# Patient Record
Sex: FEMALE | Race: ASIAN | NOT HISPANIC OR LATINO | URBAN - METROPOLITAN AREA
[De-identification: names, ages, dates, MRNs, and addresses within clinical notes are randomized per-mention and may not be internally consistent; named-entity substitution may affect disease eponyms.]

---

## 2017-03-01 ENCOUNTER — OUTPATIENT (OUTPATIENT)
Dept: OUTPATIENT SERVICES | Facility: HOSPITAL | Age: 72
LOS: 1 days | End: 2017-03-01
Payer: MEDICAID

## 2017-03-01 PROCEDURE — G9001: CPT

## 2017-03-14 DIAGNOSIS — R69 ILLNESS, UNSPECIFIED: ICD-10-CM

## 2018-04-11 ENCOUNTER — APPOINTMENT (OUTPATIENT)
Dept: MAMMOGRAPHY | Facility: IMAGING CENTER | Age: 73
End: 2018-04-11

## 2018-04-11 ENCOUNTER — APPOINTMENT (OUTPATIENT)
Dept: ULTRASOUND IMAGING | Facility: IMAGING CENTER | Age: 73
End: 2018-04-11

## 2019-02-01 ENCOUNTER — OUTPATIENT (OUTPATIENT)
Dept: OUTPATIENT SERVICES | Facility: HOSPITAL | Age: 74
LOS: 1 days | End: 2019-02-01
Payer: MEDICAID

## 2019-02-01 PROCEDURE — G9001: CPT

## 2019-02-21 ENCOUNTER — INPATIENT (INPATIENT)
Facility: HOSPITAL | Age: 74
LOS: 2 days | Discharge: HOME HEALTH | End: 2019-02-24
Attending: STUDENT IN AN ORGANIZED HEALTH CARE EDUCATION/TRAINING PROGRAM | Admitting: STUDENT IN AN ORGANIZED HEALTH CARE EDUCATION/TRAINING PROGRAM
Payer: MEDICARE

## 2019-02-21 VITALS
OXYGEN SATURATION: 96 % | DIASTOLIC BLOOD PRESSURE: 58 MMHG | SYSTOLIC BLOOD PRESSURE: 128 MMHG | HEART RATE: 112 BPM | RESPIRATION RATE: 15 BRPM | TEMPERATURE: 101 F

## 2019-02-21 LAB
ALBUMIN SERPL ELPH-MCNC: 4 G/DL — SIGNIFICANT CHANGE UP (ref 3.3–5)
ALP SERPL-CCNC: 111 U/L — SIGNIFICANT CHANGE UP (ref 40–120)
ALT FLD-CCNC: 9 U/L — SIGNIFICANT CHANGE UP (ref 4–33)
ANION GAP SERPL CALC-SCNC: 13 MMO/L — SIGNIFICANT CHANGE UP (ref 7–14)
AST SERPL-CCNC: 19 U/L — SIGNIFICANT CHANGE UP (ref 4–32)
BASE EXCESS BLDV CALC-SCNC: -0.2 MMOL/L — SIGNIFICANT CHANGE UP
BASOPHILS # BLD AUTO: 0.06 K/UL — SIGNIFICANT CHANGE UP (ref 0–0.2)
BASOPHILS NFR BLD AUTO: 0.3 % — SIGNIFICANT CHANGE UP (ref 0–2)
BILIRUB SERPL-MCNC: 1.8 MG/DL — HIGH (ref 0.2–1.2)
BLOOD GAS VENOUS - CREATININE: 0.94 MG/DL — SIGNIFICANT CHANGE UP (ref 0.5–1.3)
BUN SERPL-MCNC: 18 MG/DL — SIGNIFICANT CHANGE UP (ref 7–23)
CALCIUM SERPL-MCNC: 9.3 MG/DL — SIGNIFICANT CHANGE UP (ref 8.4–10.5)
CHLORIDE BLDV-SCNC: 105 MMOL/L — SIGNIFICANT CHANGE UP (ref 96–108)
CHLORIDE SERPL-SCNC: 98 MMOL/L — SIGNIFICANT CHANGE UP (ref 98–107)
CO2 SERPL-SCNC: 23 MMOL/L — SIGNIFICANT CHANGE UP (ref 22–31)
CREAT SERPL-MCNC: 0.97 MG/DL — SIGNIFICANT CHANGE UP (ref 0.5–1.3)
EOSINOPHIL # BLD AUTO: 0.01 K/UL — SIGNIFICANT CHANGE UP (ref 0–0.5)
EOSINOPHIL NFR BLD AUTO: 0.1 % — SIGNIFICANT CHANGE UP (ref 0–6)
FLU A RESULT: NOT DETECTED — SIGNIFICANT CHANGE UP
FLU A RESULT: NOT DETECTED — SIGNIFICANT CHANGE UP
FLUAV AG NPH QL: NOT DETECTED — SIGNIFICANT CHANGE UP
FLUBV AG NPH QL: NOT DETECTED — SIGNIFICANT CHANGE UP
GAS PNL BLDV: 135 MMOL/L — LOW (ref 136–146)
GLUCOSE BLDV-MCNC: 189 — HIGH (ref 70–99)
GLUCOSE SERPL-MCNC: 186 MG/DL — HIGH (ref 70–99)
HCO3 BLDV-SCNC: 24 MMOL/L — SIGNIFICANT CHANGE UP (ref 20–27)
HCT VFR BLD CALC: 36.4 % — SIGNIFICANT CHANGE UP (ref 34.5–45)
HCT VFR BLDV CALC: 40.7 % — SIGNIFICANT CHANGE UP (ref 34.5–45)
HGB BLD-MCNC: 12.3 G/DL — SIGNIFICANT CHANGE UP (ref 11.5–15.5)
HGB BLDV-MCNC: 13.3 G/DL — SIGNIFICANT CHANGE UP (ref 11.5–15.5)
IMM GRANULOCYTES NFR BLD AUTO: 0.6 % — SIGNIFICANT CHANGE UP (ref 0–1.5)
LACTATE BLDV-MCNC: 1.5 MMOL/L — SIGNIFICANT CHANGE UP (ref 0.5–2)
LYMPHOCYTES # BLD AUTO: 16.5 % — SIGNIFICANT CHANGE UP (ref 13–44)
LYMPHOCYTES # BLD AUTO: 3.3 K/UL — SIGNIFICANT CHANGE UP (ref 1–3.3)
MCHC RBC-ENTMCNC: 29.6 PG — SIGNIFICANT CHANGE UP (ref 27–34)
MCHC RBC-ENTMCNC: 33.8 % — SIGNIFICANT CHANGE UP (ref 32–36)
MCV RBC AUTO: 87.7 FL — SIGNIFICANT CHANGE UP (ref 80–100)
MONOCYTES # BLD AUTO: 1.32 K/UL — HIGH (ref 0–0.9)
MONOCYTES NFR BLD AUTO: 6.6 % — SIGNIFICANT CHANGE UP (ref 2–14)
NEUTROPHILS # BLD AUTO: 15.18 K/UL — HIGH (ref 1.8–7.4)
NEUTROPHILS NFR BLD AUTO: 75.9 % — SIGNIFICANT CHANGE UP (ref 43–77)
NRBC # FLD: 0 K/UL — LOW (ref 25–125)
PCO2 BLDV: 37 MMHG — LOW (ref 41–51)
PH BLDV: 7.43 PH — SIGNIFICANT CHANGE UP (ref 7.32–7.43)
PLATELET # BLD AUTO: 246 K/UL — SIGNIFICANT CHANGE UP (ref 150–400)
PMV BLD: 10.4 FL — SIGNIFICANT CHANGE UP (ref 7–13)
PO2 BLDV: 28 MMHG — LOW (ref 35–40)
POTASSIUM BLDV-SCNC: 3.6 MMOL/L — SIGNIFICANT CHANGE UP (ref 3.4–4.5)
POTASSIUM SERPL-MCNC: 3.5 MMOL/L — SIGNIFICANT CHANGE UP (ref 3.5–5.3)
POTASSIUM SERPL-SCNC: 3.5 MMOL/L — SIGNIFICANT CHANGE UP (ref 3.5–5.3)
PROT SERPL-MCNC: 8.5 G/DL — HIGH (ref 6–8.3)
RBC # BLD: 4.15 M/UL — SIGNIFICANT CHANGE UP (ref 3.8–5.2)
RBC # FLD: 12.7 % — SIGNIFICANT CHANGE UP (ref 10.3–14.5)
RSV RESULT: SIGNIFICANT CHANGE UP
RSV RNA RESP QL NAA+PROBE: SIGNIFICANT CHANGE UP
SAO2 % BLDV: 50.8 % — LOW (ref 60–85)
SODIUM SERPL-SCNC: 134 MMOL/L — LOW (ref 135–145)
WBC # BLD: 19.98 K/UL — HIGH (ref 3.8–10.5)
WBC # FLD AUTO: 19.98 K/UL — HIGH (ref 3.8–10.5)

## 2019-02-21 PROCEDURE — 71045 X-RAY EXAM CHEST 1 VIEW: CPT | Mod: 26,76

## 2019-02-21 RX ORDER — SODIUM CHLORIDE 9 MG/ML
1000 INJECTION INTRAMUSCULAR; INTRAVENOUS; SUBCUTANEOUS ONCE
Qty: 0 | Refills: 0 | Status: COMPLETED | OUTPATIENT
Start: 2019-02-21 | End: 2019-02-21

## 2019-02-21 RX ORDER — ACETAMINOPHEN 500 MG
650 TABLET ORAL ONCE
Qty: 0 | Refills: 0 | Status: COMPLETED | OUTPATIENT
Start: 2019-02-21 | End: 2019-02-21

## 2019-02-21 RX ADMIN — Medication 650 MILLIGRAM(S): at 21:17

## 2019-02-21 RX ADMIN — Medication 650 MILLIGRAM(S): at 22:00

## 2019-02-21 RX ADMIN — SODIUM CHLORIDE 2000 MILLILITER(S): 9 INJECTION INTRAMUSCULAR; INTRAVENOUS; SUBCUTANEOUS at 21:17

## 2019-02-21 NOTE — ED PROVIDER NOTE - ATTENDING CONTRIBUTION TO CARE
Donna: 74 yo female with a h/o DM, HTN, c/o one week of flu like illness. Pt endorses productive cough, myalgias, nasal congestion, and subjective fevers and chills. No LE pain or edema. No abdominal pain, nausea or vomiting. No recent sick contacts or foreign travel. Exam: GENERAL: well appearing, NAD, HEENT: MMM, PERRLA, CARDIO: +S1/S2, no murmurs, rubs or gallops, LUNGS: CTA B/L, no wheezing, rales or rhonchi, no tachypnea, speaking in full sentences ABD: soft, nontender, BSx4 quadrants, no guarding or rigidity. EXT: No LE edema NEURO: AxOx3,  SKIN: no rashes or lesions, well perfused A/P- 74yo female with URI symptoms. will obtain cbc, cmp, VBG, CXR, give IVf and reassess.

## 2019-02-21 NOTE — ED PROVIDER NOTE - PROGRESS NOTE DETAILS
Maegan ([facility]):  73 y.o. Female PMH DMII p/w diarrhea, cough, and fever/chills x 1 week.  Febrile, but well appearing and unremarkable exam, lungs clear.  Infectious w/u, Tylenol, IVF, CXR, UA/UCx, reassess.

## 2019-02-21 NOTE — ED PROVIDER NOTE - CLINICAL SUMMARY MEDICAL DECISION MAKING FREE TEXT BOX
Kyle WREN MD PGY1: 73 F p/w cough, fevers, weaknesss c/f influenza vs pneumonia. WIll eval with flu swab, CXR. Likely treat with levaquin.

## 2019-02-21 NOTE — ED PROVIDER NOTE - NSFOLLOWUPINSTRUCTIONS_ED_ALL_ED_FT
Please return to the ED for any concerns, Please follow-up with a primary care doctor in the next week. Please take your antibiotics as directed.

## 2019-02-21 NOTE — ED PROVIDER NOTE - CARE PLAN
Principal Discharge DX:	Pneumonia Principal Discharge DX:	Pneumonia  Secondary Diagnosis:	Sepsis, due to unspecified organism

## 2019-02-21 NOTE — ED PROVIDER NOTE - PHYSICAL EXAMINATION
Kyle WREN MD PGY1:   PHYSICAL EXAM:    GENERAL: NAD, well-developed  HEENT:  Atraumatic, Normocephalic  CHEST/LUNG: Chest rise equal bilaterally. CTAB.   HEART: Regular rate and rhythm. No murmurs or rubs.   ABDOMEN: Soft, Nontender, Nondistended  EXTREMITIES:  2+ Peripheral Pulses.  PSYCH: A&Ox3  SKIN: No obvious rashes or lesions

## 2019-02-21 NOTE — ED ADULT TRIAGE NOTE - CHIEF COMPLAINT QUOTE
Pt brought in by EMS from home for flu-like symptoms.  C/o diarrhea,  chills, productive cough for approx 1 week.  Upon EMS arrival, pt was 88% RA, increased to 96% on 4L O2 via NC.  Denies taking any OTC meds for symptoms.  Denies any chest pain/palpitations.  PMHx: DM, HTN, high cholesterol

## 2019-02-21 NOTE — ED ADULT NURSE NOTE - OBJECTIVE STATEMENT
pt received alert and oriented x3. pmhx dm. pt c.o having flu like symptoms consisting of body aches,fevers,chills for one week. pt states she last took tyneol for fever this morning. respirations equal and unlabored. abd soft and non tender. pt declines any chest pain,sob,n/v/d,dizziness,lightheadness. Call bell in reach, warm blanket provided, bed in lowest position, side rails up x2,safety maintained. will continue to monitor. md at bedside for eval.

## 2019-02-21 NOTE — ED PROVIDER NOTE - NSFOLLOWUPCLINICS_GEN_ALL_ED_FT
[facility] - Primary Care  Primary Care  [address] [place] [address]  Phone: [phone]  Fax:   Follow Up Time:

## 2019-02-21 NOTE — ED PROVIDER NOTE - OBJECTIVE STATEMENT
Kyle WREN MD PGY1: 73 F PMH HTn and T2DM p/w productive cough, subjective fevers, and weakness x 2 weeks. Has not seen anyone for this before. Has been taking antipyretics intermittently with some relief. No dysuria, hematuria. received influenza vaccine last season.

## 2019-02-22 ENCOUNTER — TRANSCRIPTION ENCOUNTER (OUTPATIENT)
Age: 74
End: 2019-02-22

## 2019-02-22 DIAGNOSIS — Z29.9 ENCOUNTER FOR PROPHYLACTIC MEASURES, UNSPECIFIED: ICD-10-CM

## 2019-02-22 DIAGNOSIS — J18.9 PNEUMONIA, UNSPECIFIED ORGANISM: ICD-10-CM

## 2019-02-22 DIAGNOSIS — E11.9 TYPE 2 DIABETES MELLITUS WITHOUT COMPLICATIONS: ICD-10-CM

## 2019-02-22 DIAGNOSIS — R65.10 SYSTEMIC INFLAMMATORY RESPONSE SYNDROME (SIRS) OF NON-INFECTIOUS ORIGIN WITHOUT ACUTE ORGAN DYSFUNCTION: ICD-10-CM

## 2019-02-22 DIAGNOSIS — Z79.899 OTHER LONG TERM (CURRENT) DRUG THERAPY: ICD-10-CM

## 2019-02-22 DIAGNOSIS — I10 ESSENTIAL (PRIMARY) HYPERTENSION: ICD-10-CM

## 2019-02-22 DIAGNOSIS — E87.1 HYPO-OSMOLALITY AND HYPONATREMIA: ICD-10-CM

## 2019-02-22 DIAGNOSIS — R50.9 FEVER, UNSPECIFIED: ICD-10-CM

## 2019-02-22 DIAGNOSIS — R00.0 TACHYCARDIA, UNSPECIFIED: ICD-10-CM

## 2019-02-22 DIAGNOSIS — R19.7 DIARRHEA, UNSPECIFIED: ICD-10-CM

## 2019-02-22 LAB
ALBUMIN SERPL ELPH-MCNC: 3.3 G/DL — SIGNIFICANT CHANGE UP (ref 3.3–5)
ALP SERPL-CCNC: 101 U/L — SIGNIFICANT CHANGE UP (ref 40–120)
ALT FLD-CCNC: 14 U/L — SIGNIFICANT CHANGE UP (ref 4–33)
ANION GAP SERPL CALC-SCNC: 12 MMO/L — SIGNIFICANT CHANGE UP (ref 7–14)
APPEARANCE UR: CLEAR — SIGNIFICANT CHANGE UP
AST SERPL-CCNC: 22 U/L — SIGNIFICANT CHANGE UP (ref 4–32)
B PERT DNA SPEC QL NAA+PROBE: NOT DETECTED — SIGNIFICANT CHANGE UP
BACTERIA # UR AUTO: NEGATIVE — SIGNIFICANT CHANGE UP
BASE EXCESS BLDV CALC-SCNC: -3.5 MMOL/L — SIGNIFICANT CHANGE UP
BASOPHILS # BLD AUTO: 0.05 K/UL — SIGNIFICANT CHANGE UP (ref 0–0.2)
BASOPHILS NFR BLD AUTO: 0.3 % — SIGNIFICANT CHANGE UP (ref 0–2)
BILIRUB SERPL-MCNC: 1.7 MG/DL — HIGH (ref 0.2–1.2)
BILIRUB UR-MCNC: NEGATIVE — SIGNIFICANT CHANGE UP
BLOOD GAS VENOUS - CREATININE: 0.75 MG/DL — SIGNIFICANT CHANGE UP (ref 0.5–1.3)
BLOOD UR QL VISUAL: SIGNIFICANT CHANGE UP
BUN SERPL-MCNC: 15 MG/DL — SIGNIFICANT CHANGE UP (ref 7–23)
C PNEUM DNA SPEC QL NAA+PROBE: NOT DETECTED — SIGNIFICANT CHANGE UP
CALCIUM SERPL-MCNC: 7.9 MG/DL — LOW (ref 8.4–10.5)
CHLORIDE BLDV-SCNC: 110 MMOL/L — HIGH (ref 96–108)
CHLORIDE SERPL-SCNC: 106 MMOL/L — SIGNIFICANT CHANGE UP (ref 98–107)
CO2 SERPL-SCNC: 21 MMOL/L — LOW (ref 22–31)
COLOR SPEC: YELLOW — SIGNIFICANT CHANGE UP
CREAT SERPL-MCNC: 0.83 MG/DL — SIGNIFICANT CHANGE UP (ref 0.5–1.3)
EOSINOPHIL # BLD AUTO: 0.17 K/UL — SIGNIFICANT CHANGE UP (ref 0–0.5)
EOSINOPHIL NFR BLD AUTO: 1 % — SIGNIFICANT CHANGE UP (ref 0–6)
FLUAV H1 2009 PAND RNA SPEC QL NAA+PROBE: NOT DETECTED — SIGNIFICANT CHANGE UP
FLUAV H1 RNA SPEC QL NAA+PROBE: NOT DETECTED — SIGNIFICANT CHANGE UP
FLUAV H3 RNA SPEC QL NAA+PROBE: NOT DETECTED — SIGNIFICANT CHANGE UP
FLUAV SUBTYP SPEC NAA+PROBE: NOT DETECTED — SIGNIFICANT CHANGE UP
FLUBV RNA SPEC QL NAA+PROBE: NOT DETECTED — SIGNIFICANT CHANGE UP
GAS PNL BLDV: 139 MMOL/L — SIGNIFICANT CHANGE UP (ref 136–146)
GLUCOSE BLDV-MCNC: 114 — HIGH (ref 70–99)
GLUCOSE SERPL-MCNC: 115 MG/DL — HIGH (ref 70–99)
GLUCOSE UR-MCNC: 30 — SIGNIFICANT CHANGE UP
HADV DNA SPEC QL NAA+PROBE: NOT DETECTED — SIGNIFICANT CHANGE UP
HBA1C BLD-MCNC: 6.4 % — HIGH (ref 4–5.6)
HCO3 BLDV-SCNC: 21 MMOL/L — SIGNIFICANT CHANGE UP (ref 20–27)
HCOV PNL SPEC NAA+PROBE: SIGNIFICANT CHANGE UP
HCT VFR BLD CALC: 33.3 % — LOW (ref 34.5–45)
HCT VFR BLDV CALC: 35.4 % — SIGNIFICANT CHANGE UP (ref 34.5–45)
HGB BLD-MCNC: 11 G/DL — LOW (ref 11.5–15.5)
HGB BLDV-MCNC: 11.5 G/DL — SIGNIFICANT CHANGE UP (ref 11.5–15.5)
HMPV RNA SPEC QL NAA+PROBE: NOT DETECTED — SIGNIFICANT CHANGE UP
HPIV1 RNA SPEC QL NAA+PROBE: NOT DETECTED — SIGNIFICANT CHANGE UP
HPIV2 RNA SPEC QL NAA+PROBE: NOT DETECTED — SIGNIFICANT CHANGE UP
HPIV3 RNA SPEC QL NAA+PROBE: NOT DETECTED — SIGNIFICANT CHANGE UP
HPIV4 RNA SPEC QL NAA+PROBE: NOT DETECTED — SIGNIFICANT CHANGE UP
HYALINE CASTS # UR AUTO: SIGNIFICANT CHANGE UP
IMM GRANULOCYTES NFR BLD AUTO: 0.5 % — SIGNIFICANT CHANGE UP (ref 0–1.5)
KETONES UR-MCNC: SIGNIFICANT CHANGE UP
LACTATE BLDV-MCNC: 1.9 MMOL/L — SIGNIFICANT CHANGE UP (ref 0.5–2)
LEUKOCYTE ESTERASE UR-ACNC: NEGATIVE — SIGNIFICANT CHANGE UP
LYMPHOCYTES # BLD AUTO: 17.7 % — SIGNIFICANT CHANGE UP (ref 13–44)
LYMPHOCYTES # BLD AUTO: 2.89 K/UL — SIGNIFICANT CHANGE UP (ref 1–3.3)
MAGNESIUM SERPL-MCNC: 1.8 MG/DL — SIGNIFICANT CHANGE UP (ref 1.6–2.6)
MCHC RBC-ENTMCNC: 29.5 PG — SIGNIFICANT CHANGE UP (ref 27–34)
MCHC RBC-ENTMCNC: 33 % — SIGNIFICANT CHANGE UP (ref 32–36)
MCV RBC AUTO: 89.3 FL — SIGNIFICANT CHANGE UP (ref 80–100)
MONOCYTES # BLD AUTO: 1.51 K/UL — HIGH (ref 0–0.9)
MONOCYTES NFR BLD AUTO: 9.2 % — SIGNIFICANT CHANGE UP (ref 2–14)
MUCOUS THREADS # UR AUTO: SIGNIFICANT CHANGE UP
NEUTROPHILS # BLD AUTO: 11.65 K/UL — HIGH (ref 1.8–7.4)
NEUTROPHILS NFR BLD AUTO: 71.3 % — SIGNIFICANT CHANGE UP (ref 43–77)
NITRITE UR-MCNC: NEGATIVE — SIGNIFICANT CHANGE UP
NRBC # FLD: 0 K/UL — LOW (ref 25–125)
PCO2 BLDV: 41 MMHG — SIGNIFICANT CHANGE UP (ref 41–51)
PH BLDV: 7.34 PH — SIGNIFICANT CHANGE UP (ref 7.32–7.43)
PH UR: 6 — SIGNIFICANT CHANGE UP (ref 5–8)
PHOSPHATE SERPL-MCNC: 1.8 MG/DL — LOW (ref 2.5–4.5)
PLATELET # BLD AUTO: 214 K/UL — SIGNIFICANT CHANGE UP (ref 150–400)
PMV BLD: 10.3 FL — SIGNIFICANT CHANGE UP (ref 7–13)
PO2 BLDV: 27 MMHG — LOW (ref 35–40)
POTASSIUM BLDV-SCNC: 3.1 MMOL/L — LOW (ref 3.4–4.5)
POTASSIUM SERPL-MCNC: 3.4 MMOL/L — LOW (ref 3.5–5.3)
POTASSIUM SERPL-SCNC: 3.4 MMOL/L — LOW (ref 3.5–5.3)
PROCALCITONIN SERPL-MCNC: 0.24 NG/ML — HIGH (ref 0.02–0.1)
PROT SERPL-MCNC: 7 G/DL — SIGNIFICANT CHANGE UP (ref 6–8.3)
PROT UR-MCNC: 70 — SIGNIFICANT CHANGE UP
RBC # BLD: 3.73 M/UL — LOW (ref 3.8–5.2)
RBC # FLD: 12.7 % — SIGNIFICANT CHANGE UP (ref 10.3–14.5)
RBC CASTS # UR COMP ASSIST: SIGNIFICANT CHANGE UP (ref 0–?)
RSV RNA SPEC QL NAA+PROBE: NOT DETECTED — SIGNIFICANT CHANGE UP
RV+EV RNA SPEC QL NAA+PROBE: NOT DETECTED — SIGNIFICANT CHANGE UP
SAO2 % BLDV: 43.4 % — LOW (ref 60–85)
SODIUM SERPL-SCNC: 139 MMOL/L — SIGNIFICANT CHANGE UP (ref 135–145)
SP GR SPEC: 1.03 — SIGNIFICANT CHANGE UP (ref 1–1.04)
SQUAMOUS # UR AUTO: SIGNIFICANT CHANGE UP
UROBILINOGEN FLD QL: SIGNIFICANT CHANGE UP
WBC # BLD: 16.35 K/UL — HIGH (ref 3.8–10.5)
WBC # FLD AUTO: 16.35 K/UL — HIGH (ref 3.8–10.5)
WBC UR QL: SIGNIFICANT CHANGE UP (ref 0–?)

## 2019-02-22 PROCEDURE — 12345: CPT | Mod: NC,GC

## 2019-02-22 PROCEDURE — 99223 1ST HOSP IP/OBS HIGH 75: CPT | Mod: GC

## 2019-02-22 RX ORDER — LACTOBACILLUS ACIDOPHILUS 100MM CELL
1 CAPSULE ORAL DAILY
Qty: 0 | Refills: 0 | Status: DISCONTINUED | OUTPATIENT
Start: 2019-02-22 | End: 2019-02-24

## 2019-02-22 RX ORDER — DORZOLAMIDE HYDROCHLORIDE 20 MG/ML
1 SOLUTION/ DROPS OPHTHALMIC THREE TIMES A DAY
Qty: 0 | Refills: 0 | Status: DISCONTINUED | OUTPATIENT
Start: 2019-02-22 | End: 2019-02-24

## 2019-02-22 RX ORDER — BENZOCAINE AND MENTHOL 5; 1 G/100ML; G/100ML
1 LIQUID ORAL EVERY 6 HOURS
Qty: 0 | Refills: 0 | Status: DISCONTINUED | OUTPATIENT
Start: 2019-02-22 | End: 2019-02-22

## 2019-02-22 RX ORDER — DEXTROSE 50 % IN WATER 50 %
12.5 SYRINGE (ML) INTRAVENOUS ONCE
Qty: 0 | Refills: 0 | Status: DISCONTINUED | OUTPATIENT
Start: 2019-02-22 | End: 2019-02-24

## 2019-02-22 RX ORDER — SODIUM CHLORIDE 9 MG/ML
1000 INJECTION, SOLUTION INTRAVENOUS
Qty: 0 | Refills: 0 | Status: DISCONTINUED | OUTPATIENT
Start: 2019-02-22 | End: 2019-02-22

## 2019-02-22 RX ORDER — ACETAMINOPHEN 500 MG
650 TABLET ORAL EVERY 6 HOURS
Qty: 0 | Refills: 0 | Status: DISCONTINUED | OUTPATIENT
Start: 2019-02-22 | End: 2019-02-24

## 2019-02-22 RX ORDER — SODIUM CHLORIDE 9 MG/ML
1000 INJECTION INTRAMUSCULAR; INTRAVENOUS; SUBCUTANEOUS ONCE
Qty: 0 | Refills: 0 | Status: COMPLETED | OUTPATIENT
Start: 2019-02-22 | End: 2019-02-22

## 2019-02-22 RX ORDER — GLUCAGON INJECTION, SOLUTION 0.5 MG/.1ML
1 INJECTION, SOLUTION SUBCUTANEOUS ONCE
Qty: 0 | Refills: 0 | Status: DISCONTINUED | OUTPATIENT
Start: 2019-02-22 | End: 2019-02-24

## 2019-02-22 RX ORDER — DEXTROSE 50 % IN WATER 50 %
25 SYRINGE (ML) INTRAVENOUS ONCE
Qty: 0 | Refills: 0 | Status: DISCONTINUED | OUTPATIENT
Start: 2019-02-22 | End: 2019-02-24

## 2019-02-22 RX ORDER — SODIUM CHLORIDE 9 MG/ML
1000 INJECTION INTRAMUSCULAR; INTRAVENOUS; SUBCUTANEOUS
Qty: 0 | Refills: 0 | Status: DISCONTINUED | OUTPATIENT
Start: 2019-02-22 | End: 2019-02-24

## 2019-02-22 RX ORDER — INSULIN LISPRO 100/ML
VIAL (ML) SUBCUTANEOUS AT BEDTIME
Qty: 0 | Refills: 0 | Status: DISCONTINUED | OUTPATIENT
Start: 2019-02-22 | End: 2019-02-24

## 2019-02-22 RX ORDER — SODIUM CHLORIDE 9 MG/ML
1000 INJECTION, SOLUTION INTRAVENOUS
Qty: 0 | Refills: 0 | Status: DISCONTINUED | OUTPATIENT
Start: 2019-02-22 | End: 2019-02-24

## 2019-02-22 RX ORDER — BENZOCAINE AND MENTHOL 5; 1 G/100ML; G/100ML
1 LIQUID ORAL EVERY 6 HOURS
Qty: 0 | Refills: 0 | Status: DISCONTINUED | OUTPATIENT
Start: 2019-02-22 | End: 2019-02-24

## 2019-02-22 RX ORDER — INSULIN LISPRO 100/ML
VIAL (ML) SUBCUTANEOUS
Qty: 0 | Refills: 0 | Status: DISCONTINUED | OUTPATIENT
Start: 2019-02-22 | End: 2019-02-24

## 2019-02-22 RX ORDER — SODIUM,POTASSIUM PHOSPHATES 278-250MG
1 POWDER IN PACKET (EA) ORAL
Qty: 0 | Refills: 0 | Status: COMPLETED | OUTPATIENT
Start: 2019-02-22 | End: 2019-02-22

## 2019-02-22 RX ORDER — DEXTROSE 50 % IN WATER 50 %
15 SYRINGE (ML) INTRAVENOUS ONCE
Qty: 0 | Refills: 0 | Status: DISCONTINUED | OUTPATIENT
Start: 2019-02-22 | End: 2019-02-24

## 2019-02-22 RX ADMIN — SODIUM CHLORIDE 75 MILLILITER(S): 9 INJECTION INTRAMUSCULAR; INTRAVENOUS; SUBCUTANEOUS at 05:27

## 2019-02-22 RX ADMIN — SODIUM CHLORIDE 1000 MILLILITER(S): 9 INJECTION INTRAMUSCULAR; INTRAVENOUS; SUBCUTANEOUS at 01:30

## 2019-02-22 RX ADMIN — Medication 100 MILLIGRAM(S): at 22:02

## 2019-02-22 RX ADMIN — Medication 1 TABLET(S): at 11:45

## 2019-02-22 RX ADMIN — Medication 100 MILLIGRAM(S): at 14:26

## 2019-02-22 RX ADMIN — Medication 1 PACKET(S): at 11:45

## 2019-02-22 RX ADMIN — DORZOLAMIDE HYDROCHLORIDE 1 DROP(S): 20 SOLUTION/ DROPS OPHTHALMIC at 22:03

## 2019-02-22 RX ADMIN — Medication 100 MILLIGRAM(S): at 05:28

## 2019-02-22 RX ADMIN — BENZOCAINE AND MENTHOL 1 LOZENGE: 5; 1 LIQUID ORAL at 06:34

## 2019-02-22 RX ADMIN — DORZOLAMIDE HYDROCHLORIDE 1 DROP(S): 20 SOLUTION/ DROPS OPHTHALMIC at 14:26

## 2019-02-22 RX ADMIN — SODIUM CHLORIDE 2000 MILLILITER(S): 9 INJECTION INTRAMUSCULAR; INTRAVENOUS; SUBCUTANEOUS at 00:30

## 2019-02-22 RX ADMIN — Medication 100 MILLIGRAM(S): at 06:34

## 2019-02-22 NOTE — PROGRESS NOTE ADULT - PROBLEM SELECTOR PLAN 1
- Patient with tachycardia, fever, and leukocytosis w/ symptoms concerning for respiratory infection  - S/p levaquin  - RVP negative   - CXR- No pleural effusions or pneumothorax.  - pro-calcitonin- 0.24  - c/w IV Levaquin 750 mg daily, x 5 days  - f/u CT chest  - f/u BCx and UCx, urine legionella

## 2019-02-22 NOTE — H&P ADULT - PROBLEM SELECTOR PLAN 8
- Patient does not recall medications. Need to get med rec from [facility] [address] [place] [address] Phone: [phone] - DVT prophylaxis   - Fall risk   - May need PT consult.

## 2019-02-22 NOTE — H&P ADULT - NSHPPHYSICALEXAM_GEN_ALL_CORE
VITAL SIGNS (Last 24 hrs):  T(C): 36.6 (02-22-19 @ 02:51), Max: 38.4 (02-21-19 @ 19:11)  HR: 90 (02-22-19 @ 02:51) (81 - 112)  BP: 110/59 (02-22-19 @ 02:51) (95/47 - 144/86)  RR: 16 (02-22-19 @ 02:51) (15 - 16)  SpO2: 99% (02-22-19 @ 02:51) (95% - 100%)    I&O's Summary VITAL SIGNS (Last 24 hrs):  T(C): 36.6 (02-22-19 @ 02:51), Max: 38.4 (02-21-19 @ 19:11)  HR: 90 (02-22-19 @ 02:51) (81 - 112)  BP: 110/59 (02-22-19 @ 02:51) (95/47 - 144/86)  RR: 16 (02-22-19 @ 02:51) (15 - 16)  SpO2: 99% (02-22-19 @ 02:51) (95% - 100%)    I&O's Summary    GENERAL: NAD, well-developed  HEAD:  Atraumatic, Normocephalic  EYES: EOMI, conjunctiva and sclera clear  NECK: Supple, No JVD  CHEST/LUNG: Course breath sounds.   HEART: Regular rate and rhythm; normal S1 S2,  No murmurs, rubs, or gallops  ABDOMEN: Soft, Nontender, Nondistended; Bowel sounds normal  EXTREMITIES:  2+ Peripheral Pulses, No clubbing, cyanosis, or edema  PSYCH: AAOx3, No acute distress   NEUROLOGY: non-focal  SKIN: No rashes or lesions VITAL SIGNS (Last 24 hrs):  T(C): 36.6 (02-22-19 @ 02:51), Max: 38.4 (02-21-19 @ 19:11)  HR: 90 (02-22-19 @ 02:51) (81 - 112)  BP: 110/59 (02-22-19 @ 02:51) (95/47 - 144/86)  RR: 16 (02-22-19 @ 02:51) (15 - 16)  SpO2: 99% (02-22-19 @ 02:51) (95% - 100%)    Constitutional: NAD, well-developed, well-nourished  Ears, Nose, Mouth, and Throat: normal external ears and nose, normal hearing, moist oral mucosa  Eyes: normal conjunctiva, EOMI, PERRL  Neck: supple, no JVD  Respiratory: +coarse breath sounds, normal respiratory effort  Cardiovascular: RRR, no M/R/G, no edema, 2+ Peripheral Pulses  Gastrointestinal: soft, nontender, nondistended, +BS, no hernia  Skin: warm, dry, no rash  Neurologic: sensation grossly intact, CN grossly intact, non-focal exam  Musculoskeletal: no clubbing, no cyanosis, no joint swelling  Psychiatric: AOX3, normal mood, affect

## 2019-02-22 NOTE — H&P ADULT - PROBLEM SELECTOR PLAN 6
- Consistent carb diet   - C/w SSI   - Hgba1c in the am. - 1 episode in the ED.   - Lactobacillus ordered.   - CTM

## 2019-02-22 NOTE — DISCHARGE NOTE ADULT - PATIENT PORTAL LINK FT
You can access the [email or web address] Patient Portal, offered by [facility], by registering with the following website: [email or web address]

## 2019-02-22 NOTE — H&P ADULT - NSHPREVIEWOFSYSTEMS_GEN_ALL_CORE
Constitutional: Fever,   Eyes: No recent vision problems or eye pain.  ENT: No congestion, ear pain, or sore throat.  Endocrine: No excess sweating, temperature intolerance  Cardiovascular: No chest pain, palpitations, shortness of breath, pre-syncope, syncope  Respiratory: productive cough   Gastrointestinal: Diarrhea. No abdominal pain, nausea, vomiting,  Genitourinary: No dysuria, hematuria  Musculoskeletal: No joint swelling, joint pain  Neurologic: No headache, dizziness, focal weakness  Skin: No rashes, hematoma, prupura Constitutional Symptoms: +fever, chills  Eyes: No visual changes, headache, eye pain, double vision  Ears, Nose, Mouth, Throat: No runny nose, sinus pain, ear pain, tinnitus, sore throat, dysphagia, odynophagia  Cardiovascular: No chest pain, palpitations, edema  Respiratory: +cough, no hemoptysis, shortness of breath  Gastrointestinal: +diarrhea, no abdominal pain, dysphagia, anorexia, nausea/vomiting, hematemesis, BRBPR, melena  Genitourinary: No dysuria, frequency, hematuria  Musculoskeletal: No joint pain, joint swelling, decreased ROM  Skin: No pruritus, rashes, lesions, wounds  Neurologic:  No seizures, headache, paraesthesia, numbness, limb weakness    Positives and pertinent negatives noted and all other systems negative.

## 2019-02-22 NOTE — H&P ADULT - ASSESSMENT
74 yo woman with pmhx of HTN, T2DM, Glaucoma, Osteoarthritis  who presents for productive cough, subjective fevers, chills, and productive cough for 1 week. SIRS and FUO.

## 2019-02-22 NOTE — PROGRESS NOTE ADULT - ASSESSMENT
72 yo woman with pmhx of HTN, T2DM, Glaucoma, Osteoarthritis  who presents for productive cough, subjective fevers, chills, and productive cough for 1 week.

## 2019-02-22 NOTE — H&P ADULT - HISTORY OF PRESENT ILLNESS
72 yo woman with pmhx of HTN, T2DM who presents for productive cough, subjective fevers and weakness for the past 2 weeks.     In the ED, VS T101.1 , /58 RR 15 Saturating 96%. Blood pressure dropped to SBP 90s, responded to IVF. 72 yo woman with pmhx of HTN, T2DM, Glaucoma, Osteoarthritis  who presents for productive cough, subjective fevers, chills, and productive cough for 1 week. She states all of her symptoms begun around the same time and have been getting worse. She denied any sick contacts. She has been taking acetaminophen for pain, with mild improvement. She states her cough if productive of yellowish sputum, with no blood. Denied any SOB, chest pain, abdominal pain. She recently returned from [place] on mid January. She states she looked very sick, which prompted her  to call 911. Patient does not know the name of the medications she takes, she states she has been having memory problems.     In the ED, VS T101.1 , /58 RR 15 Saturating 96%. Blood pressure dropped to SBP 90s, responded to IVF. She had one episode of watery diarrhea in the ED.

## 2019-02-22 NOTE — H&P ADULT - NSHPLABSRESULTS_GEN_ALL_CORE
12.3   19.98 )-----------( 246      ( 2019 21:30 )             36.4     Hgb Trend: 12.3<--  02    134<L>  |  98  |  18  ----------------------------<  186<H>  3.5   |  23  |  0.97    Ca    9.3      2019 21:30    TPro  8.5<H>  /  Alb  4.0  /  TBili  1.8<H>  /  DBili  x   /  AST  19  /  ALT  9   /  AlkPhos  111      Creatinine Trend: 0.97<--    Urinalysis Basic - ( 2019 23:50 )  Color: YELLOW / Appearance: CLEAR / S.029 / pH: 6.0  Gluc: 30 / Ketone: TRACE  / Bili: NEGATIVE / Urobili: TRACE   Blood: TRACE / Protein: 70 / Nitrite: NEGATIVE   Leuk Esterase: NEGATIVE / RBC: 3-5 / WBC 3-5   Sq Epi: FEW / Non Sq Epi: x / Bacteria: NEGATIVE

## 2019-02-22 NOTE — DISCHARGE NOTE ADULT - PROVIDER TOKENS
FREE:[LAST:[Tobin],FIRST:[Doni],PHONE:[(   )    -],FAX:[(   )    -],ADDRESS:[Address: [address] [place] [address]  Phone: [phone]]]

## 2019-02-22 NOTE — H&P ADULT - PROBLEM SELECTOR PLAN 7
- 1 episode in the ED.   - Lactobacillus ordered.   - CTM - Patient does not recall medications. Need to get med rec from [facility] [address] [place] [address] Phone: [phone]

## 2019-02-22 NOTE — H&P ADULT - PROBLEM SELECTOR PLAN 1
- Symptoms concerning for URI.   - RVP negative. CXR- clear lungs. U/a negative. s/p levaquin IV   - Acetaminophen for pain and fever.  Tessalon q8, Robitussin prn.   - BCx and UCx pending.   - Pro-calcitonin ordered. - Patient with tachycardia, fever, and leukocytosis w/ symptoms concerning for respiratory infection, RVP and CXR negative, will check CT chest. S/p levaquin, f/u CT chest, f/u BCx and UCx pending, check Pro-calcitonin ordered.

## 2019-02-22 NOTE — H&P ADULT - PROBLEM SELECTOR PLAN 4
- Holding anti-hypertensives due to soft blood pressure. S/p 3 liters in the ED.   - C/w NaCl at 75cc/hr for maintenance. - Likely from SIRS.   - EKG ordered

## 2019-02-22 NOTE — PHYSICAL THERAPY INITIAL EVALUATION ADULT - PERTINENT HX OF CURRENT PROBLEM, REHAB EVAL
This is a 74 yo woman admitted for productive cough, subjective fevers, chills, and productive cough for 1 week. SIRS and FUO.

## 2019-02-22 NOTE — DISCHARGE NOTE ADULT - PLAN OF CARE
resolving You presented with elevated heart rate, fever and elevated white blood cell count. We think this is likely secondary to pneumonia. Your blood culture and legionella were negative. We have been treating you with Levaquin. Please continue taking your antibiotics for 5 days. Please follow up with your outpatient PCP for further follow up. You have a history of hypertension. We have helf your blood pressure medication (losartan 50 mg daily and nifedipine 60 mg daily). Please follow up with your PCP within 1 week to assess whether it is safe to restart these medications. You have a history of diabetes mellitus and your HgA1c was 6.4. Please continue taking your metformin 500 mg daily. You reported history of diarrhea. Your diarrhea has resolved. Please continue taking lactobacillus, as needed. You presented with elevated heart rate, fever and elevated white blood cell count. We think this is likely secondary to pneumonia. Your blood culture and legionella were negative. Your CT chest showed tree-in-bud opacities. We have been treating you with Levaquin. Please continue taking your antibiotics for 5 days. Please follow up with your outpatient PCP for further follow up.

## 2019-02-22 NOTE — H&P ADULT - PROBLEM SELECTOR PLAN 2
- Patient with tachycardia, fever, and leukocytosis meeting SIRS criteria. No clear source of infection. No bands.   - Plan as above. likely hypovolemic 2/2 infection, s/p IVF, repeat BMP

## 2019-02-22 NOTE — DISCHARGE NOTE ADULT - MEDICATION SUMMARY - MEDICATIONS TO TAKE
I will START or STAY ON the medications listed below when I get home from the hospital:    losartan 50 mg oral tablet  -- 1 tab(s) by mouth once a day  -- Indication: For High blood pressure    metFORMIN 500 mg oral tablet, extended release  -- 1 tab(s) by mouth once a day  -- Indication: For Diabetes    NIFEdipine 60 mg oral tablet, extended release  -- 1 tab(s) by mouth once a day  -- Indication: For High blood presure    Azopt 1% ophthalmic suspension  -- 1 application to each affected eye 2 times a day   -- Indication: For glaucoma    levoFLOXacin 750 mg oral tablet  -- 1 tab(s) by mouth once  -- Indication: For Pneumonia

## 2019-02-22 NOTE — DISCHARGE NOTE ADULT - HOSPITAL COURSE
74 yo woman with pmhx of HTN, T2DM, Glaucoma, Osteoarthritis  who presents for productive cough, subjective fevers, chills, and productive cough for 1 week. Pt found to be sepsis likely secondary viral PNA with negative infectious workup. Pt was treated w course of antibiotics for severe illness with improvement of symptoms. PT recc home with home PT. Pt medically stable and suitable for discharge home with outpatient follow up with PCP within 1 week. 74 yo woman with pmhx of HTN, T2DM, Glaucoma, Osteoarthritis  who presents for productive cough, subjective fevers, chills, and productive cough for 1 week. Pt found to be sepsis likely secondary viral PNA with negative infectious workup. Pt was treated w course of antibiotics for severe illness with improvement of symptoms. PT recc home with home PT. Pt medically stable and suitable for discharge home with outpatient follow up with PCP within 1 week. Will complete 4 more days levaquin for a total of 7 days of abx.

## 2019-02-22 NOTE — DISCHARGE NOTE ADULT - CARE PROVIDER_API CALL
Doni Rudd  Address: [address] [place] [address]  Phone: [phone]  Phone: (   )    -  Fax: (   )    -  Follow Up Time:

## 2019-02-22 NOTE — PROGRESS NOTE ADULT - SUBJECTIVE AND OBJECTIVE BOX
Dr. Christiano Millan  Internal Medicine PGY-1   Pager# [phone]    Patient is a 73y old  Female who presents with a chief complaint of cough (2019 02:52)      SUBJECTIVE / OVERNIGHT EVENTS: Overnight, not acute events. Pt was HD stable. Denies h/d/n/v/SOB. She reports productive cough w/ white sputum. Reports 2-3 watery loose BM.    MEDICATIONS  (STANDING):  benzonatate 100 milliGRAM(s) Oral every 8 hours  dextrose 5%. 1000 milliLiter(s) (50 mL/Hr) IV Continuous <Continuous>  dextrose 50% Injectable 12.5 Gram(s) IV Push once  dextrose 50% Injectable 25 Gram(s) IV Push once  dextrose 50% Injectable 25 Gram(s) IV Push once  insulin lispro (HumaLOG) corrective regimen sliding scale   SubCutaneous three times a day before meals  insulin lispro (HumaLOG) corrective regimen sliding scale   SubCutaneous at bedtime  lactobacillus acidophilus 1 Tablet(s) Oral daily  multivitamin 1 Tablet(s) Oral daily  sodium chloride 0.9%. 1000 milliLiter(s) (75 mL/Hr) IV Continuous <Continuous>    MEDICATIONS  (PRN):  acetaminophen   Tablet .. 650 milliGRAM(s) Oral every 6 hours PRN Temp greater or equal to 38C (100.4F), Mild Pain (1 - 3)  benzocaine 15 mG/menthol 3.6 mG (Sugar-Free) Lozenge 1 Lozenge Oral every 6 hours PRN Sore Throat  dextrose 40% Gel 15 Gram(s) Oral once PRN Blood Glucose LESS THAN 70 milliGRAM(s)/deciliter  glucagon  Injectable 1 milliGRAM(s) IntraMuscular once PRN Glucose LESS THAN 70 milligrams/deciliter  guaiFENesin   Syrup  (Sugar-Free) 100 milliGRAM(s) Oral every 6 hours PRN Cough      Vital Signs Last 24 Hrs  T(C): 37 (2019 08:30), Max: 38.4 (2019 19:11)  T(F): 98.6 (2019 08:30), Max: 101.1 (2019 19:11)  HR: 90 (2019 08:30) (81 - 112)  BP: 105/67 (2019 08:30) (95/47 - 144/86)  BP(mean): --  RR: 18 (2019 08:30) (15 - 18)  SpO2: 98% (2019 08:30) (95% - 100%)  CAPILLARY BLOOD GLUCOSE      POCT Blood Glucose.: 131 mg/dL (2019 08:26)    I&O's Summary      PHYSICAL EXAM:  GENERAL: NAD, well-developed  CHEST/LUNG: Clear to auscultation bilaterally; No wheeze  HEART: Regular rate and rhythm; No murmurs, rubs, or gallops  ABDOMEN: Soft, Nontender, Nondistended; Bowel sounds present  EXTREMITIES:  2+ Peripheral Pulses, No clubbing, cyanosis, or edema  PSYCH: AAOx3  NEUROLOGY: non-focal  SKIN: No rashes or lesions    LABS:                        11.0   16.35 )-----------( 214      ( 2019 05:50 )             33.3     -    139  |  106  |  15  ----------------------------<  115<H>  3.4<L>   |  21<L>  |  0.83    Ca    7.9<L>      2019 05:50  Phos  1.8       Mg     1.8         TPro  7.0  /  Alb  3.3  /  TBili  1.7<H>  /  DBili  x   /  AST  22  /  ALT  14  /  AlkPhos  101  -          Urinalysis Basic - ( 2019 23:50 )    Color: YELLOW / Appearance: CLEAR / S.029 / pH: 6.0  Gluc: 30 / Ketone: TRACE  / Bili: NEGATIVE / Urobili: TRACE   Blood: TRACE / Protein: 70 / Nitrite: NEGATIVE   Leuk Esterase: NEGATIVE / RBC: 3-5 / WBC 3-5   Sq Epi: FEW / Non Sq Epi: x / Bacteria: NEGATIVE        RADIOLOGY & ADDITIONAL TESTS:    Imaging Personally Reviewed:    Consultant(s) Notes Reviewed:      Care Discussed with Consultants/Other Providers:

## 2019-02-22 NOTE — DISCHARGE NOTE ADULT - CARE PLAN
Principal Discharge DX:	Pneumonia  Goal:	resolving  Assessment and plan of treatment:	You presented with elevated heart rate, fever and elevated white blood cell count. We think this is likely secondary to pneumonia. Your blood culture and legionella were negative. We have been treating you with Levaquin. Please continue taking your antibiotics for 5 days. Please follow up with your outpatient PCP for further follow up.  Secondary Diagnosis:	Hypertension, unspecified type  Assessment and plan of treatment:	You have a history of hypertension. We have helf your blood pressure medication (losartan 50 mg daily and nifedipine 60 mg daily). Please follow up with your PCP within 1 week to assess whether it is safe to restart these medications.  Secondary Diagnosis:	Diabetes mellitus type 2, noninsulin dependent  Assessment and plan of treatment:	You have a history of diabetes mellitus and your HgA1c was 6.4. Please continue taking your metformin 500 mg daily.  Secondary Diagnosis:	Diarrhea  Assessment and plan of treatment:	You reported history of diarrhea. Your diarrhea has resolved. Please continue taking lactobacillus, as needed. Principal Discharge DX:	Pneumonia  Goal:	resolving  Assessment and plan of treatment:	You presented with elevated heart rate, fever and elevated white blood cell count. We think this is likely secondary to pneumonia. Your blood culture and legionella were negative. Your CT chest showed tree-in-bud opacities. We have been treating you with Levaquin. Please continue taking your antibiotics for 5 days. Please follow up with your outpatient PCP for further follow up.  Secondary Diagnosis:	Hypertension, unspecified type  Assessment and plan of treatment:	You have a history of hypertension. We have helf your blood pressure medication (losartan 50 mg daily and nifedipine 60 mg daily). Please follow up with your PCP within 1 week to assess whether it is safe to restart these medications.  Secondary Diagnosis:	Diabetes mellitus type 2, noninsulin dependent  Assessment and plan of treatment:	You have a history of diabetes mellitus and your HgA1c was 6.4. Please continue taking your metformin 500 mg daily.  Secondary Diagnosis:	Diarrhea  Assessment and plan of treatment:	You reported history of diarrhea. Your diarrhea has resolved. Please continue taking lactobacillus, as needed.

## 2019-02-22 NOTE — H&P ADULT - PROBLEM SELECTOR PLAN 3
- Unclear etiology   - S/p IVF NaCl   - Continue to monitor on BMP. - Holding anti-hypertensives due to soft blood pressure. S/p 3 liters in the ED.   - C/w NaCl at 75cc/hr for maintenance.

## 2019-02-23 LAB
ANION GAP SERPL CALC-SCNC: 11 MMO/L — SIGNIFICANT CHANGE UP (ref 7–14)
BACTERIA UR CULT: SIGNIFICANT CHANGE UP
BASE EXCESS BLDV CALC-SCNC: 0.2 MMOL/L — SIGNIFICANT CHANGE UP
BLOOD GAS VENOUS - CREATININE: 0.53 MG/DL — SIGNIFICANT CHANGE UP (ref 0.5–1.3)
BUN SERPL-MCNC: 7 MG/DL — SIGNIFICANT CHANGE UP (ref 7–23)
CALCIUM SERPL-MCNC: 8.9 MG/DL — SIGNIFICANT CHANGE UP (ref 8.4–10.5)
CHLORIDE BLDV-SCNC: 105 MMOL/L — SIGNIFICANT CHANGE UP (ref 96–108)
CHLORIDE SERPL-SCNC: 103 MMOL/L — SIGNIFICANT CHANGE UP (ref 98–107)
CO2 SERPL-SCNC: 23 MMOL/L — SIGNIFICANT CHANGE UP (ref 22–31)
CREAT SERPL-MCNC: 0.66 MG/DL — SIGNIFICANT CHANGE UP (ref 0.5–1.3)
GAS PNL BLDV: 135 MMOL/L — LOW (ref 136–146)
GLUCOSE BLDV-MCNC: 141 — HIGH (ref 70–99)
GLUCOSE SERPL-MCNC: 137 MG/DL — HIGH (ref 70–99)
HCO3 BLDV-SCNC: 25 MMOL/L — SIGNIFICANT CHANGE UP (ref 20–27)
HCT VFR BLD CALC: 32.4 % — LOW (ref 34.5–45)
HCT VFR BLDV CALC: 33.2 % — LOW (ref 34.5–45)
HCV AB S/CO SERPL IA: 2.54 S/CO — HIGH (ref 0–0.79)
HCV AB SERPL-IMP: SIGNIFICANT CHANGE UP
HCV RNA FLD QL NAA+PROBE: SIGNIFICANT CHANGE UP
HCV RNA SPEC QL PROBE+SIG AMP: NOT DETECTED — SIGNIFICANT CHANGE UP
HGB BLD-MCNC: 10.7 G/DL — LOW (ref 11.5–15.5)
HGB BLDV-MCNC: 10.7 G/DL — LOW (ref 11.5–15.5)
L PNEUMO AG UR QL: NEGATIVE — SIGNIFICANT CHANGE UP
LACTATE BLDV-MCNC: 1.8 MMOL/L — SIGNIFICANT CHANGE UP (ref 0.5–2)
MCHC RBC-ENTMCNC: 29.1 PG — SIGNIFICANT CHANGE UP (ref 27–34)
MCHC RBC-ENTMCNC: 33 % — SIGNIFICANT CHANGE UP (ref 32–36)
MCV RBC AUTO: 88 FL — SIGNIFICANT CHANGE UP (ref 80–100)
NRBC # FLD: 0 K/UL — LOW (ref 25–125)
PCO2 BLDV: 36 MMHG — LOW (ref 41–51)
PH BLDV: 7.44 PH — HIGH (ref 7.32–7.43)
PLATELET # BLD AUTO: 253 K/UL — SIGNIFICANT CHANGE UP (ref 150–400)
PMV BLD: 10.5 FL — SIGNIFICANT CHANGE UP (ref 7–13)
PO2 BLDV: 45 MMHG — HIGH (ref 35–40)
POTASSIUM BLDV-SCNC: 3 MMOL/L — LOW (ref 3.4–4.5)
POTASSIUM SERPL-MCNC: 3.2 MMOL/L — LOW (ref 3.5–5.3)
POTASSIUM SERPL-SCNC: 3.2 MMOL/L — LOW (ref 3.5–5.3)
RBC # BLD: 3.68 M/UL — LOW (ref 3.8–5.2)
RBC # FLD: 12.6 % — SIGNIFICANT CHANGE UP (ref 10.3–14.5)
SAO2 % BLDV: 80.9 % — SIGNIFICANT CHANGE UP (ref 60–85)
SODIUM SERPL-SCNC: 137 MMOL/L — SIGNIFICANT CHANGE UP (ref 135–145)
SPECIMEN SOURCE: SIGNIFICANT CHANGE UP
WBC # BLD: 12.92 K/UL — HIGH (ref 3.8–10.5)
WBC # FLD AUTO: 12.92 K/UL — HIGH (ref 3.8–10.5)

## 2019-02-23 PROCEDURE — 99233 SBSQ HOSP IP/OBS HIGH 50: CPT | Mod: GC

## 2019-02-23 PROCEDURE — 71250 CT THORAX DX C-: CPT | Mod: 26

## 2019-02-23 RX ORDER — POTASSIUM CHLORIDE 20 MEQ
40 PACKET (EA) ORAL ONCE
Qty: 0 | Refills: 0 | Status: COMPLETED | OUTPATIENT
Start: 2019-02-23 | End: 2019-02-23

## 2019-02-23 RX ADMIN — Medication 100 MILLIGRAM(S): at 06:26

## 2019-02-23 RX ADMIN — DORZOLAMIDE HYDROCHLORIDE 1 DROP(S): 20 SOLUTION/ DROPS OPHTHALMIC at 21:50

## 2019-02-23 RX ADMIN — Medication 100 MILLIGRAM(S): at 21:50

## 2019-02-23 RX ADMIN — Medication 40 MILLIEQUIVALENT(S): at 12:11

## 2019-02-23 RX ADMIN — Medication 100 MILLIGRAM(S): at 13:23

## 2019-02-23 RX ADMIN — Medication 1 TABLET(S): at 12:11

## 2019-02-23 RX ADMIN — DORZOLAMIDE HYDROCHLORIDE 1 DROP(S): 20 SOLUTION/ DROPS OPHTHALMIC at 13:23

## 2019-02-23 RX ADMIN — DORZOLAMIDE HYDROCHLORIDE 1 DROP(S): 20 SOLUTION/ DROPS OPHTHALMIC at 06:26

## 2019-02-23 NOTE — PROGRESS NOTE ADULT - SUBJECTIVE AND OBJECTIVE BOX
Dr. Christiano Millan  Internal Medicine PGY-1   Pager# [phone]    Patient is a 73y old  Female who presents with a chief complaint of cough (2019 16:43)      SUBJECTIVE / OVERNIGHT EVENTS: No acute events overnight. Pt was HD stable overnight.      MEDICATIONS  (STANDING):  benzonatate 100 milliGRAM(s) Oral every 8 hours  dextrose 5%. 1000 milliLiter(s) (50 mL/Hr) IV Continuous <Continuous>  dextrose 50% Injectable 12.5 Gram(s) IV Push once  dextrose 50% Injectable 25 Gram(s) IV Push once  dextrose 50% Injectable 25 Gram(s) IV Push once  dorzolamide 2% Ophthalmic Solution 1 Drop(s) Both EYES three times a day  insulin lispro (HumaLOG) corrective regimen sliding scale   SubCutaneous three times a day before meals  insulin lispro (HumaLOG) corrective regimen sliding scale   SubCutaneous at bedtime  lactobacillus acidophilus 1 Tablet(s) Oral daily  levoFLOXacin IVPB 750 milliGRAM(s) IV Intermittent daily  multivitamin 1 Tablet(s) Oral daily  sodium chloride 0.9%. 1000 milliLiter(s) (75 mL/Hr) IV Continuous <Continuous>    MEDICATIONS  (PRN):  acetaminophen   Tablet .. 650 milliGRAM(s) Oral every 6 hours PRN Temp greater or equal to 38C (100.4F), Mild Pain (1 - 3)  benzocaine 15 mG/menthol 3.6 mG (Sugar-Free) Lozenge 1 Lozenge Oral every 6 hours PRN Sore Throat  dextrose 40% Gel 15 Gram(s) Oral once PRN Blood Glucose LESS THAN 70 milliGRAM(s)/deciliter  glucagon  Injectable 1 milliGRAM(s) IntraMuscular once PRN Glucose LESS THAN 70 milligrams/deciliter  guaiFENesin   Syrup  (Sugar-Free) 100 milliGRAM(s) Oral every 6 hours PRN Cough      Vital Signs Last 24 Hrs  T(C): 37.6 (2019 04:58), Max: 37.7 (2019 20:51)  T(F): 99.6 (2019 04:58), Max: 99.8 (2019 20:51)  HR: 66 (2019 04:58) (66 - 104)  BP: 128/69 (2019 04:58) (105/67 - 128/69)  BP(mean): --  RR: 18 (2019 04:58) (18 - 18)  SpO2: 95% (2019 04:58) (95% - 98%)  CAPILLARY BLOOD GLUCOSE      POCT Blood Glucose.: 192 mg/dL (2019 22:49)  POCT Blood Glucose.: 149 mg/dL (2019 17:13)  POCT Blood Glucose.: 127 mg/dL (2019 12:02)    I&O's Summary      PHYSICAL EXAM:  GENERAL: NAD, well-developed  HEAD:  Atraumatic, Normocephalic  EYES: EOMI, PERRLA, conjunctiva and sclera clear  NECK: Supple, No JVD  CHEST/LUNG: Clear to auscultation bilaterally; No wheeze  HEART: Regular rate and rhythm; No murmurs, rubs, or gallops  ABDOMEN: Soft, Nontender, Nondistended; Bowel sounds present  EXTREMITIES:  2+ Peripheral Pulses, No clubbing, cyanosis, or edema  PSYCH: AAOx3  NEUROLOGY: non-focal  SKIN: No rashes or lesions    LABS:                        10.7   12.92 )-----------( 253      ( 2019 06:30 )             32.4     -    139  |  106  |  15  ----------------------------<  115<H>  3.4<L>   |  21<L>  |  0.83    Ca    7.9<L>      2019 05:50  Phos  1.8       Mg     1.8         TPro  7.0  /  Alb  3.3  /  TBili  1.7<H>  /  DBili  x   /  AST  22  /  ALT  14  /  AlkPhos  101  02-          Urinalysis Basic - ( 2019 23:50 )    Color: YELLOW / Appearance: CLEAR / S.029 / pH: 6.0  Gluc: 30 / Ketone: TRACE  / Bili: NEGATIVE / Urobili: TRACE   Blood: TRACE / Protein: 70 / Nitrite: NEGATIVE   Leuk Esterase: NEGATIVE / RBC: 3-5 / WBC 3-5   Sq Epi: FEW / Non Sq Epi: x / Bacteria: NEGATIVE        RADIOLOGY & ADDITIONAL TESTS:    Imaging Personally Reviewed:    Consultant(s) Notes Reviewed:      Care Discussed with Consultants/Other Providers: Dr. Christiano Millan  Internal Medicine PGY-1   Pager# [phone]    Patient is a 73y old  Female who presents with a chief complaint of cough (2019 16:43)      SUBJECTIVE / OVERNIGHT EVENTS: No acute events overnight. Pt was HD stable overnight. Denies n/v/f/c/h/d. Pt was unable to sleep due to cough. Denies diarrhea.     MEDICATIONS  (STANDING):  benzonatate 100 milliGRAM(s) Oral every 8 hours  dextrose 5%. 1000 milliLiter(s) (50 mL/Hr) IV Continuous <Continuous>  dextrose 50% Injectable 12.5 Gram(s) IV Push once  dextrose 50% Injectable 25 Gram(s) IV Push once  dextrose 50% Injectable 25 Gram(s) IV Push once  dorzolamide 2% Ophthalmic Solution 1 Drop(s) Both EYES three times a day  insulin lispro (HumaLOG) corrective regimen sliding scale   SubCutaneous three times a day before meals  insulin lispro (HumaLOG) corrective regimen sliding scale   SubCutaneous at bedtime  lactobacillus acidophilus 1 Tablet(s) Oral daily  levoFLOXacin IVPB 750 milliGRAM(s) IV Intermittent daily  multivitamin 1 Tablet(s) Oral daily  sodium chloride 0.9%. 1000 milliLiter(s) (75 mL/Hr) IV Continuous <Continuous>    MEDICATIONS  (PRN):  acetaminophen   Tablet .. 650 milliGRAM(s) Oral every 6 hours PRN Temp greater or equal to 38C (100.4F), Mild Pain (1 - 3)  benzocaine 15 mG/menthol 3.6 mG (Sugar-Free) Lozenge 1 Lozenge Oral every 6 hours PRN Sore Throat  dextrose 40% Gel 15 Gram(s) Oral once PRN Blood Glucose LESS THAN 70 milliGRAM(s)/deciliter  glucagon  Injectable 1 milliGRAM(s) IntraMuscular once PRN Glucose LESS THAN 70 milligrams/deciliter  guaiFENesin   Syrup  (Sugar-Free) 100 milliGRAM(s) Oral every 6 hours PRN Cough      Vital Signs Last 24 Hrs  T(C): 37.6 (2019 04:58), Max: 37.7 (2019 20:51)  T(F): 99.6 (2019 04:58), Max: 99.8 (2019 20:51)  HR: 66 (2019 04:58) (66 - 104)  BP: 128/69 (2019 04:58) (105/67 - 128/69)  BP(mean): --  RR: 18 (2019 04:58) (18 - 18)  SpO2: 95% (2019 04:58) (95% - 98%)  CAPILLARY BLOOD GLUCOSE      POCT Blood Glucose.: 192 mg/dL (2019 22:49)  POCT Blood Glucose.: 149 mg/dL (2019 17:13)  POCT Blood Glucose.: 127 mg/dL (2019 12:02)    I&O's Summary      PHYSICAL EXAM:  GENERAL: NAD, well-developed  HEAD:  Atraumatic, Normocephalic  EYES: EOMI, PERRLA, conjunctiva and sclera clear  NECK: Supple, No JVD  CHEST/LUNG: Clear to auscultation bilaterally; No wheeze  HEART: Regular rate and rhythm; No murmurs, rubs, or gallops  ABDOMEN: Soft, Nontender, Nondistended; Bowel sounds present  EXTREMITIES:  2+ Peripheral Pulses, No clubbing, cyanosis, or edema  PSYCH: AAOx3  NEUROLOGY: non-focal  SKIN: No rashes or lesions    LABS:                        10.7   12.92 )-----------( 253      ( 2019 06:30 )             32.4         139  |  106  |  15  ----------------------------<  115<H>  3.4<L>   |  21<L>  |  0.83    Ca    7.9<L>      2019 05:50  Phos  1.8       Mg     1.8         TPro  7.0  /  Alb  3.3  /  TBili  1.7<H>  /  DBili  x   /  AST  22  /  ALT  14  /  AlkPhos  101            Urinalysis Basic - ( 2019 23:50 )    Color: YELLOW / Appearance: CLEAR / S.029 / pH: 6.0  Gluc: 30 / Ketone: TRACE  / Bili: NEGATIVE / Urobili: TRACE   Blood: TRACE / Protein: 70 / Nitrite: NEGATIVE   Leuk Esterase: NEGATIVE / RBC: 3-5 / WBC 3-5   Sq Epi: FEW / Non Sq Epi: x / Bacteria: NEGATIVE        RADIOLOGY & ADDITIONAL TESTS:    Imaging Personally Reviewed:    Consultant(s) Notes Reviewed:      Care Discussed with Consultants/Other Providers:

## 2019-02-23 NOTE — PROGRESS NOTE ADULT - ATTENDING COMMENTS
73 y.o. Female w/ hx HTN, DM2 p/w productive cough, fever, leukocytosis, tachycardia found on CXR to have possible PNA so admitted for Sepsis due to PNA. RVP negative. Patient clinically improving on IV levaquin D#2. Leukocytosis improving. CT chest pending. F/U Blood Cx. Patient also with weakly positive hep C assay. Will check Hep C PCR.

## 2019-02-23 NOTE — PROGRESS NOTE ADULT - PROBLEM SELECTOR PLAN 6
- Reported 2-3 BM over the last 24 hours  - Lactobacillus
- Reported 2-3 BM over the last 24 hours  - Lactobacillus

## 2019-02-23 NOTE — PROGRESS NOTE ADULT - PROBLEM SELECTOR PLAN 5
- Consistent carb diet   - C/w SSI   - Hgba1c- 6.4
- Consistent carb diet   - C/w SSI   - Hgba1c- 6.4

## 2019-02-23 NOTE — PROGRESS NOTE ADULT - PROBLEM SELECTOR PLAN 1
- Patient with tachycardia, fever, and leukocytosis w/ symptoms concerning for respiratory infection  - RVP negative   - CXR- No pleural effusions or pneumothorax.  - pro-calcitonin- 0.24  - c/w IV Levaquin 750 mg daily, x 5 days  - f/u CT chest  - f/u BCx- NGTD, urine legionella- negative - Patient with tachycardia, fever, and leukocytosis w/ symptoms concerning for respiratory infection  - RVP negative   - CXR- No pleural effusions or pneumothorax.  - pro-calcitonin- 0.24  - c/w IV Levaquin 750 mg daily, x 5 days  - CT chest- Peripheral tree-in-bud opacities  - BCx- NGTD, urine legionella- negative

## 2019-02-23 NOTE — PROGRESS NOTE ADULT - ASSESSMENT
74 yo woman with pmhx of HTN, T2DM, Glaucoma, Osteoarthritis  who presents for productive cough, subjective fevers, chills, and productive cough for 1 week. 74 yo woman with pmhx of HTN, T2DM, Glaucoma, Osteoarthritis  who presents for productive cough, subjective fevers, chills, and productive cough for 1 week. Pt is medically optimized for discharge to home. 72 yo woman with pmhx of HTN, T2DM, Glaucoma, Osteoarthritis  who presents for productive cough, subjective fevers, chills, and productive cough for 1 week.

## 2019-02-24 VITALS
HEART RATE: 75 BPM | SYSTOLIC BLOOD PRESSURE: 123 MMHG | OXYGEN SATURATION: 95 % | DIASTOLIC BLOOD PRESSURE: 79 MMHG | RESPIRATION RATE: 18 BRPM | TEMPERATURE: 99 F

## 2019-02-24 DIAGNOSIS — A41.9 SEPSIS, UNSPECIFIED ORGANISM: ICD-10-CM

## 2019-02-24 LAB
ANION GAP SERPL CALC-SCNC: 10 MMO/L — SIGNIFICANT CHANGE UP (ref 7–14)
BUN SERPL-MCNC: 9 MG/DL — SIGNIFICANT CHANGE UP (ref 7–23)
CALCIUM SERPL-MCNC: 9.3 MG/DL — SIGNIFICANT CHANGE UP (ref 8.4–10.5)
CHLORIDE SERPL-SCNC: 102 MMOL/L — SIGNIFICANT CHANGE UP (ref 98–107)
CO2 SERPL-SCNC: 24 MMOL/L — SIGNIFICANT CHANGE UP (ref 22–31)
CREAT SERPL-MCNC: 0.67 MG/DL — SIGNIFICANT CHANGE UP (ref 0.5–1.3)
GLUCOSE SERPL-MCNC: 135 MG/DL — HIGH (ref 70–99)
HCT VFR BLD CALC: 32.8 % — LOW (ref 34.5–45)
HGB BLD-MCNC: 11.2 G/DL — LOW (ref 11.5–15.5)
MCHC RBC-ENTMCNC: 29.9 PG — SIGNIFICANT CHANGE UP (ref 27–34)
MCHC RBC-ENTMCNC: 34.1 % — SIGNIFICANT CHANGE UP (ref 32–36)
MCV RBC AUTO: 87.5 FL — SIGNIFICANT CHANGE UP (ref 80–100)
NRBC # FLD: 0 K/UL — LOW (ref 25–125)
PLATELET # BLD AUTO: 278 K/UL — SIGNIFICANT CHANGE UP (ref 150–400)
PMV BLD: 10.3 FL — SIGNIFICANT CHANGE UP (ref 7–13)
POTASSIUM SERPL-MCNC: 3.6 MMOL/L — SIGNIFICANT CHANGE UP (ref 3.5–5.3)
POTASSIUM SERPL-SCNC: 3.6 MMOL/L — SIGNIFICANT CHANGE UP (ref 3.5–5.3)
RBC # BLD: 3.75 M/UL — LOW (ref 3.8–5.2)
RBC # FLD: 12.6 % — SIGNIFICANT CHANGE UP (ref 10.3–14.5)
SODIUM SERPL-SCNC: 136 MMOL/L — SIGNIFICANT CHANGE UP (ref 135–145)
WBC # BLD: 11.52 K/UL — HIGH (ref 3.8–10.5)
WBC # FLD AUTO: 11.52 K/UL — HIGH (ref 3.8–10.5)

## 2019-02-24 PROCEDURE — 99239 HOSP IP/OBS DSCHRG MGMT >30: CPT

## 2019-02-24 RX ORDER — BRINZOLAMIDE 10 MG/ML
1 SUSPENSION/ DROPS OPHTHALMIC
Qty: 0 | Refills: 0 | COMMUNITY

## 2019-02-24 RX ORDER — BRINZOLAMIDE 10 MG/ML
1 SUSPENSION/ DROPS OPHTHALMIC
Qty: 20 | Refills: 0
Start: 2019-02-24 | End: 2019-03-25

## 2019-02-24 RX ADMIN — Medication 650 MILLIGRAM(S): at 06:35

## 2019-02-24 RX ADMIN — Medication 1 TABLET(S): at 12:19

## 2019-02-24 RX ADMIN — Medication 100 MILLIGRAM(S): at 05:52

## 2019-02-24 RX ADMIN — Medication 650 MILLIGRAM(S): at 05:52

## 2019-02-24 RX ADMIN — DORZOLAMIDE HYDROCHLORIDE 1 DROP(S): 20 SOLUTION/ DROPS OPHTHALMIC at 05:53

## 2019-02-24 NOTE — PROGRESS NOTE ADULT - SUBJECTIVE AND OBJECTIVE BOX
***************************************************************  Moiz Aquilino, PGY3  Internal Medicine   pager [phone]   ***************************************************************    TY DAVIS  73y  MRN: [id number]    Patient is a 73y old  Female who presents with a chief complaint of cough (23 Feb 2019 08:26)      Subjective: no events ON. Denies fever, CP, SOB, abn pain, N/V. Tolerating diet.      MEDICATIONS  (STANDING):  benzonatate 100 milliGRAM(s) Oral every 8 hours  dextrose 5%. 1000 milliLiter(s) (50 mL/Hr) IV Continuous <Continuous>  dextrose 50% Injectable 12.5 Gram(s) IV Push once  dextrose 50% Injectable 25 Gram(s) IV Push once  dextrose 50% Injectable 25 Gram(s) IV Push once  dorzolamide 2% Ophthalmic Solution 1 Drop(s) Both EYES three times a day  insulin lispro (HumaLOG) corrective regimen sliding scale   SubCutaneous three times a day before meals  insulin lispro (HumaLOG) corrective regimen sliding scale   SubCutaneous at bedtime  lactobacillus acidophilus 1 Tablet(s) Oral daily  levoFLOXacin IVPB 750 milliGRAM(s) IV Intermittent daily  multivitamin 1 Tablet(s) Oral daily  sodium chloride 0.9%. 1000 milliLiter(s) (75 mL/Hr) IV Continuous <Continuous>    MEDICATIONS  (PRN):  acetaminophen   Tablet .. 650 milliGRAM(s) Oral every 6 hours PRN Temp greater or equal to 38C (100.4F), Mild Pain (1 - 3)  benzocaine 15 mG/menthol 3.6 mG (Sugar-Free) Lozenge 1 Lozenge Oral every 6 hours PRN Sore Throat  dextrose 40% Gel 15 Gram(s) Oral once PRN Blood Glucose LESS THAN 70 milliGRAM(s)/deciliter  glucagon  Injectable 1 milliGRAM(s) IntraMuscular once PRN Glucose LESS THAN 70 milligrams/deciliter  guaiFENesin   Syrup  (Sugar-Free) 100 milliGRAM(s) Oral every 6 hours PRN Cough      Objective:    Vitals: Vital Signs Last 24 Hrs  T(C): 37.1 (02-24-19 @ 05:47), Max: 37.8 (02-23-19 @ 21:27)  T(F): 98.7 (02-24-19 @ 05:47), Max: 100 (02-23-19 @ 21:27)  HR: 75 (02-24-19 @ 05:47) (75 - 88)  BP: 123/79 (02-24-19 @ 05:47) (116/70 - 136/73)  BP(mean): --  RR: 18 (02-24-19 @ 05:47) (18 - 18)  SpO2: 95% (02-24-19 @ 05:47) (95% - 96%)              I&O's Summary      PHYSICAL EXAM:  GENERAL: NAD  HEAD:  Atraumatic, Normocephalic  EYES: EOMI, conjunctiva and sclera clear  CHEST/LUNG: Clear to percussion bilaterally; No rales, rhonchi, wheezing  HEART: Regular rate and rhythm; No murmurs, rubs, or gallops  ABDOMEN: Soft, Nontender, Nondistended; no rebound or guarding  SKIN: No rashes or lesions  NERVOUS SYSTEM:  Alert & Oriented X3    LABS:                        11.2   11.52 )-----------( 278      ( 24 Feb 2019 07:02 )             32.8                         10.7   12.92 )-----------( 253      ( 23 Feb 2019 06:30 )             32.4                         11.0   16.35 )-----------( 214      ( 22 Feb 2019 05:50 )             33.3     02-24    136  |  102  |  9   ----------------------------<  135<H>  3.6   |  24  |  0.67  02-23    137  |  103  |  7   ----------------------------<  137<H>  3.2<L>   |  23  |  0.66  02-22    139  |  106  |  15  ----------------------------<  115<H>  3.4<L>   |  21<L>  |  0.83    Ca    9.3      24 Feb 2019 07:02  Ca    8.9      23 Feb 2019 06:30  Ca    7.9<L>      22 Feb 2019 05:50    TPro  7.0  /  Alb  3.3  /  TBili  1.7<H>  /  DBili  x   /  AST  22  /  ALT  14  /  AlkPhos  101  02-22  TPro  8.5<H>  /  Alb  4.0  /  TBili  1.8<H>  /  DBili  x   /  AST  19  /  ALT  9   /  AlkPhos  111  02-21                      CAPILLARY BLOOD GLUCOSE      POCT Blood Glucose.: 148 mg/dL (24 Feb 2019 12:11)  POCT Blood Glucose.: 123 mg/dL (24 Feb 2019 08:41)  POCT Blood Glucose.: 126 mg/dL (23 Feb 2019 22:44)  POCT Blood Glucose.: 117 mg/dL (23 Feb 2019 17:17)      RADIOLOGY & ADDITIONAL TESTS:  Imaging Personally Reviewed:  [x ] YES  [ ] NO    Consultants involved in case:   Consultant(s) Notes Reviewed:  [ x] YES  [ ] NO:   Care Discussed with Consultants/Other Providers [x ] YES  [ ] NO

## 2019-02-24 NOTE — PROGRESS NOTE ADULT - PROBLEM SELECTOR PLAN 1
-resolved   -CXR, CT chest c/w pna   -day 3 levaqin   -blood cx NGTD, legionella neg  -d/c IVF as tolerating PO  -d/c today and will complete 4 more days levaquin to complete a 7 day course and f/u with her PMD

## 2019-02-24 NOTE — PROGRESS NOTE ADULT - PROBLEM SELECTOR PLAN 3
- Holding anti-hypertensives due to soft blood pressure. S/p 3 liters in the ED.   - C/w NaCl at 75cc/hr for maintenance.
- Holding anti-hypertensives due to soft blood pressure. S/p 3 liters in the ED.   - C/w NaCl at 75cc/hr for maintenance.
-resume home diabetic meds at time of discharge

## 2019-02-24 NOTE — PROGRESS NOTE ADULT - PROBLEM SELECTOR PROBLEM 1
SIRS (systemic inflammatory response syndrome)
SIRS (systemic inflammatory response syndrome)
Sepsis, due to unspecified organism

## 2019-02-24 NOTE — PROGRESS NOTE ADULT - PROBLEM SELECTOR PLAN 2
- Likely hypovolemic 2/2 infection, s/p IVF  - improved. Na- 139
- Likely hypovolemic 2/2 infection, s/p IVF  - improved. Na- 139
-resume home BP meds at time of discharge

## 2019-02-24 NOTE — PROGRESS NOTE ADULT - ATTENDING COMMENTS
73 y.o. Female w/ hx HTN, DM2 p/w productive cough, fever, leukocytosis, tachycardia found on CXR to have possible PNA so admitted for Sepsis due to PNA. RVP negative. Patient clinically improving on IV levaquin D#3. Leukocytosis improving. CT chest shows multifocal PNA. Blood Cx NTD. Patient also with weakly positive hep C assay. Will check Hep C PCR. 73 y.o. Female w/ hx HTN, DM2 p/w productive cough, fever, leukocytosis, tachycardia found on CXR to have possible PNA so admitted for Sepsis due to PNA. RVP negative. Patient clinically improving on IV levaquin D#3. Leukocytosis improving. CT chest shows multifocal PNA. Blood Cx NTD. Patient also with weakly positive hep C assay. F/U Hep C PCR. d/c 40 minutes.

## 2019-02-25 DIAGNOSIS — Z71.89 OTHER SPECIFIED COUNSELING: ICD-10-CM

## 2019-02-25 LAB
HCV RNA SERPL NAA DL=5-ACNC: NOT DETECTED IU/ML — SIGNIFICANT CHANGE UP
HCV RNA SPEC NAA+PROBE-LOG IU: SIGNIFICANT CHANGE UP LOGIU/ML

## 2019-02-25 RX ORDER — CIPROFLOXACIN LACTATE 400MG/40ML
1 VIAL (ML) INTRAVENOUS
Qty: 4 | Refills: 0
Start: 2019-02-25 | End: 2019-02-28

## 2019-02-27 LAB
BACTERIA BLD CULT: SIGNIFICANT CHANGE UP
BACTERIA BLD CULT: SIGNIFICANT CHANGE UP

## 2019-07-29 PROBLEM — I10 ESSENTIAL (PRIMARY) HYPERTENSION: Chronic | Status: ACTIVE | Noted: 2019-02-22

## 2019-07-29 PROBLEM — E11.9 TYPE 2 DIABETES MELLITUS WITHOUT COMPLICATIONS: Chronic | Status: ACTIVE | Noted: 2019-02-22

## 2019-08-12 ENCOUNTER — APPOINTMENT (OUTPATIENT)
Dept: ORTHOPEDIC SURGERY | Facility: CLINIC | Age: 74
End: 2019-08-12
Payer: MEDICARE

## 2019-08-12 VITALS
HEART RATE: 80 BPM | BODY MASS INDEX: 26.68 KG/M2 | DIASTOLIC BLOOD PRESSURE: 85 MMHG | HEIGHT: 62 IN | WEIGHT: 145 LBS | SYSTOLIC BLOOD PRESSURE: 147 MMHG

## 2019-08-12 DIAGNOSIS — Z78.9 OTHER SPECIFIED HEALTH STATUS: ICD-10-CM

## 2019-08-12 DIAGNOSIS — Z86.39 PERSONAL HISTORY OF OTHER ENDOCRINE, NUTRITIONAL AND METABOLIC DISEASE: ICD-10-CM

## 2019-08-12 DIAGNOSIS — Z82.61 FAMILY HISTORY OF ARTHRITIS: ICD-10-CM

## 2019-08-12 PROCEDURE — 99204 OFFICE O/P NEW MOD 45 MIN: CPT

## 2019-08-12 PROCEDURE — 73564 X-RAY EXAM KNEE 4 OR MORE: CPT | Mod: LT
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
## 2019-08-12 NOTE — PHYSICAL EXAM
[de-identified] : Patient is well nourished, well-developed, in no acute distress, with appropriate mood and affect. The patient is oriented to time, place, and person. Respirations are even and unlabored. Gait evaluation does reveal a limp. There is no inguinal adenopathy. \par Examination of the contralateral knee shows normal range of motion, strength, no tenderness, and intact skin. \par The L limb is well-perfused, prior surgical scars well healed over the lateral aspect of the knee, shows a grossly normal motor and sensory examination. Knee motion is significantly reduced and does cause significant pain. The knee moves from 0-95degrees. The knee is stable within that range-of-motion to AP and ML stress. The alignment of the knee is 5 ° varus.  Muscle strength is normal. Pedal pulses are palpable. Hip examination was negative.\par \par  [de-identified] : Long standing knee, AP knee, lateral knee, and patellar views of the left knee were ordered and taken in the office and demonstrate degenerative joint disease of the knee with joint space narrowing, osteophyte formation, and subchondral sclerosis.  No evidence of a distal femur fracture is noted with significant callus formation and extra articular deformity.

## 2019-08-12 NOTE — DISCUSSION/SUMMARY
[de-identified] : This patient has left knee post traumatic osteoarthritis.  The patient is not an appropriate candidate for total knee arthroplasty at this time. An extensive discussion was conducted on the natural history of the disease and the variety of surgical and non-surgical options available to the patient including, but not limited to non-steroidal anti-inflammatory medications, steroid injections, physical therapy, maintenance of ideal body weight, and reduction of activity.  I recommended and prescribed a course of Mobic and physical therapy.  The patient is also encouraged to trial a neoprene sleeve knee brace which can be purchased OTC.  The patient will schedule an appointment as needed.

## 2019-08-24 NOTE — H&P ADULT - PROBLEM/PLAN-2
DISPLAY PLAN FREE TEXT
Chart (EMR) reviewed. Received supine c HOB elevated, NAD. +dressing c ace wrap to left knee c hemovac intact c knee immobilizer donned. Mother present. Alert. Ox4. Able to follow multistep commands/directions.

## 2019-11-05 ENCOUNTER — APPOINTMENT (OUTPATIENT)
Dept: ORTHOPEDIC SURGERY | Facility: CLINIC | Age: 74
End: 2019-11-05
Payer: MEDICARE

## 2019-11-05 PROCEDURE — 99213 OFFICE O/P EST LOW 20 MIN: CPT

## 2019-11-05 NOTE — DISCUSSION/SUMMARY
[de-identified] : This patient has left knee post traumatic osteoarthritis.  The patient is not an appropriate candidate for total knee arthroplasty at this time. An extensive discussion was conducted on the natural history of the disease and the variety of surgical and non-surgical options available to the patient including, but not limited to non-steroidal anti-inflammatory medications, steroid injections, physical therapy, maintenance of ideal body weight, and reduction of activity. Continue with physical therapy.  Continue with the neoprene sleeve knee brace.  The patient will schedule an appointment as needed.

## 2019-11-05 NOTE — REASON FOR VISIT
[Follow-Up Visit] : a follow-up visit for [Family Member] : family member [Osteoarthritis, Knee] : osteoarthritis, knee

## 2019-11-05 NOTE — PHYSICAL EXAM
[de-identified] : Patient is well nourished, well-developed, in no acute distress, with appropriate mood and affect. The patient is oriented to time, place, and person. Respirations are even and unlabored. Gait evaluation does reveal a limp. There is no inguinal adenopathy. \par Examination of the contralateral knee shows normal range of motion, strength, no tenderness, and intact skin. \par The L limb is well-perfused, prior surgical scars well healed over the lateral aspect of the knee, shows a grossly normal motor and sensory examination. Knee motion is significantly reduced and does cause significant pain. The knee moves from 0-95 degrees. The knee is stable within that range-of-motion to AP and ML stress. The alignment of the knee is 5 ° varus.  Muscle strength is normal. Pedal pulses are palpable. Hip examination was negative.\par \par
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
## 2020-01-29 ENCOUNTER — APPOINTMENT (OUTPATIENT)
Dept: ORTHOPEDIC SURGERY | Facility: CLINIC | Age: 75
End: 2020-01-29
Payer: MEDICARE

## 2020-01-29 PROCEDURE — 73564 X-RAY EXAM KNEE 4 OR MORE: CPT | Mod: LT

## 2020-01-29 PROCEDURE — 99214 OFFICE O/P EST MOD 30 MIN: CPT

## 2020-01-29 NOTE — PHYSICAL EXAM
[de-identified] : Patient is well nourished, well-developed, in no acute distress, with appropriate mood and affect. The patient is oriented to time, place, and person. Respirations are even and unlabored. Gait evaluation does reveal a limp. There is no inguinal adenopathy. \par Examination of the contralateral knee shows normal range of motion, strength, no tenderness, and intact skin. \par The L limb is well-perfused, prior surgical scars well healed over the lateral aspect of the knee, shows a grossly normal motor and sensory examination. Knee motion is significantly reduced and does cause significant pain. The knee moves from 0-95 degrees. The knee is stable within that range-of-motion to AP and ML stress. The alignment of the knee is 5 ° varus.  Muscle strength is normal. Pedal pulses are palpable. Hip examination was negative.\par \par  [de-identified] : Long standing knee, AP knee, lateral knee, and patellar views of the left knee were ordered and taken in the office and demonstrate degenerative joint disease of the knee with joint space narrowing, osteophyte formation, and subchondral sclerosis. No evidence of a distal femur fracture is noted with significant callus formation and extra articular deformity. \par

## 2020-01-29 NOTE — DISCUSSION/SUMMARY
[de-identified] : This patient has left knee post traumatic osteoarthritis.  She is failed a course of conservative management and would like to proceed with a left total knee arthroplasty with robotic navigation assistance given her severe extra-articular deformity and contracture.\par \par The patient is an appropriate candidate for consideration of left total knee replacement. An extensive discussion was conducted of the natural history of the disease and the variety of surgical and non-surgical treatment options available to the patient. A risk/benefit analysis was discussed with the patient reviewing the advantages and disadvantages of surgical intervention at this time. Both the level and length of the patient's pain have made additional conservative treatment measures consisting of physical therapy, corticosteroids, and/or viscosupplementation contraindicated. A full explanation was given of the nature and the purpose of the procedure and anesthesia, its benefits, possible alternative methods of diagnosis or treatment, the risks involved, the possibility of complications, the foreseeable consequences of the procedure and the possible results of the non-treatment. I reviewed the plan of care as well as used a model of a total knee implant equivalent to the one that will be used for their total knee joint replacement. The ability to secure the implant utilizing cement or cementless (press-fit) was discussed with the patient. The patient agrees with the plan of care, as well as the use of implants for their total knee replacement.   We also discussed that if robotic/computer navigation is utilized, then additional incisions will be need to be made to accommodate the computer navigation arrays, which will be placed in the femur and tibia.\par \par No guarantee or assurance was made as to the results that may be obtained. Specifically, the risks were identified to include, but are not limited to the following: Infection, phlebitis, pulmonary embolism, death, paralysis, dislocation, pain, stiffness, instability, limp, weakness, breakage, leg-length inequality, uncontrolled bleeding, nerve injury, blood vessel injury, pressure sores, anesthetic risks, delayed healing of wound and bone, and wear and loosening. Further discussion was undertaken with the patient about the details of surgical preparation, treatment, and postoperative rehabilitation including medical clearance, autotransfusion, the hospital course, and the postoperative rehabilitation involved. All in all, I feel that this patient is a good candidate for surgical reconstruction.\par \par I did explain to the patient that their range of motion may not significantly improve after the surgery given the preoperative contracture. The patient expressed understanding of this and has elected to undergo total knee arthroplasty.
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
## 2020-02-03 ENCOUNTER — APPOINTMENT (OUTPATIENT)
Dept: CT IMAGING | Facility: CLINIC | Age: 75
End: 2020-02-03

## 2020-02-09 ENCOUNTER — FORM ENCOUNTER (OUTPATIENT)
Age: 75
End: 2020-02-09

## 2020-02-10 ENCOUNTER — OUTPATIENT (OUTPATIENT)
Dept: OUTPATIENT SERVICES | Facility: HOSPITAL | Age: 75
LOS: 1 days | End: 2020-02-10
Payer: COMMERCIAL

## 2020-02-10 ENCOUNTER — APPOINTMENT (OUTPATIENT)
Dept: CT IMAGING | Facility: CLINIC | Age: 75
End: 2020-02-10
Payer: MEDICARE

## 2020-02-10 DIAGNOSIS — M17.32 UNILATERAL POST-TRAUMATIC OSTEOARTHRITIS, LEFT KNEE: ICD-10-CM

## 2020-02-10 PROCEDURE — 73700 CT LOWER EXTREMITY W/O DYE: CPT | Mod: 26,LT

## 2020-02-10 PROCEDURE — 73700 CT LOWER EXTREMITY W/O DYE: CPT

## 2020-02-27 ENCOUNTER — OUTPATIENT (OUTPATIENT)
Dept: OUTPATIENT SERVICES | Facility: HOSPITAL | Age: 75
LOS: 1 days | Discharge: HOME | End: 2020-02-27
Payer: MEDICARE

## 2020-02-27 VITALS
TEMPERATURE: 98 F | DIASTOLIC BLOOD PRESSURE: 71 MMHG | WEIGHT: 134.92 LBS | HEART RATE: 82 BPM | OXYGEN SATURATION: 96 % | SYSTOLIC BLOOD PRESSURE: 100 MMHG | RESPIRATION RATE: 18 BRPM | HEIGHT: 62 IN

## 2020-02-27 DIAGNOSIS — M17.12 UNILATERAL PRIMARY OSTEOARTHRITIS, LEFT KNEE: ICD-10-CM

## 2020-02-27 DIAGNOSIS — E11.9 TYPE 2 DIABETES MELLITUS WITHOUT COMPLICATIONS: ICD-10-CM

## 2020-02-27 DIAGNOSIS — Z01.818 ENCOUNTER FOR OTHER PREPROCEDURAL EXAMINATION: ICD-10-CM

## 2020-02-27 DIAGNOSIS — E78.5 HYPERLIPIDEMIA, UNSPECIFIED: ICD-10-CM

## 2020-02-27 DIAGNOSIS — M17.32 UNILATERAL POST-TRAUMATIC OSTEOARTHRITIS, LEFT KNEE: ICD-10-CM

## 2020-02-27 DIAGNOSIS — H40.9 UNSPECIFIED GLAUCOMA: ICD-10-CM

## 2020-02-27 DIAGNOSIS — Z98.890 OTHER SPECIFIED POSTPROCEDURAL STATES: Chronic | ICD-10-CM

## 2020-02-27 DIAGNOSIS — I10 ESSENTIAL (PRIMARY) HYPERTENSION: ICD-10-CM

## 2020-02-27 LAB
ANION GAP SERPL CALC-SCNC: 5 MMOL/L — SIGNIFICANT CHANGE UP (ref 5–17)
APTT BLD: 29.9 SEC — SIGNIFICANT CHANGE UP (ref 27.5–36.3)
BASOPHILS # BLD AUTO: 0.06 K/UL — SIGNIFICANT CHANGE UP (ref 0–0.2)
BASOPHILS NFR BLD AUTO: 0.7 % — SIGNIFICANT CHANGE UP (ref 0–2)
BUN SERPL-MCNC: 15 MG/DL — SIGNIFICANT CHANGE UP (ref 7–23)
CALCIUM SERPL-MCNC: 9.6 MG/DL — SIGNIFICANT CHANGE UP (ref 8.5–10.1)
CHLORIDE SERPL-SCNC: 107 MMOL/L — SIGNIFICANT CHANGE UP (ref 96–108)
CO2 SERPL-SCNC: 26 MMOL/L — SIGNIFICANT CHANGE UP (ref 22–31)
CREAT SERPL-MCNC: 0.77 MG/DL — SIGNIFICANT CHANGE UP (ref 0.5–1.3)
EOSINOPHIL # BLD AUTO: 0.12 K/UL — SIGNIFICANT CHANGE UP (ref 0–0.5)
EOSINOPHIL NFR BLD AUTO: 1.3 % — SIGNIFICANT CHANGE UP (ref 0–6)
GLUCOSE SERPL-MCNC: 102 MG/DL — HIGH (ref 70–99)
HBA1C BLD-MCNC: 6 % — HIGH (ref 4–5.6)
HCT VFR BLD CALC: 39.3 % — SIGNIFICANT CHANGE UP (ref 34.5–45)
HGB BLD-MCNC: 13.1 G/DL — SIGNIFICANT CHANGE UP (ref 11.5–15.5)
IMM GRANULOCYTES NFR BLD AUTO: 0.4 % — SIGNIFICANT CHANGE UP (ref 0–1.5)
INR BLD: 1.03 RATIO — SIGNIFICANT CHANGE UP (ref 0.88–1.16)
LYMPHOCYTES # BLD AUTO: 2.81 K/UL — SIGNIFICANT CHANGE UP (ref 1–3.3)
LYMPHOCYTES # BLD AUTO: 30.5 % — SIGNIFICANT CHANGE UP (ref 13–44)
MCHC RBC-ENTMCNC: 30 PG — SIGNIFICANT CHANGE UP (ref 27–34)
MCHC RBC-ENTMCNC: 33.3 GM/DL — SIGNIFICANT CHANGE UP (ref 32–36)
MCV RBC AUTO: 89.9 FL — SIGNIFICANT CHANGE UP (ref 80–100)
MONOCYTES # BLD AUTO: 0.76 K/UL — SIGNIFICANT CHANGE UP (ref 0–0.9)
MONOCYTES NFR BLD AUTO: 8.3 % — SIGNIFICANT CHANGE UP (ref 2–14)
MRSA PCR RESULT.: SIGNIFICANT CHANGE UP
NEUTROPHILS # BLD AUTO: 5.42 K/UL — SIGNIFICANT CHANGE UP (ref 1.8–7.4)
NEUTROPHILS NFR BLD AUTO: 58.8 % — SIGNIFICANT CHANGE UP (ref 43–77)
NRBC # BLD: 0 /100 WBCS — SIGNIFICANT CHANGE UP (ref 0–0)
PLATELET # BLD AUTO: 319 K/UL — SIGNIFICANT CHANGE UP (ref 150–400)
POTASSIUM SERPL-MCNC: 3.7 MMOL/L — SIGNIFICANT CHANGE UP (ref 3.5–5.3)
POTASSIUM SERPL-SCNC: 3.7 MMOL/L — SIGNIFICANT CHANGE UP (ref 3.5–5.3)
PROTHROM AB SERPL-ACNC: 11.5 SEC — SIGNIFICANT CHANGE UP (ref 10–12.9)
RBC # BLD: 4.37 M/UL — SIGNIFICANT CHANGE UP (ref 3.8–5.2)
RBC # FLD: 13.2 % — SIGNIFICANT CHANGE UP (ref 10.3–14.5)
S AUREUS DNA NOSE QL NAA+PROBE: DETECTED
SODIUM SERPL-SCNC: 138 MMOL/L — SIGNIFICANT CHANGE UP (ref 135–145)
WBC # BLD: 9.21 K/UL — SIGNIFICANT CHANGE UP (ref 3.8–10.5)
WBC # FLD AUTO: 9.21 K/UL — SIGNIFICANT CHANGE UP (ref 3.8–10.5)

## 2020-02-27 PROCEDURE — 93010 ELECTROCARDIOGRAM REPORT: CPT

## 2020-02-27 NOTE — PHYSICAL THERAPY INITIAL EVALUATION ADULT - ADDITIONAL COMMENTS
Pt lives with her  and son (son can provide assist upon D/C home) in a private home, 4 entry steps (B/L rails, far apart), 1 level inside home.  Pt has a tub/shower combo with a retractable shower head, standard toilet seat height, & + grab bars. Pt states she is currently needs assistance c some ADL's and uses a straight cane for community ambulation. Pt is right hand dominant, wears eye glasses, does not drive, & is retired. Pt reports daily 7/10 pain & states it is worse with walking (10/10 pain). Pt endorses taking narcotics for pain management. Patient has [facility] 7 days per week, 5 hrs per day that provides assistance to her and her . Pt states  had stroke recently and he requires assistance as well. Goal of therapy: manage pain & improve functional mobility.

## 2020-02-27 NOTE — H&P PST ADULT - ASSESSMENT
left knee osteoarthritis   CAPRINI SCORE    AGE RELATED RISK FACTORS                                                       MOBILITY RELATED FACTORS  [ ] Age 41-60 years                                            (1 Point)                  [ ] Bed rest                                                        (1 Point)  x[ ] Age: 61-74 years                                           (2 Points)                [ ] Plaster cast                                                   (2 Points)  [ ] Age= 75 years                                              (3 Points)                 [ ] Bed bound for more than 72 hours                   (2 Points)    DISEASE RELATED RISK FACTORS                                               GENDER SPECIFIC FACTORS  [ ] Edema in the lower extremities                       (1 Point)                  [ ] Pregnancy                                                     (1 Point)  [ ] Varicose veins                                               (1 Point)                  [ ] Post-partum < 6 weeks                                   (1 Point)             [x ] BMI > 25 Kg/m2                                            (1 Point)                  [ ] Hormonal therapy  or oral contraception            (1 Point)                 [ ] Sepsis (in the previous month)                        (1 Point)                  [ ] History of pregnancy complications  [ ] Pneumonia or serious lung disease                                               [ ] Unexplained or recurrent                       (1 Point)           (in the previous month)                               (1 Point)  [ ] Abnormal pulmonary function test                     (1 Point)                 SURGERY RELATED RISK FACTORS  [ ] Acute myocardial infarction                              (1 Point)                 [ ]  Section                                            (1 Point)  [ ] Congestive heart failure (in the previous month)  (1 Point)                 [ ] Minor surgery                                                 (1 Point)   [ ] Inflammatory bowel disease                             (1 Point)                 [ ] Arthroscopic surgery                                        (2 Points)  [ ] Central venous access                                    (2 Points)                [ ] General surgery lasting more than 45 minutes   (2 Points)       [ ] Stroke (in the previous month)                          (5 Points)               [x ] Elective arthroplasty                                        (5 Points)                                                                                                                                               HEMATOLOGY RELATED FACTORS                                                 TRAUMA RELATED RISK FACTORS  [ ] Prior episodes of VTE                                     (3 Points)                 [ ] Fracture of the hip, pelvis, or leg                       (5 Points)  [ ] Positive family history for VTE                         (3 Points)                 [ ] Acute spinal cord injury (in the previous month)  (5 Points)  [ ] Prothrombin 23954 A                                      (3 Points)                 [ ] Paralysis  (less than 1 month)                          (5 Points)  [ ] Factor V Leiden                                             (3 Points)                 [ ] Multiple Trauma within 1 month                         (5 Points)  [ ] Lupus anticoagulants                                     (3 Points)                                                           [ ] Anticardiolipin antibodies                                (3 Points)                                                       [ ] High homocysteine in the blood                      (3 Points)                                             [ ] Other congenital or acquired thrombophilia       (3 Points)                                                [ ] Heparin induced thrombocytopenia                  (3 Points)                                          Total Score [    8      ]  Caprini Score 0-2: Low risk, No VTE Prophylaxis required for most patient's, encourage ambulation  Caprini Score 3-6: At Risk, Pharmacologic VTE prophylaxis is indicated for most patients ( in the absence of a contraindication)  Caprini Score Greater than or = 7: High Risk , pharmacologic VTE is indicated for most patients ( in the absence of a contraindication)    Caprini score indicates that the patient is high risk for VTE event ( score 6 or greater). Surgical patient's in this group will benefit from both pharmacologic prophylaxis and intermittent compression devices . Surgical team will determine the balance between VTE  risk and bleeding risk and other clinical considerations

## 2020-02-27 NOTE — H&P PST ADULT - NSANTHOSAYNRD_GEN_A_CORE
No. PERFECTO screening performed.  STOP BANG Legend: 0-2 = LOW Risk; 3-4 = INTERMEDIATE Risk; 5-8 = HIGH Risk

## 2020-02-27 NOTE — H&P PST ADULT - NSICDXPROBLEM_GEN_ALL_CORE_FT
PROBLEM DIAGNOSES  Problem: Osteoarthritis of left knee  Assessment and Plan: scheduled for  left knee arthroplasty    Problem: Preoperative examination  Assessment and Plan: labs - cbc,pt/ptt,bmp,t&s,nose cx,ekg  M/C required  preop 3 day hibiclens instruction reviewed and given .instructed on if  nose cx positive use mupuricin 5 days and checklist given  take routine meds DOS with sips of water. avoid NSAID and OTC supplements. verbalized understanding  information on proper nutrition , increase protein and better food choices provided in packet      Problem: Glaucoma  Assessment and Plan: continue drops    Problem: HLD (hyperlipidemia)  Assessment and Plan: continue meds    Problem: DM (diabetes mellitus)  Assessment and Plan: continue meds    Problem: HTN (hypertension)  Assessment and Plan: continue meds

## 2020-02-27 NOTE — H&P PST ADULT - HISTORY OF PRESENT ILLNESS
75 yo female , pmh- htn, dm, hld, glaucoma c/o left knee pain secondary osteoarthritis - scheduled for left knee arthroplasty  GOAL: to be able to cook and clean without pain and do every day things

## 2020-02-27 NOTE — OCCUPATIONAL THERAPY INITIAL EVALUATION ADULT - ADDITIONAL COMMENTS
Patient lives with  and son (Who can assist post op. Patients daughter will be available after surgery as well) in a private house with 4 steps to enter with bilateral handrails that are far apart. Once inside, the patients bedroom and bathroom is on that level when entering. The patients bathroom has a tub/shower combination, retractable shower head, standard toilet seat and Grab bars+. The patient reports that a 3/1 commode can fit over the toilet at home. The patient ambulates with a cane and does not own any other device for ambulation. The patient reports that she requires assistance with ADLS and has an aid come 7 days a week for 5 hours a day. The patient daily pain is a 7/10 at rest and a 10/10 with movement. The patient reports taking Tylenol for pain management. The patient denies fall history and buckling. The patient wears glasses all the time, R handed, does not drive and does not have a hearing impairment.

## 2020-02-27 NOTE — PHYSICAL THERAPY INITIAL EVALUATION ADULT - PERTINENT HX OF CURRENT PROBLEM, REHAB EVAL
Patient attends pre-op testing today following consult c Dr. Lemos due to chronic pain to L knee. Elective L TKA is now scheduled in this facility for 3/20/2020.

## 2020-02-28 RX ORDER — MUPIROCIN 20 MG/G
1 OINTMENT TOPICAL
Qty: 1 | Refills: 0
Start: 2020-02-28 | End: 2020-03-03

## 2020-03-04 ENCOUNTER — APPOINTMENT (OUTPATIENT)
Dept: CARDIOLOGY | Facility: CLINIC | Age: 75
End: 2020-03-04
Payer: MEDICARE

## 2020-03-04 VITALS
DIASTOLIC BLOOD PRESSURE: 78 MMHG | HEIGHT: 62 IN | OXYGEN SATURATION: 96 % | HEART RATE: 71 BPM | TEMPERATURE: 97.9 F | WEIGHT: 132 LBS | SYSTOLIC BLOOD PRESSURE: 125 MMHG | BODY MASS INDEX: 24.29 KG/M2

## 2020-03-04 PROBLEM — H40.9 UNSPECIFIED GLAUCOMA: Chronic | Status: ACTIVE | Noted: 2020-02-27

## 2020-03-04 PROCEDURE — 99205 OFFICE O/P NEW HI 60 MIN: CPT

## 2020-03-04 PROCEDURE — 93000 ELECTROCARDIOGRAM COMPLETE: CPT

## 2020-03-10 ENCOUNTER — APPOINTMENT (OUTPATIENT)
Dept: CARDIOLOGY | Facility: CLINIC | Age: 75
End: 2020-03-10
Payer: MEDICARE

## 2020-03-10 PROCEDURE — 93306 TTE W/DOPPLER COMPLETE: CPT

## 2020-03-11 NOTE — PHYSICAL EXAM
[General Appearance - Well Developed] : well developed [Normal Appearance] : normal appearance [Well Groomed] : well groomed [General Appearance - Well Nourished] : well nourished [No Deformities] : no deformities [General Appearance - In No Acute Distress] : no acute distress [Normal Conjunctiva] : the conjunctiva exhibited no abnormalities [Eyelids - No Xanthelasma] : the eyelids demonstrated no xanthelasmas [Normal Oral Mucosa] : normal oral mucosa [No Oral Pallor] : no oral pallor [No Oral Cyanosis] : no oral cyanosis [Normal Jugular Venous A Waves Present] : normal jugular venous A waves present [Normal Jugular Venous V Waves Present] : normal jugular venous V waves present [No Jugular Venous Alex A Waves] : no jugular venous alex A waves [Respiration, Rhythm And Depth] : normal respiratory rhythm and effort [Exaggerated Use Of Accessory Muscles For Inspiration] : no accessory muscle use [Auscultation Breath Sounds / Voice Sounds] : lungs were clear to auscultation bilaterally [Heart Rate And Rhythm] : heart rate and rhythm were normal [Heart Sounds] : normal S1 and S2 [Murmurs] : no murmurs present [Abdomen Soft] : soft [Abdomen Tenderness] : non-tender [Abdomen Mass (___ Cm)] : no abdominal mass palpated [Abnormal Walk] : normal gait [Gait - Sufficient For Exercise Testing] : the gait was sufficient for exercise testing [Nail Clubbing] : no clubbing of the fingernails [Cyanosis, Localized] : no localized cyanosis [Petechial Hemorrhages (___cm)] : no petechial hemorrhages [Skin Color & Pigmentation] : normal skin color and pigmentation [] : no rash [No Venous Stasis] : no venous stasis [Skin Lesions] : no skin lesions [No Skin Ulcers] : no skin ulcer [No Xanthoma] : no  xanthoma was observed [Oriented To Time, Place, And Person] : oriented to person, place, and time [Affect] : the affect was normal [Mood] : the mood was normal [No Anxiety] : not feeling anxious [FreeTextEntry1] : No edema.  Pulses 2+ bilaterally symmetric including pedal
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
## 2020-03-11 NOTE — REVIEW OF SYSTEMS
[see HPI] : see HPI [Negative] : Heme/Lymph [Recent Weight Gain (___ Lbs)] : no recent weight gain [Feeling Fatigued] : not feeling fatigued [Recent Weight Loss (___ Lbs)] : no recent weight loss [Shortness Of Breath] : no shortness of breath [Dyspnea on exertion] : not dyspnea during exertion [Chest  Pressure] : no chest pressure [Chest Pain] : no chest pain [Lower Ext Edema] : no extremity edema [Leg Claudication] : no intermittent leg claudication [Palpitations] : no palpitations [Dizziness] : no dizziness [Depression] : no depression [Anxiety] : no anxiety [Under Stress] : not under stress

## 2020-03-16 ENCOUNTER — MED ADMIN CHARGE (OUTPATIENT)
Age: 75
End: 2020-03-16

## 2020-03-16 ENCOUNTER — APPOINTMENT (OUTPATIENT)
Dept: CARDIOLOGY | Facility: CLINIC | Age: 75
End: 2020-03-16
Payer: MEDICARE

## 2020-03-16 PROCEDURE — A9500: CPT

## 2020-03-16 PROCEDURE — 93015 CV STRESS TEST SUPVJ I&R: CPT

## 2020-03-16 PROCEDURE — 78452 HT MUSCLE IMAGE SPECT MULT: CPT

## 2020-03-16 RX ORDER — REGADENOSON 0.08 MG/ML
0.4 INJECTION, SOLUTION INTRAVENOUS
Qty: 1 | Refills: 0 | Status: COMPLETED | OUTPATIENT
Start: 2020-03-16

## 2020-03-16 RX ADMIN — REGADENOSON 0.4 MG/5ML: 0.08 INJECTION, SOLUTION INTRAVENOUS at 00:00

## 2020-03-18 ENCOUNTER — NON-APPOINTMENT (OUTPATIENT)
Age: 75
End: 2020-03-18

## 2020-03-18 ENCOUNTER — APPOINTMENT (OUTPATIENT)
Dept: CARDIOLOGY | Facility: CLINIC | Age: 75
End: 2020-03-18
Payer: MEDICARE

## 2020-03-18 VITALS
TEMPERATURE: 97.6 F | SYSTOLIC BLOOD PRESSURE: 110 MMHG | BODY MASS INDEX: 23.96 KG/M2 | WEIGHT: 131 LBS | OXYGEN SATURATION: 95 % | DIASTOLIC BLOOD PRESSURE: 68 MMHG | HEART RATE: 70 BPM

## 2020-03-18 DIAGNOSIS — R94.31 ABNORMAL ELECTROCARDIOGRAM [ECG] [EKG]: ICD-10-CM

## 2020-03-18 PROCEDURE — 99214 OFFICE O/P EST MOD 30 MIN: CPT
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
## 2020-03-18 NOTE — REASON FOR VISIT
[FreeTextEntry1] : Follow-up for 74-year-old woman awaiting knee replacement after abnormal pharmacologic nuclear study

## 2020-03-20 ENCOUNTER — APPOINTMENT (OUTPATIENT)
Dept: ORTHOPEDIC SURGERY | Facility: HOSPITAL | Age: 75
End: 2020-03-20

## 2020-06-22 ENCOUNTER — APPOINTMENT (OUTPATIENT)
Dept: CARDIOLOGY | Facility: CLINIC | Age: 75
End: 2020-06-22
Payer: MEDICARE

## 2020-06-22 ENCOUNTER — NON-APPOINTMENT (OUTPATIENT)
Age: 75
End: 2020-06-22

## 2020-06-22 VITALS
HEART RATE: 67 BPM | DIASTOLIC BLOOD PRESSURE: 72 MMHG | SYSTOLIC BLOOD PRESSURE: 106 MMHG | BODY MASS INDEX: 24.11 KG/M2 | HEIGHT: 62 IN | OXYGEN SATURATION: 95 % | TEMPERATURE: 97.9 F | WEIGHT: 131 LBS

## 2020-06-22 DIAGNOSIS — R06.02 SHORTNESS OF BREATH: ICD-10-CM

## 2020-06-22 PROCEDURE — 36415 COLL VENOUS BLD VENIPUNCTURE: CPT

## 2020-06-22 PROCEDURE — 99214 OFFICE O/P EST MOD 30 MIN: CPT

## 2020-06-22 PROCEDURE — 93000 ELECTROCARDIOGRAM COMPLETE: CPT
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
## 2020-06-22 NOTE — PHYSICAL EXAM
[General Appearance - Well Developed] : well developed [Well Groomed] : well groomed [Normal Appearance] : normal appearance [General Appearance - Well Nourished] : well nourished [No Deformities] : no deformities [General Appearance - In No Acute Distress] : no acute distress [Eyelids - No Xanthelasma] : the eyelids demonstrated no xanthelasmas [Normal Conjunctiva] : the conjunctiva exhibited no abnormalities [No Oral Pallor] : no oral pallor [Normal Oral Mucosa] : normal oral mucosa [No Oral Cyanosis] : no oral cyanosis [Normal Jugular Venous A Waves Present] : normal jugular venous A waves present [No Jugular Venous Alex A Waves] : no jugular venous alex A waves [Normal Jugular Venous V Waves Present] : normal jugular venous V waves present [Respiration, Rhythm And Depth] : normal respiratory rhythm and effort [Exaggerated Use Of Accessory Muscles For Inspiration] : no accessory muscle use [Auscultation Breath Sounds / Voice Sounds] : lungs were clear to auscultation bilaterally [Heart Rate And Rhythm] : heart rate and rhythm were normal [Heart Sounds] : normal S1 and S2 [Abdomen Soft] : soft [Murmurs] : no murmurs present [Abdomen Mass (___ Cm)] : no abdominal mass palpated [Abdomen Tenderness] : non-tender [Gait - Sufficient For Exercise Testing] : the gait was sufficient for exercise testing [Abnormal Walk] : normal gait [Cyanosis, Localized] : no localized cyanosis [Nail Clubbing] : no clubbing of the fingernails [Petechial Hemorrhages (___cm)] : no petechial hemorrhages [Skin Color & Pigmentation] : normal skin color and pigmentation [FreeTextEntry1] : No edema.  Pulses 2+ bilaterally symmetric including pedal [] : no rash [No Venous Stasis] : no venous stasis [No Xanthoma] : no  xanthoma was observed [Skin Lesions] : no skin lesions [No Skin Ulcers] : no skin ulcer [Oriented To Time, Place, And Person] : oriented to person, place, and time [Affect] : the affect was normal [Mood] : the mood was normal [No Anxiety] : not feeling anxious

## 2020-06-22 NOTE — REVIEW OF SYSTEMS
[Recent Weight Gain (___ Lbs)] : no recent weight gain [Feeling Fatigued] : not feeling fatigued [Recent Weight Loss (___ Lbs)] : no recent weight loss [Shortness Of Breath] : no shortness of breath [Dyspnea on exertion] : not dyspnea during exertion [Chest  Pressure] : no chest pressure [Chest Pain] : no chest pain [Lower Ext Edema] : no extremity edema [Leg Claudication] : no intermittent leg claudication [Palpitations] : no palpitations [Dizziness] : no dizziness [see HPI] : see HPI [Depression] : no depression [Anxiety] : no anxiety [Under Stress] : not under stress [Negative] : Heme/Lymph

## 2020-06-23 LAB
ALBUMIN SERPL ELPH-MCNC: 4.8 G/DL
ALP BLD-CCNC: 117 U/L
ALT SERPL-CCNC: 11 U/L
ANION GAP SERPL CALC-SCNC: 13 MMOL/L
AST SERPL-CCNC: 16 U/L
BASOPHILS # BLD AUTO: 0.07 K/UL
BASOPHILS NFR BLD AUTO: 0.8 %
BILIRUB SERPL-MCNC: 1.1 MG/DL
BUN SERPL-MCNC: 10 MG/DL
CALCIUM SERPL-MCNC: 9.9 MG/DL
CHLORIDE SERPL-SCNC: 103 MMOL/L
CHOLEST SERPL-MCNC: 165 MG/DL
CHOLEST/HDLC SERPL: 4.8 RATIO
CO2 SERPL-SCNC: 24 MMOL/L
CREAT SERPL-MCNC: 0.75 MG/DL
EOSINOPHIL # BLD AUTO: 0.2 K/UL
EOSINOPHIL NFR BLD AUTO: 2.2 %
ESTIMATED AVERAGE GLUCOSE: 117 MG/DL
GLUCOSE SERPL-MCNC: 90 MG/DL
HBA1C MFR BLD HPLC: 5.7 %
HCT VFR BLD CALC: 41.3 %
HDLC SERPL-MCNC: 34 MG/DL
HGB BLD-MCNC: 13.6 G/DL
IMM GRANULOCYTES NFR BLD AUTO: 0.2 %
LDLC SERPL CALC-MCNC: 71 MG/DL
LYMPHOCYTES # BLD AUTO: 3.15 K/UL
LYMPHOCYTES NFR BLD AUTO: 35.3 %
MAN DIFF?: NORMAL
MCHC RBC-ENTMCNC: 30.6 PG
MCHC RBC-ENTMCNC: 32.9 GM/DL
MCV RBC AUTO: 93 FL
MONOCYTES # BLD AUTO: 0.64 K/UL
MONOCYTES NFR BLD AUTO: 7.2 %
NEUTROPHILS # BLD AUTO: 4.85 K/UL
NEUTROPHILS NFR BLD AUTO: 54.3 %
NT-PROBNP SERPL-MCNC: 37 PG/ML
PLATELET # BLD AUTO: 317 K/UL
POTASSIUM SERPL-SCNC: 4.4 MMOL/L
PROT SERPL-MCNC: 7.9 G/DL
RBC # BLD: 4.44 M/UL
RBC # FLD: 12.6 %
SODIUM SERPL-SCNC: 140 MMOL/L
TRIGL SERPL-MCNC: 295 MG/DL
TSH SERPL-ACNC: 0.83 UIU/ML
WBC # FLD AUTO: 8.93 K/UL

## 2020-07-27 ENCOUNTER — APPOINTMENT (OUTPATIENT)
Dept: CARDIOLOGY | Facility: CLINIC | Age: 75
End: 2020-07-27
Payer: MEDICARE

## 2020-07-27 ENCOUNTER — NON-APPOINTMENT (OUTPATIENT)
Age: 75
End: 2020-07-27

## 2020-07-27 VITALS
OXYGEN SATURATION: 96 % | DIASTOLIC BLOOD PRESSURE: 80 MMHG | SYSTOLIC BLOOD PRESSURE: 146 MMHG | BODY MASS INDEX: 24.11 KG/M2 | TEMPERATURE: 98.3 F | HEART RATE: 81 BPM | HEIGHT: 62 IN | RESPIRATION RATE: 17 BRPM | WEIGHT: 131 LBS

## 2020-07-27 PROCEDURE — 36415 COLL VENOUS BLD VENIPUNCTURE: CPT

## 2020-07-27 PROCEDURE — 93000 ELECTROCARDIOGRAM COMPLETE: CPT

## 2020-07-27 PROCEDURE — 99214 OFFICE O/P EST MOD 30 MIN: CPT
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
## 2020-07-27 NOTE — REVIEW OF SYSTEMS
[Negative] : Endocrine [Feeling Fatigued] : not feeling fatigued [Recent Weight Gain (___ Lbs)] : no recent weight gain [Recent Weight Loss (___ Lbs)] : no recent weight loss [Shortness Of Breath] : no shortness of breath [Dyspnea on exertion] : not dyspnea during exertion [Chest  Pressure] : no chest pressure [Chest Pain] : no chest pain [Lower Ext Edema] : no extremity edema [Palpitations] : no palpitations [Leg Claudication] : no intermittent leg claudication [see HPI] : see HPI [Joint Pain] : joint pain [Dizziness] : no dizziness [Joint Stiffness] : joint stiffness [Anxiety] : no anxiety [Depression] : no depression [Under Stress] : not under stress

## 2020-07-27 NOTE — PHYSICAL EXAM
[General Appearance - Well Developed] : well developed [Well Groomed] : well groomed [General Appearance - Well Nourished] : well nourished [Normal Appearance] : normal appearance [Normal Conjunctiva] : the conjunctiva exhibited no abnormalities [General Appearance - In No Acute Distress] : no acute distress [No Deformities] : no deformities [No Oral Pallor] : no oral pallor [Normal Oral Mucosa] : normal oral mucosa [Eyelids - No Xanthelasma] : the eyelids demonstrated no xanthelasmas [Normal Jugular Venous A Waves Present] : normal jugular venous A waves present [No Oral Cyanosis] : no oral cyanosis [Normal Jugular Venous V Waves Present] : normal jugular venous V waves present [No Jugular Venous Alex A Waves] : no jugular venous alex A waves [Respiration, Rhythm And Depth] : normal respiratory rhythm and effort [Exaggerated Use Of Accessory Muscles For Inspiration] : no accessory muscle use [Heart Rate And Rhythm] : heart rate and rhythm were normal [Auscultation Breath Sounds / Voice Sounds] : lungs were clear to auscultation bilaterally [Heart Sounds] : normal S1 and S2 [Murmurs] : no murmurs present [Abdomen Tenderness] : non-tender [Abdomen Soft] : soft [Abdomen Mass (___ Cm)] : no abdominal mass palpated [Abnormal Walk] : normal gait [Gait - Sufficient For Exercise Testing] : the gait was sufficient for exercise testing [Nail Clubbing] : no clubbing of the fingernails [Cyanosis, Localized] : no localized cyanosis [Petechial Hemorrhages (___cm)] : no petechial hemorrhages [Skin Color & Pigmentation] : normal skin color and pigmentation [] : no rash [No Venous Stasis] : no venous stasis [Skin Lesions] : no skin lesions [No Xanthoma] : no  xanthoma was observed [No Skin Ulcers] : no skin ulcer [Affect] : the affect was normal [Oriented To Time, Place, And Person] : oriented to person, place, and time [No Anxiety] : not feeling anxious [Mood] : the mood was normal [FreeTextEntry1] : No edema.  Pulses 2+ bilaterally symmetric including pedal

## 2020-07-28 LAB
ALBUMIN SERPL ELPH-MCNC: 4.3 G/DL
ALP BLD-CCNC: 116 U/L
ALT SERPL-CCNC: 12 U/L
ANION GAP SERPL CALC-SCNC: 14 MMOL/L
AST SERPL-CCNC: 14 U/L
BASOPHILS # BLD AUTO: 0.05 K/UL
BASOPHILS NFR BLD AUTO: 0.5 %
BILIRUB SERPL-MCNC: 1.3 MG/DL
BUN SERPL-MCNC: 10 MG/DL
CALCIUM SERPL-MCNC: 9.4 MG/DL
CHLORIDE SERPL-SCNC: 105 MMOL/L
CO2 SERPL-SCNC: 23 MMOL/L
CREAT SERPL-MCNC: 0.83 MG/DL
EOSINOPHIL # BLD AUTO: 0.18 K/UL
EOSINOPHIL NFR BLD AUTO: 1.9 %
GLUCOSE SERPL-MCNC: 158 MG/DL
HCT VFR BLD CALC: 38.3 %
HGB BLD-MCNC: 12.6 G/DL
IMM GRANULOCYTES NFR BLD AUTO: 0.4 %
LYMPHOCYTES # BLD AUTO: 2.98 K/UL
LYMPHOCYTES NFR BLD AUTO: 31.9 %
MAN DIFF?: NORMAL
MCHC RBC-ENTMCNC: 30.6 PG
MCHC RBC-ENTMCNC: 32.9 GM/DL
MCV RBC AUTO: 93 FL
MONOCYTES # BLD AUTO: 0.68 K/UL
MONOCYTES NFR BLD AUTO: 7.3 %
NEUTROPHILS # BLD AUTO: 5.42 K/UL
NEUTROPHILS NFR BLD AUTO: 58 %
PLATELET # BLD AUTO: 275 K/UL
POTASSIUM SERPL-SCNC: 3.4 MMOL/L
PROT SERPL-MCNC: 7.6 G/DL
RBC # BLD: 4.12 M/UL
RBC # FLD: 12.7 %
SODIUM SERPL-SCNC: 141 MMOL/L
WBC # FLD AUTO: 9.35 K/UL

## 2020-08-18 ENCOUNTER — APPOINTMENT (OUTPATIENT)
Dept: ORTHOPEDIC SURGERY | Facility: CLINIC | Age: 75
End: 2020-08-18
Payer: MEDICARE

## 2020-08-18 VITALS — TEMPERATURE: 98.2 F

## 2020-08-18 PROCEDURE — 73564 X-RAY EXAM KNEE 4 OR MORE: CPT | Mod: LT

## 2020-08-18 PROCEDURE — 99214 OFFICE O/P EST MOD 30 MIN: CPT

## 2020-08-18 NOTE — PHYSICAL EXAM
[de-identified] : Long standing knee, AP knee, lateral knee, and patellar views of the left knee were ordered and taken in the office and demonstrate severe posttraumatic degenerative joint disease of the knee with joint space narrowing, osteophyte formation, and subchondral sclerosis. No evidence of a distal femur fracture is noted with significant callus formation and extra articular deformity. \par  [de-identified] : Patient is well nourished, well-developed, in no acute distress, with appropriate mood and affect. The patient is oriented to time, place, and person. Respirations are even and unlabored. Gait evaluation does reveal a limp. There is no inguinal adenopathy. \par Examination of the contralateral knee shows normal range of motion, strength, no tenderness, and intact skin. \par The L limb is well-perfused, prior surgical scars well healed over the lateral aspect of the knee, shows a grossly normal motor and sensory examination. Knee motion is significantly reduced and does cause significant pain. The knee moves from 0-95 degrees. The knee is stable within that range-of-motion to AP and ML stress out to 10 degrees with solid endpoint. The alignment of the knee is 5 ° varus.  Muscle strength is normal. Pedal pulses are palpable. Hip examination was negative.\par \par

## 2020-08-18 NOTE — REASON FOR VISIT
[Follow-Up Visit] : a follow-up visit for [Knee Pain] : knee pain [Spouse] : spouse [Osteoarthritis, Knee] : osteoarthritis, knee

## 2020-08-18 NOTE — DISCUSSION/SUMMARY
[de-identified] : This patient has left knee post traumatic osteoarthritis.  She is failed a course of conservative management and would like to proceed with a left total knee arthroplasty with robotic navigation assistance given her severe extra-articular deformity and contracture.\par However she will need to proceed with cardiac clearance and I suggested a cardiac catheterization as she has previously failed a stress test.  The anesthesiology team will likely require cardiac catheterization to evaluate the cardiac vessels in the situation.  This is been communicated to the patient as well as her cardiologist Dr. Davidson.  If she does require stenting and this will delay surgery at which case we will communicate with the patient and consider alternative options in the meantime.\par \par The patient is an appropriate candidate for consideration of left total knee replacement. An extensive discussion was conducted of the natural history of the disease and the variety of surgical and non-surgical treatment options available to the patient. A risk/benefit analysis was discussed with the patient reviewing the advantages and disadvantages of surgical intervention at this time. Both the level and length of the patient's pain have made additional conservative treatment measures consisting of physical therapy, corticosteroids, and/or viscosupplementation contraindicated. A full explanation was given of the nature and the purpose of the procedure and anesthesia, its benefits, possible alternative methods of diagnosis or treatment, the risks involved, the possibility of complications, the foreseeable consequences of the procedure and the possible results of the non-treatment. I reviewed the plan of care as well as used a model of a total knee implant equivalent to the one that will be used for their total knee joint replacement. The ability to secure the implant utilizing cement or cementless (press-fit) was discussed with the patient. The patient agrees with the plan of care, as well as the use of implants for their total knee replacement.   We also discussed that if robotic/computer navigation is utilized, then additional incisions will be need to be made to accommodate the computer navigation arrays, which will be placed in the femur and tibia.\par \par No guarantee or assurance was made as to the results that may be obtained. Specifically, the risks were identified to include, but are not limited to the following: Infection, phlebitis, pulmonary embolism, death, paralysis, dislocation, pain, stiffness, instability, limp, weakness, breakage, leg-length inequality, uncontrolled bleeding, nerve injury, blood vessel injury, pressure sores, anesthetic risks, delayed healing of wound and bone, and wear and loosening. Further discussion was undertaken with the patient about the details of surgical preparation, treatment, and postoperative rehabilitation including medical clearance, autotransfusion, the hospital course, and the postoperative rehabilitation involved. All in all, I feel that this patient is a good candidate for surgical reconstruction.\par \par The patient and I discussed the current SARS-CoV-2 (COVID-19) pandemic which has affected our local hospitals. We discussed that our hospitals treat patients with COVID-19. All efforts will be made to avoid cohorting the patient with diagnosed or suspected COVID-19 patient. They also understand that we will screen them 24-48 hours prior to surgery. Despite our best efforts, there is a potential risk for iatrogenic transmission of COVID-19 to the patient during the perioperative period. [place] COVID-19 during the perioperative period may increase the patient´s risks of an adverse outcome including postoperative pneumonia, difficulty breathing, requirement for a breathing tube (general endotracheal intubation), and death. The patient is understanding of this risk, and is willing to proceed with surgery at this time.\par \par I did explain to the patient that their range of motion may not significantly improve after the surgery given the preoperative contracture. The patient expressed understanding of this and has elected to undergo total knee arthroplasty.
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
## 2020-08-21 ENCOUNTER — APPOINTMENT (OUTPATIENT)
Dept: DISASTER EMERGENCY | Facility: CLINIC | Age: 75
End: 2020-08-21

## 2020-08-22 LAB — SARS-COV-2 N GENE NPH QL NAA+PROBE: NOT DETECTED

## 2020-08-24 ENCOUNTER — APPOINTMENT (OUTPATIENT)
Dept: ORTHOPEDIC SURGERY | Facility: CLINIC | Age: 75
End: 2020-08-24
Payer: MEDICARE

## 2020-08-24 PROCEDURE — 99441: CPT

## 2020-08-25 ENCOUNTER — APPOINTMENT (OUTPATIENT)
Dept: CARDIOLOGY | Facility: CLINIC | Age: 75
End: 2020-08-25

## 2020-08-28 ENCOUNTER — OUTPATIENT (OUTPATIENT)
Dept: OUTPATIENT SERVICES | Facility: HOSPITAL | Age: 75
LOS: 1 days | Discharge: HOME | End: 2020-08-28

## 2020-08-28 VITALS
OXYGEN SATURATION: 96 % | TEMPERATURE: 98 F | DIASTOLIC BLOOD PRESSURE: 78 MMHG | HEIGHT: 60 IN | HEART RATE: 58 BPM | WEIGHT: 130.07 LBS | SYSTOLIC BLOOD PRESSURE: 141 MMHG | RESPIRATION RATE: 16 BRPM

## 2020-08-28 DIAGNOSIS — M17.32 UNILATERAL POST-TRAUMATIC OSTEOARTHRITIS, LEFT KNEE: ICD-10-CM

## 2020-08-28 DIAGNOSIS — E11.9 TYPE 2 DIABETES MELLITUS WITHOUT COMPLICATIONS: ICD-10-CM

## 2020-08-28 DIAGNOSIS — Z98.890 OTHER SPECIFIED POSTPROCEDURAL STATES: Chronic | ICD-10-CM

## 2020-08-28 DIAGNOSIS — Z87.81 PERSONAL HISTORY OF (HEALED) TRAUMATIC FRACTURE: Chronic | ICD-10-CM

## 2020-08-28 DIAGNOSIS — Z01.818 ENCOUNTER FOR OTHER PREPROCEDURAL EXAMINATION: ICD-10-CM

## 2020-08-28 DIAGNOSIS — I10 ESSENTIAL (PRIMARY) HYPERTENSION: ICD-10-CM

## 2020-08-28 LAB
A1C WITH ESTIMATED AVERAGE GLUCOSE RESULT: 5.9 % — HIGH (ref 4–5.6)
ANION GAP SERPL CALC-SCNC: 5 MMOL/L — SIGNIFICANT CHANGE UP (ref 5–17)
APTT BLD: 32.5 SEC — SIGNIFICANT CHANGE UP (ref 27.5–35.5)
BASOPHILS # BLD AUTO: 0.07 K/UL — SIGNIFICANT CHANGE UP (ref 0–0.2)
BASOPHILS NFR BLD AUTO: 0.8 % — SIGNIFICANT CHANGE UP (ref 0–2)
BLD GP AB SCN SERPL QL: SIGNIFICANT CHANGE UP
BUN SERPL-MCNC: 10 MG/DL — SIGNIFICANT CHANGE UP (ref 7–23)
CALCIUM SERPL-MCNC: 9.1 MG/DL — SIGNIFICANT CHANGE UP (ref 8.5–10.1)
CHLORIDE SERPL-SCNC: 106 MMOL/L — SIGNIFICANT CHANGE UP (ref 96–108)
CO2 SERPL-SCNC: 27 MMOL/L — SIGNIFICANT CHANGE UP (ref 22–31)
CREAT SERPL-MCNC: 0.69 MG/DL — SIGNIFICANT CHANGE UP (ref 0.5–1.3)
EOSINOPHIL # BLD AUTO: 0.33 K/UL — SIGNIFICANT CHANGE UP (ref 0–0.5)
EOSINOPHIL NFR BLD AUTO: 3.6 % — SIGNIFICANT CHANGE UP (ref 0–6)
ESTIMATED AVERAGE GLUCOSE: 123 MG/DL — HIGH (ref 68–114)
GLUCOSE SERPL-MCNC: 93 MG/DL — SIGNIFICANT CHANGE UP (ref 70–99)
HCT VFR BLD CALC: 40.9 % — SIGNIFICANT CHANGE UP (ref 34.5–45)
HGB BLD-MCNC: 13.6 G/DL — SIGNIFICANT CHANGE UP (ref 11.5–15.5)
IMM GRANULOCYTES NFR BLD AUTO: 0.2 % — SIGNIFICANT CHANGE UP (ref 0–1.5)
INR BLD: 1.05 RATIO — SIGNIFICANT CHANGE UP (ref 0.88–1.16)
LYMPHOCYTES # BLD AUTO: 3.32 K/UL — HIGH (ref 1–3.3)
LYMPHOCYTES # BLD AUTO: 36.4 % — SIGNIFICANT CHANGE UP (ref 13–44)
MCHC RBC-ENTMCNC: 30.6 PG — SIGNIFICANT CHANGE UP (ref 27–34)
MCHC RBC-ENTMCNC: 33.3 GM/DL — SIGNIFICANT CHANGE UP (ref 32–36)
MCV RBC AUTO: 91.9 FL — SIGNIFICANT CHANGE UP (ref 80–100)
MONOCYTES # BLD AUTO: 0.86 K/UL — SIGNIFICANT CHANGE UP (ref 0–0.9)
MONOCYTES NFR BLD AUTO: 9.4 % — SIGNIFICANT CHANGE UP (ref 2–14)
NEUTROPHILS # BLD AUTO: 4.53 K/UL — SIGNIFICANT CHANGE UP (ref 1.8–7.4)
NEUTROPHILS NFR BLD AUTO: 49.6 % — SIGNIFICANT CHANGE UP (ref 43–77)
NRBC # BLD: 0 /100 WBCS — SIGNIFICANT CHANGE UP (ref 0–0)
PLATELET # BLD AUTO: 281 K/UL — SIGNIFICANT CHANGE UP (ref 150–400)
POTASSIUM SERPL-MCNC: 3.6 MMOL/L — SIGNIFICANT CHANGE UP (ref 3.5–5.3)
POTASSIUM SERPL-SCNC: 3.6 MMOL/L — SIGNIFICANT CHANGE UP (ref 3.5–5.3)
PROTHROM AB SERPL-ACNC: 12.2 SEC — SIGNIFICANT CHANGE UP (ref 10.6–13.6)
RBC # BLD: 4.45 M/UL — SIGNIFICANT CHANGE UP (ref 3.8–5.2)
RBC # FLD: 12.4 % — SIGNIFICANT CHANGE UP (ref 10.3–14.5)
SODIUM SERPL-SCNC: 138 MMOL/L — SIGNIFICANT CHANGE UP (ref 135–145)
WBC # BLD: 9.13 K/UL — SIGNIFICANT CHANGE UP (ref 3.8–10.5)
WBC # FLD AUTO: 9.13 K/UL — SIGNIFICANT CHANGE UP (ref 3.8–10.5)

## 2020-08-28 NOTE — H&P PST ADULT - NEGATIVE OPHTHALMOLOGIC SYMPTOMS
no lacrimation R/no loss of vision R/no blurred vision R/no discharge R/no pain L/no lacrimation L/no blurred vision L/no loss of vision L/no pain R/no diplopia/no photophobia/no discharge L

## 2020-08-28 NOTE — H&P PST ADULT - HISTORY OF PRESENT ILLNESS
75yo female with medical h/o HTN, T2DM, Glaucoma and OA, left. Pt presents today for PST for Left Total Knee Replacement scheduled for 9/11/2020 75yo female with medical h/o HTN, T2DM, Glaucoma and OA, left knee, presents today for PST for Left Total Knee Replacement scheduled for 9/11/2020

## 2020-08-28 NOTE — H&P PST ADULT - MUSCULOSKELETAL
details… detailed exam diminished strength/no joint warmth/decreased ROM due to pain/no joint erythema/no joint swelling/no calf tenderness

## 2020-08-28 NOTE — PHYSICAL THERAPY INITIAL EVALUATION ADULT - MODIFIED CLINICAL TEST OF SENSORY INTEGRATION IN BALANCE TEST
5x Sit to Stand Test = (B hands used) 21.97 seconds, indicating significant impairment c functional mobility & strength  ; 2 Minute Walk Test = 300 feet with cane and rest stops

## 2020-08-28 NOTE — H&P PST ADULT - NSICDXPROBLEM_GEN_ALL_CORE_FT
PROBLEM DIAGNOSES  Problem: Unilateral post-traumatic osteoarthritis, left knee  Assessment and Plan: Pre-op instructions given. Pt verbalized understanding  Chlorhexidine wash instructions given  Pending: M/C + C/C + Covid test/results     Problem: Type 2 diabetes mellitus  Assessment and Plan: Accu-Chek ordered STAT for day of procedure  Pt instructed to hold all DM meds day of surgery    Problem: Hypertension  Assessment and Plan: Pt instructed to take meds

## 2020-08-28 NOTE — OCCUPATIONAL THERAPY INITIAL EVALUATION ADULT - ADDITIONAL COMMENTS
Patient lives with  and son (Patients son can assist post op) in a private house with 10 steps with bilateral handrails to enter. Once inside, the patient main bedroom and bathroom is on that level when entering. The patients bathroom has a tub/shower combination, fixed shower head, regular toilet and 2x grab bars inside of the tub. The patient reports that a 3/1 commode can fit over the toilet at home. The patient ambulates with no device inside of the home and uses a cane in the community. The patient does not own any other device for ambulation. The patient daily pain is a 0/10 at rest and a 7/10 with movement. The patient manages the pain with heat and Tylenol. The patient has no recent outpatient PT, no recent falls and no buckling of knees. The patient wears eye glasses for reading, R handed, does not drive and has no hearing impairments. 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

## 2020-08-28 NOTE — H&P PST ADULT - NSICDXPASTMEDICALHX_GEN_ALL_CORE_FT
PAST MEDICAL HISTORY:  Diabetes     Glaucoma     Hypertension, unspecified type     Osteoarthritis, knee left

## 2020-08-28 NOTE — H&P PST ADULT - NEGATIVE CARDIOVASCULAR SYMPTOMS
no orthopnea/no chest pain/no claudication/no paroxysmal nocturnal dyspnea/no peripheral edema/no palpitations/no dyspnea on exertion

## 2020-08-28 NOTE — H&P PST ADULT - BLOOD TRANSFUSION, PREVIOUS, PROFILE
Mailbox full - unable to leave message.      Left message to hold dose of warfarin on home number    no

## 2020-08-28 NOTE — H&P PST ADULT - NEGATIVE MUSCULOSKELETAL SYMPTOMS
no joint swelling/no muscle weakness/no back pain/no arthralgia/no myalgia/no muscle cramps/no neck pain/no arm pain L/no arm pain R/no leg pain R

## 2020-08-29 ENCOUNTER — APPOINTMENT (OUTPATIENT)
Dept: DISASTER EMERGENCY | Facility: CLINIC | Age: 75
End: 2020-08-29

## 2020-08-29 LAB
MRSA PCR RESULT.: SIGNIFICANT CHANGE UP
S AUREUS DNA NOSE QL NAA+PROBE: DETECTED

## 2020-08-31 RX ORDER — MUPIROCIN 20 MG/G
1 OINTMENT TOPICAL
Qty: 22 | Refills: 0
Start: 2020-08-31 | End: 2020-09-04

## 2020-09-02 PROBLEM — M17.10 UNILATERAL PRIMARY OSTEOARTHRITIS, UNSPECIFIED KNEE: Chronic | Status: ACTIVE | Noted: 2020-08-28

## 2020-09-06 ENCOUNTER — APPOINTMENT (OUTPATIENT)
Dept: DISASTER EMERGENCY | Facility: CLINIC | Age: 75
End: 2020-09-06

## 2020-09-07 LAB — SARS-COV-2 N GENE NPH QL NAA+PROBE: NOT DETECTED

## 2020-09-09 ENCOUNTER — OUTPATIENT (OUTPATIENT)
Dept: OUTPATIENT SERVICES | Facility: HOSPITAL | Age: 75
LOS: 1 days | End: 2020-09-09
Payer: COMMERCIAL

## 2020-09-09 DIAGNOSIS — Z87.81 PERSONAL HISTORY OF (HEALED) TRAUMATIC FRACTURE: Chronic | ICD-10-CM

## 2020-09-09 DIAGNOSIS — Z11.59 ENCOUNTER FOR SCREENING FOR OTHER VIRAL DISEASES: ICD-10-CM

## 2020-09-09 LAB — SARS-COV-2 RNA SPEC QL NAA+PROBE: SIGNIFICANT CHANGE UP

## 2020-09-09 PROCEDURE — U0003: CPT

## 2020-09-11 ENCOUNTER — OUTPATIENT (OUTPATIENT)
Dept: OUTPATIENT SERVICES | Facility: HOSPITAL | Age: 75
LOS: 1 days | End: 2020-09-11
Payer: COMMERCIAL

## 2020-09-11 VITALS
HEART RATE: 68 BPM | DIASTOLIC BLOOD PRESSURE: 81 MMHG | SYSTOLIC BLOOD PRESSURE: 150 MMHG | OXYGEN SATURATION: 99 % | RESPIRATION RATE: 18 BRPM

## 2020-09-11 VITALS
RESPIRATION RATE: 18 BRPM | HEART RATE: 66 BPM | DIASTOLIC BLOOD PRESSURE: 88 MMHG | OXYGEN SATURATION: 97 % | TEMPERATURE: 98 F | SYSTOLIC BLOOD PRESSURE: 136 MMHG

## 2020-09-11 DIAGNOSIS — I25.10 ATHEROSCLEROTIC HEART DISEASE OF NATIVE CORONARY ARTERY WITHOUT ANGINA PECTORIS: ICD-10-CM

## 2020-09-11 DIAGNOSIS — Z87.81 PERSONAL HISTORY OF (HEALED) TRAUMATIC FRACTURE: Chronic | ICD-10-CM

## 2020-09-11 LAB
ALBUMIN SERPL ELPH-MCNC: 4.5 G/DL — SIGNIFICANT CHANGE UP (ref 3.3–5)
ALP SERPL-CCNC: 107 U/L — SIGNIFICANT CHANGE UP (ref 40–120)
ALT FLD-CCNC: 11 U/L — SIGNIFICANT CHANGE UP (ref 10–45)
ANION GAP SERPL CALC-SCNC: 12 MMOL/L — SIGNIFICANT CHANGE UP (ref 5–17)
AST SERPL-CCNC: 16 U/L — SIGNIFICANT CHANGE UP (ref 10–40)
BILIRUB SERPL-MCNC: 2 MG/DL — HIGH (ref 0.2–1.2)
BUN SERPL-MCNC: 11 MG/DL — SIGNIFICANT CHANGE UP (ref 7–23)
CALCIUM SERPL-MCNC: 9.9 MG/DL — SIGNIFICANT CHANGE UP (ref 8.4–10.5)
CHLORIDE SERPL-SCNC: 104 MMOL/L — SIGNIFICANT CHANGE UP (ref 96–108)
CO2 SERPL-SCNC: 24 MMOL/L — SIGNIFICANT CHANGE UP (ref 22–31)
CREAT SERPL-MCNC: 0.72 MG/DL — SIGNIFICANT CHANGE UP (ref 0.5–1.3)
GLUCOSE BLDC GLUCOMTR-MCNC: 109 MG/DL — HIGH (ref 70–99)
GLUCOSE SERPL-MCNC: 108 MG/DL — HIGH (ref 70–99)
HCT VFR BLD CALC: 40.4 % — SIGNIFICANT CHANGE UP (ref 34.5–45)
HGB BLD-MCNC: 13.3 G/DL — SIGNIFICANT CHANGE UP (ref 11.5–15.5)
MCHC RBC-ENTMCNC: 30.1 PG — SIGNIFICANT CHANGE UP (ref 27–34)
MCHC RBC-ENTMCNC: 32.9 GM/DL — SIGNIFICANT CHANGE UP (ref 32–36)
MCV RBC AUTO: 91.4 FL — SIGNIFICANT CHANGE UP (ref 80–100)
NRBC # BLD: 0 /100 WBCS — SIGNIFICANT CHANGE UP (ref 0–0)
PLATELET # BLD AUTO: 310 K/UL — SIGNIFICANT CHANGE UP (ref 150–400)
POTASSIUM SERPL-MCNC: 3.7 MMOL/L — SIGNIFICANT CHANGE UP (ref 3.5–5.3)
POTASSIUM SERPL-SCNC: 3.7 MMOL/L — SIGNIFICANT CHANGE UP (ref 3.5–5.3)
PROT SERPL-MCNC: 7.9 G/DL — SIGNIFICANT CHANGE UP (ref 6–8.3)
RBC # BLD: 4.42 M/UL — SIGNIFICANT CHANGE UP (ref 3.8–5.2)
RBC # FLD: 12.3 % — SIGNIFICANT CHANGE UP (ref 10.3–14.5)
SODIUM SERPL-SCNC: 140 MMOL/L — SIGNIFICANT CHANGE UP (ref 135–145)
WBC # BLD: 10.35 K/UL — SIGNIFICANT CHANGE UP (ref 3.8–10.5)
WBC # FLD AUTO: 10.35 K/UL — SIGNIFICANT CHANGE UP (ref 3.8–10.5)

## 2020-09-11 PROCEDURE — 93005 ELECTROCARDIOGRAM TRACING: CPT

## 2020-09-11 PROCEDURE — C1894: CPT

## 2020-09-11 PROCEDURE — 93458 L HRT ARTERY/VENTRICLE ANGIO: CPT

## 2020-09-11 PROCEDURE — 93458 L HRT ARTERY/VENTRICLE ANGIO: CPT | Mod: 26

## 2020-09-11 PROCEDURE — 82962 GLUCOSE BLOOD TEST: CPT

## 2020-09-11 PROCEDURE — 93010 ELECTROCARDIOGRAM REPORT: CPT

## 2020-09-11 PROCEDURE — 80053 COMPREHEN METABOLIC PANEL: CPT

## 2020-09-11 PROCEDURE — 85027 COMPLETE CBC AUTOMATED: CPT

## 2020-09-11 PROCEDURE — C1769: CPT

## 2020-09-11 PROCEDURE — C1887: CPT

## 2020-09-11 RX ORDER — METFORMIN HYDROCHLORIDE 850 MG/1
1 TABLET ORAL
Qty: 0 | Refills: 0 | DISCHARGE

## 2020-09-11 RX ORDER — ACETAMINOPHEN 500 MG
650 TABLET ORAL ONCE
Refills: 0 | Status: COMPLETED | OUTPATIENT
Start: 2020-09-11 | End: 2020-09-11

## 2020-09-11 RX ADMIN — Medication 650 MILLIGRAM(S): at 17:45

## 2020-09-11 NOTE — H&P CARDIOLOGY - PMH
Diabetes    Glaucoma    HLD (hyperlipidemia)    Hypertension, unspecified type    Osteoarthritis, knee  left

## 2020-09-11 NOTE — ASU DISCHARGE PLAN (ADULT/PEDIATRIC) - ASU DC SPECIAL INSTRUCTIONSFT
For next 3 days- avoid pushing/pulling with the affected wrist such as pushing up from a seated position. Avoid repeated movement of the hand /wrist such as typing or hammering. Do not lift anything more than 5 lbs. Do not drive for 24 hours. You may shower after 24 hours. No bathing or swimming for 1 week. Check wrist site for bleeding and/or swelling daily following procedure. Call your doctor/cardiologist immediately should it occur. Follow up with your cardiologist in 1- 2 weeks. You may call [facility] at [phone] or [phone] after office hours and weekends with any questions or concerns following your procedure.

## 2020-09-11 NOTE — ASU DISCHARGE PLAN (ADULT/PEDIATRIC) - CALL YOUR DOCTOR IF YOU HAVE ANY OF THE FOLLOWING:
Numbness, tingling, color or temperature change to extremity/Bleeding that does not stop/Swelling that gets worse/Pain not relieved by Medications/Fever greater than (need to indicate Fahrenheit or Celsius)/Wound/Surgical Site with redness, or foul smelling discharge or pus

## 2020-09-11 NOTE — H&P CARDIOLOGY - HISTORY OF PRESENT ILLNESS
76yo female with PMH HTN, HLD, T2DM, Glaucoma and OA, for Left Total Knee Replacement scheduled for 9/15/2020, who presented to cardiology, Dr. Davidson, for clearance. NST 3/16/2020 medium sized, moderate defects in anteroapical, mid to distal anterior walls that are reversible consistent with ischemia.  There is a small, mild defect in proximal to mid inferolateral wall that is reversible consistent with ischemia.  EF >70%.  Pt. presents asymptomatic for further evaluation and cardiac cath.  No implanted monitoring devices. COVID negative.

## 2020-09-11 NOTE — ASU DISCHARGE PLAN (ADULT/PEDIATRIC) - CARE PROVIDER_API CALL
Du Davidson  CARDIOVASCULAR DISEASE  [address] [facility] [facility] [address]  [place] [address]  Phone: [phone]  Fax: [phone]  Follow Up Time:

## 2020-09-16 PROBLEM — E78.5 HYPERLIPIDEMIA, UNSPECIFIED: Chronic | Status: ACTIVE | Noted: 2020-09-11

## 2020-09-18 ENCOUNTER — OUTPATIENT (OUTPATIENT)
Dept: OUTPATIENT SERVICES | Facility: HOSPITAL | Age: 75
LOS: 1 days | Discharge: HOME | End: 2020-09-18

## 2020-09-18 DIAGNOSIS — Z87.81 PERSONAL HISTORY OF (HEALED) TRAUMATIC FRACTURE: Chronic | ICD-10-CM

## 2020-09-18 DIAGNOSIS — Z01.818 ENCOUNTER FOR OTHER PREPROCEDURAL EXAMINATION: ICD-10-CM

## 2020-09-18 DIAGNOSIS — M17.32 UNILATERAL POST-TRAUMATIC OSTEOARTHRITIS, LEFT KNEE: ICD-10-CM

## 2020-09-18 LAB
ANION GAP SERPL CALC-SCNC: 4 MMOL/L — LOW (ref 5–17)
APTT BLD: 31.5 SEC — SIGNIFICANT CHANGE UP (ref 27.5–35.5)
BASOPHILS # BLD AUTO: 0.06 K/UL — SIGNIFICANT CHANGE UP (ref 0–0.2)
BASOPHILS NFR BLD AUTO: 0.6 % — SIGNIFICANT CHANGE UP (ref 0–2)
BUN SERPL-MCNC: 10 MG/DL — SIGNIFICANT CHANGE UP (ref 7–23)
CALCIUM SERPL-MCNC: 9.6 MG/DL — SIGNIFICANT CHANGE UP (ref 8.5–10.1)
CHLORIDE SERPL-SCNC: 105 MMOL/L — SIGNIFICANT CHANGE UP (ref 96–108)
CO2 SERPL-SCNC: 30 MMOL/L — SIGNIFICANT CHANGE UP (ref 22–31)
CREAT SERPL-MCNC: 0.77 MG/DL — SIGNIFICANT CHANGE UP (ref 0.5–1.3)
EOSINOPHIL # BLD AUTO: 0.43 K/UL — SIGNIFICANT CHANGE UP (ref 0–0.5)
EOSINOPHIL NFR BLD AUTO: 4.2 % — SIGNIFICANT CHANGE UP (ref 0–6)
GLUCOSE SERPL-MCNC: 100 MG/DL — HIGH (ref 70–99)
HCT VFR BLD CALC: 39.6 % — SIGNIFICANT CHANGE UP (ref 34.5–45)
HGB BLD-MCNC: 13.6 G/DL — SIGNIFICANT CHANGE UP (ref 11.5–15.5)
IMM GRANULOCYTES NFR BLD AUTO: 0.3 % — SIGNIFICANT CHANGE UP (ref 0–1.5)
INR BLD: 1.05 RATIO — SIGNIFICANT CHANGE UP (ref 0.88–1.16)
LYMPHOCYTES # BLD AUTO: 3.79 K/UL — HIGH (ref 1–3.3)
LYMPHOCYTES # BLD AUTO: 37.3 % — SIGNIFICANT CHANGE UP (ref 13–44)
MCHC RBC-ENTMCNC: 30.5 PG — SIGNIFICANT CHANGE UP (ref 27–34)
MCHC RBC-ENTMCNC: 34.3 GM/DL — SIGNIFICANT CHANGE UP (ref 32–36)
MCV RBC AUTO: 88.8 FL — SIGNIFICANT CHANGE UP (ref 80–100)
MONOCYTES # BLD AUTO: 0.86 K/UL — SIGNIFICANT CHANGE UP (ref 0–0.9)
MONOCYTES NFR BLD AUTO: 8.5 % — SIGNIFICANT CHANGE UP (ref 2–14)
NEUTROPHILS # BLD AUTO: 4.98 K/UL — SIGNIFICANT CHANGE UP (ref 1.8–7.4)
NEUTROPHILS NFR BLD AUTO: 49.1 % — SIGNIFICANT CHANGE UP (ref 43–77)
NRBC # BLD: 0 /100 WBCS — SIGNIFICANT CHANGE UP (ref 0–0)
PLATELET # BLD AUTO: 316 K/UL — SIGNIFICANT CHANGE UP (ref 150–400)
POTASSIUM SERPL-MCNC: 3.6 MMOL/L — SIGNIFICANT CHANGE UP (ref 3.5–5.3)
POTASSIUM SERPL-SCNC: 3.6 MMOL/L — SIGNIFICANT CHANGE UP (ref 3.5–5.3)
PROTHROM AB SERPL-ACNC: 12.2 SEC — SIGNIFICANT CHANGE UP (ref 10.6–13.6)
RBC # BLD: 4.46 M/UL — SIGNIFICANT CHANGE UP (ref 3.8–5.2)
RBC # FLD: 12.5 % — SIGNIFICANT CHANGE UP (ref 10.3–14.5)
SODIUM SERPL-SCNC: 139 MMOL/L — SIGNIFICANT CHANGE UP (ref 135–145)
WBC # BLD: 10.15 K/UL — SIGNIFICANT CHANGE UP (ref 3.8–10.5)
WBC # FLD AUTO: 10.15 K/UL — SIGNIFICANT CHANGE UP (ref 3.8–10.5)

## 2020-09-18 RX ORDER — NIFEDIPINE 30 MG
1 TABLET, EXTENDED RELEASE 24 HR ORAL
Qty: 0 | Refills: 0 | DISCHARGE

## 2020-09-18 NOTE — H&P PST ADULT - MUSCULOSKELETAL
detailed exam diminished strength/no joint swelling/decreased ROM due to pain/no joint erythema/no joint warmth/no calf tenderness details…

## 2020-09-18 NOTE — H&P PST ADULT - NEGATIVE OPHTHALMOLOGIC SYMPTOMS
no blurred vision R/no discharge R/no photophobia/no lacrimation L/no discharge L/no lacrimation R/no pain R/no loss of vision R/no diplopia/no pain L/no loss of vision L/no blurred vision L

## 2020-09-18 NOTE — H&P PST ADULT - NEGATIVE CARDIOVASCULAR SYMPTOMS
no paroxysmal nocturnal dyspnea/no chest pain/no orthopnea/no peripheral edema/no claudication/no palpitations/no dyspnea on exertion

## 2020-09-18 NOTE — H&P PST ADULT - NEGATIVE MUSCULOSKELETAL SYMPTOMS
no arthralgia/no joint swelling/no muscle weakness/no back pain/no muscle cramps/no myalgia/no neck pain/no arm pain L/no arm pain R/no leg pain R

## 2020-09-18 NOTE — H&P PST ADULT - ASSESSMENT
Unilateral post-traumatic osteoarthritis, left knee Unilateral post-traumatic osteoarthritis, left knee  CAPRINI SCORE    AGE RELATED RISK FACTORS                                                       MOBILITY RELATED FACTORS  [ ] Age 41-60 years                                            (1 Point)                  [ ] Bed rest                                                        (1 Point)  [x ] Age: 61-74 years                                           (2 Points)                [ ] Plaster cast                                                   (2 Points)  [ ] Age= 75 years                                              (3 Points)                 [ ] Bed bound for more than 72 hours                   (2 Points)    DISEASE RELATED RISK FACTORS                                               GENDER SPECIFIC FACTORS  [ ] Edema in the lower extremities                       (1 Point)                  [ ] Pregnancy                                                     (1 Point)  [ ] Varicose veins                                               (1 Point)                  [ ] Post-partum < 6 weeks                                   (1 Point)             [ ] BMI > 25 Kg/m2                                            (1 Point)                  [ ] Hormonal therapy  or oral contraception            (1 Point)                 [ ] Sepsis (in the previous month)                        (1 Point)                  [ ] History of pregnancy complications  [ ] Pneumonia or serious lung disease                                               [ ] Unexplained or recurrent                       (1 Point)           (in the previous month)                               (1 Point)  [ ] Abnormal pulmonary function test                     (1 Point)                 SURGERY RELATED RISK FACTORS  [ ] Acute myocardial infarction                              (1 Point)                 [ ]  Section                                            (1 Point)  [ ] Congestive heart failure (in the previous month)  (1 Point)                 [ ] Minor surgery                                                 (1 Point)   [ ] Inflammatory bowel disease                             (1 Point)                 [ ] Arthroscopic surgery                                        (2 Points)  [ ] Central venous access                                    (2 Points)                [ ] General surgery lasting more than 45 minutes   (2 Points)       [ ] Stroke (in the previous month)                          (5 Points)               [x ] Elective arthroplasty                                        (5 Points)                                                                                                                                               HEMATOLOGY RELATED FACTORS                                                 TRAUMA RELATED RISK FACTORS  [ ] Prior episodes of VTE                                     (3 Points)                 [ ] Fracture of the hip, pelvis, or leg                       (5 Points)  [ ] Positive family history for VTE                         (3 Points)                 [ ] Acute spinal cord injury (in the previous month)  (5 Points)  [ ] Prothrombin 84062 A                                      (3 Points)                 [ ] Paralysis  (less than 1 month)                          (5 Points)  [ ] Factor V Leiden                                             (3 Points)                 [ ] Multiple Trauma within 1 month                         (5 Points)  [ ] Lupus anticoagulants                                     (3 Points)                                                           [ ] Anticardiolipin antibodies                                (3 Points)                                                       [ ] High homocysteine in the blood                      (3 Points)                                             [ ] Other congenital or acquired thrombophilia       (3 Points)                                                [ ] Heparin induced thrombocytopenia                  (3 Points)                                          Total Score [    7      ]  Caprini Score 0-2: Low risk, No VTE Prophylaxis required for most patient's, encourage ambulation  Caprini Score 3-6: At Risk, Pharmacologic VTE prophylaxis is indicated for most patients ( in the absence of a contraindication)  Caprini Score Greater than or = 7: High Risk , pharmacologic VTE is indicated for most patients ( in the absence of a contraindication)    Caprini score indicates that the patient is high risk for VTE event ( score 6 or greater). Surgical patient's in this group will benefit from both pharmacologic prophylaxis and intermittent compression devices . Surgical team will determine the balance between VTE  risk and bleeding risk and other clinical considerations

## 2020-09-18 NOTE — H&P PST ADULT - HISTORY OF PRESENT ILLNESS
73yo female with medical h/o HTN, T2DM, Glaucoma and OA, left knee, presents today for PST for Left Total Knee Replacement scheduled for 9/11/2020 74yo female with medical h/o HTN, T2DM, Glaucoma and OA, left knee, presents today for PST for Left Total Knee Replacement     goal ; to walk without pain    denies recent travels in the past 30 days. No fever, SOB, cough, flu like symptoms or body rash- covid screen

## 2020-09-19 LAB
A1C WITH ESTIMATED AVERAGE GLUCOSE RESULT: 5.8 % — HIGH (ref 4–5.6)
ESTIMATED AVERAGE GLUCOSE: 120 MG/DL — HIGH (ref 68–114)
MRSA PCR RESULT.: SIGNIFICANT CHANGE UP
S AUREUS DNA NOSE QL NAA+PROBE: SIGNIFICANT CHANGE UP

## 2020-09-22 ENCOUNTER — APPOINTMENT (OUTPATIENT)
Dept: CARDIOLOGY | Facility: CLINIC | Age: 75
End: 2020-09-22
Payer: MEDICARE

## 2020-09-22 VITALS — HEART RATE: 64 BPM | DIASTOLIC BLOOD PRESSURE: 74 MMHG | SYSTOLIC BLOOD PRESSURE: 132 MMHG

## 2020-09-22 VITALS
HEART RATE: 67 BPM | TEMPERATURE: 97.9 F | WEIGHT: 128 LBS | SYSTOLIC BLOOD PRESSURE: 144 MMHG | OXYGEN SATURATION: 90 % | DIASTOLIC BLOOD PRESSURE: 75 MMHG | BODY MASS INDEX: 23.41 KG/M2

## 2020-09-22 DIAGNOSIS — I10 ESSENTIAL (PRIMARY) HYPERTENSION: ICD-10-CM

## 2020-09-22 DIAGNOSIS — I25.10 ATHEROSCLEROTIC HEART DISEASE OF NATIVE CORONARY ARTERY W/OUT ANGINA PECTORIS: ICD-10-CM

## 2020-09-22 PROCEDURE — 99215 OFFICE O/P EST HI 40 MIN: CPT
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
## 2020-09-22 NOTE — DISCUSSION/SUMMARY
[Procedure Intermediate Risk] : the procedure risk is intermediate [Patient Intermediate Risk] : the patient is an intermediate risk [Optimized for Surgery] : the patient is optimized for surgery [As per surgery] : as per surgery [Continue] : Continue medications as currently directed [FreeTextEntry1] : Patient has normal LV function, no significant valvular heart disease, and only mild nonobstructive disease despite her abnormal ECG.  She does have mild diastolic dysfunction.  She should be maintained on her current medications.  She should not be considered to be at increased risk for the upcoming surgery or anesthesia.

## 2020-09-22 NOTE — PHYSICAL EXAM
[General Appearance - Well Developed] : well developed [Normal Appearance] : normal appearance [Well Groomed] : well groomed [General Appearance - Well Nourished] : well nourished [No Deformities] : no deformities [General Appearance - In No Acute Distress] : no acute distress [Normal Conjunctiva] : the conjunctiva exhibited no abnormalities [Eyelids - No Xanthelasma] : the eyelids demonstrated no xanthelasmas [Normal Oral Mucosa] : normal oral mucosa [No Oral Pallor] : no oral pallor [No Oral Cyanosis] : no oral cyanosis [Normal Jugular Venous A Waves Present] : normal jugular venous A waves present [Normal Jugular Venous V Waves Present] : normal jugular venous V waves present [No Jugular Venous Alex A Waves] : no jugular venous alex A waves [Respiration, Rhythm And Depth] : normal respiratory rhythm and effort [Exaggerated Use Of Accessory Muscles For Inspiration] : no accessory muscle use [Auscultation Breath Sounds / Voice Sounds] : lungs were clear to auscultation bilaterally [Heart Rate And Rhythm] : heart rate and rhythm were normal [Heart Sounds] : normal S1 and S2 [Murmurs] : no murmurs present [Abdomen Soft] : soft [Abdomen Tenderness] : non-tender [Abdomen Mass (___ Cm)] : no abdominal mass palpated [Abnormal Walk] : normal gait [Gait - Sufficient For Exercise Testing] : the gait was sufficient for exercise testing [Nail Clubbing] : no clubbing of the fingernails [Cyanosis, Localized] : no localized cyanosis [Petechial Hemorrhages (___cm)] : no petechial hemorrhages [Skin Color & Pigmentation] : normal skin color and pigmentation [] : no rash [No Venous Stasis] : no venous stasis [Skin Lesions] : no skin lesions [No Skin Ulcers] : no skin ulcer [No Xanthoma] : no  xanthoma was observed [FreeTextEntry1] : Mild ecchymoses right forearm [Oriented To Time, Place, And Person] : oriented to person, place, and time [Affect] : the affect was normal [Mood] : the mood was normal [No Anxiety] : not feeling anxious

## 2020-09-30 ENCOUNTER — TRANSCRIPTION ENCOUNTER (OUTPATIENT)
Age: 75
End: 2020-09-30

## 2020-09-30 RX ORDER — CEFAZOLIN SODIUM 1 G
2000 VIAL (EA) INJECTION ONCE
Refills: 0 | Status: DISCONTINUED | OUTPATIENT
Start: 2020-10-01 | End: 2020-10-01

## 2020-10-01 ENCOUNTER — APPOINTMENT (OUTPATIENT)
Dept: ORTHOPEDIC SURGERY | Facility: HOSPITAL | Age: 75
End: 2020-10-01

## 2020-10-01 ENCOUNTER — INPATIENT (INPATIENT)
Facility: HOSPITAL | Age: 75
LOS: 0 days | Discharge: HOME | DRG: 470 | End: 2020-10-02
Attending: ORTHOPAEDIC SURGERY | Admitting: ORTHOPAEDIC SURGERY
Payer: COMMERCIAL

## 2020-10-01 VITALS
HEIGHT: 62 IN | RESPIRATION RATE: 16 BRPM | WEIGHT: 138.89 LBS | HEART RATE: 60 BPM | OXYGEN SATURATION: 93 % | TEMPERATURE: 98 F | DIASTOLIC BLOOD PRESSURE: 85 MMHG | SYSTOLIC BLOOD PRESSURE: 147 MMHG

## 2020-10-01 DIAGNOSIS — I26.99 OTHER PULMONARY EMBOLISM WITHOUT ACUTE COR PULMONALE: ICD-10-CM

## 2020-10-01 DIAGNOSIS — M17.32 UNILATERAL POST-TRAUMATIC OSTEOARTHRITIS, LEFT KNEE: ICD-10-CM

## 2020-10-01 DIAGNOSIS — Z87.81 PERSONAL HISTORY OF (HEALED) TRAUMATIC FRACTURE: Chronic | ICD-10-CM

## 2020-10-01 LAB
BLD GP AB SCN SERPL QL: NEGATIVE — SIGNIFICANT CHANGE UP
RH IG SCN BLD-IMP: POSITIVE — SIGNIFICANT CHANGE UP
RH IG SCN BLD-IMP: POSITIVE — SIGNIFICANT CHANGE UP

## 2020-10-01 PROCEDURE — 20985 CPTR-ASST DIR MS PX: CPT

## 2020-10-01 PROCEDURE — 73560 X-RAY EXAM OF KNEE 1 OR 2: CPT | Mod: 26,LT

## 2020-10-01 PROCEDURE — 27447 TOTAL KNEE ARTHROPLASTY: CPT | Mod: LT

## 2020-10-01 RX ORDER — ACETAMINOPHEN 500 MG
975 TABLET ORAL EVERY 8 HOURS
Refills: 0 | Status: DISCONTINUED | OUTPATIENT
Start: 2020-10-02 | End: 2020-10-02

## 2020-10-01 RX ORDER — DEXTROSE 50 % IN WATER 50 %
25 SYRINGE (ML) INTRAVENOUS ONCE
Refills: 0 | Status: DISCONTINUED | OUTPATIENT
Start: 2020-10-01 | End: 2020-10-02

## 2020-10-01 RX ORDER — ASPIRIN/CALCIUM CARB/MAGNESIUM 324 MG
81 TABLET ORAL
Refills: 0 | Status: DISCONTINUED | OUTPATIENT
Start: 2020-10-01 | End: 2020-10-02

## 2020-10-01 RX ORDER — TRAMADOL HYDROCHLORIDE 50 MG/1
50 TABLET ORAL EVERY 8 HOURS
Refills: 0 | Status: DISCONTINUED | OUTPATIENT
Start: 2020-10-01 | End: 2020-10-02

## 2020-10-01 RX ORDER — POLYETHYLENE GLYCOL 3350 17 G/17G
17 POWDER, FOR SOLUTION ORAL DAILY
Refills: 0 | Status: DISCONTINUED | OUTPATIENT
Start: 2020-10-01 | End: 2020-10-02

## 2020-10-01 RX ORDER — ACETAMINOPHEN 500 MG
1000 TABLET ORAL ONCE
Refills: 0 | Status: COMPLETED | OUTPATIENT
Start: 2020-10-01 | End: 2020-10-01

## 2020-10-01 RX ORDER — ONDANSETRON 8 MG/1
4 TABLET, FILM COATED ORAL EVERY 6 HOURS
Refills: 0 | Status: DISCONTINUED | OUTPATIENT
Start: 2020-10-01 | End: 2020-10-02

## 2020-10-01 RX ORDER — GABAPENTIN 400 MG/1
300 CAPSULE ORAL ONCE
Refills: 0 | Status: COMPLETED | OUTPATIENT
Start: 2020-10-01 | End: 2020-10-01

## 2020-10-01 RX ORDER — SODIUM CHLORIDE 9 MG/ML
500 INJECTION INTRAMUSCULAR; INTRAVENOUS; SUBCUTANEOUS ONCE
Refills: 0 | Status: COMPLETED | OUTPATIENT
Start: 2020-10-01 | End: 2020-10-01

## 2020-10-01 RX ORDER — SODIUM CHLORIDE 9 MG/ML
3 INJECTION INTRAMUSCULAR; INTRAVENOUS; SUBCUTANEOUS EVERY 8 HOURS
Refills: 0 | Status: DISCONTINUED | OUTPATIENT
Start: 2020-10-01 | End: 2020-10-01

## 2020-10-01 RX ORDER — SODIUM CHLORIDE 9 MG/ML
1000 INJECTION, SOLUTION INTRAVENOUS
Refills: 0 | Status: DISCONTINUED | OUTPATIENT
Start: 2020-10-01 | End: 2020-10-02

## 2020-10-01 RX ORDER — SODIUM CHLORIDE 9 MG/ML
1000 INJECTION, SOLUTION INTRAVENOUS
Refills: 0 | Status: DISCONTINUED | OUTPATIENT
Start: 2020-10-01 | End: 2020-10-01

## 2020-10-01 RX ORDER — OXYCODONE HYDROCHLORIDE 5 MG/1
10 TABLET ORAL EVERY 4 HOURS
Refills: 0 | Status: DISCONTINUED | OUTPATIENT
Start: 2020-10-01 | End: 2020-10-02

## 2020-10-01 RX ORDER — OXYCODONE HYDROCHLORIDE 5 MG/1
5 TABLET ORAL EVERY 4 HOURS
Refills: 0 | Status: DISCONTINUED | OUTPATIENT
Start: 2020-10-01 | End: 2020-10-02

## 2020-10-01 RX ORDER — HYDROMORPHONE HYDROCHLORIDE 2 MG/ML
0.5 INJECTION INTRAMUSCULAR; INTRAVENOUS; SUBCUTANEOUS EVERY 4 HOURS
Refills: 0 | Status: DISCONTINUED | OUTPATIENT
Start: 2020-10-01 | End: 2020-10-02

## 2020-10-01 RX ORDER — MAGNESIUM HYDROXIDE 400 MG/1
30 TABLET, CHEWABLE ORAL DAILY
Refills: 0 | Status: DISCONTINUED | OUTPATIENT
Start: 2020-10-01 | End: 2020-10-02

## 2020-10-01 RX ORDER — ACETAMINOPHEN 500 MG
1000 TABLET ORAL ONCE
Refills: 0 | Status: COMPLETED | OUTPATIENT
Start: 2020-10-02 | End: 2020-10-02

## 2020-10-01 RX ORDER — DEXTROSE 50 % IN WATER 50 %
15 SYRINGE (ML) INTRAVENOUS ONCE
Refills: 0 | Status: DISCONTINUED | OUTPATIENT
Start: 2020-10-01 | End: 2020-10-02

## 2020-10-01 RX ORDER — INSULIN LISPRO 100/ML
VIAL (ML) SUBCUTANEOUS
Refills: 0 | Status: DISCONTINUED | OUTPATIENT
Start: 2020-10-01 | End: 2020-10-02

## 2020-10-01 RX ORDER — PANTOPRAZOLE SODIUM 20 MG/1
40 TABLET, DELAYED RELEASE ORAL ONCE
Refills: 0 | Status: COMPLETED | OUTPATIENT
Start: 2020-10-01 | End: 2020-10-01

## 2020-10-01 RX ORDER — PANTOPRAZOLE SODIUM 20 MG/1
40 TABLET, DELAYED RELEASE ORAL
Refills: 0 | Status: DISCONTINUED | OUTPATIENT
Start: 2020-10-01 | End: 2020-10-02

## 2020-10-01 RX ORDER — ATORVASTATIN CALCIUM 80 MG/1
20 TABLET, FILM COATED ORAL AT BEDTIME
Refills: 0 | Status: DISCONTINUED | OUTPATIENT
Start: 2020-10-01 | End: 2020-10-02

## 2020-10-01 RX ORDER — KETOROLAC TROMETHAMINE 30 MG/ML
30 SYRINGE (ML) INJECTION EVERY 6 HOURS
Refills: 0 | Status: DISCONTINUED | OUTPATIENT
Start: 2020-10-01 | End: 2020-10-02

## 2020-10-01 RX ORDER — LOSARTAN POTASSIUM 100 MG/1
50 TABLET, FILM COATED ORAL DAILY
Refills: 0 | Status: DISCONTINUED | OUTPATIENT
Start: 2020-10-01 | End: 2020-10-02

## 2020-10-01 RX ORDER — TRAMADOL HYDROCHLORIDE 50 MG/1
50 TABLET ORAL ONCE
Refills: 0 | Status: DISCONTINUED | OUTPATIENT
Start: 2020-10-01 | End: 2020-10-01

## 2020-10-01 RX ORDER — ACETAMINOPHEN 500 MG
1000 TABLET ORAL ONCE
Refills: 0 | Status: DISCONTINUED | OUTPATIENT
Start: 2020-10-02 | End: 2020-10-02

## 2020-10-01 RX ORDER — INSULIN LISPRO 100/ML
VIAL (ML) SUBCUTANEOUS AT BEDTIME
Refills: 0 | Status: DISCONTINUED | OUTPATIENT
Start: 2020-10-01 | End: 2020-10-02

## 2020-10-01 RX ORDER — CEFAZOLIN SODIUM 1 G
2000 VIAL (EA) INJECTION EVERY 8 HOURS
Refills: 0 | Status: COMPLETED | OUTPATIENT
Start: 2020-10-01 | End: 2020-10-02

## 2020-10-01 RX ORDER — LIDOCAINE HCL 20 MG/ML
0.2 VIAL (ML) INJECTION ONCE
Refills: 0 | Status: DISCONTINUED | OUTPATIENT
Start: 2020-10-01 | End: 2020-10-01

## 2020-10-01 RX ORDER — MECLIZINE HCL 12.5 MG
12.5 TABLET ORAL THREE TIMES A DAY
Refills: 0 | Status: DISCONTINUED | OUTPATIENT
Start: 2020-10-01 | End: 2020-10-02

## 2020-10-01 RX ORDER — DEXTROSE 50 % IN WATER 50 %
12.5 SYRINGE (ML) INTRAVENOUS ONCE
Refills: 0 | Status: DISCONTINUED | OUTPATIENT
Start: 2020-10-01 | End: 2020-10-02

## 2020-10-01 RX ORDER — CELECOXIB 200 MG/1
200 CAPSULE ORAL EVERY 12 HOURS
Refills: 0 | Status: DISCONTINUED | OUTPATIENT
Start: 2020-10-03 | End: 2020-10-02

## 2020-10-01 RX ORDER — METOPROLOL TARTRATE 50 MG
50 TABLET ORAL DAILY
Refills: 0 | Status: DISCONTINUED | OUTPATIENT
Start: 2020-10-01 | End: 2020-10-02

## 2020-10-01 RX ORDER — SENNA PLUS 8.6 MG/1
2 TABLET ORAL AT BEDTIME
Refills: 0 | Status: DISCONTINUED | OUTPATIENT
Start: 2020-10-01 | End: 2020-10-02

## 2020-10-01 RX ORDER — SODIUM CHLORIDE 9 MG/ML
500 INJECTION INTRAMUSCULAR; INTRAVENOUS; SUBCUTANEOUS ONCE
Refills: 0 | Status: COMPLETED | OUTPATIENT
Start: 2020-10-02 | End: 2020-10-02

## 2020-10-01 RX ORDER — GLUCAGON INJECTION, SOLUTION 0.5 MG/.1ML
1 INJECTION, SOLUTION SUBCUTANEOUS ONCE
Refills: 0 | Status: DISCONTINUED | OUTPATIENT
Start: 2020-10-01 | End: 2020-10-02

## 2020-10-01 RX ORDER — METFORMIN HYDROCHLORIDE 850 MG/1
500 TABLET ORAL DAILY
Refills: 0 | Status: DISCONTINUED | OUTPATIENT
Start: 2020-10-01 | End: 2020-10-02

## 2020-10-01 RX ADMIN — Medication 100 MILLIGRAM(S): at 22:02

## 2020-10-01 RX ADMIN — Medication 30 MILLIGRAM(S): at 23:37

## 2020-10-01 RX ADMIN — TRAMADOL HYDROCHLORIDE 50 MILLIGRAM(S): 50 TABLET ORAL at 13:07

## 2020-10-01 RX ADMIN — SODIUM CHLORIDE 500 MILLILITER(S): 9 INJECTION INTRAMUSCULAR; INTRAVENOUS; SUBCUTANEOUS at 21:22

## 2020-10-01 RX ADMIN — Medication 30 MILLIGRAM(S): at 23:07

## 2020-10-01 RX ADMIN — GABAPENTIN 300 MILLIGRAM(S): 400 CAPSULE ORAL at 13:05

## 2020-10-01 RX ADMIN — ATORVASTATIN CALCIUM 20 MILLIGRAM(S): 80 TABLET, FILM COATED ORAL at 21:27

## 2020-10-01 RX ADMIN — PANTOPRAZOLE SODIUM 40 MILLIGRAM(S): 20 TABLET, DELAYED RELEASE ORAL at 13:05

## 2020-10-01 RX ADMIN — Medication 400 MILLIGRAM(S): at 21:23

## 2020-10-01 RX ADMIN — Medication 1000 MILLIGRAM(S): at 21:53

## 2020-10-01 RX ADMIN — SODIUM CHLORIDE 500 MILLILITER(S): 9 INJECTION INTRAMUSCULAR; INTRAVENOUS; SUBCUTANEOUS at 18:30

## 2020-10-01 NOTE — PRE-OP CHECKLIST - AS BP NONINV METHOD
Physician Discharge Summary     Patient ID:  Trino Brown  [id number]  59 year old  1957    Admit date: 2/7/2017    Discharge date and time: 2/9/17 1500    Admitting Physician: Wale Kilpatrick DO     Discharge Physician: Dr Kilpatrick    Admission Diagnoses: CARDIOMYOPATHY    Discharge Diagnoses: cardiomyopathy, chronic systolic heart failure    Admission Condition: fair    Discharged Condition: stable    Indication for Admission: Milrinone Initiation    Hospital Course: Trino Brown was admitted 2/7/17 for PICC line placement and Milrinone initiation. He has been tolerating well throughout hospitalization aside from 2/8/17 he had episodes of hypotension (asymptomatic).  PTA Losartan was discontinued and Toprol XL dosing was reduced.  BP's day of discharge improved to /50's.  He was discharged home with home nursing    He will be receiving dressing changes weekly for his PICC Line in ATC department and will need outpatient orders for this; this was passed on to the [facility] Coordinators.     Consults: none    Significant Diagnostic Studies: None    Treatments: Milrinone Initiation    Discharge Exam:  Visit Vitals   • BP 95/63   • Pulse 65   • Temp 97.5 °F (36.4 °C) (Oral)   • Resp 18   • Ht 5' 7\" (1.702 m)   • Wt 65.6 kg   • SpO2 97%   • BMI 22.65 kg/m2       Disposition: Home    Patient Instructions:   Activity: activity as tolerated  Diet: cardiac diet and fluid restriction 2000 cc  Wound Care: PICC Line changes in ATC weekly    Follow-up with [facility] as scheduled for TCM.     Signed:  ODALYS Lange  2/9/2017  11:22 AM  I have examined this patient, reviewed all pertinent lab and radiology data, and determined the findings detailed above. I agree with the assessment and plan as detailed above.  SRIKANTH Kilpatrick DO [facility]  Advanced Heart Failure/ Transplant  2/9/2017               
electronic

## 2020-10-01 NOTE — CHART NOTE - NSCHARTNOTEFT_GEN_A_CORE
POC    Resting in RR without complaints.   Pain well controlled  Block / anesthesia still in place    No Chest Pain, SOB, N/V.    T(C): 36.5 (10-01-20 @ 19:00), Max: 36.7 (10-01-20 @ 10:10)  HR: 59 (10-01-20 @ 19:00) (49 - 69)  BP: 131/71 (10-01-20 @ 19:00) (93/55 - 176/91)  RR: 16 (10-01-20 @ 19:00) (14 - 18)  SpO2: 96% (10-01-20 @ 19:00) (93% - 96%)  Wt(kg): --    Exam:  Alert and Holiday, No Acute Distress  Pulm: CTAB  Abdomen soft / benign  Slade  [ n]   EXT   LLE        ACE Dressing C/D  [x ]            Calves soft       (+) Decreased motor and sensory 2/2 anesthesia / block       digits: warm / well-perfused        cap refill brisk @ 2-3 secs        2+ pulses    Xray:---- prosthesis in good alignment     A/P: S/p L Total knee replacement    - Serial n/v checks       ** Pt reassured that motor / sensory function should be restored w/ time, patience & supportive care  -PT/OT-WBAT-  -Chk AM Labs  -DVT PPx: ectrin 81 BID  -Pain Control PO/IV Pain Rx  -Continue Current Tx  -Dispo planning: anticipate home      ***See Above  Maco MATHUR  Orthopedics  B: [phone]  S: [phone]
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
## 2020-10-01 NOTE — PRE-ANESTHESIA EVALUATION ADULT - NSRADCARDRESULTSFT_GEN_ALL_CORE
TTE 2020- mild diastolic dysfunction, NML LVsize/ function, no SWMAs  Cardiac cath 9/2020- 30 % stenosis RCA, 30% stenosis LCX OM1, no intervention

## 2020-10-02 ENCOUNTER — TRANSCRIPTION ENCOUNTER (OUTPATIENT)
Age: 75
End: 2020-10-02

## 2020-10-02 VITALS — OXYGEN SATURATION: 93 % | SYSTOLIC BLOOD PRESSURE: 143 MMHG | HEART RATE: 65 BPM | DIASTOLIC BLOOD PRESSURE: 75 MMHG

## 2020-10-02 RX ORDER — DORZOLAMIDE HYDROCHLORIDE 20 MG/ML
1 SOLUTION/ DROPS OPHTHALMIC
Qty: 0 | Refills: 0 | DISCHARGE
Start: 2020-10-02

## 2020-10-02 RX ORDER — ASPIRIN/CALCIUM CARB/MAGNESIUM 324 MG
1 TABLET ORAL
Qty: 60 | Refills: 0
Start: 2020-10-02 | End: 2020-10-31

## 2020-10-02 RX ORDER — SENNA PLUS 8.6 MG/1
2 TABLET ORAL
Qty: 0 | Refills: 0 | DISCHARGE
Start: 2020-10-02

## 2020-10-02 RX ORDER — OXYCODONE HYDROCHLORIDE 5 MG/1
1 TABLET ORAL
Qty: 42 | Refills: 0
Start: 2020-10-02 | End: 2020-10-08

## 2020-10-02 RX ORDER — ACETAMINOPHEN 500 MG
3 TABLET ORAL
Qty: 0 | Refills: 0 | DISCHARGE
Start: 2020-10-02

## 2020-10-02 RX ORDER — TRAMADOL HYDROCHLORIDE 50 MG/1
1 TABLET ORAL
Qty: 28 | Refills: 0
Start: 2020-10-02 | End: 2020-10-08

## 2020-10-02 RX ORDER — OMEPRAZOLE 10 MG/1
1 CAPSULE, DELAYED RELEASE ORAL
Qty: 30 | Refills: 0
Start: 2020-10-02 | End: 2020-10-31

## 2020-10-02 RX ORDER — DORZOLAMIDE HYDROCHLORIDE 20 MG/ML
1 SOLUTION/ DROPS OPHTHALMIC EVERY 8 HOURS
Refills: 0 | Status: DISCONTINUED | OUTPATIENT
Start: 2020-10-02 | End: 2020-10-02

## 2020-10-02 RX ADMIN — Medication 50 MILLIGRAM(S): at 05:01

## 2020-10-02 RX ADMIN — Medication 30 MILLIGRAM(S): at 05:01

## 2020-10-02 RX ADMIN — Medication 30 MILLIGRAM(S): at 05:10

## 2020-10-02 RX ADMIN — Medication 400 MILLIGRAM(S): at 05:01

## 2020-10-02 RX ADMIN — PANTOPRAZOLE SODIUM 40 MILLIGRAM(S): 20 TABLET, DELAYED RELEASE ORAL at 05:01

## 2020-10-02 RX ADMIN — Medication 100 MILLIGRAM(S): at 05:01

## 2020-10-02 RX ADMIN — SODIUM CHLORIDE 500 MILLILITER(S): 9 INJECTION INTRAMUSCULAR; INTRAVENOUS; SUBCUTANEOUS at 05:04

## 2020-10-02 RX ADMIN — Medication 75 MILLIGRAM(S): at 05:02

## 2020-10-02 RX ADMIN — Medication 81 MILLIGRAM(S): at 06:03

## 2020-10-02 RX ADMIN — Medication 1000 MILLIGRAM(S): at 05:10

## 2020-10-02 RX ADMIN — LOSARTAN POTASSIUM 50 MILLIGRAM(S): 100 TABLET, FILM COATED ORAL at 05:02

## 2020-10-02 NOTE — OCCUPATIONAL THERAPY INITIAL EVALUATION ADULT - PERTINENT HX OF CURRENT PROBLEM, REHAB EVAL
74yo female with medical h/o HTN, T2DM, Glaucoma and OA, left knee, presents today for PST for Left Total Knee Replacement

## 2020-10-02 NOTE — PROGRESS NOTE ADULT - SUBJECTIVE AND OBJECTIVE BOX
Patient is a 75y old  Female who presents with a chief complaint of Right knee pain, arthritis  Patient s/p right total knee arthroplasty POD#1  Patient comfortable  No complaints    T(C): 36.6 (10-02-20 @ 04:20), Max: 36.7 (10-01-20 @ 10:10)  HR: 64 (10-02-20 @ 04:20) (49 - 70)  BP: 124/73 (10-02-20 @ 04:20) (93/55 - 176/91)  RR: 16 (10-02-20 @ 04:20) (14 - 18)  SpO2: 96% (10-02-20 @ 04:20) (93% - 98%)    PHYSICAL EXAM:  NAD, Alert  [Right ] Knee: Aquacel dressing C/D/I; sensation grossly intact to light touch; (+) DF/PF; (+) Distal Pulses; No Calf tenderness B/L, PAS              Patient is a 75y old  Female who presents with a chief complaint of Left knee pain, arthritis  Patient s/p right total knee arthroplasty POD#1  Patient comfortable  No complaints    T(C): 36.6 (10-02-20 @ 04:20), Max: 36.7 (10-01-20 @ 10:10)  HR: 64 (10-02-20 @ 04:20) (49 - 70)  BP: 124/73 (10-02-20 @ 04:20) (93/55 - 176/91)  RR: 16 (10-02-20 @ 04:20) (14 - 18)  SpO2: 96% (10-02-20 @ 04:20) (93% - 98%)    PHYSICAL EXAM:  NAD, Alert  [Left ] Knee: Aquacel dressing C/D/I; sensation grossly intact to light touch; (+) DF/PF; (+) Distal Pulses; No Calf tenderness B/L, PAS

## 2020-10-02 NOTE — PROGRESS NOTE ADULT - ASSESSMENT
74 y/o FM s/p right total knee arthroplasty POD#1, physical therapy, incentive spirometer  Malinda Ridley PA-C  Orthopaedic Surgery  Team pager [phone]  [facility] [phone]  [phone]   76 y/o FM s/p Left total knee arthroplasty POD#1, physical therapy, incentive spirometer  Malinda Ridley PA-C  Orthopaedic Surgery  Team pager [phone]  [facility] [phone]  [phone]

## 2020-10-02 NOTE — DISCHARGE NOTE PROVIDER - NSDCFUADDAPPT_GEN_ALL_CORE_FT
It is highly recommended you followup with your PCP within 4 weeks   to discuss recent surgery/general checkup/possible medication  adjustment.

## 2020-10-02 NOTE — PHYSICAL THERAPY INITIAL EVALUATION ADULT - ADDITIONAL COMMENTS
Pt lives with family in private home with steps to enter (unsure of amount), +B HR, tub in bathroom with shower chair. Pt has HHA 5 days a week, 5 hours a day, requires assistance for some ADLs and ambulates with SC yes

## 2020-10-02 NOTE — DISCHARGE NOTE PROVIDER - NSDCACTIVITY_GEN_ALL_CORE
No heavy lifting/straining/Showering allowed/Do not make important decisions/Walking - Indoors allowed/Walking - Outdoors allowed/Do not drive or operate machinery/Stairs allowed

## 2020-10-02 NOTE — DISCHARGE NOTE PROVIDER - CARE PROVIDER_API CALL
Ravi Lemos)  Orthopedics  [address], [address]  [place] [address]  Phone: [phone]  Fax: [phone]  Follow Up Time:

## 2020-10-02 NOTE — DISCHARGE NOTE NURSING/CASE MANAGEMENT/SOCIAL WORK - PATIENT PORTAL LINK FT
You can access the FollowMyHealth Patient Portal offered by [facility] by registering at the following website: [email or web address]. By joining [facility]’s FollowMyHealth portal, you will also be able to view your health information using other applications (apps) compatible with our system.

## 2020-10-02 NOTE — DISCHARGE NOTE PROVIDER - NSDCFUSCHEDAPPT_GEN_ALL_CORE_FT
TY DAVIS ; 10/02/2020 ; [facility]  TY DAVIS ; 10/13/2020 ; [facility]  TY DAVIS ; 10/20/2020 ; [facility]

## 2020-10-02 NOTE — DISCHARGE NOTE PROVIDER - NSDCMRMEDTOKEN_GEN_ALL_CORE_FT
atorvastatin 20 mg oral tablet: 1 tab(s) orally once a day  covid19 pcr: to be done 9/29  losartan 50 mg oral tablet: 1 tab(s) orally once a day  meclizine 12.5 mg oral tablet: 1 tab(s) orally 3 times a day, As Needed  metFORMIN 500 mg oral tablet, extended release: 1 tab(s) orally once a day  Hold for 48 hours   Resume on Monday   metoprolol succinate 50 mg oral tablet, extended release: 1 tab(s) orally once a day   acetaminophen 325 mg oral tablet: 3 tab(s) orally every 8 hours X 1 week, then Q8H PRN Mild Pain  atorvastatin 20 mg oral tablet: 1 tab(s) orally once a day  dorzolamide 2% ophthalmic solution: 1 drop(s) to each affected eye every 8 hours  losartan 50 mg oral tablet: 1 tab(s) orally once a day  meclizine 12.5 mg oral tablet: 1 tab(s) orally 3 times a day, As Needed  metFORMIN 500 mg oral tablet, extended release: 1 tab(s) orally once a day  Hold for 48 hours   Resume on Monday   metoprolol succinate 50 mg oral tablet, extended release: 1 tab(s) orally once a day  senna oral tablet: 2 tab(s) orally once a day (at bedtime), As needed, Constipation   acetaminophen 325 mg oral tablet: 3 tab(s) orally every 8 hours X 1 week, then Q8H PRN Mild Pain  aspirin 81 mg oral delayed release tablet: 1 tab orally 2 times a day X 4 weeks to prevent blood clots   MDD:2  atorvastatin 20 mg oral tablet: 1 tab(s) orally once a day  dorzolamide 2% ophthalmic solution: 1 drop(s) to each affected eye every 8 hours  losartan 50 mg oral tablet: 1 tab(s) orally once a day  meclizine 12.5 mg oral tablet: 1 tab(s) orally 3 times a day, As Needed  metFORMIN 500 mg oral tablet, extended release: 1 tab(s) orally once a day  Hold for 48 hours   Resume on Monday   metoprolol succinate 50 mg oral tablet, extended release: 1 tab(s) orally once a day  naproxen 500 mg oral tablet: 1 tab orally 2 times a day X 2 weeks   MDD:2  omeprazole 40 mg oral delayed release capsule: 1 cap orally once a day before breakfast   MDD:1  oxyCODONE 5 mg oral tablet: 1 to 2 tabs orally every 4-6 hours, As needed for Moderate to Severe Pain   MDD:8  pregabalin 75 mg oral capsule: 1 cap orally every 12 hours X 2 weeks   MDD:2  senna oral tablet: 2 tab(s) orally once a day (at bedtime), As needed, Constipation  traMADol 50 mg oral tablet: 1 tab orally every 6 hours, As Needed for Mild Pain MDD:4

## 2020-10-02 NOTE — DISCHARGE NOTE PROVIDER - NSDCCPCAREPLAN_GEN_ALL_CORE_FT
PRINCIPAL DISCHARGE DIAGNOSIS  Diagnosis: Osteoarthritis of left knee  Assessment and Plan of Treatment:

## 2020-10-02 NOTE — DISCHARGE NOTE PROVIDER - HOSPITAL COURSE
Patient admitted to [facility] for elective left total knee replacement.   Underwent procedure without complications. Evaluated and treated by physical therapy whom recommended  home for discharge disposition. Discharged to home when cleared by PT.

## 2020-10-02 NOTE — PHYSICAL THERAPY INITIAL EVALUATION ADULT - PERTINENT HX OF CURRENT PROBLEM, REHAB EVAL
76yo female with medical h/o HTN, T2DM, Glaucoma and OA, left knee, presents today for PST for Left Total Knee Replacement.

## 2020-10-02 NOTE — OCCUPATIONAL THERAPY INITIAL EVALUATION ADULT - ANTICIPATED DISCHARGE DISPOSITION, OT EVAL
home w/ OT/for ADLs and safety assessment in home environment. Assist with ADLs from [facility] and family. Pt would benefit from tub transfer bench

## 2020-10-02 NOTE — OCCUPATIONAL THERAPY INITIAL EVALUATION ADULT - LIVES WITH, PROFILE
Pt lives with family in private home with steps to enter (unsure of amount), +B HR, tub in bathroom with shower chair. Pt has [facility]HA 5 days a week, 5 hours a day, requires assistance for some ADLs and ambulates with SC

## 2020-10-02 NOTE — DISCHARGE NOTE PROVIDER - NSDCFUADDINST_GEN_ALL_CORE_FT
Follow up with Dr. Lemos on 10/13/20 as scheduled- please call office if have questions regarding appointment.  DVT prophylaxis: Aspirin 81mg oral twice daily for 4 weeks to prevent blood clots.  Activity: Weight Bearing as tolerated on left leg.  Dressing: keep clean and dry. Do not remove until date written on dressing.  Shower: With assistance. No direct water to dressing. No tub baths. Blot dry, no rubbing.

## 2020-10-05 NOTE — PROVIDER CONTACT NOTE (OTHER) - ASSESSMENT
no acute distress TO=[id number]

## 2020-10-13 ENCOUNTER — APPOINTMENT (OUTPATIENT)
Dept: ORTHOPEDIC SURGERY | Facility: CLINIC | Age: 75
End: 2020-10-13
Payer: MEDICARE

## 2020-10-13 VITALS — TEMPERATURE: 98 F

## 2020-10-13 PROCEDURE — 73564 X-RAY EXAM KNEE 4 OR MORE: CPT | Mod: LT

## 2020-10-13 PROCEDURE — 99024 POSTOP FOLLOW-UP VISIT: CPT

## 2020-10-20 ENCOUNTER — APPOINTMENT (OUTPATIENT)
Dept: ORTHOPEDIC SURGERY | Facility: CLINIC | Age: 75
End: 2020-10-20

## 2020-10-29 PROCEDURE — 80048 BASIC METABOLIC PNL TOTAL CA: CPT

## 2020-10-29 PROCEDURE — C1713: CPT

## 2020-10-29 PROCEDURE — S2900: CPT

## 2020-10-29 PROCEDURE — 86900 BLOOD TYPING SEROLOGIC ABO: CPT

## 2020-10-29 PROCEDURE — 73560 X-RAY EXAM OF KNEE 1 OR 2: CPT

## 2020-10-29 PROCEDURE — 85027 COMPLETE CBC AUTOMATED: CPT

## 2020-10-29 PROCEDURE — C1776: CPT

## 2020-10-29 PROCEDURE — 82962 GLUCOSE BLOOD TEST: CPT

## 2020-10-29 PROCEDURE — 97162 PT EVAL MOD COMPLEX 30 MIN: CPT

## 2020-10-29 PROCEDURE — 86901 BLOOD TYPING SEROLOGIC RH(D): CPT

## 2020-10-29 PROCEDURE — 86850 RBC ANTIBODY SCREEN: CPT

## 2020-11-10 ENCOUNTER — APPOINTMENT (OUTPATIENT)
Dept: ORTHOPEDIC SURGERY | Facility: CLINIC | Age: 75
End: 2020-11-10
Payer: MEDICARE

## 2020-11-10 VITALS — WEIGHT: 124 LBS | BODY MASS INDEX: 22.82 KG/M2 | HEIGHT: 62 IN

## 2020-11-10 DIAGNOSIS — M17.32 UNILATERAL POST-TRAUMATIC OSTEOARTHRITIS, LEFT KNEE: ICD-10-CM

## 2020-11-10 DIAGNOSIS — Z01.818 ENCOUNTER FOR OTHER PREPROCEDURAL EXAMINATION: ICD-10-CM

## 2020-11-10 DIAGNOSIS — M25.562 PAIN IN LEFT KNEE: ICD-10-CM

## 2020-11-10 DIAGNOSIS — G89.29 PAIN IN LEFT KNEE: ICD-10-CM

## 2020-11-10 DIAGNOSIS — Z01.810 ENCOUNTER FOR PREPROCEDURAL CARDIOVASCULAR EXAMINATION: ICD-10-CM

## 2020-11-10 PROCEDURE — 99024 POSTOP FOLLOW-UP VISIT: CPT

## 2020-11-10 NOTE — PHYSICAL EXAM
[de-identified] : Patient is well nourished, well-developed, in no acute distress, with appropriate mood and affect. The patient is oriented to time, place, and person. Respirations are even and unlabored. Examination reveals satisfactory wound healing. No surrounding erythema. Motion is good and relatively pain free. Active knee flexion is greater than 90 degrees. ROM 0-110

## 2020-11-10 NOTE — DISCUSSION/SUMMARY
[de-identified] : The patient is doing well after left total knee arthroplasty. Written infectious precautions were reviewed. Continue with PT. Follow up at the 6 month anniversary from surgery for follow-up evaluation.

## 2020-11-10 NOTE — HISTORY OF PRESENT ILLNESS
[de-identified] : Status-post left total knee arthroplasty here for routine postoperative evaluation. Excellent progress is noted in terms of pain and restoration of function. Pain is well controlled with oral medications. There has been no change in medical health since discharge. The patient ambulating without an assistive device

## 2020-11-30 ENCOUNTER — APPOINTMENT (OUTPATIENT)
Dept: CARDIOLOGY | Facility: CLINIC | Age: 75
End: 2020-11-30

## 2020-12-10 ENCOUNTER — FORM ENCOUNTER (OUTPATIENT)
Age: 75
End: 2020-12-10

## 2020-12-11 NOTE — PHYSICAL THERAPY INITIAL EVALUATION ADULT - DISCHARGE DISPOSITION, PT EVAL
Date of Service: 01/05/2017    CHIEF COMPLAINT:  Chronic groin and perineal pain, which has been worse since 05/2016.    PROBLEM LIST:  1.  Chronic groin and perineal area pain.  2.  Chronic prostatitis.  3.  Prostadynia.   4.  Chronic pelvic pain.  5.  Anxiety and depression.  6.  Intermittent loose stools, current.    INTERVAL HISTORY:  This man was last seen at the Pain [facility] on 09/26/2016.  He has chronic pain in the groin and perineal area with prostatitis.  At his last visit, he was given a prescription for Diazepam rectal gel 2.5 mg to be taken as needed.  There were 2 developments with this.  One was that his insurance would not pay for further prescriptions and the other development was that he did utilize this, at least 10 in number.  He said initially they were effective in improving his pain and then after a while stopped being effective and so he is not interested in using this in the future.  Other things that he has tried in the past include Gabapentin, Tizanidine.  He also utilizes Hydrocodone with Acetaminophen, which I did give him a prescription of the 5/325 mg 1 tablet to be taken twice a day p.r.n. pain.  I gave him a prescription of the Hydrocodone several months ago.  He states that is the only thing that he has been doing currently.  He had episodes of severe pain where he has to utilize probably about 3-4 of the Hydrocodone per day, uses for a few days, probably less than 3 days and then the pain gets better and he does not use the Hydrocodone anymore.  He states he has not utilized Hydrocodone for the last 2 weeks.  He denies use of marijuana, Cocaine, Heroin or recreational drugs.  He did utilize marijuana earlier last year to try to see if this was going to help his pain, but he has not utilized this ever since. Definitely, he denies any use of marijuana since he was given prescription for Hydrocodone in this clinic.  He has not had a prescription for Hydrocodone for several months.  His  last prescription was more than 3 or 4 months ago for 60 tablets.  He states that he gets some constipation when he utilizes the pills, but this is transient, intermittent loose stools.  He denies any lethargy or sedation.  He denies any binges of alcoholism or episodes of drinking alcohol.  Past Medical History   Diagnosis Date   • Chronic pain      chronic pelvic pain  (muscle tightening) past years   • Chronic prostatitis        Past Surgical History   Procedure Laterality Date   • Colonoscopy diagnostic  10/5/2016   • Esophagogastroduodenoscopy transoral flex w/bx single or mult  10/5/2016       Patient Active Problem List   Diagnosis   • Prostatitis, chronic   • Prostadynia   • Pelvic pain in male   • Chronic pelvic pain in male   • Anxiety and depression   • BEVERLY (generalized anxiety disorder)   • Constipation       Family History   Problem Relation Age of Onset   • Depression Father        Social History     Social History   • Marital status: Single     Spouse name: N/A   • Number of children: N/A   • Years of education: N/A     Occupational History   • Manages QC lab      [employer] - [employer].      Social History Main Topics   • Smoking status: Former Smoker     Packs/day: 1.00     Years: 5.00     Quit date: 6/1/2012   • Smokeless tobacco: Never Used   • Alcohol use No      Comment: occassionally/ past month nothing     • Drug use: 1.00 per week     Special: Marijuana      Comment: last smoked about 6 mo. ago   • Sexual activity: Yes     Partners: Female     Other Topics Concern   • Seat Belt Yes     Social History Narrative       Current Outpatient Prescriptions   Medication Sig Dispense Refill   • HYDROcodone-acetaminophen (NORCO) 5-325 MG per tablet Take 1 tablet by mouth 2 times daily as needed for Pain. 60 tablet 0   • Probiotic Product (VSL#3 DS) Pack Take 1 packet by mouth 2 times daily. 60 each 11   • Nutritional Supplements (NUTRITIONAL SUPPLEMENT PO) Saw robert     •  Omega-3 Fatty Acids (FISH OIL CONCENTRATE) 300 MG Cap Take 300 mg by mouth daily.       No current facility-administered medications for this encounter.        ALLERGIES: no known allergies.    SYSTEMIC REVIEW:  Fourteen systems reviewed, all grossly normal apart from the above.  See HPI and database.  CONSTITUTIONAL:  No recent fevers, weight loss or chills.  RESPIRATORY:  No cough or sputum.  CARDIOVASCULAR:  No angina or palpitations.  HEMATOLOGIC:  Does not bruise or bleed easily.  Not on anticoagulants.  GENITOURINARY:  No dysuria or frequency.  GASTROINTESTINAL:  Occasional loose stools, constipation with use of Hydrocodone.  No nausea or vomiting.  HEMATOLOGIC:  Negative.  ENDOCRINE:  No heat or cold intolerance.  No clinical features of thyroid dysfunction or diabetes mellitus.  NEUROLOGIC:  No seizures or faints.  BREASTS:  Negative.  HEENT:  Negative.  PSYCHOLOGIC/PSYCHIATRIC:  Anxiety and depression.  Denies any suicidal or homicidal ideas.  SKIN:  Negative.  All other systems are grossly normal.    PHYSICAL EXAMINATION:  VITAL SIGNS:  Blood pressure 109/67 mmHg, heart rate is 65 beats per minute.  Saturating 97% on room air oxygen.  He is 6 feet tall, weighs 168 pounds.  BACK:  He has normal range of motion of the lumbar and cervical spine.  SKIN:  He is not pale or jaundiced.    NEUROLOGIC: Cranial nerves 2-12 are grossly intact.  CHEST:  Clear to auscultation bilaterally.  CARDIOVASCULAR:  Regular rate and rhythm.  First and second heart sounds are heard.  No audible murmurs.  ABDOMEN:  Full, soft, nontender, no palpable masses, normal bowel sounds.  EXTREMITIES:  No calf or thigh swelling or tenderness noted.    RECTAL: Rectal examination was not performed.      In summary, this is a 29-year-old man who has chronic perianal pain and perineal pain.  This has been ongoing since about 2012 with exacerbation since 05/2016.  He has a diagnosis of prostadynia and chronic prostatitis.    PLAN OF CARE:  1.   The opioid policy of the Pain Clinic was reviewed with him once again and a copy was given to him.  2.  Prescription for Hydrocodone with Acetaminophen 5/325 mg 1 tablet p.o. b.i.d. was given to him.  I had an extensive discussion with him.  He was requesting a high dose of Hydrocodone.  He states that he will be able to utilize less of this.  I do not believe this would be reasonable, as he had never used the total amount given to him in several months.  I also prefer to minimize the total amount of opioids given to the patient.   I, therefore, am not increasing the Hydrocodone dosage.  He understands that utilization of marijuana will be a violation of the policy of the clinic.  He denies any marijuana use since he has been coming here.  He will have an unscheduled urine toxicology evaluation at his next visit in 3 months' time.  He was discharged home in good condition.  A prescription for Hydrocodone with Acetaminophen 60 tablets 5/325 mg 1 tablet p.o. b.i.d. p.r.n. pain was given to him at this visit.    Thank you for allowing me to participate in the care of this patient.      Dictated By: Sayra Perkins MD  Signing Provider: MD VIPUL Grullon/annie ([id number])  DD: 01/05/2017 16:07:32 TD: 01/06/2017 08:24:21    Copy Sent To:      PROVIDER:[TOKEN:[[id number]]] home w/ home PT

## 2021-01-26 ENCOUNTER — APPOINTMENT (OUTPATIENT)
Dept: CARDIOLOGY | Facility: CLINIC | Age: 76
End: 2021-01-26

## 2021-01-27 ENCOUNTER — TRANSCRIPTION ENCOUNTER (OUTPATIENT)
Age: 76
End: 2021-01-27

## 2021-03-05 ENCOUNTER — APPOINTMENT (OUTPATIENT)
Dept: ORTHOPEDIC SURGERY | Facility: CLINIC | Age: 76
End: 2021-03-05
Payer: MEDICARE

## 2021-03-05 PROCEDURE — 99212 OFFICE O/P EST SF 10 MIN: CPT

## 2021-03-05 PROCEDURE — 99072 ADDL SUPL MATRL&STAF TM PHE: CPT

## 2021-03-05 PROCEDURE — 73564 X-RAY EXAM KNEE 4 OR MORE: CPT | Mod: LT

## 2021-03-05 NOTE — PHYSICAL EXAM
[de-identified] : Patient is well nourished, well-developed, in no acute distress, with appropriate mood and affect. The patient is oriented to time, place, and person. Respirations are even and unlabored. Gait evaluation does not reveal a limp. There is no inguinal adenopathy. Examination of the contralateral knee shows normal range of motion, strength, no tenderness, and intact skin. The operative limb is well-perfused, has a well appearing surgical scar, and shows a grossly normal motor and sensory examination. Knee motion is painless and the left knee moves from 0 to 125 degrees. The knee is stable within that range-of-motion to AP and ML stress. The alignment of the knee is neutral. Muscle strength is normal. Quadriceps and hamstring muscle strength is normal bilaterally. Pedal pulses are palpable. \par  [de-identified] : AP, lateral, sunrise knee x-rays of the left knee were ordered and obtained in the office and demonstrate satisfactory position and alignment of the components are present. No signs of loosening are seen.\par

## 2021-03-05 NOTE — HISTORY OF PRESENT ILLNESS
[de-identified] : This is very nice 75-year-old female here for interim evaluation of left total knee arthroplasty. The patient reports good pain relief since surgery in the replaced joint and satisfactory restoration of function in terms of activities of daily living. The patient no longer requires an assistive device for ambulation, does not require pain medication, and completed postoperative physical therapy. They report unlimited activities of daily living and unlimited walking tolerance. The patient is thrilled with their progress from surgery and ultimate outcome. \par  \par

## 2021-03-05 NOTE — DISCUSSION/SUMMARY
[de-identified] : The patient is doing well after left total knee arthroplasty. Continue to be weight bearing as tolerated without restriction. Follow up is recommended at the 1 year anniversary from surgery.\par

## 2022-01-26 DIAGNOSIS — H40.9 UNSPECIFIED GLAUCOMA: ICD-10-CM

## 2022-01-26 DIAGNOSIS — H26.9 UNSPECIFIED CATARACT: ICD-10-CM

## 2022-01-26 DIAGNOSIS — H18.519 ENDOTHELIAL CORNEAL DYSTROPHY, UNSPECIFIED EYE: ICD-10-CM

## 2022-03-09 DIAGNOSIS — Z00.00 ENCOUNTER FOR GENERAL ADULT MEDICAL EXAMINATION W/OUT ABNORMAL FINDINGS: ICD-10-CM

## 2022-03-10 ENCOUNTER — APPOINTMENT (OUTPATIENT)
Dept: ORTHOPEDIC SURGERY | Facility: CLINIC | Age: 77
End: 2022-03-10

## 2022-05-04 ENCOUNTER — APPOINTMENT (OUTPATIENT)
Dept: ORTHOPEDIC SURGERY | Facility: CLINIC | Age: 77
End: 2022-05-04
Payer: MEDICARE

## 2022-05-04 VITALS — HEIGHT: 62 IN | BODY MASS INDEX: 23.55 KG/M2 | WEIGHT: 128 LBS

## 2022-05-04 DIAGNOSIS — Z96.652 PRESENCE OF LEFT ARTIFICIAL KNEE JOINT: ICD-10-CM

## 2022-05-04 PROCEDURE — 73562 X-RAY EXAM OF KNEE 3: CPT | Mod: LT

## 2022-05-04 PROCEDURE — 99213 OFFICE O/P EST LOW 20 MIN: CPT

## 2022-05-04 NOTE — PHYSICAL EXAM
[de-identified] : Patient is well nourished, well-developed, in no acute distress, with appropriate mood and affect. The patient is oriented to time, place, and person. Respirations are even and unlabored. Gait evaluation does not reveal a limp. There is no inguinal adenopathy. Examination of the contralateral knee shows normal range of motion, strength, no tenderness, and intact skin. The operative limb is well-perfused, has a well appearing surgical scar, and shows a grossly normal motor and sensory examination. Knee motion is painless and the left knee moves from 0 to 125 degrees. The knee is stable within that range-of-motion to AP and ML stress. The alignment of the knee is neutral. Muscle strength is normal. Quadriceps and hamstring muscle strength is normal bilaterally. Pedal pulses are palpable. \par  [de-identified] : AP, lateral, sunrise knee x-rays of the left knee were ordered and obtained in the office and demonstrate satisfactory position and alignment of the components are present. No signs of loosening are seen.\par

## 2022-05-04 NOTE — DISCUSSION/SUMMARY
[de-identified] : The patient is doing well after left total knee arthroplasty. Continue to be weight bearing as tolerated without restriction.  Performing a daily home exercise program strongly recommended to the patient.  Follow-up is recommended every 3-5 years for long-term monitoring.

## 2022-05-04 NOTE — HISTORY OF PRESENT ILLNESS
[de-identified] : This is very nice 76-year-old female here for interim evaluation of left total knee arthroplasty. The patient reports good pain relief since surgery in the replaced joint and satisfactory restoration of function in terms of activities of daily living. The patient no longer requires an assistive device for ambulation, does not require pain medication, and completed postoperative physical therapy. They report unlimited activities of daily living and unlimited walking tolerance. The patient is thrilled with their progress from surgery and ultimate outcome. \par  \par

## 2023-05-03 ENCOUNTER — OUTPATIENT (OUTPATIENT)
Dept: OUTPATIENT SERVICES | Facility: HOSPITAL | Age: 78
LOS: 1 days | End: 2023-05-03
Payer: COMMERCIAL

## 2023-05-03 ENCOUNTER — APPOINTMENT (OUTPATIENT)
Dept: CT IMAGING | Facility: IMAGING CENTER | Age: 78
End: 2023-05-03
Payer: MEDICARE

## 2023-05-03 DIAGNOSIS — R10.30 LOWER ABDOMINAL PAIN, UNSPECIFIED: ICD-10-CM

## 2023-05-03 DIAGNOSIS — Z87.81 PERSONAL HISTORY OF (HEALED) TRAUMATIC FRACTURE: Chronic | ICD-10-CM

## 2023-05-03 PROCEDURE — 74177 CT ABD & PELVIS W/CONTRAST: CPT

## 2023-05-03 PROCEDURE — 74177 CT ABD & PELVIS W/CONTRAST: CPT | Mod: 26

## 2023-05-29 ENCOUNTER — EMERGENCY (EMERGENCY)
Facility: HOSPITAL | Age: 78
LOS: 1 days | Discharge: HOME | End: 2023-05-29
Attending: EMERGENCY MEDICINE | Admitting: EMERGENCY MEDICINE
Payer: MEDICARE

## 2023-05-29 VITALS
SYSTOLIC BLOOD PRESSURE: 120 MMHG | DIASTOLIC BLOOD PRESSURE: 72 MMHG | OXYGEN SATURATION: 99 % | TEMPERATURE: 98 F | HEART RATE: 68 BPM | RESPIRATION RATE: 18 BRPM

## 2023-05-29 VITALS
RESPIRATION RATE: 16 BRPM | OXYGEN SATURATION: 95 % | HEART RATE: 84 BPM | SYSTOLIC BLOOD PRESSURE: 148 MMHG | DIASTOLIC BLOOD PRESSURE: 99 MMHG | TEMPERATURE: 98 F

## 2023-05-29 DIAGNOSIS — Z87.81 PERSONAL HISTORY OF (HEALED) TRAUMATIC FRACTURE: Chronic | ICD-10-CM

## 2023-05-29 LAB
ALBUMIN SERPL ELPH-MCNC: 4.3 G/DL — SIGNIFICANT CHANGE UP (ref 3.3–5)
ALP SERPL-CCNC: 113 U/L — SIGNIFICANT CHANGE UP (ref 40–120)
ALT FLD-CCNC: 21 U/L — SIGNIFICANT CHANGE UP (ref 4–33)
ANION GAP SERPL CALC-SCNC: 14 MMOL/L — SIGNIFICANT CHANGE UP (ref 7–14)
APPEARANCE UR: CLEAR — SIGNIFICANT CHANGE UP
AST SERPL-CCNC: 34 U/L — HIGH (ref 4–32)
BACTERIA # UR AUTO: NEGATIVE — SIGNIFICANT CHANGE UP
BASE EXCESS BLDV CALC-SCNC: -0.5 MMOL/L — SIGNIFICANT CHANGE UP (ref -2–3)
BASE EXCESS BLDV CALC-SCNC: 0.5 MMOL/L — SIGNIFICANT CHANGE UP (ref -2–3)
BASOPHILS # BLD AUTO: 0.05 K/UL — SIGNIFICANT CHANGE UP (ref 0–0.2)
BASOPHILS NFR BLD AUTO: 0.4 % — SIGNIFICANT CHANGE UP (ref 0–2)
BILIRUB SERPL-MCNC: 1.7 MG/DL — HIGH (ref 0.2–1.2)
BILIRUB UR-MCNC: NEGATIVE — SIGNIFICANT CHANGE UP
BLOOD GAS VENOUS COMPREHENSIVE RESULT: SIGNIFICANT CHANGE UP
BLOOD GAS VENOUS COMPREHENSIVE RESULT: SIGNIFICANT CHANGE UP
BUN SERPL-MCNC: 10 MG/DL — SIGNIFICANT CHANGE UP (ref 7–23)
CALCIUM SERPL-MCNC: 8.9 MG/DL — SIGNIFICANT CHANGE UP (ref 8.4–10.5)
CHLORIDE BLDV-SCNC: 101 MMOL/L — SIGNIFICANT CHANGE UP (ref 96–108)
CHLORIDE BLDV-SCNC: 102 MMOL/L — SIGNIFICANT CHANGE UP (ref 96–108)
CHLORIDE SERPL-SCNC: 99 MMOL/L — SIGNIFICANT CHANGE UP (ref 98–107)
CO2 BLDV-SCNC: 27.4 MMOL/L — HIGH (ref 22–26)
CO2 BLDV-SCNC: 27.9 MMOL/L — HIGH (ref 22–26)
CO2 SERPL-SCNC: 22 MMOL/L — SIGNIFICANT CHANGE UP (ref 22–31)
COLOR SPEC: SIGNIFICANT CHANGE UP
CREAT SERPL-MCNC: 0.51 MG/DL — SIGNIFICANT CHANGE UP (ref 0.5–1.3)
DIFF PNL FLD: NEGATIVE — SIGNIFICANT CHANGE UP
EGFR: 97 ML/MIN/1.73M2 — SIGNIFICANT CHANGE UP
EOSINOPHIL # BLD AUTO: 0 K/UL — SIGNIFICANT CHANGE UP (ref 0–0.5)
EOSINOPHIL NFR BLD AUTO: 0 % — SIGNIFICANT CHANGE UP (ref 0–6)
EPI CELLS # UR: 1 /HPF — SIGNIFICANT CHANGE UP (ref 0–5)
FLUAV AG NPH QL: SIGNIFICANT CHANGE UP
FLUBV AG NPH QL: SIGNIFICANT CHANGE UP
GAS PNL BLDV: 133 MMOL/L — LOW (ref 136–145)
GAS PNL BLDV: 134 MMOL/L — LOW (ref 136–145)
GAS PNL BLDV: SIGNIFICANT CHANGE UP
GLUCOSE BLDV-MCNC: 176 MG/DL — HIGH (ref 70–99)
GLUCOSE BLDV-MCNC: 212 MG/DL — HIGH (ref 70–99)
GLUCOSE SERPL-MCNC: 194 MG/DL — HIGH (ref 70–99)
GLUCOSE UR QL: NEGATIVE — SIGNIFICANT CHANGE UP
HCO3 BLDV-SCNC: 26 MMOL/L — SIGNIFICANT CHANGE UP (ref 22–29)
HCO3 BLDV-SCNC: 26 MMOL/L — SIGNIFICANT CHANGE UP (ref 22–29)
HCT VFR BLD CALC: 41.2 % — SIGNIFICANT CHANGE UP (ref 34.5–45)
HCT VFR BLDA CALC: 41 % — SIGNIFICANT CHANGE UP (ref 34.5–46.5)
HCT VFR BLDA CALC: 41 % — SIGNIFICANT CHANGE UP (ref 34.5–46.5)
HGB BLD CALC-MCNC: 13.6 G/DL — SIGNIFICANT CHANGE UP (ref 11.7–16.1)
HGB BLD CALC-MCNC: 13.6 G/DL — SIGNIFICANT CHANGE UP (ref 11.7–16.1)
HGB BLD-MCNC: 13.8 G/DL — SIGNIFICANT CHANGE UP (ref 11.5–15.5)
IANC: 11.16 K/UL — HIGH (ref 1.8–7.4)
IMM GRANULOCYTES NFR BLD AUTO: 0.5 % — SIGNIFICANT CHANGE UP (ref 0–0.9)
KETONES UR-MCNC: NEGATIVE — SIGNIFICANT CHANGE UP
LACTATE BLDV-MCNC: 2.2 MMOL/L — HIGH (ref 0.5–2)
LACTATE BLDV-MCNC: 2.6 MMOL/L — HIGH (ref 0.5–2)
LEUKOCYTE ESTERASE UR-ACNC: NEGATIVE — SIGNIFICANT CHANGE UP
LIDOCAIN IGE QN: 22 U/L — SIGNIFICANT CHANGE UP (ref 7–60)
LYMPHOCYTES # BLD AUTO: 1.62 K/UL — SIGNIFICANT CHANGE UP (ref 1–3.3)
LYMPHOCYTES # BLD AUTO: 12.3 % — LOW (ref 13–44)
MCHC RBC-ENTMCNC: 29.6 PG — SIGNIFICANT CHANGE UP (ref 27–34)
MCHC RBC-ENTMCNC: 33.5 GM/DL — SIGNIFICANT CHANGE UP (ref 32–36)
MCV RBC AUTO: 88.4 FL — SIGNIFICANT CHANGE UP (ref 80–100)
MONOCYTES # BLD AUTO: 0.24 K/UL — SIGNIFICANT CHANGE UP (ref 0–0.9)
MONOCYTES NFR BLD AUTO: 1.8 % — LOW (ref 2–14)
NEUTROPHILS # BLD AUTO: 11.16 K/UL — HIGH (ref 1.8–7.4)
NEUTROPHILS NFR BLD AUTO: 85 % — HIGH (ref 43–77)
NITRITE UR-MCNC: NEGATIVE — SIGNIFICANT CHANGE UP
NRBC # BLD: 0 /100 WBCS — SIGNIFICANT CHANGE UP (ref 0–0)
NRBC # FLD: 0 K/UL — SIGNIFICANT CHANGE UP (ref 0–0)
PCO2 BLDV: 44 MMHG — SIGNIFICANT CHANGE UP (ref 39–52)
PCO2 BLDV: 51 MMHG — SIGNIFICANT CHANGE UP (ref 39–52)
PH BLDV: 7.32 — SIGNIFICANT CHANGE UP (ref 7.32–7.43)
PH BLDV: 7.38 — SIGNIFICANT CHANGE UP (ref 7.32–7.43)
PH UR: 8 — SIGNIFICANT CHANGE UP (ref 5–8)
PLATELET # BLD AUTO: 285 K/UL — SIGNIFICANT CHANGE UP (ref 150–400)
PO2 BLDV: 33 MMHG — SIGNIFICANT CHANGE UP (ref 25–45)
PO2 BLDV: 38 MMHG — SIGNIFICANT CHANGE UP (ref 25–45)
POTASSIUM BLDV-SCNC: 5.1 MMOL/L — SIGNIFICANT CHANGE UP (ref 3.5–5.1)
POTASSIUM BLDV-SCNC: 6 MMOL/L — HIGH (ref 3.5–5.1)
POTASSIUM SERPL-MCNC: 4.2 MMOL/L — SIGNIFICANT CHANGE UP (ref 3.5–5.3)
POTASSIUM SERPL-SCNC: 4.2 MMOL/L — SIGNIFICANT CHANGE UP (ref 3.5–5.3)
PROT SERPL-MCNC: 8.8 G/DL — HIGH (ref 6–8.3)
PROT UR-MCNC: ABNORMAL
RBC # BLD: 4.66 M/UL — SIGNIFICANT CHANGE UP (ref 3.8–5.2)
RBC # FLD: 12.7 % — SIGNIFICANT CHANGE UP (ref 10.3–14.5)
RBC CASTS # UR COMP ASSIST: 1 /HPF — SIGNIFICANT CHANGE UP (ref 0–4)
RSV RNA NPH QL NAA+NON-PROBE: SIGNIFICANT CHANGE UP
SAO2 % BLDV: 49.7 % — LOW (ref 67–88)
SAO2 % BLDV: 60.5 % — LOW (ref 67–88)
SARS-COV-2 RNA SPEC QL NAA+PROBE: SIGNIFICANT CHANGE UP
SODIUM SERPL-SCNC: 135 MMOL/L — SIGNIFICANT CHANGE UP (ref 135–145)
SP GR SPEC: >1.05 (ref 1.01–1.05)
TROPONIN T, HIGH SENSITIVITY RESULT: 7 NG/L — SIGNIFICANT CHANGE UP
UROBILINOGEN FLD QL: SIGNIFICANT CHANGE UP
WBC # BLD: 13.13 K/UL — HIGH (ref 3.8–10.5)
WBC # FLD AUTO: 13.13 K/UL — HIGH (ref 3.8–10.5)
WBC UR QL: 1 /HPF — SIGNIFICANT CHANGE UP (ref 0–5)

## 2023-05-29 PROCEDURE — 99285 EMERGENCY DEPT VISIT HI MDM: CPT

## 2023-05-29 PROCEDURE — 74177 CT ABD & PELVIS W/CONTRAST: CPT | Mod: 26,MA

## 2023-05-29 PROCEDURE — 71045 X-RAY EXAM CHEST 1 VIEW: CPT | Mod: 26

## 2023-05-29 RX ORDER — SODIUM CHLORIDE 9 MG/ML
1000 INJECTION INTRAMUSCULAR; INTRAVENOUS; SUBCUTANEOUS ONCE
Refills: 0 | Status: COMPLETED | OUTPATIENT
Start: 2023-05-29 | End: 2023-05-29

## 2023-05-29 RX ORDER — ONDANSETRON 8 MG/1
4 TABLET, FILM COATED ORAL ONCE
Refills: 0 | Status: COMPLETED | OUTPATIENT
Start: 2023-05-29 | End: 2023-05-29

## 2023-05-29 RX ORDER — KETOROLAC TROMETHAMINE 30 MG/ML
15 SYRINGE (ML) INJECTION ONCE
Refills: 0 | Status: DISCONTINUED | OUTPATIENT
Start: 2023-05-29 | End: 2023-05-29

## 2023-05-29 RX ORDER — FAMOTIDINE 10 MG/ML
20 INJECTION INTRAVENOUS ONCE
Refills: 0 | Status: COMPLETED | OUTPATIENT
Start: 2023-05-29 | End: 2023-05-29

## 2023-05-29 RX ORDER — SODIUM CHLORIDE 9 MG/ML
1000 INJECTION INTRAMUSCULAR; INTRAVENOUS; SUBCUTANEOUS ONCE
Refills: 0 | Status: DISCONTINUED | OUTPATIENT
Start: 2023-05-29 | End: 2023-05-29

## 2023-05-29 RX ORDER — ACETAMINOPHEN 500 MG
650 TABLET ORAL ONCE
Refills: 0 | Status: COMPLETED | OUTPATIENT
Start: 2023-05-29 | End: 2023-05-29

## 2023-05-29 RX ADMIN — FAMOTIDINE 20 MILLIGRAM(S): 10 INJECTION INTRAVENOUS at 18:52

## 2023-05-29 RX ADMIN — Medication 15 MILLIGRAM(S): at 18:53

## 2023-05-29 RX ADMIN — ONDANSETRON 4 MILLIGRAM(S): 8 TABLET, FILM COATED ORAL at 18:53

## 2023-05-29 RX ADMIN — Medication 650 MILLIGRAM(S): at 23:40

## 2023-05-29 RX ADMIN — SODIUM CHLORIDE 1000 MILLILITER(S): 9 INJECTION INTRAMUSCULAR; INTRAVENOUS; SUBCUTANEOUS at 18:52

## 2023-05-29 NOTE — ED PROVIDER NOTE - PHYSICAL EXAMINATION
Generally well-appearing in no acute distress speaking in full sentences with normal mentation.  Vitals hypertensive, normal respirations with no hypoxia, SPO2 to 95%.  Afebrile, heart rate 84.  Abdomen soft mild TTP in RLQ, positive bilateral CVA tenderness.  Breath sounds clear bilaterally.  Extremities warm well perfused with no peripheral edema.  Strength and sensation grossly normal throughout.

## 2023-05-29 NOTE — ED PROVIDER NOTE - NSFOLLOWUPINSTRUCTIONS_ED_ALL_ED_FT
You were seen and evaluated in the Emergency Department for your nausea and vomiting. You were evaluated clinically and with laboratory studies.    At this time your clinical evaluation and history do not demonstrate any acute, life-threatening medical conditions warranting emergent treatment. However, we strongly recommend you follow up with one of our GI consultants (or your own) for further evaluation of your symptoms by calling the following number to make an appointment:    [facility]  Gastroenterology  [address] [place] [address], [address]  [place] [address]  Phone: [phone]    Should you develop new or worsening abdominal pain, fevers, chills, nausea, vomiting, diarrhea, or constipation - please return to the ED for immediate evaluation.     We also strongly encourage you make an appointment with your Primary Care Physician for a comprehensive evaluation of your health.

## 2023-05-29 NOTE — PROVIDER CONTACT NOTE (OTHER) - ASSESSMENT
Met with pt at bedside; pt reports she travels via taxi.  Confirmed address as address on file.  Pt with [facility]-not eligible for Medicaid taxi as per [facility].  Will arrange [employer].

## 2023-05-29 NOTE — ED PROVIDER NOTE - ATTENDING CONTRIBUTION TO CARE
I performed a face-to-face evaluation of the patient and performed a history and physical examination. I agree with the history and physical examination. If this was a PA visit, I personally saw the patient with the PA and performed a substantive portion of the visit including all aspects of the medical decision making.    P/w vomiting and aching of entire back. (B) CVAT, RLQ pain. DDx: DKA (diet-controlled DM), appy, renal stone, pyelo, viral syndrome. Check UA, CBC, CMP, CT.

## 2023-05-29 NOTE — ED ADULT NURSE REASSESSMENT NOTE - NS ED NURSE REASSESS COMMENT FT1
Received pt in stretcher- presented to ED with abdominal pain associated with nausea/vomiting. Appears well on exam, CT confirms diverticulosis. Patient stated abd pain has subsided but still present. Pending further dispo.

## 2023-05-29 NOTE — ED PROVIDER NOTE - NSICDXPASTMEDICALHX_GEN_ALL_CORE_FT
PAST MEDICAL HISTORY:  Diabetes     Glaucoma     HLD (hyperlipidemia)     Hypertension, unspecified type     Osteoarthritis, knee left

## 2023-05-29 NOTE — ED PROVIDER NOTE - PROGRESS NOTE DETAILS
Pt reassessed, no abd tenderness on exam, CT w/o concerns for acute process at this time, pt requesting food, will PO challenge and check UA/UCx, reassess. - Alexandra Gibbs, PGY-2 Pt feeling improved, PO challenged successfully, abd pain resolved, labs nonactionable, lactate cleared with fluids, medically cleared for DCTH. Called , no one is able to pick pt up from ED, will call SW for transport home,  to receive pt at home. - Alexandra Gibbs, PGY-2

## 2023-05-29 NOTE — ED PROVIDER NOTE - CLINICAL SUMMARY MEDICAL DECISION MAKING FREE TEXT BOX
Kelsi: P/w vomiting and aching of entire back. (B) CVAT, RLQ pain. DDx: DKA (diet-controlled DM), appy, renal stone, pyelo, viral syndrome. Check UA, CBC, CMP, CT. Kelsi: P/w vomiting and aching of entire back. (B) CVAT, RLQ pain. DDx: DKA (diet-controlled DM), appy, renal stone, pyelo, viral syndrome. Less likely ACS but will check EKG. Check UA, CBC, CMP, CT.

## 2023-05-29 NOTE — ED ADULT NURSE NOTE - NSFALLRISKINTERV_ED_ALL_ED

## 2023-05-29 NOTE — ED PROVIDER NOTE - OBJECTIVE STATEMENT
78yo F pmh HTN/HLD, T2DM (no longer on meds), Glaucoma, Hysterectomhy -presents emergency department for 1 day of constant vomiting and nausea in the setting of body aches and chills.  Patient reports bilateral lower back pain right worse than left, no recent trauma or heavy exertion.  Also has mild abdominal soreness diffusely.  Reports normal bowel movements, normal urination, no recent travel or lower extremity swelling.  ROS otherwise negative. 76yo F pmh HTN/HLD, T2DM (no longer on meds), Glaucoma, Hysterectomy -presents emergency department for 1 day of constant vomiting and nausea in the setting of body aches and chills.  Patient reports bilateral lower back pain right worse than left, no recent trauma or heavy exertion.  Also has mild abdominal soreness diffusely.  Reports normal bowel movements, normal urination, no recent travel or lower extremity swelling.  ROS otherwise negative.

## 2023-05-29 NOTE — ED ADULT NURSE REASSESSMENT NOTE - NSFALLRISKINTERV_ED_ALL_ED

## 2023-05-29 NOTE — ED ADULT NURSE NOTE - OBJECTIVE STATEMENT
Patient A&o X4, history of HTN, HLD, DM, received in room 13, with complaints of abdominal pain/n/v. Patient states, "I started having abdominal pain this morning and it hasn't gone away". Patient complains of nausea and vomiting, not actively vomiting at this time. Patient denies any recent travels or trying new food. Patient complains of lower abdominal pain, denies taking any medication pta. No abdominal tenderness noted on palpation, patient denies any urinary symptoms at this time. Patient able to speak in clear sentences, respirations equal and unlabored. Lung sounds clear b/l, equal chest rise and fall noted. Denies CP/SOB, fever, chills and diarrhea at this time. Skin warm and dry. Provider at bedside for eval, pending further orders.

## 2023-05-29 NOTE — ED PROVIDER NOTE - PATIENT PORTAL LINK FT
You can access the FollowMyHealth Patient Portal offered by [facility] by registering at the following website: [email or web address]. By joining [employer]’s FollowMyHealth portal, you will also be able to view your health information using other applications (apps) compatible with our system.

## 2023-05-30 ENCOUNTER — EMERGENCY (EMERGENCY)
Facility: HOSPITAL | Age: 78
LOS: 1 days | Discharge: HOME | End: 2023-05-30
Attending: EMERGENCY MEDICINE | Admitting: EMERGENCY MEDICINE
Payer: MEDICARE

## 2023-05-30 VITALS
SYSTOLIC BLOOD PRESSURE: 152 MMHG | OXYGEN SATURATION: 96 % | HEART RATE: 66 BPM | RESPIRATION RATE: 17 BRPM | DIASTOLIC BLOOD PRESSURE: 84 MMHG | TEMPERATURE: 98 F

## 2023-05-30 DIAGNOSIS — Z87.81 PERSONAL HISTORY OF (HEALED) TRAUMATIC FRACTURE: Chronic | ICD-10-CM

## 2023-05-30 LAB
CULTURE RESULTS: SIGNIFICANT CHANGE UP
SPECIMEN SOURCE: SIGNIFICANT CHANGE UP

## 2023-05-30 PROCEDURE — 99285 EMERGENCY DEPT VISIT HI MDM: CPT

## 2023-05-30 RX ORDER — SODIUM CHLORIDE 9 MG/ML
1000 INJECTION INTRAMUSCULAR; INTRAVENOUS; SUBCUTANEOUS ONCE
Refills: 0 | Status: COMPLETED | OUTPATIENT
Start: 2023-05-30 | End: 2023-05-30

## 2023-05-30 RX ORDER — ONDANSETRON 8 MG/1
4 TABLET, FILM COATED ORAL ONCE
Refills: 0 | Status: COMPLETED | OUTPATIENT
Start: 2023-05-30 | End: 2023-05-30

## 2023-05-30 RX ADMIN — ONDANSETRON 4 MILLIGRAM(S): 8 TABLET, FILM COATED ORAL at 22:37

## 2023-05-30 RX ADMIN — SODIUM CHLORIDE 2000 MILLILITER(S): 9 INJECTION INTRAMUSCULAR; INTRAVENOUS; SUBCUTANEOUS at 22:37

## 2023-05-30 NOTE — ED PROVIDER NOTE - PATIENT PORTAL LINK FT
You can access the FollowMyHealth Patient Portal offered by [facility] by registering at the following website: [email or web address]. By joining [employer]’s FollowMyHealth portal, you will also be able to view your health information using other applications (apps) compatible with our system.

## 2023-05-30 NOTE — ED PROVIDER NOTE - NSFOLLOWUPINSTRUCTIONS_ED_ALL_ED_FT
You were seen in the emergency department today for  nausea and abdominal discomfort.  You had blood work done that did not show any actionable findings.  Your urinalysis was negative for infection.  You had an EKG done that was similar to prior EKGs.  A prescription for Zofran was sent to your pharmacy.  Please follow-up with your primary care physician.    If you develop a worsening, localized pain associated with vomiting, fevers or new symptoms of concern, please come back to the emergency department.

## 2023-05-30 NOTE — ED ADULT NURSE NOTE - NS ED NURSE RECORD ANOTHER VITAL SIGN
46 year old female with no pertinent PMH who presents to the ED for neck pain radiating to left shoulder blade and left arm without any weakness. Pt symptoms similar from a week ago, medication was started but did not help with overall symptoms. Pt denies any significant trauma or activity that may have exacerbated her condition.
Yes

## 2023-05-30 NOTE — ED ADULT NURSE NOTE - NSFALLUNIVINTERV_ED_ALL_ED
Bed/Stretcher in lowest position, wheels locked, appropriate side rails in place/Call bell, personal items and telephone in reach/Instruct patient to call for assistance before getting out of bed/chair/stretcher/Non-slip footwear applied when patient is off stretcher/[place] to call system/Physically safe environment - no spills, clutter or unnecessary equipment/Purposeful proactive rounding/Room/bathroom lighting operational, light cord in reach

## 2023-05-30 NOTE — ED PROVIDER NOTE - CLINICAL SUMMARY MEDICAL DECISION MAKING FREE TEXT BOX
Patient well-appearing presents emergency department with suprapubic pain and nausea with no active retching.  Patient states she had decreased p.o. intake today secondary to her symptoms.  Patient recently evaluated with full work-up that was done yesterday and all negative.  Including electrolyte abnormalities and imaging abnormalities and his diabetes and sugar are all within normal.  Patient to be evaluated blood work antiemetics and DC with antiemetics with GI follow-up.  Patient admits to mild Andrei pain suprapubically but no obvious distention and no tenderness to palpation.

## 2023-05-30 NOTE — ED ADULT NURSE NOTE - OBJECTIVE STATEMENT
pt a&ox4, ambulatory - presents to ED c/o nausea. pt was here yesterday for same symptoms, felt better and was discharged. pt reports nausea with no vomiting today. denies abdominal pain, fevers, chills, recent travel, diarrhea. pt able to eat small amount today before symptoms returned. respirations appear even and nonlabored on RA. comfort and safety maintained. 18G IV placed in LAC, labs drawn and sent. medicated as ordered per EMR with no acute adverse reactions. pt pending lab results

## 2023-05-30 NOTE — ED PROVIDER NOTE - OBJECTIVE STATEMENT
77-year-old female history of hypertension hyperlipidemia and diabetes presents emergency department with nausea and suprapubic pain.  Patient was evaluated yesterday in emergency department with nausea and suprapubic abdominal pain received ultrasound and CAT scan and all was normal and negative with no acute findings.  Patient states she felt symptomatic improvement that time.  Patient presents back to the emerged part with nausea and mild suprapubic pain.  On physical exam patient is overall well-appearing speaking full sentences no active vomiting.  Mild retching.Patient denies any fever no back pain no hemodynamic instability.

## 2023-05-30 NOTE — ED PROVIDER NOTE - PROGRESS NOTE DETAILS
Manny BUSTAMANTE: Patient signed out pending labs, u/a. EKG done and no acute changes from prior. Manny BUSTAMANTE: Patient signed out pending labs, u/a. EKG done and no acute changes from prior.  On exam, patient is in no acute distress, RRR, lungs CTA BL, abdomen soft, nontender, nondistended. Manny BUSTAMANTE: UA negative.  Plan for discharge home.

## 2023-05-31 VITALS
SYSTOLIC BLOOD PRESSURE: 157 MMHG | HEART RATE: 58 BPM | OXYGEN SATURATION: 98 % | DIASTOLIC BLOOD PRESSURE: 76 MMHG | RESPIRATION RATE: 16 BRPM | TEMPERATURE: 98 F

## 2023-05-31 LAB
ALBUMIN SERPL ELPH-MCNC: 3.8 G/DL — SIGNIFICANT CHANGE UP (ref 3.3–5)
ALP SERPL-CCNC: 109 U/L — SIGNIFICANT CHANGE UP (ref 40–120)
ALT FLD-CCNC: 19 U/L — SIGNIFICANT CHANGE UP (ref 4–33)
ANION GAP SERPL CALC-SCNC: 12 MMOL/L — SIGNIFICANT CHANGE UP (ref 7–14)
APPEARANCE UR: CLEAR — SIGNIFICANT CHANGE UP
AST SERPL-CCNC: 31 U/L — SIGNIFICANT CHANGE UP (ref 4–32)
BASOPHILS # BLD AUTO: 0.06 K/UL — SIGNIFICANT CHANGE UP (ref 0–0.2)
BASOPHILS NFR BLD AUTO: 0.5 % — SIGNIFICANT CHANGE UP (ref 0–2)
BILIRUB SERPL-MCNC: 1.7 MG/DL — HIGH (ref 0.2–1.2)
BILIRUB UR-MCNC: NEGATIVE — SIGNIFICANT CHANGE UP
BUN SERPL-MCNC: 17 MG/DL — SIGNIFICANT CHANGE UP (ref 7–23)
CALCIUM SERPL-MCNC: 8.8 MG/DL — SIGNIFICANT CHANGE UP (ref 8.4–10.5)
CHLORIDE SERPL-SCNC: 98 MMOL/L — SIGNIFICANT CHANGE UP (ref 98–107)
CO2 SERPL-SCNC: 24 MMOL/L — SIGNIFICANT CHANGE UP (ref 22–31)
COLOR SPEC: COLORLESS — SIGNIFICANT CHANGE UP
CREAT SERPL-MCNC: 0.73 MG/DL — SIGNIFICANT CHANGE UP (ref 0.5–1.3)
DIFF PNL FLD: NEGATIVE — SIGNIFICANT CHANGE UP
EGFR: 86 ML/MIN/1.73M2 — SIGNIFICANT CHANGE UP
EOSINOPHIL # BLD AUTO: 0.04 K/UL — SIGNIFICANT CHANGE UP (ref 0–0.5)
EOSINOPHIL NFR BLD AUTO: 0.3 % — SIGNIFICANT CHANGE UP (ref 0–6)
GLUCOSE SERPL-MCNC: 165 MG/DL — HIGH (ref 70–99)
GLUCOSE UR QL: NEGATIVE — SIGNIFICANT CHANGE UP
HCT VFR BLD CALC: 39.3 % — SIGNIFICANT CHANGE UP (ref 34.5–45)
HGB BLD-MCNC: 13.3 G/DL — SIGNIFICANT CHANGE UP (ref 11.5–15.5)
IANC: 9.58 K/UL — HIGH (ref 1.8–7.4)
IMM GRANULOCYTES NFR BLD AUTO: 0.5 % — SIGNIFICANT CHANGE UP (ref 0–0.9)
KETONES UR-MCNC: NEGATIVE — SIGNIFICANT CHANGE UP
LACTATE SERPL-SCNC: 1.4 MMOL/L — SIGNIFICANT CHANGE UP (ref 0.5–2)
LEUKOCYTE ESTERASE UR-ACNC: NEGATIVE — SIGNIFICANT CHANGE UP
LYMPHOCYTES # BLD AUTO: 19.1 % — SIGNIFICANT CHANGE UP (ref 13–44)
LYMPHOCYTES # BLD AUTO: 2.42 K/UL — SIGNIFICANT CHANGE UP (ref 1–3.3)
MCHC RBC-ENTMCNC: 29.8 PG — SIGNIFICANT CHANGE UP (ref 27–34)
MCHC RBC-ENTMCNC: 33.8 GM/DL — SIGNIFICANT CHANGE UP (ref 32–36)
MCV RBC AUTO: 88.1 FL — SIGNIFICANT CHANGE UP (ref 80–100)
MONOCYTES # BLD AUTO: 0.53 K/UL — SIGNIFICANT CHANGE UP (ref 0–0.9)
MONOCYTES NFR BLD AUTO: 4.2 % — SIGNIFICANT CHANGE UP (ref 2–14)
NEUTROPHILS # BLD AUTO: 9.58 K/UL — HIGH (ref 1.8–7.4)
NEUTROPHILS NFR BLD AUTO: 75.4 % — SIGNIFICANT CHANGE UP (ref 43–77)
NITRITE UR-MCNC: NEGATIVE — SIGNIFICANT CHANGE UP
NRBC # BLD: 0 /100 WBCS — SIGNIFICANT CHANGE UP (ref 0–0)
NRBC # FLD: 0 K/UL — SIGNIFICANT CHANGE UP (ref 0–0)
PH UR: 7 — SIGNIFICANT CHANGE UP (ref 5–8)
PLATELET # BLD AUTO: 289 K/UL — SIGNIFICANT CHANGE UP (ref 150–400)
POTASSIUM SERPL-MCNC: 3.7 MMOL/L — SIGNIFICANT CHANGE UP (ref 3.5–5.3)
POTASSIUM SERPL-SCNC: 3.7 MMOL/L — SIGNIFICANT CHANGE UP (ref 3.5–5.3)
PROT SERPL-MCNC: 8.1 G/DL — SIGNIFICANT CHANGE UP (ref 6–8.3)
PROT UR-MCNC: NEGATIVE — SIGNIFICANT CHANGE UP
RBC # BLD: 4.46 M/UL — SIGNIFICANT CHANGE UP (ref 3.8–5.2)
RBC # FLD: 13 % — SIGNIFICANT CHANGE UP (ref 10.3–14.5)
SODIUM SERPL-SCNC: 134 MMOL/L — LOW (ref 135–145)
SP GR SPEC: 1.01 — LOW (ref 1.01–1.05)
UROBILINOGEN FLD QL: SIGNIFICANT CHANGE UP
WBC # BLD: 12.69 K/UL — HIGH (ref 3.8–10.5)
WBC # FLD AUTO: 12.69 K/UL — HIGH (ref 3.8–10.5)

## 2023-05-31 PROCEDURE — 93010 ELECTROCARDIOGRAM REPORT: CPT

## 2023-05-31 RX ORDER — ONDANSETRON 8 MG/1
1 TABLET, FILM COATED ORAL
Qty: 15 | Refills: 0
Start: 2023-05-31 | End: 2023-06-04

## 2023-05-31 NOTE — PROVIDER CONTACT NOTE (OTHER) - ASSESSMENT
Met with pt and -they are requesting transportation home.  Pt with [facility]; contacted [facility] to arrange transport.

## 2023-05-31 NOTE — PROVIDER CONTACT NOTE (OTHER) - ACTION/TREATMENT ORDERED:
As per [employer], trip reservation # is [phone]; it will take up to 3 hours to locate a provider for transport.  They will contact pt when a provider is identified.

## 2023-07-13 ENCOUNTER — EMERGENCY (EMERGENCY)
Facility: HOSPITAL | Age: 78
LOS: 1 days | Discharge: HOME | End: 2023-07-13
Attending: EMERGENCY MEDICINE | Admitting: EMERGENCY MEDICINE
Payer: MEDICARE

## 2023-07-13 VITALS
SYSTOLIC BLOOD PRESSURE: 125 MMHG | HEART RATE: 80 BPM | TEMPERATURE: 99 F | DIASTOLIC BLOOD PRESSURE: 84 MMHG | RESPIRATION RATE: 18 BRPM | OXYGEN SATURATION: 95 %

## 2023-07-13 DIAGNOSIS — Z87.81 PERSONAL HISTORY OF (HEALED) TRAUMATIC FRACTURE: Chronic | ICD-10-CM

## 2023-07-13 LAB
ALBUMIN SERPL ELPH-MCNC: 3.9 G/DL — SIGNIFICANT CHANGE UP (ref 3.3–5)
ALP SERPL-CCNC: 84 U/L — SIGNIFICANT CHANGE UP (ref 40–120)
ALT FLD-CCNC: 15 U/L — SIGNIFICANT CHANGE UP (ref 4–33)
ANION GAP SERPL CALC-SCNC: 6 MMOL/L — LOW (ref 7–14)
APPEARANCE UR: ABNORMAL
AST SERPL-CCNC: 20 U/L — SIGNIFICANT CHANGE UP (ref 4–32)
BILIRUB SERPL-MCNC: 1.6 MG/DL — HIGH (ref 0.2–1.2)
BILIRUB UR-MCNC: NEGATIVE — SIGNIFICANT CHANGE UP
BUN SERPL-MCNC: 7 MG/DL — SIGNIFICANT CHANGE UP (ref 7–23)
CALCIUM SERPL-MCNC: 9.8 MG/DL — SIGNIFICANT CHANGE UP (ref 8.4–10.5)
CHLORIDE SERPL-SCNC: 105 MMOL/L — SIGNIFICANT CHANGE UP (ref 98–107)
CO2 SERPL-SCNC: 26 MMOL/L — SIGNIFICANT CHANGE UP (ref 22–31)
COLOR SPEC: YELLOW — SIGNIFICANT CHANGE UP
CREAT SERPL-MCNC: 0.81 MG/DL — SIGNIFICANT CHANGE UP (ref 0.5–1.3)
DIFF PNL FLD: NEGATIVE — SIGNIFICANT CHANGE UP
EGFR: 76 ML/MIN/1.73M2 — SIGNIFICANT CHANGE UP
GLUCOSE SERPL-MCNC: 120 MG/DL — HIGH (ref 70–99)
GLUCOSE UR QL: NEGATIVE MG/DL — SIGNIFICANT CHANGE UP
HCT VFR BLD CALC: 36.8 % — SIGNIFICANT CHANGE UP (ref 34.5–45)
HGB BLD-MCNC: 12.2 G/DL — SIGNIFICANT CHANGE UP (ref 11.5–15.5)
KETONES UR-MCNC: NEGATIVE MG/DL — SIGNIFICANT CHANGE UP
LEUKOCYTE ESTERASE UR-ACNC: NEGATIVE — SIGNIFICANT CHANGE UP
MCHC RBC-ENTMCNC: 29.4 PG — SIGNIFICANT CHANGE UP (ref 27–34)
MCHC RBC-ENTMCNC: 33.2 GM/DL — SIGNIFICANT CHANGE UP (ref 32–36)
MCV RBC AUTO: 88.7 FL — SIGNIFICANT CHANGE UP (ref 80–100)
NITRITE UR-MCNC: NEGATIVE — SIGNIFICANT CHANGE UP
NRBC # BLD: 0 /100 WBCS — SIGNIFICANT CHANGE UP (ref 0–0)
NRBC # FLD: 0 K/UL — SIGNIFICANT CHANGE UP (ref 0–0)
PH UR: 8.5 (ref 5–8)
PLATELET # BLD AUTO: 294 K/UL — SIGNIFICANT CHANGE UP (ref 150–400)
POTASSIUM SERPL-MCNC: 3.9 MMOL/L — SIGNIFICANT CHANGE UP (ref 3.5–5.3)
POTASSIUM SERPL-SCNC: 3.9 MMOL/L — SIGNIFICANT CHANGE UP (ref 3.5–5.3)
PROT SERPL-MCNC: 7.6 G/DL — SIGNIFICANT CHANGE UP (ref 6–8.3)
PROT UR-MCNC: SIGNIFICANT CHANGE UP MG/DL
RBC # BLD: 4.15 M/UL — SIGNIFICANT CHANGE UP (ref 3.8–5.2)
RBC # FLD: 13.7 % — SIGNIFICANT CHANGE UP (ref 10.3–14.5)
SODIUM SERPL-SCNC: 137 MMOL/L — SIGNIFICANT CHANGE UP (ref 135–145)
SP GR SPEC: 1.02 — SIGNIFICANT CHANGE UP (ref 1–1.03)
UROBILINOGEN FLD QL: 0.2 MG/DL — SIGNIFICANT CHANGE UP (ref 0.2–1)
WBC # BLD: 8.61 K/UL — SIGNIFICANT CHANGE UP (ref 3.8–10.5)
WBC # FLD AUTO: 8.61 K/UL — SIGNIFICANT CHANGE UP (ref 3.8–10.5)

## 2023-07-13 PROCEDURE — 74177 CT ABD & PELVIS W/CONTRAST: CPT | Mod: 26,MA

## 2023-07-13 PROCEDURE — 99285 EMERGENCY DEPT VISIT HI MDM: CPT

## 2023-07-13 NOTE — ED ADULT NURSE NOTE - NSFALLUNIVINTERV_ED_ALL_ED
Bed/Stretcher in lowest position, wheels locked, appropriate side rails in place/Call bell, personal items and telephone in reach/Instruct patient to call for assistance before getting out of bed/chair/stretcher/Non-slip footwear applied when patient is off stretcher/[place] to call system/Physically safe environment - no spills, clutter or unnecessary equipment/Purposeful proactive rounding/Room/bathroom lighting operational, light cord in reach

## 2023-07-13 NOTE — ED ADULT TRIAGE NOTE - CHIEF COMPLAINT QUOTE
states" I am having  lower abdomen pain since 2 days and my doctor sent me here." h/o cholecystectomy in June 2023. h/o HTN.

## 2023-07-13 NOTE — ED PROVIDER NOTE - PATIENT PORTAL LINK FT
You can access the FollowMyHealth Patient Portal offered by [facility] by registering at the following website: [email or web address]. By joining [employer]’s FollowMyHealth portal, you will also be able to view your health information using other applications (apps) compatible with our system.

## 2023-07-13 NOTE — ED PROVIDER NOTE - NSFOLLOWUPINSTRUCTIONS_ED_ALL_ED_FT
Over-the-counter Tylenol 500 mg (1 or 2 every 6 hours) and/or Naproxen 500 mg (1 every 12 hours) for pain control.     Follow with General Surgery regarding retained gallstones in abdominal (peritoneal) cavity. These are not causing any immediate complication such as abscess. Since the pain started before the surgery, they may not be the cause of the pain, either.    Call [phone] and ask for an appointment at a convenient location. Or, your PCP can refer you to a General Surgeon.

## 2023-07-13 NOTE — ED PROVIDER NOTE - OBJECTIVE STATEMENT
Kelsi: Older woman presents with more than 1 month of pan-lower abdominal pain.  Went to [place] after the pain started and had a cholecystectomy, although she never had right upper quadrant pain symptoms, nausea, vomiting, or postprandial symptoms.  No fever.  No vomiting or diarrhea.  No UTI symptoms.  Has noted a 4 pound weight loss since her laparoscopic cholecystectomy in [place].    It is unlikely the pain originates from biliary pathology, since the patient never had any signs or symptoms of biliary pathology and the pain persists despite a cholecystectomy.  We will do a CT scan and a transvaginal ultrasound to look for GI or pelvic pathology.

## 2023-07-13 NOTE — ED ADULT NURSE NOTE - OBJECTIVE STATEMENT
patient Alert & ox3,  states" I am having  lower abdomen pain since 2 days and my doctor sent me here." h/o cholecystectomy in June 2023. pt . evaluated by MD. Placed 20g angiocath right  AC., labs drawn and sent.   patient will be waiting for results, further evaluation and disposition. IVF NS started and due meds given as per order. made comfortable.will continue to monitor.

## 2023-07-13 NOTE — ED PROVIDER NOTE - CLINICAL SUMMARY MEDICAL DECISION MAKING FREE TEXT BOX
Kelsi: It is unlikely the pain originates from biliary pathology, since the patient never had any signs or symptoms of biliary pathology and the pain persists despite a cholecystectomy.  Will do a CT scan and a transvaginal ultrasound to look for GI or pelvic pathology.

## 2023-07-13 NOTE — ED PROVIDER NOTE - PHYSICAL EXAMINATION
Well-appearing, well nourished, awake, alert, oriented to person, place, time/situation and in no apparent distress.    Airway patent. Neck supple.    Eyes without scleral injection. No jaundice.    Strong pulse.    Respirations unlabored.    Abdomen soft, non-tender, no guarding. No distension.    MSK: Spine appears normal, range of motion is not limited, no muscle or joint tenderness.    Alert and oriented, no gross motor or sensory deficits.    Skin: normal color for race, warm, dry and intact. No evidence of rash.    No SI/HI.

## 2023-07-13 NOTE — ED ADULT NURSE NOTE - HOW OFTEN DO YOU HAVE SIX OR MORE DRINKS ON ONE OCCASION?
Pt provided discharge instructions and follow up information. Pt verbalizes understanding. Pt stable and ambulatory upon discharge. No acute distress noted.   Never

## 2023-07-13 NOTE — ED PROVIDER NOTE - PROGRESS NOTE DETAILS
Kelsi: Lab results unremarkable. CT w/ likely dropped gallstones in Chi's pouch and R para-colic gutter.

## 2023-07-13 NOTE — ED ADULT NURSE NOTE - SUICIDE SCREENING QUESTION 1
Thanks for visiting us today!    You were given a packet of information handouts at today's well child exam. Please keep them handy for future reference.        Remember these important phone numbers:    [phone] for phone nurses during the day and our nurse answering service at night    [phone]  for scheduling or changing future appointments    When leaving a message for our staff, please include:   • the spelling of your child’s full name and date of birth  • your full name and relationship to child  • best phone number and time to reach you   • reason for the call    Is your child signed up for [employer]? If you do this, you can message us rather than playing phone tag! Go to your own account first. On the right hand side of your page, click the button marked \"Request Access to my Child's Records.\" Fill out the information. In a few day's your child's information will be linked to your account. It's that simple!! Here is the website for more information:     [email or web address]    If you haven't already liked us on [employer], please do so!  Just search for \"[facility]\"      --------------------------------------------------------------------------------------------------------------------         No

## 2023-07-25 ENCOUNTER — APPOINTMENT (OUTPATIENT)
Dept: SURGERY | Facility: CLINIC | Age: 78
End: 2023-07-25

## 2023-08-22 NOTE — H&P PST ADULT - VENOUS THROMBOEMBOLISM BMI
Pt presents w/ bilateral breast pain d/t recent pregnancy. Pt reports unable to latch or breast feed for the last 5 days. Denies fevers. Pt reports engorgement. ABCs intact.      Triage Assessment       Row Name 08/22/23 6818       Triage Assessment (Adult)    Airway WDL WDL       Respiratory WDL    Respiratory WDL WDL       Skin Circulation/Temperature WDL    Skin Circulation/Temperature WDL WDL       Cardiac WDL    Cardiac WDL WDL       Peripheral/Neurovascular WDL    Peripheral Neurovascular WDL WDL       Cognitive/Neuro/Behavioral WDL    Cognitive/Neuro/Behavioral WDL WDL                    
(0) indicator not present

## 2023-09-15 ASSESSMENT — KOOS JR
IMPORTED FORM: YES
STANDING UPRIGHT: MODERATE
TWISING OR PIVOTING ON KNEE: MODERATE
STRAIGHTENING KNEE FULLY: MODERATE
RISING FROM SITTING: MODERATE
IMPORTED LATERALITY: LEFT
GOING UP OR DOWN STAIRS: MODERATE
KOOS JR RAW SCORE: 15
IMPORTED KOOS JR SCORE: 50.01
BENDING TO THE FLOOR TO PICK UP OBJECT: SEVERE
HOW SEVERE IS YOUR KNEE STIFFNESS AFTER FIRST WAKING IN MORNING: MODERATE

## 2023-11-01 ENCOUNTER — APPOINTMENT (OUTPATIENT)
Dept: RADIOLOGY | Facility: IMAGING CENTER | Age: 78
End: 2023-11-01

## 2024-01-22 ENCOUNTER — INPATIENT (INPATIENT)
Facility: HOSPITAL | Age: 79
LOS: 1 days | Discharge: HOME HEALTH | End: 2024-01-24
Attending: HOSPITALIST | Admitting: HOSPITALIST
Payer: MEDICARE

## 2024-01-22 VITALS
DIASTOLIC BLOOD PRESSURE: 104 MMHG | TEMPERATURE: 99 F | OXYGEN SATURATION: 92 % | WEIGHT: 160.06 LBS | HEART RATE: 96 BPM | RESPIRATION RATE: 18 BRPM | HEIGHT: 65 IN | SYSTOLIC BLOOD PRESSURE: 163 MMHG

## 2024-01-22 DIAGNOSIS — Z87.81 PERSONAL HISTORY OF (HEALED) TRAUMATIC FRACTURE: Chronic | ICD-10-CM

## 2024-01-22 LAB
ALBUMIN SERPL ELPH-MCNC: 2.7 G/DL — LOW (ref 3.3–5)
ALP SERPL-CCNC: 106 U/L — SIGNIFICANT CHANGE UP (ref 40–120)
ALT FLD-CCNC: 19 U/L — SIGNIFICANT CHANGE UP (ref 12–78)
ANION GAP SERPL CALC-SCNC: 6 MMOL/L — SIGNIFICANT CHANGE UP (ref 5–17)
AST SERPL-CCNC: 21 U/L — SIGNIFICANT CHANGE UP (ref 15–37)
BASE EXCESS BLDV CALC-SCNC: 6.1 MMOL/L — HIGH (ref -2–3)
BASOPHILS # BLD AUTO: 0.06 K/UL — SIGNIFICANT CHANGE UP (ref 0–0.2)
BASOPHILS NFR BLD AUTO: 0.4 % — SIGNIFICANT CHANGE UP (ref 0–2)
BILIRUB SERPL-MCNC: 0.7 MG/DL — SIGNIFICANT CHANGE UP (ref 0.2–1.2)
BLOOD GAS COMMENTS, VENOUS: SIGNIFICANT CHANGE UP
BUN SERPL-MCNC: 12 MG/DL — SIGNIFICANT CHANGE UP (ref 7–23)
CALCIUM SERPL-MCNC: 8.8 MG/DL — SIGNIFICANT CHANGE UP (ref 8.5–10.1)
CHLORIDE BLDV-SCNC: 100 MMOL/L — SIGNIFICANT CHANGE UP (ref 98–107)
CHLORIDE SERPL-SCNC: 102 MMOL/L — SIGNIFICANT CHANGE UP (ref 96–108)
CO2 BLDV-SCNC: 33 MMOL/L — HIGH (ref 22–26)
CO2 SERPL-SCNC: 28 MMOL/L — SIGNIFICANT CHANGE UP (ref 22–31)
CREAT SERPL-MCNC: 0.84 MG/DL — SIGNIFICANT CHANGE UP (ref 0.5–1.3)
EGFR: 72 ML/MIN/1.73M2 — SIGNIFICANT CHANGE UP
EOSINOPHIL # BLD AUTO: 0.06 K/UL — SIGNIFICANT CHANGE UP (ref 0–0.5)
EOSINOPHIL NFR BLD AUTO: 0.4 % — SIGNIFICANT CHANGE UP (ref 0–6)
GAS PNL BLDV: 134 MMOL/L — LOW (ref 136–145)
GAS PNL BLDV: SIGNIFICANT CHANGE UP
GAS PNL BLDV: SIGNIFICANT CHANGE UP
GLUCOSE BLDV-MCNC: 156 MG/DL — HIGH (ref 65–95)
GLUCOSE SERPL-MCNC: 141 MG/DL — HIGH (ref 70–99)
HCO3 BLDV-SCNC: 31 MMOL/L — HIGH (ref 22–28)
HCT VFR BLD CALC: 38.4 % — SIGNIFICANT CHANGE UP (ref 34.5–45)
HCT VFR BLDA CALC: 38 % — SIGNIFICANT CHANGE UP (ref 37–47)
HGB BLD CALC-MCNC: 12.7 G/DL — SIGNIFICANT CHANGE UP (ref 11.7–16.1)
HGB BLD-MCNC: 12.5 G/DL — SIGNIFICANT CHANGE UP (ref 11.5–15.5)
IMM GRANULOCYTES NFR BLD AUTO: 0.4 % — SIGNIFICANT CHANGE UP (ref 0–0.9)
LACTATE BLDV-MCNC: 1 MMOL/L — SIGNIFICANT CHANGE UP (ref 0.56–1.39)
LYMPHOCYTES # BLD AUTO: 21.2 % — SIGNIFICANT CHANGE UP (ref 13–44)
LYMPHOCYTES # BLD AUTO: 3.45 K/UL — HIGH (ref 1–3.3)
MAGNESIUM SERPL-MCNC: 2 MG/DL — SIGNIFICANT CHANGE UP (ref 1.6–2.6)
MCHC RBC-ENTMCNC: 28.2 PG — SIGNIFICANT CHANGE UP (ref 27–34)
MCHC RBC-ENTMCNC: 32.6 G/DL — SIGNIFICANT CHANGE UP (ref 32–36)
MCV RBC AUTO: 86.5 FL — SIGNIFICANT CHANGE UP (ref 80–100)
MONOCYTES # BLD AUTO: 1.03 K/UL — HIGH (ref 0–0.9)
MONOCYTES NFR BLD AUTO: 6.3 % — SIGNIFICANT CHANGE UP (ref 2–14)
NEUTROPHILS # BLD AUTO: 11.6 K/UL — HIGH (ref 1.8–7.4)
NEUTROPHILS NFR BLD AUTO: 71.3 % — SIGNIFICANT CHANGE UP (ref 43–77)
NRBC # BLD: 0 /100 WBCS — SIGNIFICANT CHANGE UP (ref 0–0)
NT-PROBNP SERPL-SCNC: 74 PG/ML — SIGNIFICANT CHANGE UP (ref 0–450)
PCO2 BLDV: 46 MMHG — SIGNIFICANT CHANGE UP (ref 42–55)
PH BLDV: 7.44 — HIGH (ref 7.32–7.43)
PLATELET # BLD AUTO: 345 K/UL — SIGNIFICANT CHANGE UP (ref 150–400)
PO2 BLDV: 26 MMHG — SIGNIFICANT CHANGE UP (ref 25–45)
POTASSIUM BLDV-SCNC: 3.8 MMOL/L — SIGNIFICANT CHANGE UP (ref 3.5–5.1)
POTASSIUM SERPL-MCNC: 3.6 MMOL/L — SIGNIFICANT CHANGE UP (ref 3.5–5.3)
POTASSIUM SERPL-SCNC: 3.6 MMOL/L — SIGNIFICANT CHANGE UP (ref 3.5–5.3)
PROT SERPL-MCNC: 8.7 GM/DL — HIGH (ref 6–8.3)
RAPID RVP RESULT: SIGNIFICANT CHANGE UP
RBC # BLD: 4.44 M/UL — SIGNIFICANT CHANGE UP (ref 3.8–5.2)
RBC # FLD: 15.3 % — HIGH (ref 10.3–14.5)
SAO2 % BLDV: 29.5 % — LOW (ref 94–98)
SARS-COV-2 RNA SPEC QL NAA+PROBE: SIGNIFICANT CHANGE UP
SODIUM SERPL-SCNC: 136 MMOL/L — SIGNIFICANT CHANGE UP (ref 135–145)
TROPONIN I, HIGH SENSITIVITY RESULT: 61.8 NG/L — HIGH
WBC # BLD: 16.27 K/UL — HIGH (ref 3.8–10.5)
WBC # FLD AUTO: 16.27 K/UL — HIGH (ref 3.8–10.5)

## 2024-01-22 PROCEDURE — 99285 EMERGENCY DEPT VISIT HI MDM: CPT

## 2024-01-22 PROCEDURE — 71046 X-RAY EXAM CHEST 2 VIEWS: CPT | Mod: 26

## 2024-01-22 PROCEDURE — 93010 ELECTROCARDIOGRAM REPORT: CPT

## 2024-01-22 RX ORDER — ACETAMINOPHEN 500 MG
650 TABLET ORAL ONCE
Refills: 0 | Status: COMPLETED | OUTPATIENT
Start: 2024-01-22 | End: 2024-01-22

## 2024-01-22 RX ORDER — CEFTRIAXONE 500 MG/1
1000 INJECTION, POWDER, FOR SOLUTION INTRAMUSCULAR; INTRAVENOUS ONCE
Refills: 0 | Status: COMPLETED | OUTPATIENT
Start: 2024-01-22 | End: 2024-01-22

## 2024-01-22 RX ORDER — AZITHROMYCIN 500 MG/1
500 TABLET, FILM COATED ORAL ONCE
Refills: 0 | Status: COMPLETED | OUTPATIENT
Start: 2024-01-22 | End: 2024-01-22

## 2024-01-22 RX ORDER — SODIUM CHLORIDE 9 MG/ML
2160 INJECTION INTRAMUSCULAR; INTRAVENOUS; SUBCUTANEOUS ONCE
Refills: 0 | Status: COMPLETED | OUTPATIENT
Start: 2024-01-22 | End: 2024-01-22

## 2024-01-22 RX ADMIN — SODIUM CHLORIDE 2160 MILLILITER(S): 9 INJECTION INTRAMUSCULAR; INTRAVENOUS; SUBCUTANEOUS at 22:00

## 2024-01-22 RX ADMIN — CEFTRIAXONE 100 MILLIGRAM(S): 500 INJECTION, POWDER, FOR SOLUTION INTRAMUSCULAR; INTRAVENOUS at 20:29

## 2024-01-22 RX ADMIN — SODIUM CHLORIDE 2160 MILLILITER(S): 9 INJECTION INTRAMUSCULAR; INTRAVENOUS; SUBCUTANEOUS at 20:28

## 2024-01-22 RX ADMIN — CEFTRIAXONE 1000 MILLIGRAM(S): 500 INJECTION, POWDER, FOR SOLUTION INTRAMUSCULAR; INTRAVENOUS at 21:21

## 2024-01-22 RX ADMIN — AZITHROMYCIN 255 MILLIGRAM(S): 500 TABLET, FILM COATED ORAL at 21:21

## 2024-01-22 RX ADMIN — Medication 650 MILLIGRAM(S): at 21:10

## 2024-01-22 RX ADMIN — Medication 650 MILLIGRAM(S): at 20:10

## 2024-01-22 RX ADMIN — AZITHROMYCIN 500 MILLIGRAM(S): 500 TABLET, FILM COATED ORAL at 22:30

## 2024-01-22 NOTE — ED ADULT NURSE NOTE - NSFALLHARMRISKINTERV_ED_ALL_ED
How Severe Is Your Eczema?: moderate Is This A New Presentation, Or A Follow-Up?: Rash Assistance OOB with selected safe patient handling equipment if applicable/Assistance with ambulation/Communicate risk of Fall with Harm to all staff, patient, and family/Monitor gait and stability/Provide visual cue: red socks, yellow wristband, yellow gown, etc/Reinforce activity limits and safety measures with patient and family/Bed in lowest position, wheels locked, appropriate side rails in place/Call bell, personal items and telephone in reach/Instruct patient to call for assistance before getting out of bed/chair/stretcher/Non-slip footwear applied when patient is off stretcher/[place] to call system/Physically safe environment - no spills, clutter or unnecessary equipment/Purposeful Proactive Rounding/Room/bathroom lighting operational, light cord in reach

## 2024-01-22 NOTE — ED ADULT NURSE NOTE - OBJECTIVE STATEMENT
Pt presents to the ed with daughter stating pt has been having fever x today and coughing x 1 month. pt was noted to be febrile at 102.5 rectally, alert and oriented x2. Pt was noted to have generalized fatigue and glossy eyes. B/l clear breath sounds are noted, pt is coughing up white phlegm, SR noted on cardiac monitor, no peripheral edema was noted. Pt denies chest pain, palpitations and dysuria. No facial droop was noted. Pt was noted to be saturating at 88% roomair and was placed on 3L nasal canula, pt saturating at 95% now.

## 2024-01-22 NOTE — ED PROVIDER NOTE - OBJECTIVE STATEMENT
62-year-old female with PMH dementia A&O x 2 at baseline, HTN presents with cough/global weakness/subjective fever x 2 wks.  no rash, urinary sxs, cp, sob, back pain, ab pain, calf pain or swelling   saturating 88 on RA

## 2024-01-22 NOTE — ED ADULT NURSE NOTE - CHPI ED NUR SYMPTOMS NEG
no chest pain/no chills/no cough/no edema/no fever/no headache/no hemoptysis/no shortness of breath/no wheezing

## 2024-01-22 NOTE — ED ADULT NURSE NOTE - NSFALLRISKASMTTYPE_ED_ALL_ED
Detail Level: Detailed Show Topical Anesthesia Variable?: Yes Medical Necessity Clause: This procedure was medically necessary because the lesions that were treated were: Render Note In Bullet Format When Appropriate: No Initial (On Arrival) Duration Of Freeze Thaw-Cycle (Seconds): 0 Post-Care Instructions: I reviewed with the patient in detail post-care instructions. Patient is to wear sunprotection, and avoid picking at any of the treated lesions. Pt may apply Vaseline to crusted or scabbing areas. Consent: The patient's consent was obtained including but not limited to risks of crusting, scabbing, blistering, scarring, darker or lighter pigmentary change, recurrence, incomplete removal and infection. Spray Paint Text: The liquid nitrogen was applied to the skin utilizing a spray paint frosting technique. Medical Necessity Information: It is in your best interest to select a reason for this procedure from the list below. All of these items fulfill various CMS LCD requirements except the new and changing color options.

## 2024-01-22 NOTE — ED ADULT NURSE NOTE - NSHOSCREENINGQ1_ED_ALL_ED
Patient:   FREDY LIZ            MRN: [id number]            FIN: [id number]               Age:   45 years     Sex:  Female     :  [birth year]   Associated Diagnoses:   None   Author:   Cameron Alfaro MD      Basic Information   Time seen: Date & time 03/15/17 13:39:00.   History source: Patient.   Arrival mode: Private vehicle.   History limitation: None.      History of Present Illness   The patient is a 45 year old female, with a history of complex migraines, who presents to the emergency department with a migraine for the past 4 days. She says that her headache is in the right posterior aspect of her head and radiates around her entire head. She has also had left eye twitching and left arm numbness, which is normal for her. The patient says that she has also been vomiting. Her migraines do not normally last this long, which is why she came in. She denies anything that could have triggered this migraine but that she does work on a computer all day. She has had an MRI of her head before. She has taken imitrex and various over the counter medications with no relief.       Review of Systems   Constitutional symptoms:  No fever,    Skin symptoms:  No rash,    Eye symptoms:  No recent vision problems,    ENMT symptoms:  No sore throat,    Respiratory symptoms:  No shortness of breath,    Cardiovascular symptoms:  No chest pain,    Gastrointestinal symptoms:  Nausea, vomiting, No abdominal pain,    Genitourinary symptoms:  No dysuria,    Musculoskeletal symptoms:  No Joint pain,    Neurologic symptoms:  Headache, numbness, left eye twitching.       Health Status   Allergies:    Allergic Reactions (Selected)  Severity Not Documented  Bentyl- No reactions were documented.  Compazine- Anxiety.  Latex- Rash.  Morphine- Pressure in chest.  Reglan- Rash and anxiety..      Past Medical/ Family/ Social History   Medical history   Cardiovascular: hypertension, deep venous thrombosis.   Gastrointestinal: Crohn's  disease.   Neurological: migraine.   Psychiatric: depression, anxiety.   Surgical history: Appendectomy, cholecystectomy, hysterectomy, Bowel resection.   Social history: Alcohol use: Denies, Tobacco use: Denies, Drug use: Marijuana.      Physical Examination               Vital Signs   Vital Signs   03/15/17 13:48           Temperature Oral          98.4 F                             Peripheral Pulse Rate     84 bpm                             Respiratory Rate          16 br/min                             Systolic Blood Pressure   115 mmHg                             Diastolic Blood Pressure  75 mmHg                             Mean Arterial Pressure    88 mmHg                             Oxygen Saturation         97 %  .               Per nurse's notes.              Oxygen saturation:  97 %.   Pulse Ox 97% on Room Air which is normal for this patient.   Pulse Ox 97% on Room Air which is normal for this patient.   Vital Signs were reviewed.   General:  Alert, no acute distress.    Skin:  Warm, dry.    Head:  Normocephalic, atraumatic.    Neck:  Supple, trachea midline.    Eye:  Pupils are equal, round and reactive to light, extraocular movements are intact.    Ears, nose, mouth and throat:  Oral mucosa moist.   Cardiovascular:  Regular rate and rhythm, S1, S2.    Respiratory:  Lungs are clear to auscultation, respirations are non-labored, Symmetrical chest wall expansion.    Gastrointestinal:  Soft, Nontender, Non distended.    Musculoskeletal:  Normal ROM, no swelling, no deformity.    Neurological:  Alert and oriented to person, place, time, and situation, left arm decreased sensation, has intact strength, left eye twitching on exam.    Psychiatric:  Appropriate mood & affect.      Medical Decision Making   Results review:     Patient presents with symptoms exactly the same as her complex migraines for 3 days off and on. Given zofran, toradol, benadryl minor imporvement. Then given ativan and dilaudid which  improved it a lot more. Patient typical has to be treated for several days inpatient. Failed outpatient treatment for 3 days. I do not suspect another acute process as symptoms are exactly the same as previous 4-5 times this year.       Impression and Plan   Diagnosis   Complex migraine      Calls-Consults   -  [facility] YOUNGBLOOD, Brandon Klein MD, Ashish KENNEDY, can admit to [facility].    Plan   Condition: Improved.    Disposition: Place in Observation Unit.    Notes: \"All medical record entries made by the scribe were at my direction. I have reviewed the chart and agree that the record accurately reflects my personal performance of the history, physical exam, medical decision making, and the emergency department course for this patient. I have also personally directed, reviewed, and agree with the discharge instructions and disposition.\", This document is scribed by Shital Vance for Dr. Alfaro.         No

## 2024-01-22 NOTE — ED PROVIDER NOTE - ATTENDING APP SHARED VISIT CONTRIBUTION OF CARE
62F PMH dementia A&O x 2 at baseline, HTN pw cough, fever, decreased appetite, generalized weakness, duration of symptoms x 2 weeks, brought in by daughter who is visiting from out of state- pt lives with  and son at baseline. Denies vomiting, abd pain, chest pain, pt noted to have hypoxia on room air 88% with normal work of breath  On eval pt is aox2, nad, heart rrr, lungs  with bibasilar crackles, abd soft ntnd, no peripheral edema   plan - rule out PNA , crackles, cxr, IV abx, cultures, admission for hypoxia

## 2024-01-22 NOTE — ED PROVIDER NOTE - CARE PLAN
1 Principal Discharge DX:	Pneumonia  Secondary Diagnosis:	Hypoxia  Secondary Diagnosis:	Elevated troponin

## 2024-01-22 NOTE — ED PROVIDER NOTE - PATIENT'S PREFERRED PRONOUN
· Follow up with your primary care if symptoms do not improve, or you may return here at any time.  · If you were referred to a specialist, please follow up with that specialty.  · If you were prescribed antibiotics, please take them to completion.  · If you were prescribed a narcotic or any medication with sedative effects, do not drive or operate heavy equipment or machinery while taking these medications.  · You must understand that you have received treatment at an Urgent Care facility only, and that you may be released before all of your medical problems are known or treated. Urgent Care facilities are not equipped to handle life threatening emergencies. It is recommended that you seek care at an Emergency Department for further evaluation of worsening or concerning symptoms, or possibly life threatening conditions as discussed.                                        If you  smoke, please stop smoking          Wait and See Antibiotic:  You have been given a prescription for antibiotics. Your symptoms will likely resolve without antibiotics. It is recommended that you monitor yourself closely for 3-5 days and fill this prescription and start the antibiotic only if signs of infection, as discussed at your visit, are present. It is important to follow these instructions due to the problem of increasing antibiotic resistance.      Over the counter and home treatment of symptoms:  Alternate tylenol and motrin every 3 hours  Salt water gargles  Cold-eeze helps to reduce the duration of sore throat symptoms  Cepachol helps to numb the discomfort  Chloroseptic spray  Nasal saline spray reduces inflammation and dryness  Warm face compresses as often as you can  Vicks vapor rub at night  Flonase OTC or Nasacort OTC  Simple foods like chicken noodle soup help  Pedialyte helps with dehydration if lacking appetite  Rest as much as you can    
Her/She

## 2024-01-22 NOTE — ED PROVIDER NOTE - PHYSICAL EXAMINATION
PHYSICAL EXAM:    GENERAL: Alert, appears stated age, well appearing, non-toxic  SKIN: Warm, and dry.   HEAD: NC, AT  EYE: Normal lids/conjunctiva  ENT: Normal hearing, patent oropharynx  NECK: +supple. No meningismus, or JVD  Pulm: Bilateral BS, normal resp effort, no wheezes, stridor, or retractions +L crackles  CV: RRR, no M/R/G, 2+and = radial pulses  Abd: soft, non-tender, non-distended, no rebound/guarding.  no CVA tenderness.   Mskel: no erythema, cyanosis, edema. no calf tenderness  Neuro: AAOx2, moving extremities.

## 2024-01-23 DIAGNOSIS — J18.9 PNEUMONIA, UNSPECIFIED ORGANISM: ICD-10-CM

## 2024-01-23 DIAGNOSIS — F03.90 UNSPECIFIED DEMENTIA WITHOUT BEHAVIORAL DISTURBANCE: ICD-10-CM

## 2024-01-23 DIAGNOSIS — I10 ESSENTIAL (PRIMARY) HYPERTENSION: ICD-10-CM

## 2024-01-23 LAB
ANION GAP SERPL CALC-SCNC: 4 MMOL/L — LOW (ref 5–17)
APPEARANCE UR: CLEAR — SIGNIFICANT CHANGE UP
BILIRUB UR-MCNC: NEGATIVE — SIGNIFICANT CHANGE UP
BUN SERPL-MCNC: 7 MG/DL — SIGNIFICANT CHANGE UP (ref 7–23)
CALCIUM SERPL-MCNC: 7.5 MG/DL — LOW (ref 8.5–10.1)
CHLORIDE SERPL-SCNC: 107 MMOL/L — SIGNIFICANT CHANGE UP (ref 96–108)
CO2 SERPL-SCNC: 25 MMOL/L — SIGNIFICANT CHANGE UP (ref 22–31)
COLOR SPEC: YELLOW — SIGNIFICANT CHANGE UP
CREAT SERPL-MCNC: 0.68 MG/DL — SIGNIFICANT CHANGE UP (ref 0.5–1.3)
DIFF PNL FLD: NEGATIVE — SIGNIFICANT CHANGE UP
EGFR: 90 ML/MIN/1.73M2 — SIGNIFICANT CHANGE UP
GLUCOSE SERPL-MCNC: 164 MG/DL — HIGH (ref 70–99)
GLUCOSE UR QL: NEGATIVE MG/DL — SIGNIFICANT CHANGE UP
HCT VFR BLD CALC: 35 % — SIGNIFICANT CHANGE UP (ref 34.5–45)
HGB BLD-MCNC: 11.5 G/DL — SIGNIFICANT CHANGE UP (ref 11.5–15.5)
KETONES UR-MCNC: NEGATIVE MG/DL — SIGNIFICANT CHANGE UP
LACTATE SERPL-SCNC: 1 MMOL/L — SIGNIFICANT CHANGE UP (ref 0.7–2)
LEUKOCYTE ESTERASE UR-ACNC: NEGATIVE — SIGNIFICANT CHANGE UP
MCHC RBC-ENTMCNC: 28.8 PG — SIGNIFICANT CHANGE UP (ref 27–34)
MCHC RBC-ENTMCNC: 32.9 G/DL — SIGNIFICANT CHANGE UP (ref 32–36)
MCV RBC AUTO: 87.5 FL — SIGNIFICANT CHANGE UP (ref 80–100)
NITRITE UR-MCNC: NEGATIVE — SIGNIFICANT CHANGE UP
NRBC # BLD: 0 /100 WBCS — SIGNIFICANT CHANGE UP (ref 0–0)
PH UR: 7.5 — SIGNIFICANT CHANGE UP (ref 5–8)
PLATELET # BLD AUTO: 322 K/UL — SIGNIFICANT CHANGE UP (ref 150–400)
POTASSIUM SERPL-MCNC: 2.9 MMOL/L — CRITICAL LOW (ref 3.5–5.3)
POTASSIUM SERPL-SCNC: 2.9 MMOL/L — CRITICAL LOW (ref 3.5–5.3)
PROT UR-MCNC: NEGATIVE MG/DL — SIGNIFICANT CHANGE UP
RBC # BLD: 4 M/UL — SIGNIFICANT CHANGE UP (ref 3.8–5.2)
RBC # FLD: 15.5 % — HIGH (ref 10.3–14.5)
SODIUM SERPL-SCNC: 136 MMOL/L — SIGNIFICANT CHANGE UP (ref 135–145)
SP GR SPEC: 1.01 — SIGNIFICANT CHANGE UP (ref 1–1.03)
TROPONIN I, HIGH SENSITIVITY RESULT: 56.5 NG/L — HIGH
TROPONIN I, HIGH SENSITIVITY RESULT: 61.2 NG/L — HIGH
UROBILINOGEN FLD QL: 0.2 MG/DL — SIGNIFICANT CHANGE UP (ref 0.2–1)
WBC # BLD: 12.17 K/UL — HIGH (ref 3.8–10.5)
WBC # FLD AUTO: 12.17 K/UL — HIGH (ref 3.8–10.5)

## 2024-01-23 RX ORDER — SENNA PLUS 8.6 MG/1
2 TABLET ORAL AT BEDTIME
Refills: 0 | Status: DISCONTINUED | OUTPATIENT
Start: 2024-01-23 | End: 2024-01-24

## 2024-01-23 RX ORDER — AZITHROMYCIN 500 MG/1
500 TABLET, FILM COATED ORAL EVERY 24 HOURS
Refills: 0 | Status: DISCONTINUED | OUTPATIENT
Start: 2024-01-23 | End: 2024-01-24

## 2024-01-23 RX ORDER — POTASSIUM CHLORIDE 20 MEQ
40 PACKET (EA) ORAL ONCE
Refills: 0 | Status: DISCONTINUED | OUTPATIENT
Start: 2024-01-23 | End: 2024-01-23

## 2024-01-23 RX ORDER — ONDANSETRON 8 MG/1
4 TABLET, FILM COATED ORAL EVERY 8 HOURS
Refills: 0 | Status: DISCONTINUED | OUTPATIENT
Start: 2024-01-23 | End: 2024-01-24

## 2024-01-23 RX ORDER — ASPIRIN/CALCIUM CARB/MAGNESIUM 324 MG
81 TABLET ORAL DAILY
Refills: 0 | Status: DISCONTINUED | OUTPATIENT
Start: 2024-01-23 | End: 2024-01-24

## 2024-01-23 RX ORDER — MECLIZINE HCL 12.5 MG
1 TABLET ORAL
Qty: 0 | Refills: 0 | DISCHARGE

## 2024-01-23 RX ORDER — HEPARIN SODIUM 5000 [USP'U]/ML
5000 INJECTION INTRAVENOUS; SUBCUTANEOUS EVERY 12 HOURS
Refills: 0 | Status: DISCONTINUED | OUTPATIENT
Start: 2024-01-23 | End: 2024-01-24

## 2024-01-23 RX ORDER — LOSARTAN POTASSIUM 100 MG/1
1 TABLET, FILM COATED ORAL
Qty: 0 | Refills: 0 | DISCHARGE

## 2024-01-23 RX ORDER — SODIUM CHLORIDE 9 MG/ML
1000 INJECTION INTRAMUSCULAR; INTRAVENOUS; SUBCUTANEOUS
Refills: 0 | Status: DISCONTINUED | OUTPATIENT
Start: 2024-01-23 | End: 2024-01-23

## 2024-01-23 RX ORDER — CEFTRIAXONE 500 MG/1
1000 INJECTION, POWDER, FOR SOLUTION INTRAMUSCULAR; INTRAVENOUS EVERY 24 HOURS
Refills: 0 | Status: DISCONTINUED | OUTPATIENT
Start: 2024-01-23 | End: 2024-01-24

## 2024-01-23 RX ORDER — ATORVASTATIN CALCIUM 80 MG/1
20 TABLET, FILM COATED ORAL AT BEDTIME
Refills: 0 | Status: DISCONTINUED | OUTPATIENT
Start: 2024-01-23 | End: 2024-01-24

## 2024-01-23 RX ORDER — LANOLIN ALCOHOL/MO/W.PET/CERES
3 CREAM (GRAM) TOPICAL AT BEDTIME
Refills: 0 | Status: DISCONTINUED | OUTPATIENT
Start: 2024-01-23 | End: 2024-01-24

## 2024-01-23 RX ORDER — POTASSIUM CHLORIDE 20 MEQ
40 PACKET (EA) ORAL ONCE
Refills: 0 | Status: COMPLETED | OUTPATIENT
Start: 2024-01-23 | End: 2024-01-23

## 2024-01-23 RX ORDER — PANTOPRAZOLE SODIUM 20 MG/1
40 TABLET, DELAYED RELEASE ORAL
Refills: 0 | Status: DISCONTINUED | OUTPATIENT
Start: 2024-01-23 | End: 2024-01-24

## 2024-01-23 RX ORDER — ACETAMINOPHEN 500 MG
650 TABLET ORAL EVERY 6 HOURS
Refills: 0 | Status: DISCONTINUED | OUTPATIENT
Start: 2024-01-23 | End: 2024-01-24

## 2024-01-23 RX ORDER — SODIUM CHLORIDE 9 MG/ML
1000 INJECTION INTRAMUSCULAR; INTRAVENOUS; SUBCUTANEOUS
Refills: 0 | Status: DISCONTINUED | OUTPATIENT
Start: 2024-01-23 | End: 2024-01-24

## 2024-01-23 RX ORDER — POTASSIUM CHLORIDE 20 MEQ
10 PACKET (EA) ORAL
Refills: 0 | Status: COMPLETED | OUTPATIENT
Start: 2024-01-23 | End: 2024-01-23

## 2024-01-23 RX ORDER — METOPROLOL TARTRATE 50 MG
50 TABLET ORAL DAILY
Refills: 0 | Status: DISCONTINUED | OUTPATIENT
Start: 2024-01-23 | End: 2024-01-24

## 2024-01-23 RX ORDER — METFORMIN HYDROCHLORIDE 850 MG/1
1 TABLET ORAL
Qty: 0 | Refills: 0 | DISCHARGE

## 2024-01-23 RX ORDER — POTASSIUM CHLORIDE 20 MEQ
10 PACKET (EA) ORAL
Refills: 0 | Status: DISCONTINUED | OUTPATIENT
Start: 2024-01-23 | End: 2024-01-23

## 2024-01-23 RX ADMIN — SODIUM CHLORIDE 75 MILLILITER(S): 9 INJECTION INTRAMUSCULAR; INTRAVENOUS; SUBCUTANEOUS at 22:11

## 2024-01-23 RX ADMIN — CEFTRIAXONE 100 MILLIGRAM(S): 500 INJECTION, POWDER, FOR SOLUTION INTRAMUSCULAR; INTRAVENOUS at 22:10

## 2024-01-23 RX ADMIN — Medication 40 MILLIEQUIVALENT(S): at 16:30

## 2024-01-23 RX ADMIN — AZITHROMYCIN 255 MILLIGRAM(S): 500 TABLET, FILM COATED ORAL at 23:18

## 2024-01-23 RX ADMIN — SODIUM CHLORIDE 100 MILLILITER(S): 9 INJECTION INTRAMUSCULAR; INTRAVENOUS; SUBCUTANEOUS at 03:51

## 2024-01-23 RX ADMIN — HEPARIN SODIUM 5000 UNIT(S): 5000 INJECTION INTRAVENOUS; SUBCUTANEOUS at 05:40

## 2024-01-23 RX ADMIN — Medication 100 MILLIEQUIVALENT(S): at 22:19

## 2024-01-23 RX ADMIN — Medication 100 MILLIEQUIVALENT(S): at 19:40

## 2024-01-23 RX ADMIN — Medication 50 MILLIGRAM(S): at 05:39

## 2024-01-23 RX ADMIN — HEPARIN SODIUM 5000 UNIT(S): 5000 INJECTION INTRAVENOUS; SUBCUTANEOUS at 18:09

## 2024-01-23 RX ADMIN — ATORVASTATIN CALCIUM 20 MILLIGRAM(S): 80 TABLET, FILM COATED ORAL at 22:10

## 2024-01-23 RX ADMIN — PANTOPRAZOLE SODIUM 40 MILLIGRAM(S): 20 TABLET, DELAYED RELEASE ORAL at 05:39

## 2024-01-23 RX ADMIN — Medication 100 MILLIEQUIVALENT(S): at 18:03

## 2024-01-23 RX ADMIN — Medication 81 MILLIGRAM(S): at 11:08

## 2024-01-23 NOTE — H&P ADULT - HISTORY OF PRESENT ILLNESS
62 year old female with PMH of HTN, dementia A&O x 2 at baseline, HTN presents to the ED for URI symptoms. Accompanied by her daughter, endorses 2 weeks history of non-productive cough, congestion, generalized weakness, low appetite and subjective fever. Upon ED arrival patient was Hypoxic in RA saturating 88%, placed on NC. Also met sepsis criteria in the ED with leukocytosis- WBC:16.27, febrile T-max: 102.5, and Hypoxic. Tachy- HR:96 Source: PNA. received 2L-NS, Tylenol, Azithromycin & Ceftriaxone. Upon evaluation, resting confortably       with cough/global weakness/subjective fever x 2 wks.  	no rash, urinary sxs, cp, sob, back pain, ab pain, calf pain or swelling   saturating 88 on RA   62 year old female with PMH of HTN, dementia A&O x 2 at baseline, HTN presents to the ED for URI symptoms. Accompanied by her daughter, endorses 2 weeks history of non-productive cough, congestion, generalized weakness, low appetite and subjective fever. Upon ED arrival patient was Hypoxic in RA saturating 88%, placed on NC. Also met sepsis criteria in the ED with leukocytosis- WBC:16.27, febrile T-max: 102.5, and Hypoxic. Tachy- HR:96 Source: PNA. Received 2L-NS, Tylenol, Azithromycin & Ceftriaxone. Upon evaluation, resting comfortably, endorses she is feeling much better, denies, SOB, chest pain, palpitation, N/V/D abd pain or nay other acute symptoms.

## 2024-01-23 NOTE — PROGRESS NOTE ADULT - ASSESSMENT
62 year old female with PMH of HTN, dementia A&O x 2 at baseline, HTN presents to the ED for URI symptoms. Accompanied by her daughter, endorses 2 weeks history of non-productive cough, congestion, generalized weakness, low appetite and subjective fever. Upon ED arrival patient was Hypoxic in RA saturating 88%, placed on NC. Also met sepsis criteria in the ED with leukocytosis- WBC:16.27, febrile T-max: 102.5, and Hypoxic. Tachy- HR:96 Source: PNA. Received 2L-NS, Tylenol, Azithromycin & Ceftriaxone. Upon evaluation, resting comfortably, endorses she is feeling much better, denies, SOB, chest pain, palpitation, N/V/D abd pain or nay other acute symptoms.      Sepsis -resolved  ? PNA   RVP- negative   · CXR- shows no consolidation , chronic perihilar changes   -will repeat CBC , vitals stable, afebrile   -continue IVF, Azithromycin, Ceftriaxone    - Tylenol for fever   - F/u Culture   - DVT prophylaxis.    Hypertension.   stable   Continue home meds   - Metoprolol Succ 50mg QD   - Monitor BP.    Elevated d-dimer  -most likely from demand ischemia, will monitor      Dementia.   ·  Plan: AAOx2 at baseline   Continue home meds  - Donepezil.    dvtppx- heparin sc   62 year old female with PMH of HTN, dementia A&O x 2 at baseline, HTN presents to the ED for URI symptoms. Accompanied by her daughter, endorses 2 weeks history of non-productive cough, congestion, generalized weakness, low appetite and subjective fever. Upon ED arrival patient was Hypoxic in RA saturating 88%, placed on NC. Also met sepsis criteria in the ED with leukocytosis- WBC:16.27, febrile T-max: 102.5, and Hypoxic. Tachy- HR:96 Source: PNA. Received 2L-NS, Tylenol, Azithromycin & Ceftriaxone. Upon evaluation, resting comfortably, endorses she is feeling much better, denies, SOB, chest pain, palpitation, N/V/D abd pain or nay other acute symptoms.      Sepsis -resolved  ? PNA -denies coughing anymore  ?UTI- denies any symptoms of burning/frequency  RVP- negative   · CXR- shows no consolidation , chronic perihilar changes   -will repeat CBC , vitals stable, afebrile   -continue IVF, Azithromycin, Ceftriaxone    - Tylenol for fever   - F/u Culture   -f/u UA, UCx  - DVT prophylaxis.    Hypertension.   stable   Continue home meds   - Metoprolol Succ 50mg QD   - Monitor BP.    Elevated troponin   -most likely from demand ischemia, will monitor   -pt denies any chest pain      Dementia.   ·  Plan: AAOx2 at baseline   Continue home meds  - Donepezil.    dvtppx- heparin sc    DIspo- if labs continue to stabilize and blood cultures neg- most likely tomorrow

## 2024-01-23 NOTE — H&P ADULT - PROBLEM SELECTOR PLAN 2
Upon ED arrival BP:163/104  Current BP: 111/63  Continue home meds   - Metoprolol Succ 50mg QD   - Monitor BP

## 2024-01-23 NOTE — PHARMACOTHERAPY INTERVENTION NOTE - COMMENTS
Recommended to order a Legionella urinary antigen since patient has been empirically started on azithromycin for the treatment of pneumonia.    Sonia Crow, PharmD, BCIDP  Clinical Pharmacy Specialist, Infectious Diseases  Tele-Antimicrobial Stewardship Program (Tele-ASP)  Tele-ASP Phone: [phone]

## 2024-01-23 NOTE — H&P ADULT - PROBLEM SELECTOR PLAN 1
2 weeks history of URi symptoms, generalized weakness & low appetite   met sepsis criteria in the ED with leukocytosis- WBC:16.27, febrile T-max: 102.5, and Hypoxic. Tachy- HR:96 Source: PNA.  Hypoxic upon ED arrival - Saturating well on 2L-NC   - IVF   - Azithromycin   - Ceftriaxone  - Tylenol for fever   - 2 weeks history of URi symptoms, generalized weakness & low appetite   met sepsis criteria in the ED with leukocytosis- WBC:16.27, febrile T-max: 102.5, and Hypoxic. Tachy- HR:96 Source: PNA.  Hypoxic upon ED arrival - Saturating well on 2L-NC   - IVF   - Azithromycin   - Ceftriaxone  - Tylenol for fever   - F/u Culture   - DVT prophylaxis

## 2024-01-23 NOTE — PATIENT PROFILE ADULT - FALL HARM RISK - HARM RISK INTERVENTIONS

## 2024-01-23 NOTE — H&P ADULT - ASSESSMENT
62 year old female with PMH of HTN, dementia A&O x 2 at baseline, HTN presents to the ED for URI symptoms. Accompanied by her daughter, endorses 2 weeks history of non-productive cough, congestion, generalized weakness, low appetite and subjective fever. Upon ED arrival patient was Hypoxic in RA saturating 88%, placed on NC. Also met sepsis criteria in the ED with leukocytosis- WBC:16.27, febrile T-max: 102.5, and Hypoxic. Tachy- HR:96 Source: PNA. Received 2L-NS, Tylenol, Azithromycin & Ceftriaxone. Upon evaluation, resting comfortably, endorses she is feeling much better, denies, SOB, chest pain, palpitation, N/V/D abd pain or nay other acute symptoms.

## 2024-01-23 NOTE — H&P ADULT - NSHPPHYSICALEXAM_GEN_ALL_CORE
GENERAL: NAD, comfortable at bedside   HEAD:  Atraumatic, Normocephalic  EYES: EOMI, PERRL, conjunctiva and sclera clear  NECK: Supple, No JVD  CHEST/LUNG: Rhonchi, No wheezes, no increase work of breathing.   HEART: Regular rate and rhythm; No murmurs, rubs, or gallops, (+)S1, S2  ABDOMEN: Soft, Nontender, Nondistended; Normal Bowel sounds   EXTREMITIES:  2+ Peripheral Pulses, No clubbing, cyanosis, or edema  PSYCH: normal mood and affect  NEUROLOGY: AAOx2, non-focal  SKIN: No rashes or lesions

## 2024-01-23 NOTE — H&P ADULT - NSHPLABSRESULTS_GEN_ALL_CORE
12.5   16.27 )-----------( 345      ( 22 Jan 2024 19:50 )             38.4     136  |  102  |  12  ----------------------------<  141<H>     01-22  3.6   |  28  |  0.84    Ca    8.8      22 Jan 2024 19:50  Mg     2.0     01-22    TPro  8.7<H>  /  Alb  2.7<L>  /  TBili  0.7  /  DBili  x   /  AST  21  /  ALT  19  /  AlkPhos  106  01-22    20:09 - VBG - pH: 7.44  | pCO2: 46    | pO2: 26    | Lactate: 1.00     Pro-BNP: 74 (01-22-24 @ 19:50)

## 2024-01-23 NOTE — PATIENT PROFILE ADULT - NSPROPTRIGHTNOTIFY_GEN_A_NUR
Children under the age of 2 years will be restrained in a rear facing child safety seat.   If you have an active [facility] account, please look for your well child questionnaire to come to your [facility] account before your next well child visit.    Well-Baby Checkup: Up to 1 Month     Its fine to take the baby out. Avoid prolonged sun exposure and crowds where germs can spread.     After your first  visit, your baby will likely have a checkup within his or her first month of life. At this checkup, the healthcare provider will examine the baby and ask how things are going at home. This sheet describes some of what you can expect.  Development and milestones  The healthcare provider will ask questions about your baby. He or she will observe the baby to get an idea of the infants development. By this visit, your baby is likely doing some of the following:  · Smiling for no apparent reason (called a spontaneous smile)  · Making eye contact, especially during feeding  · Making random sounds (also called vocalizing)  · Trying to lift his or her head  · Wiggling and squirming. Each arm and leg should move about the same amount. If not, tell the healthcare provider.  · Becoming startled when hearing a loud noise  Feeding tips  At around 2 weeks of age, your baby should be back to his or her birth weight. Continue to feed your baby either breastmilk or formula. To help your baby eat well:  · During the day, feed at least every 2 to 3 hours. You may need to wake the baby for daytime feedings.  · At night, feed when the baby wakes, often every 3 to 4 hours. You may choose not to wake the baby for nighttime feedings. Discuss this with the healthcare provider.  · Breastfeeding sessions should last around 15 to 20 minutes. With a bottle, lowly increase the amount of formula or breastmilk you give your baby. By 1 month of age, most babies eat about 4 ounces per feeding, but this can vary.  · If youre concerned  about how much or how often your baby eats, discuss this with the healthcare provider.  · Ask the healthcare provider if your baby should take vitamin D.  · Don't give the baby anything to eat besides breastmilk or formula. Your baby is too young for solid foods (solids) or other liquids. An infant this age does not need to be given water.  · Be aware that many babies begin to spit up around 1 month of age. In most cases, this is normal. Call the healthcare provider right away if the baby spits up often and forcefully, or spits up anything besides milk or formula.  Hygiene tips  · Some babies poop (have a bowel movement) a few times a day. Others poop as little as once every 2 to 3 days. Anything in this range is normal. Change the babys diaper when it becomes wet or dirty.  · Its fine if your baby poops even less often than every 2 to 3 days if the baby is otherwise healthy. But if the baby also becomes fussy, spits up more than normal, eats less than normal, or has very hard stool, tell the healthcare provider. The baby may be constipated (unable to have a bowel movement).  · Stool may range in color from mustard yellow to brown to green. If the stools are another color, tell the healthcare provider.  · Bathe your baby a few times per week. You may give baths more often if the baby enjoys it. But because youre cleaning the baby during diaper changes, a daily bath often isnt needed.  · Its OK to use mild (hypoallergenic) creams or lotions on the babys skin. Avoid putting lotion on the babys hands.  Sleeping tips  At this age, your baby may sleep up to 18 to 20 hours each day. Its common for babies to sleep for short spurts throughout the day, rather than for hours at a time. The baby may be fussy before going to bed for the night (around 6 p.m. to 9 p.m.). This is normal. To help your baby sleep safely and soundly:  · Put your baby on his or her back for naps and sleeping until your child is 1 year old.  This can lower the risk for SIDS, aspiration, and choking. Never put your baby on his or her side or stomach for sleep or naps. When your baby is awake, let your child spend time on his or her tummy as long as you are watching your child. This helps your child build strong tummy and neck muscles. This will also help keep your baby's head from flattening. This problem can happen when babies spend so much time on their back.  · Ask the healthcare provider if you should let your baby sleep with a pacifier. Sleeping with a pacifier has been shown to decrease the risk for SIDS. But it should not be offered until after breastfeeding has been established. If your baby doesn't want the pacifier, don't try to force him or her to take one.  · Don't put a crib bumper, pillow, loose blankets, or stuffed animals in the crib. These could suffocate the baby.  · Don't put your baby on a couch or armchair for sleep. Sleeping on a couch or armchair puts the baby at a much higher risk for death, including SIDS.  · Don't use infant seats, car seats, strollers, infant carriers, or infant swings for routine sleep and daily naps. These may cause a baby's airway to become blocked or the baby to suffocate.  · Swaddling (wrapping the baby in a blanket) can help the baby feel safe and fall asleep. Make sure your baby can easily move his or her legs.  · Its OK to put the baby to bed awake. Its also OK to let the baby cry in bed, but only for a few minutes. At this age, babies arent ready to cry themselves to sleep.  · If you have trouble getting your baby to sleep, ask the health care provider for tips.  · Don't share a bed (co-sleep) with your baby. Bed-sharing has been shown to increase the risk for SIDS. The American Academy of Pediatrics says that babies should sleep in the same room as their parents. They should be close to their parents' bed, but in a separate bed or crib. This sleeping setup should be done for the baby's first  year, if possible. But you should do it for at least the first 6 months.  · Always put cribs, bassinets, and play yards in areas with no hazards. This means no dangling cords, wires, or window coverings. This will lower the risk for strangulation.  · Don't use baby heart rate and monitors or special devices to help lower the risk for SIDS. These devices include wedges, positioners, and special mattresses. These devices have not been shown to prevent SIDS. In rare cases, they have caused the death of a baby.  · Talk with your baby's healthcare provider about these and other health and safety issues.  Safety tips  · To avoid burns, dont carry or drink hot liquids, such as coffee, near the baby. Turn the water heater down to a temperature of 120°F (49°C) or below.  · Dont smoke or allow others to smoke near the baby. If you or other family members smoke, do so outdoors while wearing a jacket, and then remove the jacket before holding the baby. Never smoke around the baby  · Its usually fine to take a  out of the house. But stay away from confined, crowded places where germs can spread.  · When you take the baby outside, don't stay too long in direct sunlight. Keep the baby covered, or seek out the shade.   · In the car, always put the baby in a rear-facing car seat. This should be secured in the back seat according to the car seats directions. Never leave the baby alone in the car.  · Don't leave the baby on a high surface such as a table, bed, or couch. He or she could fall and get hurt.  · Older siblings will likely want to hold, play with, and get to know the baby. This is fine as long as an adult supervises.  · Call the healthcare provider right away if the baby has a fever (see Fever and children, below).  Vaccines  Based on recommendations from the CDC, your baby may get the hepatitis B vaccine if he or she did not already get it in the hospital after birth. Having your baby fully vaccinated will also  help lower your baby's risk for SIDS.        Fever and children  Always use a digital thermometer to check your childs temperature. Never use a mercury thermometer.  For infants and toddlers, be sure to use a rectal thermometer correctly. A rectal thermometer may accidentally poke a hole in (perforate) the rectum. It may also pass on germs from the stool. Always follow the product makers directions for proper use. If you dont feel comfortable taking a rectal temperature, use another method. When you talk to your childs healthcare provider, tell him or her which method you used to take your childs temperature.  Here are guidelines for fever temperature. Ear temperatures arent accurate before 6 months of age. Dont take an oral temperature until your child is at least 4 years old.  Infant under 3 months old:  · Ask your childs healthcare provider how you should take the temperature.  · Rectal or forehead (temporal artery) temperature of 100.4°F (38°C) or higher, or as directed by the provider  · Armpit temperature of 99°F (37.2°C) or higher, or as directed by the provider      Signs of postpartum depression  Its normal to be weepy and tired right after having a baby. These feelings should go away in about a week. If youre still feeling this way, it may be a sign of postpartum depression, a more serious problem. Symptoms may include:  · Feelings of deep sadness  · Gaining or losing a lot of weight  · Sleeping too much or too little  · Feeling tired all the time  · Feeling restless  · Feeling worthless or guilty  · Fearing that your baby will be harmed  · Worrying that youre a bad parent  · Having trouble thinking clearly or making decisions  · Thinking about death or suicide  If you have any of these symptoms, talk to your OB/GYN or another healthcare provider. Treatment can help you feel better.     Next checkup at: _______________________________     PARENT NOTES:           Date Last Reviewed: 11/1/2016  ©  3202-4706 [employer]. [address] [place] [address], [place] [address]. All rights reserved. This information is not intended as a substitute for professional medical care. Always follow your healthcare professional's instructions.         declines

## 2024-01-23 NOTE — PROGRESS NOTE ADULT - SUBJECTIVE AND OBJECTIVE BOX
Patient is a 78y old  Female who presents with a chief complaint of Sepsis Pneumonia (23 Jan 2024 02:35)      INTERVAL HPI/OVERNIGHT EVENTS:    MEDICATIONS  (STANDING):  aspirin enteric coated 81 milliGRAM(s) Oral daily  atorvastatin 20 milliGRAM(s) Oral at bedtime  azithromycin  IVPB 500 milliGRAM(s) IV Intermittent every 24 hours  cefTRIAXone   IVPB 1000 milliGRAM(s) IV Intermittent every 24 hours  heparin   Injectable 5000 Unit(s) SubCutaneous every 12 hours  metoprolol succinate ER 50 milliGRAM(s) Oral daily  pantoprazole    Tablet 40 milliGRAM(s) Oral before breakfast  sodium chloride 0.9%. 1000 milliLiter(s) (100 mL/Hr) IV Continuous <Continuous>    MEDICATIONS  (PRN):  acetaminophen     Tablet .. 650 milliGRAM(s) Oral every 6 hours PRN Temp greater or equal to 38C (100.4F), Mild Pain (1 - 3)  aluminum hydroxide/magnesium hydroxide/simethicone Suspension 30 milliLiter(s) Oral every 4 hours PRN Dyspepsia  melatonin 3 milliGRAM(s) Oral at bedtime PRN Insomnia  ondansetron Injectable 4 milliGRAM(s) IV Push every 8 hours PRN Nausea and/or Vomiting  senna 2 Tablet(s) Oral at bedtime PRN Constipation      Allergies    No Known Allergies    Intolerances        REVIEW OF SYSTEMS:  CONSTITUTIONAL: No fever, weight loss, or fatigue  EYES: No eye pain, visual disturbances, or discharge  ENMT:  No difficulty hearing, tinnitus, vertigo; No sinus or throat pain  NECK: No pain or stiffness  BREASTS: No pain, masses, or nipple discharge  RESPIRATORY: No cough, wheezing, chills or hemoptysis; No shortness of breath  CARDIOVASCULAR: No chest pain, palpitations, dizziness, or leg swelling  GASTROINTESTINAL: No abdominal or epigastric pain. No nausea, vomiting, or hematemesis; No diarrhea or constipation. No melena or hematochezia.  GENITOURINARY: No dysuria, frequency, hematuria, or incontinence  NEUROLOGICAL: No headaches, memory loss, loss of strength, numbness, or tremors  SKIN: No itching, burning, rashes, or lesions   LYMPH NODES: No enlarged glands  ENDOCRINE: No heat or cold intolerance; No hair loss  MUSCULOSKELETAL: No joint pain or swelling; No muscle, back, or extremity pain  PSYCHIATRIC: No depression, anxiety, mood swings, or difficulty sleeping  HEME/LYMPH: No easy bruising, or bleeding gums  ALLERGY AND IMMUNOLOGIC: No hives or eczema    Vital Signs Last 24 Hrs  T(C): 36.4 (23 Jan 2024 11:27), Max: 39.2 (22 Jan 2024 19:21)  T(F): 97.5 (23 Jan 2024 11:27), Max: 102.5 (22 Jan 2024 19:21)  HR: 74 (23 Jan 2024 11:27) (60 - 96)  BP: 143/87 (23 Jan 2024 11:27) (111/63 - 163/104)  BP(mean): 98 (23 Jan 2024 03:00) (80 - 98)  RR: 20 (23 Jan 2024 11:27) (17 - 21)  SpO2: 99% (23 Jan 2024 11:27) (92% - 100%)    Parameters below as of 23 Jan 2024 11:27  Patient On (Oxygen Delivery Method): nasal cannula        PHYSICAL EXAM:  GENERAL: NAD, well-groomed, well-developed  HEAD:  Atraumatic, Normocephalic  EYES: EOMI, PERRLA, conjunctiva and sclera clear  ENMT: No tonsillar erythema, exudates, or enlargement; Moist mucous membranes, Good dentition, No lesions  NECK: Supple, No JVD, Normal thyroid  NERVOUS SYSTEM:  Alert & Oriented X3, Good concentration; Motor Strength 5/5 B/L upper and lower extremities; DTRs 2+ intact and symmetric  CHEST/LUNG: Clear to percussion bilaterally; No rales, rhonchi, wheezing, or rubs  HEART: Regular rate and rhythm; No murmurs, rubs, or gallops  ABDOMEN: Soft, Nontender, Nondistended; Bowel sounds present  EXTREMITIES:  2+ Peripheral Pulses, No clubbing, cyanosis, or edema  LYMPH: No lymphadenopathy noted  SKIN: No rashes or lesions    LABS:                        12.5   16.27 )-----------( 345      ( 22 Jan 2024 19:50 )             38.4     01-22    136  |  102  |  12  ----------------------------<  141<H>  3.6   |  28  |  0.84    Ca    8.8      22 Jan 2024 19:50  Mg     2.0     01-22    TPro  8.7<H>  /  Alb  2.7<L>  /  TBili  0.7  /  DBili  x   /  AST  21  /  ALT  19  /  AlkPhos  106  01-22      Urinalysis Basic - ( 22 Jan 2024 19:50 )    Color: x / Appearance: x / SG: x / pH: x  Gluc: 141 mg/dL / Ketone: x  / Bili: x / Urobili: x   Blood: x / Protein: x / Nitrite: x   Leuk Esterase: x / RBC: x / WBC x   Sq Epi: x / Non Sq Epi: x / Bacteria: x      CAPILLARY BLOOD GLUCOSE          RADIOLOGY & ADDITIONAL TESTS:    Imaging Personally Reviewed:  [ ] YES  [ ] NO    Consultant(s) Notes Reviewed:  [ ] YES  [ ] NO    Care Discussed with Consultants/Other Providers [ ] YES  [ ] NO Patient is a 78y old  Female who presents with a chief complaint of Sepsis Pneumonia (23 Jan 2024 02:35)      INTERVAL HPI/OVERNIGHT EVENTS: pt feeling much better, not febrile anymore, denies anymore excessive coughing. States that can walk on her own, and would like to go home.     MEDICATIONS  (STANDING):  aspirin enteric coated 81 milliGRAM(s) Oral daily  atorvastatin 20 milliGRAM(s) Oral at bedtime  azithromycin  IVPB 500 milliGRAM(s) IV Intermittent every 24 hours  cefTRIAXone   IVPB 1000 milliGRAM(s) IV Intermittent every 24 hours  heparin   Injectable 5000 Unit(s) SubCutaneous every 12 hours  metoprolol succinate ER 50 milliGRAM(s) Oral daily  pantoprazole    Tablet 40 milliGRAM(s) Oral before breakfast  sodium chloride 0.9%. 1000 milliLiter(s) (100 mL/Hr) IV Continuous <Continuous>    MEDICATIONS  (PRN):  acetaminophen     Tablet .. 650 milliGRAM(s) Oral every 6 hours PRN Temp greater or equal to 38C (100.4F), Mild Pain (1 - 3)  aluminum hydroxide/magnesium hydroxide/simethicone Suspension 30 milliLiter(s) Oral every 4 hours PRN Dyspepsia  melatonin 3 milliGRAM(s) Oral at bedtime PRN Insomnia  ondansetron Injectable 4 milliGRAM(s) IV Push every 8 hours PRN Nausea and/or Vomiting  senna 2 Tablet(s) Oral at bedtime PRN Constipation      Allergies    No Known Allergies    Intolerances        REVIEW OF SYSTEMS:  CONSTITUTIONAL: No fever, weight loss, or fatigue  EYES: No eye pain, visual disturbances, or discharge  ENMT:  No difficulty hearing, tinnitus, vertigo; No sinus or throat pain  NECK: No pain or stiffness  BREASTS: No pain, masses, or nipple discharge  RESPIRATORY: No cough, wheezing, chills or hemoptysis; No shortness of breath  CARDIOVASCULAR: No chest pain, palpitations, dizziness, or leg swelling  GASTROINTESTINAL: No abdominal or epigastric pain. No nausea, vomiting, or hematemesis; No diarrhea or constipation. No melena or hematochezia.  GENITOURINARY: No dysuria, frequency, hematuria, or incontinence  NEUROLOGICAL: No headaches, memory loss, loss of strength, numbness, or tremors  SKIN: No itching, burning, rashes, or lesions   LYMPH NODES: No enlarged glands  ENDOCRINE: No heat or cold intolerance; No hair loss  MUSCULOSKELETAL: No joint pain or swelling; No muscle, back, or extremity pain  PSYCHIATRIC: No depression, anxiety, mood swings, or difficulty sleeping  HEME/LYMPH: No easy bruising, or bleeding gums  ALLERGY AND IMMUNOLOGIC: No hives or eczema    Vital Signs Last 24 Hrs  T(C): 36.4 (23 Jan 2024 11:27), Max: 39.2 (22 Jan 2024 19:21)  T(F): 97.5 (23 Jan 2024 11:27), Max: 102.5 (22 Jan 2024 19:21)  HR: 74 (23 Jan 2024 11:27) (60 - 96)  BP: 143/87 (23 Jan 2024 11:27) (111/63 - 163/104)  BP(mean): 98 (23 Jan 2024 03:00) (80 - 98)  RR: 20 (23 Jan 2024 11:27) (17 - 21)  SpO2: 99% (23 Jan 2024 11:27) (92% - 100%)    Parameters below as of 23 Jan 2024 11:27  Patient On (Oxygen Delivery Method): nasal cannula        PHYSICAL EXAM:  GENERAL: NAD, well-groomed, well-developed  HEAD:  Atraumatic, Normocephalic  EYES: EOMI, PERRLA, conjunctiva and sclera clear  ENMT: No tonsillar erythema, exudates, or enlargement; Moist mucous membranes, Good dentition, No lesions  NECK: Supple, No JVD, Normal thyroid  NERVOUS SYSTEM:  Alert & Oriented X3, Good concentration; Motor Strength 5/5 B/L upper and lower extremities; DTRs 2+ intact and symmetric  CHEST/LUNG: Clear to percussion bilaterally; No rales, rhonchi, wheezing, or rubs  HEART: Regular rate and rhythm; No murmurs, rubs, or gallops  ABDOMEN: Soft, Nontender, Nondistended; Bowel sounds present  EXTREMITIES:  2+ Peripheral Pulses, No clubbing, cyanosis, or edema  SKIN: No rashes or lesions    LABS:                        12.5   16.27 )-----------( 345      ( 22 Jan 2024 19:50 )             38.4     01-22    136  |  102  |  12  ----------------------------<  141<H>  3.6   |  28  |  0.84    Ca    8.8      22 Jan 2024 19:50  Mg     2.0     01-22    TPro  8.7<H>  /  Alb  2.7<L>  /  TBili  0.7  /  DBili  x   /  AST  21  /  ALT  19  /  AlkPhos  106  01-22      Urinalysis Basic - ( 22 Jan 2024 19:50 )    Color: x / Appearance: x / SG: x / pH: x  Gluc: 141 mg/dL / Ketone: x  / Bili: x / Urobili: x   Blood: x / Protein: x / Nitrite: x   Leuk Esterase: x / RBC: x / WBC x   Sq Epi: x / Non Sq Epi: x / Bacteria: x      CAPILLARY BLOOD GLUCOSE          RADIOLOGY & ADDITIONAL TESTS:    Imaging Personally Reviewed:  [ ] YES  [ ] NO    Consultant(s) Notes Reviewed:  [ ] YES  [ ] NO    Care Discussed with Consultants/Other Providers [ ] YES  [ ] NO

## 2024-01-24 ENCOUNTER — TRANSCRIPTION ENCOUNTER (OUTPATIENT)
Age: 79
End: 2024-01-24

## 2024-01-24 VITALS
DIASTOLIC BLOOD PRESSURE: 80 MMHG | HEART RATE: 83 BPM | SYSTOLIC BLOOD PRESSURE: 134 MMHG | TEMPERATURE: 99 F | RESPIRATION RATE: 18 BRPM | OXYGEN SATURATION: 95 %

## 2024-01-24 LAB
A1C WITH ESTIMATED AVERAGE GLUCOSE RESULT: 6.6 % — HIGH (ref 4–5.6)
ALBUMIN SERPL ELPH-MCNC: 2.9 G/DL — LOW (ref 3.3–5)
ALP SERPL-CCNC: 98 U/L — SIGNIFICANT CHANGE UP (ref 40–120)
ALT FLD-CCNC: 14 U/L — SIGNIFICANT CHANGE UP (ref 12–78)
ANION GAP SERPL CALC-SCNC: 8 MMOL/L — SIGNIFICANT CHANGE UP (ref 5–17)
AST SERPL-CCNC: 17 U/L — SIGNIFICANT CHANGE UP (ref 15–37)
BILIRUB SERPL-MCNC: 1.3 MG/DL — HIGH (ref 0.2–1.2)
BUN SERPL-MCNC: 5 MG/DL — LOW (ref 7–23)
CALCIUM SERPL-MCNC: 8.9 MG/DL — SIGNIFICANT CHANGE UP (ref 8.5–10.1)
CHLORIDE SERPL-SCNC: 108 MMOL/L — SIGNIFICANT CHANGE UP (ref 96–108)
CHOLEST SERPL-MCNC: 103 MG/DL — SIGNIFICANT CHANGE UP
CO2 SERPL-SCNC: 24 MMOL/L — SIGNIFICANT CHANGE UP (ref 22–31)
CREAT SERPL-MCNC: 0.61 MG/DL — SIGNIFICANT CHANGE UP (ref 0.5–1.3)
EGFR: 93 ML/MIN/1.73M2 — SIGNIFICANT CHANGE UP
ESTIMATED AVERAGE GLUCOSE: 143 MG/DL — HIGH (ref 68–114)
GLUCOSE SERPL-MCNC: 173 MG/DL — HIGH (ref 70–99)
HCT VFR BLD CALC: 37 % — SIGNIFICANT CHANGE UP (ref 34.5–45)
HDLC SERPL-MCNC: 37 MG/DL — LOW
HGB BLD-MCNC: 11.8 G/DL — SIGNIFICANT CHANGE UP (ref 11.5–15.5)
LEGIONELLA AG UR QL: NEGATIVE — SIGNIFICANT CHANGE UP
LIPID PNL WITH DIRECT LDL SERPL: 49 MG/DL — SIGNIFICANT CHANGE UP
MCHC RBC-ENTMCNC: 28.2 PG — SIGNIFICANT CHANGE UP (ref 27–34)
MCHC RBC-ENTMCNC: 31.9 G/DL — LOW (ref 32–36)
MCV RBC AUTO: 88.3 FL — SIGNIFICANT CHANGE UP (ref 80–100)
NON HDL CHOLESTEROL: 66 MG/DL — SIGNIFICANT CHANGE UP
NRBC # BLD: 0 /100 WBCS — SIGNIFICANT CHANGE UP (ref 0–0)
PLATELET # BLD AUTO: 342 K/UL — SIGNIFICANT CHANGE UP (ref 150–400)
POTASSIUM SERPL-MCNC: 3.7 MMOL/L — SIGNIFICANT CHANGE UP (ref 3.5–5.3)
POTASSIUM SERPL-SCNC: 3.7 MMOL/L — SIGNIFICANT CHANGE UP (ref 3.5–5.3)
PROT SERPL-MCNC: 8.9 GM/DL — HIGH (ref 6–8.3)
RBC # BLD: 4.19 M/UL — SIGNIFICANT CHANGE UP (ref 3.8–5.2)
RBC # FLD: 15.3 % — HIGH (ref 10.3–14.5)
SODIUM SERPL-SCNC: 140 MMOL/L — SIGNIFICANT CHANGE UP (ref 135–145)
TRIGL SERPL-MCNC: 83 MG/DL — SIGNIFICANT CHANGE UP
WBC # BLD: 10.83 K/UL — HIGH (ref 3.8–10.5)
WBC # FLD AUTO: 10.83 K/UL — HIGH (ref 3.8–10.5)

## 2024-01-24 PROCEDURE — 99239 HOSP IP/OBS DSCHRG MGMT >30: CPT

## 2024-01-24 RX ORDER — LEVOFLOXACIN 5 MG/ML
1 INJECTION, SOLUTION INTRAVENOUS
Qty: 3 | Refills: 0
Start: 2024-01-24 | End: 2024-01-26

## 2024-01-24 RX ADMIN — Medication 50 MILLIGRAM(S): at 06:11

## 2024-01-24 RX ADMIN — HEPARIN SODIUM 5000 UNIT(S): 5000 INJECTION INTRAVENOUS; SUBCUTANEOUS at 06:11

## 2024-01-24 RX ADMIN — Medication 81 MILLIGRAM(S): at 12:25

## 2024-01-24 RX ADMIN — PANTOPRAZOLE SODIUM 40 MILLIGRAM(S): 20 TABLET, DELAYED RELEASE ORAL at 06:11

## 2024-01-24 NOTE — DISCHARGE NOTE PROVIDER - NSDCMRMEDTOKEN_GEN_ALL_CORE_FT
acetaminophen 325 mg oral tablet: 3 tab(s) orally every 8 hours X 1 week, then Q8H PRN Mild Pain  aspirin 81 mg oral delayed release tablet: 1 tab orally 2 times a day X 4 weeks to prevent blood clots   MDD:2  atorvastatin 20 mg oral tablet: 1 tab(s) orally once a day  levoFLOXacin 500 mg oral tablet: 1 tab(s) orally once a day  metoprolol succinate 50 mg oral tablet, extended release: 1 tab(s) orally once a day  omeprazole 40 mg oral delayed release capsule: 1 cap orally once a day before breakfast   MDD:1  senna oral tablet: 2 tab(s) orally once a day (at bedtime), As needed, Constipation

## 2024-01-24 NOTE — DISCHARGE NOTE PROVIDER - ATTENDING DISCHARGE PHYSICAL EXAMINATION:
GENERAL: NAD, well-groomed, well-developed  HEAD:  Atraumatic, Normocephalic  EYES: EOMI, PERRLA, conjunctiva and sclera clear  ENMT: No tonsillar erythema, exudates, or enlargement; Moist mucous membranes, Good dentition, No lesions  NECK: Supple, No JVD, Normal thyroid  NERVOUS SYSTEM:  Alert & Awake   CHEST/LUNG: Clear to percussion bilaterally; No rales, rhonchi, wheezing, or rubs  HEART: Regular rate and rhythm; No murmurs, rubs, or gallops  ABDOMEN: Soft, Nontender, Nondistended; Bowel sounds present  EXTREMITIES:  2+ Peripheral Pulses, No clubbing, cyanosis, or edema  LYMPH: No lymphadenopathy noted  SKIN: No rashes or lesions

## 2024-01-24 NOTE — DISCHARGE NOTE NURSING/CASE MANAGEMENT/SOCIAL WORK - NSDCPEFALRISK_GEN_ALL_CORE
For information on Fall & Injury Prevention, visit: [email or web address] OR  [email or web address] OR  https://www.cdc.gov/steadi/patient.html

## 2024-01-24 NOTE — DISCHARGE NOTE PROVIDER - HOSPITAL COURSE
62 year old female with PMH of HTN, dementia A&O x 2 at baseline, HTN presents to the ED for URI symptoms. Accompanied by her daughter, endorses 2 weeks history of non-productive cough, congestion, generalized weakness, low appetite and subjective fever. Upon ED arrival patient was Hypoxic in RA saturating 88%, placed on NC. Also met sepsis criteria in the ED with leukocytosis- WBC:16.27, febrile T-max: 102.5, and Hypoxic. Tachy- HR:96 Source: PNA. Received 2L-NS, Tylenol, Azithromycin & Ceftriaxone. Upon evaluation, resting comfortably, endorses she is feeling much better, denies, SOB, chest pain, palpitation, N/V/D abd pain or nay other acute symptoms.      Sepsis -resolved  ? PNA -denies coughing anymore  ?UTI- denies any symptoms of burning/frequency  RVP- negative   · CXR- shows no consolidation , chronic perihilar changes   -will repeat CBC , vitals stable, afebrile   -continue IVF, Azithromycin, Ceftriaxone    - Tylenol for fever   - F/u Culture   -f/u UA, UCx  - DVT prophylaxis.    Hypertension.   stable   Continue home meds   - Metoprolol Succ 50mg QD   - Monitor BP.    Elevated troponin   -most likely from demand ischemia, will monitor   -pt denies any chest pain      Dementia.   ·  Plan: AAOx2 at baseline   Continue home meds  - Donepezil.    dvtppx- heparin sc    DIspo- if labs continue to stabilize and blood cultures neg- most likely tomorrow

## 2024-01-24 NOTE — DISCHARGE NOTE NURSING/CASE MANAGEMENT/SOCIAL WORK - PATIENT PORTAL LINK FT
You can access the FollowMyHealth Patient Portal offered by [facility] by registering at the following website: [email or web address]. By joining [facility]’s FollowMyHealth portal, you will also be able to view your health information using other applications (apps) compatible with our system.

## 2024-01-24 NOTE — DISCHARGE NOTE PROVIDER - NSDCCPCAREPLAN_GEN_ALL_CORE_FT
PRINCIPAL DISCHARGE DIAGNOSIS  Diagnosis: Sepsis due to pneumonia  Assessment and Plan of Treatment:       SECONDARY DISCHARGE DIAGNOSES  Diagnosis: Pneumonia due to suspected gram-negative bacteria  Assessment and Plan of Treatment:     Diagnosis: Hypoxia  Assessment and Plan of Treatment:     Diagnosis: Dementia  Assessment and Plan of Treatment:     Diagnosis: Elevated troponin  Assessment and Plan of Treatment:

## 2024-01-25 LAB
CULTURE RESULTS: SIGNIFICANT CHANGE UP
SPECIMEN SOURCE: SIGNIFICANT CHANGE UP

## 2024-01-28 LAB
CULTURE RESULTS: SIGNIFICANT CHANGE UP
CULTURE RESULTS: SIGNIFICANT CHANGE UP
SPECIMEN SOURCE: SIGNIFICANT CHANGE UP
SPECIMEN SOURCE: SIGNIFICANT CHANGE UP

## 2024-02-01 DIAGNOSIS — I24.89 OTHER FORMS OF ACUTE ISCHEMIC HEART DISEASE: ICD-10-CM

## 2024-02-01 DIAGNOSIS — J18.9 PNEUMONIA, UNSPECIFIED ORGANISM: ICD-10-CM

## 2024-02-01 DIAGNOSIS — F03.90 UNSPECIFIED DEMENTIA WITHOUT BEHAVIORAL DISTURBANCE: ICD-10-CM

## 2024-02-01 DIAGNOSIS — Z79.84 LONG TERM (CURRENT) USE OF ORAL HYPOGLYCEMIC DRUGS: ICD-10-CM

## 2024-02-01 DIAGNOSIS — I10 ESSENTIAL (PRIMARY) HYPERTENSION: ICD-10-CM

## 2024-02-01 DIAGNOSIS — E11.9 TYPE 2 DIABETES MELLITUS WITHOUT COMPLICATIONS: ICD-10-CM

## 2024-02-01 DIAGNOSIS — A41.9 SEPSIS, UNSPECIFIED ORGANISM: ICD-10-CM

## 2024-02-01 DIAGNOSIS — E78.5 HYPERLIPIDEMIA, UNSPECIFIED: ICD-10-CM

## 2024-02-01 DIAGNOSIS — Z11.52 ENCOUNTER FOR SCREENING FOR COVID-19: ICD-10-CM

## 2024-02-22 ENCOUNTER — APPOINTMENT (OUTPATIENT)
Dept: HOME HEALTH SERVICES | Facility: HOME HEALTH | Age: 79
End: 2024-02-22
Payer: MEDICARE

## 2024-02-22 VITALS
RESPIRATION RATE: 16 BRPM | HEART RATE: 94 BPM | OXYGEN SATURATION: 98 % | DIASTOLIC BLOOD PRESSURE: 80 MMHG | SYSTOLIC BLOOD PRESSURE: 128 MMHG

## 2024-02-22 DIAGNOSIS — J30.2 OTHER SEASONAL ALLERGIC RHINITIS: ICD-10-CM

## 2024-02-22 DIAGNOSIS — R09.89 OTHER SPECIFIED SYMPTOMS AND SIGNS INVOLVING THE CIRCULATORY AND RESPIRATORY SYSTEMS: ICD-10-CM

## 2024-02-22 DIAGNOSIS — M81.0 AGE-RELATED OSTEOPOROSIS W/OUT CURRENT PATHOLOGICAL FRACTURE: ICD-10-CM

## 2024-02-22 DIAGNOSIS — I10 ESSENTIAL (PRIMARY) HYPERTENSION: ICD-10-CM

## 2024-02-22 DIAGNOSIS — E53.8 DEFICIENCY OF OTHER SPECIFIED B GROUP VITAMINS: ICD-10-CM

## 2024-02-22 DIAGNOSIS — K21.9 GASTRO-ESOPHAGEAL REFLUX DISEASE W/OUT ESOPHAGITIS: ICD-10-CM

## 2024-02-22 PROCEDURE — 99345 HOME/RES VST NEW HIGH MDM 75: CPT | Mod: 25

## 2024-02-22 PROCEDURE — G0506: CPT

## 2024-02-22 RX ORDER — ALENDRONATE SODIUM 35 MG/1
0 TABLET ORAL
Qty: 0 | Refills: 3 | DISCHARGE
Start: 2024-02-22

## 2024-02-22 RX ORDER — SIMVASTATIN 40 MG
0 TABLET ORAL
Qty: 0 | Refills: 3 | DISCHARGE
Start: 2024-02-22

## 2024-02-22 RX ORDER — GUAIFEN/DEXTROMETHORPHAN/PE 100-20-10
0 LIQUID (ML) ORAL
Qty: 0 | Refills: 0 | DISCHARGE
Start: 2024-02-22

## 2024-02-22 RX ORDER — LORATADINE 10 MG/1
0 TABLET ORAL
Qty: 0 | Refills: 0 | DISCHARGE
Start: 2024-02-22

## 2024-02-22 RX ORDER — BRINZOLAMIDE 10 MG/ML
0 SUSPENSION/ DROPS OPHTHALMIC
Qty: 0 | Refills: 5 | DISCHARGE
Start: 2024-02-22

## 2024-02-22 RX ORDER — CRANBERRY FRUIT EXTRACT 650 MG
1 CAPSULE ORAL
Qty: 0 | Refills: 0 | DISCHARGE
Start: 2024-02-22

## 2024-02-22 RX ORDER — CYANOCOBALAMIN (VITAMIN B-12) 1000 MCG
0 TABLET, EXTENDED RELEASE ORAL
Qty: 0 | Refills: 3 | DISCHARGE
Start: 2024-02-22

## 2024-02-22 RX ORDER — PANTOPRAZOLE SODIUM 40 MG/10ML
0 INJECTION, POWDER, FOR SOLUTION INTRAVENOUS
Qty: 0 | Refills: 3 | DISCHARGE
Start: 2024-02-22

## 2024-02-22 RX ORDER — MEMANTINE HYDROCHLORIDE 7 MG/1
1 CAPSULE, EXTENDED RELEASE ORAL
Qty: 0 | Refills: 3 | DISCHARGE
Start: 2024-02-22

## 2024-02-22 RX ORDER — CALCIUM CARBONATE/VITAMIN D2 250 MG-125
0 TABLET ORAL
Qty: 0 | Refills: 0 | DISCHARGE
Start: 2024-02-22

## 2024-02-22 NOTE — HEALTH RISK ASSESSMENT
[HRA Reviewed] : Health risk assessment reviewed [Two or more falls in past year] : Patient reported two or more falls in the past year

## 2024-03-01 ENCOUNTER — TRANSCRIPTION ENCOUNTER (OUTPATIENT)
Age: 79
End: 2024-03-01

## 2024-03-01 ENCOUNTER — APPOINTMENT (OUTPATIENT)
Dept: AFTER HOURS CARE | Facility: EMERGENCY ROOM | Age: 79
End: 2024-03-01
Payer: MEDICARE

## 2024-03-01 PROCEDURE — 99204 OFFICE O/P NEW MOD 45 MIN: CPT

## 2024-03-02 ENCOUNTER — TRANSCRIPTION ENCOUNTER (OUTPATIENT)
Age: 79
End: 2024-03-02

## 2024-03-10 RX ORDER — BISACODYL 5 MG/1
5 TABLET, DELAYED RELEASE ORAL
Qty: 30 | Refills: 0 | Status: COMPLETED | COMMUNITY
Start: 2020-10-05 | End: 2024-03-10

## 2024-03-10 RX ORDER — METFORMIN HYDROCHLORIDE 625 MG/1
TABLET ORAL
Refills: 0 | Status: COMPLETED | COMMUNITY
End: 2024-03-10

## 2024-03-10 RX ORDER — OLOPATADINE HYDROCHLORIDE 7 MG/ML
0.7 SOLUTION OPHTHALMIC
Qty: 2 | Refills: 0 | Status: COMPLETED | COMMUNITY
Start: 2020-05-28 | End: 2024-03-10

## 2024-03-10 RX ORDER — OMEPRAZOLE 40 MG/1
40 CAPSULE, DELAYED RELEASE ORAL
Qty: 30 | Refills: 0 | Status: COMPLETED | COMMUNITY
Start: 2020-10-02 | End: 2024-03-10

## 2024-03-10 RX ORDER — ATORVASTATIN CALCIUM 20 MG/1
20 TABLET, FILM COATED ORAL
Qty: 30 | Refills: 5 | Status: COMPLETED | COMMUNITY
Start: 2020-03-18 | End: 2024-03-10

## 2024-03-10 RX ORDER — TRIAMCINOLONE ACETONIDE 1 MG/G
0.1 OINTMENT TOPICAL
Qty: 80 | Refills: 0 | Status: COMPLETED | COMMUNITY
Start: 2020-06-30 | End: 2024-03-10

## 2024-03-10 RX ORDER — TRAMADOL HYDROCHLORIDE 50 MG/1
50 TABLET, COATED ORAL
Qty: 21 | Refills: 0 | Status: COMPLETED | COMMUNITY
Start: 2020-10-13 | End: 2024-03-10

## 2024-03-10 RX ORDER — MECLIZINE HYDROCHLORIDE 12.5 MG/1
12.5 TABLET ORAL
Qty: 20 | Refills: 0 | Status: COMPLETED | COMMUNITY
Start: 2020-09-14 | End: 2024-03-10

## 2024-03-10 RX ORDER — ISOPROPYL ALCOHOL 0.7 ML/ML
SWAB TOPICAL
Qty: 100 | Refills: 0 | Status: COMPLETED | COMMUNITY
Start: 2020-05-01 | End: 2024-03-10

## 2024-03-10 RX ORDER — LANCETS 30 GAUGE
EACH MISCELLANEOUS
Qty: 100 | Refills: 0 | Status: COMPLETED | COMMUNITY
Start: 2020-10-29 | End: 2024-03-10

## 2024-03-10 RX ORDER — NIFEDIPINE 20 MG/1
CAPSULE ORAL
Refills: 0 | Status: COMPLETED | COMMUNITY
End: 2024-03-10

## 2024-03-10 RX ORDER — BRINZOLAMIDE 10 MG/ML
1 SUSPENSION/ DROPS OPHTHALMIC
Qty: 10 | Refills: 0 | Status: COMPLETED | COMMUNITY
Start: 2020-05-28 | End: 2024-03-10

## 2024-03-10 RX ORDER — MELOXICAM 15 MG/1
15 TABLET ORAL DAILY
Qty: 30 | Refills: 2 | Status: COMPLETED | COMMUNITY
Start: 2019-08-12 | End: 2024-03-10

## 2024-03-10 RX ORDER — OXYCODONE 5 MG/1
5 TABLET ORAL
Qty: 42 | Refills: 0 | Status: COMPLETED | COMMUNITY
Start: 2020-10-02 | End: 2024-03-10

## 2024-03-10 RX ORDER — NAPROXEN 500 MG/1
500 TABLET ORAL
Qty: 60 | Refills: 0 | Status: COMPLETED | COMMUNITY
Start: 2020-10-13 | End: 2024-03-10

## 2024-03-10 RX ORDER — LANCING DEVICE
EACH MISCELLANEOUS
Qty: 1 | Refills: 0 | Status: COMPLETED | COMMUNITY
Start: 2020-09-15 | End: 2024-03-10

## 2024-03-10 RX ORDER — ERGOCALCIFEROL 1.25 MG/1
1.25 MG CAPSULE, LIQUID FILLED ORAL
Qty: 12 | Refills: 0 | Status: COMPLETED | COMMUNITY
Start: 2020-08-12 | End: 2024-03-10

## 2024-03-10 RX ORDER — METOPROLOL SUCCINATE 50 MG/1
50 TABLET, EXTENDED RELEASE ORAL
Qty: 30 | Refills: 1 | Status: COMPLETED | COMMUNITY
Start: 2020-03-18 | End: 2024-03-10

## 2024-03-10 RX ORDER — METFORMIN ER 500 MG 500 MG/1
500 TABLET ORAL
Qty: 90 | Refills: 0 | Status: COMPLETED | COMMUNITY
Start: 2020-06-05 | End: 2024-03-10

## 2024-03-10 NOTE — DATA REVIEWED
[FreeTextEntry1] : Prior available labs and imaging reviewed, with no new or distinct findings at this time.

## 2024-03-10 NOTE — PHYSICAL EXAM
[No Acute Distress] : no acute distress [Normal Sclera/Conjunctiva] : normal sclera/conjunctiva [EOMI] : extra ocular movement intact [PERRL] : pupils equal, round and reactive to light [Normal Outer Ear/Nose] : the ears and nose were normal in appearance [Normal Oropharynx] : the oropharynx was normal [Normal TMs] : both tympanic membranes were normal [Normal Nasal Mucosa] : the nasal mucosa was normal [No JVD] : no jugular venous distention [Supple] : the neck was supple [No LAD] : no lymphadenopathy [No Respiratory Distress] : no respiratory distress [Thyroid Normal, No Nodules] : the thyroid was normal and there were no nodules present [Clear to Auscultation] : lungs were clear to auscultation bilaterally [No Accessory Muscle Use] : no accessory muscle use [Normal Rate] : heart rate was normal  [Regular Rhythm] : with a regular rhythm [No Murmurs] : no murmurs heard [Normal S1, S2] : normal S1 and S2 [Breast Exam Declined] : patient declined to have breast exam done [Normal Bowel Sounds] : normal bowel sounds [Non Tender] : non-tender [Soft] : abdomen soft [Not Distended] : not distended [Patient Refused] : rectal exam was refused by the patient [Normal Supraclavicular Nodes] : no supraclavicular lymphadenopathy [Normal Axillary Nodes] : no axillary lymphadenopathy [Normal Anterior Cervical Nodes] : no anterior cervical lymphadenopathy [No CVA Tenderness] : no ~M costovertebral angle tenderness [No Spinal Tenderness] : no spinal tenderness [No Clubbing, Cyanosis] : no clubbing  or cyanosis of the fingernails [No Rash] : no rash [No Skin Lesions] : no skin lesions [Cranial Nerves Intact] : cranial nerves 2-12 were intact [No Gross Sensory Deficits] : no gross sensory deficits [Normal Affect] : the affect was normal [Foot Ulcers] : no foot ulcers [Kyphosis] : no kyphosis present [de-identified] : Remote and recent memory not intact but able to answer most questions  [de-identified] : unsteady gait [de-identified] : AOx2, remote and recent memory not intact.

## 2024-03-10 NOTE — END OF VISIT
[FreeTextEntry3] : All medical record entries made by the Scribe were at my, Dr. Church, direction and personally dictated by me on 02/22/2024. I have reviewed the chart and agree that the record accurately reflects my personal performance of the history, physical exam, assessment, and plan. I have also personally directed, reviewed, and agreed with the chart. [Time Spent: ___ minutes] : I have spent [unfilled] minutes of time on the encounter. [>50% of the face to face encounter time was spent on counseling and/or coordination of care for ___] : Greater than 50% of the face to face encounter time was spent on counseling and/or coordination of care for [unfilled]

## 2024-03-10 NOTE — COUNSELING
[Maintain functional ability] : maintain functional ability [Improve mobility] : improve mobility [Advanced Directives discussed: ____] : Advanced directives discussed: [unfilled] [Discussed disease trajectory with patient/caregiver] : discussed disease trajectory with patient/caregiver [Full Code] : Code Status: Full Code [Completed Healthcare Proxy] : completed healthcare proxy [No Limitations] : Treatment Guidelines: No limitations [_____] : HCP: [unfilled] [Trial of Intubation] : Intubation: Trial of Intubation [Normal Weight - ( BMI  <25 )] : normal weight - ( BMI  <25 ) [Continue diet as tolerated] : continue diet as tolerated based on goals of care [Non - Smoker] : non-smoker [Smoke/CO Detectors] : smoke/CO detectors [Remove clutter and unsafe carpeting to avoid falls] : remove clutter and unsafe carpeting to avoid falls [] : foot exam [Completed] : Aspirin use discussion completed [FreeTextEntry3] : low sodium diet  [de-identified] : MOLST deferred until next visit. [FreeTextEntry4] : Stay healthy, improve low mood. [ - New patient with 2 or more chronic conditions; CCM discussed and patient-centered care plan established] : New patient with 2 or more chronic conditions; CCM discussed and patient-centered care plan established
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
## 2024-03-10 NOTE — REASON FOR VISIT
[Initial Annual Medicare Wellness Visit] : an initial annual Medicare wellness visit [Pre-Visit Preparation] : pre-visit preparation was done [FreeTextEntry2] : chart review

## 2024-03-10 NOTE — CURRENT MEDS
[Medication and Allergies Reconciled] : medication and allergies reconciled [Adherent to medications] : Patient is adherent to medications as prescribed [Reviewed patient reported medication adherence from Comprehensive Assessment] : Reviewed patient reported medication adherence from comprehensive assessment

## 2024-03-10 NOTE — REVIEW OF SYSTEMS
[Confusion] : confusion [Memory Loss] : memory loss [Unsteady Walking] : ataxia [Depression] : depression [Muscle Weakness] : muscle weakness [Negative] : Genitourinary [Muscle Pain] : no muscle pain [Back Pain] : no back pain [Headache] : no headache [Fainting] : no fainting [Dizziness] : no dizziness [FreeTextEntry7] : Poor appetite since 12/2023 after gallbladder surgery [de-identified] : complains of feeling depressed, quiet, not motivated, poor appetite since surgery in 12/2023 [FreeTextEntry9] : unsteady gait

## 2024-03-15 NOTE — HISTORY OF PRESENT ILLNESS
[Other Location: e.g. Home (Enter Location, City,State)___] : at [unfilled] [Home] : at home, [unfilled] , at the time of the visit. [Verbal consent obtained from patient] : the patient, [unfilled] [FreeTextEntry8] :  initially reported patient was weak and had a change in mental status and behavior. patient denied all symptoms at time of paramedic arrival.
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
## 2024-03-15 NOTE — HISTORY OF PRESENT ILLNESS
[Other Location: e.g. Home (Enter Location, City,State)___] : at [unfilled] [Home] : at home, [unfilled] , at the time of the visit. [FreeTextEntry8] :  initially reported patient was weak and had a change in mental status and behavior. patient denied all symptoms at time of paramedic arrival.  [Verbal consent obtained from patient] : the patient, [unfilled]

## 2024-03-15 NOTE — PLAN
[Patient chose to end visit prematurely] : Patient chose to end visit prematurely [No new medications perscribed] : Treat in place: No new medications prescribed [FreeTextEntry1] : patient declined to be evaluated by community paramedics. patient was alert and oriented and her healthcare proxy also declined to have the patient evaluated. reported that the patient is at her baseline.

## 2024-03-17 ENCOUNTER — TRANSCRIPTION ENCOUNTER (OUTPATIENT)
Age: 79
End: 2024-03-17

## 2024-03-20 ENCOUNTER — TRANSCRIPTION ENCOUNTER (OUTPATIENT)
Age: 79
End: 2024-03-20

## 2024-03-20 ENCOUNTER — APPOINTMENT (OUTPATIENT)
Dept: HOME HEALTH SERVICES | Facility: HOME HEALTH | Age: 79
End: 2024-03-20
Payer: MEDICARE

## 2024-03-20 ENCOUNTER — NON-APPOINTMENT (OUTPATIENT)
Age: 79
End: 2024-03-20

## 2024-03-20 VITALS — HEART RATE: 60 BPM | SYSTOLIC BLOOD PRESSURE: 114 MMHG | OXYGEN SATURATION: 94 % | DIASTOLIC BLOOD PRESSURE: 60 MMHG

## 2024-03-20 DIAGNOSIS — R63.4 ABNORMAL WEIGHT LOSS: ICD-10-CM

## 2024-03-20 DIAGNOSIS — R68.83 CHILLS (WITHOUT FEVER): ICD-10-CM

## 2024-03-20 DIAGNOSIS — M75.51 BURSITIS OF RIGHT SHOULDER: ICD-10-CM

## 2024-03-20 PROCEDURE — 99348 HOME/RES VST EST LOW MDM 30: CPT

## 2024-03-20 RX ORDER — MIRTAZAPINE 15 MG/1
0 TABLET, FILM COATED ORAL
Qty: 0 | Refills: 3 | DISCHARGE
Start: 2024-03-20

## 2024-03-21 ENCOUNTER — NON-APPOINTMENT (OUTPATIENT)
Age: 79
End: 2024-03-21

## 2024-03-21 ENCOUNTER — INPATIENT (INPATIENT)
Facility: HOSPITAL | Age: 79
LOS: 4 days | Discharge: HOME HEALTH | End: 2024-03-26
Attending: STUDENT IN AN ORGANIZED HEALTH CARE EDUCATION/TRAINING PROGRAM | Admitting: STUDENT IN AN ORGANIZED HEALTH CARE EDUCATION/TRAINING PROGRAM
Payer: MEDICARE

## 2024-03-21 ENCOUNTER — TRANSCRIPTION ENCOUNTER (OUTPATIENT)
Age: 79
End: 2024-03-21

## 2024-03-21 VITALS
SYSTOLIC BLOOD PRESSURE: 107 MMHG | HEIGHT: 65 IN | OXYGEN SATURATION: 98 % | TEMPERATURE: 99 F | DIASTOLIC BLOOD PRESSURE: 61 MMHG | RESPIRATION RATE: 18 BRPM | HEART RATE: 61 BPM

## 2024-03-21 DIAGNOSIS — Z87.81 PERSONAL HISTORY OF (HEALED) TRAUMATIC FRACTURE: Chronic | ICD-10-CM

## 2024-03-21 PROCEDURE — 99285 EMERGENCY DEPT VISIT HI MDM: CPT | Mod: 25

## 2024-03-21 RX ORDER — SODIUM CHLORIDE 9 MG/ML
1000 INJECTION INTRAMUSCULAR; INTRAVENOUS; SUBCUTANEOUS ONCE
Refills: 0 | Status: COMPLETED | OUTPATIENT
Start: 2024-03-21 | End: 2024-03-21

## 2024-03-21 NOTE — ED ADULT TRIAGE NOTE - MEANS OF ARRIVAL
cmp normal.  Lipids minimally elevated. Diet info.
daily Yes Nima Cedeño MD       No Known Allergies    OBJECTIVE:    BP (!) 138/90   Pulse 86   Ht 5' 6\" (1.676 m)   Wt 171 lb 12.8 oz (77.9 kg)   SpO2 98%   BMI 27.73 kg/m²     BP Readings from Last 2 Encounters:   11/27/18 (!) 138/90   03/10/16 136/80       Wt Readings from Last 3 Encounters:   11/27/18 171 lb 12.8 oz (77.9 kg)   03/10/16 165 lb 9.6 oz (75.1 kg)   06/01/15 163 lb (73.9 kg)       Physical Exam   Constitutional: He is oriented to person, place, and time. He appears well-developed and well-nourished. HENT:   Head: Normocephalic and atraumatic. Nose: Right sinus exhibits no maxillary sinus tenderness and no frontal sinus tenderness. Left sinus exhibits no maxillary sinus tenderness and no frontal sinus tenderness. Eyes: Conjunctivae and EOM are normal.   Neck: No tracheal deviation present. Cardiovascular: Normal rate and regular rhythm. Exam reveals no gallop and no friction rub. No murmur heard. Pulmonary/Chest: Effort normal and breath sounds normal.   Abdominal: Soft. There is no tenderness. Musculoskeletal: He exhibits no edema. Neurological: He is alert and oriented to person, place, and time. Skin: Skin is warm and dry. Psychiatric: He has a normal mood and affect. ASSESSMENT/PLAN:    1. Essential hypertension  Fair control. Cont meds. Labs today  - Comprehensive Metabolic Panel  - Lipid Panel  - losartan (COZAAR) 25 MG tablet; Take 1 tablet by mouth daily  Dispense: 90 tablet; Refill: 4    2. Allergic rhinitis, unspecified seasonality, unspecified trigger  Symptomatic therapy suggested: increase fluids, use mist of vaporizer prn and call prn if symptoms persist or worsen. flonase prn.   Call if worsening symptoms          Scribe attestation: Camacho PALACIO, am scribing for and in the presence of Nima Cedeño MD. Electronically signed by Marilyn PALACIO on 11/27/2018 at 3:11 PM      Provider attestation: Lukas Mccarthy MD, personally performed
stretcher

## 2024-03-21 NOTE — ED CLERICAL - NS ED CLERK NOTE PRE-ARRIVAL INFORMATION; ADDITIONAL PRE-ARRIVAL INFORMATION

## 2024-03-21 NOTE — ED ADULT TRIAGE NOTE - NS ED NURSE BANDS TYPE
December 12, 2022        Re: Yarely Calderón  [id number]  [facility] [place] [address]      To whom it may concern:    This is to certify that Yarely Calderón was seen at [facility] on 12/12/2022 for a sick visit.  Please excuse her from school.  YARELY CALDERÓN is cleared to return to school when she has had at least 24 hours without fever and with symptoms improving.      Sincerely,          Miri Escudero MD  [address]  [facility] [address]                 Name band;

## 2024-03-21 NOTE — ED ADULT TRIAGE NOTE - CHIEF COMPLAINT QUOTE
pt aox1, hx of dementia- called in by house call MD- pt sent in for generalized weakness and poor po intake. pt denies pain, or sob. pt hot to touch. hx : Type 2 DM, (EMS unsure if on insulin); HTN, Dementia.

## 2024-03-22 ENCOUNTER — NON-APPOINTMENT (OUTPATIENT)
Age: 79
End: 2024-03-22

## 2024-03-22 DIAGNOSIS — K57.92 DIVERTICULITIS OF INTESTINE, PART UNSPECIFIED, WITHOUT PERFORATION OR ABSCESS WITHOUT BLEEDING: ICD-10-CM

## 2024-03-22 DIAGNOSIS — R09.02 HYPOXEMIA: ICD-10-CM

## 2024-03-22 DIAGNOSIS — R94.6 ABNORMAL RESULTS OF THYROID FUNCTION STUDIES: ICD-10-CM

## 2024-03-22 DIAGNOSIS — I10 ESSENTIAL (PRIMARY) HYPERTENSION: ICD-10-CM

## 2024-03-22 DIAGNOSIS — N13.30 UNSPECIFIED HYDRONEPHROSIS: ICD-10-CM

## 2024-03-22 DIAGNOSIS — Z29.9 ENCOUNTER FOR PROPHYLACTIC MEASURES, UNSPECIFIED: ICD-10-CM

## 2024-03-22 DIAGNOSIS — R91.8 OTHER NONSPECIFIC ABNORMAL FINDING OF LUNG FIELD: ICD-10-CM

## 2024-03-22 DIAGNOSIS — F03.90 UNSPECIFIED DEMENTIA WITHOUT BEHAVIORAL DISTURBANCE: ICD-10-CM

## 2024-03-22 DIAGNOSIS — E86.0 DEHYDRATION: ICD-10-CM

## 2024-03-22 DIAGNOSIS — E11.9 TYPE 2 DIABETES MELLITUS WITHOUT COMPLICATIONS: ICD-10-CM

## 2024-03-22 DIAGNOSIS — R62.7 ADULT FAILURE TO THRIVE: ICD-10-CM

## 2024-03-22 DIAGNOSIS — M19.90 UNSPECIFIED OSTEOARTHRITIS, UNSPECIFIED SITE: ICD-10-CM

## 2024-03-22 DIAGNOSIS — R53.1 WEAKNESS: ICD-10-CM

## 2024-03-22 LAB
24R-OH-CALCIDIOL SERPL-MCNC: 31.1 NG/ML — SIGNIFICANT CHANGE UP (ref 30–80)
ADD ON TEST-SPECIMEN IN LAB: SIGNIFICANT CHANGE UP
ALBUMIN SERPL ELPH-MCNC: 3.1 G/DL — LOW (ref 3.3–5)
ALP SERPL-CCNC: 91 U/L — SIGNIFICANT CHANGE UP (ref 40–120)
ALT FLD-CCNC: 14 U/L — SIGNIFICANT CHANGE UP (ref 4–33)
ANION GAP SERPL CALC-SCNC: 10 MMOL/L — SIGNIFICANT CHANGE UP (ref 7–14)
ANION GAP SERPL CALC-SCNC: 11 MMOL/L — SIGNIFICANT CHANGE UP (ref 7–14)
APPEARANCE UR: CLEAR — SIGNIFICANT CHANGE UP
AST SERPL-CCNC: 24 U/L — SIGNIFICANT CHANGE UP (ref 4–32)
BASOPHILS # BLD AUTO: 0.04 K/UL — SIGNIFICANT CHANGE UP (ref 0–0.2)
BASOPHILS # BLD AUTO: 0.07 K/UL — SIGNIFICANT CHANGE UP (ref 0–0.2)
BASOPHILS NFR BLD AUTO: 0.3 % — SIGNIFICANT CHANGE UP (ref 0–2)
BASOPHILS NFR BLD AUTO: 0.4 % — SIGNIFICANT CHANGE UP (ref 0–2)
BILIRUB SERPL-MCNC: 1.2 MG/DL — SIGNIFICANT CHANGE UP (ref 0.2–1.2)
BILIRUB UR-MCNC: NEGATIVE — SIGNIFICANT CHANGE UP
BUN SERPL-MCNC: 11 MG/DL — SIGNIFICANT CHANGE UP (ref 7–23)
BUN SERPL-MCNC: 12 MG/DL — SIGNIFICANT CHANGE UP (ref 7–23)
CALCIUM SERPL-MCNC: 8.5 MG/DL — SIGNIFICANT CHANGE UP (ref 8.4–10.5)
CALCIUM SERPL-MCNC: 8.7 MG/DL — SIGNIFICANT CHANGE UP (ref 8.4–10.5)
CHLORIDE SERPL-SCNC: 101 MMOL/L — SIGNIFICANT CHANGE UP (ref 98–107)
CHLORIDE SERPL-SCNC: 99 MMOL/L — SIGNIFICANT CHANGE UP (ref 98–107)
CO2 SERPL-SCNC: 24 MMOL/L — SIGNIFICANT CHANGE UP (ref 22–31)
CO2 SERPL-SCNC: 26 MMOL/L — SIGNIFICANT CHANGE UP (ref 22–31)
COLOR SPEC: YELLOW — SIGNIFICANT CHANGE UP
CREAT SERPL-MCNC: 0.74 MG/DL — SIGNIFICANT CHANGE UP (ref 0.5–1.3)
CREAT SERPL-MCNC: 0.82 MG/DL — SIGNIFICANT CHANGE UP (ref 0.5–1.3)
DIFF PNL FLD: NEGATIVE — SIGNIFICANT CHANGE UP
EGFR: 74 ML/MIN/1.73M2 — SIGNIFICANT CHANGE UP
EGFR: 84 ML/MIN/1.73M2 — SIGNIFICANT CHANGE UP
EOSINOPHIL # BLD AUTO: 0.16 K/UL — SIGNIFICANT CHANGE UP (ref 0–0.5)
EOSINOPHIL # BLD AUTO: 0.21 K/UL — SIGNIFICANT CHANGE UP (ref 0–0.5)
EOSINOPHIL NFR BLD AUTO: 1.3 % — SIGNIFICANT CHANGE UP (ref 0–6)
EOSINOPHIL NFR BLD AUTO: 1.4 % — SIGNIFICANT CHANGE UP (ref 0–6)
FLUAV AG NPH QL: SIGNIFICANT CHANGE UP
FLUBV AG NPH QL: SIGNIFICANT CHANGE UP
GAS PNL BLDV: SIGNIFICANT CHANGE UP
GLUCOSE BLDC GLUCOMTR-MCNC: 138 MG/DL — HIGH (ref 70–99)
GLUCOSE BLDC GLUCOMTR-MCNC: 159 MG/DL — HIGH (ref 70–99)
GLUCOSE BLDC GLUCOMTR-MCNC: 164 MG/DL — HIGH (ref 70–99)
GLUCOSE BLDC GLUCOMTR-MCNC: 173 MG/DL — HIGH (ref 70–99)
GLUCOSE SERPL-MCNC: 156 MG/DL — HIGH (ref 70–99)
GLUCOSE SERPL-MCNC: 168 MG/DL — HIGH (ref 70–99)
GLUCOSE UR QL: NEGATIVE MG/DL — SIGNIFICANT CHANGE UP
HCT VFR BLD CALC: 34.8 % — SIGNIFICANT CHANGE UP (ref 34.5–45)
HCT VFR BLD CALC: 38.1 % — SIGNIFICANT CHANGE UP (ref 34.5–45)
HGB BLD-MCNC: 11.7 G/DL — SIGNIFICANT CHANGE UP (ref 11.5–15.5)
HGB BLD-MCNC: 12.5 G/DL — SIGNIFICANT CHANGE UP (ref 11.5–15.5)
IANC: 6.53 K/UL — SIGNIFICANT CHANGE UP (ref 1.8–7.4)
IANC: 9.36 K/UL — HIGH (ref 1.8–7.4)
IMM GRANULOCYTES NFR BLD AUTO: 0.3 % — SIGNIFICANT CHANGE UP (ref 0–0.9)
IMM GRANULOCYTES NFR BLD AUTO: 0.4 % — SIGNIFICANT CHANGE UP (ref 0–0.9)
KETONES UR-MCNC: NEGATIVE MG/DL — SIGNIFICANT CHANGE UP
LEUKOCYTE ESTERASE UR-ACNC: NEGATIVE — SIGNIFICANT CHANGE UP
LYMPHOCYTES # BLD AUTO: 3.79 K/UL — HIGH (ref 1–3.3)
LYMPHOCYTES # BLD AUTO: 31.9 % — SIGNIFICANT CHANGE UP (ref 13–44)
LYMPHOCYTES # BLD AUTO: 33.1 % — SIGNIFICANT CHANGE UP (ref 13–44)
LYMPHOCYTES # BLD AUTO: 5.12 K/UL — HIGH (ref 1–3.3)
MAGNESIUM SERPL-MCNC: 1.9 MG/DL — SIGNIFICANT CHANGE UP (ref 1.6–2.6)
MAGNESIUM SERPL-MCNC: 2 MG/DL — SIGNIFICANT CHANGE UP (ref 1.6–2.6)
MCHC RBC-ENTMCNC: 29 PG — SIGNIFICANT CHANGE UP (ref 27–34)
MCHC RBC-ENTMCNC: 29.3 PG — SIGNIFICANT CHANGE UP (ref 27–34)
MCHC RBC-ENTMCNC: 32.8 GM/DL — SIGNIFICANT CHANGE UP (ref 32–36)
MCHC RBC-ENTMCNC: 33.6 GM/DL — SIGNIFICANT CHANGE UP (ref 32–36)
MCV RBC AUTO: 87 FL — SIGNIFICANT CHANGE UP (ref 80–100)
MCV RBC AUTO: 88.4 FL — SIGNIFICANT CHANGE UP (ref 80–100)
MONOCYTES # BLD AUTO: 0.88 K/UL — SIGNIFICANT CHANGE UP (ref 0–0.9)
MONOCYTES # BLD AUTO: 1.22 K/UL — HIGH (ref 0–0.9)
MONOCYTES NFR BLD AUTO: 7.6 % — SIGNIFICANT CHANGE UP (ref 2–14)
MONOCYTES NFR BLD AUTO: 7.7 % — SIGNIFICANT CHANGE UP (ref 2–14)
NEUTROPHILS # BLD AUTO: 6.53 K/UL — SIGNIFICANT CHANGE UP (ref 1.8–7.4)
NEUTROPHILS # BLD AUTO: 9.36 K/UL — HIGH (ref 1.8–7.4)
NEUTROPHILS NFR BLD AUTO: 57.2 % — SIGNIFICANT CHANGE UP (ref 43–77)
NEUTROPHILS NFR BLD AUTO: 58.4 % — SIGNIFICANT CHANGE UP (ref 43–77)
NITRITE UR-MCNC: NEGATIVE — SIGNIFICANT CHANGE UP
NRBC # BLD: 0 /100 WBCS — SIGNIFICANT CHANGE UP (ref 0–0)
NRBC # BLD: 0 /100 WBCS — SIGNIFICANT CHANGE UP (ref 0–0)
NRBC # FLD: 0 K/UL — SIGNIFICANT CHANGE UP (ref 0–0)
NRBC # FLD: 0 K/UL — SIGNIFICANT CHANGE UP (ref 0–0)
PH UR: 7 — SIGNIFICANT CHANGE UP (ref 5–8)
PHOSPHATE SERPL-MCNC: 2.6 MG/DL — SIGNIFICANT CHANGE UP (ref 2.5–4.5)
PHOSPHATE SERPL-MCNC: 3.2 MG/DL — SIGNIFICANT CHANGE UP (ref 2.5–4.5)
PLATELET # BLD AUTO: 234 K/UL — SIGNIFICANT CHANGE UP (ref 150–400)
PLATELET # BLD AUTO: 251 K/UL — SIGNIFICANT CHANGE UP (ref 150–400)
POTASSIUM SERPL-MCNC: 3.6 MMOL/L — SIGNIFICANT CHANGE UP (ref 3.5–5.3)
POTASSIUM SERPL-MCNC: 4 MMOL/L — SIGNIFICANT CHANGE UP (ref 3.5–5.3)
POTASSIUM SERPL-SCNC: 3.6 MMOL/L — SIGNIFICANT CHANGE UP (ref 3.5–5.3)
POTASSIUM SERPL-SCNC: 4 MMOL/L — SIGNIFICANT CHANGE UP (ref 3.5–5.3)
PROT SERPL-MCNC: 8.4 G/DL — HIGH (ref 6–8.3)
PROT UR-MCNC: NEGATIVE MG/DL — SIGNIFICANT CHANGE UP
RBC # BLD: 4 M/UL — SIGNIFICANT CHANGE UP (ref 3.8–5.2)
RBC # BLD: 4.31 M/UL — SIGNIFICANT CHANGE UP (ref 3.8–5.2)
RBC # FLD: 14.5 % — SIGNIFICANT CHANGE UP (ref 10.3–14.5)
RBC # FLD: 14.6 % — HIGH (ref 10.3–14.5)
RSV RNA NPH QL NAA+NON-PROBE: SIGNIFICANT CHANGE UP
SARS-COV-2 RNA SPEC QL NAA+PROBE: SIGNIFICANT CHANGE UP
SODIUM SERPL-SCNC: 134 MMOL/L — LOW (ref 135–145)
SODIUM SERPL-SCNC: 137 MMOL/L — SIGNIFICANT CHANGE UP (ref 135–145)
SP GR SPEC: 1.02 — SIGNIFICANT CHANGE UP (ref 1–1.03)
TSH SERPL-MCNC: 1.04 UIU/ML — SIGNIFICANT CHANGE UP (ref 0.27–4.2)
UROBILINOGEN FLD QL: 1 MG/DL — SIGNIFICANT CHANGE UP (ref 0.2–1)
WBC # BLD: 11.44 K/UL — HIGH (ref 3.8–10.5)
WBC # BLD: 16.05 K/UL — HIGH (ref 3.8–10.5)
WBC # FLD AUTO: 11.44 K/UL — HIGH (ref 3.8–10.5)
WBC # FLD AUTO: 16.05 K/UL — HIGH (ref 3.8–10.5)

## 2024-03-22 PROCEDURE — G0316 PROLONG INPT EVAL ADD15 M: CPT

## 2024-03-22 PROCEDURE — 71275 CT ANGIOGRAPHY CHEST: CPT | Mod: 26,MC

## 2024-03-22 PROCEDURE — 74177 CT ABD & PELVIS W/CONTRAST: CPT | Mod: 26,MC

## 2024-03-22 PROCEDURE — 71045 X-RAY EXAM CHEST 1 VIEW: CPT | Mod: 26

## 2024-03-22 PROCEDURE — 99223 1ST HOSP IP/OBS HIGH 75: CPT

## 2024-03-22 RX ORDER — METRONIDAZOLE 500 MG
TABLET ORAL
Refills: 0 | Status: DISCONTINUED | OUTPATIENT
Start: 2024-03-22 | End: 2024-03-26

## 2024-03-22 RX ORDER — CIPROFLOXACIN LACTATE 400MG/40ML
400 VIAL (ML) INTRAVENOUS ONCE
Refills: 0 | Status: COMPLETED | OUTPATIENT
Start: 2024-03-22 | End: 2024-03-22

## 2024-03-22 RX ORDER — DEXTROSE 50 % IN WATER 50 %
25 SYRINGE (ML) INTRAVENOUS ONCE
Refills: 0 | Status: DISCONTINUED | OUTPATIENT
Start: 2024-03-22 | End: 2024-03-26

## 2024-03-22 RX ORDER — GLUCAGON INJECTION, SOLUTION 0.5 MG/.1ML
1 INJECTION, SOLUTION SUBCUTANEOUS ONCE
Refills: 0 | Status: DISCONTINUED | OUTPATIENT
Start: 2024-03-22 | End: 2024-03-26

## 2024-03-22 RX ORDER — SODIUM CHLORIDE 9 MG/ML
1000 INJECTION INTRAMUSCULAR; INTRAVENOUS; SUBCUTANEOUS
Refills: 0 | Status: DISCONTINUED | OUTPATIENT
Start: 2024-03-22 | End: 2024-03-26

## 2024-03-22 RX ORDER — INSULIN LISPRO 100/ML
VIAL (ML) SUBCUTANEOUS
Refills: 0 | Status: DISCONTINUED | OUTPATIENT
Start: 2024-03-22 | End: 2024-03-26

## 2024-03-22 RX ORDER — CIPROFLOXACIN LACTATE 400MG/40ML
VIAL (ML) INTRAVENOUS
Refills: 0 | Status: DISCONTINUED | OUTPATIENT
Start: 2024-03-22 | End: 2024-03-22

## 2024-03-22 RX ORDER — SODIUM CHLORIDE 9 MG/ML
1000 INJECTION, SOLUTION INTRAVENOUS
Refills: 0 | Status: DISCONTINUED | OUTPATIENT
Start: 2024-03-22 | End: 2024-03-26

## 2024-03-22 RX ORDER — METRONIDAZOLE 500 MG
500 TABLET ORAL ONCE
Refills: 0 | Status: COMPLETED | OUTPATIENT
Start: 2024-03-22 | End: 2024-03-22

## 2024-03-22 RX ORDER — ENOXAPARIN SODIUM 100 MG/ML
40 INJECTION SUBCUTANEOUS EVERY 24 HOURS
Refills: 0 | Status: DISCONTINUED | OUTPATIENT
Start: 2024-03-22 | End: 2024-03-26

## 2024-03-22 RX ORDER — ATORVASTATIN CALCIUM 80 MG/1
20 TABLET, FILM COATED ORAL AT BEDTIME
Refills: 0 | Status: DISCONTINUED | OUTPATIENT
Start: 2024-03-22 | End: 2024-03-26

## 2024-03-22 RX ORDER — ASPIRIN/CALCIUM CARB/MAGNESIUM 324 MG
81 TABLET ORAL DAILY
Refills: 0 | Status: DISCONTINUED | OUTPATIENT
Start: 2024-03-22 | End: 2024-03-26

## 2024-03-22 RX ORDER — ACETAMINOPHEN 500 MG
650 TABLET ORAL EVERY 6 HOURS
Refills: 0 | Status: DISCONTINUED | OUTPATIENT
Start: 2024-03-22 | End: 2024-03-26

## 2024-03-22 RX ORDER — DEXTROSE 50 % IN WATER 50 %
12.5 SYRINGE (ML) INTRAVENOUS ONCE
Refills: 0 | Status: DISCONTINUED | OUTPATIENT
Start: 2024-03-22 | End: 2024-03-26

## 2024-03-22 RX ORDER — DEXTROSE 50 % IN WATER 50 %
15 SYRINGE (ML) INTRAVENOUS ONCE
Refills: 0 | Status: DISCONTINUED | OUTPATIENT
Start: 2024-03-22 | End: 2024-03-26

## 2024-03-22 RX ORDER — PETROLATUM,WHITE
1 JELLY (GRAM) TOPICAL THREE TIMES A DAY
Refills: 0 | Status: DISCONTINUED | OUTPATIENT
Start: 2024-03-22 | End: 2024-03-26

## 2024-03-22 RX ORDER — CEFTRIAXONE 500 MG/1
1000 INJECTION, POWDER, FOR SOLUTION INTRAMUSCULAR; INTRAVENOUS EVERY 24 HOURS
Refills: 0 | Status: DISCONTINUED | OUTPATIENT
Start: 2024-03-22 | End: 2024-03-26

## 2024-03-22 RX ORDER — LANOLIN ALCOHOL/MO/W.PET/CERES
3 CREAM (GRAM) TOPICAL AT BEDTIME
Refills: 0 | Status: DISCONTINUED | OUTPATIENT
Start: 2024-03-22 | End: 2024-03-26

## 2024-03-22 RX ORDER — PANTOPRAZOLE SODIUM 20 MG/1
40 TABLET, DELAYED RELEASE ORAL
Refills: 0 | Status: DISCONTINUED | OUTPATIENT
Start: 2024-03-22 | End: 2024-03-26

## 2024-03-22 RX ORDER — CIPROFLOXACIN LACTATE 400MG/40ML
400 VIAL (ML) INTRAVENOUS EVERY 12 HOURS
Refills: 0 | Status: DISCONTINUED | OUTPATIENT
Start: 2024-03-22 | End: 2024-03-22

## 2024-03-22 RX ORDER — METOPROLOL TARTRATE 50 MG
25 TABLET ORAL EVERY 12 HOURS
Refills: 0 | Status: DISCONTINUED | OUTPATIENT
Start: 2024-03-22 | End: 2024-03-26

## 2024-03-22 RX ORDER — METRONIDAZOLE 500 MG
500 TABLET ORAL EVERY 8 HOURS
Refills: 0 | Status: DISCONTINUED | OUTPATIENT
Start: 2024-03-22 | End: 2024-03-26

## 2024-03-22 RX ADMIN — Medication 200 MILLIGRAM(S): at 04:37

## 2024-03-22 RX ADMIN — SODIUM CHLORIDE 1000 MILLILITER(S): 9 INJECTION INTRAMUSCULAR; INTRAVENOUS; SUBCUTANEOUS at 00:42

## 2024-03-22 RX ADMIN — Medication 100 MILLIGRAM(S): at 22:02

## 2024-03-22 RX ADMIN — ATORVASTATIN CALCIUM 20 MILLIGRAM(S): 80 TABLET, FILM COATED ORAL at 22:01

## 2024-03-22 RX ADMIN — CEFTRIAXONE 100 MILLIGRAM(S): 500 INJECTION, POWDER, FOR SOLUTION INTRAMUSCULAR; INTRAVENOUS at 19:35

## 2024-03-22 RX ADMIN — Medication 81 MILLIGRAM(S): at 13:53

## 2024-03-22 RX ADMIN — Medication 1 APPLICATION(S): at 15:47

## 2024-03-22 RX ADMIN — Medication 100 MILLIGRAM(S): at 13:53

## 2024-03-22 RX ADMIN — Medication 1 APPLICATION(S): at 22:01

## 2024-03-22 RX ADMIN — SODIUM CHLORIDE 100 MILLILITER(S): 9 INJECTION INTRAMUSCULAR; INTRAVENOUS; SUBCUTANEOUS at 04:37

## 2024-03-22 RX ADMIN — Medication 25 MILLIGRAM(S): at 19:34

## 2024-03-22 RX ADMIN — Medication 100 MILLIGRAM(S): at 06:13

## 2024-03-22 NOTE — PHYSICAL THERAPY INITIAL EVALUATION ADULT - PERTINENT HX OF CURRENT PROBLEM, REHAB EVAL
78 year-old female, with past history significant for Glaucoma, Type-2 DM, Dyslipidemia, Hypertension, Osteoarthritis and Dementia, presented to the ED secondary to generalized weakness and decreased oral intake.  Diagnosed with Diverticulitis in the ED.

## 2024-03-22 NOTE — H&P ADULT - PROBLEM SELECTOR PLAN 1
- presented with report of generalized weakness and decreased oral intake  - CT scan = "Distal descending/proximal sigmoid colon diverticulitis. Small free fluid with increased attenuation, which may contain debris/blood product. No obvious organized fluid collection, bowel obstruction or intraperitoneal free air. Recommend correlation with colonoscopy to exclude neoplasm, following resolution of acute episode."  - no abdominal tenderness to mild/moderate palpation  - started on ciprofloxacin and metronidazole in the ED; continuing  - f/u blood cultures x 2 (in progress)  - ensure optimal hydration  - clear liquid diet for now; advance as tolerated  - holding senna  - f/u electrolytes

## 2024-03-22 NOTE — ED ADULT NURSE REASSESSMENT NOTE - NS ED NURSE REASSESS COMMENT FT1
report given to [address] [place] [address]. report given from nurse , patient laying in semi fowlers position on the stretcher. patient alert and oriented times three. patient denies shortness of breath, chest pain, nausea, vomiting, chill, fever. Patient normal sinus on the monitor. Respirations equal and adequate. Patients IV patent, no signs of infiltration. Safety measures in place, call bell within reach. Patient stable upon leaving the area

## 2024-03-22 NOTE — H&P ADULT - PROBLEM SELECTOR PLAN 5
- in the setting of infection, decreased oral intake, dehydration  - on antibiotics for diverticulitis  - being fluid resuscitated via IV; encourage oral hydration, as tolerated  - f/u electrolytes

## 2024-03-22 NOTE — H&P ADULT - PROBLEM SELECTOR PLAN 2
- desaturated to low 90's in the ED, and placed on nasal cannula, with O2 Sat at 95% on 3L NC  - CT scan negative for PE, but shows "Persistent scattered tree-in-bud opacities in both lungs. Mildly decreased patchy opacities in bilateral upper and lower lobes since 2/23/2019."  - per chart review, was discharged on levofloxacin recently, as treatment for suspected pneumonia  - no report of coughing or shortness of breath presently, but, per documentation, had decreased O2 saturation in the ED.  Placed on 3L NC initially, with O2 Sat = 95%  - Influenza A&B/RSV/COVID-19 negative  - follow pulse oximetry  - HOB elevation  - since opacities decreasing, will not treat directly for pneumonia at present

## 2024-03-22 NOTE — PHYSICAL THERAPY INITIAL EVALUATION ADULT - NSPTDISCHREC_GEN_A_CORE
Home with home PT services to address current functional limitations to optimize safety within the home environment with the use of a rolling walker.

## 2024-03-22 NOTE — H&P ADULT - NSHPSOCIALHISTORY_GEN_ALL_CORE
SOCIAL HISTORY:    Marital Status:  (  )    (  ) Single        (  )        (  )        (  )   Lives with:          (  ) Alone      (  ) Spouse     (  ) Children         (  ) Parents             (  ) Other    No history of smoking  No history of alcohol abuse  No history of illegal drug use    Occupation:

## 2024-03-22 NOTE — PATIENT PROFILE ADULT - FALL HARM RISK - HARM RISK INTERVENTIONS
Assistance with ambulation/Assistance OOB with selected safe patient handling equipment/Communicate Risk of Fall with Harm to all staff/Discuss with provider need for PT consult/Monitor gait and stability/Reinforce activity limits and safety measures with patient and family/Tailored Fall Risk Interventions/Visual Cue: Yellow wristband and red socks/Bed in lowest position, wheels locked, appropriate side rails in place/Call bell, personal items and telephone in reach/Instruct patient to call for assistance before getting out of bed or chair/Non-slip footwear when patient is out of bed/[place] to call system/Physically safe environment - no spills, clutter or unnecessary equipment/Purposeful Proactive Rounding/Room/bathroom lighting operational, light cord in reach

## 2024-03-22 NOTE — ED PROVIDER NOTE - ATTENDING CONTRIBUTION TO CARE
I have personally performed a face to face medical and diagnostic evaluation of the patient. I have discussed with and reviewed the Resident's note and agree with the History, ROS, Physical Exam and MDM unless otherwise indicated. A brief summary of my personal evaluation and impression can be found below.    78-year-old female, history of HTN, dementia, A&O x 2 at baseline, presenting for generalized weakness and poor p.o. intake. Patient may be unreliable historian given dementia.  Ma'am, vital signs stable with no focal weakness noticed on neurologic exam.  Mild suprapubic tenderness to palpation.  Patient occasionally desats to low 90s.  But nonfocal lung sounds.  Will be broad workup.  Symptoms could be metabolic versus infectious.  Plan for labs, will likely need CT of the abdomen pelvis.  Will likely need admission.  Reassess to dispo. Awilda Sharp, ED Attending

## 2024-03-22 NOTE — ED PROVIDER NOTE - OBJECTIVE STATEMENT
78-year-old female, history of HTN, dementia, A&O x 2 at baseline, presenting for generalized weakness and poor p.o. intake.  The patient physician called to have come into ED for weakness.  The patient on exam patient is denying any symptoms at this time.  The patient reports that she does not have pain.  The patient reports that she does not feel weak.  Patient is denying decreased p.o. intake.  The patient denies fevers, chills, chest pain, shortness of breath, headache, visual changes, nausea or vomiting.  Patient may be unreliable historian given dementia.  Will attempt to reach out to physician who sent her in.

## 2024-03-22 NOTE — CONSULT NOTE ADULT - ASSESSMENT
79 y/o F PMhx Glaucoma, DM II, HTN, dementia who presented w/ generalized weakness and decreased oral intake   found to have abd pain and diverticulitis    diverticulitis, leukocytosis, hydroureteronephrosis  abdominal tenderness  CT abd/pelvis- Distal descending/proximal sigmoid colon diverticulitis. Small free fluid with increased attenuation, which may contain debris/blood product. No obvious organized fluid collection, bowel obstruction or intraperitoneal free air. Mild left hydroureteronephrosis to the level of the sigmoid colon, presumably related to inflammation and subsequent compression of the ureter.  UA negative  RVP negative  CTA chest- No evidence of PE. Persistent scattered tree-in-bud opacities in both lungs. Mildly decreased patchy opacities in bilateral upper and lower lobes since 2/23/2019.    Recommendations  given patient's age and adverse effects of fluoroquinolones will switch cipro to ceftriaxone 1g daily  c/w flagyl 500mg every 8 hours  monitor for clinical improvement of abdominal pain  low suspicion for PNA, pt not hypoxic  - but would be covered by ceftriaxone   - procal ordered  trend temps, wbc    should have colonoscopy several weeks after resolution of infection    Dr. Richa Andres will be covering 3/23-3/24. I will resume care on 3/25     Geovani Goldman M.D.  [facility], Division of Infectious Diseases  [phone]  After 5pm on weekdays and all day on weekends - please call [phone]  77 y/o F PMhx Glaucoma, DM II, HTN, dementia who presented w/ generalized weakness and decreased oral intake   found to have abd pain and diverticulitis    diverticulitis, leukocytosis, hydroureteronephrosis  abdominal tenderness  CT abd/pelvis- Distal descending/proximal sigmoid colon diverticulitis. Small free fluid with increased attenuation, which may contain debris/blood product. No obvious organized fluid collection, bowel obstruction or intraperitoneal free air. Mild left hydroureteronephrosis to the level of the sigmoid colon, presumably related to inflammation and subsequent compression of the ureter.  UA negative  RVP negative  CTA chest- No evidence of PE. Persistent scattered tree-in-bud opacities in both lungs. Mildly decreased patchy opacities in bilateral upper and lower lobes since 2/23/2019.    Recommendations  given patient's age and adverse effects of fluoroquinolones will switch cipro to ceftriaxone 1g daily  c/w flagyl 500mg every 8 hours  monitor for clinical improvement of abdominal pain  low suspicion for PNA, pt not hypoxic  - but would be covered by ceftriaxone   - procal ordered  f/u blood cultures  trend temps, wbc    should have colonoscopy several weeks after resolution of infection    Dr. Richa Andres will be covering 3/23-3/24. I will resume care on 3/25     Geovani Goldman M.D.  [facility], Division of Infectious Diseases  [phone]  After 5pm on weekdays and all day on weekends - please call [phone]

## 2024-03-22 NOTE — ED ADULT NURSE REASSESSMENT NOTE - NS ED NURSE REASSESS COMMENT FT1
patient laying in semi fowlers position on the stretcher. patient denies shortness of breath, chest pain, nausea, vomiting, chill, fever. Patient normal sinus on the monitor. Respirations equal and adequate. Patients IV patent, no signs of infiltration. Safety measures in place, call bell within reach. Patient stable upon leaving the room.

## 2024-03-22 NOTE — H&P ADULT - PROBLEM SELECTOR PLAN 10
- noted on CT scan; " Heterogenous thyroid gland, which can be further assessed on a nonemergent ultrasound if not previously characterized.  - TSH level within acceptable range (1.04)  - f/u as outpatient

## 2024-03-22 NOTE — ED ADULT NURSE NOTE - NSFALLRISKINTERV_ED_ALL_ED

## 2024-03-22 NOTE — H&P ADULT - PROBLEM SELECTOR PLAN 4
- dry lips, dry oral mucosa, sequela of infection, decreased oral intake  - being fluid resuscitated via IV  - encourage oral hydration, as tolerated  - f/u electrolytes, renal function

## 2024-03-22 NOTE — H&P ADULT - PROBLEM SELECTOR PLAN 9
- forgetful, but pleasant and with good affect.  Cooperative  - last TSH level within acceptable range (1.04)  - may benefit from donepezil

## 2024-03-22 NOTE — H&P ADULT - HISTORY OF PRESENT ILLNESS
78 year-old female, with past history significant for Glaucoma, Type-2 DM, Dyslipidemia, Hypertension, Osteoarthritis and Dementia, presented to the ED secondary to generalized weakness and decreased oral intake.  Seen and evaluated at bedside;    Vital signs upon ED presentation as follows: BP = 107/61, HR = 61, RR = 18, T = 37.3 C (99.2 F), O2 Sat = 95% on 3L NC.  Diagnosed with Diverticulitis and prescribed ciprofloxacin, metronidazole and NaCl one liter in the ED. 78 year-old female, with past history significant for Glaucoma, Type-2 DM, Dyslipidemia, Hypertension, Osteoarthritis and Dementia, presented to the ED secondary to generalized weakness and decreased oral intake.  Seen and evaluated at bedside; NAD.  Knows that she is in the hospital, but is confused/forgetful.  Per reports, patient was sent to the hospital, after contact with House Calls MD, because of generalized weakness and decreased oral intake.  Patient states not complaints when seen, but does confirm loss of appetite.  No reports of fever, chills, headache, nausea, vomiting, chest pain, shortness of breath, abdominal pain, diarrhea, constipation or dysuria.    Vital signs upon ED presentation as follows: BP = 107/61, HR = 61, RR = 18, T = 37.3 C (99.2 F), O2 Sat = 95% on 3L NC.  Diagnosed with Diverticulitis and prescribed ciprofloxacin, metronidazole and NaCl one liter in the ED.

## 2024-03-22 NOTE — H&P ADULT - NSHPREVIEWOFSYSTEMS_GEN_ALL_CORE
REVIEW OF SYSTEMS: Limited, due to patient's mental state (forgetfulness)    CONSTITUTIONAL: Loss of appetite.  No weakness, fever, chills or sweating  EYES/ENT: No visual changes.  No dysphagia  NECK: No pain or stiffness  RESPIRATORY: No cough or hemoptysis.  No shortness of breath  CARDIOVASCULAR: No chest pain or palpitations.  No lower extremity edema  GASTROINTESTINAL: No epigastric or abdominal pain. No nausea, vomiting or hematemesis.  No diarrhea or constipation. No melena or hematochezia.  GENITOURINARY: No dysuria, frequency or hematuria  MUSCULOSKELETAL: No joint pain, swelling, decreased ROM, erythema, warmth  NEUROLOGICAL: No numbness or weakness  PSYCHIATRY: No anxiety, or depression.  SKIN: No itching, burning, rashes, or lesions   All other review of systems is negative unless indicated above.

## 2024-03-22 NOTE — ED ADULT NURSE NOTE - OBJECTIVE STATEMENT
pt received to room 18. a&oX2, unsure of ambulatory status. pmh of htn, dm2. pt brought by ems per pt  called due to pt having generalized weakness and dec po intake. pt denies complaints.arrives on 2L NC satting 95-96%. pt appears uncomfortable. unsure of mental status at baseline. 20g to R ac placed, septic labs sent, given IVF. Resp even unlabored, abd soft, pedal pulses2+bilaterally. safety maintained. awaiting orders

## 2024-03-22 NOTE — ED PROVIDER NOTE - INTERNATIONAL TRAVEL
patient called left message with request for refill on norco. Last office visit 8-15 with next scheduled appointment 12-12  Dose verified.    Last UDS  8-15    Last fill per chart 11-10  Called and informed need to keep appointment tomorrow    Component Results     Component Collected Lab   Amphetamine Screen, Urine 08/15/2017  1:54 PM Unknown   neg    Comment:   MOP/Norco:Positive   Barbiturate Screen, Urine 08/15/2017  1:54 PM Unknown   neg    Benzodiazepine Screen, Urine 08/15/2017  1:54 PM Unknown   neg    COCAINE METABOLITE SCREEN URINE 08/15/2017  1:54 PM Unknown   neg    THC 08/15/2017  1:54 PM Unknown   neg    MDMA URINE 08/15/2017  1:54 PM Unknown   neg    Methadone Screen, Urine 08/15/2017  1:54 PM Unknown   neg    Opiate Scrn, Ur 08/15/2017  1:54 PM Unknown   neg    Oxycodone Screen, Ur 08/15/2017  1:54 PM Unknown   neg    PCP Scrn, Ur 08/15/2017  1:54 PM Unknown   neg    Propoxyphene Screen, Urine 08/15/2017  1:54 PM Unknown   neg    Tricyclic Antidepressants, Ur 08/15/2017  1:54 PM Unknown   neg    Methamphetamine, Urine 08/15/2017  1:54 PM Unknown   neg    Lab and Collection     POCT Rapid Drug Screen on 8/15/2017
No

## 2024-03-22 NOTE — H&P ADULT - PROBLEM SELECTOR PLAN 7
- no acute issues presently  - per chart review, appears to be on metoprolol 50 mg ER as outpatient  - prescribing metoprolol 25 mg Q12H for now; would convert to daily dosing upon discharge, if possible  - f/u w/ repeat measurements

## 2024-03-22 NOTE — PHYSICAL THERAPY INITIAL EVALUATION ADULT - ADDITIONAL COMMENTS
Pt states she lives with her  and children in a house with steps to enter and then remains on the main level. Prior to admission, pt was ambulating independently without any assistive devices.   Pt states she has a home health aid 7 days/week, unsure how many hours. Pt states her  assists her with stairs.   Post PT evaluation, pt left semi-supine, alarm on, call bell and remote within reach, all precautions maintained, NAD. RN aware.

## 2024-03-22 NOTE — PHYSICAL THERAPY INITIAL EVALUATION ADULT - LEVEL OF INDEPENDENCE: GAIT, REHAB EVAL
Initially ambulated 10 feet without any assistive devices, one slight loss of balance notes. Pt ambulated rest of way using rolling walker for safety and stability./contact guard

## 2024-03-22 NOTE — ASSESSMENT
[Assessment / Treatment and Transport to ER] : Community [facility]amed[facility] Outcome: Assessment/treatment and transport to ER [Oxygen administered] : Oxygen administered [No] : Oxygen tanks not left in home [Need for ER transport confirmed] : Need for ER transport confirmed [Yes] : If the Community [facility]amed[facility] evaluation had not been available would you have advised the patient to go to the ER?  Yes [FreeTextEntry1] : Chronically ill patient now with increased work of breathing, hypoxia requiring 4 L via nasal cannula, decreased p.o. intake, decreased urine output.  There is concern about pulmonary edema versus respiratory infection and possible dehydration.  Patient would benefit from in person evaluation in ED to assess and treat for potential emergent pathology.  Patient will go to the ER via paramedics.  Patient, paramedic team and patient's significant other in agreement with this plan.

## 2024-03-22 NOTE — H&P ADULT - PROBLEM SELECTOR PLAN 8
- Tylenol PRN mild pain  - if pain becomes worse, could try short course of small doses of NSAID  - ensure optimal hydration  - Physical Therapy, if necessary

## 2024-03-22 NOTE — PHYSICAL EXAM
[de-identified] : chronically ill, no severe distress [Supple] : supple [de-identified] : dry mucous membranes [de-identified] : mild inc WOB, per paramedic exam bilateral crackles at the bases

## 2024-03-22 NOTE — H&P ADULT - ASSESSMENT
[ x ]  Lab studies personally reviewed  [ x ]  Radiology personally reviewed  [ x ]  Old records personally reviewed    78 year-old female, with past history significant for Glaucoma, Type-2 DM, Dyslipidemia, Hypertension, Osteoarthritis and Dementia, presented to the ED secondary to generalized weakness and decreased oral intake.  Diagnosed with Diverticulitis in the ED.

## 2024-03-22 NOTE — PHYSICAL THERAPY INITIAL EVALUATION ADULT - DISCHARGE PLANNER MADE AWARE
Patient's mother at the bedside,     Removed all sharp objects from room, removed items which may be used for strangulation, removed medical supplies, removed potentially harmful equipment, screened/searched all visitors and items brought for patient and room in view of nurse's station (patient is on the cardiac monitor) yes

## 2024-03-22 NOTE — ED PROVIDER NOTE - PHYSICAL EXAMINATION
GENERAL: Awake, alert, NAD  HEENT: NC/AT, moist mucous membranes, PERRL, EOMI  LUNGS: CTAB, no wheezes or crackles   CARDIAC: RRR, no m/r/g  ABDOMEN: Soft, + suprapubic TTP, non distended, no rebound, no guarding  BACK: No midline spinal tenderness, no CVA tenderness  EXT: No edema, no calf tenderness, 2+ DP pulses bilaterally, no deformities.  NEURO: A&Ox2. Moving all extremities. Decreased strength in the upper and lower extremity.  3/5 in upper and lower extremities.  Sensation is intact.  Cranial nerves II through XII intact. T  SKIN: Warm and dry. No rash.  PSYCH: Normal affect.

## 2024-03-22 NOTE — PLAN
[FreeTextEntry1] : 1. Pt to go to ED for immediate evaluation. To be transported by community paramedic team.

## 2024-03-22 NOTE — H&P ADULT - NSHPLABSRESULTS_GEN_ALL_CORE
11.7   16.05 )-----------( 251      ( 22 Mar 2024 00:57 )             34.8     134<L>  |  99  |  12  ----------------------------<  156<H>       4.0   |  24  |  0.82    Ca    8.7      22 Mar 2024 00:57  Phos  3.2       Mg     1.90         TPro  8.4<H>  /  Alb  3.1<L>  /  TBili  1.2  /  DBili  x   /  AST  24  /  ALT  14  /  AlkPhos  91      00:57 - VBG - pH: 7.39  | pCO2: 45    | pO2: 43    | Lactate: 1.6      hs Troponin, T - 10 ng/L (24 @ 00:57)          Urinalysis Basic - ( 22 Mar 2024 02:31 )  Color: Yellow / Appearance: Clear / S.022 / pH: 7.0  Gluc: Negative mg/dL / Ketone: Negative mg/dL  / Bili: Negative / Urobili: 1.0 mg/dL   Blood: Negative / Protein: Negative mg/dL / Nitrite: Negative   Leuk Esterase: Negative / RBC: x / WBC x   Sq Epi: x / Non Sq Epi: x / Bacteria: x

## 2024-03-22 NOTE — H&P ADULT - PROBLEM SELECTOR PLAN 6
- glucose = 156.  A1c = 6.6 on 01/24/2024  - does not appear to be on med/s as outpatient  - consistent carb diet

## 2024-03-22 NOTE — H&P ADULT - PROBLEM SELECTOR PLAN 3
- CT scan = "Mild left hydroureteronephrosis to the level of the sigmoid colon,  presumably related to inflammation and subsequent compression of the ureter. Distended urinary bladder. Recommend with clinical correlation to assess urinary retention."  - patient states no urinary difficulties  - UA negative for signs of infection  - urine culture "collected."  (please f/u)  - Bladder Scan ordered (please f/u)  - intake/output Q4H  - ensure optimal hydration

## 2024-03-22 NOTE — HISTORY OF PRESENT ILLNESS
[Home] : at home, [unfilled] , at the time of the visit. [Other Location: e.g. Home (Enter Location, City,State)___] : at [unfilled] [Formal Caregiver] : formal caregiver [Other:____] : [unfilled] [FreeTextEntry3] : Spouse via paramedics [FreeTextEntry8] : 78-year-old female with history of hypertension, diabetes, dementia, CAD, presents for community paramedic evaluation.  Patient's  was in touch with the [facility] team and they advised a visit by community paramedic.  I received a call from the community paramedic with an evaluation of the patient.  Per the report, patient has been generally weak with poor oral intake over the last day or so and today developed some shortness of breath.  No other history reported, patient unable to provide any history due to her baseline dementia. [Spouse] : spouse [Other: _____] : [unfilled]

## 2024-03-22 NOTE — ED PROVIDER NOTE - CLINICAL SUMMARY MEDICAL DECISION MAKING FREE TEXT BOX
78-year-old female, history of HTN, dementia, A&O x 2 at baseline, presenting for generalized weakness and poor p.o. intake. Patient may be unreliable historian given dementia.  Will attempt to reach out to physician who sent her in.    VSS.  PE.  Decreased strength in the upper and lower extremity.  3/5 in upper and lower extremities.  Sensation is intact.  Cranial nerves II through XII intact. The differential includes but is not limited to electrolyte/hematological derangement, TSH abnormality, ACS, infectious process such as pneumonia or UTI.  Will obtain basic labs, TSH, chest x-ray, UA, UC.  Dispo is pending labs imaging and reassessment.  This is an initial assessment.  Final plan is subject to change.

## 2024-03-23 LAB
A1C WITH ESTIMATED AVERAGE GLUCOSE RESULT: 6.3 % — HIGH (ref 4–5.6)
ANION GAP SERPL CALC-SCNC: 9 MMOL/L — SIGNIFICANT CHANGE UP (ref 7–14)
BASOPHILS # BLD AUTO: 0.06 K/UL — SIGNIFICANT CHANGE UP (ref 0–0.2)
BASOPHILS NFR BLD AUTO: 0.3 % — SIGNIFICANT CHANGE UP (ref 0–2)
BUN SERPL-MCNC: 10 MG/DL — SIGNIFICANT CHANGE UP (ref 7–23)
CALCIUM SERPL-MCNC: 8.4 MG/DL — SIGNIFICANT CHANGE UP (ref 8.4–10.5)
CHLORIDE SERPL-SCNC: 103 MMOL/L — SIGNIFICANT CHANGE UP (ref 98–107)
CO2 SERPL-SCNC: 25 MMOL/L — SIGNIFICANT CHANGE UP (ref 22–31)
CREAT SERPL-MCNC: 0.74 MG/DL — SIGNIFICANT CHANGE UP (ref 0.5–1.3)
EGFR: 84 ML/MIN/1.73M2 — SIGNIFICANT CHANGE UP
EOSINOPHIL # BLD AUTO: 0.07 K/UL — SIGNIFICANT CHANGE UP (ref 0–0.5)
EOSINOPHIL NFR BLD AUTO: 0.4 % — SIGNIFICANT CHANGE UP (ref 0–6)
ESTIMATED AVERAGE GLUCOSE: 134 — SIGNIFICANT CHANGE UP
GLUCOSE BLDC GLUCOMTR-MCNC: 105 MG/DL — HIGH (ref 70–99)
GLUCOSE BLDC GLUCOMTR-MCNC: 110 MG/DL — HIGH (ref 70–99)
GLUCOSE BLDC GLUCOMTR-MCNC: 127 MG/DL — HIGH (ref 70–99)
GLUCOSE BLDC GLUCOMTR-MCNC: 148 MG/DL — HIGH (ref 70–99)
GLUCOSE SERPL-MCNC: 152 MG/DL — HIGH (ref 70–99)
HCT VFR BLD CALC: 36.1 % — SIGNIFICANT CHANGE UP (ref 34.5–45)
HGB BLD-MCNC: 11.7 G/DL — SIGNIFICANT CHANGE UP (ref 11.5–15.5)
IANC: 12.04 K/UL — HIGH (ref 1.8–7.4)
IMM GRANULOCYTES NFR BLD AUTO: 0.3 % — SIGNIFICANT CHANGE UP (ref 0–0.9)
LYMPHOCYTES # BLD AUTO: 23.8 % — SIGNIFICANT CHANGE UP (ref 13–44)
LYMPHOCYTES # BLD AUTO: 4.23 K/UL — HIGH (ref 1–3.3)
MAGNESIUM SERPL-MCNC: 1.9 MG/DL — SIGNIFICANT CHANGE UP (ref 1.6–2.6)
MCHC RBC-ENTMCNC: 28.2 PG — SIGNIFICANT CHANGE UP (ref 27–34)
MCHC RBC-ENTMCNC: 32.4 GM/DL — SIGNIFICANT CHANGE UP (ref 32–36)
MCV RBC AUTO: 87 FL — SIGNIFICANT CHANGE UP (ref 80–100)
MONOCYTES # BLD AUTO: 1.34 K/UL — HIGH (ref 0–0.9)
MONOCYTES NFR BLD AUTO: 7.5 % — SIGNIFICANT CHANGE UP (ref 2–14)
NEUTROPHILS # BLD AUTO: 12.04 K/UL — HIGH (ref 1.8–7.4)
NEUTROPHILS NFR BLD AUTO: 67.7 % — SIGNIFICANT CHANGE UP (ref 43–77)
NRBC # BLD: 0 /100 WBCS — SIGNIFICANT CHANGE UP (ref 0–0)
NRBC # FLD: 0 K/UL — SIGNIFICANT CHANGE UP (ref 0–0)
PHOSPHATE SERPL-MCNC: 2.8 MG/DL — SIGNIFICANT CHANGE UP (ref 2.5–4.5)
PLATELET # BLD AUTO: 226 K/UL — SIGNIFICANT CHANGE UP (ref 150–400)
POTASSIUM SERPL-MCNC: 3.7 MMOL/L — SIGNIFICANT CHANGE UP (ref 3.5–5.3)
POTASSIUM SERPL-SCNC: 3.7 MMOL/L — SIGNIFICANT CHANGE UP (ref 3.5–5.3)
PROCALCITONIN SERPL-MCNC: 0.07 NG/ML — SIGNIFICANT CHANGE UP (ref 0.02–0.1)
RBC # BLD: 4.15 M/UL — SIGNIFICANT CHANGE UP (ref 3.8–5.2)
RBC # FLD: 14.6 % — HIGH (ref 10.3–14.5)
SODIUM SERPL-SCNC: 137 MMOL/L — SIGNIFICANT CHANGE UP (ref 135–145)
WBC # BLD: 17.79 K/UL — HIGH (ref 3.8–10.5)
WBC # FLD AUTO: 17.79 K/UL — HIGH (ref 3.8–10.5)

## 2024-03-23 RX ADMIN — Medication 100 MILLIGRAM(S): at 21:33

## 2024-03-23 RX ADMIN — CEFTRIAXONE 100 MILLIGRAM(S): 500 INJECTION, POWDER, FOR SOLUTION INTRAMUSCULAR; INTRAVENOUS at 18:49

## 2024-03-23 RX ADMIN — ATORVASTATIN CALCIUM 20 MILLIGRAM(S): 80 TABLET, FILM COATED ORAL at 21:33

## 2024-03-23 RX ADMIN — Medication 1 APPLICATION(S): at 21:32

## 2024-03-23 RX ADMIN — Medication 25 MILLIGRAM(S): at 18:49

## 2024-03-23 RX ADMIN — Medication 1 APPLICATION(S): at 06:22

## 2024-03-23 RX ADMIN — Medication 81 MILLIGRAM(S): at 13:31

## 2024-03-23 RX ADMIN — ENOXAPARIN SODIUM 40 MILLIGRAM(S): 100 INJECTION SUBCUTANEOUS at 06:21

## 2024-03-23 RX ADMIN — Medication 100 MILLIGRAM(S): at 06:21

## 2024-03-23 RX ADMIN — Medication 25 MILLIGRAM(S): at 06:21

## 2024-03-23 RX ADMIN — Medication 100 MILLIGRAM(S): at 13:31

## 2024-03-23 RX ADMIN — PANTOPRAZOLE SODIUM 40 MILLIGRAM(S): 20 TABLET, DELAYED RELEASE ORAL at 06:21

## 2024-03-23 NOTE — DIETITIAN INITIAL EVALUATION ADULT - NS FNS DIET ORDER
Diet, Clear Liquid:   Consistent Carbohydrate {Evening Snack} (CSTCHOSN)  Gastroesophageal Reflux Disease (GERD) (03-22-24 @ 14:49)

## 2024-03-23 NOTE — DIETITIAN INITIAL EVALUATION ADULT - PERTINENT MEDS FT
MEDICATIONS  (STANDING):  aspirin enteric coated 81 milliGRAM(s) Oral daily  atorvastatin 20 milliGRAM(s) Oral at bedtime  cefTRIAXone   IVPB 1000 milliGRAM(s) IV Intermittent every 24 hours  dextrose 5%. 1000 milliLiter(s) (50 mL/Hr) IV Continuous <Continuous>  dextrose 5%. 1000 milliLiter(s) (100 mL/Hr) IV Continuous <Continuous>  dextrose 50% Injectable 25 Gram(s) IV Push once  dextrose 50% Injectable 12.5 Gram(s) IV Push once  dextrose 50% Injectable 25 Gram(s) IV Push once  enoxaparin Injectable 40 milliGRAM(s) SubCutaneous every 24 hours  glucagon  Injectable 1 milliGRAM(s) IntraMuscular once  insulin lispro (ADMELOG) corrective regimen sliding scale   SubCutaneous three times a day before meals  metoprolol tartrate 25 milliGRAM(s) Oral every 12 hours  metroNIDAZOLE  IVPB 500 milliGRAM(s) IV Intermittent every 8 hours  metroNIDAZOLE  IVPB      pantoprazole    Tablet 40 milliGRAM(s) Oral before breakfast  petrolatum white Ointment 1 Application(s) Topical three times a day  sodium chloride 0.9%. 1000 milliLiter(s) (100 mL/Hr) IV Continuous <Continuous>    MEDICATIONS  (PRN):  acetaminophen     Tablet .. 650 milliGRAM(s) Oral every 6 hours PRN Temp greater or equal to 38C (100.4F), Mild Pain (1 - 3)  dextrose Oral Gel 15 Gram(s) Oral once PRN Blood Glucose LESS THAN 70 milliGRAM(s)/deciliter  melatonin 3 milliGRAM(s) Oral at bedtime PRN Insomnia

## 2024-03-23 NOTE — DIETITIAN INITIAL EVALUATION ADULT - ADD RECOMMEND
1) Recommend advance diet as tolerated. Once diet advanced recommend consider low-fiber diet.   2) Recommend add Ensure Clear while pt on clear liquid diet. Recommend add Glucerna 2x daily (440kcals, 20g protein) onced diet advanced to full/regular diet to provide optimal nutrition.   3) Monitor PO intake, Labs, weights, BMs, and skin integrity. Recommend follow up with GI.   4) RD to remain available for further nutritional interventions as indicated.

## 2024-03-23 NOTE — DIETITIAN INITIAL EVALUATION ADULT - WEIGHT FOR BMI (LBS)
Asc Procedure Text (A): After obtaining clear surgical margins the patient was sent to an ASC for surgical repair.  The patient understands they will receive post-surgical care and follow-up from the ASC physician. 118.3

## 2024-03-23 NOTE — DIETITIAN INITIAL EVALUATION ADULT - OTHER INFO
Per chart review 77 y/o F PMhx Glaucoma, DM II, HTN, dementia who presented w/ generalized weakness and decreased oral intake found to have abd pain and diverticulitis.     Patient seen at bedside, appears to be confused. Reports she is hungry. Pt currently on clear liquid diet due to dx with acute diverticulitis on IV ABX. Son provided collateral over the phone, who reports pt with    Per chart review 77 y/o F PMhx Glaucoma, DM II, HTN, dementia who presented w/ generalized weakness and decreased oral intake found to have abd pain and diverticulitis.     Patient seen at bedside, appears to be confused. Reports she is hungry. Pt currently on clear liquid diet due to dx with acute diverticulitis on IV ABX. Food preferences explored and obtained via pt and pt's son over the phone. Pt doesn't eat beef, pork. Recommend advance diet as tolerated. Pt currently on IV hydration. Recommend consider add Ensure Clear 3x daily (720 lorne and 24 gm protein) while pt on clear liquid diet to provide optimal nutrition. Pt noted with A1C 6.3H, DX with DM. POCT ranges from 138-173. Recommend continue monitor POCT and adjust insulin PRN. RD to remain available for further nutritional interventions as indicated.

## 2024-03-23 NOTE — DIETITIAN INITIAL EVALUATION ADULT - NS FNS WEIGHT CHANGE REASON
Pt's son reported pt's previous weight 130lbs ~3 months ago. Pt's current adm weight 118.3lbs (3/22/24)./unintentional

## 2024-03-23 NOTE — DIETITIAN INITIAL EVALUATION ADULT - PERTINENT LABORATORY DATA
03-23    137  |  103  |  10  ----------------------------<  152<H>  3.7   |  25  |  0.74    Ca    8.4      23 Mar 2024 03:10  Phos  2.8     03-23  Mg     1.90     03-23    TPro  8.4<H>  /  Alb  3.1<L>  /  TBili  1.2  /  DBili  x   /  AST  24  /  ALT  14  /  AlkPhos  91  03-22  POCT Blood Glucose.: 148 mg/dL (03-23-24 @ 08:51)  A1C with Estimated Average Glucose Result: 6.3 % (03-23-24 @ 03:10)  A1C with Estimated Average Glucose Result: 6.6 % (01-24-24 @ 07:17)

## 2024-03-23 NOTE — DIETITIAN INITIAL EVALUATION ADULT - ORAL INTAKE PTA/DIET HISTORY
Obtained patient's diet hx from pt's son over the phone, who reports pt's appetite has decreased over the past 3 months. As per Son, pt has been more lethargic, and PO remains minimal. Of note, son reported pt has very bad constipation, son unknown when was pt's last BM.  Son reports pt's UBW~130lbs 3 months ago.   Obtained patient's diet hx from pt's son over the phone, who reports pt's appetite has decreased over the past 3 months. As per Son, pt has been more lethargic, and PO remains minimal. Of note, son reported pt has persistent constipation; unknown when was pt's last BM.  Son reports pt's UBW~130lbs 3 months ago.

## 2024-03-24 PROBLEM — M75.51 BURSITIS OF RIGHT SHOULDER: Status: RESOLVED | Noted: 2020-07-27 | Resolved: 2024-03-24

## 2024-03-24 PROBLEM — R63.4 WEIGHT LOSS: Status: ACTIVE | Noted: 2024-03-20

## 2024-03-24 LAB
ANION GAP SERPL CALC-SCNC: 8 MMOL/L — SIGNIFICANT CHANGE UP (ref 7–14)
BASOPHILS # BLD AUTO: 0.07 K/UL — SIGNIFICANT CHANGE UP (ref 0–0.2)
BASOPHILS NFR BLD AUTO: 0.6 % — SIGNIFICANT CHANGE UP (ref 0–2)
BUN SERPL-MCNC: 14 MG/DL — SIGNIFICANT CHANGE UP (ref 7–23)
CALCIUM SERPL-MCNC: 8.5 MG/DL — SIGNIFICANT CHANGE UP (ref 8.4–10.5)
CHLORIDE SERPL-SCNC: 104 MMOL/L — SIGNIFICANT CHANGE UP (ref 98–107)
CO2 SERPL-SCNC: 23 MMOL/L — SIGNIFICANT CHANGE UP (ref 22–31)
CREAT SERPL-MCNC: 0.73 MG/DL — SIGNIFICANT CHANGE UP (ref 0.5–1.3)
EGFR: 85 ML/MIN/1.73M2 — SIGNIFICANT CHANGE UP
EOSINOPHIL # BLD AUTO: 0.44 K/UL — SIGNIFICANT CHANGE UP (ref 0–0.5)
EOSINOPHIL NFR BLD AUTO: 3.5 % — SIGNIFICANT CHANGE UP (ref 0–6)
GLUCOSE BLDC GLUCOMTR-MCNC: 84 MG/DL — SIGNIFICANT CHANGE UP (ref 70–99)
GLUCOSE BLDC GLUCOMTR-MCNC: 87 MG/DL — SIGNIFICANT CHANGE UP (ref 70–99)
GLUCOSE BLDC GLUCOMTR-MCNC: 89 MG/DL — SIGNIFICANT CHANGE UP (ref 70–99)
GLUCOSE BLDC GLUCOMTR-MCNC: 95 MG/DL — SIGNIFICANT CHANGE UP (ref 70–99)
GLUCOSE SERPL-MCNC: 94 MG/DL — SIGNIFICANT CHANGE UP (ref 70–99)
HCT VFR BLD CALC: 33.8 % — LOW (ref 34.5–45)
HGB BLD-MCNC: 11.2 G/DL — LOW (ref 11.5–15.5)
IANC: 7.67 K/UL — HIGH (ref 1.8–7.4)
IMM GRANULOCYTES NFR BLD AUTO: 0.6 % — SIGNIFICANT CHANGE UP (ref 0–0.9)
LYMPHOCYTES # BLD AUTO: 26.6 % — SIGNIFICANT CHANGE UP (ref 13–44)
LYMPHOCYTES # BLD AUTO: 3.33 K/UL — HIGH (ref 1–3.3)
MAGNESIUM SERPL-MCNC: 1.9 MG/DL — SIGNIFICANT CHANGE UP (ref 1.6–2.6)
MCHC RBC-ENTMCNC: 28.8 PG — SIGNIFICANT CHANGE UP (ref 27–34)
MCHC RBC-ENTMCNC: 33.1 GM/DL — SIGNIFICANT CHANGE UP (ref 32–36)
MCV RBC AUTO: 86.9 FL — SIGNIFICANT CHANGE UP (ref 80–100)
MONOCYTES # BLD AUTO: 0.94 K/UL — HIGH (ref 0–0.9)
MONOCYTES NFR BLD AUTO: 7.5 % — SIGNIFICANT CHANGE UP (ref 2–14)
MRSA PCR RESULT.: SIGNIFICANT CHANGE UP
NEUTROPHILS # BLD AUTO: 7.67 K/UL — HIGH (ref 1.8–7.4)
NEUTROPHILS NFR BLD AUTO: 61.2 % — SIGNIFICANT CHANGE UP (ref 43–77)
NRBC # BLD: 0 /100 WBCS — SIGNIFICANT CHANGE UP (ref 0–0)
NRBC # FLD: 0 K/UL — SIGNIFICANT CHANGE UP (ref 0–0)
PHOSPHATE SERPL-MCNC: 2.4 MG/DL — LOW (ref 2.5–4.5)
PLATELET # BLD AUTO: 239 K/UL — SIGNIFICANT CHANGE UP (ref 150–400)
POTASSIUM SERPL-MCNC: 3.9 MMOL/L — SIGNIFICANT CHANGE UP (ref 3.5–5.3)
POTASSIUM SERPL-SCNC: 3.9 MMOL/L — SIGNIFICANT CHANGE UP (ref 3.5–5.3)
RBC # BLD: 3.89 M/UL — SIGNIFICANT CHANGE UP (ref 3.8–5.2)
RBC # FLD: 14.4 % — SIGNIFICANT CHANGE UP (ref 10.3–14.5)
S AUREUS DNA NOSE QL NAA+PROBE: SIGNIFICANT CHANGE UP
SODIUM SERPL-SCNC: 135 MMOL/L — SIGNIFICANT CHANGE UP (ref 135–145)
WBC # BLD: 12.52 K/UL — HIGH (ref 3.8–10.5)
WBC # FLD AUTO: 12.52 K/UL — HIGH (ref 3.8–10.5)

## 2024-03-24 RX ORDER — CHLORHEXIDINE GLUCONATE 213 G/1000ML
1 SOLUTION TOPICAL
Refills: 0 | Status: DISCONTINUED | OUTPATIENT
Start: 2024-03-24 | End: 2024-03-26

## 2024-03-24 RX ORDER — LANOLIN ALCOHOL/MO/W.PET/CERES
6 CREAM (GRAM) TOPICAL ONCE
Refills: 0 | Status: COMPLETED | OUTPATIENT
Start: 2024-03-24 | End: 2024-03-24

## 2024-03-24 RX ADMIN — CHLORHEXIDINE GLUCONATE 1 APPLICATION(S): 213 SOLUTION TOPICAL at 21:17

## 2024-03-24 RX ADMIN — Medication 3 MILLIGRAM(S): at 21:16

## 2024-03-24 RX ADMIN — Medication 25 MILLIGRAM(S): at 17:15

## 2024-03-24 RX ADMIN — Medication 100 MILLIGRAM(S): at 05:37

## 2024-03-24 RX ADMIN — PANTOPRAZOLE SODIUM 40 MILLIGRAM(S): 20 TABLET, DELAYED RELEASE ORAL at 06:05

## 2024-03-24 RX ADMIN — Medication 1 APPLICATION(S): at 12:14

## 2024-03-24 RX ADMIN — Medication 25 MILLIGRAM(S): at 05:36

## 2024-03-24 RX ADMIN — Medication 1 APPLICATION(S): at 05:37

## 2024-03-24 RX ADMIN — CEFTRIAXONE 100 MILLIGRAM(S): 500 INJECTION, POWDER, FOR SOLUTION INTRAMUSCULAR; INTRAVENOUS at 17:13

## 2024-03-24 RX ADMIN — Medication 81 MILLIGRAM(S): at 11:40

## 2024-03-24 RX ADMIN — ATORVASTATIN CALCIUM 20 MILLIGRAM(S): 80 TABLET, FILM COATED ORAL at 21:16

## 2024-03-24 RX ADMIN — ENOXAPARIN SODIUM 40 MILLIGRAM(S): 100 INJECTION SUBCUTANEOUS at 05:42

## 2024-03-24 RX ADMIN — Medication 6 MILLIGRAM(S): at 22:57

## 2024-03-24 RX ADMIN — Medication 100 MILLIGRAM(S): at 21:16

## 2024-03-24 RX ADMIN — Medication 1 APPLICATION(S): at 21:16

## 2024-03-24 RX ADMIN — Medication 100 MILLIGRAM(S): at 13:29

## 2024-03-24 NOTE — REVIEW OF SYSTEMS
[Muscle Weakness] : muscle weakness [Confusion] : confusion [Unsteady Walking] : ataxia [Memory Loss] : memory loss [Depression] : depression [Negative] : Heme/Lymph [Muscle Pain] : no muscle pain [Back Pain] : no back pain [Dizziness] : no dizziness [Headache] : no headache [FreeTextEntry7] : Poor appetite since 12/2023 after gallbladder surgery [Fainting] : no fainting [FreeTextEntry9] : unsteady gait [de-identified] : complains of feeling depressed, quiet, not motivated, poor appetite since surgery in 12/2023

## 2024-03-24 NOTE — END OF VISIT
[Time Spent: ___ minutes] : I have spent [unfilled] minutes of time on the encounter. [>50% of the face to face encounter time was spent on counseling and/or coordination of care for ___] : Greater than 50% of the face to face encounter time was spent on counseling and/or coordination of care for [unfilled] [FreeTextEntry3] : All medical record entries made by the Scribe were at my, Dr. Church, direction and personally dictated by me on 03/20/2024. I have reviewed the chart and agree that the record accurately reflects my personal performance of the history, physical exam, assessment, and plan. I have also personally directed, reviewed, and agreed with the chart.

## 2024-03-24 NOTE — PHYSICAL EXAM
[No Acute Distress] : no acute distress [Normal Sclera/Conjunctiva] : normal sclera/conjunctiva [EOMI] : extra ocular movement intact [PERRL] : pupils equal, round and reactive to light [Normal Oropharynx] : the oropharynx was normal [Normal Outer Ear/Nose] : the ears and nose were normal in appearance [Normal Nasal Mucosa] : the nasal mucosa was normal [Normal TMs] : both tympanic membranes were normal [No JVD] : no jugular venous distention [Thyroid Normal, No Nodules] : the thyroid was normal and there were no nodules present [Supple] : the neck was supple [No LAD] : no lymphadenopathy [Clear to Auscultation] : lungs were clear to auscultation bilaterally [No Respiratory Distress] : no respiratory distress [Normal Rate] : heart rate was normal  [No Accessory Muscle Use] : no accessory muscle use [Normal S1, S2] : normal S1 and S2 [Regular Rhythm] : with a regular rhythm [No Murmurs] : no murmurs heard [Normal Bowel Sounds] : normal bowel sounds [Breast Exam Declined] : patient declined to have breast exam done [Non Tender] : non-tender [Soft] : abdomen soft [Patient Refused] : rectal exam was refused by the patient [Not Distended] : not distended [Normal Supraclavicular Nodes] : no supraclavicular lymphadenopathy [Normal Axillary Nodes] : no axillary lymphadenopathy [Normal Anterior Cervical Nodes] : no anterior cervical lymphadenopathy [No CVA Tenderness] : no ~M costovertebral angle tenderness [No Spinal Tenderness] : no spinal tenderness [No Clubbing, Cyanosis] : no clubbing  or cyanosis of the fingernails [No Rash] : no rash [No Skin Lesions] : no skin lesions [Cranial Nerves Intact] : cranial nerves 2-12 were intact [No Gross Sensory Deficits] : no gross sensory deficits [Normal Affect] : the affect was normal [Kyphosis] : no kyphosis present [Foot Ulcers] : no foot ulcers [de-identified] : Remote and recent memory not intact but able to answer most questions  [de-identified] : unsteady gait [de-identified] : AOx2, remote and recent memory not intact.

## 2024-03-24 NOTE — COUNSELING
[Normal Weight - ( BMI  <25 )] : normal weight - ( BMI  <25 ) [Continue diet as tolerated] : continue diet as tolerated based on goals of care [Non - Smoker] : non-smoker [Remove clutter and unsafe carpeting to avoid falls] : remove clutter and unsafe carpeting to avoid falls [Smoke/CO Detectors] : smoke/CO detectors [Completed] : Aspirin use discussion completed [] : foot exam [Improve mobility] : improve mobility [Maintain functional ability] : maintain functional ability [Discussed disease trajectory with patient/caregiver] : discussed disease trajectory with patient/caregiver [Advanced Directives discussed: ____] : Advanced directives discussed: [unfilled] [Completed Healthcare Proxy] : completed healthcare proxy [Full Code] : Code Status: Full Code [No Limitations] : Treatment Guidelines: No limitations [Trial of Intubation] : Intubation: Trial of Intubation [_____] : HCP: [unfilled] [FreeTextEntry3] : low sodium diet  [FreeTextEntry4] : Stay healthy, improve low mood. [de-identified] : MOLST deferred until next visit.

## 2024-03-24 NOTE — CHART NOTE - NSCHARTNOTEFT_GEN_A_CORE
[facility] MEDICINE NIGHT COVERAGE    78 year-old female, with past history significant for Glaucoma, Type-2 DM, Dyslipidemia, Hypertension, Osteoarthritis and Dementia, presented to the ED secondary to generalized weakness and decreased oral intake.  Diagnosed with Diverticulitis in the ED.    Notified by RN that patient agitated, walking around the room and disturbing other patients, and stating she wants to go home. Patient unable to be redirected. Per RN, patient spoke with her son over the phone but is still stating she wants to go home. Patient seen and assessed at bedside. Patient is awake, alert, in NAD, sitting in chair by nursing station, mentating at baseline. Patient appears calm. Denies any acute complaints at this time. When asked why she wants to leave, patient states she "does not belong here." Of note, patient was given PRN melatonin 3mg. Additional 6mg of melatonin ordered. Patient agreed to take medication and return to her bed. While getting in bed, patient states "I would rather kill myself than stay here." When asked why, patient continues to state she does not want to be here. Will place patient on constant observation. Will take patient's belongings for now. Suicide precautions ordered. Will continue to monitor closely and relay events to day team. Above discussed with RN who agrees with plan.     Rox Caraballo PA-C  Medicine [id number]

## 2024-03-24 NOTE — HISTORY OF PRESENT ILLNESS
[Patient] : patient [Family Member] : family member [FreeTextEntry2] : PMH: T2DM, dementia  Purpose of Visit: 4-week post admission visit   Relevant Prior Hospitalizations: 1/22-24/2024 [facility]: pneumonia & sepsis s/p azithro/CTX, d/c with levofloxacin 12/2023 [facility]: Cholecystectomy   Social/Home Environment:  -Lives in home with her  and son Trevin -HHA: [employer] insurance, unclear MLTC, 20 hours/week, son acts as CDPAS. Requesting increase to 56 hrs/wk.   Today's Visit & Review: -Interviewed son with patient, HHA present -Fall 2 days ago with , could not lift her from floor, had to get help from downstairs. -Mirtazapine 15mg QHS prescribed today for appetite. Generalized progression of dementia unfortunately -Still following with neuro but not much change or workup occurring -[place] PCP getting chest imaging to determine why she's not eating -Patient to see primary care on Friday in [place] to undergo chest imaging to investigate the cause for her difficulty swallowing.  -Dementia: Prominent memory impairment. Memantine ER 21mg daily. Unsteady gait with 3 falls in past year, no severe injury. Losing weight recently. Insomnia, not on meds. -Vertigo: Active in past, meclizine 12.5mg TID PRN -Fuch's corneal dystrophy: Uncommon condition with change in corneal compartment dimensions causing glare & optical phenomena. Brinzolamide eyedrops. -Cataracts -Seasonal rhinitis/cough: Claritin 10mg daily, Robitussin DM, Flonase -Dental: Top & bottom dentures -CAD: Metoprolol ER 50mg daily, simvastatin 20mg QHS, ASA 81mg -HTN: Losartan, nifedipine ER  -GERD: Pantoprazole 20mg daily -Cholecystitis: S/p cholecystectomy 11/2023, no plans to return to surgeon at this time. -T2DM: Unclear control. Metformin ER 2000mg daily. -Osteoporosis: Alendronate 70mg weekly, oyster calcium/D3 500mg-5mcg daily -Screening/Preventive: Vaccines: Covid, flu, shingles, PNA   DME: Shower chair and bars in the bathroom [FreeTextEntry1] : dementia

## 2024-03-25 LAB
GLUCOSE BLDC GLUCOMTR-MCNC: 77 MG/DL — SIGNIFICANT CHANGE UP (ref 70–99)
GLUCOSE BLDC GLUCOMTR-MCNC: 81 MG/DL — SIGNIFICANT CHANGE UP (ref 70–99)
GLUCOSE BLDC GLUCOMTR-MCNC: 95 MG/DL — SIGNIFICANT CHANGE UP (ref 70–99)

## 2024-03-25 RX ORDER — SODIUM,POTASSIUM PHOSPHATES 278-250MG
1 POWDER IN PACKET (EA) ORAL ONCE
Refills: 0 | Status: COMPLETED | OUTPATIENT
Start: 2024-03-25 | End: 2024-03-25

## 2024-03-25 RX ADMIN — Medication 1 APPLICATION(S): at 21:22

## 2024-03-25 RX ADMIN — Medication 1 PACKET(S): at 15:58

## 2024-03-25 RX ADMIN — Medication 100 MILLIGRAM(S): at 21:22

## 2024-03-25 RX ADMIN — Medication 1 APPLICATION(S): at 05:51

## 2024-03-25 RX ADMIN — Medication 81 MILLIGRAM(S): at 13:20

## 2024-03-25 RX ADMIN — Medication 25 MILLIGRAM(S): at 18:42

## 2024-03-25 RX ADMIN — CHLORHEXIDINE GLUCONATE 1 APPLICATION(S): 213 SOLUTION TOPICAL at 05:51

## 2024-03-25 RX ADMIN — Medication 25 MILLIGRAM(S): at 05:51

## 2024-03-25 RX ADMIN — Medication 100 MILLIGRAM(S): at 13:20

## 2024-03-25 RX ADMIN — Medication 1 APPLICATION(S): at 13:20

## 2024-03-25 RX ADMIN — Medication 100 MILLIGRAM(S): at 05:46

## 2024-03-25 RX ADMIN — PANTOPRAZOLE SODIUM 40 MILLIGRAM(S): 20 TABLET, DELAYED RELEASE ORAL at 05:51

## 2024-03-25 RX ADMIN — CEFTRIAXONE 100 MILLIGRAM(S): 500 INJECTION, POWDER, FOR SOLUTION INTRAMUSCULAR; INTRAVENOUS at 18:41

## 2024-03-25 RX ADMIN — ATORVASTATIN CALCIUM 20 MILLIGRAM(S): 80 TABLET, FILM COATED ORAL at 21:22

## 2024-03-25 RX ADMIN — ENOXAPARIN SODIUM 40 MILLIGRAM(S): 100 INJECTION SUBCUTANEOUS at 05:51
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
## 2024-03-25 NOTE — CONSULT NOTE ADULT - ASSESSMENT
78 year-old female, with past history significant for Glaucoma, Type-2 DM, Dyslipidemia, Hypertension, Osteoarthritis and Dementia, presented to the ED secondary to generalized weakness and decreased oral intake.  Diagnosed with Diverticulitis in the ED.      Suspected pneumonia;  Diverticulitis  Glaucoma  DM  Dyslipidemia  HTN  Dementia    Suspected pneumonia;  -ct chest showed; Suboptimal evaluation of the subsegmental pulmonary arteries. No obvious embolus to the level of the segmental pulmonary arteries. Persistent scattered tree-in-bud opacities in both lungs. Mildly decreased patchy opacities in bilateral upper and lower lobes since 2/23/2019.  Doubt she has pneumonia procal is normal  ;   Diverticulitis  -cnt antibiotics  : id following  Glaucoma  cont home meds  DM  -monitor and co ntrol   Dyslipidemia  -defer to primary team  HTN  controlled  Dementia  -supportive care;   -dvt prophylaxis    odette dinero

## 2024-03-25 NOTE — PROGRESS NOTE ADULT - NUTRITIONAL ASSESSMENT
This patient has been assessed with a concern for Malnutrition and has been determined to have a diagnosis/diagnoses of Severe protein-calorie malnutrition.    This patient is being managed with:   Diet Clear Liquid-  Consistent Carbohydrate {Evening Snack} (CSTCHOSN)  Gastroesophageal Reflux Disease (GERD)  Entered: Mar 22 2024  2:49PM  
This patient has been assessed with a concern for Malnutrition and has been determined to have a diagnosis/diagnoses of Severe protein-calorie malnutrition.    This patient is being managed with:   Safety Tray-    Time/Priority:  Routine  Entered: Mar 24 2024 11:00PM    Diet Clear Liquid-  Consistent Carbohydrate {Evening Snack} (CSTCHOSN)  Gastroesophageal Reflux Disease (GERD)  Entered: Mar 22 2024  2:49PM  
This patient has been assessed with a concern for Malnutrition and has been determined to have a diagnosis/diagnoses of Severe protein-calorie malnutrition.    This patient is being managed with:   Diet Clear Liquid-  Consistent Carbohydrate {Evening Snack} (CSTCHOSN)  Gastroesophageal Reflux Disease (GERD)  Entered: Mar 22 2024  2:49PM

## 2024-03-25 NOTE — CONSULT NOTE ADULT - NS ATTEND AMEND GEN_ALL_CORE FT
Patient seen and examined. Agree with plan as detailed in PA/NP Note.     BP stable, c/w BB euvolemic on exam    Negar Eagle MD  Pager: [phone]

## 2024-03-25 NOTE — CHART NOTE - NSCHARTNOTEFT_GEN_A_CORE
78 year-old female, with past history significant for Glaucoma, Type-2 DM, Dyslipidemia, Hypertension, Osteoarthritis and Dementia, presented to the ED secondary to generalized weakness and decreased oral intake.  Diagnosed with Diverticulitis in the ED.    Per chart review - overnight  provider was notified that  patient was agitated, walking around the room and disturbing other patients, and stating she wanted to go home. At that time, patient was unable to be redirected. Night provider evaluated the patient at bedside and while getting into bed, patient stated "I would rather kill myself than stay here." When asked why, patient continued  to state she did not want to be in hospital.  Pt was placed on Constant observation, belonging taken away,  suicide precautions ordered.     Writer went to assess pt at bedside this afternoon. Pt sitting up in chair, alert and oriented x 4. Pt does not recall making a statement about wanting to harm herself. She explains that she was irritable and frustrated and she did not really mean what she said. Pt denies wanting to harm herself or others. Constant observation discontinued as pt denies any SI. PT reiterated she just wants to get better and go home.     Flores Ruggiero NP   Department of Medicine, [facility]  P. [phone] 78 year-old female, with past history significant for Glaucoma, Type-2 DM, Dyslipidemia, Hypertension, Osteoarthritis and Dementia, presented to the ED secondary to generalized weakness and decreased oral intake.  Diagnosed with Diverticulitis in the ED.    Per chart review - overnight  provider was notified that  patient was agitated, walking around the room and disturbing other patients, and stating she wanted to go home. At that time, patient was unable to be redirected. Night provider evaluated the patient at bedside and while getting into bed, patient stated "I would rather kill myself than stay here." When asked why, patient continued  to state she did not want to be in hospital.  Pt was placed on Constant observation, belonging taken away,  suicide precautions ordered.     Writer went to assess pt at bedside this afternoon. Pt sitting up in chair, alert and oriented x 4. Pt does not recall making a statement about wanting to harm herself. She explains that she was irritable and frustrated and if she said that, did not mean it as she was just angry. Pt denies wanting to harm herself or others. Constant observation discontinued as pt denies any SI. PT reiterated she just wants to get better and go home.     Flores Ruggiero NP   Department of Medicine, [facility]  P. [phone]

## 2024-03-25 NOTE — CHART NOTE - NSCHARTNOTEFT_GEN_A_CORE
Psychiatry consulted to assess for SI. After discussion between CL and the primary team, consult has been withdrawn as patient adamantly denies making any suicidal statements overnight and the team has no acute safety concerns.

## 2024-03-26 ENCOUNTER — TRANSCRIPTION ENCOUNTER (OUTPATIENT)
Age: 79
End: 2024-03-26

## 2024-03-26 VITALS
OXYGEN SATURATION: 94 % | TEMPERATURE: 98 F | SYSTOLIC BLOOD PRESSURE: 131 MMHG | RESPIRATION RATE: 18 BRPM | HEART RATE: 79 BPM | DIASTOLIC BLOOD PRESSURE: 69 MMHG

## 2024-03-26 LAB
ANION GAP SERPL CALC-SCNC: 10 MMOL/L — SIGNIFICANT CHANGE UP (ref 7–14)
BUN SERPL-MCNC: 10 MG/DL — SIGNIFICANT CHANGE UP (ref 7–23)
CALCIUM SERPL-MCNC: 8.5 MG/DL — SIGNIFICANT CHANGE UP (ref 8.4–10.5)
CHLORIDE SERPL-SCNC: 103 MMOL/L — SIGNIFICANT CHANGE UP (ref 98–107)
CO2 SERPL-SCNC: 22 MMOL/L — SIGNIFICANT CHANGE UP (ref 22–31)
CREAT SERPL-MCNC: 0.67 MG/DL — SIGNIFICANT CHANGE UP (ref 0.5–1.3)
EGFR: 91 ML/MIN/1.73M2 — SIGNIFICANT CHANGE UP
GLUCOSE BLDC GLUCOMTR-MCNC: 117 MG/DL — HIGH (ref 70–99)
GLUCOSE BLDC GLUCOMTR-MCNC: 215 MG/DL — HIGH (ref 70–99)
GLUCOSE SERPL-MCNC: 95 MG/DL — SIGNIFICANT CHANGE UP (ref 70–99)
HCT VFR BLD CALC: 33.5 % — LOW (ref 34.5–45)
HGB BLD-MCNC: 11.3 G/DL — LOW (ref 11.5–15.5)
MAGNESIUM SERPL-MCNC: 1.9 MG/DL — SIGNIFICANT CHANGE UP (ref 1.6–2.6)
MCHC RBC-ENTMCNC: 28.9 PG — SIGNIFICANT CHANGE UP (ref 27–34)
MCHC RBC-ENTMCNC: 33.7 GM/DL — SIGNIFICANT CHANGE UP (ref 32–36)
MCV RBC AUTO: 85.7 FL — SIGNIFICANT CHANGE UP (ref 80–100)
NRBC # BLD: 0 /100 WBCS — SIGNIFICANT CHANGE UP (ref 0–0)
NRBC # FLD: 0 K/UL — SIGNIFICANT CHANGE UP (ref 0–0)
PHOSPHATE SERPL-MCNC: 2.3 MG/DL — LOW (ref 2.5–4.5)
PLATELET # BLD AUTO: 249 K/UL — SIGNIFICANT CHANGE UP (ref 150–400)
POTASSIUM SERPL-MCNC: 3.1 MMOL/L — LOW (ref 3.5–5.3)
POTASSIUM SERPL-SCNC: 3.1 MMOL/L — LOW (ref 3.5–5.3)
RBC # BLD: 3.91 M/UL — SIGNIFICANT CHANGE UP (ref 3.8–5.2)
RBC # FLD: 14.2 % — SIGNIFICANT CHANGE UP (ref 10.3–14.5)
SODIUM SERPL-SCNC: 135 MMOL/L — SIGNIFICANT CHANGE UP (ref 135–145)
WBC # BLD: 9.88 K/UL — SIGNIFICANT CHANGE UP (ref 3.8–10.5)
WBC # FLD AUTO: 9.88 K/UL — SIGNIFICANT CHANGE UP (ref 3.8–10.5)

## 2024-03-26 RX ORDER — ASPIRIN/CALCIUM CARB/MAGNESIUM 324 MG
1 TABLET ORAL
Qty: 0 | Refills: 0 | DISCHARGE
Start: 2024-03-26

## 2024-03-26 RX ORDER — METRONIDAZOLE 500 MG
1 TABLET ORAL
Qty: 18 | Refills: 0
Start: 2024-03-26 | End: 2024-03-31

## 2024-03-26 RX ORDER — METOPROLOL TARTRATE 50 MG
1 TABLET ORAL
Qty: 60 | Refills: 0
Start: 2024-03-26 | End: 2024-04-24

## 2024-03-26 RX ORDER — POTASSIUM CHLORIDE 20 MEQ
40 PACKET (EA) ORAL EVERY 4 HOURS
Refills: 0 | Status: DISCONTINUED | OUTPATIENT
Start: 2024-03-26 | End: 2024-03-26

## 2024-03-26 RX ORDER — POTASSIUM CHLORIDE 20 MEQ
10 PACKET (EA) ORAL
Refills: 0 | Status: COMPLETED | OUTPATIENT
Start: 2024-03-26 | End: 2024-03-26

## 2024-03-26 RX ORDER — POTASSIUM CHLORIDE 20 MEQ
40 PACKET (EA) ORAL ONCE
Refills: 0 | Status: COMPLETED | OUTPATIENT
Start: 2024-03-26 | End: 2024-03-26

## 2024-03-26 RX ORDER — CEFPODOXIME PROXETIL 100 MG
1 TABLET ORAL
Qty: 12 | Refills: 0
Start: 2024-03-26 | End: 2024-03-31

## 2024-03-26 RX ORDER — METOPROLOL TARTRATE 50 MG
1 TABLET ORAL
Qty: 0 | Refills: 0 | DISCHARGE

## 2024-03-26 RX ADMIN — Medication 2: at 12:40

## 2024-03-26 RX ADMIN — Medication 100 MILLIEQUIVALENT(S): at 09:18

## 2024-03-26 RX ADMIN — Medication 100 MILLIGRAM(S): at 13:41

## 2024-03-26 RX ADMIN — Medication 25 MILLIGRAM(S): at 05:41

## 2024-03-26 RX ADMIN — CHLORHEXIDINE GLUCONATE 1 APPLICATION(S): 213 SOLUTION TOPICAL at 05:41

## 2024-03-26 RX ADMIN — Medication 100 MILLIEQUIVALENT(S): at 07:08

## 2024-03-26 RX ADMIN — PANTOPRAZOLE SODIUM 40 MILLIGRAM(S): 20 TABLET, DELAYED RELEASE ORAL at 06:56

## 2024-03-26 RX ADMIN — Medication 1 APPLICATION(S): at 13:41

## 2024-03-26 RX ADMIN — Medication 100 MILLIGRAM(S): at 05:42

## 2024-03-26 RX ADMIN — ENOXAPARIN SODIUM 40 MILLIGRAM(S): 100 INJECTION SUBCUTANEOUS at 05:42

## 2024-03-26 RX ADMIN — Medication 100 MILLIEQUIVALENT(S): at 11:00

## 2024-03-26 RX ADMIN — Medication 1 APPLICATION(S): at 05:42

## 2024-03-26 RX ADMIN — Medication 40 MILLIEQUIVALENT(S): at 05:46

## 2024-03-26 RX ADMIN — Medication 81 MILLIGRAM(S): at 11:39

## 2024-03-26 NOTE — DISCHARGE NOTE PROVIDER - HOSPITAL COURSE
78 year-old female, with past history significant for Glaucoma, Type-2 DM, Dyslipidemia, Hypertension, Osteoarthritis and Dementia, presented to the ED secondary to generalized weakness and decreased oral intake.  Diagnosed with Diverticulitis in the ED.        Acute diverticulitis of intestine.    - presented with report of generalized weakness and decreased oral intake  - CT scan: "Distal descending/proximal sigmoid colon diverticulitis. Small free fluid with increased attenuation, which may contain debris/blood product. No obvious organized fluid collection, bowel obstruction or intraperitoneal free air. Recommend correlation with colonoscopy to exclude neoplasm, following resolution of acute episode."  - ID evaluated - c/w Ceftriaxone 1 g IV q24h and Flagyl 500 mg q8h. On discharge, Switch to PO Cefpodoxime 200mg BID and Flagyl 500mg POq8h through 3/31/2024.    - Advance diet as tolerated, pt is tolerating solids.       Diabetes mellitus treated without insulin.   - glucose = 156.  A1c = 6.6 on 01/24/2024  - does not appear to be on med/s as outpatient  - consistent carb diet.  - Follow up with PCP on d/c      Essential hypertension.   - cw current meds     Discussed with Dr. Eagle on 3/26/2024. Pt is tolerating advancement in her diet. Pt medically cleared for discharge on antibiotics as above with last day of 3/31/2024. PT to have close follow up with GI for f/u Colonoscopy.

## 2024-03-26 NOTE — DISCHARGE NOTE PROVIDER - NSFOLLOWUPCLINICS_GEN_ALL_ED_FT
[facility]  Gastroenterology  [address] [place] [address]  Phone: [phone]  Fax:   Follow Up Time: 2 weeks

## 2024-03-26 NOTE — DISCHARGE NOTE NURSING/CASE MANAGEMENT/SOCIAL WORK - NSDCPEFALRISK_GEN_ALL_CORE
For information on Fall & Injury Prevention, visit: [email or web address] OR  [email or web address] OR  https://www.cdc.gov/steadi/patient.html

## 2024-03-26 NOTE — DISCHARGE NOTE NURSING/CASE MANAGEMENT/SOCIAL WORK - PATIENT PORTAL LINK FT
You can access the FollowMyHealth Patient Portal offered by [facility] by registering at the following website: [email or web address]. By joining [facility]’s FollowMyHealth portal, you will also be able to view your health information using other applications (apps) compatible with our system.

## 2024-03-26 NOTE — PROGRESS NOTE ADULT - ASSESSMENT
78 year-old female, with past history significant for Glaucoma, Type-2 DM, Dyslipidemia, Hypertension, Osteoarthritis and Dementia, presented to the ED secondary to generalized weakness and decreased oral intake.  Diagnosed with Diverticulitis in the ED.       Acute diverticulitis of intestine.   ·  Plan: - presented with report of generalized weakness and decreased oral intake  - CT scan = "Distal descending/proximal sigmoid colon diverticulitis. Small free fluid with increased attenuation, which may contain debris/blood product. No obvious organized fluid collection, bowel obstruction or intraperitoneal free air. Recommend correlation with colonoscopy to exclude neoplasm, following resolution of acute episode."  - cw abx  - ID fu      Diabetes mellitus treated without insulin.   ·  Plan: - glucose = 156.  A1c = 6.6 on 01/24/2024  - does not appear to be on med/s as outpatient  - consistent carb diet.     Essential hypertension.   ·  Plan: - no acute issues presently  - cw current meds 
79 y/o F PMhx Glaucoma, DM II, HTN, dementia who presented w/ generalized weakness and decreased oral intake   found to have abd pain and diverticulitis    diverticulitis, leukocytosis, hydroureteronephrosis  abdominal tenderness- resolved  CT abd/pelvis- Distal descending/proximal sigmoid colon diverticulitis. Small free fluid with increased attenuation, which may contain debris/blood product. No obvious organized fluid collection, bowel obstruction or intraperitoneal free air. Mild left hydroureteronephrosis to the level of the sigmoid colon, presumably related to inflammation and subsequent compression of the ureter.  UA negative  RVP negative  CTA chest- No evidence of PE. Persistent scattered tree-in-bud opacities in both lungs. Mildly decreased patchy opacities in bilateral upper and lower lobes since 2/23/2019.  procal negative, pt not hypoxic  low suspicion for PNA    Recommendations  c/w ceftriaxone 1g daily and flagyl 500mg every 8 hours  on discharge transition to cefpodoxime 200mg bid and flagyl 500mg every 8 hours PO w/ last day 3/31  she should have colonoscopy several weeks after resolution of infection    Geovani Goldman M.D.  [facility], Division of Infectious Diseases  [phone]  After 5pm on weekdays and all day on weekends - please call [phone] 
77 y/o F PMhx Glaucoma, DM II, HTN, dementia who presented w/ generalized weakness and decreased oral intake   found to have abd pain and diverticulitis    diverticulitis, leukocytosis, hydroureteronephrosis  abdominal tenderness- resolved  CT abd/pelvis- Distal descending/proximal sigmoid colon diverticulitis. Small free fluid with increased attenuation, which may contain debris/blood product. No obvious organized fluid collection, bowel obstruction or intraperitoneal free air. Mild left hydroureteronephrosis to the level of the sigmoid colon, presumably related to inflammation and subsequent compression of the ureter.  UA negative  RVP negative  CTA chest- No evidence of PE. Persistent scattered tree-in-bud opacities in both lungs. Mildly decreased patchy opacities in bilateral upper and lower lobes since 2/23/2019.  procal negative, pt not hypoxic  low suspicion for PNA    Recommendations  c/w ceftriaxone 1g daily and flagyl 500mg every 8 hours  on discharge transition to cefpodoxime 200mg bid and flagyl 500mg every 8 hours PO w/ last day 3/31  she should have colonoscopy several weeks after resolution of infection    Geovani Goldman M.D.  [facility], Division of Infectious Diseases  [phone]  After 5pm on weekdays and all day on weekends - please call [phone] 
78 year-old female, with past history significant for Glaucoma, Type-2 DM, Dyslipidemia, Hypertension, Osteoarthritis and Dementia, presented to the ED secondary to generalized weakness and decreased oral intake.  Diagnosed with Diverticulitis in the ED.       Acute diverticulitis of intestine.   ·  Plan: - presented with report of generalized weakness and decreased oral intake  - CT scan = "Distal descending/proximal sigmoid colon diverticulitis. Small free fluid with increased attenuation, which may contain debris/blood product. No obvious organized fluid collection, bowel obstruction or intraperitoneal free air. Recommend correlation with colonoscopy to exclude neoplasm, following resolution of acute episode."  - cw abx  - ID fu      Diabetes mellitus treated without insulin.   ·  Plan: - glucose = 156.  A1c = 6.6 on 01/24/2024  - does not appear to be on med/s as outpatient  - consistent carb diet.     Essential hypertension.   ·  Plan: - no acute issues presently  - cw current meds   
{\rtf1\zhxxet28332\ansi\onjspzo7168\ftnbj\uc1\deff0  {\fonttbl{\f0 \fnil Segoe UI;}{\f1 \fnil \fcharset0 Segoe UI;}{\f2 \fnil Times New Shyam;}}  {\colortbl ;\exp041\pgnab455\ysgi939 ;\red0\green0\blue0 ;\red0\green0\bubj530 ;\red0\green0\blue0 ;}  {\stylesheet{\f0\fs20 Normal;}{\cs1 Default Paragraph Font;}{\cs2\f0\fs16 Line Number;}{\cs3\f2\fs24\ul\cf3 Hyperlink;}}  {\*\revtbl{Unknown;}}  \fnajva17720\pvxklz08659\dpnjd3967\rnaxk9186\btmvh8304\hzzml8670\jciaghf639\pkzuitx592\nogrowautofit\etymmb006\formshade\nofeaturethrottle1\dntblnsbdb\fet4\aendnotes\aftnnrlc\pgbrdrhead\pgbrdrfoot  \sectd\vcqeze15186\jmdsof21295\guttersxn0\pizjonlq1209\wioundog0891\kgfflfzk4520\nwdsovgt6118\yddwyen421\xyxbkab097\sbkpage\pgncont\pgndec  \plain\plain\f0\fs24\ql\plain\f0\fs24\plain\f1\fs16\udeg5444\hich\f1\dbch\f1\loch\f1\cf2\fs16 78 year-old female, with past history significant for Glaucoma, Type-2 DM, Dyslipidemia, Hypertension, Osteoarthritis and Dementia, presented to the ED secondary   to generalized weakness and decreased oral intake.  Diagnosed with Diverticulitis in the ED.\par  \par  \plain\f1\fs16\rake9701\hich\f1\dbch\f1\loch\f1\cf2\fs16\strike\plain\f1\fs16\mlpq7197\hich\f1\dbch\f1\loch\f1\cf2\fs16\plain\f1\fs16\dksn3987\hich\f1\dbch\f1\loch\f1\cf2\fs16\par  \plain\f1\fs16\wpxr9152\hich\f1\dbch\f1\loch\f1\cf2\fs16\b\ul{\field{\*\fldinst HYPERLINK [id number] }{\fldrslt Problem/Plan - 1:}}\plain\f1\fs16\orfh3413\hich\f1\dbch\f1\loch\f1\cf2\fs16\ql\par  \'b7  {\*\bkmkstart vi47427237661}{\*\bkmkend xe10928392707}Problem: {\*\bkmkstart oj36946898723}{\*\bkmkend nl23952737687}Acute diverticulitis of intestine. \par  \'b7  {\*\bkmkstart ff33595756166}{\*\bkmkend yj65060982249}Plan: {\*\bkmkstart wg82045440294}{\*\bkmkend kt33097107753}- presented with report of generalized weakness and decreased oral intake\par  - CT scan = "Distal descending/proximal sigmoid colon diverticulitis. Small free fluid with increased attenuation, which may contain debris/blood product. No obvious organized fluid collection, bowel obstruction or intraperitoneal free air. Recommend   correlation with colonoscopy to exclude neoplasm, following resolution of acute episode."\par  - no abdominal tenderness to mild/moderate palpation\par  - started on ciprofloxacin and metronidazole in the ED; continuing\par  - f/u blood cultures x 2 (in progress)\par  - ensure optimal hydration\par  \par  \plain\f1\fs16\zfmh7019\hich\f1\dbch\f1\loch\f1\cf2\fs16\b\ul{\field{\*\fldinst HYPERLINK [id number] }{\fldrslt Problem/Plan - 2:}}\plain\f1\fs16\iqal8481\hich\f1\dbch\f1\loch\f1\cf2\fs16\ql\par  \'b7  {\*\bkmkstart qt65412178266}{\*\bkmkend nq85764658039}Problem: {\*\bkmkstart xn62649647982}{\*\bkmkend kz01749735656}Hypoxia. \par  \'b7  {\*\bkmkstart ep03725358117}{\*\bkmkend dx77923787288}Plan: {\*\bkmkstart pt91809214246}{\*\bkmkend jv23628256415}- desaturated to low 90's in the ED, and placed on nasal cannula, with O2 Sat at 95% on 3L NC\par  - CT scan negative for PE, but shows "Persistent scattered tree-in-bud opacities in both lungs. Mildly decreased patchy opacities in bilateral upper and lower lobes since 2/23/2019."\par  - follow pulse oximetry\par  - HOB elevation\par  - since opacities decreasing, will not treat directly for pneumonia at present.\par  \par  \plain\f1\fs16\alma0523\hich\f1\dbch\f1\loch\f1\cf2\fs16\b\ul{\field{\*\fldinst HYPERLINK [id number] }{\fldrslt Problem/Plan - 3:}}\plain\f1\fs16\xxnm8339\hich\f1\dbch\f1\loch\f1\cf2\fs16\ql\par  \'b7  {\*\bkmkstart bz08718334638}{\*\bkmkend qq79782904060}Problem: {\*\bkmkstart nv16686162563}{\*\bkmkend po58337310959}Hydroureteronephrosis. \par  \'b7  {\*\bkmkstart vp74898395449}{\*\bkmkend kq27726439747}Plan: {\*\bkmkstart pm42939587860}{\*\bkmkend nv84416289926}- CT scan = "Mild left hydroureteronephrosis to the level of the sigmoid colon,  presumably related to inflammation and subsequent   compression of the ureter. Distended urinary bladder. Recommend with clinical correlation to assess urinary retention."\par  - patient states no urinary difficulties\par  - UA negative for signs of infection\par  - urine culture "collected."  (please f/u)\par  - Bladder Scan ordered (please f/u)\par  - intake/output Q4H\par  - ensure optimal hydration.\par  \par  \plain\f1\fs16\dmsk7988\hich\f1\dbch\f1\loch\f1\cf2\fs16\b\ul{\field{\*\fldinst HYPERLINK [id number] }{\fldrslt Problem/Plan - 4:}}\plain\f1\fs16\myvd3908\hich\f1\dbch\f1\loch\f1\cf2\fs16\ql\par  \'b7  {\*\bkmkstart xi02177505801}{\*\bkmkend ps67731618228}Problem: {\*\bkmkstart ov31758822033}{\*\bkmkend ue23475897710}Dehydration. \par  \'b7  {\*\bkmkstart qe69521212441}{\*\bkmkend my66600693294}Plan: {\*\bkmkstart wv88270948843}{\*\bkmkend ci97045976012}- dry lips, dry oral mucosa, sequela of infection, decreased oral intake\par  - being fluid resuscitated via IV\par  - encourage oral hydration, as tolerated\par  - f/u electrolytes, renal function.\par  \plain\f1\fs16\stft9047\hich\f1\dbch\f1\loch\f1\cf2\fs16\b\ul{\field{\*\fldinst HYPERLINK [id number] }{\fldrslt Problem/Plan - 5:}}\plain\f1\fs16\rmnz3313\hich\f1\dbch\f1\loch\f1\cf2\fs16\ql\par  \'b7  {\*\bkmkstart ne35490391300}{\*\bkmkend wp78113631348}Problem: {\*\bkmkstart dz68325128139}{\*\bkmkend up47744012518}Generalized weakness. \par  \'b7  {\*\bkmkstart yg42979443989}{\*\bkmkend uy45319533879}Plan: {\*\bkmkstart un09732686052}{\*\bkmkend tk84693939250}- in the setting of infection, decreased oral intake, dehydration\par  - on antibiotics for diverticulitis\par  - being fluid resuscitated via IV; encourage oral hydration, as tolerated\par  - f/u electrolytes.\par  \par  \plain\f1\fs16\fdxx5714\hich\f1\dbch\f1\loch\f1\cf2\fs16\b\ul{\field{\*\fldinst HYPERLINK [id number] }{\fldrslt Problem/Plan - 6:}}\plain\f1\fs16\qryz2074\hich\f1\dbch\f1\loch\f1\cf2\fs16\ql\par  \'b7  {\*\bkmkstart pt62927845808}{\*\bkmkend gc20796087397}Problem: {\*\bkmkstart vt31991988897}{\*\bkmkend kw42427630759}Type 2 diabetes mellitus treated without insulin. \par  \'b7  {\*\bkmkstart pv28933659028}{\*\bkmkend zu20175576638}Plan: {\*\bkmkstart iy56233724078}{\*\bkmkend fc38003599971}- glucose = 156.  A1c = 6.6 on 01/24/2024\par  - does not appear to be on med/s as outpatient\par  - consistent carb diet.\par  \par  \plain\f1\fs16\nupz2997\hich\f1\dbch\f1\loch\f1\cf2\fs16\b\ul{\field{\*\fldinst HYPERLINK [id number] }{\fldrslt Problem/Plan - 7:}}\plain\f1\fs16\ecft4259\hich\f1\dbch\f1\loch\f1\cf2\fs16\ql\par  \'b7  {\*\bkmkstart vx40129550389}{\*\bkmkend fm96407849523}Problem: {\*\bkmkstart ja38850357900}{\*\bkmkend ji51171059327}Essential hypertension. \par  \'b7  {\*\bkmkstart wx22900091361}{\*\bkmkend ml11031207292}Plan: {\*\bkmkstart he23265435672}{\*\bkmkend qa67574951785}- no acute issues presently\par  - per chart review, appears to be on metoprolol 50 mg ER as outpatient\par  - prescribing metoprolol 25 mg Q12H for now; would convert to daily dosing upon discharge, if possible\par  - f/u w/ repeat measurements.\par  \par  \plain\f1\fs16\nzgr6450\hich\f1\dbch\f1\loch\f1\cf2\fs16\b\ul{\field{\*\fldinst HYPERLINK [id number] }{\fldrslt Problem/Plan - 8:}}\plain\f1\fs16\bgez4032\hich\f1\dbch\f1\loch\f1\cf2\fs16\ql\par  \'b7  {\*\bkmkstart tu28501688476}{\*\bkmkend vl12485556189}Problem: {\*\bkmkstart ab64024929219}{\*\bkmkend gi86798352592}Osteoarthritis. \par  \'b7  {\*\bkmkstart kc52854538830}{\*\bkmkend ew69487671422}Plan: {\*\bkmkstart ac19868171524}{\*\bkmkend ti38875724590}- Tylenol PRN mild pain\par  - if pain becomes worse, could try short course of small doses of NSAID\par  - ensure optimal hydration\par  - Physical Therapy, if necessary.\par  \par  \plain\f1\fs16\pbwl1144\hich\f1\dbch\f1\loch\f1\cf2\fs16\b\ul{\field{\*\fldinst HYPERLINK [id number] }{\fldrslt Problem/Plan - 9:}}\plain\f1\fs16\rpxn2063\hich\f1\dbch\f1\loch\f1\cf2\fs16\ql\par  \'b7  {\*\bkmkstart ph92515629040}{\*\bkmkend mz11979609285}Problem: {\*\bkmkstart gk00996739409}{\*\bkmkend qo89620176423}Dementia without behavioral disturbance. \par  \'b7  {\*\bkmkstart hj14235286779}{\*\bkmkend qm25352837023}Plan: {\*\bkmkstart dw47070281067}{\*\bkmkend si11330600832}- forgetful, but pleasant and with good affect.  Cooperative\par  - last TSH level within acceptable range (1.04)\par  - may benefit from donepezil.\par  \plain\f0\fs20\nago9339\hich\f0\dbch\f0\loch\f0\fs20\par  }  
78 year-old female, with past history significant for Glaucoma, Type-2 DM, Dyslipidemia, Hypertension, Osteoarthritis and Dementia, presented to the ED secondary to generalized weakness and decreased oral intake.  Diagnosed with Diverticulitis in the ED.      Suspected pneumonia;  Diverticulitis  Glaucoma  DM  Dyslipidemia  HTN  Dementia    Suspected pneumonia;  -ct chest showed; Suboptimal evaluation of the subsegmental pulmonary arteries. No obvious embolus to the level of the segmental pulmonary arteries. Persistent scattered tree-in-bud opacities in both lungs. Mildly decreased patchy opacities in bilateral upper and lower lobes since 2/23/2019.  Doubt she has pneumonia procal is normal  ;   3/26 : seems to be doing  ok : no sob: on room air   Diverticulitis  -cnt antibiotics  : id following  3/26: dw ID:  to cont antibtiofcs  Glaucoma  cont home meds  DM  -monitor and co ntrol   Dyslipidemia  -defer to primary team  HTN  controlled  Dementia  -supportive care;   -dvt prophylaxis    dw ID

## 2024-03-26 NOTE — DISCHARGE NOTE PROVIDER - CARE PROVIDER_API CALL
Mandy Martinez  Internal Medicine  [address] [place] [address]  Phone: [phone]  Fax: [phone]  Follow Up Time: 2 weeks

## 2024-03-26 NOTE — DISCHARGE NOTE PROVIDER - NSDCCPCAREPLAN_GEN_ALL_CORE_FT
PRINCIPAL DISCHARGE DIAGNOSIS  Diagnosis: Diverticulitis  Assessment and Plan of Treatment: You presented to the hospital with abdominal pain. You were found to have diverticulitis, an inflammation in the lining of your digestive system. Please continue to take your medicatons as prescribed through 3/31/2024. Please schedule an appointment with a Gastroenteroligst within 2 weeks of discharge as you should have a colonoscopy.

## 2024-03-26 NOTE — DISCHARGE NOTE PROVIDER - NSDCMRMEDTOKEN_GEN_ALL_CORE_FT
acetaminophen 325 mg oral tablet: 3 tab(s) orally every 8 hours as needed for  moderate pain  aspirin 81 mg oral delayed release tablet: 1 tab(s) orally once a day  atorvastatin 20 mg oral tablet: 1 tab(s) orally once a day  omeprazole 40 mg oral delayed release capsule: 1 cap orally once a day before breakfast   MDD:1   acetaminophen 325 mg oral tablet: 3 tab(s) orally every 8 hours as needed for  moderate pain  aspirin 81 mg oral delayed release tablet: 1 tab(s) orally once a day  atorvastatin 20 mg oral tablet: 1 tab(s) orally once a day  cefpodoxime 200 mg oral tablet: 1 tab(s) orally 2 times a day  metoprolol tartrate 25 mg oral tablet: 1 tab(s) orally every 12 hours  metroNIDAZOLE 500 mg oral tablet: 1 tab(s) orally every 8 hours  omeprazole 40 mg oral delayed release capsule: 1 cap orally once a day before breakfast   MDD:1

## 2024-03-26 NOTE — DISCHARGE NOTE PROVIDER - DETAILS OF MALNUTRITION DIAGNOSIS/DIAGNOSES
This patient has been assessed with a concern for Malnutrition and was treated during this hospitalization for the following Nutrition diagnosis/diagnoses:     -  03/23/2024: Severe protein-calorie malnutrition

## 2024-03-27 ENCOUNTER — APPOINTMENT (OUTPATIENT)
Dept: HOME HEALTH SERVICES | Facility: HOME HEALTH | Age: 79
End: 2024-03-27

## 2024-03-27 PROBLEM — K57.32 DIVERTICULITIS, COLON: Status: ACTIVE | Noted: 2024-03-27

## 2024-03-28 ENCOUNTER — TRANSCRIPTION ENCOUNTER (OUTPATIENT)
Age: 79
End: 2024-03-28

## 2024-03-28 ENCOUNTER — APPOINTMENT (OUTPATIENT)
Dept: HOME HEALTH SERVICES | Facility: HOME HEALTH | Age: 79
End: 2024-03-28
Payer: MEDICARE

## 2024-03-28 VITALS — SYSTOLIC BLOOD PRESSURE: 144 MMHG | HEART RATE: 72 BPM | OXYGEN SATURATION: 93 % | DIASTOLIC BLOOD PRESSURE: 86 MMHG

## 2024-03-28 DIAGNOSIS — K57.32 DIVERTICULITIS OF LARGE INTESTINE W/OUT PERFORATION OR ABSCESS W/OUT BLEEDING: ICD-10-CM

## 2024-03-28 PROCEDURE — 99495 TRANSJ CARE MGMT MOD F2F 14D: CPT

## 2024-03-28 NOTE — DATA REVIEWED
CALLED AND LEFT MESSAGE FOR PATIENT ABOUT RESCHEDULING APPT ON 2/9.  
[FreeTextEntry1] : Prior available labs and imaging reviewed, with no new or distinct findings at this time.

## 2024-03-28 NOTE — COUNSELING
[Normal Weight - ( BMI  <25 )] : normal weight - ( BMI  <25 ) [Continue diet as tolerated] : continue diet as tolerated based on goals of care [Non - Smoker] : non-smoker [Smoke/CO Detectors] : smoke/CO detectors [Remove clutter and unsafe carpeting to avoid falls] : remove clutter and unsafe carpeting to avoid falls [] : eye exam [Completed] : Aspirin use discussion completed [Improve mobility] : improve mobility [Maintain functional ability] : maintain functional ability [Advanced Directives discussed: ____] : Advanced directives discussed: [unfilled] [Discussed disease trajectory with patient/caregiver] : discussed disease trajectory with patient/caregiver [Completed Healthcare Proxy] : completed healthcare proxy [Full Code] : Code Status: Full Code [Trial of Intubation] : Intubation: Trial of Intubation [No Limitations] : Treatment Guidelines: No limitations [_____] : HCP: [unfilled] [FreeTextEntry3] : low sodium diet  [FreeTextEntry4] : Stay healthy, improve low mood. [de-identified] : MOLST deferred until next visit.

## 2024-03-28 NOTE — END OF VISIT
[Time Spent: ___ minutes] : I have spent [unfilled] minutes of time on the encounter. [FreeTextEntry3] : Documented by Marion Shaver acting as a scribe for Dr. Baldo Church. 03/28/2024   All medical record entries made by the Scribe were at my, Dr. Baldo Church, direction and personally dictated by me on 03/28/2024. I have reviewed the chart and agree that the record accurately reflects my personal performance of the history, physical exam, assessment and plan. I have also personally directed, reviewed, and agreed with the chart.

## 2024-03-28 NOTE — HEALTH RISK ASSESSMENT
[Independent] : feeding [Some assistance needed] : using telephone [Full assistance needed] : managing medications [] : managing finances [Yes] : The patient has visual impairment [Any fall with injury in past year] : Patient reported fall with injury in the past year [Two or more falls in past year] : Patient reported two or more falls in the past year [HRA Reviewed] : Health risk assessment reviewed [[facility]] : 14

## 2024-03-28 NOTE — HISTORY OF PRESENT ILLNESS
[Patient] : patient [Family Member] : family member [FreeTextEntry1] : dementia [FreeTextEntry2] : PMH: T2DM, dementia  Purpose of Visit: Post-hospitalization visit   Relevant Prior Hospitalizations: 3/22-26/2024 [facility]: Diverticulitis, cipro/flagyl, recc outpt colonoscopy 1/22-24/2024 [facility]: pneumonia & sepsis s/p azithro/CTX, d/c with levofloxacin 12/2023 [facility]: Cholecystectomy   Social/Home Environment:  -Lives in home with her  and son Trevin -HHA: [employer] insurance, unclear MLTC, 20 hours/week, son acts as CDPAS. Requesting increase to 56 hrs/wk.   Today's Visit & Review: -Interviewed son with patient, HHA present -Home Care RN makes visit at same time as writer -Will ask about PT post hospitalization with Home Care, if that happens & runs its course, consider trying for nearby office PT -Sees PCP in 2 weeks, will fax results from our visit & plan -Full med refills, 90 day supply -Denies symptoms of mucositis/esophagitis -- suspicion her alendronate is causing or contributing to appetite issues is low. -Will refer to GI with orders for endo/colonoscopy 4/15/2024  -Dementia: Prominent memory impairment. Memantine ER 21mg daily. Unsteady gait with 3 falls in past year, no severe injury. Losing weight recently. Insomnia, not on meds. New trial mirtazapine 15mg QHS. -Vertigo: Active in past, meclizine 12.5mg TID PRN -Fuch's corneal dystrophy: Uncommon condition with change in corneal compartment dimensions causing glare & optical phenomena. Brinzolamide eyedrops. -Cataracts -Seasonal rhinitis/cough: Claritin 10mg daily, Robitussin DM, Flonase -Dental: Top & bottom dentures -CAD: Metoprolol ER 50mg daily, simvastatin 20mg QHS, ASA 81mg -HTN: Losartan, nifedipine ER  -GERD: Pantoprazole 20mg daily -Diverticulitis: Noted on CT 3/2024, cipro/flagyl, advised to f/u outpt for colonscopy to r/o malignancy -Cholecystitis: S/p cholecystectomy 11/2023, no plans to return to surgeon at this time. -T2DM: Unclear control. Metformin ER 2000mg daily. -Osteoporosis: Alendronate 70mg weekly, oyster calcium/D3 500mg-5mcg daily -Screening/Preventive: Vaccines: Covid, flu, shingles, PNA   DME: Shower chair and bars in the bathroom

## 2024-03-28 NOTE — PHYSICAL EXAM
[No Acute Distress] : no acute distress [Normal Sclera/Conjunctiva] : normal sclera/conjunctiva [EOMI] : extra ocular movement intact [PERRL] : pupils equal, round and reactive to light [Normal Outer Ear/Nose] : the ears and nose were normal in appearance [Normal Oropharynx] : the oropharynx was normal [Normal Nasal Mucosa] : the nasal mucosa was normal [Normal TMs] : both tympanic membranes were normal [Supple] : the neck was supple [No JVD] : no jugular venous distention [No LAD] : no lymphadenopathy [Thyroid Normal, No Nodules] : the thyroid was normal and there were no nodules present [Clear to Auscultation] : lungs were clear to auscultation bilaterally [No Respiratory Distress] : no respiratory distress [No Accessory Muscle Use] : no accessory muscle use [Normal Rate] : heart rate was normal  [Normal S1, S2] : normal S1 and S2 [Regular Rhythm] : with a regular rhythm [Breast Exam Declined] : patient declined to have breast exam done [No Murmurs] : no murmurs heard [Non Tender] : non-tender [Normal Bowel Sounds] : normal bowel sounds [Not Distended] : not distended [Soft] : abdomen soft [Patient Refused] : rectal exam was refused by the patient [Normal Axillary Nodes] : no axillary lymphadenopathy [Normal Supraclavicular Nodes] : no supraclavicular lymphadenopathy [Normal Anterior Cervical Nodes] : no anterior cervical lymphadenopathy [No CVA Tenderness] : no ~M costovertebral angle tenderness [No Spinal Tenderness] : no spinal tenderness [No Clubbing, Cyanosis] : no clubbing  or cyanosis of the fingernails [No Skin Lesions] : no skin lesions [No Rash] : no rash [Cranial Nerves Intact] : cranial nerves 2-12 were intact [No Gross Sensory Deficits] : no gross sensory deficits [Normal Affect] : the affect was normal [Kyphosis] : no kyphosis present [Foot Ulcers] : no foot ulcers [de-identified] : Remote and recent memory not intact but able to answer most questions  [de-identified] : unsteady gait [de-identified] : AOx2, remote and recent memory not intact.

## 2024-03-28 NOTE — REASON FOR VISIT
[Post-hospitalization from ___ Hospital] : Post-hospitalization from [unfilled] Hospital [Admitted on: ___] : The patient was admitted on [unfilled] [Discharged on ___] : discharged on [unfilled] [Pre-Visit Preparation] : pre-visit preparation was done [Post Hospitalization] : a post hospitalization visit [FreeTextEntry2] : chart review

## 2024-03-28 NOTE — REVIEW OF SYSTEMS
[Muscle Weakness] : muscle weakness [Confusion] : confusion [Unsteady Walking] : ataxia [Memory Loss] : memory loss [Depression] : depression [Negative] : Heme/Lymph [Muscle Pain] : no muscle pain [Back Pain] : no back pain [Headache] : no headache [Dizziness] : no dizziness [Fainting] : no fainting [FreeTextEntry7] : Poor appetite since 12/2023 after gallbladder surgery [FreeTextEntry9] : unsteady gait [de-identified] : complains of feeling depressed, quiet, not motivated, poor appetite since surgery in 12/2023

## 2024-03-29 RX ORDER — NIFEDIPINE 60 MG/1
60 TABLET, FILM COATED, EXTENDED RELEASE ORAL DAILY
Qty: 90 | Refills: 3 | Status: COMPLETED | COMMUNITY
Start: 2020-09-09 | End: 2024-03-29

## 2024-04-06 ENCOUNTER — TRANSCRIPTION ENCOUNTER (OUTPATIENT)
Age: 79
End: 2024-04-06

## 2024-04-08 ENCOUNTER — NON-APPOINTMENT (OUTPATIENT)
Age: 79
End: 2024-04-08

## 2024-04-08 ENCOUNTER — TRANSCRIPTION ENCOUNTER (OUTPATIENT)
Age: 79
End: 2024-04-08

## 2024-04-08 ENCOUNTER — APPOINTMENT (OUTPATIENT)
Dept: HOME HEALTH SERVICES | Facility: HOME HEALTH | Age: 79
End: 2024-04-08
Payer: MEDICARE

## 2024-04-08 DIAGNOSIS — R50.9 FEVER, UNSPECIFIED: ICD-10-CM

## 2024-04-08 PROCEDURE — 99348 HOME/RES VST EST LOW MDM 30: CPT

## 2024-04-09 PROBLEM — R50.9 FEVER AND CHILLS: Status: ACTIVE | Noted: 2024-04-08

## 2024-04-10 LAB
INFLUENZA A RESULT: NOT DETECTED
INFLUENZA B RESULT: NOT DETECTED
RESP SYN VIRUS RESULT: NOT DETECTED
SARS-COV-2 RESULT: NOT DETECTED

## 2024-04-17 ENCOUNTER — NON-APPOINTMENT (OUTPATIENT)
Age: 79
End: 2024-04-17

## 2024-04-19 ENCOUNTER — INPATIENT (INPATIENT)
Facility: HOSPITAL | Age: 79
LOS: 2 days | Discharge: HOME | End: 2024-04-22
Attending: INTERNAL MEDICINE | Admitting: INTERNAL MEDICINE
Payer: MEDICARE

## 2024-04-19 VITALS
HEIGHT: 65 IN | TEMPERATURE: 102 F | SYSTOLIC BLOOD PRESSURE: 144 MMHG | RESPIRATION RATE: 18 BRPM | HEART RATE: 102 BPM | OXYGEN SATURATION: 89 % | DIASTOLIC BLOOD PRESSURE: 86 MMHG

## 2024-04-19 DIAGNOSIS — Z87.81 PERSONAL HISTORY OF (HEALED) TRAUMATIC FRACTURE: Chronic | ICD-10-CM

## 2024-04-19 DIAGNOSIS — J18.9 PNEUMONIA, UNSPECIFIED ORGANISM: ICD-10-CM

## 2024-04-19 LAB
ADD ON TEST-SPECIMEN IN LAB: SIGNIFICANT CHANGE UP
ALBUMIN SERPL ELPH-MCNC: 3.9 G/DL — SIGNIFICANT CHANGE UP (ref 3.3–5)
ALP SERPL-CCNC: 96 U/L — SIGNIFICANT CHANGE UP (ref 40–120)
ALT FLD-CCNC: 10 U/L — SIGNIFICANT CHANGE UP (ref 4–33)
ANION GAP SERPL CALC-SCNC: 12 MMOL/L — SIGNIFICANT CHANGE UP (ref 7–14)
APTT BLD: 35.8 SEC — HIGH (ref 24.5–35.6)
AST SERPL-CCNC: 15 U/L — SIGNIFICANT CHANGE UP (ref 4–32)
BASOPHILS # BLD AUTO: 0.05 K/UL — SIGNIFICANT CHANGE UP (ref 0–0.2)
BASOPHILS NFR BLD AUTO: 0.3 % — SIGNIFICANT CHANGE UP (ref 0–2)
BILIRUB SERPL-MCNC: 0.7 MG/DL — SIGNIFICANT CHANGE UP (ref 0.2–1.2)
BLD GP AB SCN SERPL QL: NEGATIVE — SIGNIFICANT CHANGE UP
BLOOD GAS VENOUS COMPREHENSIVE RESULT: SIGNIFICANT CHANGE UP
BUN SERPL-MCNC: 11 MG/DL — SIGNIFICANT CHANGE UP (ref 7–23)
CALCIUM SERPL-MCNC: 9.2 MG/DL — SIGNIFICANT CHANGE UP (ref 8.4–10.5)
CHLORIDE SERPL-SCNC: 100 MMOL/L — SIGNIFICANT CHANGE UP (ref 98–107)
CO2 SERPL-SCNC: 24 MMOL/L — SIGNIFICANT CHANGE UP (ref 22–31)
CREAT SERPL-MCNC: 0.63 MG/DL — SIGNIFICANT CHANGE UP (ref 0.5–1.3)
EGFR: 92 ML/MIN/1.73M2 — SIGNIFICANT CHANGE UP
EOSINOPHIL # BLD AUTO: 0.12 K/UL — SIGNIFICANT CHANGE UP (ref 0–0.5)
EOSINOPHIL NFR BLD AUTO: 0.8 % — SIGNIFICANT CHANGE UP (ref 0–6)
FLUAV AG NPH QL: SIGNIFICANT CHANGE UP
FLUBV AG NPH QL: SIGNIFICANT CHANGE UP
GLUCOSE BLDC GLUCOMTR-MCNC: 211 MG/DL — HIGH (ref 70–99)
GLUCOSE SERPL-MCNC: 163 MG/DL — HIGH (ref 70–99)
HCT VFR BLD CALC: 37.1 % — SIGNIFICANT CHANGE UP (ref 34.5–45)
HGB BLD-MCNC: 12.3 G/DL — SIGNIFICANT CHANGE UP (ref 11.5–15.5)
IANC: 10.72 K/UL — HIGH (ref 1.8–7.4)
IMM GRANULOCYTES NFR BLD AUTO: 0.5 % — SIGNIFICANT CHANGE UP (ref 0–0.9)
INR BLD: 1.06 RATIO — SIGNIFICANT CHANGE UP (ref 0.85–1.18)
LYMPHOCYTES # BLD AUTO: 20.5 % — SIGNIFICANT CHANGE UP (ref 13–44)
LYMPHOCYTES # BLD AUTO: 3.01 K/UL — SIGNIFICANT CHANGE UP (ref 1–3.3)
MAGNESIUM SERPL-MCNC: 1.8 MG/DL — SIGNIFICANT CHANGE UP (ref 1.6–2.6)
MCHC RBC-ENTMCNC: 29.3 PG — SIGNIFICANT CHANGE UP (ref 27–34)
MCHC RBC-ENTMCNC: 33.2 GM/DL — SIGNIFICANT CHANGE UP (ref 32–36)
MCV RBC AUTO: 88.3 FL — SIGNIFICANT CHANGE UP (ref 80–100)
MONOCYTES # BLD AUTO: 0.74 K/UL — SIGNIFICANT CHANGE UP (ref 0–0.9)
MONOCYTES NFR BLD AUTO: 5 % — SIGNIFICANT CHANGE UP (ref 2–14)
NEUTROPHILS # BLD AUTO: 10.72 K/UL — HIGH (ref 1.8–7.4)
NEUTROPHILS NFR BLD AUTO: 72.9 % — SIGNIFICANT CHANGE UP (ref 43–77)
NRBC # BLD: 0 /100 WBCS — SIGNIFICANT CHANGE UP (ref 0–0)
NRBC # FLD: 0 K/UL — SIGNIFICANT CHANGE UP (ref 0–0)
PHOSPHATE SERPL-MCNC: 1.9 MG/DL — LOW (ref 2.5–4.5)
PLATELET # BLD AUTO: 300 K/UL — SIGNIFICANT CHANGE UP (ref 150–400)
POTASSIUM SERPL-MCNC: 3.8 MMOL/L — SIGNIFICANT CHANGE UP (ref 3.5–5.3)
POTASSIUM SERPL-SCNC: 3.8 MMOL/L — SIGNIFICANT CHANGE UP (ref 3.5–5.3)
PROT SERPL-MCNC: 9.2 G/DL — HIGH (ref 6–8.3)
PROTHROM AB SERPL-ACNC: 11.9 SEC — SIGNIFICANT CHANGE UP (ref 9.5–13)
RBC # BLD: 4.2 M/UL — SIGNIFICANT CHANGE UP (ref 3.8–5.2)
RBC # FLD: 14.8 % — HIGH (ref 10.3–14.5)
RH IG SCN BLD-IMP: POSITIVE — SIGNIFICANT CHANGE UP
RSV RNA NPH QL NAA+NON-PROBE: SIGNIFICANT CHANGE UP
SARS-COV-2 RNA SPEC QL NAA+PROBE: SIGNIFICANT CHANGE UP
SODIUM SERPL-SCNC: 136 MMOL/L — SIGNIFICANT CHANGE UP (ref 135–145)
WBC # BLD: 14.71 K/UL — HIGH (ref 3.8–10.5)
WBC # FLD AUTO: 14.71 K/UL — HIGH (ref 3.8–10.5)

## 2024-04-19 PROCEDURE — 74177 CT ABD & PELVIS W/CONTRAST: CPT | Mod: 26,MC

## 2024-04-19 PROCEDURE — 71046 X-RAY EXAM CHEST 2 VIEWS: CPT | Mod: 26

## 2024-04-19 PROCEDURE — 71260 CT THORAX DX C+: CPT | Mod: 26,MC

## 2024-04-19 PROCEDURE — 99285 EMERGENCY DEPT VISIT HI MDM: CPT

## 2024-04-19 PROCEDURE — 99223 1ST HOSP IP/OBS HIGH 75: CPT

## 2024-04-19 RX ORDER — VANCOMYCIN HCL 1 G
1000 VIAL (EA) INTRAVENOUS ONCE
Refills: 0 | Status: COMPLETED | OUTPATIENT
Start: 2024-04-19 | End: 2024-04-19

## 2024-04-19 RX ORDER — ACETAMINOPHEN 500 MG
1000 TABLET ORAL ONCE
Refills: 0 | Status: COMPLETED | OUTPATIENT
Start: 2024-04-19 | End: 2024-04-19

## 2024-04-19 RX ORDER — PIPERACILLIN AND TAZOBACTAM 4; .5 G/20ML; G/20ML
3.38 INJECTION, POWDER, LYOPHILIZED, FOR SOLUTION INTRAVENOUS EVERY 8 HOURS
Refills: 0 | Status: DISCONTINUED | OUTPATIENT
Start: 2024-04-19 | End: 2024-04-22

## 2024-04-19 RX ORDER — PIPERACILLIN AND TAZOBACTAM 4; .5 G/20ML; G/20ML
3.38 INJECTION, POWDER, LYOPHILIZED, FOR SOLUTION INTRAVENOUS ONCE
Refills: 0 | Status: COMPLETED | OUTPATIENT
Start: 2024-04-19 | End: 2024-04-19

## 2024-04-19 RX ORDER — KETOROLAC TROMETHAMINE 30 MG/ML
15 SYRINGE (ML) INJECTION ONCE
Refills: 0 | Status: DISCONTINUED | OUTPATIENT
Start: 2024-04-19 | End: 2024-04-19

## 2024-04-19 RX ORDER — SODIUM CHLORIDE 9 MG/ML
1000 INJECTION INTRAMUSCULAR; INTRAVENOUS; SUBCUTANEOUS ONCE
Refills: 0 | Status: COMPLETED | OUTPATIENT
Start: 2024-04-19 | End: 2024-04-19

## 2024-04-19 RX ADMIN — PIPERACILLIN AND TAZOBACTAM 200 GRAM(S): 4; .5 INJECTION, POWDER, LYOPHILIZED, FOR SOLUTION INTRAVENOUS at 14:48

## 2024-04-19 RX ADMIN — SODIUM CHLORIDE 1000 MILLILITER(S): 9 INJECTION INTRAMUSCULAR; INTRAVENOUS; SUBCUTANEOUS at 14:48

## 2024-04-19 RX ADMIN — SODIUM CHLORIDE 1000 MILLILITER(S): 9 INJECTION INTRAMUSCULAR; INTRAVENOUS; SUBCUTANEOUS at 16:45

## 2024-04-19 RX ADMIN — Medication 1000 MILLIGRAM(S): at 15:05

## 2024-04-19 RX ADMIN — Medication 400 MILLIGRAM(S): at 14:48

## 2024-04-19 RX ADMIN — Medication 15 MILLIGRAM(S): at 18:42

## 2024-04-19 RX ADMIN — PIPERACILLIN AND TAZOBACTAM 25 GRAM(S): 4; .5 INJECTION, POWDER, LYOPHILIZED, FOR SOLUTION INTRAVENOUS at 20:37

## 2024-04-19 RX ADMIN — Medication 250 MILLIGRAM(S): at 17:03

## 2024-04-19 NOTE — H&P ADULT - PROBLEM SELECTOR PLAN 4
- report of no bowel movement for the past ~ 2 days, nor passage of gas  - CT scan findings as above; no obstruction and with similar findings to that of 3/22/2024  - f/u stool count and for flatus  - ensure optimal hydration; s/p one liter NaCl in the ED.  Additional IVF of NaCl one liter, followed by LR at 125 mL/Hr x 8 hours prescribed  - recent TSH level within acceptable range  - f/u electrolytes  - f/u need for additional IVF

## 2024-04-19 NOTE — ED ADULT TRIAGE NOTE - CHIEF COMPLAINT QUOTE
Patient brought to ER by EMS from home for c/o flu like symptoms. Fever, hypertensive, tachycardia, abdominal pain and chills.

## 2024-04-19 NOTE — ED ADULT NURSE REASSESSMENT NOTE - NS ED NURSE REASSESS COMMENT FT1
Pt pulled out IV peripheral line , dressed herself in own clothes and walked out of the exam room all dressed. pt stated "I want to go home." Pt A/o x 2 , pt unaware she pulled out her IV line. Pt redirected to stretcher. Assisted pt to get undressed and placed back on tele monitor. RA O2 sat at 90%, placed on 2L NC sats 94-95%. Pending insertion of new peripheral line for Vano administration. Dr. Craven made aware .

## 2024-04-19 NOTE — H&P ADULT - HISTORY OF PRESENT ILLNESS
78 year old female, with past history significant for Diverticulitis, Type-2 DM, HTN, HLD, Glaucoma, OA of the L-knee and PERFECTO, presented to the ED secondary to malaise, fatigue, chills and abdominal pain.  Seen and evaluated at bedside;    Vital signs upon ED presentation as follows: BP = 144/86, HR = 102, RR = 18, T = 39.1 C (102.3 F), O2 Sat = 89% on RA.  Diagnosed with Pneumonia and Diverticulitis and prescribed zosyn, vancomycin, NaCl one liter, Ofirmev 1 gram and ketorolac 15 mg IV in the ED. 78 year old female, with past history significant for Diverticulitis, Type-2 DM, HTN, HLD, Glaucoma, OA of the L-knee and PERFECTO, presented to the ED secondary to malaise, fatigue, chills and abdominal pain.  Seen and evaluated at bedside; ill appearing, but in NAD.  Patient appears forgetful and contribution toward medical history is limited.  Per reports, patient had generalized weakness, malaise, chills and abdominal pain; no similar signs/symptoms on the day prior.  Also reported is that patient has not had a bowel movement, or passed gas in ~ 2 days; patient is unable to recall when the last bowel movement occurred.  Patient was noted to be febrile, hypoxic and tachycardic in the ED.  Patient states no sick contacts or recent travel.    Vital signs upon ED presentation as follows: BP = 144/86, HR = 102, RR = 18, T = 39.1 C (102.3 F), O2 Sat = 89% on RA.  Diagnosed with Pneumonia and Diverticulitis and prescribed zosyn, vancomycin, NaCl one liter, Ofirmev 1 gram and ketorolac 15 mg IV in the ED.

## 2024-04-19 NOTE — H&P ADULT - NSHPREVIEWOFSYSTEMS_GEN_ALL_CORE
REVIEW OF SYSTEMS: Unreliable since patient is forgetful    CONSTITUTIONAL: No weakness, fever, chills or sweating  EYES/ENT: No visual changes.  No dysphagia  NECK: No pain or stiffness  RESPIRATORY: No cough or hemoptysis.  No shortness of breath  CARDIOVASCULAR: No chest pain or palpitations.  No lower extremity edema  GASTROINTESTINAL: No epigastric or abdominal pain. No nausea, vomiting or hematemesis.  No diarrhea or constipation. No melena or hematochezia.  GENITOURINARY: No dysuria, frequency or hematuria  MUSCULOSKELETAL: L-knee pain.  No erythema, warmth  NEUROLOGICAL: No numbness or weakness  PSYCHIATRY: No anxiety, or depression.  SKIN: No itching, burning, rashes, or lesions   All other review of systems is negative unless indicated above.

## 2024-04-19 NOTE — ED PROVIDER NOTE - PHYSICAL EXAMINATION
Exam as stated below:   CONSTITUTIONAL: In NAD. tired appearing, speaking in full sentences   SKIN: Warm dry. No rashes noted.   HEAD: NCAT.   EYES: No scleral icterus. Conjunctiva pink.  ENT: Posterior pharynx with no erythema.  CARD: RRR. No murmurs.  RESP: Clear to ausculation b/l. No Crackles noted. No Wheezing noted.  ABD: Soft. diffuse slight ttp. Not distended.   MSK: No pedal edema. No calf tenderness.  NEURO: UE/LE grossly intact. Motor UE/LE sensation grossly intact. CN II-XII grossly intact.   PSYCH: Cooperative, appropriate.

## 2024-04-19 NOTE — ED PROVIDER NOTE - CLINICAL SUMMARY MEDICAL DECISION MAKING FREE TEXT BOX
MDM/Summary/DDx (includes but is not limited to):  Concerns patient is primarily going to be ruling out sepsis in the setting of SIRS criteria being met, sounds viral versus pneumonia versus URI versus bacteremic from unknown source.  Patient with a history of diverticulitis, will scan the belly as well.   Labs: cbc cmp flu covid 12lead vbg   Imaging: CT C AP, XR   Tx: Supportive, pain/nausea medications as pt requires/requests.  Consults/Resources: none at this time    Dispo: pending     Triage note reviewed. VS reviewed. EKG reviewed and documented in "RESULTS" section, if possible at given time.     DDx in MDM includes the most likely ddx, but is not limited to solely what is listed. Clinical course may alter/deviate from the above plan. When possible, progress notes written, as needed, and are included in "PROGRESS NOTE" section below.       Medical, family, and social determinants of health reviewed and discussed w/ pt/family/caretaker, when allowable, and is incorporated into note above, whenever possible. MDM/Summary/DDx (includes but is not limited to):  Concerns patient is primarily going to be ruling out sepsis in the setting of SIRS criteria being met, sounds viral versus pneumonia versus URI versus bacteremic from unknown source.  Patient with a history of diverticulitis, will scan the belly as well.   Labs: cbc cmp flu covid 12lead vbg   Imaging: CT C AP, XR   Tx: Supportive, pain/nausea medications as pt requires/requests.  Consults/Resources: none at this time    Dispo: pending     Triage note reviewed. VS reviewed. EKG reviewed and documented in "RESULTS" section, if possible at given time.     DDx in MDM includes the most likely ddx, but is not limited to solely what is listed. Clinical course may alter/deviate from the above plan. When possible, progress notes written, as needed, and are included in "PROGRESS NOTE" section below.       Medical, family, and social determinants of health reviewed and discussed w/ pt/family/caretaker, when allowable, and is incorporated into note above, whenever possible.    cash: This 78-year-old woman who comes in with history of diverticulitis pneumonia diabetes high lipids with 1 day of abdominal pain and chills.  Denies chest pain or shortness of breath has no cough.    Vital signs on presentation are blood pressure 144/86 heart rate 102 temperature of 102.3 and an O2 sat of 89.  When I examined patient she was alert interactive and appropriate abdomen had some mild tenderness lungs were clear heart sounds regular rate rhythm S1-S2    White count 14,000.    Patient is meeting sepsis criteria antibiotics are started.    Patient signed out to Dr. Sanderson awaiting CT.  High likelihood of diverticulitis.  Also concerned for pneumonia given relative hypoxia upon presentation and fever  Dispo as per Dr. Sanderson

## 2024-04-19 NOTE — H&P ADULT - ASSESSMENT
[ x ]  Lab studies personally reviewed  [ x ]  Radiology personally reviewed  [ x ]  Old records personally reviewed    78 year old female, with past history significant for Diverticulitis, Type-2 DM, HTN, HLD, Glaucoma, OA of the L-knee and PERFECTO, presented to the ED secondary to malaise, fatigue, chills and abdominal pain.  Diagnosed with Pneumonia and Diverticulitis in the ED. [ x ]  Lab studies personally reviewed  [ x ]  Radiology personally reviewed  [ x ]  Old records personally reviewed    78 year old female, with past history significant for Diverticulitis, Type-2 DM, HTN, HLD, Glaucoma, OA of the L-knee and PERFECTO, presented to the ED secondary to malaise, fatigue, chills and abdominal pain.  Diagnosed with Pneumonia and Diverticulitis in the ED.        Clustered nodular opacity in the right upper lobe with additional   scattered bilateral groundglass opacity in both lungs, concerning for   infectious/inflammatory etiology.    Acute diverticulitis superimposed on chronic changes of diverticular   disease, similar to findings present on prior exam. No evidence of   diverticular abscess.    Mild left hydroureteronephrosis to the region of diverticulitis. No   evidence of obstructing ureteral stone.

## 2024-04-19 NOTE — ED PROVIDER NOTE - ATTENDING CONTRIBUTION TO CARE
cash: This 78-year-old woman who comes in with history of diverticulitis pneumonia diabetes high lipids with 1 day of abdominal pain and chills.  Denies chest pain or shortness of breath has no cough.    Vital signs on presentation are blood pressure 144/86 heart rate 102 temperature of 102.3 and an O2 sat of 89.  When I examined patient she was alert interactive and appropriate abdomen had some mild tenderness lungs were clear heart sounds regular rate rhythm S1-S2    White count 14,000.    Patient is meeting sepsis criteria antibiotics are started.    Patient signed out to Dr. Sanderson awaiting CT.  High likelihood of diverticulitis.  Also concerned for pneumonia given relative hypoxia upon presentation and fever  Dispo as per Dr. Sanderson    I performed a history and physical exam of the patient and discussed their management with the resident and /or advanced care provider. I personally made/approved the management plan and take responsibility for the patient management. I reviewed the resident and /or ACP's note and agree with the documented findings and plan of care. My medical decison making and observations are found above.

## 2024-04-19 NOTE — H&P ADULT - NSHPPHYSICALEXAM_GEN_ALL_CORE
Vital Signs Last 24 Hrs  T(C): 38.3 (19 Apr 2024 15:40), Max: 39.1 (19 Apr 2024 14:02)  T(F): 101 (19 Apr 2024 15:40), Max: 102.3 (19 Apr 2024 14:02)  HR: 90 (19 Apr 2024 15:40) (90 - 107)  BP: 107/57 (19 Apr 2024 15:40) (107/57 - 144/86)  BP(mean): --  RR: 20 (19 Apr 2024 15:40) (18 - 21)  SpO2: 96% (19 Apr 2024 15:40) (89% - 100%)    Parameters below as of 19 Apr 2024 15:40  Patient On (Oxygen Delivery Method): nasal cannula  O2 Flow (L/min): 4  ================================================== Vital Signs Last 24 Hrs  T(C): 38.3 (19 Apr 2024 15:40), Max: 39.1 (19 Apr 2024 14:02)  T(F): 101 (19 Apr 2024 15:40), Max: 102.3 (19 Apr 2024 14:02)  HR: 90 (19 Apr 2024 15:40) (90 - 107)  BP: 107/57 (19 Apr 2024 15:40) (107/57 - 144/86)  BP(mean): --  RR: 20 (19 Apr 2024 15:40) (18 - 21)  SpO2: 96% (19 Apr 2024 15:40) (89% - 100%)    Parameters below as of 19 Apr 2024 15:40  Patient On (Oxygen Delivery Method): nasal cannula  O2 Flow (L/min): 4  ==================================================    APPEARANCE: Adequately groomed, ill appearing female, lying propped up in bed in NAD.  Forgetful  HEENT: Dry lips and dry oral mucosa.  Arcus senilis bilaterally.  Pupils reactive to light.  EOMI  LYMPHATIC: No lymphadenopathy appreciated  CARDIOVASCULAR: (+) S1 S2.  No JVD.  No murmurs.  No edema  RESPIRATORY: No wheezing, rhonchi, crackles appreciated  GASTROINTESTINAL: Soft.  Non-tender to mild/moderate palpation.  (+) BS  GENITOURINARY: No suprapubic tenderness.  No CVA tenderness B/L  EXTREMITIES: Normal range of motion.  No clubbing, cyanosis or edema  MUSCULOSKELETAL: No atrophy.  No asymmetry.  Good ROM  SKIN: No rashes. No ecchymoses.  No cyanosis  PSYCHIATRIC: A&O x 3.  Mood & affect appropriate to situation  NEUROLOGICAL: Non-focal, MCCANN x 4 against gravity  VASCULAR: Peripheral pulses palpable

## 2024-04-19 NOTE — H&P ADULT - NSHPLABSRESULTS_GEN_ALL_CORE
12.3   14.71 )-----------( 300      ( 19 Apr 2024 14:40 )             37.1     136  |  100  |  11  ----------------------------<  163<H>     04-19  3.8   |  24  |  0.63    Ca    9.2      19 Apr 2024 14:40    TPro  9.2<H>  /  Alb  3.9  /  TBili  0.7  /  DBili  x   /  AST  15  /  ALT  10  /  AlkPhos  96  04-19    PT/INR: 11.9/1.06 (04-19-24 @ 14:40)  PTT: 35.8 (04-19-24 @ 14:40)    14:40 - VBG - pH: 7.46  | pCO2: 41    | pO2: 42    | Lactate: 1.8      ==========================================================================        ==========================================================================  . 12.3   14.71 )-----------( 300      ( 19 Apr 2024 14:40 )             37.1     136  |  100  |  11  ----------------------------<  163<H>     04-19  3.8   |  24  |  0.63    Ca    9.2      19 Apr 2024 14:40    TPro  9.2<H>  /  Alb  3.9  /  TBili  0.7  /  DBili  x   /  AST  15  /  ALT  10  /  AlkPhos  96  04-19    PT/INR: 11.9/1.06 (04-19-24 @ 14:40)  PTT: 35.8 (04-19-24 @ 14:40)    14:40 - VBG - pH: 7.46  | pCO2: 41    | pO2: 42    | Lactate: 1.8      ==========================================================================    ECG = sinus tachycardia at 106 bpm.  TWI in II, III, aVF, V3, V4, V5, V6  Small Q-wave in aVL.  QTc = 422    ==========================================================================  .

## 2024-04-19 NOTE — ED ADULT NURSE NOTE - OBJECTIVE STATEMENT
break cover RN. Pt reporting to the ED for fever and chill  starting today. PMH of dementia, HTn, HLD. Pt arrives to room 9, hypoxic 89 on room air placed on 4 L nasal cannula spo2 100%, placed on Cm, tachycardic, normotensive at this time. Pt febrile orally. Pt reports feeling chills and fatigue. Pt is AOX2. Pt denies chest pain or SOB.. Pt denies abdominal pain, dysuria, hematuria. Pt as pain on palpation of abdomen. IV obtained, labs drawn,  pending review, awaiting further orders.

## 2024-04-19 NOTE — H&P ADULT - NSHPSOCIALHISTORY_GEN_ALL_CORE
SOCIAL HISTORY:    Marital Status:  ( x )    (  ) Single        (  )        (  )        (  )   Lives with:          (  ) Alone      ( x ) Spouse     (  ) Children         (  ) Parents             (  ) Other    No history of smoking  No history of alcohol abuse  No history of illegal drug use    Occupation:

## 2024-04-19 NOTE — H&P ADULT - PROBLEM SELECTOR PLAN 2
- CT scan = "Clustered nodular opacity in the right upper lobe with additional scattered bilateral groundglass opacity in both lungs, concerning for infectious/inflammatory etiology."  - Hypoxic to 89% initially in the ED; placed on 4L NC with increased O2 Sat  - on antibiotic as above  - f/u blood cultures (in progress)  - f/u pulse oximetry; continuous prescribed  - HOB elevation  - aspiration precautions

## 2024-04-19 NOTE — H&P ADULT - PROBLEM SELECTOR PLAN 5
- in the setting of sepsis, decreased oral intake  - s/p one liter NaCl in the ED.  Additional IVF of NaCl one liter, followed by LR at 125 mL/Hr x 8 hours prescribed  - encourage oral intake, as tolerated  - f/u electrolytes, renal function  - assess need for additional IVF

## 2024-04-19 NOTE — H&P ADULT - PROBLEM SELECTOR PLAN 1
- tachycardic, febrile, elevated WBC, tachypneic.  Hypoxic to 89% initially in the ED  - CT scan =  "Clustered nodular opacity in the right upper lobe with additional scattered bilateral groundglass opacity in both lungs, concerning for infectious/inflammatory etiology.  ...Acute diverticulitis superimposed on chronic changes of diverticular disease, similar to findings present on prior exam. No evidence of diverticular abscess.  - UA neg for gross signs of infection, although CT scan = "Mild left hydroureteronephrosis to the region of diverticulitis."  - started on zosyn and vancomycin in the ED; continuing zosyn  - s/p one liter NaCl in the ED; additional IVF prescribed - NaCl one liter, followed by LR at 125 mL/Hr x 8 hours prescribed  - s/p Ofirmev 1 gram in the ED; oral prescribed  - f/u blood cultures (in progress)  - continuing on supplemental oxygen via nasal cannula, as needed  - if signs of worsening infection, would get ID consult

## 2024-04-19 NOTE — ED PROVIDER NOTE - OBJECTIVE STATEMENT
HPI & ROS: 78-year-old female history of divertic, PNA, diabetes, hyperlipidemia hypertension PERFECTO, coming in with 1 day of of malaise fatigue belly pain, chills.  No  antipyretics  or pain relief taken this morning.  Patient in normal health yesterday.  There is no chest pain or shortness of breath.  Patient without cough.  No bowel movement today or yesterday.  No gas today or yesterday.  No dysuria.

## 2024-04-19 NOTE — H&P ADULT - PROBLEM SELECTOR PLAN 6
-glucose = 163 on CMP  - last A1c = 6.3 in 03/2024  - does not appear to be on antidiabetic meds  - consistent carb diet  - will hold off on ISS per FS for now

## 2024-04-19 NOTE — ED ADULT TRIAGE NOTE - ESI TRIAGE ACUITY LEVEL, MLM
Updated letter to include the affected extremity (right)  Ok to send at this time.    Swati Pinto MD   2

## 2024-04-19 NOTE — ED ADULT NURSE NOTE - NSFALLRISKINTERV_ED_ALL_ED

## 2024-04-20 ENCOUNTER — TRANSCRIPTION ENCOUNTER (OUTPATIENT)
Age: 79
End: 2024-04-20

## 2024-04-20 ENCOUNTER — NON-APPOINTMENT (OUTPATIENT)
Age: 79
End: 2024-04-20

## 2024-04-20 DIAGNOSIS — E86.0 DEHYDRATION: ICD-10-CM

## 2024-04-20 DIAGNOSIS — E11.9 TYPE 2 DIABETES MELLITUS WITHOUT COMPLICATIONS: ICD-10-CM

## 2024-04-20 DIAGNOSIS — K57.92 DIVERTICULITIS OF INTESTINE, PART UNSPECIFIED, WITHOUT PERFORATION OR ABSCESS WITHOUT BLEEDING: ICD-10-CM

## 2024-04-20 DIAGNOSIS — A41.9 SEPSIS, UNSPECIFIED ORGANISM: ICD-10-CM

## 2024-04-20 DIAGNOSIS — J18.9 PNEUMONIA, UNSPECIFIED ORGANISM: ICD-10-CM

## 2024-04-20 DIAGNOSIS — K59.01 SLOW TRANSIT CONSTIPATION: ICD-10-CM

## 2024-04-20 DIAGNOSIS — Z29.9 ENCOUNTER FOR PROPHYLACTIC MEASURES, UNSPECIFIED: ICD-10-CM

## 2024-04-20 LAB
A1C WITH ESTIMATED AVERAGE GLUCOSE RESULT: 6.4 % — HIGH (ref 4–5.6)
ANION GAP SERPL CALC-SCNC: 13 MMOL/L — SIGNIFICANT CHANGE UP (ref 7–14)
APPEARANCE UR: CLEAR — SIGNIFICANT CHANGE UP
BACTERIA # UR AUTO: NEGATIVE /HPF — SIGNIFICANT CHANGE UP
BASOPHILS # BLD AUTO: 0.04 K/UL — SIGNIFICANT CHANGE UP (ref 0–0.2)
BASOPHILS NFR BLD AUTO: 0.3 % — SIGNIFICANT CHANGE UP (ref 0–2)
BILIRUB UR-MCNC: NEGATIVE — SIGNIFICANT CHANGE UP
BUN SERPL-MCNC: 8 MG/DL — SIGNIFICANT CHANGE UP (ref 7–23)
CALCIUM SERPL-MCNC: 8.1 MG/DL — LOW (ref 8.4–10.5)
CAST: 0 /LPF — SIGNIFICANT CHANGE UP (ref 0–4)
CHLORIDE SERPL-SCNC: 107 MMOL/L — SIGNIFICANT CHANGE UP (ref 98–107)
CO2 SERPL-SCNC: 20 MMOL/L — LOW (ref 22–31)
COLOR SPEC: YELLOW — SIGNIFICANT CHANGE UP
CREAT SERPL-MCNC: 0.57 MG/DL — SIGNIFICANT CHANGE UP (ref 0.5–1.3)
DIFF PNL FLD: NEGATIVE — SIGNIFICANT CHANGE UP
EGFR: 94 ML/MIN/1.73M2 — SIGNIFICANT CHANGE UP
EOSINOPHIL # BLD AUTO: 0.22 K/UL — SIGNIFICANT CHANGE UP (ref 0–0.5)
EOSINOPHIL NFR BLD AUTO: 1.9 % — SIGNIFICANT CHANGE UP (ref 0–6)
ESTIMATED AVERAGE GLUCOSE: 137 — SIGNIFICANT CHANGE UP
GLUCOSE SERPL-MCNC: 127 MG/DL — HIGH (ref 70–99)
GLUCOSE UR QL: NEGATIVE MG/DL — SIGNIFICANT CHANGE UP
HCT VFR BLD CALC: 34 % — LOW (ref 34.5–45)
HGB BLD-MCNC: 11.1 G/DL — LOW (ref 11.5–15.5)
IANC: 8.32 K/UL — HIGH (ref 1.8–7.4)
IMM GRANULOCYTES NFR BLD AUTO: 0.5 % — SIGNIFICANT CHANGE UP (ref 0–0.9)
KETONES UR-MCNC: NEGATIVE MG/DL — SIGNIFICANT CHANGE UP
LEUKOCYTE ESTERASE UR-ACNC: ABNORMAL
LYMPHOCYTES # BLD AUTO: 2.42 K/UL — SIGNIFICANT CHANGE UP (ref 1–3.3)
LYMPHOCYTES # BLD AUTO: 20.4 % — SIGNIFICANT CHANGE UP (ref 13–44)
MAGNESIUM SERPL-MCNC: 1.7 MG/DL — SIGNIFICANT CHANGE UP (ref 1.6–2.6)
MCHC RBC-ENTMCNC: 28.6 PG — SIGNIFICANT CHANGE UP (ref 27–34)
MCHC RBC-ENTMCNC: 32.6 GM/DL — SIGNIFICANT CHANGE UP (ref 32–36)
MCV RBC AUTO: 87.6 FL — SIGNIFICANT CHANGE UP (ref 80–100)
MONOCYTES # BLD AUTO: 0.82 K/UL — SIGNIFICANT CHANGE UP (ref 0–0.9)
MONOCYTES NFR BLD AUTO: 6.9 % — SIGNIFICANT CHANGE UP (ref 2–14)
NEUTROPHILS # BLD AUTO: 8.32 K/UL — HIGH (ref 1.8–7.4)
NEUTROPHILS NFR BLD AUTO: 70 % — SIGNIFICANT CHANGE UP (ref 43–77)
NITRITE UR-MCNC: NEGATIVE — SIGNIFICANT CHANGE UP
NRBC # BLD: 0 /100 WBCS — SIGNIFICANT CHANGE UP (ref 0–0)
NRBC # FLD: 0 K/UL — SIGNIFICANT CHANGE UP (ref 0–0)
PH UR: 6.5 — SIGNIFICANT CHANGE UP (ref 5–8)
PHOSPHATE SERPL-MCNC: 2.5 MG/DL — SIGNIFICANT CHANGE UP (ref 2.5–4.5)
PLATELET # BLD AUTO: 268 K/UL — SIGNIFICANT CHANGE UP (ref 150–400)
POTASSIUM SERPL-MCNC: 3.1 MMOL/L — LOW (ref 3.5–5.3)
POTASSIUM SERPL-SCNC: 3.1 MMOL/L — LOW (ref 3.5–5.3)
PROT UR-MCNC: NEGATIVE MG/DL — SIGNIFICANT CHANGE UP
RBC # BLD: 3.88 M/UL — SIGNIFICANT CHANGE UP (ref 3.8–5.2)
RBC # FLD: 14.9 % — HIGH (ref 10.3–14.5)
RBC CASTS # UR COMP ASSIST: 0 /HPF — SIGNIFICANT CHANGE UP (ref 0–4)
SODIUM SERPL-SCNC: 140 MMOL/L — SIGNIFICANT CHANGE UP (ref 135–145)
SP GR SPEC: 1.02 — SIGNIFICANT CHANGE UP (ref 1–1.03)
SQUAMOUS # UR AUTO: 1 /HPF — SIGNIFICANT CHANGE UP (ref 0–5)
UROBILINOGEN FLD QL: 0.2 MG/DL — SIGNIFICANT CHANGE UP (ref 0.2–1)
WBC # BLD: 11.88 K/UL — HIGH (ref 3.8–10.5)
WBC # FLD AUTO: 11.88 K/UL — HIGH (ref 3.8–10.5)
WBC UR QL: 2 /HPF — SIGNIFICANT CHANGE UP (ref 0–5)

## 2024-04-20 RX ORDER — AZITHROMYCIN 500 MG/1
TABLET, FILM COATED ORAL
Refills: 0 | Status: DISCONTINUED | OUTPATIENT
Start: 2024-04-20 | End: 2024-04-21

## 2024-04-20 RX ORDER — POTASSIUM CHLORIDE 20 MEQ
40 PACKET (EA) ORAL ONCE
Refills: 0 | Status: COMPLETED | OUTPATIENT
Start: 2024-04-20 | End: 2024-04-20

## 2024-04-20 RX ORDER — ENOXAPARIN SODIUM 100 MG/ML
30 INJECTION SUBCUTANEOUS EVERY 24 HOURS
Refills: 0 | Status: DISCONTINUED | OUTPATIENT
Start: 2024-04-20 | End: 2024-04-22

## 2024-04-20 RX ORDER — SODIUM CHLORIDE 9 MG/ML
1000 INJECTION INTRAMUSCULAR; INTRAVENOUS; SUBCUTANEOUS ONCE
Refills: 0 | Status: COMPLETED | OUTPATIENT
Start: 2024-04-20 | End: 2024-04-20

## 2024-04-20 RX ORDER — LANOLIN ALCOHOL/MO/W.PET/CERES
3 CREAM (GRAM) TOPICAL AT BEDTIME
Refills: 0 | Status: DISCONTINUED | OUTPATIENT
Start: 2024-04-20 | End: 2024-04-22

## 2024-04-20 RX ORDER — AZITHROMYCIN 500 MG/1
500 TABLET, FILM COATED ORAL ONCE
Refills: 0 | Status: COMPLETED | OUTPATIENT
Start: 2024-04-20 | End: 2024-04-20

## 2024-04-20 RX ORDER — ASPIRIN/CALCIUM CARB/MAGNESIUM 324 MG
81 TABLET ORAL DAILY
Refills: 0 | Status: DISCONTINUED | OUTPATIENT
Start: 2024-04-20 | End: 2024-04-22

## 2024-04-20 RX ORDER — ATORVASTATIN CALCIUM 80 MG/1
20 TABLET, FILM COATED ORAL AT BEDTIME
Refills: 0 | Status: DISCONTINUED | OUTPATIENT
Start: 2024-04-20 | End: 2024-04-22

## 2024-04-20 RX ORDER — AZITHROMYCIN 500 MG/1
500 TABLET, FILM COATED ORAL EVERY 24 HOURS
Refills: 0 | Status: DISCONTINUED | OUTPATIENT
Start: 2024-04-21 | End: 2024-04-21

## 2024-04-20 RX ORDER — SODIUM CHLORIDE 9 MG/ML
1000 INJECTION, SOLUTION INTRAVENOUS
Refills: 0 | Status: DISCONTINUED | OUTPATIENT
Start: 2024-04-20 | End: 2024-04-22

## 2024-04-20 RX ORDER — PANTOPRAZOLE SODIUM 20 MG/1
40 TABLET, DELAYED RELEASE ORAL
Refills: 0 | Status: DISCONTINUED | OUTPATIENT
Start: 2024-04-20 | End: 2024-04-22

## 2024-04-20 RX ORDER — ACETAMINOPHEN 500 MG
650 TABLET ORAL EVERY 6 HOURS
Refills: 0 | Status: DISCONTINUED | OUTPATIENT
Start: 2024-04-20 | End: 2024-04-22

## 2024-04-20 RX ADMIN — Medication 81 MILLIGRAM(S): at 12:50

## 2024-04-20 RX ADMIN — SODIUM CHLORIDE 125 MILLILITER(S): 9 INJECTION, SOLUTION INTRAVENOUS at 03:55

## 2024-04-20 RX ADMIN — PIPERACILLIN AND TAZOBACTAM 25 GRAM(S): 4; .5 INJECTION, POWDER, LYOPHILIZED, FOR SOLUTION INTRAVENOUS at 21:10

## 2024-04-20 RX ADMIN — AZITHROMYCIN 255 MILLIGRAM(S): 500 TABLET, FILM COATED ORAL at 18:28

## 2024-04-20 RX ADMIN — PIPERACILLIN AND TAZOBACTAM 25 GRAM(S): 4; .5 INJECTION, POWDER, LYOPHILIZED, FOR SOLUTION INTRAVENOUS at 14:25

## 2024-04-20 RX ADMIN — ATORVASTATIN CALCIUM 20 MILLIGRAM(S): 80 TABLET, FILM COATED ORAL at 21:10

## 2024-04-20 RX ADMIN — ENOXAPARIN SODIUM 30 MILLIGRAM(S): 100 INJECTION SUBCUTANEOUS at 18:28

## 2024-04-20 RX ADMIN — PIPERACILLIN AND TAZOBACTAM 25 GRAM(S): 4; .5 INJECTION, POWDER, LYOPHILIZED, FOR SOLUTION INTRAVENOUS at 05:02

## 2024-04-20 RX ADMIN — SODIUM CHLORIDE 1000 MILLILITER(S): 9 INJECTION INTRAMUSCULAR; INTRAVENOUS; SUBCUTANEOUS at 02:20

## 2024-04-20 RX ADMIN — Medication 40 MILLIEQUIVALENT(S): at 12:50

## 2024-04-20 RX ADMIN — Medication 650 MILLIGRAM(S): at 15:29

## 2024-04-20 NOTE — DISCHARGE NOTE PROVIDER - CARE PROVIDER_API CALL
Mandy Martinez  Internal Medicine  [address] [place] [address]  Phone: [phone]  Fax: [phone]  Follow Up Time:

## 2024-04-20 NOTE — DISCHARGE NOTE PROVIDER - NSDCMRMEDTOKEN_GEN_ALL_CORE_FT
acetaminophen 325 mg oral tablet: 3 tab(s) orally every 8 hours as needed for  moderate pain  aspirin 81 mg oral delayed release tablet: 1 tab(s) orally once a day  atorvastatin 20 mg oral tablet: 1 tab(s) orally once a day  cefpodoxime 200 mg oral tablet: 1 tab(s) orally 2 times a day  metoprolol tartrate 25 mg oral tablet: 1 tab(s) orally every 12 hours  omeprazole 40 mg oral delayed release capsule: 1 cap orally once a day before breakfast   MDD:1   acetaminophen 325 mg oral tablet: 3 tab(s) orally every 8 hours as needed for  moderate pain  amoxicillin-clavulanate 875 mg-125 mg oral tablet: 1 tab(s) orally 2 times a day  aspirin 81 mg oral delayed release tablet: 1 tab(s) orally once a day  atorvastatin 20 mg oral tablet: 1 tab(s) orally once a day  Metoprolol Tartrate 25 mg oral tablet: 1 tab(s) orally 2 times a day  omeprazole 40 mg oral delayed release capsule: 1 cap orally once a day before breakfast   MDD:1
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
## 2024-04-20 NOTE — DISCHARGE NOTE PROVIDER - NSDCCPCAREPLAN_GEN_ALL_CORE_FT
PRINCIPAL DISCHARGE DIAGNOSIS  Diagnosis: Pneumonia  Assessment and Plan of Treatment: CT showed;  Clustered nodular opacity in the right upper lobe with additional scattered bilateral groundglass opacity in both lungs, concerning for infectious/inflammatory etiology. Acute diverticulitis superimposed on chronic changes of diverticular disease, similar to findings present on prior exam. No evidence of diverticular abscess. Mild left hydroureteronephrosis to the region of diverticulitis. No evidence of obstructing ureteral stone.  UA negative  RVP negative  Treated w/ IV zosyn        SECONDARY DISCHARGE DIAGNOSES  Diagnosis: Diverticulitis  Assessment and Plan of Treatment: will need a colonoscopy after completion of antibiotics     PRINCIPAL DISCHARGE DIAGNOSIS  Diagnosis: Diverticulitis  Assessment and Plan of Treatment: You had a CT abdomen and pelvis showing infection and inflammation of the colon. You were treated with IV antibiotics. You should complete course of antibiotics with PO Augmentin 875-125mg twice daily as prescribed on discharge to complete 10 day course w/ last day 4/28. You were followed by Infectious Disease. You will need a colonoscopy after you complete antibiotics. Follow up with your PCP.      SECONDARY DISCHARGE DIAGNOSES  Diagnosis: PNA (pneumonia)  Assessment and Plan of Treatment: You had CT chest showing opacities in the lung concerning for infection versus inflammation. You were treated with IV antibiotics to cover for possible pneumonia (infection of the lungs). You were followed by Pulmonology (lung specialist). Your oxygen level remained stable on room air. You may follow up with your PCP.

## 2024-04-20 NOTE — DISCHARGE NOTE PROVIDER - NSDCFUADDAPPT_GEN_ALL_CORE_FT
Follow up with your primary care physician for further monitoring in 1-2 weeks. Please call to arrange appointment.     You will need a colonoscopy after you complete antibiotics. If you need to establish care with gastroenterology for colonoscopy, refer to one of the clinics provided above.

## 2024-04-20 NOTE — PATIENT PROFILE ADULT - FALL HARM RISK - HARM RISK INTERVENTIONS

## 2024-04-20 NOTE — DISCHARGE NOTE PROVIDER - NSFOLLOWUPCLINICS_GEN_ALL_ED_FT
Gastroenterology at [facility]  Gastroenterology  [address] [place] [address]  Phone: [phone]  Fax:     [facility]  Gastroenterology  [address] [place] [address]  Phone: [phone]  Fax:     [facility]  Gastroenterology  [address]  [place] [address]  Phone: [phone]  Fax:

## 2024-04-20 NOTE — DISCHARGE NOTE PROVIDER - HOSPITAL COURSE
79 y/o F w/Diverticulitis, Type-2 DM, HTN, HLD, Glaucoma, OA of the L-knee and PERFECTO, presented to the ED secondary to malaise, fatigue, chills and abdominal pain.  Diagnosed with Pneumonia and Diverticulitis in the ED.    Diverticulitis   - CTAP: Acute diverticulitis superimposed on chronic changes of diverticular disease, similar to findings present on prior exam. No evidence of diverticular abscess. Mild left hydroureteronephrosis to the region of diverticulitis. No evidence of obstructing ureteral stone.  - S/p IV Zosyn transitioned to PO Augmentin 875-125mg bid on discharge to complete 10 day course w/ last day 4/28 per ID   - BCx NGTD, UCx neg; Procal neg; WBC returned to normal    - Will need colonoscopy after completion abx     PNA   - CT chest: Clustered nodular opacity in the right upper lobe with additional scattered bilateral groundglass opacity in both lungs, concerning for infectious/inflammatory etiology.  - S/p IV Azithro DC'd legionella neg   - S/p IV Zosyn transitioned to PO Augmentin on discharge   - SaO2 stable on RA    On 4/22/24 this case was reviewed with Dr. Eagle. The patient is medically stable and optimized for discharge. All medications were reviewed and prescriptions were sent to mutually agreed upon pharmacy.

## 2024-04-21 LAB
ANION GAP SERPL CALC-SCNC: 12 MMOL/L — SIGNIFICANT CHANGE UP (ref 7–14)
ANION GAP SERPL CALC-SCNC: 13 MMOL/L — SIGNIFICANT CHANGE UP (ref 7–14)
BUN SERPL-MCNC: 6 MG/DL — LOW (ref 7–23)
BUN SERPL-MCNC: 8 MG/DL — SIGNIFICANT CHANGE UP (ref 7–23)
CALCIUM SERPL-MCNC: 8 MG/DL — LOW (ref 8.4–10.5)
CALCIUM SERPL-MCNC: 8.1 MG/DL — LOW (ref 8.4–10.5)
CHLORIDE SERPL-SCNC: 105 MMOL/L — SIGNIFICANT CHANGE UP (ref 98–107)
CHLORIDE SERPL-SCNC: 106 MMOL/L — SIGNIFICANT CHANGE UP (ref 98–107)
CO2 SERPL-SCNC: 18 MMOL/L — LOW (ref 22–31)
CO2 SERPL-SCNC: 21 MMOL/L — LOW (ref 22–31)
CREAT SERPL-MCNC: 0.58 MG/DL — SIGNIFICANT CHANGE UP (ref 0.5–1.3)
CREAT SERPL-MCNC: 0.61 MG/DL — SIGNIFICANT CHANGE UP (ref 0.5–1.3)
CULTURE RESULTS: NO GROWTH — SIGNIFICANT CHANGE UP
EGFR: 93 ML/MIN/1.73M2 — SIGNIFICANT CHANGE UP
EGFR: 94 ML/MIN/1.73M2 — SIGNIFICANT CHANGE UP
GLUCOSE SERPL-MCNC: 115 MG/DL — HIGH (ref 70–99)
GLUCOSE SERPL-MCNC: 130 MG/DL — HIGH (ref 70–99)
HCT VFR BLD CALC: 32.7 % — LOW (ref 34.5–45)
HGB BLD-MCNC: 10.7 G/DL — LOW (ref 11.5–15.5)
LEGIONELLA AG UR QL: NEGATIVE — SIGNIFICANT CHANGE UP
MAGNESIUM SERPL-MCNC: 1.7 MG/DL — SIGNIFICANT CHANGE UP (ref 1.6–2.6)
MAGNESIUM SERPL-MCNC: 2.1 MG/DL — SIGNIFICANT CHANGE UP (ref 1.6–2.6)
MCHC RBC-ENTMCNC: 28.5 PG — SIGNIFICANT CHANGE UP (ref 27–34)
MCHC RBC-ENTMCNC: 32.7 GM/DL — SIGNIFICANT CHANGE UP (ref 32–36)
MCV RBC AUTO: 87 FL — SIGNIFICANT CHANGE UP (ref 80–100)
NRBC # BLD: 0 /100 WBCS — SIGNIFICANT CHANGE UP (ref 0–0)
NRBC # FLD: 0 K/UL — SIGNIFICANT CHANGE UP (ref 0–0)
NT-PROBNP SERPL-SCNC: 303 PG/ML — HIGH
PHOSPHATE SERPL-MCNC: 2 MG/DL — LOW (ref 2.5–4.5)
PHOSPHATE SERPL-MCNC: 2.3 MG/DL — LOW (ref 2.5–4.5)
PLATELET # BLD AUTO: 253 K/UL — SIGNIFICANT CHANGE UP (ref 150–400)
POTASSIUM SERPL-MCNC: 2.7 MMOL/L — CRITICAL LOW (ref 3.5–5.3)
POTASSIUM SERPL-MCNC: 3.8 MMOL/L — SIGNIFICANT CHANGE UP (ref 3.5–5.3)
POTASSIUM SERPL-SCNC: 2.7 MMOL/L — CRITICAL LOW (ref 3.5–5.3)
POTASSIUM SERPL-SCNC: 3.8 MMOL/L — SIGNIFICANT CHANGE UP (ref 3.5–5.3)
PROCALCITONIN SERPL-MCNC: 0.06 NG/ML — SIGNIFICANT CHANGE UP (ref 0.02–0.1)
RBC # BLD: 3.76 M/UL — LOW (ref 3.8–5.2)
RBC # FLD: 15 % — HIGH (ref 10.3–14.5)
SODIUM SERPL-SCNC: 135 MMOL/L — SIGNIFICANT CHANGE UP (ref 135–145)
SODIUM SERPL-SCNC: 140 MMOL/L — SIGNIFICANT CHANGE UP (ref 135–145)
SPECIMEN SOURCE: SIGNIFICANT CHANGE UP
WBC # BLD: 10.19 K/UL — SIGNIFICANT CHANGE UP (ref 3.8–10.5)
WBC # FLD AUTO: 10.19 K/UL — SIGNIFICANT CHANGE UP (ref 3.8–10.5)

## 2024-04-21 RX ORDER — POTASSIUM PHOSPHATE, MONOBASIC POTASSIUM PHOSPHATE, DIBASIC 236; 224 MG/ML; MG/ML
15 INJECTION, SOLUTION INTRAVENOUS ONCE
Refills: 0 | Status: COMPLETED | OUTPATIENT
Start: 2024-04-21 | End: 2024-04-21

## 2024-04-21 RX ORDER — POTASSIUM CHLORIDE 20 MEQ
40 PACKET (EA) ORAL ONCE
Refills: 0 | Status: COMPLETED | OUTPATIENT
Start: 2024-04-21 | End: 2024-04-21

## 2024-04-21 RX ORDER — POTASSIUM CHLORIDE 20 MEQ
10 PACKET (EA) ORAL
Refills: 0 | Status: COMPLETED | OUTPATIENT
Start: 2024-04-21 | End: 2024-04-21

## 2024-04-21 RX ORDER — MAGNESIUM SULFATE 500 MG/ML
1 VIAL (ML) INJECTION ONCE
Refills: 0 | Status: COMPLETED | OUTPATIENT
Start: 2024-04-21 | End: 2024-04-21

## 2024-04-21 RX ADMIN — ENOXAPARIN SODIUM 30 MILLIGRAM(S): 100 INJECTION SUBCUTANEOUS at 18:22

## 2024-04-21 RX ADMIN — Medication 81 MILLIGRAM(S): at 11:47

## 2024-04-21 RX ADMIN — Medication 100 MILLIEQUIVALENT(S): at 10:41

## 2024-04-21 RX ADMIN — Medication 100 MILLIEQUIVALENT(S): at 09:19

## 2024-04-21 RX ADMIN — Medication 3 MILLIGRAM(S): at 21:13

## 2024-04-21 RX ADMIN — POTASSIUM PHOSPHATE, MONOBASIC POTASSIUM PHOSPHATE, DIBASIC 62.5 MILLIMOLE(S): 236; 224 INJECTION, SOLUTION INTRAVENOUS at 13:00

## 2024-04-21 RX ADMIN — Medication 100 GRAM(S): at 12:58

## 2024-04-21 RX ADMIN — PIPERACILLIN AND TAZOBACTAM 25 GRAM(S): 4; .5 INJECTION, POWDER, LYOPHILIZED, FOR SOLUTION INTRAVENOUS at 05:04

## 2024-04-21 RX ADMIN — Medication 40 MILLIEQUIVALENT(S): at 09:19

## 2024-04-21 RX ADMIN — ATORVASTATIN CALCIUM 20 MILLIGRAM(S): 80 TABLET, FILM COATED ORAL at 21:13

## 2024-04-21 RX ADMIN — PIPERACILLIN AND TAZOBACTAM 25 GRAM(S): 4; .5 INJECTION, POWDER, LYOPHILIZED, FOR SOLUTION INTRAVENOUS at 13:00

## 2024-04-21 RX ADMIN — PANTOPRAZOLE SODIUM 40 MILLIGRAM(S): 20 TABLET, DELAYED RELEASE ORAL at 05:04

## 2024-04-21 RX ADMIN — Medication 100 MILLIEQUIVALENT(S): at 11:48

## 2024-04-21 RX ADMIN — PIPERACILLIN AND TAZOBACTAM 25 GRAM(S): 4; .5 INJECTION, POWDER, LYOPHILIZED, FOR SOLUTION INTRAVENOUS at 21:13

## 2024-04-22 ENCOUNTER — TRANSCRIPTION ENCOUNTER (OUTPATIENT)
Age: 79
End: 2024-04-22

## 2024-04-22 VITALS
HEART RATE: 80 BPM | OXYGEN SATURATION: 97 % | TEMPERATURE: 99 F | SYSTOLIC BLOOD PRESSURE: 148 MMHG | RESPIRATION RATE: 18 BRPM | DIASTOLIC BLOOD PRESSURE: 86 MMHG

## 2024-04-22 LAB
ANION GAP SERPL CALC-SCNC: 10 MMOL/L — SIGNIFICANT CHANGE UP (ref 7–14)
BUN SERPL-MCNC: 7 MG/DL — SIGNIFICANT CHANGE UP (ref 7–23)
CALCIUM SERPL-MCNC: 8.3 MG/DL — LOW (ref 8.4–10.5)
CHLORIDE SERPL-SCNC: 106 MMOL/L — SIGNIFICANT CHANGE UP (ref 98–107)
CO2 SERPL-SCNC: 20 MMOL/L — LOW (ref 22–31)
CREAT SERPL-MCNC: 0.61 MG/DL — SIGNIFICANT CHANGE UP (ref 0.5–1.3)
EGFR: 93 ML/MIN/1.73M2 — SIGNIFICANT CHANGE UP
GLUCOSE SERPL-MCNC: 102 MG/DL — HIGH (ref 70–99)
HCT VFR BLD CALC: 30.8 % — LOW (ref 34.5–45)
HGB BLD-MCNC: 10.4 G/DL — LOW (ref 11.5–15.5)
MAGNESIUM SERPL-MCNC: 2.2 MG/DL — SIGNIFICANT CHANGE UP (ref 1.6–2.6)
MCHC RBC-ENTMCNC: 29.2 PG — SIGNIFICANT CHANGE UP (ref 27–34)
MCHC RBC-ENTMCNC: 33.8 GM/DL — SIGNIFICANT CHANGE UP (ref 32–36)
MCV RBC AUTO: 86.5 FL — SIGNIFICANT CHANGE UP (ref 80–100)
NRBC # BLD: 0 /100 WBCS — SIGNIFICANT CHANGE UP (ref 0–0)
NRBC # FLD: 0 K/UL — SIGNIFICANT CHANGE UP (ref 0–0)
PHOSPHATE SERPL-MCNC: 2 MG/DL — LOW (ref 2.5–4.5)
PLATELET # BLD AUTO: 270 K/UL — SIGNIFICANT CHANGE UP (ref 150–400)
POTASSIUM SERPL-MCNC: 3.5 MMOL/L — SIGNIFICANT CHANGE UP (ref 3.5–5.3)
POTASSIUM SERPL-SCNC: 3.5 MMOL/L — SIGNIFICANT CHANGE UP (ref 3.5–5.3)
RBC # BLD: 3.56 M/UL — LOW (ref 3.8–5.2)
RBC # FLD: 15.1 % — HIGH (ref 10.3–14.5)
SODIUM SERPL-SCNC: 136 MMOL/L — SIGNIFICANT CHANGE UP (ref 135–145)
WBC # BLD: 10 K/UL — SIGNIFICANT CHANGE UP (ref 3.8–10.5)
WBC # FLD AUTO: 10 K/UL — SIGNIFICANT CHANGE UP (ref 3.8–10.5)

## 2024-04-22 RX ORDER — POTASSIUM PHOSPHATE, MONOBASIC POTASSIUM PHOSPHATE, DIBASIC 236; 224 MG/ML; MG/ML
30 INJECTION, SOLUTION INTRAVENOUS ONCE
Refills: 0 | Status: COMPLETED | OUTPATIENT
Start: 2024-04-22 | End: 2024-04-22

## 2024-04-22 RX ORDER — METOPROLOL TARTRATE 50 MG
1 TABLET ORAL
Qty: 0 | Refills: 0 | DISCHARGE
Start: 2024-04-22

## 2024-04-22 RX ORDER — POTASSIUM CHLORIDE 20 MEQ
40 PACKET (EA) ORAL ONCE
Refills: 0 | Status: COMPLETED | OUTPATIENT
Start: 2024-04-22 | End: 2024-04-22

## 2024-04-22 RX ADMIN — PIPERACILLIN AND TAZOBACTAM 25 GRAM(S): 4; .5 INJECTION, POWDER, LYOPHILIZED, FOR SOLUTION INTRAVENOUS at 05:18

## 2024-04-22 RX ADMIN — PANTOPRAZOLE SODIUM 40 MILLIGRAM(S): 20 TABLET, DELAYED RELEASE ORAL at 05:18

## 2024-04-22 RX ADMIN — Medication 40 MILLIEQUIVALENT(S): at 10:56

## 2024-04-22 RX ADMIN — POTASSIUM PHOSPHATE, MONOBASIC POTASSIUM PHOSPHATE, DIBASIC 83.33 MILLIMOLE(S): 236; 224 INJECTION, SOLUTION INTRAVENOUS at 10:32

## 2024-04-22 RX ADMIN — Medication 81 MILLIGRAM(S): at 12:31

## 2024-04-22 RX ADMIN — Medication 650 MILLIGRAM(S): at 12:31

## 2024-04-22 NOTE — DISCHARGE NOTE NURSING/CASE MANAGEMENT/SOCIAL WORK - NSDCPEFALRISK_GEN_ALL_CORE
For information on Fall & Injury Prevention, visit: [email or web address] OR  [email or web address] OR  https://www.cdc.gov/steadi/patient.html

## 2024-04-22 NOTE — PROGRESS NOTE ADULT - ASSESSMENT
Problem/Plan - 1:  ·  Problem: Sepsis due to undetermined organism with metabolic encephalopathy   ·  Plan: - tachycardic, febrile, elevated WBC, tachypneic.  Hypoxic to 89% initially in the ED  - CT scan =  "Clustered nodular opacity in the right upper lobe with additional scattered bilateral groundglass opacity in both lungs, concerning for infectious/inflammatory etiology.  ...Acute diverticulitis superimposed on chronic changes of diverticular disease, similar to findings present on prior exam. No evidence of diverticular abscess.  - ID  fu     Problem/Plan - 2:  ·  Problem: Bilateral pneumonia.   ·  Plan: - CT scan = "Clustered nodular opacity in the right upper lobe with additional scattered bilateral groundglass opacity in both lungs, concerning for infectious/inflammatory etiology."  - Hypoxic to 89% initially in the ED; placed on 4L NC with increased O2 Sat  - abx as per ID   - HOB elevation  - aspiration precautions.    Problem/Plan - 3:  ·  Problem: Acute diverticulitis.   ·  Plan: -   - on abx  - if recurs, may need to change/add antibiotic  - diet, as tolerated  - ensure optimal hydration.    Problem/Plan - 4:  ·  Problem: Constipation, slow transit.   ·  Plan: - report of no bowel movement for the past ~ 2 days, nor passage of gas  - CT scan findings as above; no obstruction and with similar findings to that of 3/22/2024  - f/u stool count and for flatus
78 year old female, with past history significant for Diverticulitis, Type-2 DM, HTN, HLD, Glaucoma, OA of the L-knee and PERFECTO, presented to the ED secondary to malaise, fatigue, chills and abdominal pain.  Seen and evaluated at bedside; ill appearing, but in NAD.  Patient appears forgetful and contribution toward medical history is limited.  Per reports, patient had generalized weakness, malaise, chills and abdominal pain; no similar signs/symptoms on the day prior.  Also reported is that patient has not had a bowel movement, or passed gas in ~ 2 days; patient is unable to recall when the last bowel movement occurred.  Patient was noted to be febrile, hypoxic and tachycardic in the ED.  Patient states no sick contacts or recent travel.  Vital signs upon ED presentation as follows: BP = 144/86, HR = 102, RR = 18, T = 39.1 C (102.3 F), O2 Sat = 89% on RA.  Diagnosed with Pneumonia and Diverticulitis and prescribed zosyn, vancomycin, NaCl one liter, Ofirmev 1 gram and ketorolac 15 mg IV in the ED. (19 Apr 2024 21:49):  pt seems to be doing  ok  but confused:  son and daughter at bedside: : she is on room air:  noc ugh : no phlegm :        Pneumonia/ fever;   Confusion   Diverticulitis  DM  HTN  HLD/ GLAUCOMA  PERFECTO     Pneumonia/ fever;   -ct chest : Faint right upper lobe opacity, which may represent pneumonia in the appropriate clinical setting. Findings are better seen on same-day CT chest, abdomen, and pelvis.  -check legionella   -RVP is negative   -vbg seems reasonable:  no acidosis   4/21: on zosyn ; afebrile:  on room air:  no sob:    4/22: on zosyn still : she looks pretty good:  no sob:  demented:  on room air   Confusion   -she is confused:  encephalopathic  secondary to pneumonia and also she has dementia   Diverticulitis  -cont antibiotics - defer to [facility] team     DM  -monitor and control   HTN  -controlled  HLD/ GLAUCOMA  -per primary team   PERFECTO   -no ton cpap:    dw acp  
79 y/o F PMhx Glaucoma, DM II, HTN, dementia who presented w/ malaise, fatigue, chills and abdominal pain    sepsis, diverticulitis, possible PNA  CT C/A/P- Clustered nodular opacity in the right upper lobe with additional scattered bilateral groundglass opacity in both lungs, concerning for infectious/inflammatory etiology. Acute diverticulitis superimposed on chronic changes of diverticular disease, similar to findings present on prior exam. No evidence of diverticular abscess. Mild left hydroureteronephrosis to the region of diverticulitis. No evidence of obstructing ureteral stone.  UA negative  RVP negative  pt not hypoxic    Recommendations  c/w zosyn   c/w azithromycin  - discontinue if urine legionella Ag is negative  f/u blood cultures  will need a colonoscopy after completion of antibiotics    Geovani Goldman M.D.  [facility], Division of Infectious Diseases  [phone]  After 5pm on weekdays and all day on weekends - please call [phone] 
Problem/Plan - 1:  ·  Problem: Sepsis due to undetermined organism with metabolic encephalopathy   ·  Plan: - tachycardic, febrile, elevated WBC, tachypneic.  Hypoxic to 89% initially in the ED  - CT scan =  "Clustered nodular opacity in the right upper lobe with additional scattered bilateral groundglass opacity in both lungs, concerning for infectious/inflammatory etiology.  ...Acute diverticulitis superimposed on chronic changes of diverticular disease, similar to findings present on prior exam. No evidence of diverticular abscess.  - ID  fu     Problem/Plan - 2:  ·  Problem: Bilateral pneumonia.   ·  Plan: - CT scan = "Clustered nodular opacity in the right upper lobe with additional scattered bilateral groundglass opacity in both lungs, concerning for infectious/inflammatory etiology."  - Hypoxic to 89% initially in the ED; placed on 4L NC with increased O2 Sat  - abx as per ID   - HOB elevation  - aspiration precautions.    Problem/Plan - 3:  ·  Problem: Acute diverticulitis.   ·  Plan: -   - on abx  - if recurs, may need to change/add antibiotic  - diet, as tolerated  - ensure optimal hydration.    Problem/Plan - 4:  ·  Problem: Constipation, slow transit.   ·  Plan: - report of no bowel movement for the past ~ 2 days, nor passage of gas  - CT scan findings as above; no obstruction and with similar findings to that of 3/22/2024  - f/u stool count and for flatus      
78 year old female, with past history significant for Diverticulitis, Type-2 DM, HTN, HLD, Glaucoma, OA of the L-knee and PERFECTO, presented to the ED secondary to malaise, fatigue, chills and abdominal pain.  Seen and evaluated at bedside; ill appearing, but in NAD.  Patient appears forgetful and contribution toward medical history is limited.  Per reports, patient had generalized weakness, malaise, chills and abdominal pain; no similar signs/symptoms on the day prior.  Also reported is that patient has not had a bowel movement, or passed gas in ~ 2 days; patient is unable to recall when the last bowel movement occurred.  Patient was noted to be febrile, hypoxic and tachycardic in the ED.  Patient states no sick contacts or recent travel.  Vital signs upon ED presentation as follows: BP = 144/86, HR = 102, RR = 18, T = 39.1 C (102.3 F), O2 Sat = 89% on RA.  Diagnosed with Pneumonia and Diverticulitis and prescribed zosyn, vancomycin, NaCl one liter, Ofirmev 1 gram and ketorolac 15 mg IV in the ED. (19 Apr 2024 21:49):  pt seems to be doing  ok  but confused:  son and daughter at bedside: : she is on room air:  noc ugh : no phlegm :        Pneumonia/ fever;   Confusion   Diverticulitis  DM  HTN  HLD/ GLAUCOMA  PERFECTO     Pneumonia/ fever;   -ct chest : Faint right upper lobe opacity, which may represent pneumonia in the appropriate clinical setting. Findings are better seen on same-day CT chest, abdomen, and pelvis.  -check legionella   -RVP is negative   -vbg seems reasonable:  no acidosis   4/21: on zosyn ; afebrile:  on room air:  no sob:    Confusion   -she is confused:  encephalopathic  secondary to pneumonia  Diverticulitis  -cont antibiotics - defer to [facility] team     DM  -monitor and control   HTN  -controlled  HLD/ GLAUCOMA  -per primary team   PERFECTO   -no ton cpap:    dw acp  
79 y/o F PMhx Glaucoma, DM II, HTN, dementia who presented w/ malaise, fatigue, chills and abdominal pain    sepsis, diverticulitis, possible PNA  CT C/A/P- Clustered nodular opacity in the right upper lobe with additional scattered bilateral groundglass opacity in both lungs, concerning for infectious/inflammatory etiology. Acute diverticulitis superimposed on chronic changes of diverticular disease, similar to findings present on prior exam. No evidence of diverticular abscess. Mild left hydroureteronephrosis to the region of diverticulitis. No evidence of obstructing ureteral stone.  UA negative  RVP negative, urine legionella Ag negative, azithromycin discontinued  blood cultures- NGTD    Recommendations  c/w zosyn   transition to augmentin 875-125mg bid on discharge to complete 10 day course w/ last day 4/28  will need a colonoscopy after completion of antibiotics    Geovani Goldman M.D.  [facility], Division of Infectious Diseases  [phone]  After 5pm on weekdays and all day on weekends - please call [phone] 
Problem/Plan - 1:  ·  Problem: Sepsis due to undetermined organism with metabolic encephalopathy   ·  Plan: - tachycardic, febrile, elevated WBC, tachypneic.  Hypoxic to 89% initially in the ED  - CT scan =  "Clustered nodular opacity in the right upper lobe with additional scattered bilateral groundglass opacity in both lungs, concerning for infectious/inflammatory etiology.  ...Acute diverticulitis superimposed on chronic changes of diverticular disease, similar to findings present on prior exam. No evidence of diverticular abscess.  - ID  fu     Problem/Plan - 2:  ·  Problem: Bilateral pneumonia.   ·  Plan: - CT scan = "Clustered nodular opacity in the right upper lobe with additional scattered bilateral groundglass opacity in both lungs, concerning for infectious/inflammatory etiology."  - Hypoxic to 89% initially in the ED; placed on 4L NC with increased O2 Sat  - abx as per ID   - HOB elevation  - aspiration precautions.    Problem/Plan - 3:  ·  Problem: Acute diverticulitis.   ·  Plan: -   - on abx  - if recurs, may need to change/add antibiotic  - diet, as tolerated  - ensure optimal hydration.    Problem/Plan - 4:  ·  Problem: Constipation, slow transit.   ·  Plan: - report of no bowel movement for the past ~ 2 days, nor passage of gas  - CT scan findings as above; no obstruction and with similar findings to that of 3/22/2024  - f/u stool count and for flatus

## 2024-04-22 NOTE — PROGRESS NOTE ADULT - REASON FOR ADMISSION
Sepsis due to undetermined organism, Pneumonia, Diverticulitis

## 2024-04-22 NOTE — PROGRESS NOTE ADULT - SUBJECTIVE AND OBJECTIVE BOX
Date of Service: 04-21-24 @ 10:40    Patient is a 78y old  Female who presents with a chief complaint of Sepsis due to undetermined organism, Pneumonia, Diverticulitis (21 Apr 2024 07:59)      Any change in ROS: seems to be doing  ok ; no cough : no phlegm    MEDICATIONS  (STANDING):  aspirin enteric coated 81 milliGRAM(s) Oral daily  atorvastatin 20 milliGRAM(s) Oral at bedtime  enoxaparin Injectable 30 milliGRAM(s) SubCutaneous every 24 hours  lactated ringers. 1000 milliLiter(s) (125 mL/Hr) IV Continuous <Continuous>  magnesium sulfate  IVPB 1 Gram(s) IV Intermittent once  pantoprazole    Tablet 40 milliGRAM(s) Oral before breakfast  piperacillin/tazobactam IVPB.. 3.375 Gram(s) IV Intermittent every 8 hours  potassium chloride  10 mEq/100 mL IVPB 10 milliEquivalent(s) IV Intermittent every 1 hour  potassium phosphate IVPB 15 milliMole(s) IV Intermittent once    MEDICATIONS  (PRN):  acetaminophen     Tablet .. 650 milliGRAM(s) Oral every 6 hours PRN Temp greater or equal to 38C (100.4F), Mild Pain (1 - 3)  melatonin 3 milliGRAM(s) Oral at bedtime PRN Insomnia    Vital Signs Last 24 Hrs  T(C): 36.7 (21 Apr 2024 04:30), Max: 39 (20 Apr 2024 15:19)  T(F): 98.1 (21 Apr 2024 04:30), Max: 102.2 (20 Apr 2024 15:19)  HR: 90 (21 Apr 2024 04:30) (90 - 112)  BP: 136/75 (21 Apr 2024 04:30) (120/73 - 150/92)  BP(mean): --  RR: 18 (21 Apr 2024 04:30) (18 - 18)  SpO2: 94% (21 Apr 2024 04:30) (94% - 99%)    Parameters below as of 21 Apr 2024 04:30  Patient On (Oxygen Delivery Method): room air        I&O's Summary        Physical Exam:   GENERAL: NAD, well-groomed, well-developed  HEENT: MARTINE/   Atraumatic, Normocephalic  ENMT: No tonsillar erythema, exudates, or enlargement; Moist mucous membranes, Good dentition, No lesions  NECK: Supple, No JVD, Normal thyroid  CHEST/LUNG: Clear to auscultaion- no wheezing  CVS: Regular rate and rhythm; No murmurs, rubs, or gallops  GI: : Soft, Nontender, Nondistended; Bowel sounds present  NERVOUS SYSTEM:  Alert & Oriented X1  EXTREMITIES:  2+ Peripheral Pulses, No clubbing, cyanosis, or edema  LYMPH: No lymphadenopathy noted  SKIN: No rashes or lesions  ENDOCRINOLOGY: No Thyromegaly  PSYCH: dementia    Labs:  29                            10.7   10.19 )-----------( 253      ( 21 Apr 2024 06:30 )             32.7                         11.1   11.88 )-----------( 268      ( 20 Apr 2024 05:00 )             34.0                         12.3   14.71 )-----------( 300      ( 19 Apr 2024 14:40 )             37.1     04-21    140  |  106  |  8   ----------------------------<  115<H>  2.7<LL>   |  21<L>  |  0.61  04-20    140  |  107  |  8   ----------------------------<  127<H>  3.1<L>   |  20<L>  |  0.57  04-19    136  |  100  |  11  ----------------------------<  163<H>  3.8   |  24  |  0.63    Ca    8.0<L>      21 Apr 2024 06:30  Ca    8.1<L>      20 Apr 2024 05:00  Ca    9.2      19 Apr 2024 14:40  Phos  2.0     04-21  Phos  2.5     04-20  Phos  1.9     04-19  Mg     1.70     04-21  Mg     1.70     04-20  Mg     1.80     04-19    TPro  9.2<H>  /  Alb  3.9  /  TBili  0.7  /  DBili  x   /  AST  15  /  ALT  10  /  AlkPhos  96  04-19    CAPILLARY BLOOD GLUCOSE          LIVER FUNCTIONS - ( 19 Apr 2024 14:40 )  Alb: 3.9 g/dL / Pro: 9.2 g/dL / ALK PHOS: 96 U/L / ALT: 10 U/L / AST: 15 U/L / GGT: x           PT/INR - ( 19 Apr 2024 14:40 )   PT: 11.9 sec;   INR: 1.06 ratio         PTT - ( 19 Apr 2024 14:40 )  PTT:35.8 sec  Urinalysis Basic - ( 21 Apr 2024 06:30 )    Color: x / Appearance: x / SG: x / pH: x  Gluc: 115 mg/dL / Ketone: x  / Bili: x / Urobili: x   Blood: x / Protein: x / Nitrite: x   Leuk Esterase: x / RBC: x / WBC x   Sq Epi: x / Non Sq Epi: x / Bacteria: x      Procalcitonin, Serum: 0.06 ng/mL (04-21 @ 06:30)        RECENT CULTURES:  04-20 @ 02:30 Clean Catch Clean Catch (Midstream)                No growth    04-19 @ 14:35 .Blood Blood-Peripheral            rad< from: Xray Chest 2 Views PA/Lat (04.19.24 @ 18:01) >  INTERPRETATION:  CLINICAL INFORMATION: Sepsis    TECHNIQUE:  Frontal and Lateral  xrays of the chest.    COMPARISON: CT chest abdomenpelvis 4/19/2024    FINDINGS:    LUNGS: Faint right upper lobe peripheral opacity.    PLEURA: No pleural effusion or pneumothorax.    HEART AND MEDIASTINUM: Heart size is within normal limits.    SKELETON: Unremarkable skeletal structures.      IMPRESSION:    Faint right upper lobe opacity, which may represent pneumonia in the   appropriate clinical setting. Findings are better seen on same-day CT   chest, abdomen, and pelvis.    --- End of Report ---          MAXWELL PEREZ MD; Resident Radiologist  This document has been electronically signed.  MIGUEL KEEN MD; Attending Radiologist  This document has been electronically signed. Apr 20 2024  8:28AM    < end of copied text >  < from: CT Chest w/ IV Cont (04.19.24 @ 17:51) >  in the sigmoid colonic mesentery, possibly reactive.  ABDOMINAL WALL: Infraumbilical postoperative change.  BONES: Grade 1 anterolisthesis of L5 on S1    IMPRESSION:  Clustered nodular opacity in the right upper lobe with additional   scattered bilateral groundglass opacity in both lungs, concerning for   infectious/inflammatory etiology.    Acute diverticulitis superimposed on chronic changes of diverticular   disease, similar to findings present on prior exam. No evidence of   diverticular abscess.    Mild left hydroureteronephrosis to the region of diverticulitis. No   evidence of obstructing ureteral stone.        --- End of Report ---      < end of copied text >  < from: CT Angio Chest PE Protocol w/ IV Cont (03.22.24 @ 02:03) >  pelvic wall soft tissue. Small fat-containing umbilical hernia.   Enthesopathy/chronic avulsion of the left gluteus medius again noted.  BONES: Degenerative changes of the spine. Stable endplate depression of   the spine, notably at L2. Stable grade 1/2 anterolisthesis of L5 on S1   with bilateral L5 pars defect.    IMPRESSION:    Suboptimal evaluation of the subsegmental pulmonary arteries. No obvious   embolus to the level of the segmental pulmonary arteries.    Persistent scattered tree-in-bud opacities in both lungs. Mildly   decreased patchy opacities in bilateral upper and lower lobes since   2/23/2019.    Heterogenous thyroid gland, which can be further assessed on a   nonemergent ultrasound if not previously characterized.    Distal descending/proximal sigmoid colon diverticulitis. Small free fluid   with increased attenuation, which may contain debris/blood product. No   obvious organized fluid collection, bowel obstruction or intraperitoneal   free air. Recommend correlation with colonoscopy to exclude neoplasm,   following resolution of acute episode.    Mild left hydroureteronephrosis to the level of the sigmoid colon,   presumably related to inflammation and subsequent compression of the   ureter. Distended urinary bladder. Recommend with clinical correlation to   assess urinary retention.    < end of copied text >      No growth at 24 hours    04-19 @ 14:20 .Blood Blood-Peripheral                No growth at 24 hours          RESPIRATORY CULTURES:          Studies  Chest X-RAY  CT SCAN Chest   Venous Dopplers: LE:   CT Abdomen  Others              
Patient is a 78y old  Female who presents with a chief complaint of Sepsis due to undetermined organism, Pneumonia, Diverticulitis (22 Apr 2024 11:29)    Date of servie : 04-22-24 @ 14:20  INTERVAL HPI/OVERNIGHT EVENTS:  T(C): 36.8 (04-22-24 @ 11:43), Max: 37.6 (04-21-24 @ 21:43)  HR: 73 (04-22-24 @ 11:43) (69 - 89)  BP: 154/88 (04-22-24 @ 11:43) (127/87 - 154/88)  RR: 18 (04-22-24 @ 11:43) (17 - 18)  SpO2: 96% (04-22-24 @ 11:43) (88% - 98%)  Wt(kg): --  I&O's Summary      LABS:                        10.4   10.00 )-----------( 270      ( 22 Apr 2024 05:29 )             30.8     04-22    136  |  106  |  7   ----------------------------<  102<H>  3.5   |  20<L>  |  0.61    Ca    8.3<L>      22 Apr 2024 05:29  Phos  2.0     04-22  Mg     2.20     04-22        Urinalysis Basic - ( 22 Apr 2024 05:29 )    Color: x / Appearance: x / SG: x / pH: x  Gluc: 102 mg/dL / Ketone: x  / Bili: x / Urobili: x   Blood: x / Protein: x / Nitrite: x   Leuk Esterase: x / RBC: x / WBC x   Sq Epi: x / Non Sq Epi: x / Bacteria: x      CAPILLARY BLOOD GLUCOSE            Urinalysis Basic - ( 22 Apr 2024 05:29 )    Color: x / Appearance: x / SG: x / pH: x  Gluc: 102 mg/dL / Ketone: x  / Bili: x / Urobili: x   Blood: x / Protein: x / Nitrite: x   Leuk Esterase: x / RBC: x / WBC x   Sq Epi: x / Non Sq Epi: x / Bacteria: x        MEDICATIONS  (STANDING):  aspirin enteric coated 81 milliGRAM(s) Oral daily  atorvastatin 20 milliGRAM(s) Oral at bedtime  enoxaparin Injectable 30 milliGRAM(s) SubCutaneous every 24 hours  lactated ringers. 1000 milliLiter(s) (125 mL/Hr) IV Continuous <Continuous>  pantoprazole    Tablet 40 milliGRAM(s) Oral before breakfast  piperacillin/tazobactam IVPB.. 3.375 Gram(s) IV Intermittent every 8 hours    MEDICATIONS  (PRN):  acetaminophen     Tablet .. 650 milliGRAM(s) Oral every 6 hours PRN Temp greater or equal to 38C (100.4F), Mild Pain (1 - 3)  melatonin 3 milliGRAM(s) Oral at bedtime PRN Insomnia          PHYSICAL EXAM:  GENERAL: NAD, well-groomed, well-developed  HEAD:  Atraumatic, Normocephalic  CHEST/LUNG: Clear to percussion bilaterally; No rales, rhonchi, wheezing, or rubs  HEART: Regular rate and rhythm; No murmurs, rubs, or gallops  ABDOMEN: Soft, Nontender, Nondistended; Bowel sounds present  EXTREMITIES:  2+ Peripheral Pulses, No clubbing, cyanosis, or edema  LYMPH: No lymphadenopathy noted  SKIN: No rashes or lesions    Care Discussed with Consultants/Other Providers [ ] YES  [ ] NO
[facility], Division of Infectious Diseases  JORGE LUIS Chaves Y. Patel, S. Shah, G. Jones  [phone]  ([phone] - weekdays after 5pm and weekends)    Name: TY DAVIS  Age/Gender: 78y Female  MRN: [id number]    Interval History:  Notes reviewed.   No concerning overnight events.  Afebrile.   denies abd pain, diarrhea, SOB    Allergies: No Known Allergies      Objective:  Vitals:   T(F): 98.2 (04-22-24 @ 10:35), Max: 99.7 (04-21-24 @ 21:43)  HR: 69 (04-22-24 @ 10:35) (69 - 89)  BP: 150/83 (04-22-24 @ 10:35) (127/87 - 153/96)  RR: 18 (04-22-24 @ 10:35) (17 - 18)  SpO2: 96% (04-22-24 @ 10:35) (88% - 99%)  Physical Examination:  General: no acute distress  HEENT: anicteric  Cardio: S1, S2, normal rate  Resp: clear to auscultation anteriorly   Abd: soft, NT, ND  Ext: no LE edema  Skin: warm, dry    Laboratory Studies:  CBC:                       10.4   10.00 )-----------( 270      ( 22 Apr 2024 05:29 )             30.8     WBC Trend:  10.00 04-22-24 @ 05:29  10.19 04-21-24 @ 06:30  11.88 04-20-24 @ 05:00  14.71 04-19-24 @ 14:40    CMP: 04-22    136  |  106  |  7   ----------------------------<  102<H>  3.5   |  20<L>  |  0.61    Ca    8.3<L>      22 Apr 2024 05:29  Phos  2.0     04-22  Mg     2.20     04-22            Urinalysis Basic - ( 22 Apr 2024 05:29 )    Color: x / Appearance: x / SG: x / pH: x  Gluc: 102 mg/dL / Ketone: x  / Bili: x / Urobili: x   Blood: x / Protein: x / Nitrite: x   Leuk Esterase: x / RBC: x / WBC x   Sq Epi: x / Non Sq Epi: x / Bacteria: x      Microbiology: reviewed     Culture - Urine (collected 04-20-24 @ 02:30)  Source: Clean Catch Clean Catch (Midstream)  Final Report (04-21-24 @ 01:19):    No growth    Culture - Blood (collected 04-19-24 @ 14:35)  Source: .Blood Blood-Peripheral  Preliminary Report (04-21-24 @ 19:02):    No growth at 48 Hours    Culture - Blood (collected 04-19-24 @ 14:20)  Source: .Blood Blood-Peripheral  Preliminary Report (04-21-24 @ 19:02):    No growth at 48 Hours        Radiology: reviewed     Medications:  acetaminophen     Tablet .. 650 milliGRAM(s) Oral every 6 hours PRN  aspirin enteric coated 81 milliGRAM(s) Oral daily  atorvastatin 20 milliGRAM(s) Oral at bedtime  enoxaparin Injectable 30 milliGRAM(s) SubCutaneous every 24 hours  lactated ringers. 1000 milliLiter(s) IV Continuous <Continuous>  melatonin 3 milliGRAM(s) Oral at bedtime PRN  pantoprazole    Tablet 40 milliGRAM(s) Oral before breakfast  piperacillin/tazobactam IVPB.. 3.375 Gram(s) IV Intermittent every 8 hours    Antimicrobials:  piperacillin/tazobactam IVPB.. 3.375 Gram(s) IV Intermittent every 8 hours  
Patient is a 78y old  Female who presents with a chief complaint of Sepsis due to undetermined organism, Pneumonia, Diverticulitis (21 Apr 2024 10:40)    Date of servie : 04-21-24 @ 14:38  INTERVAL HPI/OVERNIGHT EVENTS:  T(C): 36.2 (04-21-24 @ 12:04), Max: 39 (04-20-24 @ 15:19)  HR: 87 (04-21-24 @ 12:04) (87 - 112)  BP: 153/96 (04-21-24 @ 12:04) (120/73 - 153/96)  RR: 18 (04-21-24 @ 12:04) (18 - 18)  SpO2: 99% (04-21-24 @ 12:04) (94% - 99%)  Wt(kg): --  I&O's Summary      LABS:                        10.7   10.19 )-----------( 253      ( 21 Apr 2024 06:30 )             32.7     04-21    140  |  106  |  8   ----------------------------<  115<H>  2.7<LL>   |  21<L>  |  0.61    Ca    8.0<L>      21 Apr 2024 06:30  Phos  2.0     04-21  Mg     1.70     04-21    TPro  9.2<H>  /  Alb  3.9  /  TBili  0.7  /  DBili  x   /  AST  15  /  ALT  10  /  AlkPhos  96  04-19    PT/INR - ( 19 Apr 2024 14:40 )   PT: 11.9 sec;   INR: 1.06 ratio         PTT - ( 19 Apr 2024 14:40 )  PTT:35.8 sec  Urinalysis Basic - ( 21 Apr 2024 06:30 )    Color: x / Appearance: x / SG: x / pH: x  Gluc: 115 mg/dL / Ketone: x  / Bili: x / Urobili: x   Blood: x / Protein: x / Nitrite: x   Leuk Esterase: x / RBC: x / WBC x   Sq Epi: x / Non Sq Epi: x / Bacteria: x      CAPILLARY BLOOD GLUCOSE            Urinalysis Basic - ( 21 Apr 2024 06:30 )    Color: x / Appearance: x / SG: x / pH: x  Gluc: 115 mg/dL / Ketone: x  / Bili: x / Urobili: x   Blood: x / Protein: x / Nitrite: x   Leuk Esterase: x / RBC: x / WBC x   Sq Epi: x / Non Sq Epi: x / Bacteria: x        MEDICATIONS  (STANDING):  aspirin enteric coated 81 milliGRAM(s) Oral daily  atorvastatin 20 milliGRAM(s) Oral at bedtime  enoxaparin Injectable 30 milliGRAM(s) SubCutaneous every 24 hours  lactated ringers. 1000 milliLiter(s) (125 mL/Hr) IV Continuous <Continuous>  pantoprazole    Tablet 40 milliGRAM(s) Oral before breakfast  piperacillin/tazobactam IVPB.. 3.375 Gram(s) IV Intermittent every 8 hours    MEDICATIONS  (PRN):  acetaminophen     Tablet .. 650 milliGRAM(s) Oral every 6 hours PRN Temp greater or equal to 38C (100.4F), Mild Pain (1 - 3)  melatonin 3 milliGRAM(s) Oral at bedtime PRN Insomnia          PHYSICAL EXAM:  GENERAL: frail  CHEST/LUNG: Clear to percussion bilaterally; No rales, rhonchi, wheezing, or rubs  HEART: Regular rate and rhythm; No murmurs, rubs, or gallops  ABDOMEN: Soft, Nontender, Nondistended; Bowel sounds present  EXTREMITIES:  edema +    Care Discussed with Consultants/Other Providers [x ] YES  [ ] NO
Date of Service: 04-22-24 @ 10:43    Patient is a 78y old  Female who presents with a chief complaint of Sepsis due to undetermined organism, Pneumonia, Diverticulitis (21 Apr 2024 14:38)      Any change in ROS: seems to be doing  ok : no sob:  no cough : no phlegm     MEDICATIONS  (STANDING):  aspirin enteric coated 81 milliGRAM(s) Oral daily  atorvastatin 20 milliGRAM(s) Oral at bedtime  enoxaparin Injectable 30 milliGRAM(s) SubCutaneous every 24 hours  lactated ringers. 1000 milliLiter(s) (125 mL/Hr) IV Continuous <Continuous>  pantoprazole    Tablet 40 milliGRAM(s) Oral before breakfast  piperacillin/tazobactam IVPB.. 3.375 Gram(s) IV Intermittent every 8 hours  potassium chloride   Powder 40 milliEquivalent(s) Oral once    MEDICATIONS  (PRN):  acetaminophen     Tablet .. 650 milliGRAM(s) Oral every 6 hours PRN Temp greater or equal to 38C (100.4F), Mild Pain (1 - 3)  melatonin 3 milliGRAM(s) Oral at bedtime PRN Insomnia    Vital Signs Last 24 Hrs  T(C): 36.8 (22 Apr 2024 05:18), Max: 37.6 (21 Apr 2024 21:43)  T(F): 98.3 (22 Apr 2024 05:18), Max: 99.7 (21 Apr 2024 21:43)  HR: 72 (22 Apr 2024 05:18) (72 - 89)  BP: 142/88 (22 Apr 2024 05:18) (127/87 - 153/96)  BP(mean): --  RR: 18 (22 Apr 2024 05:18) (17 - 18)  SpO2: 94% (22 Apr 2024 05:18) (88% - 99%)    Parameters below as of 22 Apr 2024 05:18  Patient On (Oxygen Delivery Method): room air        I&O's Summary        Physical Exam:   GENERAL: NAD, well-groomed, well-developed  HEENT: MARTINE/   Atraumatic, Normocephalic  ENMT: No tonsillar erythema, exudates, or enlargement; Moist mucous membranes, Good dentition, No lesions  NECK: Supple, No JVD, Normal thyroid  CHEST/LUNG: Clear to auscultaion  CVS: Regular rate and rhythm; No murmurs, rubs, or gallops  GI: : Soft, Nontender, Nondistended; Bowel sounds present  NERVOUS SYSTEM:  Alert & awake x1  EXTREMITIES: -edema  LYMPH: No lymphadenopathy noted  SKIN: No rashes or lesions  ENDOCRINOLOGY: No Thyromegaly  PSYCH: Appropriate    Labs:  29                            10.4   10.00 )-----------( 270      ( 22 Apr 2024 05:29 )             30.8                         10.7   10.19 )-----------( 253      ( 21 Apr 2024 06:30 )             32.7                         11.1   11.88 )-----------( 268      ( 20 Apr 2024 05:00 )             34.0                         12.3   14.71 )-----------( 300      ( 19 Apr 2024 14:40 )             37.1     04-22    136  |  106  |  7   ----------------------------<  102<H>  3.5   |  20<L>  |  0.61  04-21    135  |  105  |  6<L>  ----------------------------<  130<H>  3.8   |  18<L>  |  0.58  04-21    140  |  106  |  8   ----------------------------<  115<H>  2.7<LL>   |  21<L>  |  0.61  04-20    140  |  107  |  8   ----------------------------<  127<H>  3.1<L>   |  20<L>  |  0.57  04-19    136  |  100  |  11  ----------------------------<  163<H>  3.8   |  24  |  0.63    Ca    8.3<L>      22 Apr 2024 05:29  Ca    8.1<L>      21 Apr 2024 18:45  Ca    8.0<L>      21 Apr 2024 06:30  Phos  2.0     04-22  Phos  2.3     04-21  Phos  2.0     04-21  Mg     2.20     04-22  Mg     2.10     04-21  Mg     1.70     04-21    TPro  9.2<H>  /  Alb  3.9  /  TBili  0.7  /  DBili  x   /  AST  15  /  ALT  10  /  AlkPhos  96  04-19    CAPILLARY BLOOD GLUCOSE        < from: Xray Chest 2 Views PA/Lat (04.19.24 @ 18:01) >    LUNGS: Faint right upper lobe peripheral opacity.    PLEURA: No pleural effusion or pneumothorax.    HEART AND MEDIASTINUM: Heart size is within normal limits.    SKELETON: Unremarkable skeletal structures.      IMPRESSION:    Faint right upper lobe opacity, which may represent pneumonia in the   appropriate clinical setting. Findings are better seen on same-day CT   chest, abdomen, and pelvis.    --- End of Report ---          MAXWELL PEREZ MD; Resident Radiologist  This document has been electronically signed.  MIGUEL KEEN MD; Attending Radiologist  This document has been electronically signed. Apr 20 2024  8:28AM    < end of copied text >  < from: CT Chest w/ IV Cont (04.19.24 @ 17:51) >  in the sigmoid colonic mesentery, possibly reactive.  ABDOMINAL WALL: Infraumbilical postoperative change.  BONES: Grade 1 anterolisthesis of L5 on S1    IMPRESSION:  Clustered nodular opacity in the right upper lobe with additional   scattered bilateral groundglass opacity in both lungs, concerning for   infectious/inflammatory etiology.    Acute diverticulitis superimposed on chronic changes of diverticular   disease, similar to findings present on prior exam. No evidence of   diverticular abscess.    Mild left hydroureteronephrosis to the region of diverticulitis. No   evidence of obstructing ureteral stone.        --- End of Report ---        < end of copied text >        Urinalysis Basic - ( 22 Apr 2024 05:29 )    Color: x / Appearance: x / SG: x / pH: x  Gluc: 102 mg/dL / Ketone: x  / Bili: x / Urobili: x   Blood: x / Protein: x / Nitrite: x   Leuk Esterase: x / RBC: x / WBC x   Sq Epi: x / Non Sq Epi: x / Bacteria: x      Procalcitonin, Serum: 0.06 ng/mL (04-21 @ 06:30)        RECENT CULTURES:  04-20 @ 02:30 Clean Catch Clean Catch (Midstream)                No growth    04-19 @ 14:35 .Blood Blood-Peripheral                No growth at 48 Hours    04-19 @ 14:20 .Blood Blood-Peripheral                No growth at 48 Hours          RESPIRATORY CULTURES:          Studies  Chest X-RAY  CT SCAN Chest   Venous Dopplers: LE:   CT Abdomen  Others              
[facility], Division of Infectious Diseases  JORGE LUIS Chaves Y. Patel, S. [facility], [facility]. Jones  [phone]  ([phone] - weekdays after 5pm and weekends)    Name: TY DAVIS  Age/Gender: 78y Female  MRN: [id number]    Interval History:  Notes reviewed.   No concerning overnight events.  febrile yesterday  azithromycin added  denies SOB, abd pain    Allergies: No Known Allergies      Objective:  Vitals:   T(F): 98.1 (04-21-24 @ 04:30), Max: 102.2 (04-20-24 @ 15:19)  HR: 90 (04-21-24 @ 04:30) (90 - 112)  BP: 136/75 (04-21-24 @ 04:30) (114/76 - 150/92)  RR: 18 (04-21-24 @ 04:30) (18 - 18)  SpO2: 94% (04-21-24 @ 04:30) (92% - 99%)  Physical Examination:  General: no acute distress  HEENT: anicteric  Cardio: S1, S2, normal rate  Resp: clear to auscultation anteriorly   Abd: soft, NT, ND  Ext: no LE edema  Skin: warm, dry    Laboratory Studies:  CBC:                       10.7   10.19 )-----------( 253      ( 21 Apr 2024 06:30 )             32.7     WBC Trend:  10.19 04-21-24 @ 06:30  11.88 04-20-24 @ 05:00  14.71 04-19-24 @ 14:40    CMP: 04-21    x   |  x   |  8   ----------------------------<  x   x    |  21<L>  |  x     Ca    8.0<L>      21 Apr 2024 06:30  Phos  2.5     04-20  Mg     1.70     04-21    TPro  9.2<H>  /  Alb  3.9  /  TBili  0.7  /  DBili  x   /  AST  15  /  ALT  10  /  AlkPhos  96  04-19      LIVER FUNCTIONS - ( 19 Apr 2024 14:40 )  Alb: 3.9 g/dL / Pro: 9.2 g/dL / ALK PHOS: 96 U/L / ALT: 10 U/L / AST: 15 U/L / GGT: x             Urinalysis Basic - ( 20 Apr 2024 05:00 )    Color: x / Appearance: x / SG: x / pH: x  Gluc: 127 mg/dL / Ketone: x  / Bili: x / Urobili: x   Blood: x / Protein: x / Nitrite: x   Leuk Esterase: x / RBC: x / WBC x   Sq Epi: x / Non Sq Epi: x / Bacteria: x      Microbiology: reviewed     Culture - Urine (collected 04-20-24 @ 02:30)  Source: Clean Catch Clean Catch (Midstream)  Final Report (04-21-24 @ 01:19):    No growth    Culture - Blood (collected 04-19-24 @ 14:35)  Source: .Blood Blood-Peripheral  Preliminary Report (04-20-24 @ 19:02):    No growth at 24 hours    Culture - Blood (collected 04-19-24 @ 14:20)  Source: .Blood Blood-Peripheral  Preliminary Report (04-20-24 @ 19:02):    No growth at 24 hours        Radiology: reviewed     Medications:  acetaminophen     Tablet .. 650 milliGRAM(s) Oral every 6 hours PRN  aspirin enteric coated 81 milliGRAM(s) Oral daily  atorvastatin 20 milliGRAM(s) Oral at bedtime  azithromycin  IVPB      azithromycin  IVPB 500 milliGRAM(s) IV Intermittent every 24 hours  enoxaparin Injectable 30 milliGRAM(s) SubCutaneous every 24 hours  lactated ringers. 1000 milliLiter(s) IV Continuous <Continuous>  melatonin 3 milliGRAM(s) Oral at bedtime PRN  pantoprazole    Tablet 40 milliGRAM(s) Oral before breakfast  piperacillin/tazobactam IVPB.. 3.375 Gram(s) IV Intermittent every 8 hours    Antimicrobials:  azithromycin  IVPB 500 milliGRAM(s) IV Intermittent every 24 hours  azithromycin  IVPB      piperacillin/tazobactam IVPB.. 3.375 Gram(s) IV Intermittent every 8 hours  
Patient is a 78y old  Female who presents with a chief complaint of Sepsis due to undetermined organism, Pneumonia, Diverticulitis (20 Apr 2024 11:44)    Date of servie : 04-20-24 @ 14:15  INTERVAL HPI/OVERNIGHT EVENTS:  T(C): 36.5 (04-20-24 @ 04:40), Max: 38.3 (04-19-24 @ 15:40)  HR: 75 (04-20-24 @ 04:40) (69 - 107)  BP: 120/80 (04-20-24 @ 04:40) (101/65 - 135/73)  RR: 18 (04-20-24 @ 04:40) (18 - 21)  SpO2: 95% (04-20-24 @ 04:40) (95% - 100%)  Wt(kg): --  I&O's Summary      LABS:                        11.1   11.88 )-----------( 268      ( 20 Apr 2024 05:00 )             34.0     04-20    140  |  107  |  8   ----------------------------<  127<H>  3.1<L>   |  20<L>  |  0.57    Ca    8.1<L>      20 Apr 2024 05:00  Phos  2.5     04-20  Mg     1.70     04-20    TPro  9.2<H>  /  Alb  3.9  /  TBili  0.7  /  DBili  x   /  AST  15  /  ALT  10  /  AlkPhos  96  04-19    PT/INR - ( 19 Apr 2024 14:40 )   PT: 11.9 sec;   INR: 1.06 ratio         PTT - ( 19 Apr 2024 14:40 )  PTT:35.8 sec  Urinalysis Basic - ( 20 Apr 2024 05:00 )    Color: x / Appearance: x / SG: x / pH: x  Gluc: 127 mg/dL / Ketone: x  / Bili: x / Urobili: x   Blood: x / Protein: x / Nitrite: x   Leuk Esterase: x / RBC: x / WBC x   Sq Epi: x / Non Sq Epi: x / Bacteria: x      CAPILLARY BLOOD GLUCOSE      POCT Blood Glucose.: 211 mg/dL (19 Apr 2024 19:12)        Urinalysis Basic - ( 20 Apr 2024 05:00 )    Color: x / Appearance: x / SG: x / pH: x  Gluc: 127 mg/dL / Ketone: x  / Bili: x / Urobili: x   Blood: x / Protein: x / Nitrite: x   Leuk Esterase: x / RBC: x / WBC x   Sq Epi: x / Non Sq Epi: x / Bacteria: x        MEDICATIONS  (STANDING):  aspirin enteric coated 81 milliGRAM(s) Oral daily  atorvastatin 20 milliGRAM(s) Oral at bedtime  enoxaparin Injectable 30 milliGRAM(s) SubCutaneous every 24 hours  lactated ringers. 1000 milliLiter(s) (125 mL/Hr) IV Continuous <Continuous>  pantoprazole    Tablet 40 milliGRAM(s) Oral before breakfast  piperacillin/tazobactam IVPB.. 3.375 Gram(s) IV Intermittent every 8 hours    MEDICATIONS  (PRN):  acetaminophen     Tablet .. 650 milliGRAM(s) Oral every 6 hours PRN Temp greater or equal to 38C (100.4F), Mild Pain (1 - 3)  melatonin 3 milliGRAM(s) Oral at bedtime PRN Insomnia          PHYSICAL EXAM:  GENERAL: frail  CHEST/LUNG: Clear to percussion bilaterally; No rales, rhonchi, wheezing, or rubs  HEART: Regular rate and rhythm; No murmurs, rubs, or gallops  ABDOMEN: Soft, Nontender, Nondistended; Bowel sounds present  EXTREMITIES: edema +    Care Discussed with Consultants/Other Providers [ ] YES  [ ] NO

## 2024-04-22 NOTE — DISCHARGE NOTE NURSING/CASE MANAGEMENT/SOCIAL WORK - PATIENT PORTAL LINK FT
You can access the FollowMyHealth Patient Portal offered by [facility] by registering at the following website: [email or web address]. By joining [facility]’s FollowMyHealth portal, you will also be able to view your health information using other applications (apps) compatible with our system.

## 2024-04-22 NOTE — PROVIDER CONTACT NOTE (OTHER) - ACTION/TREATMENT ORDERED:
Reviewed mammogram additional views from 09/23/2019. Patient stating she received letter from us reccomending bilateral screening ultrasound. She read letter over phone. Can you please add order?   Provider aware and order for 2L NC O2 placed.

## 2024-04-23 ENCOUNTER — APPOINTMENT (OUTPATIENT)
Dept: HOME HEALTH SERVICES | Facility: HOME HEALTH | Age: 79
End: 2024-04-23

## 2024-04-23 VITALS
RESPIRATION RATE: 16 BRPM | OXYGEN SATURATION: 94 % | HEART RATE: 81 BPM | DIASTOLIC BLOOD PRESSURE: 79 MMHG | SYSTOLIC BLOOD PRESSURE: 124 MMHG | TEMPERATURE: 96.9 F

## 2024-04-23 RX ORDER — BLOOD SUGAR DIAGNOSTIC
STRIP MISCELLANEOUS
Qty: 50 | Refills: 0 | Status: ACTIVE | COMMUNITY
Start: 2020-09-22

## 2024-04-23 RX ORDER — AMOXICILLIN/POTASSIUM CLAV 250-125 MG
1 TABLET ORAL
Qty: 0 | Refills: 0 | DISCHARGE
Start: 2024-04-23

## 2024-04-23 RX ORDER — METOPROLOL SUCCINATE 50 MG/1
50 TABLET, EXTENDED RELEASE ORAL DAILY
Qty: 90 | Refills: 3 | Status: DISCONTINUED | COMMUNITY
Start: 2024-02-22 | End: 2024-04-23

## 2024-04-23 RX ORDER — LOSARTAN POTASSIUM 100 MG/1
TABLET, FILM COATED ORAL
Refills: 0 | Status: DISCONTINUED | COMMUNITY
End: 2024-04-23

## 2024-04-26 ENCOUNTER — APPOINTMENT (OUTPATIENT)
Dept: HOME HEALTH SERVICES | Facility: HOME HEALTH | Age: 79
End: 2024-04-26
Payer: MEDICARE

## 2024-04-26 VITALS — DIASTOLIC BLOOD PRESSURE: 94 MMHG | SYSTOLIC BLOOD PRESSURE: 160 MMHG | HEART RATE: 83 BPM | OXYGEN SATURATION: 93 %

## 2024-04-26 DIAGNOSIS — R48.2 APRAXIA: ICD-10-CM

## 2024-04-26 DIAGNOSIS — J18.9 PNEUMONIA, UNSPECIFIED ORGANISM: ICD-10-CM

## 2024-04-26 DIAGNOSIS — R26.81 UNSTEADINESS ON FEET: ICD-10-CM

## 2024-04-26 PROCEDURE — 99495 TRANSJ CARE MGMT MOD F2F 14D: CPT

## 2024-05-02 ENCOUNTER — TRANSCRIPTION ENCOUNTER (OUTPATIENT)
Age: 79
End: 2024-05-02

## 2024-05-02 ENCOUNTER — INPATIENT (INPATIENT)
Facility: HOSPITAL | Age: 79
LOS: 12 days | Discharge: HOME | End: 2024-05-15
Attending: INTERNAL MEDICINE | Admitting: INTERNAL MEDICINE
Payer: MEDICARE

## 2024-05-02 VITALS
OXYGEN SATURATION: 91 % | TEMPERATURE: 99 F | RESPIRATION RATE: 18 BRPM | SYSTOLIC BLOOD PRESSURE: 110 MMHG | HEIGHT: 64 IN | DIASTOLIC BLOOD PRESSURE: 67 MMHG | HEART RATE: 76 BPM

## 2024-05-02 DIAGNOSIS — Z87.81 PERSONAL HISTORY OF (HEALED) TRAUMATIC FRACTURE: Chronic | ICD-10-CM

## 2024-05-02 LAB
ALBUMIN SERPL ELPH-MCNC: 3.7 G/DL — SIGNIFICANT CHANGE UP (ref 3.3–5)
ALP SERPL-CCNC: 105 U/L — SIGNIFICANT CHANGE UP (ref 40–120)
ALT FLD-CCNC: 10 U/L — SIGNIFICANT CHANGE UP (ref 4–33)
ANION GAP SERPL CALC-SCNC: 12 MMOL/L — SIGNIFICANT CHANGE UP (ref 7–14)
APTT BLD: 35.3 SEC — SIGNIFICANT CHANGE UP (ref 24.5–35.6)
AST SERPL-CCNC: 18 U/L — SIGNIFICANT CHANGE UP (ref 4–32)
BASE EXCESS BLDV CALC-SCNC: 2.8 MMOL/L — SIGNIFICANT CHANGE UP (ref -2–3)
BASOPHILS # BLD AUTO: 0.05 K/UL — SIGNIFICANT CHANGE UP (ref 0–0.2)
BASOPHILS NFR BLD AUTO: 0.3 % — SIGNIFICANT CHANGE UP (ref 0–2)
BILIRUB SERPL-MCNC: 0.7 MG/DL — SIGNIFICANT CHANGE UP (ref 0.2–1.2)
BLOOD GAS VENOUS COMPREHENSIVE RESULT: SIGNIFICANT CHANGE UP
BUN SERPL-MCNC: 14 MG/DL — SIGNIFICANT CHANGE UP (ref 7–23)
CALCIUM SERPL-MCNC: 9.2 MG/DL — SIGNIFICANT CHANGE UP (ref 8.4–10.5)
CHLORIDE BLDV-SCNC: 102 MMOL/L — SIGNIFICANT CHANGE UP (ref 96–108)
CHLORIDE SERPL-SCNC: 99 MMOL/L — SIGNIFICANT CHANGE UP (ref 98–107)
CO2 BLDV-SCNC: 29.3 MMOL/L — HIGH (ref 22–26)
CO2 SERPL-SCNC: 24 MMOL/L — SIGNIFICANT CHANGE UP (ref 22–31)
CREAT SERPL-MCNC: 0.68 MG/DL — SIGNIFICANT CHANGE UP (ref 0.5–1.3)
EGFR: 90 ML/MIN/1.73M2 — SIGNIFICANT CHANGE UP
EOSINOPHIL # BLD AUTO: 0.04 K/UL — SIGNIFICANT CHANGE UP (ref 0–0.5)
EOSINOPHIL NFR BLD AUTO: 0.3 % — SIGNIFICANT CHANGE UP (ref 0–6)
GAS PNL BLDV: 134 MMOL/L — LOW (ref 136–145)
GLUCOSE BLDV-MCNC: 146 MG/DL — HIGH (ref 70–99)
GLUCOSE SERPL-MCNC: 148 MG/DL — HIGH (ref 70–99)
HCO3 BLDV-SCNC: 28 MMOL/L — SIGNIFICANT CHANGE UP (ref 22–29)
HCT VFR BLD CALC: 35 % — SIGNIFICANT CHANGE UP (ref 34.5–45)
HCT VFR BLDA CALC: 36 % — SIGNIFICANT CHANGE UP (ref 34.5–46.5)
HGB BLD CALC-MCNC: 12.1 G/DL — SIGNIFICANT CHANGE UP (ref 11.7–16.1)
HGB BLD-MCNC: 12 G/DL — SIGNIFICANT CHANGE UP (ref 11.5–15.5)
IANC: 9.76 K/UL — HIGH (ref 1.8–7.4)
IMM GRANULOCYTES NFR BLD AUTO: 0.4 % — SIGNIFICANT CHANGE UP (ref 0–0.9)
INR BLD: 1.12 RATIO — SIGNIFICANT CHANGE UP (ref 0.85–1.18)
LACTATE BLDV-MCNC: 1.5 MMOL/L — SIGNIFICANT CHANGE UP (ref 0.5–2)
LYMPHOCYTES # BLD AUTO: 27.7 % — SIGNIFICANT CHANGE UP (ref 13–44)
LYMPHOCYTES # BLD AUTO: 4.22 K/UL — HIGH (ref 1–3.3)
MCHC RBC-ENTMCNC: 29.6 PG — SIGNIFICANT CHANGE UP (ref 27–34)
MCHC RBC-ENTMCNC: 34.3 GM/DL — SIGNIFICANT CHANGE UP (ref 32–36)
MCV RBC AUTO: 86.2 FL — SIGNIFICANT CHANGE UP (ref 80–100)
MONOCYTES # BLD AUTO: 1.11 K/UL — HIGH (ref 0–0.9)
MONOCYTES NFR BLD AUTO: 7.3 % — SIGNIFICANT CHANGE UP (ref 2–14)
NEUTROPHILS # BLD AUTO: 9.76 K/UL — HIGH (ref 1.8–7.4)
NEUTROPHILS NFR BLD AUTO: 64 % — SIGNIFICANT CHANGE UP (ref 43–77)
NRBC # BLD: 0 /100 WBCS — SIGNIFICANT CHANGE UP (ref 0–0)
NRBC # FLD: 0 K/UL — SIGNIFICANT CHANGE UP (ref 0–0)
PCO2 BLDV: 44 MMHG — SIGNIFICANT CHANGE UP (ref 39–52)
PH BLDV: 7.41 — SIGNIFICANT CHANGE UP (ref 7.32–7.43)
PLATELET # BLD AUTO: 321 K/UL — SIGNIFICANT CHANGE UP (ref 150–400)
PO2 BLDV: 46 MMHG — HIGH (ref 25–45)
POTASSIUM BLDV-SCNC: 3.2 MMOL/L — LOW (ref 3.5–5.1)
POTASSIUM SERPL-MCNC: 3.3 MMOL/L — LOW (ref 3.5–5.3)
POTASSIUM SERPL-SCNC: 3.3 MMOL/L — LOW (ref 3.5–5.3)
PROT SERPL-MCNC: 9.2 G/DL — HIGH (ref 6–8.3)
PROTHROM AB SERPL-ACNC: 12.6 SEC — SIGNIFICANT CHANGE UP (ref 9.5–13)
RBC # BLD: 4.06 M/UL — SIGNIFICANT CHANGE UP (ref 3.8–5.2)
RBC # FLD: 14.3 % — SIGNIFICANT CHANGE UP (ref 10.3–14.5)
SAO2 % BLDV: 71.9 % — SIGNIFICANT CHANGE UP (ref 67–88)
SODIUM SERPL-SCNC: 135 MMOL/L — SIGNIFICANT CHANGE UP (ref 135–145)
WBC # BLD: 15.24 K/UL — HIGH (ref 3.8–10.5)
WBC # FLD AUTO: 15.24 K/UL — HIGH (ref 3.8–10.5)

## 2024-05-02 PROCEDURE — 99285 EMERGENCY DEPT VISIT HI MDM: CPT

## 2024-05-02 PROCEDURE — 71046 X-RAY EXAM CHEST 2 VIEWS: CPT | Mod: 26

## 2024-05-02 RX ADMIN — Medication 125 MILLIGRAM(S): at 23:26

## 2024-05-02 NOTE — ED PROVIDER NOTE - PHYSICAL EXAMINATION
GENERAL: well appearing in no acute distress, non-toxic appearing  HEAD: normocephalic, atraumatic  CARDIAC: regular rate and rhythm, normal S1S2, no appreciable murmurs, 2+ pulses in UE/LE b/l  PULM: rhonchi b/l worse RUL  GI: abdomen nondistended, soft, nontender, no guarding, rebound tenderness  NEURO: no focal motor or sensory deficits,  MSK: no peripheral edema, no calf tenderness b/l  SKIN: well-perfused, extremities warm, no visible rashes

## 2024-05-02 NOTE — ED CLERICAL - NS ED CARE COORDINATION INFORMATION
This patient is enrolled in the House Calls Program and receives comprehensive home-based primary care.  To obtain additional clinical information , or to discuss any questions or concerns, you can call the [facility]s team at [phone], 24 hours a day.  If discharged, this patient will be followed up by the [facility] Calls team within 2 days.  If this patient requires admission, please use the hospitalist service.

## 2024-05-02 NOTE — ED ADULT NURSE NOTE - OBJECTIVE STATEMENT
Pt is alert and orientedx3 (disoriented to time), ambulatory at baseline. Pt presents to the ED on advise of PCP  due to abnormal WBC results from 01 MAY 2024, found during office visit, pt recently diagnosed and seen for Pneumonia infection. SOB present at rest, 91% SPO2, placed on 2L, SPO2 increased to 100%. Pt denies chest pain, breathing is unlabored and even, denies nausea, vomiting or diarrhea. 20G IV placed in L.AC. Labs drawn, awaiting further orders. Family at bedside.

## 2024-05-02 NOTE — ED PROVIDER NOTE - OBJECTIVE STATEMENT
78 year old female, with past history significant for Diverticulitis, Type-2 DM, HTN, HLD, Glaucoma, OA of the L-knee and PERFECTO, here after being sent in by PMD for leukocytosis 14 today. pt was seen at [facility] april 19th for maliase and abdominal pain, found to have multifocal pneumonia nodular opacity RUL w b/l groundglass opacities in both lungs treated w zosyn and transitioned to Augmentin finished 4/28. RVP negative urine legionella negative and blood culture NGTD. pt presents w son, denies any active complaints. he says she has been more tired and eating less. denies any fevers, chills, chest pain, sob, productive cough, abdominal pain, diarrhea, dysuria, leg swelling. on arrival bp 110/67 RR 18 HR 76 afebrile 98.7 and 97% on RA, pt well appearing no acute distress no increased work of breathing.

## 2024-05-02 NOTE — ED PROVIDER NOTE - CLINICAL SUMMARY MEDICAL DECISION MAKING FREE TEXT BOX
78 year old female, with past history significant for Diverticulitis, Type-2 DM, HTN, HLD, Glaucoma, OA of the L-knee and PERFECTO, here after being sent in by PMD for leukocytosis 14 today. pt was seen at [facility] april 19th for maliase and abdominal pain, found to have multifocal pneumonia nodular opacity RUL w b/l groundglass opacities in both lungs treated w zosyn and transitoned to augmentin finished 4/28. RVP negative urine legionella negative and blood culture NGTD. pt presents w son, denies any active complaints. he says she has been more tired and eating less. denies any fevers, chills, chest pain, sob, productive cough, abdominal pain, diarrhea, dysuria, leg swelling. on arrival bp 110/67 RR 18 HR 76 on rpt vitals 99.4 rectlaly, and 97% on RA, pt well appearing no acute distress no increased work of breathing.     will get xray, basic labs check for electrolyte abnormality in setting of decreased PO intake/fatigue, likely has persistent pna, if pt does not need oxygen, no tachycardia or signs of septic shock, no new concerning leukocytosis w left shift, no increased wob, no reason to stay in ed unless pt becomes unstable or lab work reveals significant abnormality, will send sputum culture. 78 year old female, with past history significant for Diverticulitis, Type-2 DM, HTN, HLD, Glaucoma, OA of the L-knee and PERFECTO, here after being sent in by PMD for leukocytosis 14 today. pt was seen at [facility] april 19th for maliase and abdominal pain, found to have multifocal pneumonia nodular opacity RUL w b/l groundglass opacities in both lungs treated w zosyn and transitioned to Augmentin finished 4/28. RVP negative urine legionella negative and blood culture NGTD. pt presents w son, denies any active complaints. he says she has been more tired and eating less. denies any fevers, chills, chest pain, sob, productive cough, abdominal pain, diarrhea, dysuria, leg swelling. on arrival bp 110/67 RR 18 HR 76 on rpt vitals 99.4 rectlaly, and 97% on RA, pt well appearing no acute distress no increased work of breathing.     will get xray, basic labs check for electrolyte abnormality in setting of decreased PO intake/fatigue, likely has persistent pna, if pt does not need oxygen, no tachycardia or signs of septic shock, no new concerning leukocytosis w left shift, no increased wob, no reason to stay in ed unless pt becomes unstable or lab work reveals significant abnormality, will send sputum culture.

## 2024-05-02 NOTE — ED ADULT NURSE NOTE - NSFALLHARMRISKINTERV_ED_ALL_ED

## 2024-05-02 NOTE — ED ADULT TRIAGE NOTE - CHIEF COMPLAINT QUOTE
Pt sent by PCP for abnormal lab (WBC 14,000). Per EMS, pt has recent dx pneumonia, spo2 91% on room air, placed on 3L with improvement. Hx prediabetic, HTN, dementia

## 2024-05-02 NOTE — ED PROVIDER NOTE - ATTENDING CONTRIBUTION TO CARE
Patient presents from PMDS office for leukocytosis; seen earlier at [facility] for same      CTScan 4/19 IMPRESSION:  Clustered nodular opacity in the right upper lobe with additional   scattered bilateral groundglass opacity in both lungs, concerning for   infectious/inflammatory etiology.  cultures antigen w/u etc negative at that time     Patient still c/o increasing fatigue and and decreased appetite but no NS Fever chills or other compliants   VSS afebrile  PE coarse rhonchi in upper lung fields   labs see pullset   Plan  for repeat CT cbc not suspicious for malignancy (CML? pmn predominance) which would make immunocompromise atypical organisms more likely viral panels negative ? inflammatory (BOOP etc)   as above  Patient to be readmitted

## 2024-05-03 ENCOUNTER — NON-APPOINTMENT (OUTPATIENT)
Age: 79
End: 2024-05-03

## 2024-05-03 DIAGNOSIS — J13 PNEUMONIA DUE TO STREPTOCOCCUS PNEUMONIAE: ICD-10-CM

## 2024-05-03 DIAGNOSIS — K57.92 DIVERTICULITIS OF INTESTINE, PART UNSPECIFIED, WITHOUT PERFORATION OR ABSCESS WITHOUT BLEEDING: ICD-10-CM

## 2024-05-03 DIAGNOSIS — Z29.9 ENCOUNTER FOR PROPHYLACTIC MEASURES, UNSPECIFIED: ICD-10-CM

## 2024-05-03 DIAGNOSIS — J18.9 PNEUMONIA, UNSPECIFIED ORGANISM: ICD-10-CM

## 2024-05-03 DIAGNOSIS — N13.30 UNSPECIFIED HYDRONEPHROSIS: ICD-10-CM

## 2024-05-03 DIAGNOSIS — Z79.899 OTHER LONG TERM (CURRENT) DRUG THERAPY: ICD-10-CM

## 2024-05-03 DIAGNOSIS — I10 ESSENTIAL (PRIMARY) HYPERTENSION: ICD-10-CM

## 2024-05-03 DIAGNOSIS — E11.9 TYPE 2 DIABETES MELLITUS WITHOUT COMPLICATIONS: ICD-10-CM

## 2024-05-03 LAB
FLUAV AG NPH QL: SIGNIFICANT CHANGE UP
FLUBV AG NPH QL: SIGNIFICANT CHANGE UP
GLUCOSE BLDC GLUCOMTR-MCNC: 156 MG/DL — HIGH (ref 70–99)
GLUCOSE BLDC GLUCOMTR-MCNC: 171 MG/DL — HIGH (ref 70–99)
GLUCOSE BLDC GLUCOMTR-MCNC: 251 MG/DL — HIGH (ref 70–99)
GLUCOSE BLDC GLUCOMTR-MCNC: 255 MG/DL — HIGH (ref 70–99)
GLUCOSE BLDC GLUCOMTR-MCNC: 290 MG/DL — HIGH (ref 70–99)
MRSA PCR RESULT.: SIGNIFICANT CHANGE UP
RSV RNA NPH QL NAA+NON-PROBE: SIGNIFICANT CHANGE UP
S AUREUS DNA NOSE QL NAA+PROBE: SIGNIFICANT CHANGE UP
SARS-COV-2 RNA SPEC QL NAA+PROBE: SIGNIFICANT CHANGE UP

## 2024-05-03 PROCEDURE — 74177 CT ABD & PELVIS W/CONTRAST: CPT | Mod: 26

## 2024-05-03 PROCEDURE — 71275 CT ANGIOGRAPHY CHEST: CPT | Mod: 26,MC

## 2024-05-03 PROCEDURE — 99223 1ST HOSP IP/OBS HIGH 75: CPT

## 2024-05-03 RX ORDER — INSULIN LISPRO 100/ML
VIAL (ML) SUBCUTANEOUS AT BEDTIME
Refills: 0 | Status: DISCONTINUED | OUTPATIENT
Start: 2024-05-03 | End: 2024-05-12

## 2024-05-03 RX ORDER — ACETAMINOPHEN 500 MG
650 TABLET ORAL EVERY 6 HOURS
Refills: 0 | Status: DISCONTINUED | OUTPATIENT
Start: 2024-05-03 | End: 2024-05-15

## 2024-05-03 RX ORDER — INSULIN LISPRO 100/ML
VIAL (ML) SUBCUTANEOUS
Refills: 0 | Status: DISCONTINUED | OUTPATIENT
Start: 2024-05-03 | End: 2024-05-12

## 2024-05-03 RX ORDER — PIPERACILLIN AND TAZOBACTAM 4; .5 G/20ML; G/20ML
4.5 INJECTION, POWDER, LYOPHILIZED, FOR SOLUTION INTRAVENOUS ONCE
Refills: 0 | Status: COMPLETED | OUTPATIENT
Start: 2024-05-03 | End: 2024-05-03

## 2024-05-03 RX ORDER — VANCOMYCIN HCL 1 G
1000 VIAL (EA) INTRAVENOUS ONCE
Refills: 0 | Status: COMPLETED | OUTPATIENT
Start: 2024-05-03 | End: 2024-05-03

## 2024-05-03 RX ORDER — LANOLIN ALCOHOL/MO/W.PET/CERES
3 CREAM (GRAM) TOPICAL AT BEDTIME
Refills: 0 | Status: DISCONTINUED | OUTPATIENT
Start: 2024-05-03 | End: 2024-05-15

## 2024-05-03 RX ORDER — CEFTRIAXONE 500 MG/1
1000 INJECTION, POWDER, FOR SOLUTION INTRAMUSCULAR; INTRAVENOUS ONCE
Refills: 0 | Status: COMPLETED | OUTPATIENT
Start: 2024-05-03 | End: 2024-05-03

## 2024-05-03 RX ORDER — SODIUM CHLORIDE 9 MG/ML
1000 INJECTION, SOLUTION INTRAVENOUS
Refills: 0 | Status: DISCONTINUED | OUTPATIENT
Start: 2024-05-03 | End: 2024-05-15

## 2024-05-03 RX ORDER — DEXTROSE 50 % IN WATER 50 %
25 SYRINGE (ML) INTRAVENOUS ONCE
Refills: 0 | Status: DISCONTINUED | OUTPATIENT
Start: 2024-05-03 | End: 2024-05-15

## 2024-05-03 RX ORDER — ASPIRIN/CALCIUM CARB/MAGNESIUM 324 MG
81 TABLET ORAL DAILY
Refills: 0 | Status: DISCONTINUED | OUTPATIENT
Start: 2024-05-03 | End: 2024-05-11

## 2024-05-03 RX ORDER — DEXTROSE 10 % IN WATER 10 %
125 INTRAVENOUS SOLUTION INTRAVENOUS ONCE
Refills: 0 | Status: DISCONTINUED | OUTPATIENT
Start: 2024-05-03 | End: 2024-05-15

## 2024-05-03 RX ORDER — PIPERACILLIN AND TAZOBACTAM 4; .5 G/20ML; G/20ML
3.38 INJECTION, POWDER, LYOPHILIZED, FOR SOLUTION INTRAVENOUS EVERY 8 HOURS
Refills: 0 | Status: DISCONTINUED | OUTPATIENT
Start: 2024-05-03 | End: 2024-05-03

## 2024-05-03 RX ORDER — ATORVASTATIN CALCIUM 80 MG/1
20 TABLET, FILM COATED ORAL AT BEDTIME
Refills: 0 | Status: DISCONTINUED | OUTPATIENT
Start: 2024-05-03 | End: 2024-05-15

## 2024-05-03 RX ORDER — DEXTROSE 50 % IN WATER 50 %
12.5 SYRINGE (ML) INTRAVENOUS ONCE
Refills: 0 | Status: DISCONTINUED | OUTPATIENT
Start: 2024-05-03 | End: 2024-05-15

## 2024-05-03 RX ORDER — PIPERACILLIN AND TAZOBACTAM 4; .5 G/20ML; G/20ML
4.5 INJECTION, POWDER, LYOPHILIZED, FOR SOLUTION INTRAVENOUS ONCE
Refills: 0 | Status: COMPLETED | OUTPATIENT
Start: 2024-05-04 | End: 2024-05-04

## 2024-05-03 RX ORDER — PIPERACILLIN AND TAZOBACTAM 4; .5 G/20ML; G/20ML
4.5 INJECTION, POWDER, LYOPHILIZED, FOR SOLUTION INTRAVENOUS EVERY 8 HOURS
Refills: 0 | Status: DISCONTINUED | OUTPATIENT
Start: 2024-05-04 | End: 2024-05-06

## 2024-05-03 RX ORDER — GLUCAGON INJECTION, SOLUTION 0.5 MG/.1ML
1 INJECTION, SOLUTION SUBCUTANEOUS ONCE
Refills: 0 | Status: DISCONTINUED | OUTPATIENT
Start: 2024-05-03 | End: 2024-05-15

## 2024-05-03 RX ORDER — AZITHROMYCIN 500 MG/1
TABLET, FILM COATED ORAL
Refills: 0 | Status: DISCONTINUED | OUTPATIENT
Start: 2024-05-03 | End: 2024-05-03

## 2024-05-03 RX ORDER — AZITHROMYCIN 500 MG/1
500 TABLET, FILM COATED ORAL ONCE
Refills: 0 | Status: COMPLETED | OUTPATIENT
Start: 2024-05-03 | End: 2024-05-03

## 2024-05-03 RX ORDER — DEXTROSE 50 % IN WATER 50 %
15 SYRINGE (ML) INTRAVENOUS ONCE
Refills: 0 | Status: DISCONTINUED | OUTPATIENT
Start: 2024-05-03 | End: 2024-05-15

## 2024-05-03 RX ORDER — PANTOPRAZOLE SODIUM 20 MG/1
40 TABLET, DELAYED RELEASE ORAL
Refills: 0 | Status: DISCONTINUED | OUTPATIENT
Start: 2024-05-03 | End: 2024-05-04

## 2024-05-03 RX ORDER — ONDANSETRON 8 MG/1
4 TABLET, FILM COATED ORAL EVERY 8 HOURS
Refills: 0 | Status: DISCONTINUED | OUTPATIENT
Start: 2024-05-03 | End: 2024-05-15

## 2024-05-03 RX ADMIN — Medication 3: at 07:04

## 2024-05-03 RX ADMIN — Medication 1: at 17:47

## 2024-05-03 RX ADMIN — AZITHROMYCIN 255 MILLIGRAM(S): 500 TABLET, FILM COATED ORAL at 02:05

## 2024-05-03 RX ADMIN — CEFTRIAXONE 100 MILLIGRAM(S): 500 INJECTION, POWDER, FOR SOLUTION INTRAMUSCULAR; INTRAVENOUS at 00:53

## 2024-05-03 RX ADMIN — Medication 250 MILLIGRAM(S): at 03:29

## 2024-05-03 RX ADMIN — PANTOPRAZOLE SODIUM 40 MILLIGRAM(S): 20 TABLET, DELAYED RELEASE ORAL at 07:03

## 2024-05-03 RX ADMIN — PIPERACILLIN AND TAZOBACTAM 200 GRAM(S): 4; .5 INJECTION, POWDER, LYOPHILIZED, FOR SOLUTION INTRAVENOUS at 11:05

## 2024-05-03 RX ADMIN — PIPERACILLIN AND TAZOBACTAM 25 GRAM(S): 4; .5 INJECTION, POWDER, LYOPHILIZED, FOR SOLUTION INTRAVENOUS at 14:59

## 2024-05-03 RX ADMIN — Medication 3: at 12:44

## 2024-05-03 RX ADMIN — Medication 81 MILLIGRAM(S): at 12:47

## 2024-05-03 NOTE — CONSULT NOTE ADULT - ASSESSMENT
This is a 79 y/o F with pmhx of Diverticulitis, Type-2 DM, HTN, HLD, Glaucoma, OA of the L-knee and PERFECTO, presented to the ED for new onset lethargy. At bedside, the patient does not appear lethargic. Son is not at bedside and the patient gave me the son's phone number however as per charts it is her own phone number. The patient denies shortness of breath. She also stated that she feels fine and does not have any complaints at this time. Denies cough. "She stated that I have dementia so I do not remember why I am here." Denies abdominal pain, and diarrhea. As per ED charts, the patient was sent here for leukocytosis. She also denies having issues eating at home. (03 May 2024 06:00):  she does not know why  sheis here:  recently she was dced from the hospital : currently she has no resp complaints and is on room air     suspected pneumonia  Hx of diverticulitis  MD  HTN  Glaucoma  OA    suspected pneumonia  -NOT SURE IF SHE HAS PNEUMONIA:  SHE IS EMPIRICALLY STARTED ON ANTIBIOTICS   -SHE IS HAS No FEVER BUT wbc COUNT IS HIGH would RULE OUT uti:  MICHAEL ACP ; TO SEND UA AS WELL AS PROCAL  -oN ROOM AIR:   -CHECK LEGIONELLA    -MINIMISE ANTIBIOTICS IF WE CAN   Hx of diverticulitis  -PER PRIMARY TEAM  dm:   -MONITOR AND CONTROL   HTN  -CONTROLLED  Glaucoma  -PER [facility] TEAM   OA  -NO PAIN  ;      MICHAEL ACP

## 2024-05-03 NOTE — CONSULT NOTE ADULT - ASSESSMENT
77 y/o F PMhx Glaucoma, DM II, HTN, dementia, diverticulitis who presented w/ increased lethargy  was treated for diverticulitis and PNA    leukocytosis, r/o PNA  afebrile, not hypoxic  was treated for PNA during prior admission; unclear if imaging findings are 2/2 prior PNA vs new PNA  SARS-CoV-2/ flu/ RSV PCR negative  CTA chest- No pulmonary embolus. Bilateral nodular and tree-in-bud opacities, similar to priors. Partially imaged mild left hydronephrosis, similar to 4/19/2024.    Recommendations  c/w zosyn for now  f/u blood cultures  f/u CT abd/pelvis    Dr. Jarrett Lee will be covering 5/4 & 5/5. I will resume care on 5/6    Geovani Goldman M.D.  [facility], Division of Infectious Diseases  [phone]  After 5pm on weekdays and all day on weekends - please call [phone]  79 y/o F PMhx Glaucoma, DM II, HTN, dementia, diverticulitis who presented w/ increased lethargy  was treated for diverticulitis and PNA    leukocytosis, r/o PNA  afebrile, not hypoxic  was treated for PNA during prior admission; unclear if imaging findings are 2/2 prior PNA vs new PNA  SARS-CoV-2/ flu/ RSV PCR negative  CTA chest- No pulmonary embolus. Bilateral nodular and tree-in-bud opacities, similar to priors. Partially imaged mild left hydronephrosis, similar to 4/19/2024.    Recommendations  c/w zosyn for now  f/u blood cultures  f/u CT abd/pelvis  procal ordered w/ AM labs    Dr. Jarrett Lee will be covering 5/4 & 5/5. I will resume care on 5/6    Geovani Goldman M.D.  [facility], Division of Infectious Diseases  [phone]  After 5pm on weekdays and all day on weekends - please call [phone]  79 y/o F PMhx Glaucoma, DM II, HTN, dementia, diverticulitis who presented w/ increased lethargy  was treated for diverticulitis and PNA    sigmoid diverticulitis w/ perforation, leukocytosis, r/o PNA  afebrile, not hypoxic  was treated for PNA during prior admission; maging findings may be 2/2 prior PNA  SARS-CoV-2/ flu/ RSV PCR negative  CTA chest- No pulmonary embolus. Bilateral nodular and tree-in-bud opacities, similar to priors. Partially imaged mild left hydronephrosis, similar to 4/19/2024.  CT abd/pelvis- Persistent sigmoid diverticulitis with adjacent phlegmonous changes with small foci of extraluminal air, likely sequela of contained perforation. No drainable fluid collection. Moderate left hydroureteronephrosis    Recommendations  c/w zosyn  f/u blood cultures  procal ordered w/ AM labs  f/u colorectal surgery recs    Dr. Jarrett Lee will be covering 5/4 & 5/5. I will resume care on 5/6    Geovani Goldman M.D.  [facility], Division of Infectious Diseases  [phone]  After 5pm on weekdays and all day on weekends - please call [phone]

## 2024-05-03 NOTE — H&P ADULT - NSHPADDITIONALINFOADULT_GEN_ALL_CORE
- will need to obtain collateral from son, tried to call him multiple times but did not . - will need to obtain collateral from son, tried to call him multiple times but did not .  As per current hospital policy, this patient will be followed by the private attending and to take over the case in the morning, and assume complete responsibility by Dr. Eagle, as listed on the attending of record as above on the chart.

## 2024-05-03 NOTE — PHYSICAL THERAPY INITIAL EVALUATION ADULT - PRECAUTIONS/LIMITATIONS, REHAB EVAL
H&P reviewed. The patient was examined and there are no changes to the H&P.        
aspiration precautions/fall precautions

## 2024-05-03 NOTE — PHYSICAL THERAPY INITIAL EVALUATION ADULT - MANUAL MUSCLE TESTING RESULTS, REHAB EVAL
Patients bilateral upper and lower extremity strength grossly 4/5 upon MMT and functional assessment

## 2024-05-03 NOTE — H&P ADULT - PROBLEM SELECTOR PLAN 2
- the patient also had diverticulitis on imaging with abdominal pain  - there is no abdominal pain or tenderness on palpation at bedside today, denies diarrhea at bedside  - will obtain repeat CT abdomen/pelvis to assess for resolution for diverticulitis  - monitor for abdominal pain

## 2024-05-03 NOTE — H&P ADULT - ASSESSMENT
79 y/o F with pmhx of Diverticulitis, Type-2 DM, HTN, HLD, Glaucoma, OA of the L-knee and PERFECTO, presented to the ED for new onset lethargy. Found to have leukocytosis likely from pneumonia vs. diverticulitis. The patient does not appear lethargic. Admit for IV abx.

## 2024-05-03 NOTE — H&P ADULT - PROBLEM SELECTOR PLAN 4
- hold BP meds Video Triage Note:    History: Pt to ED c/o fall. Pt got up from bed without walker, fell and hit himself on wall. Fall was witnessed by family who reports he seemed confused, and is normally very sharp. Pt suffered abrasion to back of head. +pain to lumbar spine - no other complaints. Limited neuro exam performed by RN is WNL. Pt on baby ASA, no other blood thinners    Assessment and Plan: CT of head and neck, Xray lumbar spine    This patient encounter was via video triage. The patient will be evaluated by an ED physician/APC.       Destiney Page, CNP  08/16/23 [id number]
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
## 2024-05-03 NOTE — H&P ADULT - PROBLEM SELECTOR PLAN 3
- patient with known hx of hydronephrosis  - CT chest still shows L hydronephrosis  - f/u CT abdomen/pelvis  - UA pending, but patient does not have dysuria  - likely chronic however will f/u with imaging to assess for progression

## 2024-05-03 NOTE — H&P ADULT - NSHPPHYSICALEXAM_GEN_ALL_CORE
PHYSICAL EXAM:  VITALS: Vital Signs Last 24 Hrs  T(C): 36.8 (02 May 2024 23:18), Max: 37.6 (02 May 2024 21:28)  T(F): 98.3 (02 May 2024 23:18), Max: 99.6 (02 May 2024 21:28)  HR: 59 (03 May 2024 05:01) (59 - 80)  BP: 115/70 (03 May 2024 05:01) (102/56 - 116/67)  BP(mean): 71 (02 May 2024 23:18) (71 - 71)  RR: 18 (03 May 2024 05:01) (17 - 20)  SpO2: 95% (03 May 2024 05:01) (91% - 100%)    Parameters below as of 03 May 2024 05:01  Patient On (Oxygen Delivery Method): room air      GENERAL: NAD, comfortable at bedside  HEAD:  Atraumatic, Normocephalic  EYES: EOMI, PERRL, conjunctiva and sclera clear  ENT: Moist Mucus Membranes present, no ulcers appreciated  NECK: Supple, No JVD  CHEST/LUNG: + scattered crackles b/l, no accessory muscle use, on room air  HEART: Regular rate and rhythm; No murmurs, rubs, or gallops, (+)S1, S2  ABDOMEN: Soft, Nontender, Nondistended; Normal Bowel sounds   EXTREMITIES: No clubbing, cyanosis, or edema  PSYCH: normal mood and affect  NEUROLOGY: AAOx2, non-focal  SKIN: No rashes or lesions

## 2024-05-03 NOTE — ED ADULT NURSE REASSESSMENT NOTE - NS ED NURSE REASSESS COMMENT FT1
report received from ALEXANDRA Woo. patient laying in semi fowlers position on the stretcher. patient alert and oriented times four. patient denies shortness of breath, chest pain, nausea, vomiting, chill, fever. Patient normal sinus on the monitor. Patient tolerating nasal cannula well. Oxygen adequate. Respirations equal and adequate. Patients IV patent, no signs of infiltration. Safety measures in place, call bell within reach. Patient stable upon leaving the room. report received from ALEXANDRA Woo. patient laying in semi fowlers position on the stretcher. patient alert and oriented times two. patient denies shortness of breath, chest pain, nausea, vomiting, chill, fever. Patient normal sinus on the monitor. Patient tolerating nasal cannula well. Oxygen adequate. Respirations equal and adequate. Patients IV patent, no signs of infiltration. Safety measures in place, call bell within reach. Patient stable upon leaving the room.

## 2024-05-03 NOTE — CONSULT NOTE ADULT - ASSESSMENT
A 78 year old female who presented with abnormal leukocytosis found to have contained perforation of sigmoid diverticulitis on CT scan. IR deem undrainable collection.    Recommendations:  - NPO  - IVF  - IV antibiotics: Zosyn if no contraindication  - Will continue to follow    A-Team  [id number] A 78 year old female who presented with abnormal leukocytosis found to have contained perforation of sigmoid diverticulitis on CT scan. IR deem undrainable collection.    Recommendations:  - NPO  - IVF  - IV antibiotics: Zosyn if no contraindication  - If the patient is about to be discharged from medical service, can be transferred to our service  - Will continue to follow    A-Team  [id number] A 78 year old female who presented with abnormal leukocytosis found to have contained perforation of sigmoid diverticulitis on CT scan.     Recommendations:  - NPO  - IVF  - IV antibiotics: Zosyn if no contraindication  - If the patient is about to be discharged from medical service, can be transferred to our service  - Will continue to follow    A-Team  [id number]

## 2024-05-03 NOTE — PHYSICAL THERAPY INITIAL EVALUATION ADULT - PERTINENT HX OF CURRENT PROBLEM, REHAB EVAL
Patient is a 78 year old female with PMHx of Diverticulitis, Type-2 DM, HTN, HLD, Glaucoma, osteoarthritis of the left knee and PERFECTO, presented to the ED for new onset lethargy. Found to have leukocytosis likely from pneumonia vs. diverticulitis. The patient does not appear lethargic. CT chest neg for PE, still showing tree in bud opacities
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
## 2024-05-03 NOTE — H&P ADULT - NSHPREVIEWOFSYSTEMS_GEN_ALL_CORE
REVIEW OF SYSTEMS:    CONSTITUTIONAL: No weakness, fevers or chills  EYES/ENT: No visual changes;  No dysphagia; No sore throat; No rhinorrhea; No sinus pain/pressure  NECK: No pain or stiffness  RESPIRATORY: No cough, wheezing, hemoptysis; No shortness of breath  CARDIOVASCULAR: No chest pain or palpitations; No lower extremity edema  GASTROINTESTINAL: No abdominal or epigastric pain. No nausea, vomiting, or hematemesis; No diarrhea or constipation. No melena or hematochezia.  GENITOURINARY: No dysuria, frequency or hematuria  NEUROLOGICAL: No numbness or weakness  MSK: no muscle pains  SKIN: No itching, burning, rashes, or lesions   All other review of systems is negative unless indicated above.

## 2024-05-03 NOTE — PHYSICAL THERAPY INITIAL EVALUATION ADULT - ADDITIONAL COMMENTS
Pt states she lives with her  and children in a house with steps to enter and then remains on the main level. Prior to admission, pt was ambulating independently without any assistive devices.   Pt states she has a home health aid 7 days/week, owns a rolling walker. Pt states her  assists her with stairs.     Patient found sitting at edge of bed, NAD, all lines and tubes intact, call paz within reach, ALEXANDRA Valerio aware of PT evaluation

## 2024-05-03 NOTE — H&P ADULT - NSHPLABSRESULTS_GEN_ALL_CORE
12.0   15.24 )-----------( 321      ( 02 May 2024 22:35 )             35.0       05-02    135  |  99  |  14  ----------------------------<  148<H>  3.3<L>   |  24  |  0.68    Ca    9.2      02 May 2024 22:35    TPro  9.2<H>  /  Alb  3.7  /  TBili  0.7  /  DBili  x   /  AST  18  /  ALT  10  /  AlkPhos  105  05-02            PT/INR - ( 02 May 2024 22:35 )   PT: 12.6 sec;   INR: 1.12 ratio         PTT - ( 02 May 2024 22:35 )  PTT:35.3 sec    22:35 - VBG - pH: 7.41  | pCO2: 44    | pO2: 46    | Lactate: 1.5        Urinalysis Basic - ( 02 May 2024 22:35 )    Color: x / Appearance: x / SG: x / pH: x  Gluc: 148 mg/dL / Ketone: x  / Bili: x / Urobili: x   Blood: x / Protein: x / Nitrite: x   Leuk Esterase: x / RBC: x / WBC x   Sq Epi: x / Non Sq Epi: x / Bacteria: x            CXR: As per my read - no opacities noted, Will wait for prelim/official read    EKG: As per my read - NSR QTc 427 ms    CT:  IMPRESSION:  No pulmonary embolus.  Bilateral nodular and tree-in-bud opacities, similar to priors.  Partially imaged mild left hydronephrosis, similar to 4/19/2024.

## 2024-05-03 NOTE — CONSULT NOTE ADULT - SUBJECTIVE AND OBJECTIVE BOX
[facility], Division of Infectious Diseases  JORGE LUIS Chaves S. Shah, Y. Patel, G. [facility]  [phone]  ([phone] - weekdays after 5pm and weekends)    TY DAVIS  78y, Female  [id number]    HPI--  HPI:  This is a 77 y/o F with pmhx of Diverticulitis, Type-2 DM, HTN, HLD, Glaucoma, OA of the L-knee and PERFECTO, presented to the ED for new onset lethargy. At bedside, the patient does not appear lethargic. Son is not at bedside and the patient gave me the son's phone number however as per charts it is her own phone number. The patient denies shortness of breath. She also stated that she feels fine and does not have any complaints at this time. Denies cough. "She stated that I have dementia so I do not remember why I am here." Denies abdominal pain, and diarrhea. As per ED charts, the patient was sent here for leukocytosis. She also denies having issues eating at home. (03 May 2024 06:00)  States she does not remember or know why she is in the hospital. Of note she had recent hospitalization for diverticulitis and PNA.  Patient unable to provide history therefore history obtained from chart review.     ROS: 14 point review of systems completed, pertinent positives and negatives as per HPI.    Allergies: No Known Allergies    PMH -- Hypertension, unspecified type    Diabetes    Glaucoma    Osteoarthritis, knee    HLD (hyperlipidemia)      PSH -- No significant past surgical history    H/O knee surgery    H/O fracture of leg      FH -- No pertinent family history in first degree relatives    Family history of diabetes mellitus (DM)      Social History -- denies tobacco, alcohol or illicit drug use    Physical Exam--  Vital Signs Last 24 Hrs  T(F): 98.3 (03 May 2024 06:29), Max: 99.6 (02 May 2024 21:28)  HR: 78 (03 May 2024 06:29) (59 - 80)  BP: 133/77 (03 May 2024 06:29) (102/56 - 133/77)  RR: 17 (03 May 2024 06:29) (17 - 20)  SpO2: 99% (03 May 2024 06:29) (91% - 100%)  General: nontoxic-appearing, no acute distress  HEENT: anicteric  Lungs: Clear bilaterally without rales  Heart: S1, S2, normal rate.   Abdomen: Soft. Nondistended. Nontender.   Neuro: AAOx2  Back: No costovertebral angle tenderness.  Extremities: No LE edema.   Skin: Warm. Dry. No rash.    Laboratory & Imaging Data--  CBC:                       12.0   15.24 )-----------( 321      ( 02 May 2024 22:35 )             35.0     WBC Count: 15.24 K/uL (05-02-24 @ 22:35)    CMP: 05-02    135  |  99  |  14  ----------------------------<  148<H>  3.3<L>   |  24  |  0.68    Ca    9.2      02 May 2024 22:35    TPro  9.2<H>  /  Alb  3.7  /  TBili  0.7  /  DBili  x   /  AST  18  /  ALT  10  /  AlkPhos  105  05-02    LIVER FUNCTIONS - ( 02 May 2024 22:35 )  Alb: 3.7 g/dL / Pro: 9.2 g/dL / ALK PHOS: 105 U/L / ALT: 10 U/L / AST: 18 U/L / GGT: x           Urinalysis Basic - ( 02 May 2024 22:35 )    Color: x / Appearance: x / SG: x / pH: x  Gluc: 148 mg/dL / Ketone: x  / Bili: x / Urobili: x   Blood: x / Protein: x / Nitrite: x   Leuk Esterase: x / RBC: x / WBC x   Sq Epi: x / Non Sq Epi: x / Bacteria: x      Microbiology:     Culture - Urine (collected 04-20-24 @ 02:30)  Source: Clean Catch Clean Catch (Midstream)  Final Report (04-21-24 @ 01:19):    No growth    Culture - Blood (collected 04-19-24 @ 14:35)  Source: .Blood Blood-Peripheral  Final Report (04-24-24 @ 19:00):    No growth at 5 days        Radiology--  ***  Active Medications--  acetaminophen     Tablet .. 650 milliGRAM(s) Oral every 6 hours PRN  aluminum hydroxide/magnesium hydroxide/simethicone Suspension 30 milliLiter(s) Oral every 4 hours PRN  aspirin enteric coated 81 milliGRAM(s) Oral daily  atorvastatin 20 milliGRAM(s) Oral at bedtime  dextrose 10% Bolus 125 milliLiter(s) IV Bolus once  dextrose 5%. 1000 milliLiter(s) IV Continuous <Continuous>  dextrose 5%. 1000 milliLiter(s) IV Continuous <Continuous>  dextrose 50% Injectable 25 Gram(s) IV Push once  dextrose 50% Injectable 12.5 Gram(s) IV Push once  dextrose Oral Gel 15 Gram(s) Oral once PRN  glucagon  Injectable 1 milliGRAM(s) IntraMuscular once  insulin lispro (ADMELOG) corrective regimen sliding scale   SubCutaneous three times a day before meals  insulin lispro (ADMELOG) corrective regimen sliding scale   SubCutaneous at bedtime  melatonin 3 milliGRAM(s) Oral at bedtime PRN  ondansetron Injectable 4 milliGRAM(s) IV Push every 8 hours PRN  pantoprazole    Tablet 40 milliGRAM(s) Oral before breakfast  piperacillin/tazobactam IVPB.- 4.5 Gram(s) IV Intermittent once  piperacillin/tazobactam IVPB.- 4.5 Gram(s) IV Intermittent once    Antimicrobials:   piperacillin/tazobactam IVPB.- 4.5 Gram(s) IV Intermittent once  piperacillin/tazobactam IVPB.- 4.5 Gram(s) IV Intermittent once    Immunologic:

## 2024-05-03 NOTE — CONSULT NOTE ADULT - ATTENDING COMMENTS
Date of service 5/3    78F recently admitted in April 2024 and found to have uncomplicated diverticulitis, managed expectantly, re admitted to due to abnormal labs as outpatient. She denies pain at this time  WBC 16  CT reviewed - persistent diverticulitis in LLQ without abscess  Abd soft , mild TTP to deep palpation, no rebound/guarding    Would continue conservative treatment at this time, patient is clinically asymptomatic  NPO for now  IV abx  ID involved  No plan for urgent surgical intervention, may benefit from elective sigmoidectomy

## 2024-05-03 NOTE — H&P ADULT - HISTORY OF PRESENT ILLNESS
This is a 77 y/o F with pmhx of Diverticulitis, Type-2 DM, HTN, HLD, Glaucoma, OA of the L-knee and PERFECTO, presented to the ED for new onset lethargy. At bedside, the patient does not appear lethargic. Son is not at bedside and the patient gave me the son's phone number however as per charts it is her own phone number. The patient denies shortness of breath. She also stated that she feels fine and does not have any complaints at this time. Denies cough. "She stated that I have dementia so I do not remember why I am here." Denies abdominal pain, and diarrhea. As per ED charts, the patient was sent here for leukocytosis. She also denies having issues eating at home.

## 2024-05-03 NOTE — PHYSICAL THERAPY INITIAL EVALUATION ADULT - GENERAL OBSERVATIONS, REHAB EVAL
Patient found standing in room, NAD, A&Ox4, OK for PT per RN Iman, patient agreeable to participate in PT evaluation HR 99

## 2024-05-03 NOTE — PATIENT PROFILE ADULT - FUNCTIONAL SCREEN CURRENT LEVEL: COMMUNICATION, MLM
patient has hx of dementia, confused at times A&O 3-4/0 = understands/communicates without difficulty

## 2024-05-03 NOTE — H&P ADULT - PROBLEM SELECTOR PLAN 1
- patient presented for pneumonia  - known prior admission for pneumonia, completed zosyn and transitioned to outpatient abx   - CT chest neg for PE, still showing tree in bud opacities  - patient now has leukocytosis, although the patient does not appear to be toxic appearing    Plan:  - will restart zosyn  - f/u sputum and blood cultures  - would obtain ID consult in AM - was followed last admission  - monitor for signs of sepsis

## 2024-05-03 NOTE — ED ADULT NURSE REASSESSMENT NOTE - NS ED NURSE REASSESS COMMENT FT1
BREAK RN: Pt recieevd in Rm 26 in no acute distress. Denies headache, dizziness, nausea, SOB, chest pain. VS as charted. Pending bed assignment.

## 2024-05-04 LAB
ANION GAP SERPL CALC-SCNC: 12 MMOL/L — SIGNIFICANT CHANGE UP (ref 7–14)
BUN SERPL-MCNC: 10 MG/DL — SIGNIFICANT CHANGE UP (ref 7–23)
CALCIUM SERPL-MCNC: 8.7 MG/DL — SIGNIFICANT CHANGE UP (ref 8.4–10.5)
CHLORIDE SERPL-SCNC: 102 MMOL/L — SIGNIFICANT CHANGE UP (ref 98–107)
CO2 SERPL-SCNC: 25 MMOL/L — SIGNIFICANT CHANGE UP (ref 22–31)
CREAT SERPL-MCNC: 0.57 MG/DL — SIGNIFICANT CHANGE UP (ref 0.5–1.3)
EGFR: 94 ML/MIN/1.73M2 — SIGNIFICANT CHANGE UP
GLUCOSE BLDC GLUCOMTR-MCNC: 105 MG/DL — HIGH (ref 70–99)
GLUCOSE BLDC GLUCOMTR-MCNC: 112 MG/DL — HIGH (ref 70–99)
GLUCOSE BLDC GLUCOMTR-MCNC: 116 MG/DL — HIGH (ref 70–99)
GLUCOSE BLDC GLUCOMTR-MCNC: 127 MG/DL — HIGH (ref 70–99)
GLUCOSE SERPL-MCNC: 124 MG/DL — HIGH (ref 70–99)
HCT VFR BLD CALC: 36.4 % — SIGNIFICANT CHANGE UP (ref 34.5–45)
HGB BLD-MCNC: 12.2 G/DL — SIGNIFICANT CHANGE UP (ref 11.5–15.5)
LEGIONELLA AG UR QL: NEGATIVE — SIGNIFICANT CHANGE UP
MAGNESIUM SERPL-MCNC: 1.9 MG/DL — SIGNIFICANT CHANGE UP (ref 1.6–2.6)
MCHC RBC-ENTMCNC: 29.2 PG — SIGNIFICANT CHANGE UP (ref 27–34)
MCHC RBC-ENTMCNC: 33.5 GM/DL — SIGNIFICANT CHANGE UP (ref 32–36)
MCV RBC AUTO: 87.1 FL — SIGNIFICANT CHANGE UP (ref 80–100)
NRBC # BLD: 0 /100 WBCS — SIGNIFICANT CHANGE UP (ref 0–0)
NRBC # FLD: 0 K/UL — SIGNIFICANT CHANGE UP (ref 0–0)
PHOSPHATE SERPL-MCNC: 2.4 MG/DL — LOW (ref 2.5–4.5)
PLATELET # BLD AUTO: 360 K/UL — SIGNIFICANT CHANGE UP (ref 150–400)
POTASSIUM SERPL-MCNC: 2.8 MMOL/L — CRITICAL LOW (ref 3.5–5.3)
POTASSIUM SERPL-SCNC: 2.8 MMOL/L — CRITICAL LOW (ref 3.5–5.3)
PROCALCITONIN SERPL-MCNC: 0.05 NG/ML — SIGNIFICANT CHANGE UP (ref 0.02–0.1)
RBC # BLD: 4.18 M/UL — SIGNIFICANT CHANGE UP (ref 3.8–5.2)
RBC # FLD: 14.1 % — SIGNIFICANT CHANGE UP (ref 10.3–14.5)
SODIUM SERPL-SCNC: 139 MMOL/L — SIGNIFICANT CHANGE UP (ref 135–145)
WBC # BLD: 12.03 K/UL — HIGH (ref 3.8–10.5)
WBC # FLD AUTO: 12.03 K/UL — HIGH (ref 3.8–10.5)

## 2024-05-04 RX ORDER — PANTOPRAZOLE SODIUM 20 MG/1
40 TABLET, DELAYED RELEASE ORAL DAILY
Refills: 0 | Status: DISCONTINUED | OUTPATIENT
Start: 2024-05-04 | End: 2024-05-15

## 2024-05-04 RX ORDER — POTASSIUM CHLORIDE 20 MEQ
40 PACKET (EA) ORAL ONCE
Refills: 0 | Status: COMPLETED | OUTPATIENT
Start: 2024-05-04 | End: 2024-05-04

## 2024-05-04 RX ORDER — POTASSIUM CHLORIDE 20 MEQ
10 PACKET (EA) ORAL
Refills: 0 | Status: COMPLETED | OUTPATIENT
Start: 2024-05-04 | End: 2024-05-04

## 2024-05-04 RX ADMIN — Medication 40 MILLIEQUIVALENT(S): at 22:19

## 2024-05-04 RX ADMIN — Medication 100 MILLIEQUIVALENT(S): at 07:30

## 2024-05-04 RX ADMIN — ATORVASTATIN CALCIUM 20 MILLIGRAM(S): 80 TABLET, FILM COATED ORAL at 00:51

## 2024-05-04 RX ADMIN — PIPERACILLIN AND TAZOBACTAM 25 GRAM(S): 4; .5 INJECTION, POWDER, LYOPHILIZED, FOR SOLUTION INTRAVENOUS at 00:51

## 2024-05-04 RX ADMIN — PIPERACILLIN AND TAZOBACTAM 25 GRAM(S): 4; .5 INJECTION, POWDER, LYOPHILIZED, FOR SOLUTION INTRAVENOUS at 22:19

## 2024-05-04 RX ADMIN — PANTOPRAZOLE SODIUM 40 MILLIGRAM(S): 20 TABLET, DELAYED RELEASE ORAL at 15:12

## 2024-05-04 RX ADMIN — PIPERACILLIN AND TAZOBACTAM 25 GRAM(S): 4; .5 INJECTION, POWDER, LYOPHILIZED, FOR SOLUTION INTRAVENOUS at 15:12

## 2024-05-04 RX ADMIN — Medication 81 MILLIGRAM(S): at 15:12

## 2024-05-04 RX ADMIN — Medication 100 MILLIEQUIVALENT(S): at 09:30

## 2024-05-04 RX ADMIN — ATORVASTATIN CALCIUM 20 MILLIGRAM(S): 80 TABLET, FILM COATED ORAL at 22:26

## 2024-05-04 RX ADMIN — Medication 100 MILLIEQUIVALENT(S): at 08:30

## 2024-05-04 NOTE — PROGRESS NOTE ADULT - ASSESSMENT
77 y/o F PMhx Glaucoma, DM II, HTN, dementia, diverticulitis who presented w/ increased lethargy  was treated for diverticulitis and PNA    sigmoid diverticulitis w/ perforation, leukocytosis, r/o PNA  afebrile, not hypoxic, PCT negative   was treated for PNA during prior admission; maging findings may be 2/2 prior PNA  SARS-CoV-2/ flu/ RSV PCR negative  CTA chest- No pulmonary embolus. Bilateral nodular and tree-in-bud opacities, similar to priors. Partially imaged mild left hydronephrosis, similar to 4/19/2024.  CT abd/pelvis- Persistent sigmoid diverticulitis with adjacent phlegmonous changes with small foci of extraluminal air, likely sequela of contained perforation. No drainable fluid collection. Moderate left hydroureteronephrosis    Recommendations  c/w zosyn  f/u blood cultures-NGTD  CRS following, on CLD, no surgery planned      Covering for Dr. Jones Lee M.D.  [facility], Division of Infectious Diseases  [phone]  After 5pm on weekdays and all day on weekends - please call [phone]

## 2024-05-04 NOTE — PROGRESS NOTE ADULT - SUBJECTIVE AND OBJECTIVE BOX
Date of Service: 05-04-24 @ 11:33    Patient is a 78y old  Female who presents with a chief complaint of abnormal labs (04 May 2024 11:22)      Any change in ROS:  doing pretty good:  no sob:  on room air     MEDICATIONS  (STANDING):  aspirin enteric coated 81 milliGRAM(s) Oral daily  atorvastatin 20 milliGRAM(s) Oral at bedtime  dextrose 10% Bolus 125 milliLiter(s) IV Bolus once  dextrose 5%. 1000 milliLiter(s) (50 mL/Hr) IV Continuous <Continuous>  dextrose 5%. 1000 milliLiter(s) (100 mL/Hr) IV Continuous <Continuous>  dextrose 50% Injectable 25 Gram(s) IV Push once  dextrose 50% Injectable 12.5 Gram(s) IV Push once  glucagon  Injectable 1 milliGRAM(s) IntraMuscular once  insulin lispro (ADMELOG) corrective regimen sliding scale   SubCutaneous at bedtime  insulin lispro (ADMELOG) corrective regimen sliding scale   SubCutaneous three times a day before meals  pantoprazole  Injectable 40 milliGRAM(s) IV Push daily  piperacillin/tazobactam IVPB.. 4.5 Gram(s) IV Intermittent every 8 hours    MEDICATIONS  (PRN):  acetaminophen     Tablet .. 650 milliGRAM(s) Oral every 6 hours PRN Temp greater or equal to 38C (100.4F), Mild Pain (1 - 3)  aluminum hydroxide/magnesium hydroxide/simethicone Suspension 30 milliLiter(s) Oral every 4 hours PRN Dyspepsia  dextrose Oral Gel 15 Gram(s) Oral once PRN Blood Glucose LESS THAN 70 milliGRAM(s)/deciliter  melatonin 3 milliGRAM(s) Oral at bedtime PRN Insomnia  ondansetron Injectable 4 milliGRAM(s) IV Push every 8 hours PRN Nausea and/or Vomiting    Vital Signs Last 24 Hrs  T(C): 36.3 (04 May 2024 09:58), Max: 36.9 (04 May 2024 00:55)  T(F): 97.4 (04 May 2024 09:58), Max: 98.4 (04 May 2024 00:55)  HR: 60 (04 May 2024 09:58) (60 - 75)  BP: 153/84 (04 May 2024 09:58) (123/72 - 153/84)  BP(mean): --  RR: 18 (04 May 2024 09:58) (18 - 18)  SpO2: 98% (04 May 2024 09:58) (95% - 100%)    Parameters below as of 04 May 2024 09:58  Patient On (Oxygen Delivery Method): room air        I&O's Summary        Physical Exam:   GENERAL: NAD, well-groomed, well-developed  HEENT: MARTINE/   Atraumatic, Normocephalic  ENMT: No tonsillar erythema, exudates, or enlargement; Moist mucous membranes, Good dentition, No lesions  NECK: Supple, No JVD, Normal thyroid  CHEST/LUNG: Clear to auscultaion  CVS: Regular rate and rhythm; No murmurs, rubs, or gallops  GI: : Soft, Nontender, Nondistended; Bowel sounds present  NERVOUS SYSTEM:  Alert & Oriented X1-  EXTREMITIES: - edema  LYMPH: No lymphadenopathy noted  SKIN: No rashes or lesions  ENDOCRINOLOGY: No Thyromegaly  PSYCH: calm     Labs:  28                            12.2   12.03 )-----------( 360      ( 04 May 2024 05:46 )             36.4                         12.0   15.24 )-----------( 321      ( 02 May 2024 22:35 )             35.0     05-04    139  |  102  |  10  ----------------------------<  124<H>  2.8<LL>   |  25  |  0.57  05-02    135  |  99  |  14  ----------------------------<  148<H>  3.3<L>   |  24  |  0.68    Ca    8.7      04 May 2024 05:46  Ca    9.2      02 May 2024 22:35  Phos  2.4     05-04  Mg     1.90     05-04    TPro  9.2<H>  /  Alb  3.7  /  TBili  0.7  /  DBili  x   /  AST  18  /  ALT  10  /  AlkPhos  105  05-02    CAPILLARY BLOOD GLUCOSE      POCT Blood Glucose.: 112 mg/dL (04 May 2024 11:13)  POCT Blood Glucose.: 127 mg/dL (04 May 2024 07:02)  POCT Blood Glucose.: 156 mg/dL (03 May 2024 20:49)  POCT Blood Glucose.: 171 mg/dL (03 May 2024 16:54)  POCT Blood Glucose.: 290 mg/dL (03 May 2024 12:42)      LIVER FUNCTIONS - ( 02 May 2024 22:35 )  Alb: 3.7 g/dL / Pro: 9.2 g/dL / ALK PHOS: 105 U/L / ALT: 10 U/L / AST: 18 U/L / GGT: x           PT/INR - ( 02 May 2024 22:35 )   PT: 12.6 sec;   INR: 1.12 ratio         PTT - ( 02 May 2024 22:35 )  PTT:35.3 sec  Urinalysis Basic - ( 04 May 2024 05:46 )    Color: x / Appearance: x / SG: x / pH: x  Gluc: 124 mg/dL / Ketone: x  / Bili: x / Urobili: x   Blood: x / Protein: x / Nitrite: x   Leuk Esterase: x / RBC: x / WBC x   Sq Epi: x / Non Sq Epi: x / Bacteria: x      Procalcitonin: 0.05 ng/mL (05-04 @ 05:46)        RECENT CULTURES:  05-02 @ 22:47 .Blood Blood-Peripheral       < from: CT Angio Chest PE Protocol w/ IV Cont (05.03.24 @ 02:00) >  discarded.  Oral Contrast: NONE  Complications: None reported at time of study completion    PROCEDURE:  CT Angiography of the Chest.  Sagittal and coronal reformats were performed as well as 3D (MIP)   reconstructions.    FINDINGS:  PULMONARY ANGIOGRAM: Adequate pulmonary artery opacification without   visualized pulmonary embolus.  LUNGS AND AIRWAYS: Patent central airways.  Bilateral nodular and   tree-in-bud opacities most prominent in the bilateral upper lobes,   similar to priors. Areas of mosaic attenuation, suggestive of air   trapping.  PLEURA: No pleural effusion.  MEDIASTINUM AND OLIVE: No lymphadenopathy.  VESSELS: Coronary artery and aortic calcifications.  HEART: Heart size is normal. No pericardial effusion.  CHEST WALL AND LOWER NECK: Within normal limits.  VISUALIZED UPPER ABDOMEN: Mild left hydronephrosis and right renal cyst,   stable.  BONES: Mild degenerative changes.    IMPRESSION:  No pulmonary embolus.  Bilateral nodular and tree-in-bud opacities, similar to priors.  Partially imaged mild left hydronephrosis, similar to 4/19/2024.    --- End of Report ---          JONA PERALTA MD; Resident Radiologist  This document has been electronically signed.  SLIME BROWN MD; Attending Radiologist  This document has been electronically signed. May  3 2024  3:57AM    < end of copied text >           No growth at 24 hours    05-02 @ 22:35 .Blood Blood-Peripheral                No growth at 24 hours          RESPIRATORY CULTURES:    ct< from: CT Abdomen and Pelvis w/ IV Cont (05.03.24 @ 14:41) >  IMPRESSION:  Persistent sigmoid diverticulitis with adjacent phlegmonous changes with   small foci of extraluminal air, likely sequela of contained perforation,   in retrospect unchanged from the prior exam. No drainable fluid   collection.    Moderate left hydroureteronephrosis to the level of inflammatory changes   in the pelvis, unchanged.    < end of copied text >        Studies  Chest X-RAY  CT SCAN Chest   Venous Dopplers: LE:   CT Abdomen  Others

## 2024-05-04 NOTE — PROGRESS NOTE ADULT - ASSESSMENT
This is a 77 y/o F with pmhx of Diverticulitis, Type-2 DM, HTN, HLD, Glaucoma, OA of the L-knee and PERFECTO, presented to the ED for new onset lethargy. At bedside, the patient does not appear lethargic. Son is not at bedside and the patient gave me the son's phone number however as per charts it is her own phone number. The patient denies shortness of breath. She also stated that she feels fine and does not have any complaints at this time. Denies cough. "She stated that I have dementia so I do not remember why I am here." Denies abdominal pain, and diarrhea. As per ED charts, the patient was sent here for leukocytosis. She also denies having issues eating at home. (03 May 2024 06:00):  she does not know why  sheis here:  recently she was dced from the hospital : currently she has no resp complaints and is on room air     suspected pneumonia  Hx of diverticulitis  MD  HTN  Glaucoma  OA    suspected pneumonia  -NOT SURE IF SHE HAS PNEUMONIA:  SHE IS EMPIRICALLY STARTED ON ANTIBIOTICS   -SHE IS HAS No FEVER BUT wbc COUNT IS HIGH would RULE OUT uti:  DW ACP ; TO SEND UA AS WELL AS PROCAL  -oN ROOM AIR:   -CHECK LEGIONELLA    -MINIMISE ANTIBIOTICS IF WE CAN   5/4: doubt she has pneumonia:  the new ct scan with no changes from prior and no clinical symptoms too resp wise   Hx of diverticulitis  -PER PRIMARY TEAM  -5/4: the ct abd showed: Persistent sigmoid diverticulitis with adjacent phlegmonous changes with small foci of extraluminal air, likely sequela of contained perforation,   in retrospect unchanged from the prior exam. No drainable fluid collection. Moderate left hydroureteronephrosis to the level of inflammatory changes in the pelvis, unchanged.  -defer to ID and surgery   dm:   -MONITOR AND CONTROL   HTN  -CONTROLLED  Glaucoma  -PER [facility] TEAM   OA  -NO PAIN  ;      MICHAEL ARANGO

## 2024-05-04 NOTE — PROGRESS NOTE ADULT - SUBJECTIVE AND OBJECTIVE BOX
[facility] DIVISION OF INFECTIOUS DISEASES  JORGE LUIS Chaves Y. Patel, S. Shah, G. Jones  [phone]  ([phone] - weekdays after 5pm and weekends)    Name: TY DAVIS  Age/Gender: 78y Female  MRN: [id number]    Interval History:  Patient seen and examined.  Denies fever, pain, n/v/d.   No new complaints noted.  Notes reviewed  No concerning overnight events  Afebrile   Allergies: No Known Allergies      Objective:  Vitals:   T(F): 97.6 (05-04-24 @ 17:37), Max: 98.4 (05-04-24 @ 00:55)  HR: 62 (05-04-24 @ 17:37) (60 - 70)  BP: 153/79 (05-04-24 @ 17:37) (138/80 - 153/84)  RR: 18 (05-04-24 @ 17:37) (18 - 18)  SpO2: 100% (05-04-24 @ 17:37) (98% - 100%)  Physical Examination:  General: no acute distress, sitting up in chair   HEENT: NC/AT, anicteric,EOMI  Respiratory: no acc muscle use, breathing comfortably  Cardiovascular: S1 and S2 present  Gastrointestinal: normal appearing, nondistended  Extremities: no edema, no cyanosis  Skin: no visible rash    Laboratory Studies:  CBC:                       12.2   12.03 )-----------( 360      ( 04 May 2024 05:46 )             36.4     WBC Trend:  12.03 05-04-24 @ 05:46  15.24 05-02-24 @ 22:35    CMP: 05-04    139  |  102  |  10  ----------------------------<  124<H>  2.8<LL>   |  25  |  0.57    Ca    8.7      04 May 2024 05:46  Phos  2.4     05-04  Mg     1.90     05-04    TPro  9.2<H>  /  Alb  3.7  /  TBili  0.7  /  DBili  x   /  AST  18  /  ALT  10  /  AlkPhos  105  05-02    Creatinine: 0.57 mg/dL (05-04-24 @ 05:46)  Creatinine: 0.68 mg/dL (05-02-24 @ 22:35)    LIVER FUNCTIONS - ( 02 May 2024 22:35 )  Alb: 3.7 g/dL / Pro: 9.2 g/dL / ALK PHOS: 105 U/L / ALT: 10 U/L / AST: 18 U/L / GGT: x           Microbiology: reviewed   Culture - Blood (collected 05-02-24 @ 22:47)  Source: .Blood Blood-Peripheral  Preliminary Report (05-04-24 @ 11:02):    No growth at 24 hours    Culture - Blood (collected 05-02-24 @ 22:35)  Source: .Blood Blood-Peripheral  Preliminary Report (05-04-24 @ 11:02):    No growth at 24 hours    SARS-CoV-2 Result: NotDetec (02 May 2024 23:13)    Radiology: reviewed     Medications:  acetaminophen     Tablet .. 650 milliGRAM(s) Oral every 6 hours PRN  aluminum hydroxide/magnesium hydroxide/simethicone Suspension 30 milliLiter(s) Oral every 4 hours PRN  aspirin enteric coated 81 milliGRAM(s) Oral daily  atorvastatin 20 milliGRAM(s) Oral at bedtime  dextrose 10% Bolus 125 milliLiter(s) IV Bolus once  dextrose 5%. 1000 milliLiter(s) IV Continuous <Continuous>  dextrose 5%. 1000 milliLiter(s) IV Continuous <Continuous>  dextrose 50% Injectable 25 Gram(s) IV Push once  dextrose 50% Injectable 12.5 Gram(s) IV Push once  dextrose Oral Gel 15 Gram(s) Oral once PRN  glucagon  Injectable 1 milliGRAM(s) IntraMuscular once  insulin lispro (ADMELOG) corrective regimen sliding scale   SubCutaneous at bedtime  insulin lispro (ADMELOG) corrective regimen sliding scale   SubCutaneous three times a day before meals  melatonin 3 milliGRAM(s) Oral at bedtime PRN  ondansetron Injectable 4 milliGRAM(s) IV Push every 8 hours PRN  pantoprazole  Injectable 40 milliGRAM(s) IV Push daily  piperacillin/tazobactam IVPB.. 4.5 Gram(s) IV Intermittent every 8 hours    Current Antimicrobials:  piperacillin/tazobactam IVPB.. 4.5 Gram(s) IV Intermittent every 8 hours    Prior/Completed Antimicrobials:  azithromycin  IVPB  cefTRIAXone   IVPB  piperacillin/tazobactam IVPB.  piperacillin/tazobactam IVPB.-  piperacillin/tazobactam IVPB.-  piperacillin/tazobactam IVPB.-  vancomycin  IVPB.

## 2024-05-04 NOTE — PROGRESS NOTE ADULT - SUBJECTIVE AND OBJECTIVE BOX
Patient is a 78y old  Female who presents with a chief complaint of abnormal labs (04 May 2024 12:32)    Date of servie : 05-04-24 @ 16:07  INTERVAL HPI/OVERNIGHT EVENTS:  T(C): 36.3 (05-04-24 @ 09:58), Max: 36.9 (05-04-24 @ 00:55)  HR: 60 (05-04-24 @ 09:58) (60 - 75)  BP: 153/84 (05-04-24 @ 09:58) (133/97 - 153/84)  RR: 18 (05-04-24 @ 09:58) (18 - 18)  SpO2: 98% (05-04-24 @ 09:58) (95% - 100%)  Wt(kg): --  I&O's Summary      LABS:                        12.2   12.03 )-----------( 360      ( 04 May 2024 05:46 )             36.4     05-04    139  |  102  |  10  ----------------------------<  124<H>  2.8<LL>   |  25  |  0.57    Ca    8.7      04 May 2024 05:46  Phos  2.4     05-04  Mg     1.90     05-04    TPro  9.2<H>  /  Alb  3.7  /  TBili  0.7  /  DBili  x   /  AST  18  /  ALT  10  /  AlkPhos  105  05-02    PT/INR - ( 02 May 2024 22:35 )   PT: 12.6 sec;   INR: 1.12 ratio         PTT - ( 02 May 2024 22:35 )  PTT:35.3 sec  Urinalysis Basic - ( 04 May 2024 05:46 )    Color: x / Appearance: x / SG: x / pH: x  Gluc: 124 mg/dL / Ketone: x  / Bili: x / Urobili: x   Blood: x / Protein: x / Nitrite: x   Leuk Esterase: x / RBC: x / WBC x   Sq Epi: x / Non Sq Epi: x / Bacteria: x      CAPILLARY BLOOD GLUCOSE      POCT Blood Glucose.: 112 mg/dL (04 May 2024 11:13)  POCT Blood Glucose.: 127 mg/dL (04 May 2024 07:02)  POCT Blood Glucose.: 156 mg/dL (03 May 2024 20:49)  POCT Blood Glucose.: 171 mg/dL (03 May 2024 16:54)        Urinalysis Basic - ( 04 May 2024 05:46 )    Color: x / Appearance: x / SG: x / pH: x  Gluc: 124 mg/dL / Ketone: x  / Bili: x / Urobili: x   Blood: x / Protein: x / Nitrite: x   Leuk Esterase: x / RBC: x / WBC x   Sq Epi: x / Non Sq Epi: x / Bacteria: x        MEDICATIONS  (STANDING):  aspirin enteric coated 81 milliGRAM(s) Oral daily  atorvastatin 20 milliGRAM(s) Oral at bedtime  dextrose 10% Bolus 125 milliLiter(s) IV Bolus once  dextrose 5%. 1000 milliLiter(s) (50 mL/Hr) IV Continuous <Continuous>  dextrose 5%. 1000 milliLiter(s) (100 mL/Hr) IV Continuous <Continuous>  dextrose 50% Injectable 25 Gram(s) IV Push once  dextrose 50% Injectable 12.5 Gram(s) IV Push once  glucagon  Injectable 1 milliGRAM(s) IntraMuscular once  insulin lispro (ADMELOG) corrective regimen sliding scale   SubCutaneous at bedtime  insulin lispro (ADMELOG) corrective regimen sliding scale   SubCutaneous three times a day before meals  pantoprazole  Injectable 40 milliGRAM(s) IV Push daily  piperacillin/tazobactam IVPB.. 4.5 Gram(s) IV Intermittent every 8 hours    MEDICATIONS  (PRN):  acetaminophen     Tablet .. 650 milliGRAM(s) Oral every 6 hours PRN Temp greater or equal to 38C (100.4F), Mild Pain (1 - 3)  aluminum hydroxide/magnesium hydroxide/simethicone Suspension 30 milliLiter(s) Oral every 4 hours PRN Dyspepsia  dextrose Oral Gel 15 Gram(s) Oral once PRN Blood Glucose LESS THAN 70 milliGRAM(s)/deciliter  melatonin 3 milliGRAM(s) Oral at bedtime PRN Insomnia  ondansetron Injectable 4 milliGRAM(s) IV Push every 8 hours PRN Nausea and/or Vomiting          PHYSICAL EXAM:  GENERAL: NAD, well-groomed, well-developed  HEAD:  Atraumatic, Normocephalic  CHEST/LUNG: Clear to percussion bilaterally; No rales, rhonchi, wheezing, or rubs  HEART: Regular rate and rhythm; No murmurs, rubs, or gallops  ABDOMEN: Soft, Nontender, Nondistended; Bowel sounds present  EXTREMITIES:  2+ Peripheral Pulses, No clubbing, cyanosis, or edema  LYMPH: No lymphadenopathy noted  SKIN: No rashes or lesions    Care Discussed with Consultants/Other Providers [ ] YES  [ ] NO

## 2024-05-04 NOTE — PROGRESS NOTE ADULT - ASSESSMENT
77 y/o F with pmhx of Diverticulitis, Type-2 DM, HTN, HLD, Glaucoma, OA of the L-knee and PERFECTO, presented to the ED for new onset lethargy. Found to have leukocytosis likely from pneumonia vs. diverticulitis. The patient does not appear lethargic. Admit for IV abx.      Problem/Plan - 1:  ·  Problem: Pneumonia, pneumococcal.   ·  Plan: - sepsis likely from diverticulitis  pneumonia ruled out   - ID fu     Problem/Plan - 2:  ·  Problem: Diverticulitis.   ·  Plan: -  CT abdomen/pelvis with contained perforation  - ivf  - no indication for surgery as per colorectal     Problem/Plan - 3:  ·  Problem: Hydronephrosis, left.   ·  Plan: - patient with known hx of hydronephrosis  - CT chest still shows L hydronephrosis  - chronic     Problem/Plan - 4:  ·  Problem: Benign essential HTN.   ·  Plan: - hold BP meds.  restart as needed     Problem/Plan - 5:  ·  Problem: DM2 (diabetes mellitus, type 2).   ·  Plan: - low dose sliding scale insulin  - patient does not appar to be on DM meds at home.

## 2024-05-04 NOTE — PROGRESS NOTE ADULT - SUBJECTIVE AND OBJECTIVE BOX
DATE OF SERVICE: 05-04-24 @ 11:22    INTERVAL HPI/OVERNIGHT EVENTS:  No complaints, WBC downtrending    MEDICATIONS  (STANDING):  aspirin enteric coated 81 milliGRAM(s) Oral daily  atorvastatin 20 milliGRAM(s) Oral at bedtime  dextrose 10% Bolus 125 milliLiter(s) IV Bolus once  dextrose 5%. 1000 milliLiter(s) (50 mL/Hr) IV Continuous <Continuous>  dextrose 5%. 1000 milliLiter(s) (100 mL/Hr) IV Continuous <Continuous>  dextrose 50% Injectable 25 Gram(s) IV Push once  dextrose 50% Injectable 12.5 Gram(s) IV Push once  glucagon  Injectable 1 milliGRAM(s) IntraMuscular once  insulin lispro (ADMELOG) corrective regimen sliding scale   SubCutaneous at bedtime  insulin lispro (ADMELOG) corrective regimen sliding scale   SubCutaneous three times a day before meals  pantoprazole  Injectable 40 milliGRAM(s) IV Push daily  piperacillin/tazobactam IVPB.. 4.5 Gram(s) IV Intermittent every 8 hours    MEDICATIONS  (PRN):  acetaminophen     Tablet .. 650 milliGRAM(s) Oral every 6 hours PRN Temp greater or equal to 38C (100.4F), Mild Pain (1 - 3)  aluminum hydroxide/magnesium hydroxide/simethicone Suspension 30 milliLiter(s) Oral every 4 hours PRN Dyspepsia  dextrose Oral Gel 15 Gram(s) Oral once PRN Blood Glucose LESS THAN 70 milliGRAM(s)/deciliter  melatonin 3 milliGRAM(s) Oral at bedtime PRN Insomnia  ondansetron Injectable 4 milliGRAM(s) IV Push every 8 hours PRN Nausea and/or Vomiting      Vital Signs Last 24 Hrs  T(C): 36.3 (04 May 2024 09:58), Max: 36.9 (04 May 2024 00:55)  T(F): 97.4 (04 May 2024 09:58), Max: 98.4 (04 May 2024 00:55)  HR: 60 (04 May 2024 09:58) (60 - 75)  BP: 153/84 (04 May 2024 09:58) (123/72 - 153/84)  BP(mean): --  RR: 18 (04 May 2024 09:58) (18 - 18)  SpO2: 98% (04 May 2024 09:58) (95% - 100%)    Parameters below as of 04 May 2024 09:58  Patient On (Oxygen Delivery Method): room air      I&O's Detail        Physical Exam:  General: WN/WD NAD  Respiratory: airway patent, respirations unlabored, no increased WoB  Neurology: A&Ox3, nonfocal, MCCANN x 4  Abdominal: soft NT/ND      LABS:                        12.2   12.03 )-----------( 360      ( 04 May 2024 05:46 )             36.4     05-04    139  |  102  |  10  ----------------------------<  124<H>  2.8<LL>   |  25  |  0.57    Ca    8.7      04 May 2024 05:46  Phos  2.4     05-04  Mg     1.90     05-04    TPro  9.2<H>  /  Alb  3.7  /  TBili  0.7  /  DBili  x   /  AST  18  /  ALT  10  /  AlkPhos  105  05-02    PT/INR - ( 02 May 2024 22:35 )   PT: 12.6 sec;   INR: 1.12 ratio         PTT - ( 02 May 2024 22:35 )  PTT:35.3 sec  Urinalysis Basic - ( 04 May 2024 05:46 )    Color: x / Appearance: x / SG: x / pH: x  Gluc: 124 mg/dL / Ketone: x  / Bili: x / Urobili: x   Blood: x / Protein: x / Nitrite: x   Leuk Esterase: x / RBC: x / WBC x   Sq Epi: x / Non Sq Epi: x / Bacteria: x        RADIOLOGY & ADDITIONAL STUDIES:

## 2024-05-04 NOTE — PROGRESS NOTE ADULT - SUBJECTIVE AND OBJECTIVE BOX
A TEAM SURGERY DAILY PROGRESS NOTE    SUBJECTIVE:   Overnight: no acute events   Patient seen and evaluated on AM rounds.  Patient otherwise denies nausea, vomiting, chest pain, shortness of breath     OBJECTIVE:  Vital Signs Last 24 Hrs  T(C): 36.3 (04 May 2024 09:58), Max: 36.9 (04 May 2024 00:55)  T(F): 97.4 (04 May 2024 09:58), Max: 98.4 (04 May 2024 00:55)  HR: 60 (04 May 2024 09:58) (60 - 75)  BP: 153/84 (04 May 2024 09:58) (133/97 - 153/84)  BP(mean): --  RR: 18 (04 May 2024 09:58) (18 - 18)  SpO2: 98% (04 May 2024 09:58) (95% - 100%)    Parameters below as of 04 May 2024 09:58  Patient On (Oxygen Delivery Method): room air      Daily     Daily   KULWANT:    Chest Tube:    NG Tube:       STANDING  aspirin enteric coated 81 milliGRAM(s) Oral daily  atorvastatin 20 milliGRAM(s) Oral at bedtime  dextrose 10% Bolus 125 milliLiter(s) IV Bolus once  dextrose 5%. 1000 milliLiter(s) (50 mL/Hr) IV Continuous <Continuous>  dextrose 5%. 1000 milliLiter(s) (100 mL/Hr) IV Continuous <Continuous>  dextrose 50% Injectable 25 Gram(s) IV Push once  dextrose 50% Injectable 12.5 Gram(s) IV Push once  glucagon  Injectable 1 milliGRAM(s) IntraMuscular once  insulin lispro (ADMELOG) corrective regimen sliding scale   SubCutaneous at bedtime  insulin lispro (ADMELOG) corrective regimen sliding scale   SubCutaneous three times a day before meals  pantoprazole  Injectable 40 milliGRAM(s) IV Push daily  piperacillin/tazobactam IVPB.. 4.5 Gram(s) IV Intermittent every 8 hours    PRN  acetaminophen     Tablet .. 650 milliGRAM(s) Oral every 6 hours PRN Temp greater or equal to 38C (100.4F), Mild Pain (1 - 3)  aluminum hydroxide/magnesium hydroxide/simethicone Suspension 30 milliLiter(s) Oral every 4 hours PRN Dyspepsia  dextrose Oral Gel 15 Gram(s) Oral once PRN Blood Glucose LESS THAN 70 milliGRAM(s)/deciliter  melatonin 3 milliGRAM(s) Oral at bedtime PRN Insomnia  ondansetron Injectable 4 milliGRAM(s) IV Push every 8 hours PRN Nausea and/or Vomiting      Labs:  139  |  102  |  10  ----------------------------<  124<H>    (05-04)  2.8<LL>   |  25  |  0.57          Ca    8.7      05-04  Mg    1.90  Phos  2.4<L>          Urinalysis Basic - ( 04 May 2024 05:46 )    Color: x / Appearance: x / SG: x / pH: x  Gluc: 124 mg/dL / Ketone: x  / Bili: x / Urobili: x   Blood: x / Protein: x / Nitrite: x   Leuk Esterase: x / RBC: x / WBC x   Sq Epi: x / Non Sq Epi: x / Bacteria: x      Urinalysis Basic - ( 04 May 2024 05:46 )    Color: x / Appearance: x / SG: x / pH: x  Gluc: 124 mg/dL / Ketone: x  / Bili: x / Urobili: x   Blood: x / Protein: x / Nitrite: x   Leuk Esterase: x / RBC: x / WBC x   Sq Epi: x / Non Sq Epi: x / Bacteria: x    Physical Exam:  General: well developed, well nourished, NAD  Cardiovascular: appears well perfused   Respiratory: respirations non labored  Gastrointestinal: Soft, NT/ND, no rebound/guarding, no masses palpated  Extremities: FROM, warm  Neurological: A+Ox3

## 2024-05-04 NOTE — PROGRESS NOTE ADULT - ASSESSMENT
78F with diverticulitis    Continue IV abx  Ok for CLD  OOB and ambulate  No surgery planned at this time, will follow  D/w medical attending

## 2024-05-04 NOTE — PROGRESS NOTE ADULT - ASSESSMENT
A 78 year old female who presented with abnormal leukocytosis found to have contained perforation of sigmoid diverticulitis on CT scan.     Recommendations:  - Okay for CLD  - IVF  - IV antibiotics: Zosyn if no contraindication  - No surgery planned at this time, will continue to follow    [facility]  [id number]

## 2024-05-05 LAB
ANION GAP SERPL CALC-SCNC: 10 MMOL/L — SIGNIFICANT CHANGE UP (ref 7–14)
BUN SERPL-MCNC: 9 MG/DL — SIGNIFICANT CHANGE UP (ref 7–23)
CALCIUM SERPL-MCNC: 8.1 MG/DL — LOW (ref 8.4–10.5)
CHLORIDE SERPL-SCNC: 106 MMOL/L — SIGNIFICANT CHANGE UP (ref 98–107)
CO2 SERPL-SCNC: 23 MMOL/L — SIGNIFICANT CHANGE UP (ref 22–31)
CREAT SERPL-MCNC: 0.69 MG/DL — SIGNIFICANT CHANGE UP (ref 0.5–1.3)
EGFR: 90 ML/MIN/1.73M2 — SIGNIFICANT CHANGE UP
GLUCOSE BLDC GLUCOMTR-MCNC: 105 MG/DL — HIGH (ref 70–99)
GLUCOSE BLDC GLUCOMTR-MCNC: 112 MG/DL — HIGH (ref 70–99)
GLUCOSE BLDC GLUCOMTR-MCNC: 114 MG/DL — HIGH (ref 70–99)
GLUCOSE BLDC GLUCOMTR-MCNC: 130 MG/DL — HIGH (ref 70–99)
GLUCOSE SERPL-MCNC: 106 MG/DL — HIGH (ref 70–99)
HCT VFR BLD CALC: 33 % — LOW (ref 34.5–45)
HGB BLD-MCNC: 11.2 G/DL — LOW (ref 11.5–15.5)
MAGNESIUM SERPL-MCNC: 1.9 MG/DL — SIGNIFICANT CHANGE UP (ref 1.6–2.6)
MCHC RBC-ENTMCNC: 29.8 PG — SIGNIFICANT CHANGE UP (ref 27–34)
MCHC RBC-ENTMCNC: 33.9 GM/DL — SIGNIFICANT CHANGE UP (ref 32–36)
MCV RBC AUTO: 87.8 FL — SIGNIFICANT CHANGE UP (ref 80–100)
NRBC # BLD: 0 /100 WBCS — SIGNIFICANT CHANGE UP (ref 0–0)
NRBC # FLD: 0 K/UL — SIGNIFICANT CHANGE UP (ref 0–0)
PHOSPHATE SERPL-MCNC: 2.2 MG/DL — LOW (ref 2.5–4.5)
PLATELET # BLD AUTO: 319 K/UL — SIGNIFICANT CHANGE UP (ref 150–400)
POTASSIUM SERPL-MCNC: 4.1 MMOL/L — SIGNIFICANT CHANGE UP (ref 3.5–5.3)
POTASSIUM SERPL-SCNC: 4.1 MMOL/L — SIGNIFICANT CHANGE UP (ref 3.5–5.3)
RBC # BLD: 3.76 M/UL — LOW (ref 3.8–5.2)
RBC # FLD: 14.3 % — SIGNIFICANT CHANGE UP (ref 10.3–14.5)
SODIUM SERPL-SCNC: 139 MMOL/L — SIGNIFICANT CHANGE UP (ref 135–145)
WBC # BLD: 8.07 K/UL — SIGNIFICANT CHANGE UP (ref 3.8–10.5)
WBC # FLD AUTO: 8.07 K/UL — SIGNIFICANT CHANGE UP (ref 3.8–10.5)

## 2024-05-05 RX ORDER — HALOPERIDOL DECANOATE 100 MG/ML
1 INJECTION INTRAMUSCULAR ONCE
Refills: 0 | Status: COMPLETED | OUTPATIENT
Start: 2024-05-05 | End: 2024-05-05

## 2024-05-05 RX ADMIN — PIPERACILLIN AND TAZOBACTAM 25 GRAM(S): 4; .5 INJECTION, POWDER, LYOPHILIZED, FOR SOLUTION INTRAVENOUS at 21:46

## 2024-05-05 RX ADMIN — PIPERACILLIN AND TAZOBACTAM 25 GRAM(S): 4; .5 INJECTION, POWDER, LYOPHILIZED, FOR SOLUTION INTRAVENOUS at 13:25

## 2024-05-05 RX ADMIN — Medication 3 MILLIGRAM(S): at 21:45

## 2024-05-05 RX ADMIN — PANTOPRAZOLE SODIUM 40 MILLIGRAM(S): 20 TABLET, DELAYED RELEASE ORAL at 12:12

## 2024-05-05 RX ADMIN — ATORVASTATIN CALCIUM 20 MILLIGRAM(S): 80 TABLET, FILM COATED ORAL at 21:45

## 2024-05-05 RX ADMIN — Medication 81 MILLIGRAM(S): at 12:10

## 2024-05-05 RX ADMIN — PIPERACILLIN AND TAZOBACTAM 25 GRAM(S): 4; .5 INJECTION, POWDER, LYOPHILIZED, FOR SOLUTION INTRAVENOUS at 06:19

## 2024-05-05 RX ADMIN — HALOPERIDOL DECANOATE 1 MILLIGRAM(S): 100 INJECTION INTRAMUSCULAR at 21:45

## 2024-05-05 NOTE — PROGRESS NOTE ADULT - ASSESSMENT
A 78 year old female who presented with abnormal leukocytosis found to have contained perforation of sigmoid diverticulitis on CT scan.     Recommendations:  - Advance to LRD  - IV antibiotics: Zosyn if no contraindication  - No surgery planned at this time, will continue to follow    [facility]  [id number]

## 2024-05-05 NOTE — PROGRESS NOTE ADULT - ASSESSMENT
77 y/o F PMhx Glaucoma, DM II, HTN, dementia, diverticulitis who presented w/ increased lethargy  was treated for diverticulitis and PNA    Sigmoid diverticulitis w/ perforation  CTAP- Persistent sigmoid diverticulitis with adjacent phlegmonous changes with small foci of extraluminal air, likely sequela of contained perforation. No drainable fluid collection. Moderate left hydroureteronephrosis    was treated for PNA during prior admission; imaging findings may be 2/2 prior PNA  SARS-CoV-2/ flu/ RSV PCR negative  CTA chest- No PE. Bilateral nodular and tree-in-bud opacities, similar to priors. Partially imaged mild left hydronephrosis, similar to 4/19/2024.  not hypoxic, PCT negative   leukocytosis resolved, remains afebrile    Recommendations  c/w zosyn  f/u blood cultures-NGTD  CRS following, no surgery planned  Advance diet as tolerated       Dr. Goldman to follow from tomorrow   Jarrett Lee M.D.  [facility], Division of Infectious Diseases  [phone]  After 5pm on weekdays and all day on weekends - please call [phone]

## 2024-05-05 NOTE — PROGRESS NOTE ADULT - ASSESSMENT
79 y/o F with pmhx of Diverticulitis, Type-2 DM, HTN, HLD, Glaucoma, OA of the L-knee and PERFECTO, presented to the ED for new onset lethargy. Found to have leukocytosis likely from pneumonia vs. diverticulitis. The patient does not appear lethargic. Admit for IV abx.      Problem/Plan - 1:  ·  Problem: Pneumonia, pneumococcal.   ·  Plan: - sepsis likely from diverticulitis  pneumonia ruled out   - ID fu     Problem/Plan - 2:  ·  Problem: Diverticulitis.   ·  Plan: -  CT abdomen/pelvis with contained perforation  - ivf  - no indication for surgery as per colorectal     Problem/Plan - 3:  ·  Problem: Hydronephrosis, left.   ·  Plan: - patient with known hx of hydronephrosis  - CT chest still shows L hydronephrosis  - chronic     Problem/Plan - 4:  ·  Problem: Benign essential HTN.   ·  Plan: - hold BP meds.  restart as needed     Problem/Plan - 5:  ·  Problem: DM2 (diabetes mellitus, type 2).   ·  Plan: - low dose sliding scale insulin  - patient does not appar to be on DM meds at home.

## 2024-05-05 NOTE — PROGRESS NOTE ADULT - SUBJECTIVE AND OBJECTIVE BOX
Patient is a 78y old  Female who presents with a chief complaint of abnormal labs (05 May 2024 10:18)    Date of servie : 05-05-24 @ 14:16  INTERVAL HPI/OVERNIGHT EVENTS:  T(C): 36.5 (05-05-24 @ 09:57), Max: 36.6 (05-04-24 @ 21:00)  HR: 101 (05-05-24 @ 09:57) (62 - 101)  BP: 116/87 (05-05-24 @ 09:57) (116/87 - 161/88)  RR: 18 (05-05-24 @ 09:57) (18 - 18)  SpO2: 97% (05-05-24 @ 09:57) (97% - 100%)  Wt(kg): --  I&O's Summary      LABS:                        11.2   8.07  )-----------( 319      ( 05 May 2024 05:30 )             33.0     05-05    139  |  106  |  9   ----------------------------<  106<H>  4.1   |  23  |  0.69    Ca    8.1<L>      05 May 2024 05:30  Phos  2.2     05-05  Mg     1.90     05-05        Urinalysis Basic - ( 05 May 2024 05:30 )    Color: x / Appearance: x / SG: x / pH: x  Gluc: 106 mg/dL / Ketone: x  / Bili: x / Urobili: x   Blood: x / Protein: x / Nitrite: x   Leuk Esterase: x / RBC: x / WBC x   Sq Epi: x / Non Sq Epi: x / Bacteria: x      CAPILLARY BLOOD GLUCOSE      POCT Blood Glucose.: 112 mg/dL (05 May 2024 11:21)  POCT Blood Glucose.: 114 mg/dL (05 May 2024 07:23)  POCT Blood Glucose.: 105 mg/dL (04 May 2024 21:11)  POCT Blood Glucose.: 116 mg/dL (04 May 2024 16:25)        Urinalysis Basic - ( 05 May 2024 05:30 )    Color: x / Appearance: x / SG: x / pH: x  Gluc: 106 mg/dL / Ketone: x  / Bili: x / Urobili: x   Blood: x / Protein: x / Nitrite: x   Leuk Esterase: x / RBC: x / WBC x   Sq Epi: x / Non Sq Epi: x / Bacteria: x        MEDICATIONS  (STANDING):  aspirin enteric coated 81 milliGRAM(s) Oral daily  atorvastatin 20 milliGRAM(s) Oral at bedtime  dextrose 10% Bolus 125 milliLiter(s) IV Bolus once  dextrose 5%. 1000 milliLiter(s) (50 mL/Hr) IV Continuous <Continuous>  dextrose 5%. 1000 milliLiter(s) (100 mL/Hr) IV Continuous <Continuous>  dextrose 50% Injectable 25 Gram(s) IV Push once  dextrose 50% Injectable 12.5 Gram(s) IV Push once  glucagon  Injectable 1 milliGRAM(s) IntraMuscular once  insulin lispro (ADMELOG) corrective regimen sliding scale   SubCutaneous at bedtime  insulin lispro (ADMELOG) corrective regimen sliding scale   SubCutaneous three times a day before meals  pantoprazole  Injectable 40 milliGRAM(s) IV Push daily  piperacillin/tazobactam IVPB.. 4.5 Gram(s) IV Intermittent every 8 hours    MEDICATIONS  (PRN):  acetaminophen     Tablet .. 650 milliGRAM(s) Oral every 6 hours PRN Temp greater or equal to 38C (100.4F), Mild Pain (1 - 3)  aluminum hydroxide/magnesium hydroxide/simethicone Suspension 30 milliLiter(s) Oral every 4 hours PRN Dyspepsia  dextrose Oral Gel 15 Gram(s) Oral once PRN Blood Glucose LESS THAN 70 milliGRAM(s)/deciliter  melatonin 3 milliGRAM(s) Oral at bedtime PRN Insomnia  ondansetron Injectable 4 milliGRAM(s) IV Push every 8 hours PRN Nausea and/or Vomiting          PHYSICAL EXAM:  GENERAL: NAD, well-groomed, well-developed  HEAD:  Atraumatic, Normocephalic  CHEST/LUNG: Clear to percussion bilaterally; No rales, rhonchi, wheezing, or rubs  HEART: Regular rate and rhythm; No murmurs, rubs, or gallops  ABDOMEN: Soft, Nontender, Nondistended; Bowel sounds present  EXTREMITIES:  2+ Peripheral Pulses, No clubbing, cyanosis, or edema  LYMPH: No lymphadenopathy noted  SKIN: No rashes or lesions    Care Discussed with Consultants/Other Providers [ ] YES  [ ] NO

## 2024-05-05 NOTE — PROGRESS NOTE ADULT - SUBJECTIVE AND OBJECTIVE BOX
[facility] DIVISION OF INFECTIOUS DISEASES  JORGE LUIS Chaves Y. Patel, S. Shah, G. Casimir  [phone]  ([phone] - weekdays after 5pm and weekends)    Name: TY DAVIS  Age/Gender: 78y Female  MRN: [id number]    Interval History:  Patient seen and examined.  No new complaints noted.  Notes reviewed  No concerning overnight events  Afebrile   Allergies: No Known Allergies      Objective:  Vitals:   T(F): 98.7 (05-05-24 @ 16:40), Max: 98.7 (05-05-24 @ 16:40)  HR: 96 (05-05-24 @ 16:40) (67 - 101)  BP: 164/88 (05-05-24 @ 16:40) (116/87 - 164/88)  RR: 18 (05-05-24 @ 16:40) (18 - 18)  SpO2: 98% (05-05-24 @ 16:40) (97% - 100%)  Physical Examination:  General: no acute distress, nontoxic   HEENT: NC/AT, anicteric, EOMI  Respiratory: no acc muscle use, breathing comfortably  Cardiovascular: S1 and S2 present  Gastrointestinal: normal appearing, nondistended  Extremities: no edema, no cyanosis  Skin: no visible rash    Laboratory Studies:  CBC:                       11.2   8.07  )-----------( 319      ( 05 May 2024 05:30 )             33.0     WBC Trend:  8.07 05-05-24 @ 05:30  12.03 05-04-24 @ 05:46  15.24 05-02-24 @ 22:35    CMP: 05-05    139  |  106  |  9   ----------------------------<  106<H>  4.1   |  23  |  0.69    Ca    8.1<L>      05 May 2024 05:30  Phos  2.2     05-05  Mg     1.90     05-05      Creatinine: 0.69 mg/dL (05-05-24 @ 05:30)  Creatinine: 0.57 mg/dL (05-04-24 @ 05:46)  Creatinine: 0.68 mg/dL (05-02-24 @ 22:35)    Microbiology: reviewed   Culture - Blood (collected 05-03-24 @ 18:52)  Source: .Blood Blood  Preliminary Report (05-05-24 @ 01:02):    No growth at 24 hours    Culture - Blood (collected 05-03-24 @ 18:46)  Source: .Blood Blood  Preliminary Report (05-05-24 @ 01:02):    No growth at 24 hours    Culture - Blood (collected 05-02-24 @ 22:47)  Source: .Blood Blood-Peripheral  Preliminary Report (05-05-24 @ 11:01):    No growth at 48 Hours    Culture - Blood (collected 05-02-24 @ 22:35)  Source: .Blood Blood-Peripheral  Preliminary Report (05-05-24 @ 11:01):    No growth at 48 Hours    SARS-CoV-2 Result: NotDetec (02 May 2024 23:13)    Radiology: reviewed     Medications:  acetaminophen     Tablet .. 650 milliGRAM(s) Oral every 6 hours PRN  aluminum hydroxide/magnesium hydroxide/simethicone Suspension 30 milliLiter(s) Oral every 4 hours PRN  aspirin enteric coated 81 milliGRAM(s) Oral daily  atorvastatin 20 milliGRAM(s) Oral at bedtime  dextrose 10% Bolus 125 milliLiter(s) IV Bolus once  dextrose 5%. 1000 milliLiter(s) IV Continuous <Continuous>  dextrose 5%. 1000 milliLiter(s) IV Continuous <Continuous>  dextrose 50% Injectable 25 Gram(s) IV Push once  dextrose 50% Injectable 12.5 Gram(s) IV Push once  dextrose Oral Gel 15 Gram(s) Oral once PRN  glucagon  Injectable 1 milliGRAM(s) IntraMuscular once  insulin lispro (ADMELOG) corrective regimen sliding scale   SubCutaneous at bedtime  insulin lispro (ADMELOG) corrective regimen sliding scale   SubCutaneous three times a day before meals  melatonin 3 milliGRAM(s) Oral at bedtime PRN  ondansetron Injectable 4 milliGRAM(s) IV Push every 8 hours PRN  pantoprazole  Injectable 40 milliGRAM(s) IV Push daily  piperacillin/tazobactam IVPB.. 4.5 Gram(s) IV Intermittent every 8 hours    Current Antimicrobials:  piperacillin/tazobactam IVPB.. 4.5 Gram(s) IV Intermittent every 8 hours    Prior/Completed Antimicrobials:  azithromycin  IVPB  cefTRIAXone   IVPB  piperacillin/tazobactam IVPB.  piperacillin/tazobactam IVPB.-  piperacillin/tazobactam IVPB.-  piperacillin/tazobactam IVPB.-  vancomycin  IVPB.

## 2024-05-05 NOTE — PROGRESS NOTE ADULT - SUBJECTIVE AND OBJECTIVE BOX
SURGERY DAILY PROGRESS NOTE    SUBJECTIVE:     Overnight: no acute events     Patient seen and evaluated on AM rounds. pt reports feeling better   Patient otherwise denies nausea, vomiting, chest pain, shortness of breath     OBJECTIVE:  Vital Signs Last 24 Hrs  T(C): 36.4 (05 May 2024 06:00), Max: 36.6 (04 May 2024 21:00)  T(F): 97.5 (05 May 2024 06:00), Max: 97.9 (04 May 2024 21:00)  HR: 71 (05 May 2024 06:00) (62 - 71)  BP: 129/85 (05 May 2024 06:00) (129/85 - 161/88)  BP(mean): --  RR: 18 (05 May 2024 06:00) (18 - 18)  SpO2: 97% (05 May 2024 06:00) (97% - 100%)    Parameters below as of 05 May 2024 06:00  Patient On (Oxygen Delivery Method): nasal cannula      Daily     Daily   KULWANT:    Chest Tube:    NG Tube:       STANDING  aspirin enteric coated 81 milliGRAM(s) Oral daily  atorvastatin 20 milliGRAM(s) Oral at bedtime  dextrose 10% Bolus 125 milliLiter(s) IV Bolus once  dextrose 5%. 1000 milliLiter(s) (50 mL/Hr) IV Continuous <Continuous>  dextrose 5%. 1000 milliLiter(s) (100 mL/Hr) IV Continuous <Continuous>  dextrose 50% Injectable 25 Gram(s) IV Push once  dextrose 50% Injectable 12.5 Gram(s) IV Push once  glucagon  Injectable 1 milliGRAM(s) IntraMuscular once  insulin lispro (ADMELOG) corrective regimen sliding scale   SubCutaneous at bedtime  insulin lispro (ADMELOG) corrective regimen sliding scale   SubCutaneous three times a day before meals  pantoprazole  Injectable 40 milliGRAM(s) IV Push daily  piperacillin/tazobactam IVPB.. 4.5 Gram(s) IV Intermittent every 8 hours    PRN  acetaminophen     Tablet .. 650 milliGRAM(s) Oral every 6 hours PRN Temp greater or equal to 38C (100.4F), Mild Pain (1 - 3)  aluminum hydroxide/magnesium hydroxide/simethicone Suspension 30 milliLiter(s) Oral every 4 hours PRN Dyspepsia  dextrose Oral Gel 15 Gram(s) Oral once PRN Blood Glucose LESS THAN 70 milliGRAM(s)/deciliter  melatonin 3 milliGRAM(s) Oral at bedtime PRN Insomnia  ondansetron Injectable 4 milliGRAM(s) IV Push every 8 hours PRN Nausea and/or Vomiting      Labs:  139  |  106  |  9  ----------------------------<  106<H>    (05-05)  4.1   |  23  |  0.69          Ca    8.1<L>      05-05  Mg    1.90  Phos  2.2<L>          Urinalysis Basic - ( 05 May 2024 05:30 )    Color: x / Appearance: x / SG: x / pH: x  Gluc: 106 mg/dL / Ketone: x  / Bili: x / Urobili: x   Blood: x / Protein: x / Nitrite: x   Leuk Esterase: x / RBC: x / WBC x   Sq Epi: x / Non Sq Epi: x / Bacteria: x      Urinalysis Basic - ( 05 May 2024 05:30 )    Color: x / Appearance: x / SG: x / pH: x  Gluc: 106 mg/dL / Ketone: x  / Bili: x / Urobili: x   Blood: x / Protein: x / Nitrite: x   Leuk Esterase: x / RBC: x / WBC x   Sq Epi: x / Non Sq Epi: x / Bacteria: x          Physical Exam:  General: NAD  Respiratory: respirations non labored  Abdominal: soft, nontender, nondistended

## 2024-05-05 NOTE — PROGRESS NOTE ADULT - ASSESSMENT
This is a 77 y/o F with pmhx of Diverticulitis, Type-2 DM, HTN, HLD, Glaucoma, OA of the L-knee and PERFECTO, presented to the ED for new onset lethargy. At bedside, the patient does not appear lethargic. Son is not at bedside and the patient gave me the son's phone number however as per charts it is her own phone number. The patient denies shortness of breath. She also stated that she feels fine and does not have any complaints at this time. Denies cough. "She stated that I have dementia so I do not remember why I am here." Denies abdominal pain, and diarrhea. As per ED charts, the patient was sent here for leukocytosis. She also denies having issues eating at home. (03 May 2024 06:00):  she does not know why  sheis here:  recently she was dced from the hospital : currently she has no resp complaints and is on room air     suspected pneumonia  Hx of diverticulitis  MD  HTN  Glaucoma  OA    suspected pneumonia  -NOT SURE IF SHE HAS PNEUMONIA:  SHE IS EMPIRICALLY STARTED ON ANTIBIOTICS   -SHE IS HAS No FEVER BUT wbc COUNT IS HIGH would RULE OUT uti:  MICHAEL ACP ; TO SEND UA AS WELL AS PROCAL  -oN ROOM AIR:   -CHECK LEGIONELLA    -MINIMISE ANTIBIOTICS IF WE CAN   5/4: doubt she has pneumonia:  the new ct scan with no changes from prior and no clinical symptoms too resp wise   5/5: seems stable:  no sob:  no cough : no phlegm  : on room air   Hx of diverticulitis  -PER PRIMARY TEAM  -5/4: the ct abd showed: Persistent sigmoid diverticulitis with adjacent phlegmonous changes with small foci of extraluminal air, likely sequela of contained perforation,   in retrospect unchanged from the prior exam. No drainable fluid collection. Moderate left hydroureteronephrosis to the level of inflammatory changes in the pelvis, unchanged.  -defer to ID and surgery   5/5: no abd pain : cont antbiotics:  id following   dm:   -MONITOR AND CONTROL   HTN  -CONTROLLED  Glaucoma  -PER [facility] TEAM   OA  -NO PAIN  ;      MICHAEL ACP

## 2024-05-05 NOTE — PROGRESS NOTE ADULT - SUBJECTIVE AND OBJECTIVE BOX
Date of Service: 05-05-24 @ 10:18    Patient is a 78y old  Female who presents with a chief complaint of abnormal labs (05 May 2024 09:59)      Any change in ROS: seems OK:  no sob:  no cough ; no phlegm      MEDICATIONS  (STANDING):  aspirin enteric coated 81 milliGRAM(s) Oral daily  atorvastatin 20 milliGRAM(s) Oral at bedtime  dextrose 10% Bolus 125 milliLiter(s) IV Bolus once  dextrose 5%. 1000 milliLiter(s) (50 mL/Hr) IV Continuous <Continuous>  dextrose 5%. 1000 milliLiter(s) (100 mL/Hr) IV Continuous <Continuous>  dextrose 50% Injectable 25 Gram(s) IV Push once  dextrose 50% Injectable 12.5 Gram(s) IV Push once  glucagon  Injectable 1 milliGRAM(s) IntraMuscular once  insulin lispro (ADMELOG) corrective regimen sliding scale   SubCutaneous at bedtime  insulin lispro (ADMELOG) corrective regimen sliding scale   SubCutaneous three times a day before meals  pantoprazole  Injectable 40 milliGRAM(s) IV Push daily  piperacillin/tazobactam IVPB.. 4.5 Gram(s) IV Intermittent every 8 hours    MEDICATIONS  (PRN):  acetaminophen     Tablet .. 650 milliGRAM(s) Oral every 6 hours PRN Temp greater or equal to 38C (100.4F), Mild Pain (1 - 3)  aluminum hydroxide/magnesium hydroxide/simethicone Suspension 30 milliLiter(s) Oral every 4 hours PRN Dyspepsia  dextrose Oral Gel 15 Gram(s) Oral once PRN Blood Glucose LESS THAN 70 milliGRAM(s)/deciliter  melatonin 3 milliGRAM(s) Oral at bedtime PRN Insomnia  ondansetron Injectable 4 milliGRAM(s) IV Push every 8 hours PRN Nausea and/or Vomiting    Vital Signs Last 24 Hrs  T(C): 36.4 (05 May 2024 06:00), Max: 36.6 (04 May 2024 21:00)  T(F): 97.5 (05 May 2024 06:00), Max: 97.9 (04 May 2024 21:00)  HR: 71 (05 May 2024 06:00) (62 - 71)  BP: 129/85 (05 May 2024 06:00) (129/85 - 161/88)  BP(mean): --  RR: 18 (05 May 2024 06:00) (18 - 18)  SpO2: 97% (05 May 2024 06:00) (97% - 100%)    Parameters below as of 05 May 2024 06:00  Patient On (Oxygen Delivery Method): nasal cannula        I&O's Summary        Physical Exam:   GENERAL: NAD, well-groomed, well-developed  HEENT: MARTINE/   Atraumatic, Normocephalic  ENMT: No tonsillar erythema, exudates, or enlargement; Moist mucous membranes, Good dentition, No lesions  NECK: Supple, No JVD, Normal thyroid  CHEST/LUNG: Clear to auscultaion  CVS: Regular rate and rhythm; No murmurs, rubs, or gallops  GI: : Soft, Nontender, Nondistended; Bowel sounds present  NERVOUS SYSTEM:  Alert & Oriented X1-2  EXTREMITIES:  2+ Peripheral Pulses, No clubbing, cyanosis, or edema  LYMPH: No lymphadenopathy noted  SKIN: No rashes or lesions  ENDOCRINOLOGY: No Thyromegaly  PSYCH: calm   Labs:  28                            11.2   8.07  )-----------( 319      ( 05 May 2024 05:30 )             33.0                         12.2   12.03 )-----------( 360      ( 04 May 2024 05:46 )             36.4                         12.0   15.24 )-----------( 321      ( 02 May 2024 22:35 )             35.0     05-05    139  |  106  |  9   ----------------------------<  106<H>  4.1   |  23  |  0.69  05-04    139  |  102  |  10  ----------------------------<  124<H>  2.8<LL>   |  25  |  0.57  05-02    135  |  99  |  14  ----------------------------<  148<H>  3.3<L>   |  24  |  0.68    Ca    8.1<L>      05 May 2024 05:30  Ca    8.7      04 May 2024 05:46  Phos  2.2     05-05  Phos  2.4     05-04  Mg     1.90     05-05  Mg     1.90     05-04    TPro  9.2<H>  /  Alb  3.7  /  TBili  0.7  /  DBili  x   /  AST  18  /  ALT  10  /  AlkPhos  105  05-02    CAPILLARY BLOOD GLUCOSE      POCT Blood Glucose.: 114 mg/dL (05 May 2024 07:23)  POCT Blood Glucose.: 105 mg/dL (04 May 2024 21:11)  POCT Blood Glucose.: 116 mg/dL (04 May 2024 16:25)  POCT Blood Glucose.: 112 mg/dL (04 May 2024 11:13)          Urinalysis Basic - ( 05 May 2024 05:30 )    Color: x / Appearance: x / SG: x / pH: x  Gluc: 106 mg/dL / Ketone: x  / Bili: x / Urobili: x   Blood: x / Protein: x / Nitrite: x   Leuk Esterase: x / RBC: x / WBC x   Sq Epi: x / Non Sq Epi: x / Bacteria: x      Procalcitonin: 0.05 ng/mL (05-04 @ 05:46)        RECENT CULTURES:  05-03 @ 18:52 .Blood Blood                No growth at 24 hours    05-03 @ 18:46 .Blood Blood                No growth at 24 hours    05-02 @ 22:47 .Blood Blood-Peripheral         rad< from: CT Angio Chest PE Protocol w/ IV Cont (05.03.24 @ 02:00) >  FINDINGS:  PULMONARY ANGIOGRAM: Adequate pulmonary artery opacification without   visualized pulmonary embolus.  LUNGS AND AIRWAYS: Patent central airways.  Bilateral nodular and   tree-in-bud opacities most prominent in the bilateral upper lobes,   similar to priors. Areas of mosaic attenuation, suggestive of air   trapping.  PLEURA: No pleural effusion.  MEDIASTINUM AND OLIVE: No lymphadenopathy.  VESSELS: Coronary artery and aortic calcifications.  HEART: Heart size is normal. No pericardial effusion.  CHEST WALL AND LOWER NECK: Within normal limits.  VISUALIZED UPPER ABDOMEN: Mild left hydronephrosis and right renal cyst,   stable.  BONES: Mild degenerative changes.    IMPRESSION:  No pulmonary embolus.  Bilateral nodular and tree-in-bud opacities, similar to priors.  Partially imaged mild left hydronephrosis, similar to 4/19/2024.    --- End of Report ---          JONA PERALTA MD; Resident Radiologist  This document has been electronically signed.  SLIME BROWN MD; Attending Radiologist  This document has been electronically signed. May  3 2024  3:57AM    < end of copied text >         No growth at 24 hours    05-02 @ 22:35 .Blood Blood-Peripheral                No growth at 24 hours          RESPIRATORY CULTURES:          Studies  Chest X-RAY  CT SCAN Chest   Venous Dopplers: LE:   CT Abdomen  Others

## 2024-05-06 ENCOUNTER — TRANSCRIPTION ENCOUNTER (OUTPATIENT)
Age: 79
End: 2024-05-06

## 2024-05-06 LAB
ANION GAP SERPL CALC-SCNC: 15 MMOL/L — HIGH (ref 7–14)
BUN SERPL-MCNC: 11 MG/DL — SIGNIFICANT CHANGE UP (ref 7–23)
CALCIUM SERPL-MCNC: 7.9 MG/DL — LOW (ref 8.4–10.5)
CHLORIDE SERPL-SCNC: 100 MMOL/L — SIGNIFICANT CHANGE UP (ref 98–107)
CO2 SERPL-SCNC: 21 MMOL/L — LOW (ref 22–31)
CREAT SERPL-MCNC: 0.76 MG/DL — SIGNIFICANT CHANGE UP (ref 0.5–1.3)
EGFR: 81 ML/MIN/1.73M2 — SIGNIFICANT CHANGE UP
GLUCOSE BLDC GLUCOMTR-MCNC: 128 MG/DL — HIGH (ref 70–99)
GLUCOSE BLDC GLUCOMTR-MCNC: 134 MG/DL — HIGH (ref 70–99)
GLUCOSE BLDC GLUCOMTR-MCNC: 136 MG/DL — HIGH (ref 70–99)
GLUCOSE BLDC GLUCOMTR-MCNC: 170 MG/DL — HIGH (ref 70–99)
GLUCOSE SERPL-MCNC: 115 MG/DL — HIGH (ref 70–99)
HCT VFR BLD CALC: 32.8 % — LOW (ref 34.5–45)
HGB BLD-MCNC: 11.1 G/DL — LOW (ref 11.5–15.5)
MAGNESIUM SERPL-MCNC: 1.7 MG/DL — SIGNIFICANT CHANGE UP (ref 1.6–2.6)
MCHC RBC-ENTMCNC: 29.3 PG — SIGNIFICANT CHANGE UP (ref 27–34)
MCHC RBC-ENTMCNC: 33.8 GM/DL — SIGNIFICANT CHANGE UP (ref 32–36)
MCV RBC AUTO: 86.5 FL — SIGNIFICANT CHANGE UP (ref 80–100)
NRBC # BLD: 0 /100 WBCS — SIGNIFICANT CHANGE UP (ref 0–0)
NRBC # FLD: 0 K/UL — SIGNIFICANT CHANGE UP (ref 0–0)
PHOSPHATE SERPL-MCNC: 2.1 MG/DL — LOW (ref 2.5–4.5)
PLATELET # BLD AUTO: 285 K/UL — SIGNIFICANT CHANGE UP (ref 150–400)
POTASSIUM SERPL-MCNC: 3.2 MMOL/L — LOW (ref 3.5–5.3)
POTASSIUM SERPL-SCNC: 3.2 MMOL/L — LOW (ref 3.5–5.3)
RBC # BLD: 3.79 M/UL — LOW (ref 3.8–5.2)
RBC # FLD: 14.4 % — SIGNIFICANT CHANGE UP (ref 10.3–14.5)
SODIUM SERPL-SCNC: 136 MMOL/L — SIGNIFICANT CHANGE UP (ref 135–145)
WBC # BLD: 11.4 K/UL — HIGH (ref 3.8–10.5)
WBC # FLD AUTO: 11.4 K/UL — HIGH (ref 3.8–10.5)

## 2024-05-06 RX ORDER — POTASSIUM CHLORIDE 20 MEQ
40 PACKET (EA) ORAL EVERY 4 HOURS
Refills: 0 | Status: COMPLETED | OUTPATIENT
Start: 2024-05-06 | End: 2024-05-06

## 2024-05-06 RX ORDER — PIPERACILLIN AND TAZOBACTAM 4; .5 G/20ML; G/20ML
3.38 INJECTION, POWDER, LYOPHILIZED, FOR SOLUTION INTRAVENOUS EVERY 8 HOURS
Refills: 0 | Status: DISCONTINUED | OUTPATIENT
Start: 2024-05-06 | End: 2024-05-09

## 2024-05-06 RX ORDER — SODIUM,POTASSIUM PHOSPHATES 278-250MG
1 POWDER IN PACKET (EA) ORAL THREE TIMES A DAY
Refills: 0 | Status: COMPLETED | OUTPATIENT
Start: 2024-05-06 | End: 2024-05-08

## 2024-05-06 RX ADMIN — Medication 40 MILLIEQUIVALENT(S): at 17:11

## 2024-05-06 RX ADMIN — Medication 40 MILLIEQUIVALENT(S): at 13:38

## 2024-05-06 RX ADMIN — Medication 1: at 17:08

## 2024-05-06 RX ADMIN — ATORVASTATIN CALCIUM 20 MILLIGRAM(S): 80 TABLET, FILM COATED ORAL at 21:58

## 2024-05-06 RX ADMIN — Medication 81 MILLIGRAM(S): at 11:38

## 2024-05-06 RX ADMIN — Medication 1 PACKET(S): at 21:58

## 2024-05-06 RX ADMIN — Medication 1 PACKET(S): at 13:38

## 2024-05-06 RX ADMIN — PIPERACILLIN AND TAZOBACTAM 25 GRAM(S): 4; .5 INJECTION, POWDER, LYOPHILIZED, FOR SOLUTION INTRAVENOUS at 21:58

## 2024-05-06 RX ADMIN — PANTOPRAZOLE SODIUM 40 MILLIGRAM(S): 20 TABLET, DELAYED RELEASE ORAL at 11:38

## 2024-05-06 RX ADMIN — PIPERACILLIN AND TAZOBACTAM 25 GRAM(S): 4; .5 INJECTION, POWDER, LYOPHILIZED, FOR SOLUTION INTRAVENOUS at 06:30

## 2024-05-06 RX ADMIN — PIPERACILLIN AND TAZOBACTAM 25 GRAM(S): 4; .5 INJECTION, POWDER, LYOPHILIZED, FOR SOLUTION INTRAVENOUS at 13:37

## 2024-05-06 RX ADMIN — Medication 3 MILLIGRAM(S): at 21:58

## 2024-05-06 NOTE — DISCHARGE NOTE PROVIDER - PROVIDER TOKENS
PROVIDER:[TOKEN:[[id number]],FOLLOWUP:[1 week],ESTABLISHEDPATIENT:[T]],PROVIDER:[TOKEN:[[id number]],FOLLOWUP:[1 week]],PROVIDER:[TOKEN:[[id number]],FOLLOWUP:[1 month]]

## 2024-05-06 NOTE — PROGRESS NOTE ADULT - SUBJECTIVE AND OBJECTIVE BOX
Date of Service: 05-06-24 @ 11:06    Patient is a 78y old  Female who presents with a chief complaint of abnormal labs (06 May 2024 07:47)      Any change in ROS: seems OK:  no sob:  no cough : no phlegm      MEDICATIONS  (STANDING):  aspirin enteric coated 81 milliGRAM(s) Oral daily  atorvastatin 20 milliGRAM(s) Oral at bedtime  dextrose 10% Bolus 125 milliLiter(s) IV Bolus once  dextrose 5%. 1000 milliLiter(s) (50 mL/Hr) IV Continuous <Continuous>  dextrose 5%. 1000 milliLiter(s) (100 mL/Hr) IV Continuous <Continuous>  dextrose 50% Injectable 25 Gram(s) IV Push once  dextrose 50% Injectable 12.5 Gram(s) IV Push once  glucagon  Injectable 1 milliGRAM(s) IntraMuscular once  insulin lispro (ADMELOG) corrective regimen sliding scale   SubCutaneous three times a day before meals  insulin lispro (ADMELOG) corrective regimen sliding scale   SubCutaneous at bedtime  pantoprazole  Injectable 40 milliGRAM(s) IV Push daily  piperacillin/tazobactam IVPB.. 4.5 Gram(s) IV Intermittent every 8 hours    MEDICATIONS  (PRN):  acetaminophen     Tablet .. 650 milliGRAM(s) Oral every 6 hours PRN Temp greater or equal to 38C (100.4F), Mild Pain (1 - 3)  aluminum hydroxide/magnesium hydroxide/simethicone Suspension 30 milliLiter(s) Oral every 4 hours PRN Dyspepsia  dextrose Oral Gel 15 Gram(s) Oral once PRN Blood Glucose LESS THAN 70 milliGRAM(s)/deciliter  melatonin 3 milliGRAM(s) Oral at bedtime PRN Insomnia  ondansetron Injectable 4 milliGRAM(s) IV Push every 8 hours PRN Nausea and/or Vomiting    Vital Signs Last 24 Hrs  T(C): 36.6 (06 May 2024 10:49), Max: 37.2 (05 May 2024 21:40)  T(F): 97.9 (06 May 2024 10:49), Max: 98.9 (05 May 2024 21:40)  HR: 83 (06 May 2024 10:49) (77 - 98)  BP: 140/83 (06 May 2024 10:49) (109/69 - 164/88)  BP(mean): --  RR: 18 (06 May 2024 10:49) (18 - 18)  SpO2: 100% (06 May 2024 10:49) (98% - 100%)    Parameters below as of 06 May 2024 10:49  Patient On (Oxygen Delivery Method): room air        I&O's Summary    05 May 2024 07:01  -  06 May 2024 07:00  --------------------------------------------------------  IN: 240 mL / OUT: 0 mL / NET: 240 mL          Physical Exam:   GENERAL: NAD, well-groomed, well-developed  HEENT: MARTINE/   Atraumatic, Normocephalic  ENMT: No tonsillar erythema, exudates, or enlargement; Moist mucous membranes, Good dentition, No lesions  NECK: Supple, No JVD, Normal thyroid  CHEST/LUNG: Clear to auscultaion-  CVS: Regular rate and rhythm; No murmurs, rubs, or gallops  GI: : Soft, Nontender, Nondistended; Bowel sounds present  NERVOUS SYSTEM:  Alert & Oriented X1  EXTREMITIES: - edema  LYMPH: No lymphadenopathy noted  SKIN: No rashes or lesions  ENDOCRINOLOGY: No Thyromegaly  PSYCH: Appropriate    Labs:  28                            11.1   11.40 )-----------( 285      ( 06 May 2024 05:40 )             32.8                         11.2   8.07  )-----------( 319      ( 05 May 2024 05:30 )             33.0                         12.2   12.03 )-----------( 360      ( 04 May 2024 05:46 )             36.4                         12.0   15.24 )-----------( 321      ( 02 May 2024 22:35 )             35.0     05-06    136  |  100  |  11  ----------------------------<  115<H>  3.2<L>   |  21<L>  |  0.76  05-05    139  |  106  |  9   ----------------------------<  106<H>  4.1   |  23  |  0.69  05-04    139  |  102  |  10  ----------------------------<  124<H>  2.8<LL>   |  25  |  0.57  05-02    135  |  99  |  14  ----------------------------<  148<H>  3.3<L>   |  24  |  0.68    Ca    7.9<L>      06 May 2024 05:40  Ca    8.1<L>      05 May 2024 05:30  Phos  2.1     05-06  Phos  2.2     05-05  Mg     1.70     05-06  Mg     1.90     05-05    TPro  9.2<H>  /  Alb  3.7  /  TBili  0.7  /  DBili  x   /  AST  18  /  ALT  10  /  AlkPhos  105  05-02    CAPILLARY BLOOD GLUCOSE      POCT Blood Glucose.: 134 mg/dL (06 May 2024 07:13)  POCT Blood Glucose.: 130 mg/dL (05 May 2024 20:44)  POCT Blood Glucose.: 105 mg/dL (05 May 2024 16:30)  POCT Blood Glucose.: 112 mg/dL (05 May 2024 11:21)          Urinalysis Basic - ( 06 May 2024 05:40 )    Color: x / Appearance: x / SG: x / pH: x  Gluc: 115 mg/dL / Ketone: x  / Bili: x / Urobili: x   Blood: x / Protein: x / Nitrite: x   Leuk Esterase: x / RBC: x / WBC x   Sq Epi: x / Non Sq Epi: x / Bacteria: x      Procalcitonin: 0.05 ng/mL (05-04 @ 05:46)        RECENT CULTURES:  05-03 @ 18:52 .Blood Blood                No growth at 48 Hours    05-03 @ 18:46 .Blood Blood       rad< from: CT Angio Chest PE Protocol w/ IV Cont (05.03.24 @ 02:00) >    PROCEDURE:  CT Angiography of the Chest.  Sagittal and coronal reformats were performed as well as 3D (MIP)   reconstructions.    FINDINGS:  PULMONARY ANGIOGRAM: Adequate pulmonary artery opacification without   visualized pulmonary embolus.  LUNGS AND AIRWAYS: Patent central airways.  Bilateral nodular and   tree-in-bud opacities most prominent in the bilateral upper lobes,   similar to priors. Areas of mosaic attenuation, suggestive of air   trapping.  PLEURA: No pleural effusion.  MEDIASTINUM AND OLIVE: No lymphadenopathy.  VESSELS: Coronary artery and aortic calcifications.  HEART: Heart size is normal. No pericardial effusion.  CHEST WALL AND LOWER NECK: Within normal limits.  VISUALIZED UPPER ABDOMEN: Mild left hydronephrosis and right renal cyst,   stable.  BONES: Mild degenerative changes.    IMPRESSION:  No pulmonary embolus.  Bilateral nodular and tree-in-bud opacities, similar to priors.  Partially imaged mild left hydronephrosis, similar to 4/19/2024.    --- End of Report ---          JONA PERALTA MD; Resident Radiologist  This document has been electronically signed.  SLIME BROWN MD; Attending Radiologist  This document has been electronically signed. May  3 2024  3:57AM    < end of copied text >           No growth at 48 Hours    05-02 @ 22:47 .Blood Blood-Peripheral                No growth at 72 Hours    05-02 @ 22:35 .Blood Blood-Peripheral                No growth at 72 Hours          RESPIRATORY CULTURES:          Studies  Chest X-RAY  CT SCAN Chest   Venous Dopplers: LE:   CT Abdomen  Others

## 2024-05-06 NOTE — DISCHARGE NOTE PROVIDER - DETAILS OF MALNUTRITION DIAGNOSIS/DIAGNOSES
This patient has been assessed with a concern for Malnutrition and was treated during this hospitalization for the following Nutrition diagnosis/diagnoses:     -  05/10/2024: Severe protein-calorie malnutrition

## 2024-05-06 NOTE — DISCHARGE NOTE PROVIDER - NSDCCPCAREPLAN_GEN_ALL_CORE_FT
PRINCIPAL DISCHARGE DIAGNOSIS  Diagnosis: Diverticulitis  Assessment and Plan of Treatment: You were treated in the hospital with IV antibiotics for Diverticulitis. You will have to continue on Ertapenem 1 g IV daily to 5/24. Please call to schedule an appointment with Dr Mercedes from infectious disease as outpatient. You will need to have a repeat CAT scan of your abdomen. You will also need outpatient colonoscopy with Gastroenterology. For any symptoms including but not limited to new fevers, chills, weakness, abdominal pain, nausea, vomit, ches pain or trouble breathing please return to ER.   Please also follow up with Dr. Lr in 2-4 weeks from surgery for your Diverticular abscess found on CAT scan imaging.      SECONDARY DISCHARGE DIAGNOSES  Diagnosis: Benign essential HTN  Assessment and Plan of Treatment: Continue blood pressure medication regimen as directed. Monitor for any visual changes, headaches or dizziness.  Monitor blood pressure regularly.  Follow up with your primary care provider for further management for high blood pressure.      Diagnosis: Hydronephrosis, left  Assessment and Plan of Treatment: You CAT scan showed Hydronephrosis also known as swelling in the kidney. Your kidney function is normal and your are urinating okay. Please follow up as outpatient with your primary doctor and Urologist for further work up.     PRINCIPAL DISCHARGE DIAGNOSIS  Diagnosis: Diverticulitis  Assessment and Plan of Treatment: You were treated in the hospital with IV antibiotics for Diverticulitis. You will have to continue on Ertapenem 1 g IV daily to 6/3. Please call to schedule an appointment with Dr Mercedes from infectious disease as outpatient. You will need to have a repeat CAT scan of your abdomen. You will also need outpatient colonoscopy with Gastroenterology. For any symptoms including but not limited to new fevers, chills, weakness, abdominal pain, nausea, vomit, chest pain or trouble breathing please return to ER.   Please also follow up with Dr. Lr in 2-4 weeks from surgery for your Diverticular abscess found on CAT scan imaging.      SECONDARY DISCHARGE DIAGNOSES  Diagnosis: Hydronephrosis, left  Assessment and Plan of Treatment: You CAT scan showed Hydronephrosis also known as swelling in the kidney. Your kidney function is normal and your are urinating okay. Please follow up as outpatient with your primary doctor and Urologist for further work up.    Diagnosis: Benign essential HTN  Assessment and Plan of Treatment: Continue blood pressure medication regimen as directed. Monitor for any visual changes, headaches or dizziness.  Monitor blood pressure regularly.  Follow up with your primary care provider for further management for high blood pressure.       PRINCIPAL DISCHARGE DIAGNOSIS  Diagnosis: Diverticulitis  Assessment and Plan of Treatment: You were treated in the hospital with IV antibiotics for Diverticulitis. You will have to continue on Ertapenem 1 g IV daily to 6/3. Please call to schedule an appointment with Dr Mercedes from infectious disease as outpatient. You will need to have a repeat CAT scan of your abdomen. You will also need outpatient colonoscopy with Gastroenterology. For any symptoms including but not limited to new fevers, chills, weakness, abdominal pain, nausea, vomit, chest pain or trouble breathing please return to ER.   Please also follow up with Dr. Lr in 2-4 weeks from surgery for your Diverticular abscess found on CAT scan imaging.  will need repeat CT outpatient prior to completion of antibiotics  will need surgical f/u outpatient  Follow up with [facility] office next week      SECONDARY DISCHARGE DIAGNOSES  Diagnosis: Hydronephrosis, left  Assessment and Plan of Treatment: You CAT scan showed Hydronephrosis also known as swelling in the kidney. Your kidney function is normal and your are urinating okay. Please follow up as outpatient with your primary doctor and Urologist for further work up.    Diagnosis: Benign essential HTN  Assessment and Plan of Treatment: Continue blood pressure medication regimen as directed. Monitor for any visual changes, headaches or dizziness.  Monitor blood pressure regularly.  Follow up with your primary care provider for further management for high blood pressure.

## 2024-05-06 NOTE — PROGRESS NOTE ADULT - ASSESSMENT
A 78 year old female who presented with abnormal leukocytosis found to have contained perforation of sigmoid diverticulitis on CT scan.     Recommendations:  - low fiber diet  - antibiotics such as augmentin for 2 weeks total course  - No surgery planned at this time  outpatient follow up with Dr. Lr in 2-4 weeks  will need colonoscopy outpatient    please call back A TEAM SURGERY with any additional questions or concerns    [facility] Surgery  [id number]

## 2024-05-06 NOTE — DISCHARGE NOTE PROVIDER - NSDCFUSCHEDAPPT_GEN_ALL_CORE_FT
[facility] Physician [facility] [address]  Scheduled Appointment: 05/10/2024

## 2024-05-06 NOTE — DISCHARGE NOTE PROVIDER - NSDCMRMEDTOKEN_GEN_ALL_CORE_FT
acetaminophen 325 mg oral tablet: 3 tab(s) orally every 8 hours as needed for  moderate pain  amoxicillin-clavulanate 875 mg-125 mg oral tablet: 1 tab(s) orally 2 times a day  aspirin 81 mg oral delayed release tablet: 1 tab(s) orally once a day  atorvastatin 20 mg oral tablet: 1 tab(s) orally once a day  ertapenem 1 g injection: 1 gram(s) intravenously once a day Please provide  with recommendations Midline vs PICC and co-pay amount please$  Metoprolol Tartrate 25 mg oral tablet: 1 tab(s) orally 2 times a day  omeprazole 40 mg oral delayed release capsule: 1 cap orally once a day before breakfast   MDD:1   acetaminophen 325 mg oral tablet: 3 tab(s) orally every 8 hours as needed for  moderate pain  aspirin 81 mg oral delayed release tablet: 1 tab(s) orally once a day  atorvastatin 20 mg oral tablet: 1 tab(s) orally once a day  ertapenem 1 g injection: 1 gram(s) intravenously once a day Please provide  with recommendations Midline vs PICC and co-pay amount please$  omeprazole 40 mg oral delayed release capsule: 1 cap orally once a day before breakfast   MDD:1

## 2024-05-06 NOTE — PROGRESS NOTE ADULT - SUBJECTIVE AND OBJECTIVE BOX
GENERAL SURGERY PROGRESS NOTE    SUBJECTIVE  Patient seen and examined.   denies pain  tolerating diet      OBJECTIVE    PHYSICAL EXAM  General: Appears well, NAD  CHEST: breathing comfortably  CV: appears well perfused  Abdomen: soft, nontender, nondistended, no rebound or guarding  Extremities: Grossly symmetric    T(C): 37.1 (05-06-24 @ 06:25), Max: 37.2 (05-05-24 @ 21:40)  HR: 77 (05-06-24 @ 06:25) (77 - 101)  BP: 109/69 (05-06-24 @ 06:25) (109/69 - 164/88)  RR: 18 (05-06-24 @ 06:25) (18 - 18)  SpO2: 99% (05-06-24 @ 06:25) (97% - 99%)      MEDICATIONS  acetaminophen     Tablet .. 650 milliGRAM(s) Oral every 6 hours PRN  aluminum hydroxide/magnesium hydroxide/simethicone Suspension 30 milliLiter(s) Oral every 4 hours PRN  aspirin enteric coated 81 milliGRAM(s) Oral daily  atorvastatin 20 milliGRAM(s) Oral at bedtime  dextrose 10% Bolus 125 milliLiter(s) IV Bolus once  dextrose 5%. 1000 milliLiter(s) IV Continuous <Continuous>  dextrose 5%. 1000 milliLiter(s) IV Continuous <Continuous>  dextrose 50% Injectable 12.5 Gram(s) IV Push once  dextrose 50% Injectable 25 Gram(s) IV Push once  dextrose Oral Gel 15 Gram(s) Oral once PRN  glucagon  Injectable 1 milliGRAM(s) IntraMuscular once  insulin lispro (ADMELOG) corrective regimen sliding scale   SubCutaneous at bedtime  insulin lispro (ADMELOG) corrective regimen sliding scale   SubCutaneous three times a day before meals  melatonin 3 milliGRAM(s) Oral at bedtime PRN  ondansetron Injectable 4 milliGRAM(s) IV Push every 8 hours PRN  pantoprazole  Injectable 40 milliGRAM(s) IV Push daily  piperacillin/tazobactam IVPB.. 4.5 Gram(s) IV Intermittent every 8 hours      LABS                        11.1   11.40 )-----------( 285      ( 06 May 2024 05:40 )             32.8     05-06    136  |  100  |  11  ----------------------------<  115<H>  3.2<L>   |  21<L>  |  0.76    Ca    7.9<L>      06 May 2024 05:40  Phos  2.1     05-06  Mg     1.70     05-06        Urinalysis Basic - ( 06 May 2024 05:40 )    Color: x / Appearance: x / SG: x / pH: x  Gluc: 115 mg/dL / Ketone: x  / Bili: x / Urobili: x   Blood: x / Protein: x / Nitrite: x   Leuk Esterase: x / RBC: x / WBC x   Sq Epi: x / Non Sq Epi: x / Bacteria: x        RADIOLOGY & ADDITIONAL STUDIES

## 2024-05-06 NOTE — DISCHARGE NOTE PROVIDER - NSFOLLOWUPCLINICS_GEN_ALL_ED_FT
[facility]  Gastroenterology  [address] [place] [address]  Phone: [phone]  Fax:     [facility] [place] [facility] [place]  Urology  [address] [place] [address]  Phone: [phone]  Fax:      [facility] [place] [facility]  Urology  [address] [place] [address], [place] [address]  Phone: [phone]  Fax:

## 2024-05-06 NOTE — DISCHARGE NOTE PROVIDER - CARE PROVIDER_API CALL
Mandy Martinez  Internal Medicine  [address] [place] [address]  Phone: [phone]  Fax: [phone]  Established Patient  Follow Up Time: 1 week    Geovani Goldman  Infectious Disease  [address] [facility], [address]  [place] [address]  Phone: [phone]  Fax: [phone]  Follow Up Time: 1 week    Lucas Lr (DO)  Surgery  [address] [facility] [address]  [place] [address]  Phone: [phone]  Fax: [phone]  Follow Up Time: 1 month

## 2024-05-06 NOTE — PROGRESS NOTE ADULT - ASSESSMENT
This is a 77 y/o F with pmhx of Diverticulitis, Type-2 DM, HTN, HLD, Glaucoma, OA of the L-knee and PERFECTO, presented to the ED for new onset lethargy. At bedside, the patient does not appear lethargic. Son is not at bedside and the patient gave me the son's phone number however as per charts it is her own phone number. The patient denies shortness of breath. She also stated that she feels fine and does not have any complaints at this time. Denies cough. "She stated that I have dementia so I do not remember why I am here." Denies abdominal pain, and diarrhea. As per ED charts, the patient was sent here for leukocytosis. She also denies having issues eating at home. (03 May 2024 06:00):  she does not know why  sheis here:  recently she was dced from the hospital : currently she has no resp complaints and is on room air     suspected pneumonia  Hx of diverticulitis  MD  HTN  Glaucoma  OA    suspected pneumonia  -NOT SURE IF SHE HAS PNEUMONIA:  SHE IS EMPIRICALLY STARTED ON ANTIBIOTICS   -SHE IS HAS No FEVER BUT wbc COUNT IS HIGH would RULE OUT uti:  MICHAEL ACP ; TO SEND UA AS WELL AS PROCAL  -oN ROOM AIR:   -CHECK LEGIONELLA    -MINIMISE ANTIBIOTICS IF WE CAN   5/4: doubt she has pneumonia:  the new ct scan with no changes from prior and no clinical symptoms too resp wise   5/5: seems stable:  no sob:  no cough : no phlegm  : on room air   5/6: seems to be doing  ok ; no sob:  no phlegm  :   Hx of diverticulitis  -PER PRIMARY TEAM  -5/4: the ct abd showed: Persistent sigmoid diverticulitis with adjacent phlegmonous changes with small foci of extraluminal air, likely sequela of contained perforation,   in retrospect unchanged from the prior exam. No drainable fluid collection. Moderate left hydroureteronephrosis to the level of inflammatory changes in the pelvis, unchanged.  -defer to ID and surgery   5/5: no abd pain : cont antbiotics:  id following   5/6: on zosyn ; for diverticulitis   dm:   -MONITOR AND CONTROL   HTN  -CONTROLLED  Glaucoma  -PER [facility] TEAM   OA  -NO PAIN  ;      MICHAEL ACP

## 2024-05-06 NOTE — PROGRESS NOTE ADULT - ASSESSMENT
79 y/o F PMhx Glaucoma, DM II, HTN, dementia, diverticulitis who presented w/ increased lethargy  was treated for diverticulitis and PNA    Sigmoid diverticulitis w/ possible contained perforation  CTAP- Persistent sigmoid diverticulitis with adjacent phlegmonous changes with small foci of extraluminal air, likely sequela of contained perforation. No drainable fluid collection. Moderate left hydroureteronephrosis  blood cultures- NGTD  s/p surgery eval- no plans for surgical intervention    low suspicion for PNA  CTA chest- No PE. Bilateral nodular and tree-in-bud opacities, similar to priors. Partially imaged mild left hydronephrosis, similar to 4/19/2024.  not hypoxic, PCT negative   SARS-CoV-2/ flu/ RSV PCR negative    she was previously treated w/ cefpodoxime/ flagyl x 10 days and augmentin x 10 days; now w/ possible contained perforation  therefore will plan to complete IV course  Recommendations  c/w zosyn  complete 14 day course of IV antibiotics w/ last day 5/17  - if not cost prohibitive can do ertapenem 1g daily on discharge for ease of administration  will need midline  will plan to repeat CT abd/pelvis outpt    Geovani Goldman M.D.  [facility], Division of Infectious Diseases  [phone]  After 5pm on weekdays and all day on weekends - please call [phone]

## 2024-05-06 NOTE — DISCHARGE NOTE PROVIDER - HOSPITAL COURSE
79 y/o F with pmhx of Diverticulitis, Type-2 DM, HTN, HLD, Glaucoma, OA of the L-knee and PERFECTO, presented to the ED for new onset lethargy. Found to have leukocytosis likely from pneumonia vs. diverticulitis. The patient does not appear lethargic. Admit for IV abx.    Hospital Course:    Pneumonia, pneumococcal.    sepsis likely from diverticulitis  pneumonia ruled out   - ID fu     Diverticulitis.    - CT abdomen/pelvis with contained perforation  - ivf  - no indication for surgery as per colorectal   - repeat CT reviewed   - Continue on IV Ertapenem 1g daily to 5/24. Patient will have to follow up in one week as outpatient with Dr Geovani Goldman M.D. Patient will have to have repeat CAT scan of the abdomen and pelvis as outpatient and outpatient colonoscopy.   - Outpatient follow up with Dr Lr from surgery in 2-4 weeks.   - Discharge planning discussed with family at bedside. All questions answered. Amenable to plan and discharge for home.     Hydronephrosis, left.   - patient with known hx of hydronephrosis  - CT chest still shows L hydronephrosis  - chronic     Benign essential HTN.   - hold BP meds.  restart as needed on discharge ********************************    DM2 (diabetes mellitus, type 2).   - low dose sliding scale insulin.    On ___ this case was reviewed with  ____, the patient is medically stable and optimized for discharge. All medications were reviewed and prescriptions were sent to mutually agreed upon pharmacy.   79 y/o F with pmhx of Diverticulitis, Type-2 DM, HTN, HLD, Glaucoma, OA of the L-knee and PERFECTO, presented to the ED for new onset lethargy. Found to have leukocytosis likely from pneumonia vs. diverticulitis. The patient does not appear lethargic. Admit for IV abx.    Hospital Course:    Pneumonia, pneumococcal.    sepsis likely from diverticulitis  pneumonia ruled out   - ID fu     Diverticulitis.    - CT abdomen/pelvis with contained perforation  - ivf  - no indication for surgery as per colorectal   -LUE midline placed 5/9  - repeat CT reviewed - Continue on IV Ertapenem 1g daily to 6/3. Patient will have to follow up in one week as outpatient with Dr Geovani Goldman M.D. Patient will have to have repeat CAT scan of the abdomen and pelvis as outpatient and outpatient colonoscopy.   - Outpatient follow up with Dr Lr from surgery in 2-4 weeks.   - Discharge planning discussed with family at bedside. All questions answered. Amenable to plan and discharge for home.     Hydronephrosis, left.   - patient with known hx of hydronephrosis  - CT chest still shows L hydronephrosis  - chronic     Benign essential HTN.   - hold BP meds.  restart as needed on discharge ********************************    DM2 (diabetes mellitus, type 2).   - low dose sliding scale insulin.    On  5/15/2024 this case was reviewed with Dr. Eagle , the patient is medically stable and optimized for discharge. All medications were reviewed and prescriptions were sent to mutually agreed upon pharmacy.

## 2024-05-06 NOTE — PROGRESS NOTE ADULT - SUBJECTIVE AND OBJECTIVE BOX
Patient is a 78y old  Female who presents with a chief complaint of abnormal labs (06 May 2024 12:24)    Date of servie : 05-06-24 @ 14:55  INTERVAL HPI/OVERNIGHT EVENTS:  T(C): 36.6 (05-06-24 @ 10:49), Max: 37.2 (05-05-24 @ 21:40)  HR: 83 (05-06-24 @ 10:49) (77 - 98)  BP: 140/83 (05-06-24 @ 10:49) (109/69 - 164/88)  RR: 18 (05-06-24 @ 10:49) (18 - 18)  SpO2: 100% (05-06-24 @ 10:49) (98% - 100%)  Wt(kg): --  I&O's Summary    05 May 2024 07:01  -  06 May 2024 07:00  --------------------------------------------------------  IN: 240 mL / OUT: 0 mL / NET: 240 mL        LABS:                        11.1   11.40 )-----------( 285      ( 06 May 2024 05:40 )             32.8     05-06    136  |  100  |  11  ----------------------------<  115<H>  3.2<L>   |  21<L>  |  0.76    Ca    7.9<L>      06 May 2024 05:40  Phos  2.1     05-06  Mg     1.70     05-06        Urinalysis Basic - ( 06 May 2024 05:40 )    Color: x / Appearance: x / SG: x / pH: x  Gluc: 115 mg/dL / Ketone: x  / Bili: x / Urobili: x   Blood: x / Protein: x / Nitrite: x   Leuk Esterase: x / RBC: x / WBC x   Sq Epi: x / Non Sq Epi: x / Bacteria: x      CAPILLARY BLOOD GLUCOSE      POCT Blood Glucose.: 128 mg/dL (06 May 2024 11:24)  POCT Blood Glucose.: 134 mg/dL (06 May 2024 07:13)  POCT Blood Glucose.: 130 mg/dL (05 May 2024 20:44)  POCT Blood Glucose.: 105 mg/dL (05 May 2024 16:30)        Urinalysis Basic - ( 06 May 2024 05:40 )    Color: x / Appearance: x / SG: x / pH: x  Gluc: 115 mg/dL / Ketone: x  / Bili: x / Urobili: x   Blood: x / Protein: x / Nitrite: x   Leuk Esterase: x / RBC: x / WBC x   Sq Epi: x / Non Sq Epi: x / Bacteria: x        MEDICATIONS  (STANDING):  aspirin enteric coated 81 milliGRAM(s) Oral daily  atorvastatin 20 milliGRAM(s) Oral at bedtime  dextrose 10% Bolus 125 milliLiter(s) IV Bolus once  dextrose 5%. 1000 milliLiter(s) (50 mL/Hr) IV Continuous <Continuous>  dextrose 5%. 1000 milliLiter(s) (100 mL/Hr) IV Continuous <Continuous>  dextrose 50% Injectable 25 Gram(s) IV Push once  dextrose 50% Injectable 12.5 Gram(s) IV Push once  glucagon  Injectable 1 milliGRAM(s) IntraMuscular once  insulin lispro (ADMELOG) corrective regimen sliding scale   SubCutaneous three times a day before meals  insulin lispro (ADMELOG) corrective regimen sliding scale   SubCutaneous at bedtime  pantoprazole  Injectable 40 milliGRAM(s) IV Push daily  piperacillin/tazobactam IVPB.. 3.375 Gram(s) IV Intermittent every 8 hours  potassium chloride   Powder 40 milliEquivalent(s) Oral every 4 hours  potassium phosphate / sodium phosphate Powder (PHOS-NaK) 1 Packet(s) Oral three times a day    MEDICATIONS  (PRN):  acetaminophen     Tablet .. 650 milliGRAM(s) Oral every 6 hours PRN Temp greater or equal to 38C (100.4F), Mild Pain (1 - 3)  aluminum hydroxide/magnesium hydroxide/simethicone Suspension 30 milliLiter(s) Oral every 4 hours PRN Dyspepsia  dextrose Oral Gel 15 Gram(s) Oral once PRN Blood Glucose LESS THAN 70 milliGRAM(s)/deciliter  melatonin 3 milliGRAM(s) Oral at bedtime PRN Insomnia  ondansetron Injectable 4 milliGRAM(s) IV Push every 8 hours PRN Nausea and/or Vomiting          PHYSICAL EXAM:  GENERAL: NAD, well-groomed, well-developed  HEAD:  Atraumatic, Normocephalic  CHEST/LUNG: Clear to percussion bilaterally; No rales, rhonchi, wheezing, or rubs  HEART: Regular rate and rhythm; No murmurs, rubs, or gallops  ABDOMEN: Soft, Nontender, Nondistended; Bowel sounds present  EXTREMITIES:  2+ Peripheral Pulses, No clubbing, cyanosis, or edema  LYMPH: No lymphadenopathy noted  SKIN: No rashes or lesions    Care Discussed with Consultants/Other Providers [ ] YES  [ ] NO

## 2024-05-07 LAB
ANION GAP SERPL CALC-SCNC: 14 MMOL/L — SIGNIFICANT CHANGE UP (ref 7–14)
BUN SERPL-MCNC: 9 MG/DL — SIGNIFICANT CHANGE UP (ref 7–23)
CALCIUM SERPL-MCNC: 7.9 MG/DL — LOW (ref 8.4–10.5)
CHLORIDE SERPL-SCNC: 101 MMOL/L — SIGNIFICANT CHANGE UP (ref 98–107)
CO2 SERPL-SCNC: 19 MMOL/L — LOW (ref 22–31)
CREAT SERPL-MCNC: 0.61 MG/DL — SIGNIFICANT CHANGE UP (ref 0.5–1.3)
EGFR: 93 ML/MIN/1.73M2 — SIGNIFICANT CHANGE UP
GLUCOSE BLDC GLUCOMTR-MCNC: 127 MG/DL — HIGH (ref 70–99)
GLUCOSE BLDC GLUCOMTR-MCNC: 134 MG/DL — HIGH (ref 70–99)
GLUCOSE BLDC GLUCOMTR-MCNC: 146 MG/DL — HIGH (ref 70–99)
GLUCOSE BLDC GLUCOMTR-MCNC: 153 MG/DL — HIGH (ref 70–99)
GLUCOSE SERPL-MCNC: 126 MG/DL — HIGH (ref 70–99)
HCT VFR BLD CALC: 31.1 % — LOW (ref 34.5–45)
HGB BLD-MCNC: 10.7 G/DL — LOW (ref 11.5–15.5)
MAGNESIUM SERPL-MCNC: 1.7 MG/DL — SIGNIFICANT CHANGE UP (ref 1.6–2.6)
MCHC RBC-ENTMCNC: 29.3 PG — SIGNIFICANT CHANGE UP (ref 27–34)
MCHC RBC-ENTMCNC: 34.4 GM/DL — SIGNIFICANT CHANGE UP (ref 32–36)
MCV RBC AUTO: 85.2 FL — SIGNIFICANT CHANGE UP (ref 80–100)
NRBC # BLD: 0 /100 WBCS — SIGNIFICANT CHANGE UP (ref 0–0)
NRBC # FLD: 0 K/UL — SIGNIFICANT CHANGE UP (ref 0–0)
PHOSPHATE SERPL-MCNC: 1.8 MG/DL — LOW (ref 2.5–4.5)
PLATELET # BLD AUTO: 265 K/UL — SIGNIFICANT CHANGE UP (ref 150–400)
POTASSIUM SERPL-MCNC: 3.2 MMOL/L — LOW (ref 3.5–5.3)
POTASSIUM SERPL-SCNC: 3.2 MMOL/L — LOW (ref 3.5–5.3)
RBC # BLD: 3.65 M/UL — LOW (ref 3.8–5.2)
RBC # FLD: 14.2 % — SIGNIFICANT CHANGE UP (ref 10.3–14.5)
SODIUM SERPL-SCNC: 134 MMOL/L — LOW (ref 135–145)
WBC # BLD: 15.28 K/UL — HIGH (ref 3.8–10.5)
WBC # FLD AUTO: 15.28 K/UL — HIGH (ref 3.8–10.5)

## 2024-05-07 PROCEDURE — 74177 CT ABD & PELVIS W/CONTRAST: CPT | Mod: 26

## 2024-05-07 RX ORDER — ERTAPENEM SODIUM 1 G/1
1 INJECTION, POWDER, LYOPHILIZED, FOR SOLUTION INTRAMUSCULAR; INTRAVENOUS
Qty: 11 | Refills: 0
Start: 2024-05-07 | End: 2024-05-17

## 2024-05-07 RX ORDER — POTASSIUM CHLORIDE 20 MEQ
40 PACKET (EA) ORAL ONCE
Refills: 0 | Status: COMPLETED | OUTPATIENT
Start: 2024-05-07 | End: 2024-05-07

## 2024-05-07 RX ADMIN — PANTOPRAZOLE SODIUM 40 MILLIGRAM(S): 20 TABLET, DELAYED RELEASE ORAL at 11:16

## 2024-05-07 RX ADMIN — Medication 81 MILLIGRAM(S): at 11:16

## 2024-05-07 RX ADMIN — Medication 1: at 16:48

## 2024-05-07 RX ADMIN — Medication 1 PACKET(S): at 13:05

## 2024-05-07 RX ADMIN — ATORVASTATIN CALCIUM 20 MILLIGRAM(S): 80 TABLET, FILM COATED ORAL at 21:29

## 2024-05-07 RX ADMIN — Medication 1 PACKET(S): at 05:39

## 2024-05-07 RX ADMIN — PIPERACILLIN AND TAZOBACTAM 25 GRAM(S): 4; .5 INJECTION, POWDER, LYOPHILIZED, FOR SOLUTION INTRAVENOUS at 13:10

## 2024-05-07 RX ADMIN — PIPERACILLIN AND TAZOBACTAM 25 GRAM(S): 4; .5 INJECTION, POWDER, LYOPHILIZED, FOR SOLUTION INTRAVENOUS at 05:39

## 2024-05-07 RX ADMIN — PIPERACILLIN AND TAZOBACTAM 25 GRAM(S): 4; .5 INJECTION, POWDER, LYOPHILIZED, FOR SOLUTION INTRAVENOUS at 21:31

## 2024-05-07 RX ADMIN — Medication 40 MILLIEQUIVALENT(S): at 11:16

## 2024-05-07 RX ADMIN — Medication 1 PACKET(S): at 21:29

## 2024-05-07 NOTE — PROGRESS NOTE ADULT - SUBJECTIVE AND OBJECTIVE BOX
Patient is a 78y old  Female who presents with a chief complaint of abnormal labs (07 May 2024 11:31)    Date of servie : 05-07-24 @ 17:09  INTERVAL HPI/OVERNIGHT EVENTS:  T(C): 37.6 (05-07-24 @ 10:17), Max: 37.6 (05-07-24 @ 10:17)  HR: 91 (05-07-24 @ 10:17) (70 - 95)  BP: 119/71 (05-07-24 @ 10:17) (119/71 - 139/86)  RR: 18 (05-07-24 @ 10:17) (16 - 18)  SpO2: 96% (05-07-24 @ 10:17) (94% - 96%)  Wt(kg): --  I&O's Summary    06 May 2024 07:01  -  07 May 2024 07:00  --------------------------------------------------------  IN: 90 mL / OUT: 0 mL / NET: 90 mL        LABS:                        10.7   15.28 )-----------( 265      ( 07 May 2024 04:30 )             31.1     05-07    134<L>  |  101  |  9   ----------------------------<  126<H>  3.2<L>   |  19<L>  |  0.61    Ca    7.9<L>      07 May 2024 04:30  Phos  1.8     05-07  Mg     1.70     05-07        Urinalysis Basic - ( 07 May 2024 04:30 )    Color: x / Appearance: x / SG: x / pH: x  Gluc: 126 mg/dL / Ketone: x  / Bili: x / Urobili: x   Blood: x / Protein: x / Nitrite: x   Leuk Esterase: x / RBC: x / WBC x   Sq Epi: x / Non Sq Epi: x / Bacteria: x      CAPILLARY BLOOD GLUCOSE      POCT Blood Glucose.: 153 mg/dL (07 May 2024 16:15)  POCT Blood Glucose.: 134 mg/dL (07 May 2024 11:32)  POCT Blood Glucose.: 127 mg/dL (07 May 2024 07:05)  POCT Blood Glucose.: 136 mg/dL (06 May 2024 22:11)        Urinalysis Basic - ( 07 May 2024 04:30 )    Color: x / Appearance: x / SG: x / pH: x  Gluc: 126 mg/dL / Ketone: x  / Bili: x / Urobili: x   Blood: x / Protein: x / Nitrite: x   Leuk Esterase: x / RBC: x / WBC x   Sq Epi: x / Non Sq Epi: x / Bacteria: x        MEDICATIONS  (STANDING):  aspirin enteric coated 81 milliGRAM(s) Oral daily  atorvastatin 20 milliGRAM(s) Oral at bedtime  dextrose 10% Bolus 125 milliLiter(s) IV Bolus once  dextrose 5%. 1000 milliLiter(s) (100 mL/Hr) IV Continuous <Continuous>  dextrose 5%. 1000 milliLiter(s) (50 mL/Hr) IV Continuous <Continuous>  dextrose 50% Injectable 25 Gram(s) IV Push once  dextrose 50% Injectable 12.5 Gram(s) IV Push once  glucagon  Injectable 1 milliGRAM(s) IntraMuscular once  insulin lispro (ADMELOG) corrective regimen sliding scale   SubCutaneous three times a day before meals  insulin lispro (ADMELOG) corrective regimen sliding scale   SubCutaneous at bedtime  pantoprazole  Injectable 40 milliGRAM(s) IV Push daily  piperacillin/tazobactam IVPB.. 3.375 Gram(s) IV Intermittent every 8 hours  potassium phosphate / sodium phosphate Powder (PHOS-NaK) 1 Packet(s) Oral three times a day    MEDICATIONS  (PRN):  acetaminophen     Tablet .. 650 milliGRAM(s) Oral every 6 hours PRN Temp greater or equal to 38C (100.4F), Mild Pain (1 - 3)  aluminum hydroxide/magnesium hydroxide/simethicone Suspension 30 milliLiter(s) Oral every 4 hours PRN Dyspepsia  dextrose Oral Gel 15 Gram(s) Oral once PRN Blood Glucose LESS THAN 70 milliGRAM(s)/deciliter  melatonin 3 milliGRAM(s) Oral at bedtime PRN Insomnia  ondansetron Injectable 4 milliGRAM(s) IV Push every 8 hours PRN Nausea and/or Vomiting          PHYSICAL EXAM:  GENERAL: NAD, well-groomed, well-developed  HEAD:  Atraumatic, Normocephalic  CHEST/LUNG: Clear to percussion bilaterally; No rales, rhonchi, wheezing, or rubs  HEART: Regular rate and rhythm; No murmurs, rubs, or gallops  ABDOMEN: Soft, Nontender, Nondistended; Bowel sounds present  EXTREMITIES:  2+ Peripheral Pulses, No clubbing, cyanosis, or edema  LYMPH: No lymphadenopathy noted  SKIN: No rashes or lesions    Care Discussed with Consultants/Other Providers [ ] YES  [ ] NO

## 2024-05-07 NOTE — PROGRESS NOTE ADULT - ASSESSMENT
77 y/o F with pmhx of Diverticulitis, Type-2 DM, HTN, HLD, Glaucoma, OA of the L-knee and PERFECTO, presented to the ED for new onset lethargy. Found to have leukocytosis likely from pneumonia vs. diverticulitis. The patient does not appear lethargic. Admit for IV abx.      Problem/Plan - 1:  ·  Problem: Pneumonia, pneumococcal.   ·  Plan: - sepsis likely from diverticulitis  pneumonia ruled out   - ID fu     Problem/Plan - 2:  ·  Problem: Diverticulitis.   ·  Plan: -  CT abdomen/pelvis with contained perforation  - ivf  - no indication for surgery as per colorectal     Problem/Plan - 3:  ·  Problem: Hydronephrosis, left.   ·  Plan: - patient with known hx of hydronephrosis  - CT chest still shows L hydronephrosis  - chronic     Problem/Plan - 4:  ·  Problem: Benign essential HTN.   ·  Plan: - hold BP meds.  restart as needed     Problem/Plan - 5:  ·  Problem: DM2 (diabetes mellitus, type 2).   ·  Plan: - low dose sliding scale insulin  - patient does not appar to be on DM meds at home.      midline and dc planning

## 2024-05-07 NOTE — PROGRESS NOTE ADULT - SUBJECTIVE AND OBJECTIVE BOX
Date of Service: 05-07-24 @ 11:12    Patient is a 78y old  Female who presents with a chief complaint of abnormal labs (06 May 2024 15:33)      Any change in ROS: seems pretty good:  no sob:  no cough : no phlegm, on room air       MEDICATIONS  (STANDING):  aspirin enteric coated 81 milliGRAM(s) Oral daily  atorvastatin 20 milliGRAM(s) Oral at bedtime  dextrose 10% Bolus 125 milliLiter(s) IV Bolus once  dextrose 5%. 1000 milliLiter(s) (50 mL/Hr) IV Continuous <Continuous>  dextrose 5%. 1000 milliLiter(s) (100 mL/Hr) IV Continuous <Continuous>  dextrose 50% Injectable 25 Gram(s) IV Push once  dextrose 50% Injectable 12.5 Gram(s) IV Push once  glucagon  Injectable 1 milliGRAM(s) IntraMuscular once  insulin lispro (ADMELOG) corrective regimen sliding scale   SubCutaneous three times a day before meals  insulin lispro (ADMELOG) corrective regimen sliding scale   SubCutaneous at bedtime  pantoprazole  Injectable 40 milliGRAM(s) IV Push daily  piperacillin/tazobactam IVPB.. 3.375 Gram(s) IV Intermittent every 8 hours  potassium chloride   Powder 40 milliEquivalent(s) Oral once  potassium phosphate / sodium phosphate Powder (PHOS-NaK) 1 Packet(s) Oral three times a day    MEDICATIONS  (PRN):  acetaminophen     Tablet .. 650 milliGRAM(s) Oral every 6 hours PRN Temp greater or equal to 38C (100.4F), Mild Pain (1 - 3)  aluminum hydroxide/magnesium hydroxide/simethicone Suspension 30 milliLiter(s) Oral every 4 hours PRN Dyspepsia  dextrose Oral Gel 15 Gram(s) Oral once PRN Blood Glucose LESS THAN 70 milliGRAM(s)/deciliter  melatonin 3 milliGRAM(s) Oral at bedtime PRN Insomnia  ondansetron Injectable 4 milliGRAM(s) IV Push every 8 hours PRN Nausea and/or Vomiting    Vital Signs Last 24 Hrs  T(C): 37.6 (07 May 2024 10:17), Max: 37.6 (07 May 2024 10:17)  T(F): 99.6 (07 May 2024 10:17), Max: 99.6 (07 May 2024 10:17)  HR: 91 (07 May 2024 10:17) (70 - 95)  BP: 119/71 (07 May 2024 10:17) (119/71 - 139/86)  BP(mean): --  RR: 18 (07 May 2024 10:17) (16 - 18)  SpO2: 96% (07 May 2024 10:17) (94% - 96%)    Parameters below as of 07 May 2024 10:17  Patient On (Oxygen Delivery Method): room air        I&O's Summary    06 May 2024 07:01  -  07 May 2024 07:00  --------------------------------------------------------  IN: 90 mL / OUT: 0 mL / NET: 90 mL          Physical Exam:   GENERAL: NAD, well-groomed, well-developed  HEENT: MARTINE/   Atraumatic, Normocephalic  ENMT: No tonsillar erythema, exudates, or enlargement; Moist mucous membranes, Good dentition, No lesions  NECK: Supple, No JVD, Normal thyroid  CHEST/LUNG: Clear to auscultaion  CVS: Regular rate and rhythm; No murmurs, rubs, or gallops  GI: : Soft, Nontender, Nondistended; Bowel sounds present  NERVOUS SYSTEM:  Alert & awake and responding to simple commands   EXTREMITIES:  2+ Peripheral Pulses, No clubbing, cyanosis, or edema  LYMPH: No lymphadenopathy noted  SKIN: No rashes or lesions  ENDOCRINOLOGY: No Thyromegaly  PSYCH: calm     Labs:  28                            10.7   15.28 )-----------( 265      ( 07 May 2024 04:30 )             31.1                         11.1   11.40 )-----------( 285      ( 06 May 2024 05:40 )             32.8                         11.2   8.07  )-----------( 319      ( 05 May 2024 05:30 )             33.0                         12.2   12.03 )-----------( 360      ( 04 May 2024 05:46 )             36.4     05-07    134<L>  |  101  |  9   ----------------------------<  126<H>  3.2<L>   |  19<L>  |  0.61  05-06    136  |  100  |  11  ----------------------------<  115<H>  3.2<L>   |  21<L>  |  0.76  05-05    139  |  106  |  9   ----------------------------<  106<H>  4.1   |  23  |  0.69  05-04    139  |  102  |  10  ----------------------------<  124<H>  2.8<LL>   |  25  |  0.57    Ca    7.9<L>      07 May 2024 04:30  Ca    7.9<L>      06 May 2024 05:40  Phos  1.8     05-07  Phos  2.1     05-06  Mg     1.70     05-07  Mg     1.70     05-06      CAPILLARY BLOOD GLUCOSE      POCT Blood Glucose.: 127 mg/dL (07 May 2024 07:05)  POCT Blood Glucose.: 136 mg/dL (06 May 2024 22:11)  POCT Blood Glucose.: 170 mg/dL (06 May 2024 16:45)  POCT Blood Glucose.: 128 mg/dL (06 May 2024 11:24)          Urinalysis Basic - ( 07 May 2024 04:30 )    Color: x / Appearance: x / SG: x / pH: x  Gluc: 126 mg/dL / Ketone: x  / Bili: x / Urobili: x   Blood: x / Protein: x / Nitrite: x   Leuk Esterase: x / RBC: x / WBC x   Sq Epi: x / Non Sq Epi: x / Bacteria: x      Procalcitonin: 0.05 ng/mL (05-04 @ 05:46)        RECENT CULTURES:  05-03 @ 18:52 .Blood Blood         rad< from: CT Angio Chest PE Protocol w/ IV Cont (05.03.24 @ 02:00) >    PROCEDURE DATE:  05/03/2024          INTERPRETATION:  CLINICAL INFORMATION: Shortness of breath. Evaluate for   pulmonary embolus.    COMPARISON: Chest CT 4/19/2024, 3/22/2024 and chest x-ray 5/2/2024.    CONTRAST/COMPLICATIONS:  IV Contrast: 70 cc Omnipaque 350 were administered and 30 cc's were   discarded.  Oral Contrast: NONE  Complications: None reported at time of study completion    PROCEDURE:  CT Angiography of the Chest.  Sagittal and coronal reformats were performed as well as 3D (MIP)   reconstructions.    FINDINGS:  PULMONARY ANGIOGRAM: Adequate pulmonary artery opacification without   visualized pulmonary embolus.  LUNGS AND AIRWAYS: Patent central airways.  Bilateral nodular and   tree-in-bud opacities most prominent in the bilateral upper lobes,   similar to priors. Areas of mosaic attenuation, suggestive of air   trapping.  PLEURA: No pleural effusion.  MEDIASTINUM AND OLIVE: No lymphadenopathy.  VESSELS: Coronary artery and aortic calcifications.  HEART: Heart size is normal. No pericardial effusion.  CHEST WALL AND LOWER NECK: Within normal limits.  VISUALIZED UPPER ABDOMEN: Mild left hydronephrosis and right renal cyst,   stable.  BONES: Mild degenerative changes.    IMPRESSION:  No pulmonary embolus.  Bilateral nodular and tree-in-bud opacities, similar to priors.  Partially imaged mild left hydronephrosis, similar to 4/19/2024.    --- End of Report ---          JONA PERALTA MD; Resident Radiologist  This document has been electronically signed.  SLIME BROWN MD; Attending Radiologist  This document has been electronically signed. May  3 2024  3:57AM    < end of copied text >         No growth at 72 Hours    05-03 @ 18:46 .Blood Blood                No growth at 72 Hours    05-02 @ 22:47 .Blood Blood-Peripheral                No growth at 4 days    05-02 @ 22:35 .Blood Blood-Peripheral                No growth at 4 days          RESPIRATORY CULTURES:          Studies  Chest X-RAY  CT SCAN Chest   Venous Dopplers: LE:   CT Abdomen  Others

## 2024-05-07 NOTE — PROGRESS NOTE ADULT - ASSESSMENT
77 y/o F PMhx Glaucoma, DM II, HTN, dementia, diverticulitis who presented w/ increased lethargy  had diverticulitis and was previously treated w/ cefpodoxime/ flagyl x 10 days and augmentin x 10 days; now w/ possible contained perforation    Sigmoid diverticulitis w/ contained perforation  CTAP- Persistent sigmoid diverticulitis with adjacent phlegmonous changes with small foci of extraluminal air, likely sequela of contained perforation. No drainable fluid collection. Moderate left hydroureteronephrosis  blood cultures- NGTD  s/p surgery eval- no plans for surgical intervention    Recommendations  c/w zosyn  given rising wbc would repeat CT abd/pelvis w/ IV contrast    Geovani Goldman M.D.  [facility], Division of Infectious Diseases  [phone]  After 5pm on weekdays and all day on weekends - please call [phone]

## 2024-05-07 NOTE — PROGRESS NOTE ADULT - ASSESSMENT
This is a 77 y/o F with pmhx of Diverticulitis, Type-2 DM, HTN, HLD, Glaucoma, OA of the L-knee and PERFECTO, presented to the ED for new onset lethargy. At bedside, the patient does not appear lethargic. Son is not at bedside and the patient gave me the son's phone number however as per charts it is her own phone number. The patient denies shortness of breath. She also stated that she feels fine and does not have any complaints at this time. Denies cough. "She stated that I have dementia so I do not remember why I am here." Denies abdominal pain, and diarrhea. As per ED charts, the patient was sent here for leukocytosis. She also denies having issues eating at home. (03 May 2024 06:00):  she does not know why  sheis here:  recently she was dced from the hospital : currently she has no resp complaints and is on room air     suspected pneumonia  Hx of diverticulitis  MD  HTN  Glaucoma  OA    suspected pneumonia  -NOT SURE IF SHE HAS PNEUMONIA:  SHE IS EMPIRICALLY STARTED ON ANTIBIOTICS   -SHE IS HAS No FEVER BUT wbc COUNT IS HIGH would RULE OUT uti:  MICHAEL ACP ; TO SEND UA AS WELL AS PROCAL  -oN ROOM AIR:   -CHECK LEGIONELLA    -MINIMISE ANTIBIOTICS IF WE CAN   5/4: doubt she has pneumonia:  the new ct scan with no changes from prior and no clinical symptoms too resp wise   5/5: seems stable:  no sob:  no cough : no phlegm  : on room air   5/6: seems to be doing  ok ; no sob:  no phlegm  :   5/7; doubt pneumonia: no pulm issues: on room air   Hx of diverticulitis  -PER PRIMARY TEAM  -5/4: the ct abd showed: Persistent sigmoid diverticulitis with adjacent phlegmonous changes with small foci of extraluminal air, likely sequela of contained perforation,   in retrospect unchanged from the prior exam. No drainable fluid collection. Moderate left hydroureteronephrosis to the level of inflammatory changes in the pelvis, unchanged.  -defer to ID and surgery   5/5: no abd pain : cont antbiotics:  id following   5/6: on zosyn ; for diverticulitis   5/7: cont antibtiocs till 22nd;  per id   dm:   -MONITOR AND CONTROL   HTN  -CONTROLLED  Glaucoma  -PER [facility] TEAM   OA  -NO PAIN  ;      MICHAEL ACP

## 2024-05-07 NOTE — PROGRESS NOTE ADULT - SUBJECTIVE AND OBJECTIVE BOX
[facility], Division of Infectious Diseases  JORGE LUIS Chaves Y. Patel, S. Shah, G. Jones  [phone]  ([phone] - weekdays after 5pm and weekends)    Name: TY DAVIS  Age/Gender: 78y Female  MRN: [id number]    Interval History:  Notes reviewed.   No concerning overnight events.  Afebrile.   denies abd pain    Allergies: No Known Allergies      Objective:  Vitals:   T(F): 99.6 (05-07-24 @ 10:17), Max: 99.6 (05-07-24 @ 10:17)  HR: 91 (05-07-24 @ 10:17) (70 - 95)  BP: 119/71 (05-07-24 @ 10:17) (119/71 - 139/86)  RR: 18 (05-07-24 @ 10:17) (16 - 18)  SpO2: 96% (05-07-24 @ 10:17) (94% - 96%)  Physical Examination:  General: no acute distress  HEENT: anicteric  Cardio: S1, S2, normal rate  Resp: clear to auscultation anteriorly  Abd: soft, NT, ND  Ext: no LE edema  Skin: warm, dry    Laboratory Studies:  CBC:                       10.7   15.28 )-----------( 265      ( 07 May 2024 04:30 )             31.1     WBC Trend:  15.28 05-07-24 @ 04:30  11.40 05-06-24 @ 05:40  8.07 05-05-24 @ 05:30  12.03 05-04-24 @ 05:46  15.24 05-02-24 @ 22:35    CMP: 05-07    134<L>  |  101  |  9   ----------------------------<  126<H>  3.2<L>   |  19<L>  |  0.61    Ca    7.9<L>      07 May 2024 04:30  Phos  1.8     05-07  Mg     1.70     05-07            Urinalysis Basic - ( 07 May 2024 04:30 )    Color: x / Appearance: x / SG: x / pH: x  Gluc: 126 mg/dL / Ketone: x  / Bili: x / Urobili: x   Blood: x / Protein: x / Nitrite: x   Leuk Esterase: x / RBC: x / WBC x   Sq Epi: x / Non Sq Epi: x / Bacteria: x      Microbiology: reviewed     Culture - Blood (collected 05-03-24 @ 18:52)  Source: .Blood Blood  Preliminary Report (05-07-24 @ 01:01):    No growth at 72 Hours    Culture - Blood (collected 05-03-24 @ 18:46)  Source: .Blood Blood  Preliminary Report (05-07-24 @ 01:01):    No growth at 72 Hours    Culture - Blood (collected 05-02-24 @ 22:47)  Source: .Blood Blood-Peripheral  Preliminary Report (05-07-24 @ 11:00):    No growth at 4 days    Culture - Blood (collected 05-02-24 @ 22:35)  Source: .Blood Blood-Peripheral  Preliminary Report (05-07-24 @ 11:00):    No growth at 4 days        Radiology: reviewed     Medications:  acetaminophen     Tablet .. 650 milliGRAM(s) Oral every 6 hours PRN  aluminum hydroxide/magnesium hydroxide/simethicone Suspension 30 milliLiter(s) Oral every 4 hours PRN  aspirin enteric coated 81 milliGRAM(s) Oral daily  atorvastatin 20 milliGRAM(s) Oral at bedtime  dextrose 10% Bolus 125 milliLiter(s) IV Bolus once  dextrose 5%. 1000 milliLiter(s) IV Continuous <Continuous>  dextrose 5%. 1000 milliLiter(s) IV Continuous <Continuous>  dextrose 50% Injectable 25 Gram(s) IV Push once  dextrose 50% Injectable 12.5 Gram(s) IV Push once  dextrose Oral Gel 15 Gram(s) Oral once PRN  glucagon  Injectable 1 milliGRAM(s) IntraMuscular once  insulin lispro (ADMELOG) corrective regimen sliding scale   SubCutaneous three times a day before meals  insulin lispro (ADMELOG) corrective regimen sliding scale   SubCutaneous at bedtime  melatonin 3 milliGRAM(s) Oral at bedtime PRN  ondansetron Injectable 4 milliGRAM(s) IV Push every 8 hours PRN  pantoprazole  Injectable 40 milliGRAM(s) IV Push daily  piperacillin/tazobactam IVPB.. 3.375 Gram(s) IV Intermittent every 8 hours  potassium phosphate / sodium phosphate Powder (PHOS-NaK) 1 Packet(s) Oral three times a day    Antimicrobials:  piperacillin/tazobactam IVPB.. 3.375 Gram(s) IV Intermittent every 8 hours

## 2024-05-08 LAB
ANION GAP SERPL CALC-SCNC: 11 MMOL/L — SIGNIFICANT CHANGE UP (ref 7–14)
BUN SERPL-MCNC: 6 MG/DL — LOW (ref 7–23)
CALCIUM SERPL-MCNC: 7.9 MG/DL — LOW (ref 8.4–10.5)
CHLORIDE SERPL-SCNC: 102 MMOL/L — SIGNIFICANT CHANGE UP (ref 98–107)
CO2 SERPL-SCNC: 21 MMOL/L — LOW (ref 22–31)
CREAT SERPL-MCNC: 0.59 MG/DL — SIGNIFICANT CHANGE UP (ref 0.5–1.3)
CULTURE RESULTS: SIGNIFICANT CHANGE UP
CULTURE RESULTS: SIGNIFICANT CHANGE UP
EGFR: 93 ML/MIN/1.73M2 — SIGNIFICANT CHANGE UP
GLUCOSE BLDC GLUCOMTR-MCNC: 113 MG/DL — HIGH (ref 70–99)
GLUCOSE BLDC GLUCOMTR-MCNC: 126 MG/DL — HIGH (ref 70–99)
GLUCOSE BLDC GLUCOMTR-MCNC: 129 MG/DL — HIGH (ref 70–99)
GLUCOSE BLDC GLUCOMTR-MCNC: 140 MG/DL — HIGH (ref 70–99)
GLUCOSE SERPL-MCNC: 119 MG/DL — HIGH (ref 70–99)
HCT VFR BLD CALC: 29.7 % — LOW (ref 34.5–45)
HGB BLD-MCNC: 10.2 G/DL — LOW (ref 11.5–15.5)
MAGNESIUM SERPL-MCNC: 1.6 MG/DL — SIGNIFICANT CHANGE UP (ref 1.6–2.6)
MCHC RBC-ENTMCNC: 29.3 PG — SIGNIFICANT CHANGE UP (ref 27–34)
MCHC RBC-ENTMCNC: 34.3 GM/DL — SIGNIFICANT CHANGE UP (ref 32–36)
MCV RBC AUTO: 85.3 FL — SIGNIFICANT CHANGE UP (ref 80–100)
NRBC # BLD: 0 /100 WBCS — SIGNIFICANT CHANGE UP (ref 0–0)
NRBC # FLD: 0 K/UL — SIGNIFICANT CHANGE UP (ref 0–0)
PHOSPHATE SERPL-MCNC: 2 MG/DL — LOW (ref 2.5–4.5)
PLATELET # BLD AUTO: 263 K/UL — SIGNIFICANT CHANGE UP (ref 150–400)
POTASSIUM SERPL-MCNC: 3 MMOL/L — LOW (ref 3.5–5.3)
POTASSIUM SERPL-SCNC: 3 MMOL/L — LOW (ref 3.5–5.3)
RBC # BLD: 3.48 M/UL — LOW (ref 3.8–5.2)
RBC # FLD: 14.5 % — SIGNIFICANT CHANGE UP (ref 10.3–14.5)
SODIUM SERPL-SCNC: 134 MMOL/L — LOW (ref 135–145)
SPECIMEN SOURCE: SIGNIFICANT CHANGE UP
SPECIMEN SOURCE: SIGNIFICANT CHANGE UP
WBC # BLD: 12.89 K/UL — HIGH (ref 3.8–10.5)
WBC # FLD AUTO: 12.89 K/UL — HIGH (ref 3.8–10.5)

## 2024-05-08 RX ORDER — HALOPERIDOL DECANOATE 100 MG/ML
1 INJECTION INTRAMUSCULAR ONCE
Refills: 0 | Status: COMPLETED | OUTPATIENT
Start: 2024-05-08 | End: 2024-05-10

## 2024-05-08 RX ORDER — POTASSIUM CHLORIDE 20 MEQ
40 PACKET (EA) ORAL
Refills: 0 | Status: DISCONTINUED | OUTPATIENT
Start: 2024-05-08 | End: 2024-05-08

## 2024-05-08 RX ADMIN — PIPERACILLIN AND TAZOBACTAM 25 GRAM(S): 4; .5 INJECTION, POWDER, LYOPHILIZED, FOR SOLUTION INTRAVENOUS at 06:36

## 2024-05-08 RX ADMIN — Medication 40 MILLIEQUIVALENT(S): at 06:42

## 2024-05-08 RX ADMIN — PIPERACILLIN AND TAZOBACTAM 25 GRAM(S): 4; .5 INJECTION, POWDER, LYOPHILIZED, FOR SOLUTION INTRAVENOUS at 13:07

## 2024-05-08 RX ADMIN — PIPERACILLIN AND TAZOBACTAM 25 GRAM(S): 4; .5 INJECTION, POWDER, LYOPHILIZED, FOR SOLUTION INTRAVENOUS at 21:27

## 2024-05-08 RX ADMIN — ATORVASTATIN CALCIUM 20 MILLIGRAM(S): 80 TABLET, FILM COATED ORAL at 21:27

## 2024-05-08 RX ADMIN — Medication 1 PACKET(S): at 06:38

## 2024-05-08 RX ADMIN — Medication 40 MILLIEQUIVALENT(S): at 10:21

## 2024-05-08 RX ADMIN — Medication 650 MILLIGRAM(S): at 19:30

## 2024-05-08 RX ADMIN — Medication 650 MILLIGRAM(S): at 20:00

## 2024-05-08 RX ADMIN — Medication 81 MILLIGRAM(S): at 11:31

## 2024-05-08 RX ADMIN — Medication 3 MILLIGRAM(S): at 19:54

## 2024-05-08 RX ADMIN — Medication 40 MILLIEQUIVALENT(S): at 08:54

## 2024-05-08 RX ADMIN — Medication 63.75 MILLIMOLE(S): at 08:54

## 2024-05-08 RX ADMIN — PANTOPRAZOLE SODIUM 40 MILLIGRAM(S): 20 TABLET, DELAYED RELEASE ORAL at 11:33

## 2024-05-08 NOTE — CHART NOTE - NSCHARTNOTEFT_GEN_A_CORE
Spoke with patient son Trevin at bedside and sister on phone. Discussed plan of care and all questions answered.     Domingo Garcia PA-C,   Internal Medicine ACP   In house pager #[phone]

## 2024-05-08 NOTE — CHART NOTE - NSCHARTNOTEFT_GEN_A_CORE
Overnight Medicine ACP Coverage        Jeremy Reese PA-C  Department of Medicine  [facility]  [id number] Overnight Medicine ACP Coverage    Informed by RN patient screaming and walking around unit. Patient also stating "I will take a scissor and stab myself so you can get in trouble for keeping me here. You don't believe me. I will do it." Patient assessed bedside, now laying in bed. Melatonin given. Patient states the claims she made to the nurse are true because she wants to leave the hospital. Given apparent SI will place patient on 1:1. Can consider  c/s in morning. Summer Reese PA-C  Department of Medicine  [facility]  [id number] Overnight Medicine ACP Coverage    Informed by RN patient screaming and walking around unit. Patient also stating "I will take a scissor and stab myself so you can get in trouble for keeping me here. You don't believe me. I will do it." Patient assessed bedside, now laying in bed. Melatonin given. Patient states the claims she made to the nurse are true because she wants to leave the hospital. Given apparent SI will place patient on 1:1. Can consider BH c/s in morning. Haldol 1mg PRN ordered if patient continues to be agitated. Encourage redirection with staff.     Jeremy Reese PA-C  Department of Medicine  [facility]  [id number]

## 2024-05-08 NOTE — PROGRESS NOTE ADULT - ASSESSMENT
A 78 year old female who presented with abnormal leukocytosis found to have contained perforation of sigmoid diverticulitis on CT scan.     Recommendations:  - low fiber diet  - antibiotics per ID  - No surgery planned at this time  - No role for IR given to size of collection   - outpatient follow up with Dr. Lr in 2-4 weeks  - will need colonoscopy outpatient  - Surgery signing off     [facility]  [id number] A 78 year old female who presented with abnormal leukocytosis found to have contained perforation of sigmoid diverticulitis on CT scan. Interval CT scan performed on 5/7 now demonstrating a new diverticular abscess along the cephalad aspect of the vaginal cuff measuring up to 1.8 cm.    Recommendations:  - low fiber diet  - antibiotics per ID  - No surgery planned at this time  - No role for IR given to size of collection   - outpatient follow up with Dr. Lr in 2-4 weeks  - will need colonoscopy outpatient  - Surgery signing off     [facility]  [id number]

## 2024-05-08 NOTE — PROGRESS NOTE ADULT - SUBJECTIVE AND OBJECTIVE BOX
Patient is a 78y old  Female who presents with a chief complaint of abnormal labs (08 May 2024 14:52)    Date of servie : 05-08-24 @ 17:57  INTERVAL HPI/OVERNIGHT EVENTS:  T(C): 36.4 (05-08-24 @ 16:49), Max: 37.3 (05-07-24 @ 18:00)  HR: 93 (05-08-24 @ 16:49) (89 - 93)  BP: 126/82 (05-08-24 @ 16:49) (109/90 - 148/82)  RR: 20 (05-08-24 @ 16:49) (17 - 20)  SpO2: 99% (05-08-24 @ 16:49) (96% - 99%)  Wt(kg): --  I&O's Summary      LABS:                        10.2   12.89 )-----------( 263      ( 08 May 2024 05:18 )             29.7     05-08    134<L>  |  102  |  6<L>  ----------------------------<  119<H>  3.0<L>   |  21<L>  |  0.59    Ca    7.9<L>      08 May 2024 05:18  Phos  2.0     05-08  Mg     1.60     05-08        Urinalysis Basic - ( 08 May 2024 05:18 )    Color: x / Appearance: x / SG: x / pH: x  Gluc: 119 mg/dL / Ketone: x  / Bili: x / Urobili: x   Blood: x / Protein: x / Nitrite: x   Leuk Esterase: x / RBC: x / WBC x   Sq Epi: x / Non Sq Epi: x / Bacteria: x      CAPILLARY BLOOD GLUCOSE      POCT Blood Glucose.: 140 mg/dL (08 May 2024 16:16)  POCT Blood Glucose.: 113 mg/dL (08 May 2024 11:11)  POCT Blood Glucose.: 126 mg/dL (08 May 2024 07:11)  POCT Blood Glucose.: 146 mg/dL (07 May 2024 22:09)        Urinalysis Basic - ( 08 May 2024 05:18 )    Color: x / Appearance: x / SG: x / pH: x  Gluc: 119 mg/dL / Ketone: x  / Bili: x / Urobili: x   Blood: x / Protein: x / Nitrite: x   Leuk Esterase: x / RBC: x / WBC x   Sq Epi: x / Non Sq Epi: x / Bacteria: x        MEDICATIONS  (STANDING):  aspirin enteric coated 81 milliGRAM(s) Oral daily  atorvastatin 20 milliGRAM(s) Oral at bedtime  dextrose 10% Bolus 125 milliLiter(s) IV Bolus once  dextrose 5%. 1000 milliLiter(s) (50 mL/Hr) IV Continuous <Continuous>  dextrose 5%. 1000 milliLiter(s) (100 mL/Hr) IV Continuous <Continuous>  dextrose 50% Injectable 25 Gram(s) IV Push once  dextrose 50% Injectable 12.5 Gram(s) IV Push once  glucagon  Injectable 1 milliGRAM(s) IntraMuscular once  insulin lispro (ADMELOG) corrective regimen sliding scale   SubCutaneous at bedtime  insulin lispro (ADMELOG) corrective regimen sliding scale   SubCutaneous three times a day before meals  pantoprazole  Injectable 40 milliGRAM(s) IV Push daily  piperacillin/tazobactam IVPB.. 3.375 Gram(s) IV Intermittent every 8 hours    MEDICATIONS  (PRN):  acetaminophen     Tablet .. 650 milliGRAM(s) Oral every 6 hours PRN Temp greater or equal to 38C (100.4F), Mild Pain (1 - 3)  aluminum hydroxide/magnesium hydroxide/simethicone Suspension 30 milliLiter(s) Oral every 4 hours PRN Dyspepsia  dextrose Oral Gel 15 Gram(s) Oral once PRN Blood Glucose LESS THAN 70 milliGRAM(s)/deciliter  melatonin 3 milliGRAM(s) Oral at bedtime PRN Insomnia  ondansetron Injectable 4 milliGRAM(s) IV Push every 8 hours PRN Nausea and/or Vomiting          PHYSICAL EXAM:  GENERAL: NAD, well-groomed, well-developed  HEAD:  Atraumatic, Normocephalic  CHEST/LUNG: Clear to percussion bilaterally; No rales, rhonchi, wheezing, or rubs  HEART: Regular rate and rhythm; No murmurs, rubs, or gallops  ABDOMEN: Soft, Nontender, Nondistended; Bowel sounds present  EXTREMITIES:  2+ Peripheral Pulses, No clubbing, cyanosis, or edema  LYMPH: No lymphadenopathy noted  SKIN: No rashes or lesions    Care Discussed with Consultants/Other Providers [ ] YES  [ ] NO

## 2024-05-08 NOTE — PROGRESS NOTE ADULT - ASSESSMENT
79 y/o F PMhx Glaucoma, DM II, HTN, dementia, diverticulitis who presented w/ increased lethargy  had diverticulitis and was previously treated w/ cefpodoxime/ flagyl x 10 days and augmentin x 10 days; now w/ possible contained perforation    Sigmoid diverticulitis w/ abscess  CTAP- Persistent sigmoid diverticulitis with adjacent phlegmonous changes with small foci of extraluminal air, likely sequela of contained perforation. No drainable fluid collection. Moderate left hydroureteronephrosis  blood cultures- NGTD  CT - Acute sigmoid diverticulitis with new diverticular abscess along the cephalad aspect of the vaginal cuff measuring up to 1.8 cm. Small amount of extraluminal air tracks along the lateral aspect of the sigmoid colon, slightly increased compared to prior exam. Persistent moderate left hydroureteronephrosis to the level of the   sigmoid colon.    Recommendations  c/w zosyn  now w/ diverticular abscess  surgery f/u  - if no plans for surgical intervention would have IR eval for possible drainage  - if performed please send for gram stain and culture    Geovani Goldman M.D.  [facility], Division of Infectious Diseases  [phone]  After 5pm on weekdays and all day on weekends - please call [phone]

## 2024-05-08 NOTE — PROGRESS NOTE ADULT - ASSESSMENT
77 y/o F with pmhx of Diverticulitis, Type-2 DM, HTN, HLD, Glaucoma, OA of the L-knee and PERFECTO, presented to the ED for new onset lethargy. Found to have leukocytosis likely from pneumonia vs. diverticulitis. The patient does not appear lethargic. Admit for IV abx.      Problem/Plan - 1:  ·  Problem: Pneumonia, pneumococcal.   ·  Plan: - sepsis likely from diverticulitis  pneumonia ruled out   - ID fu     Problem/Plan - 2:  ·  Problem: Diverticulitis.   ·  Plan: -  CT abdomen/pelvis with contained perforation  - ivf  - no indication for surgery as per colorectal   - repeat CT reviewed     Problem/Plan - 3:  ·  Problem: Hydronephrosis, left.   ·  Plan: - patient with known hx of hydronephrosis  - CT chest still shows L hydronephrosis  - chronic     Problem/Plan - 4:  ·  Problem: Benign essential HTN.   ·  Plan: - hold BP meds.  restart as needed     Problem/Plan - 5:  ·  Problem: DM2 (diabetes mellitus, type 2).   ·  Plan: - low dose sliding scale insulin.

## 2024-05-08 NOTE — PROGRESS NOTE ADULT - SUBJECTIVE AND OBJECTIVE BOX
[facility], Division of Infectious Diseases  JORGE LUIS Chaves Y. Patel, S. Shah, G. Jones  [phone]  ([phone] - weekdays after 5pm and weekends)    Name: TY DAVIS  Age/Gender: 78y Female  MRN: [id number]    Interval History:  Notes reviewed.   No concerning overnight events.  Afebrile.   denies n/v, diarrhea, abd pain    Allergies: No Known Allergies      Objective:  Vitals:   T(F): 98.2 (05-08-24 @ 10:05), Max: 99.2 (05-07-24 @ 18:00)  HR: 92 (05-08-24 @ 10:05) (89 - 93)  BP: 109/90 (05-08-24 @ 10:05) (109/90 - 148/82)  RR: 18 (05-08-24 @ 10:05) (17 - 18)  SpO2: 96% (05-08-24 @ 10:05) (96% - 98%)  Physical Examination:  General: no acute distress  HEENT: anicteric  Cardio: S1, S2, normal rate  Resp: clear to auscultation anteriorly  Abd: soft, NT, ND  Ext: no LE edema  Skin: warm, dry    Laboratory Studies:  CBC:                       10.2   12.89 )-----------( 263      ( 08 May 2024 05:18 )             29.7     WBC Trend:  12.89 05-08-24 @ 05:18  15.28 05-07-24 @ 04:30  11.40 05-06-24 @ 05:40  8.07 05-05-24 @ 05:30  12.03 05-04-24 @ 05:46  15.24 05-02-24 @ 22:35    CMP: 05-08    134<L>  |  102  |  6<L>  ----------------------------<  119<H>  3.0<L>   |  21<L>  |  0.59    Ca    7.9<L>      08 May 2024 05:18  Phos  2.0     05-08  Mg     1.60     05-08            Urinalysis Basic - ( 08 May 2024 05:18 )    Color: x / Appearance: x / SG: x / pH: x  Gluc: 119 mg/dL / Ketone: x  / Bili: x / Urobili: x   Blood: x / Protein: x / Nitrite: x   Leuk Esterase: x / RBC: x / WBC x   Sq Epi: x / Non Sq Epi: x / Bacteria: x      Microbiology: reviewed     Culture - Blood (collected 05-03-24 @ 18:52)  Source: .Blood Blood  Preliminary Report (05-08-24 @ 01:01):    No growth at 4 days    Culture - Blood (collected 05-03-24 @ 18:46)  Source: .Blood Blood  Preliminary Report (05-08-24 @ 01:01):    No growth at 4 days    Culture - Blood (collected 05-02-24 @ 22:47)  Source: .Blood Blood-Peripheral  Preliminary Report (05-07-24 @ 11:00):    No growth at 4 days    Culture - Blood (collected 05-02-24 @ 22:35)  Source: .Blood Blood-Peripheral  Preliminary Report (05-07-24 @ 11:00):    No growth at 4 days        Radiology: reviewed     Medications:  acetaminophen     Tablet .. 650 milliGRAM(s) Oral every 6 hours PRN  aluminum hydroxide/magnesium hydroxide/simethicone Suspension 30 milliLiter(s) Oral every 4 hours PRN  aspirin enteric coated 81 milliGRAM(s) Oral daily  atorvastatin 20 milliGRAM(s) Oral at bedtime  dextrose 10% Bolus 125 milliLiter(s) IV Bolus once  dextrose 5%. 1000 milliLiter(s) IV Continuous <Continuous>  dextrose 5%. 1000 milliLiter(s) IV Continuous <Continuous>  dextrose 50% Injectable 25 Gram(s) IV Push once  dextrose 50% Injectable 12.5 Gram(s) IV Push once  dextrose Oral Gel 15 Gram(s) Oral once PRN  glucagon  Injectable 1 milliGRAM(s) IntraMuscular once  insulin lispro (ADMELOG) corrective regimen sliding scale   SubCutaneous three times a day before meals  insulin lispro (ADMELOG) corrective regimen sliding scale   SubCutaneous at bedtime  melatonin 3 milliGRAM(s) Oral at bedtime PRN  ondansetron Injectable 4 milliGRAM(s) IV Push every 8 hours PRN  pantoprazole  Injectable 40 milliGRAM(s) IV Push daily  piperacillin/tazobactam IVPB.. 3.375 Gram(s) IV Intermittent every 8 hours  potassium chloride   Powder 40 milliEquivalent(s) Oral every 2 hours    Antimicrobials:  piperacillin/tazobactam IVPB.. 3.375 Gram(s) IV Intermittent every 8 hours

## 2024-05-08 NOTE — PROGRESS NOTE ADULT - SUBJECTIVE AND OBJECTIVE BOX
Surgery Progress Note    OVERNIGHT EVENTS: NAEO    SUBJECTIVE: Pt seen and examined at bedside. Patient comfortable and in no-apparent distress.     Vital Signs Last 24 Hrs  T(C): 36.8 (08 May 2024 10:05), Max: 37.3 (07 May 2024 18:00)  T(F): 98.2 (08 May 2024 10:05), Max: 99.2 (07 May 2024 18:00)  HR: 92 (08 May 2024 10:05) (89 - 93)  BP: 109/90 (08 May 2024 10:05) (109/90 - 148/82)  BP(mean): --  RR: 18 (08 May 2024 10:05) (17 - 18)  SpO2: 96% (08 May 2024 10:05) (96% - 98%)    Parameters below as of 08 May 2024 10:05  Patient On (Oxygen Delivery Method): room air        PHYSICAL EXAM:  General Appearance: Appears well, NAD  Respiratory: No labored breathing  CV: Pulse regularly present  Abdomen: Soft, nontender    INs and OUTs:      LABS:                        10.2   12.89 )-----------( 263      ( 08 May 2024 05:18 )             29.7     05-08    134<L>  |  102  |  6<L>  ----------------------------<  119<H>  3.0<L>   |  21<L>  |  0.59    Ca    7.9<L>      08 May 2024 05:18  Phos  2.0     05-08  Mg     1.60     05-08        Urinalysis Basic - ( 08 May 2024 05:18 )    Color: x / Appearance: x / SG: x / pH: x  Gluc: 119 mg/dL / Ketone: x  / Bili: x / Urobili: x   Blood: x / Protein: x / Nitrite: x   Leuk Esterase: x / RBC: x / WBC x   Sq Epi: x / Non Sq Epi: x / Bacteria: x

## 2024-05-08 NOTE — PROGRESS NOTE ADULT - ASSESSMENT
This is a 77 y/o F with pmhx of Diverticulitis, Type-2 DM, HTN, HLD, Glaucoma, OA of the L-knee and PERFECTO, presented to the ED for new onset lethargy. At bedside, the patient does not appear lethargic. Son is not at bedside and the patient gave me the son's phone number however as per charts it is her own phone number. The patient denies shortness of breath. She also stated that she feels fine and does not have any complaints at this time. Denies cough. "She stated that I have dementia so I do not remember why I am here." Denies abdominal pain, and diarrhea. As per ED charts, the patient was sent here for leukocytosis. She also denies having issues eating at home. (03 May 2024 06:00):  she does not know why  sheis here:  recently she was dced from the hospital : currently she has no resp complaints and is on room air     suspected pneumonia  Hx of diverticulitis  MD  HTN  Glaucoma  OA    suspected pneumonia  -NOT SURE IF SHE HAS PNEUMONIA:  SHE IS EMPIRICALLY STARTED ON ANTIBIOTICS   -SHE IS HAS No FEVER BUT wbc COUNT IS HIGH would RULE OUT uti:  DW ACP ; TO SEND UA AS WELL AS PROCAL  -oN ROOM AIR:   -CHECK LEGIONELLA    -MINIMISE ANTIBIOTICS IF WE CAN   5/4: doubt she has pneumonia:  the new ct scan with no changes from prior and no clinical symptoms too resp wise   5/5: seems stable:  no sob:  no cough : no phlegm  : on room air   5/6: seems to be doing  ok ; no sob:  no phlegm  :   5/7; doubt pneumonia: no pulm issues: on room air   5/8: pulm wise remeains stable:  no sob:  no cough or phlegm    Hx of diverticulitis  -PER PRIMARY TEAM  -5/4: the ct abd showed: Persistent sigmoid diverticulitis with adjacent phlegmonous changes with small foci of extraluminal air, likely sequela of contained perforation,   in retrospect unchanged from the prior exam. No drainable fluid collection. Moderate left hydroureteronephrosis to the level of inflammatory changes in the pelvis, unchanged.  -defer to ID and surgery   5/5: no abd pain : cont antbiotics:  id following   5/6: on zosyn ; for diverticulitis   5/7: cont antibtiocs till 22nd;  per id   5/8 : rpt ct scab and showed new abscess near diverticulitis  slight increase in ai : defer to ID:  and surgery    dm:   -MONITOR AND CONTROL   HTN  -CONTROLLED  Glaucoma  -PER DIMITRY WILSON TEAM   OA  -NO PAIN  ;      DW ACP

## 2024-05-08 NOTE — CHART NOTE - NSCHARTNOTEFT_GEN_A_CORE
CT 5/7:  "Acute sigmoid diverticulitis with new diverticular abscess along the cephalad aspect of the vaginal cuff measuring up to 1.8 cm. Small amount of extraluminal air tracks along the lateral aspect of the sigmoid colon, slightly increased compared to prior exam. persistent moderate left hydroureteronephrosis to the level of the sigmoid colon."    Surgery team reconsulted for new findings.    Domingo Garcia PA-C,   Internal Medicine ACP   In house pager #[phone]

## 2024-05-08 NOTE — PROGRESS NOTE ADULT - SUBJECTIVE AND OBJECTIVE BOX
Date of Service: 05-08-24 @ 12:10    Patient is a 78y old  Female who presents with a chief complaint of abnormal labs (08 May 2024 10:54)      Any change in ROS: seems OK:  no sob:  no cough ; no phlegm      MEDICATIONS  (STANDING):  aspirin enteric coated 81 milliGRAM(s) Oral daily  atorvastatin 20 milliGRAM(s) Oral at bedtime  dextrose 10% Bolus 125 milliLiter(s) IV Bolus once  dextrose 5%. 1000 milliLiter(s) (100 mL/Hr) IV Continuous <Continuous>  dextrose 5%. 1000 milliLiter(s) (50 mL/Hr) IV Continuous <Continuous>  dextrose 50% Injectable 25 Gram(s) IV Push once  dextrose 50% Injectable 12.5 Gram(s) IV Push once  glucagon  Injectable 1 milliGRAM(s) IntraMuscular once  insulin lispro (ADMELOG) corrective regimen sliding scale   SubCutaneous three times a day before meals  insulin lispro (ADMELOG) corrective regimen sliding scale   SubCutaneous at bedtime  pantoprazole  Injectable 40 milliGRAM(s) IV Push daily  piperacillin/tazobactam IVPB.. 3.375 Gram(s) IV Intermittent every 8 hours    MEDICATIONS  (PRN):  acetaminophen     Tablet .. 650 milliGRAM(s) Oral every 6 hours PRN Temp greater or equal to 38C (100.4F), Mild Pain (1 - 3)  aluminum hydroxide/magnesium hydroxide/simethicone Suspension 30 milliLiter(s) Oral every 4 hours PRN Dyspepsia  dextrose Oral Gel 15 Gram(s) Oral once PRN Blood Glucose LESS THAN 70 milliGRAM(s)/deciliter  melatonin 3 milliGRAM(s) Oral at bedtime PRN Insomnia  ondansetron Injectable 4 milliGRAM(s) IV Push every 8 hours PRN Nausea and/or Vomiting    Vital Signs Last 24 Hrs  T(C): 36.8 (08 May 2024 10:05), Max: 37.3 (07 May 2024 18:00)  T(F): 98.2 (08 May 2024 10:05), Max: 99.2 (07 May 2024 18:00)  HR: 92 (08 May 2024 10:05) (89 - 93)  BP: 109/90 (08 May 2024 10:05) (109/90 - 148/82)  BP(mean): --  RR: 18 (08 May 2024 10:05) (17 - 18)  SpO2: 96% (08 May 2024 10:05) (96% - 98%)    Parameters below as of 08 May 2024 10:05  Patient On (Oxygen Delivery Method): room air        I&O's Summary        Physical Exam:   GENERAL: NAD, well-groomed, well-developed  HEENT: MARTINE/   Atraumatic, Normocephalic  ENMT: No tonsillar erythema, exudates, or enlargement; Moist mucous membranes, Good dentition, No lesions  NECK: Supple, No JVD, Normal thyroid  CHEST/LUNG: Clear to auscultaion  CVS: Regular rate and rhythm; No murmurs, rubs, or gallops  GI: : not tender   NERVOUS SYSTEM:  Alert & awake  EXTREMITIES:  2+ Peripheral Pulses, No clubbing, cyanosis, or edema  LYMPH: No lymphadenopathy noted  SKIN: No rashes or lesions  ENDOCRINOLOGY: No Thyromegaly  PSYCH: Appropriate    Labs:  28                            10.2   12.89 )-----------( 263      ( 08 May 2024 05:18 )             29.7                         10.7   15.28 )-----------( 265      ( 07 May 2024 04:30 )             31.1                         11.1   11.40 )-----------( 285      ( 06 May 2024 05:40 )             32.8                         11.2   8.07  )-----------( 319      ( 05 May 2024 05:30 )             33.0     05-08    134<L>  |  102  |  6<L>  ----------------------------<  119<H>  3.0<L>   |  21<L>  |  0.59  05-07    134<L>  |  101  |  9   ----------------------------<  126<H>  3.2<L>   |  19<L>  |  0.61  05-06    136  |  100  |  11  ----------------------------<  115<H>  3.2<L>   |  21<L>  |  0.76  05-05    139  |  106  |  9   ----------------------------<  106<H>  4.1   |  23  |  0.69    Ca    7.9<L>      08 May 2024 05:18  Ca    7.9<L>      07 May 2024 04:30  Phos  2.0     05-08  Phos  1.8     05-07  Mg     1.60     05-08  Mg     1.70     05-07      CAPILLARY BLOOD GLUCOSE      POCT Blood Glucose.: 113 mg/dL (08 May 2024 11:11)  POCT Blood Glucose.: 126 mg/dL (08 May 2024 07:11)  POCT Blood Glucose.: 146 mg/dL (07 May 2024 22:09)  POCT Blood Glucose.: 153 mg/dL (07 May 2024 16:15)          Urinalysis Basic - ( 08 May 2024 05:18 )    Color: x / Appearance: x / SG: x / pH: x  Gluc: 119 mg/dL / Ketone: x  / Bili: x / Urobili: x   Blood: x / Protein: x / Nitrite: x   Leuk Esterase: x / RBC: x / WBC x   Sq Epi: x / Non Sq Epi: x / Bacteria: x  rad< from: CT Abdomen and Pelvis w/ IV Cont (05.07.24 @ 16:45) >  IMPRESSION:  *  Acute sigmoid diverticulitis with new diverticular abscess along the   cephalad aspect of the vaginal cuff measuring up to 1.8 cm.  *  Small amount of extraluminal air tracks along the lateral aspect of   the sigmoid colon, slightly increased compared to prior exam.  *  Persistent moderate left hydroureteronephrosis to the level of the   sigmoid colon.    < end of copied text >            RECENT CULTURES:  05-03 @ 18:52 .Blood Blood                No growth at 4 days    05-03 @ 18:46 .Blood Blood                No growth at 4 days    05-02 @ 22:47 .Blood Blood-Peripheral                No growth at 5 days    05-02 @ 22:35 .Blood Blood-Peripheral                No growth at 5 days          RESPIRATORY CULTURES:          Studies  Chest X-RAY  CT SCAN Chest   Venous Dopplers: LE:   CT Abdomen  Others

## 2024-05-09 LAB
ANION GAP SERPL CALC-SCNC: 11 MMOL/L — SIGNIFICANT CHANGE UP (ref 7–14)
ANISOCYTOSIS BLD QL: SLIGHT — SIGNIFICANT CHANGE UP
BASOPHILS # BLD AUTO: 0.09 K/UL — SIGNIFICANT CHANGE UP (ref 0–0.2)
BASOPHILS NFR BLD AUTO: 0.9 % — SIGNIFICANT CHANGE UP (ref 0–2)
BUN SERPL-MCNC: 4 MG/DL — LOW (ref 7–23)
CALCIUM SERPL-MCNC: 8.3 MG/DL — LOW (ref 8.4–10.5)
CHLORIDE SERPL-SCNC: 105 MMOL/L — SIGNIFICANT CHANGE UP (ref 98–107)
CO2 SERPL-SCNC: 21 MMOL/L — LOW (ref 22–31)
CREAT SERPL-MCNC: 0.54 MG/DL — SIGNIFICANT CHANGE UP (ref 0.5–1.3)
CULTURE RESULTS: SIGNIFICANT CHANGE UP
CULTURE RESULTS: SIGNIFICANT CHANGE UP
EGFR: 95 ML/MIN/1.73M2 — SIGNIFICANT CHANGE UP
EOSINOPHIL # BLD AUTO: 0.36 K/UL — SIGNIFICANT CHANGE UP (ref 0–0.5)
EOSINOPHIL NFR BLD AUTO: 3.5 % — SIGNIFICANT CHANGE UP (ref 0–6)
GIANT PLATELETS BLD QL SMEAR: PRESENT — SIGNIFICANT CHANGE UP
GLUCOSE BLDC GLUCOMTR-MCNC: 107 MG/DL — HIGH (ref 70–99)
GLUCOSE BLDC GLUCOMTR-MCNC: 123 MG/DL — HIGH (ref 70–99)
GLUCOSE BLDC GLUCOMTR-MCNC: 124 MG/DL — HIGH (ref 70–99)
GLUCOSE BLDC GLUCOMTR-MCNC: 132 MG/DL — HIGH (ref 70–99)
GLUCOSE SERPL-MCNC: 123 MG/DL — HIGH (ref 70–99)
HCT VFR BLD CALC: 32.3 % — LOW (ref 34.5–45)
HGB BLD-MCNC: 10.9 G/DL — LOW (ref 11.5–15.5)
IANC: 6.67 K/UL — SIGNIFICANT CHANGE UP (ref 1.8–7.4)
LYMPHOCYTES # BLD AUTO: 2.08 K/UL — SIGNIFICANT CHANGE UP (ref 1–3.3)
LYMPHOCYTES # BLD AUTO: 20 % — SIGNIFICANT CHANGE UP (ref 13–44)
MAGNESIUM SERPL-MCNC: 1.6 MG/DL — SIGNIFICANT CHANGE UP (ref 1.6–2.6)
MANUAL SMEAR VERIFICATION: SIGNIFICANT CHANGE UP
MCHC RBC-ENTMCNC: 29.5 PG — SIGNIFICANT CHANGE UP (ref 27–34)
MCHC RBC-ENTMCNC: 33.7 GM/DL — SIGNIFICANT CHANGE UP (ref 32–36)
MCV RBC AUTO: 87.5 FL — SIGNIFICANT CHANGE UP (ref 80–100)
MONOCYTES # BLD AUTO: 1.08 K/UL — HIGH (ref 0–0.9)
MONOCYTES NFR BLD AUTO: 10.4 % — SIGNIFICANT CHANGE UP (ref 2–14)
NEUTROPHILS # BLD AUTO: 6.7 K/UL — SIGNIFICANT CHANGE UP (ref 1.8–7.4)
NEUTROPHILS NFR BLD AUTO: 64.3 % — SIGNIFICANT CHANGE UP (ref 43–77)
PHOSPHATE SERPL-MCNC: 2.1 MG/DL — LOW (ref 2.5–4.5)
PLAT MORPH BLD: NORMAL — SIGNIFICANT CHANGE UP
PLATELET # BLD AUTO: 267 K/UL — SIGNIFICANT CHANGE UP (ref 150–400)
PLATELET COUNT - ESTIMATE: NORMAL — SIGNIFICANT CHANGE UP
POLYCHROMASIA BLD QL SMEAR: SLIGHT — SIGNIFICANT CHANGE UP
POTASSIUM SERPL-MCNC: 3.5 MMOL/L — SIGNIFICANT CHANGE UP (ref 3.5–5.3)
POTASSIUM SERPL-SCNC: 3.5 MMOL/L — SIGNIFICANT CHANGE UP (ref 3.5–5.3)
RBC # BLD: 3.69 M/UL — LOW (ref 3.8–5.2)
RBC # FLD: 14.4 % — SIGNIFICANT CHANGE UP (ref 10.3–14.5)
RBC BLD AUTO: ABNORMAL
SODIUM SERPL-SCNC: 137 MMOL/L — SIGNIFICANT CHANGE UP (ref 135–145)
SPECIMEN SOURCE: SIGNIFICANT CHANGE UP
SPECIMEN SOURCE: SIGNIFICANT CHANGE UP
VARIANT LYMPHS # BLD: 0.9 % — SIGNIFICANT CHANGE UP (ref 0–6)
WBC # BLD: 10.42 K/UL — SIGNIFICANT CHANGE UP (ref 3.8–10.5)
WBC # FLD AUTO: 10.42 K/UL — SIGNIFICANT CHANGE UP (ref 3.8–10.5)

## 2024-05-09 PROCEDURE — 99223 1ST HOSP IP/OBS HIGH 75: CPT

## 2024-05-09 RX ORDER — ERTAPENEM SODIUM 1 G/1
1000 INJECTION, POWDER, LYOPHILIZED, FOR SOLUTION INTRAMUSCULAR; INTRAVENOUS EVERY 24 HOURS
Refills: 0 | Status: DISCONTINUED | OUTPATIENT
Start: 2024-05-09 | End: 2024-05-12

## 2024-05-09 RX ORDER — POTASSIUM PHOSPHATE, MONOBASIC POTASSIUM PHOSPHATE, DIBASIC 236; 224 MG/ML; MG/ML
15 INJECTION, SOLUTION INTRAVENOUS ONCE
Refills: 0 | Status: COMPLETED | OUTPATIENT
Start: 2024-05-09 | End: 2024-05-09

## 2024-05-09 RX ADMIN — ERTAPENEM SODIUM 120 MILLIGRAM(S): 1 INJECTION, POWDER, LYOPHILIZED, FOR SOLUTION INTRAMUSCULAR; INTRAVENOUS at 22:03

## 2024-05-09 RX ADMIN — POTASSIUM PHOSPHATE, MONOBASIC POTASSIUM PHOSPHATE, DIBASIC 62.5 MILLIMOLE(S): 236; 224 INJECTION, SOLUTION INTRAVENOUS at 11:42

## 2024-05-09 RX ADMIN — PANTOPRAZOLE SODIUM 40 MILLIGRAM(S): 20 TABLET, DELAYED RELEASE ORAL at 11:42

## 2024-05-09 RX ADMIN — PIPERACILLIN AND TAZOBACTAM 25 GRAM(S): 4; .5 INJECTION, POWDER, LYOPHILIZED, FOR SOLUTION INTRAVENOUS at 14:10

## 2024-05-09 RX ADMIN — PIPERACILLIN AND TAZOBACTAM 25 GRAM(S): 4; .5 INJECTION, POWDER, LYOPHILIZED, FOR SOLUTION INTRAVENOUS at 06:24

## 2024-05-09 RX ADMIN — Medication 81 MILLIGRAM(S): at 11:42

## 2024-05-09 RX ADMIN — ATORVASTATIN CALCIUM 20 MILLIGRAM(S): 80 TABLET, FILM COATED ORAL at 22:03

## 2024-05-09 NOTE — BH CONSULTATION LIAISON ASSESSMENT NOTE - RISK ASSESSMENT
Risk factors include: medical comorbidities  Protective factors include: Denies SI/I/P, no history of suicide attempts, no history of NSSIB, identifies reasons for living, family support, residential stability, no substance abuse, future oriented, lists reasons to live.

## 2024-05-09 NOTE — CHART NOTE - NSCHARTNOTEFT_GEN_A_CORE
Midline requested to help expedite discharge. Discussed w/ SW and CM patient is medically cleared for discharge pending IV antibiotic set up and midline access.    Domingo Garcia PA-C,   Internal Medicine ACP   In house pager #[phone] Midline requested to help expedite discharge. Discussed w/ SW and CM patient is medically cleared for discharge pending IV antibiotic set up and midline access.    Addendum: 1400  - Patient now has midline. Social work notified for DC planning.    D/w Attending    Domingo Garcia PA-C,   Internal Medicine ACP   In house pager #[phone] Midline requested to help expedite discharge. Discussed w/ SW and CM patient is medically cleared for discharge pending IV antibiotic set up and midline access.    Addendum: 1400  - Patient now has midline. Social work notified for DC planning.    Addendum: 1500  - Informed by case management, patient will require first dose of Ertapenem while inpatient (scheduled next earliest dose 2000) and can be discharged home tomorrow in AM.     D/w Attending    Domingo Garcia PA-C,   Internal Medicine ACP   In house pager #[phone]

## 2024-05-09 NOTE — PROGRESS NOTE ADULT - ASSESSMENT
79 y/o F with pmhx of Diverticulitis, Type-2 DM, HTN, HLD, Glaucoma, OA of the L-knee and PERFECTO, presented to the ED for new onset lethargy. Found to have leukocytosis likely from pneumonia vs. diverticulitis. The patient does not appear lethargic. Admit for IV abx.      Problem/Plan - 1:  ·  Problem: Pneumonia, pneumococcal.   ·  Plan: - sepsis likely from diverticulitis  pneumonia ruled out   - ID fu     Problem/Plan - 2:  ·  Problem: Diverticulitis.   ·  Plan: -  CT abdomen/pelvis with contained perforation  - ivf  - no indication for surgery as per colorectal   - repeat CT reviewed     Problem/Plan - 3:  ·  Problem: Hydronephrosis, left. ·  Plan: - patient with known hx of hydronephrosis  - CT chest still shows L hydronephrosis  - chronic     Problem/Plan - 4:  ·  Problem: Benign essential HTN.   ·  Plan: - hold BP meds.  restart as needed     Problem/Plan - 5:  ·  Problem: DM2 (diabetes mellitus, type 2).   ·  Plan: - low dose sliding scale insulin.

## 2024-05-09 NOTE — BH CONSULTATION LIAISON ASSESSMENT NOTE - CURRENT MEDICATION
MEDICATIONS  (STANDING):  aspirin enteric coated 81 milliGRAM(s) Oral daily  atorvastatin 20 milliGRAM(s) Oral at bedtime  dextrose 10% Bolus 125 milliLiter(s) IV Bolus once  dextrose 5%. 1000 milliLiter(s) (100 mL/Hr) IV Continuous <Continuous>  dextrose 5%. 1000 milliLiter(s) (50 mL/Hr) IV Continuous <Continuous>  dextrose 50% Injectable 25 Gram(s) IV Push once  dextrose 50% Injectable 12.5 Gram(s) IV Push once  glucagon  Injectable 1 milliGRAM(s) IntraMuscular once  insulin lispro (ADMELOG) corrective regimen sliding scale   SubCutaneous three times a day before meals  insulin lispro (ADMELOG) corrective regimen sliding scale   SubCutaneous at bedtime  pantoprazole  Injectable 40 milliGRAM(s) IV Push daily  piperacillin/tazobactam IVPB.. 3.375 Gram(s) IV Intermittent every 8 hours  potassium phosphate IVPB 15 milliMole(s) IV Intermittent once    MEDICATIONS  (PRN):  acetaminophen     Tablet .. 650 milliGRAM(s) Oral every 6 hours PRN Temp greater or equal to 38C (100.4F), Mild Pain (1 - 3)  aluminum hydroxide/magnesium hydroxide/simethicone Suspension 30 milliLiter(s) Oral every 4 hours PRN Dyspepsia  dextrose Oral Gel 15 Gram(s) Oral once PRN Blood Glucose LESS THAN 70 milliGRAM(s)/deciliter  haloperidol    Injectable 1 milliGRAM(s) IV Push once PRN agitiation  melatonin 3 milliGRAM(s) Oral at bedtime PRN Insomnia  ondansetron Injectable 4 milliGRAM(s) IV Push every 8 hours PRN Nausea and/or Vomiting

## 2024-05-09 NOTE — PROGRESS NOTE ADULT - SUBJECTIVE AND OBJECTIVE BOX
Patient is a 78y old  Female who presents with a chief complaint of abnormal labs (09 May 2024 10:49)    Date of servie : 05-09-24 @ 16:40  INTERVAL HPI/OVERNIGHT EVENTS:  T(C): 37.2 (05-09-24 @ 10:22), Max: 37.2 (05-09-24 @ 05:35)  HR: 77 (05-09-24 @ 10:22) (77 - 93)  BP: 148/87 (05-09-24 @ 10:22) (126/82 - 148/87)  RR: 18 (05-09-24 @ 10:22) (18 - 20)  SpO2: 97% (05-09-24 @ 10:22) (95% - 99%)  Wt(kg): --  I&O's Summary      LABS:                        10.9   10.42 )-----------( 267      ( 09 May 2024 05:10 )             32.3     05-09    137  |  105  |  4<L>  ----------------------------<  123<H>  3.5   |  21<L>  |  0.54    Ca    8.3<L>      09 May 2024 05:10  Phos  2.1     05-09  Mg     1.60     05-09        Urinalysis Basic - ( 09 May 2024 05:10 )    Color: x / Appearance: x / SG: x / pH: x  Gluc: 123 mg/dL / Ketone: x  / Bili: x / Urobili: x   Blood: x / Protein: x / Nitrite: x   Leuk Esterase: x / RBC: x / WBC x   Sq Epi: x / Non Sq Epi: x / Bacteria: x      CAPILLARY BLOOD GLUCOSE      POCT Blood Glucose.: 132 mg/dL (09 May 2024 16:27)  POCT Blood Glucose.: 107 mg/dL (09 May 2024 11:47)  POCT Blood Glucose.: 123 mg/dL (09 May 2024 07:30)  POCT Blood Glucose.: 129 mg/dL (08 May 2024 21:31)        Urinalysis Basic - ( 09 May 2024 05:10 )    Color: x / Appearance: x / SG: x / pH: x  Gluc: 123 mg/dL / Ketone: x  / Bili: x / Urobili: x   Blood: x / Protein: x / Nitrite: x   Leuk Esterase: x / RBC: x / WBC x   Sq Epi: x / Non Sq Epi: x / Bacteria: x        MEDICATIONS  (STANDING):  aspirin enteric coated 81 milliGRAM(s) Oral daily  atorvastatin 20 milliGRAM(s) Oral at bedtime  dextrose 10% Bolus 125 milliLiter(s) IV Bolus once  dextrose 5%. 1000 milliLiter(s) (50 mL/Hr) IV Continuous <Continuous>  dextrose 5%. 1000 milliLiter(s) (100 mL/Hr) IV Continuous <Continuous>  dextrose 50% Injectable 25 Gram(s) IV Push once  dextrose 50% Injectable 12.5 Gram(s) IV Push once  ertapenem  IVPB 1000 milliGRAM(s) IV Intermittent every 24 hours  glucagon  Injectable 1 milliGRAM(s) IntraMuscular once  insulin lispro (ADMELOG) corrective regimen sliding scale   SubCutaneous at bedtime  insulin lispro (ADMELOG) corrective regimen sliding scale   SubCutaneous three times a day before meals  pantoprazole  Injectable 40 milliGRAM(s) IV Push daily    MEDICATIONS  (PRN):  acetaminophen     Tablet .. 650 milliGRAM(s) Oral every 6 hours PRN Temp greater or equal to 38C (100.4F), Mild Pain (1 - 3)  aluminum hydroxide/magnesium hydroxide/simethicone Suspension 30 milliLiter(s) Oral every 4 hours PRN Dyspepsia  dextrose Oral Gel 15 Gram(s) Oral once PRN Blood Glucose LESS THAN 70 milliGRAM(s)/deciliter  haloperidol    Injectable 1 milliGRAM(s) IV Push once PRN agitiation  melatonin 3 milliGRAM(s) Oral at bedtime PRN Insomnia  ondansetron Injectable 4 milliGRAM(s) IV Push every 8 hours PRN Nausea and/or Vomiting          PHYSICAL EXAM:  GENERAL: NAD, well-groomed, well-developed  HEAD:  Atraumatic, Normocephalic  CHEST/LUNG: Clear to percussion bilaterally; No rales, rhonchi, wheezing, or rubs  HEART: Regular rate and rhythm; No murmurs, rubs, or gallops  ABDOMEN: Soft, Nontender, Nondistended; Bowel sounds present  EXTREMITIES:  2+ Peripheral Pulses, No clubbing, cyanosis, or edema  LYMPH: No lymphadenopathy noted  SKIN: No rashes or lesions    Care Discussed with Consultants/Other Providers [ ] YES  [ ] NO

## 2024-05-09 NOTE — BH CONSULTATION LIAISON ASSESSMENT NOTE - HPI (INCLUDE ILLNESS QUALITY, SEVERITY, DURATION, TIMING, CONTEXT, MODIFYING FACTORS, ASSOCIATED SIGNS AND SYMPTOMS)
79 y/o F domiciled with family, retired, no PPHx, no substance abuse hx, PMHx of Diverticulitis, Type-2 DM, HTN, HLD, Glaucoma, OA of the L-knee and PERFECTO, presented to the ED for new onset lethargy. Found to have leukocytosis likely from pneumonia vs. diverticulitis, admitted for IV abx on 5/3. Psychiatry consulted for pt saying she wants to kill herself on 5/8. Per note, pt was patient screaming and walking around unit overnight. Patient stated "I will take a scissor and stab myself so you can get in trouble for keeping me here. You don't believe me. I will do it."    On evaluation, pt laying comfortably in bed, is calm, pleasant and cooperative. Pt with euthymic, full, reactive affect. When asked about events last night, she first says she doesn't recall, but when directly asked about statement she replies "it was a joke. I didn't mean it." Pt says she regrets saying that as she did not mean it, and apologizes. Says she has been feeling "fed up" with being in the hospital. Says the bed is uncomfortable and wants to go home. She denies current and past passive and active SI/intent/plan and denies NSSI/B. Denies HI. She also denies current and past depressed mood. Denies anxiety. Has no complaints, fair sleep and appetite. Is looking forward to her family visiting her, says she has her , children and grandchildren to live for. No hopelessness or ruminations. Says when she feels frustrated again, she will try deep breathing exercises. Denies ever seeing a psychiatrist/therapist in the past, no past medications or hospitalizations or SA or NSSIB. Denies substance use. Pt is oriented to self, place, and situation but not date. Denies AVH/paranoia.

## 2024-05-09 NOTE — ADVANCED PRACTICE NURSE CONSULT - ASSESSMENT
Patient is aware and alert. Midline insertion along with risks, benefits, possible complications and infection prevention explained to patient who verbalized understanding. All questions addressed and patient gave verbal consent to place midline. Left arm cleansed with CHG. Using sterile technique under ultra sound guidance, placed PowerGlide Pro Midline 20G /10cm into Left  Basilic vein. Brisk blood return and flushed with 20Mls of normal saline. Minimal blood loss and patient tolerated procedure well. CHG dressing placed. All sharps accounted for. Report given to district RN and ordering provider. LOT#:[id number] , REF#: [id number] Patient is awake and alert. Midline insertion along with risks, benefits, possible complications and infection prevention explained to patient and son Trevin who verbalized understanding. All questions addressed and patient and patients son Trevin gave verbal consent to place midline. Left arm cleansed with CHG. Using sterile technique under ultra sound guidance, placed PowerGlide Pro Midline 20G /10cm into Left  Basilic vein. Brisk blood return and flushed with 20Mls of normal saline. Minimal blood loss and patient tolerated procedure well. CHG dressing placed. All sharps accounted for. Report given to district RN and ordering provider. LOT#:[id number] , REF#: [id number]

## 2024-05-09 NOTE — BH CONSULTATION LIAISON ASSESSMENT NOTE - SUMMARY
77 y/o F domiciled with family, retired, no PPHx, no substance abuse hx, PMHx of Diverticulitis, Type-2 DM, HTN, HLD, Glaucoma, OA of the L-knee and PERFECTO, presented to the ED for new onset lethargy. Found to have leukocytosis likely from pneumonia vs. diverticulitis, admitted for IV abx on 5/3. Psychiatry consulted for pt saying she wants to kill herself on 5/8. Per note, pt was patient screaming and walking around unit overnight. Patient stated "I will take a scissor and stab myself so you can get in trouble for keeping me here. You don't believe me. I will do it."    On evaluation, pt laying comfortably in bed, is calm, pleasant and cooperative. Pt with euthymic, full, reactive affect. Reports she made suicidal statement yesterday in frustration, and as a joke. Denies meaning it, denies all current and past SI and NSSI. Pt denies depressive sx and anxiety, lists reasons to live for her family, is hopeful, contracts for safety. Pt denies any psychiatric hx.    Plan  -PRN haldol 0.5 mg PO/IM/IV q6h for agitation  -hold antipsychotics if QTc >500  -no 1:1 indicated at this time due to pt denying SI/HI, and not agitated/disorganized  -Pt would not benefit from psychiatric hospitalization. Is not at acute elevated risk of harm to self/others   77 y/o F domiciled with family, retired, no PPHx, no substance abuse hx, PMHx of Diverticulitis, Type-2 DM, HTN, HLD, Glaucoma, OA of the L-knee and PERFECTO, presented to the ED for new onset lethargy. Found to have leukocytosis likely from pneumonia vs. diverticulitis, admitted for IV abx on 5/3. Psychiatry consulted for pt saying she wants to kill herself on 5/8. Per note, pt was patient screaming and walking around unit overnight. Patient stated "I will take a scissor and stab myself so you can get in trouble for keeping me here. You don't believe me. I will do it."    On evaluation, pt laying comfortably in bed, is calm, pleasant and cooperative. Pt with euthymic, full, reactive affect. Reports she made suicidal statement yesterday in frustration, and as a joke. Denies meaning it, denies all current and past SI and NSSI. Pt denies depressive sx and anxiety, lists reasons to live for her family, is hopeful, contracts for safety. Pt denies any psychiatric hx.    Plan  -PRN haldol 0.5 mg PO/IM/IV q6h for agitation  -hold antipsychotics if QTc >500  -no 1:1 indicated at this time due to pt denying SI/HI, and not agitated/disorganized  -Pt would not benefit from psychiatric hospitalization. Is not at acute elevated risk of harm to self/others  - Signing off given low acuity/transient nature of sx. Please call back if needed

## 2024-05-09 NOTE — BH CONSULTATION LIAISON ASSESSMENT NOTE - NSBHCHARTREVIEWVS_PSY_A_CORE FT
Vital Signs Last 24 Hrs  T(C): 37.2 (09 May 2024 05:35), Max: 37.2 (09 May 2024 05:35)  T(F): 98.9 (09 May 2024 05:35), Max: 98.9 (09 May 2024 05:35)  HR: 85 (09 May 2024 05:35) (85 - 93)  BP: 141/89 (09 May 2024 05:35) (126/82 - 141/89)  BP(mean): --  RR: 18 (09 May 2024 05:35) (18 - 20)  SpO2: 98% (09 May 2024 05:35) (95% - 99%)    Parameters below as of 09 May 2024 05:35  Patient On (Oxygen Delivery Method): room air

## 2024-05-09 NOTE — PROGRESS NOTE ADULT - ASSESSMENT
77 y/o F PMhx Glaucoma, DM II, HTN, dementia, diverticulitis who presented w/ increased lethargy  had diverticulitis and was previously treated w/ cefpodoxime/ flagyl x 10 days and augmentin x 10 days; now w/ possible contained perforation    Sigmoid diverticulitis w/ abscess  CTAP- Persistent sigmoid diverticulitis with adjacent phlegmonous changes with small foci of extraluminal air, likely sequela of contained perforation. No drainable fluid collection. Moderate left hydroureteronephrosis  blood cultures- NGTD  repeat CT - Acute sigmoid diverticulitis with new diverticular abscess along the cephalad aspect of the vaginal cuff measuring up to 1.8 cm. Small amount of extraluminal air tracks along the lateral aspect of the sigmoid colon, slightly increased compared to prior exam. Persistent moderate left hydroureteronephrosis to the level of the   sigmoid colon.  per surgery no surgical intervention and no role for IR drainage    Recommendations  c/w zosyn  complete 14 day course of IV antibiotics w/ last day 5/17  - if not cost prohibitive can do ertapenem 1g daily on discharge for ease of administration  will need midline  will plan to repeat CT abd/pelvis outpt  will need eventual colonoscopy    Geovani Goldman M.D.  [facility], Division of Infectious Diseases  [phone]  After 5pm on weekdays and all day on weekends - please call [phone]  77 y/o F PMhx Glaucoma, DM II, HTN, dementia, diverticulitis who presented w/ increased lethargy  had diverticulitis and was previously treated w/ cefpodoxime/ flagyl x 10 days and augmentin x 10 days; now w/ possible contained perforation    Sigmoid diverticulitis w/ abscess  CTAP- Persistent sigmoid diverticulitis with adjacent phlegmonous changes with small foci of extraluminal air, likely sequela of contained perforation. No drainable fluid collection. Moderate left hydroureteronephrosis  blood cultures- NGTD  repeat CT - Acute sigmoid diverticulitis with new diverticular abscess along the cephalad aspect of the vaginal cuff measuring up to 1.8 cm. Small amount of extraluminal air tracks along the lateral aspect of the sigmoid colon, slightly increased compared to prior exam. Persistent moderate left hydroureteronephrosis to the level of the   sigmoid colon.  per surgery no surgical intervention and no role for IR drainage    Recommendations  c/w zosyn  c/w IV antibiotics w/ last day 5/24  - if not cost prohibitive can do ertapenem 1g daily on discharge for ease of administration  will need midline  will plan to repeat CT abd/pelvis outpt  will need eventual colonoscopy    Geovani Goldman M.D.  [facility], Division of Infectious Diseases  [phone]  After 5pm on weekdays and all day on weekends - please call [phone]

## 2024-05-09 NOTE — PROGRESS NOTE ADULT - SUBJECTIVE AND OBJECTIVE BOX
Date of Service: 05-09-24 @ 10:47    Patient is a 78y old  Female who presents with a chief complaint of abnormal labs (08 May 2024 17:57)      Any change in ROS: seems to be doing  ok : no sob:  no cough : no phlegm   /; has suicidal ideation    MEDICATIONS  (STANDING):  aspirin enteric coated 81 milliGRAM(s) Oral daily  atorvastatin 20 milliGRAM(s) Oral at bedtime  dextrose 10% Bolus 125 milliLiter(s) IV Bolus once  dextrose 5%. 1000 milliLiter(s) (100 mL/Hr) IV Continuous <Continuous>  dextrose 5%. 1000 milliLiter(s) (50 mL/Hr) IV Continuous <Continuous>  dextrose 50% Injectable 25 Gram(s) IV Push once  dextrose 50% Injectable 12.5 Gram(s) IV Push once  glucagon  Injectable 1 milliGRAM(s) IntraMuscular once  insulin lispro (ADMELOG) corrective regimen sliding scale   SubCutaneous at bedtime  insulin lispro (ADMELOG) corrective regimen sliding scale   SubCutaneous three times a day before meals  pantoprazole  Injectable 40 milliGRAM(s) IV Push daily  piperacillin/tazobactam IVPB.. 3.375 Gram(s) IV Intermittent every 8 hours  potassium phosphate IVPB 15 milliMole(s) IV Intermittent once    MEDICATIONS  (PRN):  acetaminophen     Tablet .. 650 milliGRAM(s) Oral every 6 hours PRN Temp greater or equal to 38C (100.4F), Mild Pain (1 - 3)  aluminum hydroxide/magnesium hydroxide/simethicone Suspension 30 milliLiter(s) Oral every 4 hours PRN Dyspepsia  dextrose Oral Gel 15 Gram(s) Oral once PRN Blood Glucose LESS THAN 70 milliGRAM(s)/deciliter  haloperidol    Injectable 1 milliGRAM(s) IV Push once PRN agitiation  melatonin 3 milliGRAM(s) Oral at bedtime PRN Insomnia  ondansetron Injectable 4 milliGRAM(s) IV Push every 8 hours PRN Nausea and/or Vomiting    Vital Signs Last 24 Hrs  T(C): 37.2 (09 May 2024 10:22), Max: 37.2 (09 May 2024 05:35)  T(F): 98.9 (09 May 2024 10:22), Max: 98.9 (09 May 2024 05:35)  HR: 77 (09 May 2024 10:22) (77 - 93)  BP: 148/87 (09 May 2024 10:22) (126/82 - 148/87)  BP(mean): --  RR: 18 (09 May 2024 10:22) (18 - 20)  SpO2: 97% (09 May 2024 10:22) (95% - 99%)    Parameters below as of 09 May 2024 10:22  Patient On (Oxygen Delivery Method): room air        I&O's Summary        Physical Exam:   GENERAL: NAD, well-groomed, well-developed  HEENT: MARTINE/   Atraumatic, Normocephalic  ENMT: No tonsillar erythema, exudates, or enlargement; Moist mucous membranes, Good dentition, No lesions  NECK: Supple, No JVD, Normal thyroid  CHEST/LUNG: Clear to auscultaion- no wheezing  CVS: Regular rate and rhythm; No murmurs, rubs, or gallops  GI: : Soft, Nontender, Nondistended; Bowel sounds present  NERVOUS SYSTEM:  Alert & awake  EXTREMITIES:  - edema  LYMPH: No lymphadenopathy noted  SKIN: No rashes or lesions  ENDOCRINOLOGY: No Thyromegaly  PSYCH: calm     Labs:  28                            10.9   10.42 )-----------( 267      ( 09 May 2024 05:10 )             32.3                         10.2   12.89 )-----------( 263      ( 08 May 2024 05:18 )             29.7                         10.7   15.28 )-----------( 265      ( 07 May 2024 04:30 )             31.1                         11.1   11.40 )-----------( 285      ( 06 May 2024 05:40 )             32.8     05-09    137  |  105  |  4<L>  ----------------------------<  123<H>  3.5   |  21<L>  |  0.54  05-08    134<L>  |  102  |  6<L>  ----------------------------<  119<H>  3.0<L>   |  21<L>  |  0.59  05-07    134<L>  |  101  |  9   ----------------------------<  126<H>  3.2<L>   |  19<L>  |  0.61  05-06    136  |  100  |  11  ----------------------------<  115<H>  3.2<L>   |  21<L>  |  0.76    Ca    8.3<L>      09 May 2024 05:10  Ca    7.9<L>      08 May 2024 05:18  Phos  2.1     05-09  Phos  2.0     05-08  Mg     1.60     05-09  Mg     1.60     05-08      CAPILLARY BLOOD GLUCOSE      POCT Blood Glucose.: 123 mg/dL (09 May 2024 07:30)  POCT Blood Glucose.: 129 mg/dL (08 May 2024 21:31)  POCT Blood Glucose.: 140 mg/dL (08 May 2024 16:16)  POCT Blood Glucose.: 113 mg/dL (08 May 2024 11:11)          Urinalysis Basic - ( 09 May 2024 05:10 )    Color: x / Appearance: x / SG: x / pH: x  Gluc: 123 mg/dL / Ketone: x  / Bili: x / Urobili: x   Blood: x / Protein: x / Nitrite: x   Leuk Esterase: x / RBC: x / WBC x   Sq Epi: x / Non Sq Epi: x / Bacteria: x        rad< from: CT Abdomen and Pelvis w/ IV Cont (05.07.24 @ 16:45) >  not significantly changed.  PERITONEUM: As above.  VESSELS: Atherosclerotic calcifications. Left-sided infrarenal IVC.  RETROPERITONEUM/LYMPH NODES: No lymphadenopathy.  ABDOMINAL WALL: Fat-containing left inguinal hernia.  BONES: Degenerative changes. Superior endplate deformity of L2. Grade 2   anterolisthesis of L5 on S1. Bilateral L5 spondylolysis.    IMPRESSION:  *  Acute sigmoid diverticulitis with new diverticular abscess along the   cephalad aspect of the vaginal cuff measuring up to 1.8 cm.  *  Small amount of extraluminal air tracks along the lateral aspect of   the sigmoid colon, slightly increased compared to prior exam.  *  Persistent moderate left hydroureteronephrosis to the level of the   sigmoid colon.    < end of copied text >      RECENT CULTURES:  05-03 @ 18:52 .Blood Blood                No growth at 5 days    05-03 @ 18:46 .Blood Blood                No growth at 5 days    05-02 @ 22:47 .Blood Blood-Peripheral                No growth at 5 days    05-02 @ 22:35 .Blood Blood-Peripheral                No growth at 5 days          RESPIRATORY CULTURES:          Studies  Chest X-RAY  CT SCAN Chest   Venous Dopplers: LE:   CT Abdomen  Others

## 2024-05-09 NOTE — BH CONSULTATION LIAISON ASSESSMENT NOTE - NSBHATTESTCOMMENTATTENDFT_PSY_A_CORE
Chart reviewed. Seen with resident. Pt is pleasant, AA, cooperative. O x 2, not to time. Does note recall overnight comments/confusion. Lacks overt psychiatric concerns but appears to be at risk for ongoing delirium on dementia. Agree with above assessment/recs. Will sign off but please call us back if any issues arise.

## 2024-05-09 NOTE — PROGRESS NOTE ADULT - SUBJECTIVE AND OBJECTIVE BOX
[facility], Division of Infectious Diseases  JORGE LUIS Chaves Y. Patel, S. Shah, G. Jones  [phone]  ([phone] - weekdays after 5pm and weekends)    Name: TY DAVIS  Age/Gender: 78y Female  MRN: [id number]    Interval History:  Notes reviewed.   No concerning overnight events.  Afebrile.   denies abd pain, SOB    Allergies: No Known Allergies      Objective:  Vitals:   T(F): 98.9 (05-09-24 @ 10:22), Max: 98.9 (05-09-24 @ 05:35)  HR: 77 (05-09-24 @ 10:22) (77 - 93)  BP: 148/87 (05-09-24 @ 10:22) (126/82 - 148/87)  RR: 18 (05-09-24 @ 10:22) (18 - 20)  SpO2: 97% (05-09-24 @ 10:22) (95% - 99%)  Physical Examination:  General: no acute distress  HEENT: anicteric  Cardio: S1, S2, normal rate  Resp: clear to auscultation anteriorly  Abd: soft, NT, ND  Ext: no LE edema  Skin: warm, dry    Laboratory Studies:  CBC:                       10.9   10.42 )-----------( 267      ( 09 May 2024 05:10 )             32.3     WBC Trend:  10.42 05-09-24 @ 05:10  12.89 05-08-24 @ 05:18  15.28 05-07-24 @ 04:30  11.40 05-06-24 @ 05:40  8.07 05-05-24 @ 05:30  12.03 05-04-24 @ 05:46  15.24 05-02-24 @ 22:35    CMP: 05-09    137  |  105  |  4<L>  ----------------------------<  123<H>  3.5   |  21<L>  |  0.54    Ca    8.3<L>      09 May 2024 05:10  Phos  2.1     05-09  Mg     1.60     05-09            Urinalysis Basic - ( 09 May 2024 05:10 )    Color: x / Appearance: x / SG: x / pH: x  Gluc: 123 mg/dL / Ketone: x  / Bili: x / Urobili: x   Blood: x / Protein: x / Nitrite: x   Leuk Esterase: x / RBC: x / WBC x   Sq Epi: x / Non Sq Epi: x / Bacteria: x      Microbiology: reviewed     Culture - Blood (collected 05-03-24 @ 18:52)  Source: .Blood Blood  Final Report (05-09-24 @ 01:00):    No growth at 5 days    Culture - Blood (collected 05-03-24 @ 18:46)  Source: .Blood Blood  Final Report (05-09-24 @ 01:00):    No growth at 5 days    Culture - Blood (collected 05-02-24 @ 22:47)  Source: .Blood Blood-Peripheral  Final Report (05-08-24 @ 11:00):    No growth at 5 days    Culture - Blood (collected 05-02-24 @ 22:35)  Source: .Blood Blood-Peripheral  Final Report (05-08-24 @ 11:00):    No growth at 5 days        Radiology: reviewed     Medications:  acetaminophen     Tablet .. 650 milliGRAM(s) Oral every 6 hours PRN  aluminum hydroxide/magnesium hydroxide/simethicone Suspension 30 milliLiter(s) Oral every 4 hours PRN  aspirin enteric coated 81 milliGRAM(s) Oral daily  atorvastatin 20 milliGRAM(s) Oral at bedtime  dextrose 10% Bolus 125 milliLiter(s) IV Bolus once  dextrose 5%. 1000 milliLiter(s) IV Continuous <Continuous>  dextrose 5%. 1000 milliLiter(s) IV Continuous <Continuous>  dextrose 50% Injectable 25 Gram(s) IV Push once  dextrose 50% Injectable 12.5 Gram(s) IV Push once  dextrose Oral Gel 15 Gram(s) Oral once PRN  glucagon  Injectable 1 milliGRAM(s) IntraMuscular once  haloperidol    Injectable 1 milliGRAM(s) IV Push once PRN  insulin lispro (ADMELOG) corrective regimen sliding scale   SubCutaneous three times a day before meals  insulin lispro (ADMELOG) corrective regimen sliding scale   SubCutaneous at bedtime  melatonin 3 milliGRAM(s) Oral at bedtime PRN  ondansetron Injectable 4 milliGRAM(s) IV Push every 8 hours PRN  pantoprazole  Injectable 40 milliGRAM(s) IV Push daily  piperacillin/tazobactam IVPB.. 3.375 Gram(s) IV Intermittent every 8 hours  potassium phosphate IVPB 15 milliMole(s) IV Intermittent once    Antimicrobials:  piperacillin/tazobactam IVPB.. 3.375 Gram(s) IV Intermittent every 8 hours

## 2024-05-09 NOTE — CHART NOTE - NSCHARTNOTEFT_GEN_A_CORE
Psychiatry called for overnight events.    Domingo Garcia PA-C,   Internal Medicine ACP   In house pager #[phone]

## 2024-05-09 NOTE — PROGRESS NOTE ADULT - ASSESSMENT
This is a 77 y/o F with pmhx of Diverticulitis, Type-2 DM, HTN, HLD, Glaucoma, OA of the L-knee and PERFECTO, presented to the ED for new onset lethargy. At bedside, the patient does not appear lethargic. Son is not at bedside and the patient gave me the son's phone number however as per charts it is her own phone number. The patient denies shortness of breath. She also stated that she feels fine and does not have any complaints at this time. Denies cough. "She stated that I have dementia so I do not remember why I am here." Denies abdominal pain, and diarrhea. As per ED charts, the patient was sent here for leukocytosis. She also denies having issues eating at home. (03 May 2024 06:00):  she does not know why  sheis here:  recently she was dced from the hospital : currently she has no resp complaints and is on room air     suspected pneumonia  Hx of diverticulitis  MD  HTN  Glaucoma  OA    suspected pneumonia  -NOT SURE IF SHE HAS PNEUMONIA:  SHE IS EMPIRICALLY STARTED ON ANTIBIOTICS   -SHE IS HAS No FEVER BUT wbc COUNT IS HIGH would RULE OUT uti:  DW ACP ; TO SEND UA AS WELL AS PROCAL  -oN ROOM AIR:   -CHECK LEGIONELLA    -MINIMISE ANTIBIOTICS IF WE CAN   5/4: doubt she has pneumonia:  the new ct scan with no changes from prior and no clinical symptoms too resp wise   5/5: seems stable:  no sob:  no cough : no phlegm  : on room air   5/6: seems to be doing  ok ; no sob:  no phlegm  :   5/7; doubt pneumonia: no pulm issues: on room air   5/8: pulm wise remains stable:  no sob:  no cough or phlegm    5/9: seems O K:  no sob:  no cough : no phlegm    Hx of diverticulitis  -PER PRIMARY TEAM  -5/4: the ct abd showed: Persistent sigmoid diverticulitis with adjacent phlegmonous changes with small foci of extraluminal air, likely sequela of contained perforation,   in retrospect unchanged from the prior exam. No drainable fluid collection. Moderate left hydroureteronephrosis to the level of inflammatory changes in the pelvis, unchanged.  -defer to ID and surgery   5/5: no abd pain : cont antbiotics:  id following   5/6: on zosyn ; for diverticulitis   5/7: cont antibtiocs till 22nd;  per id   5/8 : rpt ct scab and showed new abscess near diverticulitis  slight increase in ai : defer to ID:  and surgery    5/9: seems OK:  ct noted:  remains on antibtiocs  dm:   -MONITOR AND CONTROL   HTN  -CONTROLLED  Glaucoma  -PER DIMITRY WILSON TEAM   OA  -NO PAIN  ;      DW ACP

## 2024-05-10 ENCOUNTER — APPOINTMENT (OUTPATIENT)
Dept: CT IMAGING | Facility: IMAGING CENTER | Age: 79
End: 2024-05-10

## 2024-05-10 ENCOUNTER — TRANSCRIPTION ENCOUNTER (OUTPATIENT)
Age: 79
End: 2024-05-10

## 2024-05-10 LAB
ANION GAP SERPL CALC-SCNC: 14 MMOL/L — SIGNIFICANT CHANGE UP (ref 7–14)
BASOPHILS # BLD AUTO: 0.09 K/UL — SIGNIFICANT CHANGE UP (ref 0–0.2)
BASOPHILS NFR BLD AUTO: 0.6 % — SIGNIFICANT CHANGE UP (ref 0–2)
BUN SERPL-MCNC: 4 MG/DL — LOW (ref 7–23)
CALCIUM SERPL-MCNC: 9.1 MG/DL — SIGNIFICANT CHANGE UP (ref 8.4–10.5)
CHLORIDE SERPL-SCNC: 101 MMOL/L — SIGNIFICANT CHANGE UP (ref 98–107)
CO2 SERPL-SCNC: 20 MMOL/L — LOW (ref 22–31)
CREAT SERPL-MCNC: 0.58 MG/DL — SIGNIFICANT CHANGE UP (ref 0.5–1.3)
EGFR: 94 ML/MIN/1.73M2 — SIGNIFICANT CHANGE UP
EOSINOPHIL # BLD AUTO: 0.39 K/UL — SIGNIFICANT CHANGE UP (ref 0–0.5)
EOSINOPHIL NFR BLD AUTO: 2.6 % — SIGNIFICANT CHANGE UP (ref 0–6)
GLUCOSE BLDC GLUCOMTR-MCNC: 101 MG/DL — HIGH (ref 70–99)
GLUCOSE BLDC GLUCOMTR-MCNC: 103 MG/DL — HIGH (ref 70–99)
GLUCOSE BLDC GLUCOMTR-MCNC: 106 MG/DL — HIGH (ref 70–99)
GLUCOSE BLDC GLUCOMTR-MCNC: 117 MG/DL — HIGH (ref 70–99)
GLUCOSE SERPL-MCNC: 96 MG/DL — SIGNIFICANT CHANGE UP (ref 70–99)
HCT VFR BLD CALC: 34.1 % — LOW (ref 34.5–45)
HGB BLD-MCNC: 11.2 G/DL — LOW (ref 11.5–15.5)
IANC: 7.42 K/UL — HIGH (ref 1.8–7.4)
IMM GRANULOCYTES NFR BLD AUTO: 0.5 % — SIGNIFICANT CHANGE UP (ref 0–0.9)
LYMPHOCYTES # BLD AUTO: 38.6 % — SIGNIFICANT CHANGE UP (ref 13–44)
LYMPHOCYTES # BLD AUTO: 5.85 K/UL — HIGH (ref 1–3.3)
MAGNESIUM SERPL-MCNC: 1.7 MG/DL — SIGNIFICANT CHANGE UP (ref 1.6–2.6)
MCHC RBC-ENTMCNC: 28.9 PG — SIGNIFICANT CHANGE UP (ref 27–34)
MCHC RBC-ENTMCNC: 32.8 GM/DL — SIGNIFICANT CHANGE UP (ref 32–36)
MCV RBC AUTO: 87.9 FL — SIGNIFICANT CHANGE UP (ref 80–100)
MONOCYTES # BLD AUTO: 1.33 K/UL — HIGH (ref 0–0.9)
MONOCYTES NFR BLD AUTO: 8.8 % — SIGNIFICANT CHANGE UP (ref 2–14)
NEUTROPHILS # BLD AUTO: 7.42 K/UL — HIGH (ref 1.8–7.4)
NEUTROPHILS NFR BLD AUTO: 48.9 % — SIGNIFICANT CHANGE UP (ref 43–77)
NRBC # BLD: 0 /100 WBCS — SIGNIFICANT CHANGE UP (ref 0–0)
NRBC # FLD: 0 K/UL — SIGNIFICANT CHANGE UP (ref 0–0)
PHOSPHATE SERPL-MCNC: 2.6 MG/DL — SIGNIFICANT CHANGE UP (ref 2.5–4.5)
PLATELET # BLD AUTO: 337 K/UL — SIGNIFICANT CHANGE UP (ref 150–400)
POTASSIUM SERPL-MCNC: 3.5 MMOL/L — SIGNIFICANT CHANGE UP (ref 3.5–5.3)
POTASSIUM SERPL-SCNC: 3.5 MMOL/L — SIGNIFICANT CHANGE UP (ref 3.5–5.3)
RBC # BLD: 3.88 M/UL — SIGNIFICANT CHANGE UP (ref 3.8–5.2)
RBC # FLD: 14.2 % — SIGNIFICANT CHANGE UP (ref 10.3–14.5)
SODIUM SERPL-SCNC: 135 MMOL/L — SIGNIFICANT CHANGE UP (ref 135–145)
WBC # BLD: 15.16 K/UL — HIGH (ref 3.8–10.5)
WBC # FLD AUTO: 15.16 K/UL — HIGH (ref 3.8–10.5)

## 2024-05-10 RX ORDER — LANOLIN ALCOHOL/MO/W.PET/CERES
3 CREAM (GRAM) TOPICAL ONCE
Refills: 0 | Status: DISCONTINUED | OUTPATIENT
Start: 2024-05-10 | End: 2024-05-15

## 2024-05-10 RX ADMIN — HALOPERIDOL DECANOATE 1 MILLIGRAM(S): 100 INJECTION INTRAMUSCULAR at 23:11

## 2024-05-10 RX ADMIN — PANTOPRAZOLE SODIUM 40 MILLIGRAM(S): 20 TABLET, DELAYED RELEASE ORAL at 11:32

## 2024-05-10 RX ADMIN — ATORVASTATIN CALCIUM 20 MILLIGRAM(S): 80 TABLET, FILM COATED ORAL at 21:22

## 2024-05-10 RX ADMIN — ERTAPENEM SODIUM 120 MILLIGRAM(S): 1 INJECTION, POWDER, LYOPHILIZED, FOR SOLUTION INTRAMUSCULAR; INTRAVENOUS at 21:22

## 2024-05-10 RX ADMIN — Medication 3 MILLIGRAM(S): at 22:42

## 2024-05-10 RX ADMIN — Medication 81 MILLIGRAM(S): at 11:32

## 2024-05-10 NOTE — DIETITIAN INITIAL EVALUATION ADULT - OTHER INFO
Detail Level: Simple Detail Level: Detailed Per chart review, 77 y/o F with pmhx of Diverticulitis, Type-2 DM, HTN, HLD, Glaucoma, OA of the L-knee and PERFECTO, presented to the ED for new onset lethargy. Found to have leukocytosis likely from pneumonia vs. diverticulitis. The patient does not appear lethargic. Admit for IV abx.    Patient seen for Length Of Stay. Reports appetite remains poor in hospital. PO intake per RN flow sheet varies from 25-75% completion. Pt currently on pureed, Consistent Carbohydrate, and low fat diet. Offered pt nutrition supplements pt states she will be d/c today. Patient noted with dx of diverticulitis, provide nutrition education on low fiber diet. Encouraged pt to gradually increase fiber intake as tolerated once recovered. Recommended pt to follow high fiber diet to prevent recurrence of diverticulitis. Pt shows understanding. RD to remain available for further nutritional interventions as indicated.

## 2024-05-10 NOTE — DIETITIAN NUTRITION RISK NOTIFICATION - ETIOLOGY-BASIS
Please notify patient with result. GERD, neg Wisdom's. Gastritis, neg HP. PPI.   
Please notify patient with result. Negative colon bx. Schedule repeat Colonoscopy in 5 years.
Chronic illness

## 2024-05-10 NOTE — CHART NOTE - NSCHARTNOTEFT_GEN_A_CORE
[facility] MEDICINE NIGHT COVERAGE    Notified by RN that patient states she will harm herself if we do not let her leave the hospital. Per RN, patient was unable to be redirected and given PRN melatonin 3mg and PRN haldol 1mg IVP which was ordered on 5/8/24. Patient placed on 1:1 for possible SI and RN advised to remove patient's belongings from room. Patient seen and assessed at bedside with RN present. Patient awake, alert, in NAD, resting comfortably in bed, mentating at baseline - a&ox1-2. Patient appears calm and able to be redirected. Patient agrees to stay in the hospital and states she will try to sleep. Denies SI, HI, delusions, hallucinations. Will continue with 1:1 for now and reassess. Will order additional dose of PRN melatonin 3mg if patient still unable to sleep. Will continue to monitor closely and relay events to day team. RN made aware of plan.     Rox Caraballo PA-C  Medicine [phone] [facility] MEDICINE NIGHT COVERAGE    Notified by RN that patient states she will harm herself if we do not let her leave the hospital. Per RN, patient was unable to be redirected and given PRN melatonin 3mg and PRN haldol 1mg IVP which was ordered on 5/8/24. Patient placed on 1:1 for possible SI and RN advised to remove patient's belongings from room. Patient seen and assessed at bedside with RN present. Patient awake, alert, in NAD, resting comfortably in bed, mentating at baseline - a&ox1-2. Patient appears calm and able to be redirected. Patient agrees to stay in the hospital and states she will try to sleep. Denies SI, HI, delusions, hallucinations. On chart review, patient had similar episode on 5/8/24. Will continue with 1:1 for now and reassess. Will order additional dose of PRN melatonin 3mg if patient still unable to sleep. Consider BH c/s in AM. Will continue to monitor closely and relay events to day team. RN made aware of plan.     Rox Caraballo PA-C  Medicine [phone]

## 2024-05-10 NOTE — PROGRESS NOTE ADULT - SUBJECTIVE AND OBJECTIVE BOX
[facility], Division of Infectious Diseases  JORGE LUIS Chaves Y. Patel, S. Shah, G. Jones  [phone]  ([phone] - weekdays after 5pm and weekends)    Name: TY DAVIS  Age/Gender: 78y Female  MRN: [id number]    Interval History:  Notes reviewed.   No concerning overnight events.  Afebrile.   continues to deny abd pain, diarrhea or other GI symptoms    Allergies: No Known Allergies      Objective:  Vitals:   T(F): 98.1 (05-10-24 @ 05:50), Max: 98.9 (05-09-24 @ 10:22)  HR: 89 (05-10-24 @ 05:50) (77 - 98)  BP: 135/86 (05-10-24 @ 05:50) (135/86 - 148/87)  RR: 18 (05-10-24 @ 05:50) (18 - 18)  SpO2: 95% (05-10-24 @ 05:50) (94% - 97%)  Physical Examination:  General: no acute distress  HEENT: anicteric  Cardio: S1, S2, normal rate  Resp: clear to auscultation anteriorly  Abd: soft, NT, ND  Ext: no LE edema  Skin: warm, dry    Laboratory Studies:  CBC:                       11.2   15.16 )-----------( 337      ( 10 May 2024 05:40 )             34.1     WBC Trend:  15.16 05-10-24 @ 05:40  10.42 05-09-24 @ 05:10  12.89 05-08-24 @ 05:18  15.28 05-07-24 @ 04:30  11.40 05-06-24 @ 05:40  8.07 05-05-24 @ 05:30  12.03 05-04-24 @ 05:46    CMP: 05-10    135  |  101  |  4<L>  ----------------------------<  96  3.5   |  20<L>  |  0.58    Ca    9.1      10 May 2024 05:40  Phos  2.6     05-10  Mg     1.70     05-10            Urinalysis Basic - ( 10 May 2024 05:40 )    Color: x / Appearance: x / SG: x / pH: x  Gluc: 96 mg/dL / Ketone: x  / Bili: x / Urobili: x   Blood: x / Protein: x / Nitrite: x   Leuk Esterase: x / RBC: x / WBC x   Sq Epi: x / Non Sq Epi: x / Bacteria: x      Microbiology: reviewed     Culture - Blood (collected 05-03-24 @ 18:52)  Source: .Blood Blood  Final Report (05-09-24 @ 01:00):    No growth at 5 days    Culture - Blood (collected 05-03-24 @ 18:46)  Source: .Blood Blood  Final Report (05-09-24 @ 01:00):    No growth at 5 days    Culture - Blood (collected 05-02-24 @ 22:47)  Source: .Blood Blood-Peripheral  Final Report (05-08-24 @ 11:00):    No growth at 5 days    Culture - Blood (collected 05-02-24 @ 22:35)  Source: .Blood Blood-Peripheral  Final Report (05-08-24 @ 11:00):    No growth at 5 days        Radiology: reviewed     Medications:  acetaminophen     Tablet .. 650 milliGRAM(s) Oral every 6 hours PRN  aluminum hydroxide/magnesium hydroxide/simethicone Suspension 30 milliLiter(s) Oral every 4 hours PRN  aspirin enteric coated 81 milliGRAM(s) Oral daily  atorvastatin 20 milliGRAM(s) Oral at bedtime  dextrose 10% Bolus 125 milliLiter(s) IV Bolus once  dextrose 5%. 1000 milliLiter(s) IV Continuous <Continuous>  dextrose 5%. 1000 milliLiter(s) IV Continuous <Continuous>  dextrose 50% Injectable 25 Gram(s) IV Push once  dextrose 50% Injectable 12.5 Gram(s) IV Push once  dextrose Oral Gel 15 Gram(s) Oral once PRN  ertapenem  IVPB 1000 milliGRAM(s) IV Intermittent every 24 hours  glucagon  Injectable 1 milliGRAM(s) IntraMuscular once  haloperidol    Injectable 1 milliGRAM(s) IV Push once PRN  insulin lispro (ADMELOG) corrective regimen sliding scale   SubCutaneous three times a day before meals  insulin lispro (ADMELOG) corrective regimen sliding scale   SubCutaneous at bedtime  melatonin 3 milliGRAM(s) Oral at bedtime PRN  ondansetron Injectable 4 milliGRAM(s) IV Push every 8 hours PRN  pantoprazole  Injectable 40 milliGRAM(s) IV Push daily    Antimicrobials:  ertapenem  IVPB 1000 milliGRAM(s) IV Intermittent every 24 hours

## 2024-05-10 NOTE — DISCHARGE NOTE NURSING/CASE MANAGEMENT/SOCIAL WORK - NSDCPEFALRISK_GEN_ALL_CORE
For information on Fall & Injury Prevention, visit: [email or web address] OR  [email or web address] OR  https://www.cdc.gov/steadi/patient.html

## 2024-05-10 NOTE — DIETITIAN INITIAL EVALUATION ADULT - NS FNS DIET ORDER
Diet, Pureed:   Consistent Carbohydrate {No Snacks} (CSTCHO)  Low Fat (LOWFAT) (05-07-24 @ 16:04) [Active]

## 2024-05-10 NOTE — PROGRESS NOTE ADULT - SUBJECTIVE AND OBJECTIVE BOX
Patient is a 78y old  Female who presents with a chief complaint of abnormal labs (10 May 2024 12:25)    Date of servie : 05-10-24 @ 15:17  INTERVAL HPI/OVERNIGHT EVENTS:  T(C): 36.7 (05-10-24 @ 11:06), Max: 37.2 (05-09-24 @ 22:00)  HR: 82 (05-10-24 @ 11:06) (82 - 98)  BP: 128/85 (05-10-24 @ 11:06) (128/85 - 147/84)  RR: 18 (05-10-24 @ 11:06) (18 - 18)  SpO2: 98% (05-10-24 @ 11:06) (94% - 98%)  Wt(kg): --  I&O's Summary      LABS:                        11.2   15.16 )-----------( 337      ( 10 May 2024 05:40 )             34.1     05-10    135  |  101  |  4<L>  ----------------------------<  96  3.5   |  20<L>  |  0.58    Ca    9.1      10 May 2024 05:40  Phos  2.6     05-10  Mg     1.70     05-10        Urinalysis Basic - ( 10 May 2024 05:40 )    Color: x / Appearance: x / SG: x / pH: x  Gluc: 96 mg/dL / Ketone: x  / Bili: x / Urobili: x   Blood: x / Protein: x / Nitrite: x   Leuk Esterase: x / RBC: x / WBC x   Sq Epi: x / Non Sq Epi: x / Bacteria: x      CAPILLARY BLOOD GLUCOSE      POCT Blood Glucose.: 101 mg/dL (10 May 2024 11:32)  POCT Blood Glucose.: 106 mg/dL (10 May 2024 07:06)  POCT Blood Glucose.: 124 mg/dL (09 May 2024 22:06)  POCT Blood Glucose.: 132 mg/dL (09 May 2024 16:27)        Urinalysis Basic - ( 10 May 2024 05:40 )    Color: x / Appearance: x / SG: x / pH: x  Gluc: 96 mg/dL / Ketone: x  / Bili: x / Urobili: x   Blood: x / Protein: x / Nitrite: x   Leuk Esterase: x / RBC: x / WBC x   Sq Epi: x / Non Sq Epi: x / Bacteria: x        MEDICATIONS  (STANDING):  aspirin enteric coated 81 milliGRAM(s) Oral daily  atorvastatin 20 milliGRAM(s) Oral at bedtime  dextrose 10% Bolus 125 milliLiter(s) IV Bolus once  dextrose 5%. 1000 milliLiter(s) (100 mL/Hr) IV Continuous <Continuous>  dextrose 5%. 1000 milliLiter(s) (50 mL/Hr) IV Continuous <Continuous>  dextrose 50% Injectable 25 Gram(s) IV Push once  dextrose 50% Injectable 12.5 Gram(s) IV Push once  ertapenem  IVPB 1000 milliGRAM(s) IV Intermittent every 24 hours  glucagon  Injectable 1 milliGRAM(s) IntraMuscular once  insulin lispro (ADMELOG) corrective regimen sliding scale   SubCutaneous three times a day before meals  insulin lispro (ADMELOG) corrective regimen sliding scale   SubCutaneous at bedtime  pantoprazole  Injectable 40 milliGRAM(s) IV Push daily    MEDICATIONS  (PRN):  acetaminophen     Tablet .. 650 milliGRAM(s) Oral every 6 hours PRN Temp greater or equal to 38C (100.4F), Mild Pain (1 - 3)  aluminum hydroxide/magnesium hydroxide/simethicone Suspension 30 milliLiter(s) Oral every 4 hours PRN Dyspepsia  dextrose Oral Gel 15 Gram(s) Oral once PRN Blood Glucose LESS THAN 70 milliGRAM(s)/deciliter  haloperidol    Injectable 1 milliGRAM(s) IV Push once PRN agitiation  melatonin 3 milliGRAM(s) Oral at bedtime PRN Insomnia  ondansetron Injectable 4 milliGRAM(s) IV Push every 8 hours PRN Nausea and/or Vomiting          PHYSICAL EXAM:  GENERAL: NAD, well-groomed, well-developed  HEAD:  Atraumatic, Normocephalic  CHEST/LUNG: Clear to percussion bilaterally; No rales, rhonchi, wheezing, or rubs  HEART: Regular rate and rhythm; No murmurs, rubs, or gallops  ABDOMEN: Soft, Nontender, Nondistended; Bowel sounds present  EXTREMITIES:  2+ Peripheral Pulses, No clubbing, cyanosis, or edema  LYMPH: No lymphadenopathy noted  SKIN: No rashes or lesions    Care Discussed with Consultants/Other Providers [ ] YES  [ ] NO

## 2024-05-10 NOTE — DISCHARGE NOTE NURSING/CASE MANAGEMENT/SOCIAL WORK - NSDCCRNAME_GEN_ALL_CORE_FT
[facility]- Rn to call to schedule homecare visit 5/11 Saturday by 1pm to teach family how to administer medication at home [facility]- Rn to call to schedule homecare visit 5/16 Thursday by 1pm to teach family how to administer medication at home-Pt son involved

## 2024-05-10 NOTE — DISCHARGE NOTE NURSING/CASE MANAGEMENT/SOCIAL WORK - PATIENT PORTAL LINK FT
You can access the FollowMyHealth Patient Portal offered by [facility] by registering at the following website: [email or web address]. By joining [facility]’s FollowMyHealth portal, you will also be able to view your health information using other applications (apps) compatible with our system. right upper arm

## 2024-05-10 NOTE — DIETITIAN INITIAL EVALUATION ADULT - NS FNS WEIGHT CHANGE REASON
Pt unable to provide her UBW. As per HIE, pt's noted with inconsistent weight hx from 118-123lbs in March and April, 2024. Most recent adm weight 114lbs./unintentional

## 2024-05-10 NOTE — DISCHARGE NOTE NURSING/CASE MANAGEMENT/SOCIAL WORK - NSSCNAMETXT_GEN_ALL_CORE
[facility] homecare   Rn to call to schedule home visit for Rn, Home pt evaluation q24hrs after d/c from hospital [facility] homecare   Rn to call to schedule home visit for Rn, Home pt evaluation q24hrs after d/c from hospital soc 5/16/24

## 2024-05-10 NOTE — DIETITIAN INITIAL EVALUATION ADULT - PERTINENT MEDS FT
MEDICATIONS  (STANDING):  aspirin enteric coated 81 milliGRAM(s) Oral daily  atorvastatin 20 milliGRAM(s) Oral at bedtime  dextrose 10% Bolus 125 milliLiter(s) IV Bolus once  dextrose 5%. 1000 milliLiter(s) (100 mL/Hr) IV Continuous <Continuous>  dextrose 5%. 1000 milliLiter(s) (50 mL/Hr) IV Continuous <Continuous>  dextrose 50% Injectable 25 Gram(s) IV Push once  dextrose 50% Injectable 12.5 Gram(s) IV Push once  ertapenem  IVPB 1000 milliGRAM(s) IV Intermittent every 24 hours  glucagon  Injectable 1 milliGRAM(s) IntraMuscular once  insulin lispro (ADMELOG) corrective regimen sliding scale   SubCutaneous three times a day before meals  insulin lispro (ADMELOG) corrective regimen sliding scale   SubCutaneous at bedtime  pantoprazole  Injectable 40 milliGRAM(s) IV Push daily    MEDICATIONS  (PRN):  acetaminophen     Tablet .. 650 milliGRAM(s) Oral every 6 hours PRN Temp greater or equal to 38C (100.4F), Mild Pain (1 - 3)  aluminum hydroxide/magnesium hydroxide/simethicone Suspension 30 milliLiter(s) Oral every 4 hours PRN Dyspepsia  dextrose Oral Gel 15 Gram(s) Oral once PRN Blood Glucose LESS THAN 70 milliGRAM(s)/deciliter  haloperidol    Injectable 1 milliGRAM(s) IV Push once PRN agitiation  melatonin 3 milliGRAM(s) Oral at bedtime PRN Insomnia  ondansetron Injectable 4 milliGRAM(s) IV Push every 8 hours PRN Nausea and/or Vomiting

## 2024-05-10 NOTE — PROGRESS NOTE ADULT - ASSESSMENT
77 y/o F PMhx Glaucoma, DM II, HTN, dementia, diverticulitis who presented w/ increased lethargy  had diverticulitis and was previously treated w/ cefpodoxime/ flagyl x 10 days and augmentin x 10 days; now w/ contained perforation & abscess    Sigmoid diverticulitis w/ contained perforation and abscess  CTAP- Persistent sigmoid diverticulitis with adjacent phlegmonous changes with small foci of extraluminal air, likely sequela of contained perforation.  repeat CT - Acute sigmoid diverticulitis with new diverticular abscess along the cephalad aspect of the vaginal cuff measuring up to 1.8 cm. Small amount of extraluminal air tracks along the lateral aspect of the sigmoid colon, slightly increased compared to prior exam. Persistent moderate left hydroureteronephrosis  per surgery no surgical intervention and no role for IR drainage  blood cultures- NGTD  switched form zosyn to ertapenem 5/9 evening for ease of administration at home  midline placed 5/24    leukocytosis increased today  Recommendations  c/w ertapenem w/ tentative last day 5/24  trend wbc  will need repeat CT prior to completion of antibotics  will need eventual colonoscopy    Geovani Goldman M.D.  [facility], Division of Infectious Diseases  [phone]  After 5pm on weekdays and all day on weekends - please call [phone]

## 2024-05-10 NOTE — DIETITIAN INITIAL EVALUATION ADULT - ADD RECOMMEND
1) Encourage PO intake and honor food preferences as able.   2) Monitor PO intake, Labs, weights, BMs, and skin integrity.   3) RD to remain available for further nutritional interventions as indicated.

## 2024-05-10 NOTE — DIETITIAN INITIAL EVALUATION ADULT - PERTINENT LABORATORY DATA
05-10    135  |  101  |  4<L>  ----------------------------<  96  3.5   |  20<L>  |  0.58    Ca    9.1      10 May 2024 05:40  Phos  2.6     05-10  Mg     1.70     05-10    POCT Blood Glucose.: 101 mg/dL (05-10-24 @ 11:32)  A1C with Estimated Average Glucose Result: 6.4 % (04-20-24 @ 05:00)  A1C with Estimated Average Glucose Result: 6.3 % (03-23-24 @ 03:10)  A1C with Estimated Average Glucose Result: 6.6 % (01-24-24 @ 07:17)

## 2024-05-10 NOTE — PROGRESS NOTE ADULT - ASSESSMENT
77 y/o F with pmhx of Diverticulitis, Type-2 DM, HTN, HLD, Glaucoma, OA of the L-knee and PERFECTO, presented to the ED for new onset lethargy. Found to have leukocytosis likely from pneumonia vs. diverticulitis. The patient does not appear lethargic. Admit for IV abx.      Problem/Plan - 1:  ·  Problem: Pneumonia, pneumococcal.   ·  Plan: - sepsis likely from diverticulitis  pneumonia ruled out   - ID fu     Problem/Plan - 2:  ·  Problem: Diverticulitis.   ·  Plan: -  CT abdomen/pelvis with contained perforation  - ivf- no indication for surgery as per colorectal   - repeat CT reviewed     Problem/Plan - 3:  ·  Problem: Hydronephrosis, left. ·  Plan: - patient with known hx of hydronephrosis  - CT chest still shows L hydronephrosis  - chronic     Problem/Plan - 4:  ·  Problem: Benign essential HTN.   ·  Plan: - hold BP meds.  restart as needed     Problem/Plan - 5:  ·  Problem: DM2 (diabetes mellitus, type 2).   ·  Plan: - low dose sliding scale insulin.      dc planning

## 2024-05-10 NOTE — DIETITIAN INITIAL EVALUATION ADULT - ORAL INTAKE PTA/DIET HISTORY
Patient reports her appetite has been fair in general, consumes 2-3 meals daily and snacks in between her meals. Pt endorses appetite has decreased few days PTA due lack of appetite 2/2 diverticulitis. Denies any specific diet followed PTA. Pt was seen by RD on 3/30, son reported Pt's UBW 130lbs in Jan.  Patient reports her appetite has been fair in general, consumes 2-3 meals daily and snacks in between her meals. Pt endorses appetite has decreased over the past couple months due lack of appetite. Denies any specific diet followed PTA. Pt was seen by RD on 3/30, son reported Pt's UBW 130lbs in Jan.

## 2024-05-10 NOTE — PROGRESS NOTE ADULT - ASSESSMENT
This is a 77 y/o F with pmhx of Diverticulitis, Type-2 DM, HTN, HLD, Glaucoma, OA of the L-knee and PERFECTO, presented to the ED for new onset lethargy. At bedside, the patient does not appear lethargic. Son is not at bedside and the patient gave me the son's phone number however as per charts it is her own phone number. The patient denies shortness of breath. She also stated that she feels fine and does not have any complaints at this time. Denies cough. "She stated that I have dementia so I do not remember why I am here." Denies abdominal pain, and diarrhea. As per ED charts, the patient was sent here for leukocytosis. She also denies having issues eating at home. (03 May 2024 06:00):  she does not know why  sheis here:  recently she was dced from the hospital : currently she has no resp complaints and is on room air     suspected pneumonia  Hx of diverticulitis  MD  HTN  Glaucoma  OA    suspected pneumonia  -NOT SURE IF SHE HAS PNEUMONIA:  SHE IS EMPIRICALLY STARTED ON ANTIBIOTICS   -SHE IS HAS No FEVER BUT wbc COUNT IS HIGH would RULE OUT uti:  DW ACP ; TO SEND UA AS WELL AS PROCAL  -oN ROOM AIR:   -CHECK LEGIONELLA    -MINIMISE ANTIBIOTICS IF WE CAN   5/4: doubt she has pneumonia:  the new ct scan with no changes from prior and no clinical symptoms too resp wise   5/5: seems stable:  no sob:  no cough : no phlegm  : on room air   5/6: seems to be doing  ok ; no sob:  no phlegm  :   5/7; doubt pneumonia: no pulm issues: on room air   5/8: pulm wise remains stable:  no sob:  no cough or phlegm    5/9: seems O K:  no sob:  no cough : no phlegm    5/10: seems to be doing  ok ; no sob:  no cough ; no phlegm  now added ertpaenem for abd sourcce  Hx of diverticulitis  -PER PRIMARY TEAM  -5/4: the ct abd showed: Persistent sigmoid diverticulitis with adjacent phlegmonous changes with small foci of extraluminal air, likely sequela of contained perforation,   in retrospect unchanged from the prior exam. No drainable fluid collection. Moderate left hydroureteronephrosis to the level of inflammatory changes in the pelvis, unchanged.  -defer to ID and surgery   5/5: no abd pain : cont antbiotics:  id following   5/6: on zosyn ; for diverticulitis   5/7: cont antibtiocs till 22nd;  per id   5/8 : rpt ct scab and showed new abscess near diverticulitis  slight increase in ai : defer to ID:  and surgery    5/9: seems OK:  ct noted:  remains on antibiotics  5/10: now on ertapenem  dm:   -MONITOR AND CONTROL   HTN  -CONTROLLED  Glaucoma  -PER DIMITRY WILSON TEAM   OA  -NO PAIN  ;      DW ACP

## 2024-05-10 NOTE — PROGRESS NOTE ADULT - SUBJECTIVE AND OBJECTIVE BOX
Date of Service: 05-10-24 @ 12:25    Patient is a 78y old  Female who presents with a chief complaint of abnormal labs (10 May 2024 08:27)      Any change in ROS: seems OK: no sob:     MEDICATIONS  (STANDING):  aspirin enteric coated 81 milliGRAM(s) Oral daily  atorvastatin 20 milliGRAM(s) Oral at bedtime  dextrose 10% Bolus 125 milliLiter(s) IV Bolus once  dextrose 5%. 1000 milliLiter(s) (100 mL/Hr) IV Continuous <Continuous>  dextrose 5%. 1000 milliLiter(s) (50 mL/Hr) IV Continuous <Continuous>  dextrose 50% Injectable 25 Gram(s) IV Push once  dextrose 50% Injectable 12.5 Gram(s) IV Push once  ertapenem  IVPB 1000 milliGRAM(s) IV Intermittent every 24 hours  glucagon  Injectable 1 milliGRAM(s) IntraMuscular once  insulin lispro (ADMELOG) corrective regimen sliding scale   SubCutaneous three times a day before meals  insulin lispro (ADMELOG) corrective regimen sliding scale   SubCutaneous at bedtime  pantoprazole  Injectable 40 milliGRAM(s) IV Push daily    MEDICATIONS  (PRN):  acetaminophen     Tablet .. 650 milliGRAM(s) Oral every 6 hours PRN Temp greater or equal to 38C (100.4F), Mild Pain (1 - 3)  aluminum hydroxide/magnesium hydroxide/simethicone Suspension 30 milliLiter(s) Oral every 4 hours PRN Dyspepsia  dextrose Oral Gel 15 Gram(s) Oral once PRN Blood Glucose LESS THAN 70 milliGRAM(s)/deciliter  haloperidol    Injectable 1 milliGRAM(s) IV Push once PRN agitiation  melatonin 3 milliGRAM(s) Oral at bedtime PRN Insomnia  ondansetron Injectable 4 milliGRAM(s) IV Push every 8 hours PRN Nausea and/or Vomiting    Vital Signs Last 24 Hrs  T(C): 36.7 (10 May 2024 11:06), Max: 37.2 (09 May 2024 22:00)  T(F): 98.1 (10 May 2024 11:06), Max: 98.9 (09 May 2024 22:00)  HR: 82 (10 May 2024 11:06) (82 - 98)  BP: 128/85 (10 May 2024 11:06) (128/85 - 147/84)  BP(mean): --  RR: 18 (10 May 2024 11:06) (18 - 18)  SpO2: 98% (10 May 2024 11:06) (94% - 98%)    Parameters below as of 10 May 2024 11:06  Patient On (Oxygen Delivery Method): room air        I&O's Summary        Physical Exam:   GENERAL: NAD, well-groomed, well-developed  HEENT: MARTINE/   Atraumatic, Normocephalic  ENMT: No tonsillar erythema, exudates, or enlargement; Moist mucous membranes, Good dentition, No lesions  NECK: Supple, No JVD, Normal thyroid  CHEST/LUNG: Clear to auscultaion  CVS: Regular rate and rhythm; No murmurs, rubs, or gallops  GI: : Soft, Nontender, Nondistended; Bowel sounds present  NERVOUS SYSTEM:  Alert & awake  EXTREMITIES: - edema  LYMPH: No lymphadenopathy noted  SKIN: No rashes or lesions  ENDOCRINOLOGY: No Thyromegaly  PSYCH: Appropriate    Labs:                              11.2   15.16 )-----------( 337      ( 10 May 2024 05:40 )             34.1                         10.9   10.42 )-----------( 267      ( 09 May 2024 05:10 )             32.3                         10.2   12.89 )-----------( 263      ( 08 May 2024 05:18 )             29.7                         10.7   15.28 )-----------( 265      ( 07 May 2024 04:30 )             31.1     05-10    135  |  101  |  4<L>  ----------------------------<  96  3.5   |  20<L>  |  0.58  05-09    137  |  105  |  4<L>  ----------------------------<  123<H>  3.5   |  21<L>  |  0.54  05-08    134<L>  |  102  |  6<L>  ----------------------------<  119<H>  3.0<L>   |  21<L>  |  0.59  05-07    134<L>  |  101  |  9   ----------------------------<  126<H>  3.2<L>   |  19<L>  |  0.61    Ca    9.1      10 May 2024 05:40  Ca    8.3<L>      09 May 2024 05:10  Phos  2.6     05-10  Phos  2.1     05-09  Mg     1.70     05-10  Mg     1.60     05-09      CAPILLARY BLOOD GLUCOSE      POCT Blood Glucose.: 101 mg/dL (10 May 2024 11:32)  POCT Blood Glucose.: 106 mg/dL (10 May 2024 07:06)  POCT Blood Glucose.: 124 mg/dL (09 May 2024 22:06)  POCT Blood Glucose.: 132 mg/dL (09 May 2024 16:27)          Urinalysis Basic - ( 10 May 2024 05:40 )    Color: x / Appearance: x / SG: x / pH: x  Gluc: 96 mg/dL / Ketone: x  / Bili: x / Urobili: x   Blood: x / Protein: x / Nitrite: x   Leuk Esterase: x / RBC: x / WBC x   Sq Epi: x / Non Sq Epi: x / Bacteria: x            RECENT CULTURES:  05-03 @ 18:52 .Blood Blood       rad< from: CT Abdomen and Pelvis w/ IV Cont (05.07.24 @ 16:45) >  not significantly changed.  PERITONEUM: As above.  VESSELS: Atherosclerotic calcifications. Left-sided infrarenal IVC.  RETROPERITONEUM/LYMPH NODES: No lymphadenopathy.  ABDOMINAL WALL: Fat-containing left inguinal hernia.  BONES: Degenerative changes. Superior endplate deformity of L2. Grade 2   anterolisthesis of L5 on S1. Bilateral L5 spondylolysis.    IMPRESSION:  *  Acute sigmoid diverticulitis with new diverticular abscess along the   cephalad aspect of the vaginal cuff measuring up to 1.8 cm.  *  Small amount of extraluminal air tracks along the lateral aspect of   the sigmoid colon, slightly increased compared to prior exam.  *  Persistent moderate left hydroureteronephrosis to the level of the   sigmoid colon.        --- End of Report ---      < end of copied text >           No growth at 5 days    05-03 @ 18:46 .Blood Blood                No growth at 5 days          RESPIRATORY CULTURES:          Studies  Chest X-RAY  CT SCAN Chest   Venous Dopplers: LE:   CT Abdomen  Others

## 2024-05-11 LAB
A1C WITH ESTIMATED AVERAGE GLUCOSE RESULT: 6.3 % — HIGH (ref 4–5.6)
ADD ON TEST-SPECIMEN IN LAB: SIGNIFICANT CHANGE UP
ANION GAP SERPL CALC-SCNC: 14 MMOL/L — SIGNIFICANT CHANGE UP (ref 7–14)
APPEARANCE UR: ABNORMAL
BACTERIA # UR AUTO: NEGATIVE /HPF — SIGNIFICANT CHANGE UP
BASOPHILS # BLD AUTO: 0.06 K/UL — SIGNIFICANT CHANGE UP (ref 0–0.2)
BASOPHILS NFR BLD AUTO: 0.5 % — SIGNIFICANT CHANGE UP (ref 0–2)
BILIRUB UR-MCNC: NEGATIVE — SIGNIFICANT CHANGE UP
BUN SERPL-MCNC: 7 MG/DL — SIGNIFICANT CHANGE UP (ref 7–23)
CALCIUM SERPL-MCNC: 8.6 MG/DL — SIGNIFICANT CHANGE UP (ref 8.4–10.5)
CAST: 1 /LPF — SIGNIFICANT CHANGE UP (ref 0–4)
CHLORIDE SERPL-SCNC: 103 MMOL/L — SIGNIFICANT CHANGE UP (ref 98–107)
CO2 SERPL-SCNC: 19 MMOL/L — LOW (ref 22–31)
COLOR SPEC: YELLOW — SIGNIFICANT CHANGE UP
CREAT SERPL-MCNC: 0.67 MG/DL — SIGNIFICANT CHANGE UP (ref 0.5–1.3)
DIFF PNL FLD: ABNORMAL
EGFR: 91 ML/MIN/1.73M2 — SIGNIFICANT CHANGE UP
EOSINOPHIL # BLD AUTO: 0.32 K/UL — SIGNIFICANT CHANGE UP (ref 0–0.5)
EOSINOPHIL NFR BLD AUTO: 2.5 % — SIGNIFICANT CHANGE UP (ref 0–6)
ESTIMATED AVERAGE GLUCOSE: 134 — SIGNIFICANT CHANGE UP
FLUAV AG NPH QL: SIGNIFICANT CHANGE UP
FLUBV AG NPH QL: SIGNIFICANT CHANGE UP
GLUCOSE BLDC GLUCOMTR-MCNC: 120 MG/DL — HIGH (ref 70–99)
GLUCOSE BLDC GLUCOMTR-MCNC: 153 MG/DL — HIGH (ref 70–99)
GLUCOSE BLDC GLUCOMTR-MCNC: 196 MG/DL — HIGH (ref 70–99)
GLUCOSE SERPL-MCNC: 96 MG/DL — SIGNIFICANT CHANGE UP (ref 70–99)
GLUCOSE UR QL: NEGATIVE MG/DL — SIGNIFICANT CHANGE UP
HCT VFR BLD CALC: 30.7 % — LOW (ref 34.5–45)
HGB BLD-MCNC: 10.3 G/DL — LOW (ref 11.5–15.5)
IANC: 8 K/UL — HIGH (ref 1.8–7.4)
IMM GRANULOCYTES NFR BLD AUTO: 0.4 % — SIGNIFICANT CHANGE UP (ref 0–0.9)
KETONES UR-MCNC: NEGATIVE MG/DL — SIGNIFICANT CHANGE UP
LEUKOCYTE ESTERASE UR-ACNC: ABNORMAL
LYMPHOCYTES # BLD AUTO: 26.5 % — SIGNIFICANT CHANGE UP (ref 13–44)
LYMPHOCYTES # BLD AUTO: 3.45 K/UL — HIGH (ref 1–3.3)
MAGNESIUM SERPL-MCNC: 1.8 MG/DL — SIGNIFICANT CHANGE UP (ref 1.6–2.6)
MCHC RBC-ENTMCNC: 29.2 PG — SIGNIFICANT CHANGE UP (ref 27–34)
MCHC RBC-ENTMCNC: 33.6 GM/DL — SIGNIFICANT CHANGE UP (ref 32–36)
MCV RBC AUTO: 87 FL — SIGNIFICANT CHANGE UP (ref 80–100)
MONOCYTES # BLD AUTO: 1.14 K/UL — HIGH (ref 0–0.9)
MONOCYTES NFR BLD AUTO: 8.8 % — SIGNIFICANT CHANGE UP (ref 2–14)
NEUTROPHILS # BLD AUTO: 8 K/UL — HIGH (ref 1.8–7.4)
NEUTROPHILS NFR BLD AUTO: 61.3 % — SIGNIFICANT CHANGE UP (ref 43–77)
NITRITE UR-MCNC: NEGATIVE — SIGNIFICANT CHANGE UP
NRBC # BLD: 0 /100 WBCS — SIGNIFICANT CHANGE UP (ref 0–0)
NRBC # FLD: 0 K/UL — SIGNIFICANT CHANGE UP (ref 0–0)
PH UR: 6 — SIGNIFICANT CHANGE UP (ref 5–8)
PHOSPHATE SERPL-MCNC: 3.4 MG/DL — SIGNIFICANT CHANGE UP (ref 2.5–4.5)
PLATELET # BLD AUTO: 295 K/UL — SIGNIFICANT CHANGE UP (ref 150–400)
POTASSIUM SERPL-MCNC: 3.5 MMOL/L — SIGNIFICANT CHANGE UP (ref 3.5–5.3)
POTASSIUM SERPL-SCNC: 3.5 MMOL/L — SIGNIFICANT CHANGE UP (ref 3.5–5.3)
PROT UR-MCNC: 30 MG/DL
RBC # BLD: 3.53 M/UL — LOW (ref 3.8–5.2)
RBC # FLD: 14.2 % — SIGNIFICANT CHANGE UP (ref 10.3–14.5)
RBC CASTS # UR COMP ASSIST: 22 /HPF — HIGH (ref 0–4)
RSV RNA NPH QL NAA+NON-PROBE: SIGNIFICANT CHANGE UP
SARS-COV-2 RNA SPEC QL NAA+PROBE: SIGNIFICANT CHANGE UP
SODIUM SERPL-SCNC: 136 MMOL/L — SIGNIFICANT CHANGE UP (ref 135–145)
SP GR SPEC: 1.02 — SIGNIFICANT CHANGE UP (ref 1–1.03)
SQUAMOUS # UR AUTO: 0 /HPF — SIGNIFICANT CHANGE UP (ref 0–5)
UROBILINOGEN FLD QL: 1 MG/DL — SIGNIFICANT CHANGE UP (ref 0.2–1)
WBC # BLD: 13.02 K/UL — HIGH (ref 3.8–10.5)
WBC # FLD AUTO: 13.02 K/UL — HIGH (ref 3.8–10.5)
WBC UR QL: 351 /HPF — HIGH (ref 0–5)

## 2024-05-11 PROCEDURE — 71045 X-RAY EXAM CHEST 1 VIEW: CPT | Mod: 26

## 2024-05-11 RX ORDER — ACETAMINOPHEN 500 MG
1000 TABLET ORAL ONCE
Refills: 0 | Status: COMPLETED | OUTPATIENT
Start: 2024-05-11 | End: 2024-05-11

## 2024-05-11 RX ORDER — ASPIRIN/CALCIUM CARB/MAGNESIUM 324 MG
81 TABLET ORAL DAILY
Refills: 0 | Status: DISCONTINUED | OUTPATIENT
Start: 2024-05-11 | End: 2024-05-15

## 2024-05-11 RX ORDER — POTASSIUM CHLORIDE 20 MEQ
20 PACKET (EA) ORAL ONCE
Refills: 0 | Status: COMPLETED | OUTPATIENT
Start: 2024-05-11 | End: 2024-05-11

## 2024-05-11 RX ADMIN — Medication 81 MILLIGRAM(S): at 11:24

## 2024-05-11 RX ADMIN — ATORVASTATIN CALCIUM 20 MILLIGRAM(S): 80 TABLET, FILM COATED ORAL at 21:08

## 2024-05-11 RX ADMIN — Medication 3 MILLIGRAM(S): at 19:45

## 2024-05-11 RX ADMIN — ERTAPENEM SODIUM 120 MILLIGRAM(S): 1 INJECTION, POWDER, LYOPHILIZED, FOR SOLUTION INTRAMUSCULAR; INTRAVENOUS at 21:08

## 2024-05-11 RX ADMIN — Medication 20 MILLIEQUIVALENT(S): at 09:22

## 2024-05-11 RX ADMIN — Medication 400 MILLIGRAM(S): at 19:29

## 2024-05-11 RX ADMIN — PANTOPRAZOLE SODIUM 40 MILLIGRAM(S): 20 TABLET, DELAYED RELEASE ORAL at 11:22

## 2024-05-11 RX ADMIN — Medication 1000 MILLIGRAM(S): at 20:00

## 2024-05-11 NOTE — PROGRESS NOTE ADULT - ASSESSMENT
79 y/o F PMhx Glaucoma, DM II, HTN, dementia, diverticulitis who presented w/ increased lethargy  had diverticulitis and was previously treated w/ cefpodoxime/ flagyl x 10 days and augmentin x 10 days; now w/ contained perforation & abscess    Sigmoid diverticulitis w/ contained perforation and abscess  CTAP- Persistent sigmoid diverticulitis with adjacent phlegmonous changes with small foci of extraluminal air, likely sequela of contained perforation.  repeat CT - Acute sigmoid diverticulitis with new diverticular abscess along the cephalad aspect of the vaginal cuff measuring up to 1.8 cm. Small amount of extraluminal air tracks along the lateral aspect of the sigmoid colon, slightly increased compared to prior exam. Persistent moderate left hydroureteronephrosis  per surgery no surgical intervention and no role for IR drainage  blood cultures- NGTD  switched form zosyn to ertapenem 5/9 evening for ease of administration at home  midline placed 5/24    leukocytosis increased today  Recommendations  c/w ertapenem w/ tentative last day 5/24  trend wbc, fine to place midline and complete outside hospital   will need repeat CT prior to completion of antibiotics  will need eventual colonoscopy    Thank you for consulting us and involving us in the management of this most interesting and challenging case.  We will follow along in the care of this patient. Please call us at [phone] or text me directly on my cell# at [phone] with any concerns.    Starting Monday Dr Geovani Goldman will be covering this patient so please contact him with further questions office [phone] or our answering service at [phone]

## 2024-05-11 NOTE — PROVIDER CONTACT NOTE (OTHER) - ACTION/TREATMENT ORDERED:
Provider notified.
Provider notified. SI and 1:1, clean out room.
provider aware, continue to monitor.

## 2024-05-11 NOTE — PROVIDER CONTACT NOTE (OTHER) - SITUATION
Pt making threats of SI.
patient was noted with worsening unsteady gait and weakness to b/l legs
Pt threatening to leave AMA, multiple attempts to walk out the door. Threats to scream and "behave bad".

## 2024-05-11 NOTE — PROVIDER CONTACT NOTE (OTHER) - ASSESSMENT
Pt states "I will take a scissor and stab myself so you can get in trouble for keeping me here. You don't believe me. I will do it."
Pt threatening to leave AMA, multiple attempts to walk out the door. Threats to scream and "behave bad". Melatonin given, no help. Redirection unsuccessful.
patient was noted with worsening unsteady gait while ambulating, from standby to two assists. patient is AOx2 at baseline, denied chest pain and SOB, no acute distress noted. upon neuro check, arms no drift, right leg drift, left leg some efforts against gravity, pupil check WNL, no facial droop noted at this time. fall precaution maintained. vitals check WNL, see flowsheet.

## 2024-05-11 NOTE — PROGRESS NOTE ADULT - ASSESSMENT
This is a 79 y/o F with pmhx of Diverticulitis, Type-2 DM, HTN, HLD, Glaucoma, OA of the L-knee and PERFECTO, presented to the ED for new onset lethargy. At bedside, the patient does not appear lethargic. Son is not at bedside and the patient gave me the son's phone number however as per charts it is her own phone number. The patient denies shortness of breath. She also stated that she feels fine and does not have any complaints at this time. Denies cough. "She stated that I have dementia so I do not remember why I am here." Denies abdominal pain, and diarrhea. As per ED charts, the patient was sent here for leukocytosis. She also denies having issues eating at home. (03 May 2024 06:00):  she does not know why  sheis here:  recently she was dced from the hospital : currently she has no resp complaints and is on room air     suspected pneumonia  Hx of diverticulitis  MD  HTN  Glaucoma  OA    suspected pneumonia  -NOT SURE IF SHE HAS PNEUMONIA:  SHE IS EMPIRICALLY STARTED ON ANTIBIOTICS   -SHE IS HAS No FEVER BUT wbc COUNT IS HIGH would RULE OUT uti:  DW ACP ; TO SEND UA AS WELL AS PROCAL  -oN ROOM AIR:   -CHECK LEGIONELLA    -MINIMISE ANTIBIOTICS IF WE CAN   5/4: doubt she has pneumonia:  the new ct scan with no changes from prior and no clinical symptoms too resp wise   5/5: seems stable:  no sob:  no cough : no phlegm  : on room air   5/6: seems to be doing  ok ; no sob:  no phlegm  :   5/7; doubt pneumonia: no pulm issues: on room air   5/8: pulm wise remains stable:  no sob:  no cough or phlegm    5/9: seems O K:  no sob:  no cough : no phlegm    5/10: seems to be doing  ok ; no sob:  no cough ; no phlegm  now added ertpaenem for abd source  5/11: cont antibiotics  no sob:  no couggh or phlegm : on room air   Hx of diverticulitis  -PER PRIMARY TEAM  -5/4: the ct abd showed: Persistent sigmoid diverticulitis with adjacent phlegmonous changes with small foci of extraluminal air, likely sequela of contained perforation,   in retrospect unchanged from the prior exam. No drainable fluid collection. Moderate left hydroureteronephrosis to the level of inflammatory changes in the pelvis, unchanged.  -defer to ID and surgery   5/5: no abd pain : cont antbiotics:  id following   5/6: on zosyn ; for diverticulitis   5/7: cont antibtiocs till 22nd;  per id   5/8 : rpt ct scab and showed new abscess near diverticulitis  slight increase in ai : defer to ID:  and surgery    5/9: seems OK:  ct noted:  remains on antibiotics  5/10: now on ertapenem  5/11; cont ertapenem : id following   dm:   -MONITOR AND CONTROL   HTN  -CONTROLLED  Glaucoma  -PER DIMITRY WILSON TEAM   OA  -NO PAIN  ;      DW ACP

## 2024-05-11 NOTE — PROGRESS NOTE ADULT - SUBJECTIVE AND OBJECTIVE BOX
Patient is a 78y old  Female who presents with a chief complaint of abnormal labs (11 May 2024 12:15)    Date of servie : 05-11-24 @ 16:08  INTERVAL HPI/OVERNIGHT EVENTS:  T(C): 36.7 (05-11-24 @ 10:18), Max: 36.8 (05-10-24 @ 21:30)  HR: 83 (05-11-24 @ 10:18) (77 - 86)  BP: 117/89 (05-11-24 @ 10:18) (117/89 - 135/87)  RR: 18 (05-11-24 @ 10:18) (17 - 18)  SpO2: 99% (05-11-24 @ 10:18) (93% - 99%)  Wt(kg): --  I&O's Summary    11 May 2024 07:01  -  11 May 2024 16:08  --------------------------------------------------------  IN: 420 mL / OUT: 450 mL / NET: -30 mL        LABS:                        10.3   13.02 )-----------( 295      ( 11 May 2024 05:16 )             30.7     05-11    136  |  103  |  7   ----------------------------<  96  3.5   |  19<L>  |  0.67    Ca    8.6      11 May 2024 05:16  Phos  3.4     05-11  Mg     1.80     05-11        Urinalysis Basic - ( 11 May 2024 05:16 )    Color: x / Appearance: x / SG: x / pH: x  Gluc: 96 mg/dL / Ketone: x  / Bili: x / Urobili: x   Blood: x / Protein: x / Nitrite: x   Leuk Esterase: x / RBC: x / WBC x   Sq Epi: x / Non Sq Epi: x / Bacteria: x      CAPILLARY BLOOD GLUCOSE      POCT Blood Glucose.: 120 mg/dL (11 May 2024 12:00)  POCT Blood Glucose.: 103 mg/dL (10 May 2024 22:16)  POCT Blood Glucose.: 117 mg/dL (10 May 2024 16:44)        Urinalysis Basic - ( 11 May 2024 05:16 )    Color: x / Appearance: x / SG: x / pH: x  Gluc: 96 mg/dL / Ketone: x  / Bili: x / Urobili: x   Blood: x / Protein: x / Nitrite: x   Leuk Esterase: x / RBC: x / WBC x   Sq Epi: x / Non Sq Epi: x / Bacteria: x        MEDICATIONS  (STANDING):  aspirin  chewable 81 milliGRAM(s) Oral daily  atorvastatin 20 milliGRAM(s) Oral at bedtime  dextrose 10% Bolus 125 milliLiter(s) IV Bolus once  dextrose 5%. 1000 milliLiter(s) (100 mL/Hr) IV Continuous <Continuous>  dextrose 5%. 1000 milliLiter(s) (50 mL/Hr) IV Continuous <Continuous>  dextrose 50% Injectable 25 Gram(s) IV Push once  dextrose 50% Injectable 12.5 Gram(s) IV Push once  ertapenem  IVPB 1000 milliGRAM(s) IV Intermittent every 24 hours  glucagon  Injectable 1 milliGRAM(s) IntraMuscular once  insulin lispro (ADMELOG) corrective regimen sliding scale   SubCutaneous three times a day before meals  insulin lispro (ADMELOG) corrective regimen sliding scale   SubCutaneous at bedtime  pantoprazole  Injectable 40 milliGRAM(s) IV Push daily    MEDICATIONS  (PRN):  acetaminophen     Tablet .. 650 milliGRAM(s) Oral every 6 hours PRN Temp greater or equal to 38C (100.4F), Mild Pain (1 - 3)  aluminum hydroxide/magnesium hydroxide/simethicone Suspension 30 milliLiter(s) Oral every 4 hours PRN Dyspepsia  dextrose Oral Gel 15 Gram(s) Oral once PRN Blood Glucose LESS THAN 70 milliGRAM(s)/deciliter  melatonin 3 milliGRAM(s) Oral at bedtime PRN Insomnia  melatonin 3 milliGRAM(s) Oral once PRN Sleep  ondansetron Injectable 4 milliGRAM(s) IV Push every 8 hours PRN Nausea and/or Vomiting          PHYSICAL EXAM:  GENERAL: NAD, well-groomed, well-developed  HEAD:  Atraumatic, Normocephalic  CHEST/LUNG: Clear to percussion bilaterally; No rales, rhonchi, wheezing, or rubs  HEART: Regular rate and rhythm; No murmurs, rubs, or gallops  ABDOMEN: Soft, Nontender, Nondistended; Bowel sounds present  EXTREMITIES:  2+ Peripheral Pulses, No clubbing, cyanosis, or edema  LYMPH: No lymphadenopathy noted  SKIN: No rashes or lesions    Care Discussed with Consultants/Other Providers [ ] YES  [ ] NO

## 2024-05-11 NOTE — CHART NOTE - NSCHARTNOTEFT_GEN_A_CORE
Provider notified by RN patient now much weaker than this morning, requiring 2 person assist to get out of bed when she normally is self-assist.    On examination at bedside, neuro examination non-focal. Patient with bilateral strength intact in both upper and lower extremities, no pronator drift, no facial asymmetry, able to follow commands. Patient A&Ox2 at baseline, no change in mental status, however, patient does appear to be more tired than on examination this AM. Provider noted that patient's skin was warm to touch. Provider requested that RN check rectal temperature as oral temperature was normal. Rectal temperature 102.    IV tylenol ordered. UA, UCx, BCx, CXR, flu/COVID ordered. Attending notified. Events signed out to night coverage ACP.

## 2024-05-11 NOTE — CHART NOTE - NSCHARTNOTEFT_GEN_A_CORE
Spoke w/ patient's family at bedside and updated with current plan of care. All other medical questions were answered to the best of my ability and teach-back offered with demonstrated understanding.

## 2024-05-11 NOTE — PROVIDER CONTACT NOTE (OTHER) - BACKGROUND
78yr old female admitted with PNA
78yr old female admitted for PNA.
patient was admitted for pneumonia with PMHx of HLD, Glaucoma, HTN, DM

## 2024-05-11 NOTE — PROGRESS NOTE ADULT - SUBJECTIVE AND OBJECTIVE BOX
Date of Service: 05-11-24 @ 10:51    Patient is a 78y old  Female who presents with a chief complaint of abnormal labs (10 May 2024 15:17)      Any change in ROS: seems OK:  no sob:  no cough : no phlegm     MEDICATIONS  (STANDING):  aspirin enteric coated 81 milliGRAM(s) Oral daily  atorvastatin 20 milliGRAM(s) Oral at bedtime  dextrose 10% Bolus 125 milliLiter(s) IV Bolus once  dextrose 5%. 1000 milliLiter(s) (100 mL/Hr) IV Continuous <Continuous>  dextrose 5%. 1000 milliLiter(s) (50 mL/Hr) IV Continuous <Continuous>  dextrose 50% Injectable 25 Gram(s) IV Push once  dextrose 50% Injectable 12.5 Gram(s) IV Push once  ertapenem  IVPB 1000 milliGRAM(s) IV Intermittent every 24 hours  glucagon  Injectable 1 milliGRAM(s) IntraMuscular once  insulin lispro (ADMELOG) corrective regimen sliding scale   SubCutaneous three times a day before meals  insulin lispro (ADMELOG) corrective regimen sliding scale   SubCutaneous at bedtime  pantoprazole  Injectable 40 milliGRAM(s) IV Push daily    MEDICATIONS  (PRN):  acetaminophen     Tablet .. 650 milliGRAM(s) Oral every 6 hours PRN Temp greater or equal to 38C (100.4F), Mild Pain (1 - 3)  aluminum hydroxide/magnesium hydroxide/simethicone Suspension 30 milliLiter(s) Oral every 4 hours PRN Dyspepsia  dextrose Oral Gel 15 Gram(s) Oral once PRN Blood Glucose LESS THAN 70 milliGRAM(s)/deciliter  melatonin 3 milliGRAM(s) Oral at bedtime PRN Insomnia  melatonin 3 milliGRAM(s) Oral once PRN Sleep  ondansetron Injectable 4 milliGRAM(s) IV Push every 8 hours PRN Nausea and/or Vomiting    Vital Signs Last 24 Hrs  T(C): 36.7 (11 May 2024 10:18), Max: 36.8 (10 May 2024 21:30)  T(F): 98.1 (11 May 2024 10:18), Max: 98.2 (10 May 2024 21:30)  HR: 83 (11 May 2024 10:18) (77 - 86)  BP: 117/89 (11 May 2024 10:18) (117/89 - 135/87)  BP(mean): --  RR: 18 (11 May 2024 10:18) (17 - 18)  SpO2: 99% (11 May 2024 10:18) (93% - 99%)    Parameters below as of 11 May 2024 10:18  Patient On (Oxygen Delivery Method): room air        I&O's Summary        Physical Exam:   GENERAL: NAD, well-groomed, well-developed  HEENT: MARTINE/   Atraumatic, Normocephalic  ENMT: No tonsillar erythema, exudates, or enlargement; Moist mucous membranes, Good dentition, No lesions  NECK: Supple, No JVD, Normal thyroid  CHEST/LUNG: Clear to auscultaion  CVS: Regular rate and rhythm; No murmurs, rubs, or gallops  GI: : Soft, Nontender, Nondistended; Bowel sounds present  NERVOUS SYSTEM:  Alert & awake  EXTREMITIES: -edema  LYMPH: No lymphadenopathy noted  SKIN: No rashes or lesions  ENDOCRINOLOGY: No Thyromegaly  PSYCH: calm     Labs:                              10.3   13.02 )-----------( 295      ( 11 May 2024 05:16 )             30.7                         11.2   15.16 )-----------( 337      ( 10 May 2024 05:40 )             34.1                         10.9   10.42 )-----------( 267      ( 09 May 2024 05:10 )             32.3                         10.2   12.89 )-----------( 263      ( 08 May 2024 05:18 )             29.7     05-11    136  |  103  |  7   ----------------------------<  96  3.5   |  19<L>  |  0.67  05-10    135  |  101  |  4<L>  ----------------------------<  96  3.5   |  20<L>  |  0.58  05-09    137  |  105  |  4<L>  ----------------------------<  123<H>  3.5   |  21<L>  |  0.54  05-08    134<L>  |  102  |  6<L>  ----------------------------<  119<H>  3.0<L>   |  21<L>  |  0.59    Ca    8.6      11 May 2024 05:16  Ca    9.1      10 May 2024 05:40  Phos  3.4     05-11  Phos  2.6     05-10  Mg     1.80     05-11  Mg     1.70     05-10      CAPILLARY BLOOD GLUCOSE      POCT Blood Glucose.: 103 mg/dL (10 May 2024 22:16)  POCT Blood Glucose.: 117 mg/dL (10 May 2024 16:44)  POCT Blood Glucose.: 101 mg/dL (10 May 2024 11:32)          Urinalysis Basic - ( 11 May 2024 05:16 )    Color: x / Appearance: x / SG: x / pH: x  Gluc: 96 mg/dL / Ketone: x  / Bili: x / Urobili: x   Blood: x / Protein: x / Nitrite: x   Leuk Esterase: x / RBC: x / WBC x   Sq Epi: x / Non Sq Epi: x / Bacteria: x      rad< from: CT Angio Chest PE Protocol w/ IV Cont (05.03.24 @ 02:00) >  similar to priors. Areas of mosaic attenuation, suggestive of air   trapping.  PLEURA: No pleural effusion.  MEDIASTINUM AND OLIVE: No lymphadenopathy.  VESSELS: Coronary artery and aortic calcifications.  HEART: Heart size is normal. No pericardial effusion.  CHEST WALL AND LOWER NECK: Within normal limits.  VISUALIZED UPPER ABDOMEN: Mild left hydronephrosis and right renal cyst,   stable.  BONES: Mild degenerative changes.    IMPRESSION:  No pulmonary embolus.  Bilateral nodular and tree-in-bud opacities, similar to priors.  Partially imaged mild left hydronephrosis, similar to 4/19/2024.    --- End of Report ---          JONA PERALTA MD; Resident Radiologist  This document has been electronically signed.  SLIME BROWN MD; Attending Radiologist  This document has been electronically signed. May  3 2024  3:57AM    < end of copied text >        RECENT CULTURES:        RESPIRATORY CULTURES:          Studies  Chest X-RAY  CT SCAN Chest   Venous Dopplers: LE:   CT Abdomen  Others

## 2024-05-11 NOTE — PROGRESS NOTE ADULT - SUBJECTIVE AND OBJECTIVE BOX
[facility] DIV[facility] of INFECTIOUS DISEASE  Riccardo Okeefe MD PhD, Vianca Nascimento MD, Richa Andres MD, Jarrett Lee MD, Geovani Goldman MD  and providing coverage with Mya Dumas MD  Providing Infectious Disease Consultations at [facility], [facility], [place], [place], [facility], [facility]'s    Office# [phone] to schedule follow up appointments  Answering Service for urgent calls or New Consults [phone]  Cell# to text for urgent issues Riccardo Okeefe [phone]     infectious diseases progress note:    TY DAVIS is a 78y y. o. Female patient    Overnight and events of the last 24hrs reviewed    Allergies    No Known Allergies    Intolerances        ANTIBIOTICS/RELEVANT:  antimicrobials  ertapenem  IVPB 1000 milliGRAM(s) IV Intermittent every 24 hours    immunologic:    OTHER:  acetaminophen     Tablet .. 650 milliGRAM(s) Oral every 6 hours PRN  aluminum hydroxide/magnesium hydroxide/simethicone Suspension 30 milliLiter(s) Oral every 4 hours PRN  aspirin  chewable 81 milliGRAM(s) Oral daily  atorvastatin 20 milliGRAM(s) Oral at bedtime  dextrose 10% Bolus 125 milliLiter(s) IV Bolus once  dextrose 5%. 1000 milliLiter(s) IV Continuous <Continuous>  dextrose 5%. 1000 milliLiter(s) IV Continuous <Continuous>  dextrose 50% Injectable 25 Gram(s) IV Push once  dextrose 50% Injectable 12.5 Gram(s) IV Push once  dextrose Oral Gel 15 Gram(s) Oral once PRN  glucagon  Injectable 1 milliGRAM(s) IntraMuscular once  insulin lispro (ADMELOG) corrective regimen sliding scale   SubCutaneous three times a day before meals  insulin lispro (ADMELOG) corrective regimen sliding scale   SubCutaneous at bedtime  melatonin 3 milliGRAM(s) Oral at bedtime PRN  melatonin 3 milliGRAM(s) Oral once PRN  ondansetron Injectable 4 milliGRAM(s) IV Push every 8 hours PRN  pantoprazole  Injectable 40 milliGRAM(s) IV Push daily      Objective:  Vital Signs Last 24 Hrs  T(C): 36.7 (11 May 2024 10:18), Max: 36.8 (10 May 2024 21:30)  T(F): 98.1 (11 May 2024 10:18), Max: 98.2 (10 May 2024 21:30)  HR: 83 (11 May 2024 10:18) (77 - 86)  BP: 117/89 (11 May 2024 10:18) (117/89 - 135/87)  BP(mean): --  RR: 18 (11 May 2024 10:18) (17 - 18)  SpO2: 99% (11 May 2024 10:18) (93% - 99%)    Parameters below as of 11 May 2024 10:18  Patient On (Oxygen Delivery Method): room air        T(C): 36.7 (05-11-24 @ 10:18), Max: 37.2 (05-09-24 @ 22:00)  T(C): 36.7 (05-11-24 @ 10:18), Max: 37.2 (05-09-24 @ 05:35)  T(C): 36.7 (05-11-24 @ 10:18), Max: 37.3 (05-07-24 @ 18:00)    PHYSICAL EXAM:  HEENT: NC atraumatic  Neck: supple  Respiratory: no accessory muscle use, breathing comfortably  Cardiovascular: distant  Gastrointestinal: normal appearing, nondistended  Extremities: no clubbing, no cyanosis,        LABS:                          10.3   13.02 )-----------( 295      ( 11 May 2024 05:16 )             30.7       WBC  13.02 05-11 @ 05:16  15.16 05-10 @ 05:40  10.42 05-09 @ 05:10  12.89 05-08 @ 05:18  15.28 05-07 @ 04:30  11.40 05-06 @ 05:40  8.07 05-05 @ 05:30      05-11    136  |  103  |  7   ----------------------------<  96  3.5   |  19<L>  |  0.67    Ca    8.6      11 May 2024 05:16  Phos  3.4     05-11  Mg     1.80     05-11        Creatinine: 0.67 mg/dL (05-11-24 @ 05:16)  Creatinine: 0.58 mg/dL (05-10-24 @ 05:40)  Creatinine: 0.54 mg/dL (05-09-24 @ 05:10)  Creatinine: 0.59 mg/dL (05-08-24 @ 05:18)  Creatinine: 0.61 mg/dL (05-07-24 @ 04:30)  Creatinine: 0.76 mg/dL (05-06-24 @ 05:40)  Creatinine: 0.69 mg/dL (05-05-24 @ 05:30)        Urinalysis Basic - ( 11 May 2024 05:16 )    Color: x / Appearance: x / SG: x / pH: x  Gluc: 96 mg/dL / Ketone: x  / Bili: x / Urobili: x   Blood: x / Protein: x / Nitrite: x   Leuk Esterase: x / RBC: x / WBC x   Sq Epi: x / Non Sq Epi: x / Bacteria: x            INFLAMMATORY MARKERS      MICROBIOLOGY:              RADIOLOGY & ADDITIONAL STUDIES:

## 2024-05-11 NOTE — PROGRESS NOTE ADULT - ASSESSMENT
79 y/o F with pmhx of Diverticulitis, Type-2 DM, HTN, HLD, Glaucoma, OA of the L-knee and PERFECTO, presented to the ED for new onset lethargy. Found to have leukocytosis likely from pneumonia vs. diverticulitis. The patient does not appear lethargic. Admit for IV abx.      Problem/Plan - 1:·  Problem: Pneumonia, pneumococcal.   ·  Plan: - sepsis likely from diverticulitis  pneumonia ruled out   - ID fu     Problem/Plan - 2:  ·  Problem: Diverticulitis.   ·  Plan: -  CT abdomen/pelvis with contained perforation  - ivf- no indication for surgery as per colorectal   - repeat CT reviewed     Problem/Plan - 3:  ·  Problem: Hydronephrosis, left. ·  Plan: - patient with known hx of hydronephrosis  - CT chest still shows L hydronephrosis  - chronic     Problem/Plan - 4:  ·  Problem: Benign essential HTN.   ·  Plan: - hold BP meds.  restart as needed     Problem/Plan - 5:  ·  Problem: DM2 (diabetes mellitus, type 2).   ·  Plan: - low dose sliding scale insulin.      dc planning

## 2024-05-12 PROBLEM — R26.81 UNSTEADY GAIT: Status: ACTIVE | Noted: 2024-04-26

## 2024-05-12 PROBLEM — R48.2 APRAXIA: Status: ACTIVE | Noted: 2024-04-26

## 2024-05-12 PROBLEM — J18.9 PNEUMONIA: Status: ACTIVE | Noted: 2024-04-26

## 2024-05-12 LAB
ANION GAP SERPL CALC-SCNC: 14 MMOL/L — SIGNIFICANT CHANGE UP (ref 7–14)
B PERT DNA SPEC QL NAA+PROBE: SIGNIFICANT CHANGE UP
B PERT+PARAPERT DNA PNL SPEC NAA+PROBE: SIGNIFICANT CHANGE UP
BASOPHILS # BLD AUTO: 0.04 K/UL — SIGNIFICANT CHANGE UP (ref 0–0.2)
BASOPHILS NFR BLD AUTO: 0.3 % — SIGNIFICANT CHANGE UP (ref 0–2)
BORDETELLA PARAPERTUSSIS (RAPRVP): SIGNIFICANT CHANGE UP
BUN SERPL-MCNC: 6 MG/DL — LOW (ref 7–23)
C PNEUM DNA SPEC QL NAA+PROBE: SIGNIFICANT CHANGE UP
CALCIUM SERPL-MCNC: 9.2 MG/DL — SIGNIFICANT CHANGE UP (ref 8.4–10.5)
CHLORIDE SERPL-SCNC: 102 MMOL/L — SIGNIFICANT CHANGE UP (ref 98–107)
CO2 SERPL-SCNC: 20 MMOL/L — LOW (ref 22–31)
CREAT SERPL-MCNC: 0.61 MG/DL — SIGNIFICANT CHANGE UP (ref 0.5–1.3)
EGFR: 93 ML/MIN/1.73M2 — SIGNIFICANT CHANGE UP
EOSINOPHIL # BLD AUTO: 0.18 K/UL — SIGNIFICANT CHANGE UP (ref 0–0.5)
EOSINOPHIL NFR BLD AUTO: 1.5 % — SIGNIFICANT CHANGE UP (ref 0–6)
FLUAV SUBTYP SPEC NAA+PROBE: SIGNIFICANT CHANGE UP
FLUBV RNA SPEC QL NAA+PROBE: SIGNIFICANT CHANGE UP
GLUCOSE BLDC GLUCOMTR-MCNC: 140 MG/DL — HIGH (ref 70–99)
GLUCOSE SERPL-MCNC: 145 MG/DL — HIGH (ref 70–99)
HADV DNA SPEC QL NAA+PROBE: SIGNIFICANT CHANGE UP
HCOV 229E RNA SPEC QL NAA+PROBE: SIGNIFICANT CHANGE UP
HCOV HKU1 RNA SPEC QL NAA+PROBE: SIGNIFICANT CHANGE UP
HCOV NL63 RNA SPEC QL NAA+PROBE: SIGNIFICANT CHANGE UP
HCOV OC43 RNA SPEC QL NAA+PROBE: SIGNIFICANT CHANGE UP
HCT VFR BLD CALC: 32.1 % — LOW (ref 34.5–45)
HGB BLD-MCNC: 11 G/DL — LOW (ref 11.5–15.5)
HMPV RNA SPEC QL NAA+PROBE: SIGNIFICANT CHANGE UP
HPIV1 RNA SPEC QL NAA+PROBE: SIGNIFICANT CHANGE UP
HPIV2 RNA SPEC QL NAA+PROBE: SIGNIFICANT CHANGE UP
HPIV3 RNA SPEC QL NAA+PROBE: SIGNIFICANT CHANGE UP
HPIV4 RNA SPEC QL NAA+PROBE: SIGNIFICANT CHANGE UP
IANC: 8.08 K/UL — HIGH (ref 1.8–7.4)
IMM GRANULOCYTES NFR BLD AUTO: 0.5 % — SIGNIFICANT CHANGE UP (ref 0–0.9)
LYMPHOCYTES # BLD AUTO: 2.93 K/UL — SIGNIFICANT CHANGE UP (ref 1–3.3)
LYMPHOCYTES # BLD AUTO: 23.8 % — SIGNIFICANT CHANGE UP (ref 13–44)
M PNEUMO DNA SPEC QL NAA+PROBE: SIGNIFICANT CHANGE UP
MAGNESIUM SERPL-MCNC: 1.7 MG/DL — SIGNIFICANT CHANGE UP (ref 1.6–2.6)
MCHC RBC-ENTMCNC: 29.5 PG — SIGNIFICANT CHANGE UP (ref 27–34)
MCHC RBC-ENTMCNC: 34.3 GM/DL — SIGNIFICANT CHANGE UP (ref 32–36)
MCV RBC AUTO: 86.1 FL — SIGNIFICANT CHANGE UP (ref 80–100)
MONOCYTES # BLD AUTO: 1 K/UL — HIGH (ref 0–0.9)
MONOCYTES NFR BLD AUTO: 8.1 % — SIGNIFICANT CHANGE UP (ref 2–14)
NEUTROPHILS # BLD AUTO: 8.08 K/UL — HIGH (ref 1.8–7.4)
NEUTROPHILS NFR BLD AUTO: 65.8 % — SIGNIFICANT CHANGE UP (ref 43–77)
NRBC # BLD: 0 /100 WBCS — SIGNIFICANT CHANGE UP (ref 0–0)
NRBC # FLD: 0 K/UL — SIGNIFICANT CHANGE UP (ref 0–0)
PHOSPHATE SERPL-MCNC: 2.6 MG/DL — SIGNIFICANT CHANGE UP (ref 2.5–4.5)
PLATELET # BLD AUTO: 299 K/UL — SIGNIFICANT CHANGE UP (ref 150–400)
POTASSIUM SERPL-MCNC: 3.6 MMOL/L — SIGNIFICANT CHANGE UP (ref 3.5–5.3)
POTASSIUM SERPL-SCNC: 3.6 MMOL/L — SIGNIFICANT CHANGE UP (ref 3.5–5.3)
RAPID RVP RESULT: DETECTED
RBC # BLD: 3.73 M/UL — LOW (ref 3.8–5.2)
RBC # FLD: 14.5 % — SIGNIFICANT CHANGE UP (ref 10.3–14.5)
RSV RNA SPEC QL NAA+PROBE: SIGNIFICANT CHANGE UP
RV+EV RNA SPEC QL NAA+PROBE: DETECTED
SARS-COV-2 RNA SPEC QL NAA+PROBE: SIGNIFICANT CHANGE UP
SODIUM SERPL-SCNC: 136 MMOL/L — SIGNIFICANT CHANGE UP (ref 135–145)
WBC # BLD: 12.29 K/UL — HIGH (ref 3.8–10.5)
WBC # FLD AUTO: 12.29 K/UL — HIGH (ref 3.8–10.5)

## 2024-05-12 RX ORDER — SODIUM CHLORIDE 9 MG/ML
1000 INJECTION INTRAMUSCULAR; INTRAVENOUS; SUBCUTANEOUS
Refills: 0 | Status: DISCONTINUED | OUTPATIENT
Start: 2024-05-12 | End: 2024-05-15

## 2024-05-12 RX ORDER — ERTAPENEM SODIUM 1 G/1
1000 INJECTION, POWDER, LYOPHILIZED, FOR SOLUTION INTRAMUSCULAR; INTRAVENOUS EVERY 24 HOURS
Refills: 0 | Status: DISCONTINUED | OUTPATIENT
Start: 2024-05-12 | End: 2024-05-15

## 2024-05-12 RX ORDER — SODIUM CHLORIDE 9 MG/ML
500 INJECTION INTRAMUSCULAR; INTRAVENOUS; SUBCUTANEOUS ONCE
Refills: 0 | Status: COMPLETED | OUTPATIENT
Start: 2024-05-12 | End: 2024-05-12

## 2024-05-12 RX ADMIN — SODIUM CHLORIDE 50 MILLILITER(S): 9 INJECTION INTRAMUSCULAR; INTRAVENOUS; SUBCUTANEOUS at 09:39

## 2024-05-12 RX ADMIN — ERTAPENEM SODIUM 120 MILLIGRAM(S): 1 INJECTION, POWDER, LYOPHILIZED, FOR SOLUTION INTRAMUSCULAR; INTRAVENOUS at 21:27

## 2024-05-12 RX ADMIN — SODIUM CHLORIDE 500 MILLILITER(S): 9 INJECTION INTRAMUSCULAR; INTRAVENOUS; SUBCUTANEOUS at 08:32

## 2024-05-12 RX ADMIN — PANTOPRAZOLE SODIUM 40 MILLIGRAM(S): 20 TABLET, DELAYED RELEASE ORAL at 11:37

## 2024-05-12 RX ADMIN — Medication 81 MILLIGRAM(S): at 11:38

## 2024-05-12 RX ADMIN — ATORVASTATIN CALCIUM 20 MILLIGRAM(S): 80 TABLET, FILM COATED ORAL at 21:30

## 2024-05-12 NOTE — PROGRESS NOTE ADULT - SUBJECTIVE AND OBJECTIVE BOX
Patient is a 78y old  Female who presents with a chief complaint of abnormal labs (12 May 2024 12:22)    Date of servie : 05-12-24 @ 13:31  INTERVAL HPI/OVERNIGHT EVENTS:  T(C): 37.4 (05-12-24 @ 05:36), Max: 38.9 (05-11-24 @ 19:08)  HR: 87 (05-12-24 @ 10:09) (82 - 98)  BP: 148/87 (05-12-24 @ 10:09) (118/68 - 148/87)  RR: 17 (05-12-24 @ 10:09) (15 - 18)  SpO2: 97% (05-12-24 @ 10:09) (94% - 99%)  Wt(kg): --  I&O's Summary    11 May 2024 07:01  -  12 May 2024 07:00  --------------------------------------------------------  IN: 540 mL / OUT: 570 mL / NET: -30 mL        LABS:                        11.0   12.29 )-----------( 299      ( 12 May 2024 05:32 )             32.1     05-12    136  |  102  |  6<L>  ----------------------------<  145<H>  3.6   |  20<L>  |  0.61    Ca    9.2      12 May 2024 05:32  Phos  2.6     05-12  Mg     1.70     05-12        Urinalysis Basic - ( 12 May 2024 05:32 )    Color: x / Appearance: x / SG: x / pH: x  Gluc: 145 mg/dL / Ketone: x  / Bili: x / Urobili: x   Blood: x / Protein: x / Nitrite: x   Leuk Esterase: x / RBC: x / WBC x   Sq Epi: x / Non Sq Epi: x / Bacteria: x      CAPILLARY BLOOD GLUCOSE      POCT Blood Glucose.: 140 mg/dL (12 May 2024 07:05)  POCT Blood Glucose.: 196 mg/dL (11 May 2024 21:06)  POCT Blood Glucose.: 153 mg/dL (11 May 2024 16:37)        Urinalysis Basic - ( 12 May 2024 05:32 )    Color: x / Appearance: x / SG: x / pH: x  Gluc: 145 mg/dL / Ketone: x  / Bili: x / Urobili: x   Blood: x / Protein: x / Nitrite: x   Leuk Esterase: x / RBC: x / WBC x   Sq Epi: x / Non Sq Epi: x / Bacteria: x        MEDICATIONS  (STANDING):  aspirin  chewable 81 milliGRAM(s) Oral daily  atorvastatin 20 milliGRAM(s) Oral at bedtime  dextrose 10% Bolus 125 milliLiter(s) IV Bolus once  dextrose 5%. 1000 milliLiter(s) (100 mL/Hr) IV Continuous <Continuous>  dextrose 5%. 1000 milliLiter(s) (50 mL/Hr) IV Continuous <Continuous>  dextrose 50% Injectable 25 Gram(s) IV Push once  dextrose 50% Injectable 12.5 Gram(s) IV Push once  ertapenem  IVPB 1000 milliGRAM(s) IV Intermittent every 24 hours  glucagon  Injectable 1 milliGRAM(s) IntraMuscular once  pantoprazole  Injectable 40 milliGRAM(s) IV Push daily  sodium chloride 0.9%. 1000 milliLiter(s) (50 mL/Hr) IV Continuous <Continuous>    MEDICATIONS  (PRN):  acetaminophen     Tablet .. 650 milliGRAM(s) Oral every 6 hours PRN Temp greater or equal to 38C (100.4F), Mild Pain (1 - 3)  aluminum hydroxide/magnesium hydroxide/simethicone Suspension 30 milliLiter(s) Oral every 4 hours PRN Dyspepsia  dextrose Oral Gel 15 Gram(s) Oral once PRN Blood Glucose LESS THAN 70 milliGRAM(s)/deciliter  melatonin 3 milliGRAM(s) Oral at bedtime PRN Insomnia  melatonin 3 milliGRAM(s) Oral once PRN Sleep  ondansetron Injectable 4 milliGRAM(s) IV Push every 8 hours PRN Nausea and/or Vomiting          PHYSICAL EXAM:  GENERAL: NAD, well-groomed, well-developed  HEAD:  Atraumatic, Normocephalic  CHEST/LUNG: Clear to percussion bilaterally; No rales, rhonchi, wheezing, or rubs  HEART: Regular rate and rhythm; No murmurs, rubs, or gallops  ABDOMEN: Soft, Nontender, Nondistended; Bowel sounds present  EXTREMITIES:  2+ Peripheral Pulses, No clubbing, cyanosis, or edema  LYMPH: No lymphadenopathy noted  SKIN: No rashes or lesions    Care Discussed with Consultants/Other Providers [ ] YES  [ ] NO

## 2024-05-12 NOTE — PROGRESS NOTE ADULT - ASSESSMENT
77 y/o F with pmhx of Diverticulitis, Type-2 DM, HTN, HLD, Glaucoma, OA of the L-knee and PERFECTO, presented to the ED for new onset lethargy. Found to have leukocytosis likely from pneumonia vs. diverticulitis. The patient does not appear lethargic. Admit for IV abx.      Problem/Plan - 1:·  Problem: Pneumonia, pneumococcal.   ·  Plan: - sepsis likely from diverticulitis  pneumonia ruled out   - ID fu     Problem/Plan - 2:·  Problem: Diverticulitis.   ·  Plan: -  CT abdomen/pelvis with contained perforation  - ivf- no indication for surgery as per colorectal   - repeat CT today   - ivf    Problem/Plan - 3:  ·  Problem: Hydronephrosis, left. ·  Plan: - patient with known hx of hydronephrosis  - CT chest still shows L hydronephrosis  - chronic     Problem/Plan - 4:  ·  Problem: Benign essential HTN.   ·  Plan: - hold BP meds.  restart as needed     Problem/Plan - 5:  ·  Problem: DM2 (diabetes mellitus, type 2).   ·  Plan: - low dose sliding scale insulin.      dc planning

## 2024-05-12 NOTE — PROGRESS NOTE ADULT - SUBJECTIVE AND OBJECTIVE BOX
Date of Service: 05-12-24 @ 10:25    Patient is a 78y old  Female who presents with a chief complaint of abnormal labs (11 May 2024 16:07)      Any change in ROS: lyingin bed:  with no sob : on room air:  noti n any distress     MEDICATIONS  (STANDING):  aspirin  chewable 81 milliGRAM(s) Oral daily  atorvastatin 20 milliGRAM(s) Oral at bedtime  dextrose 10% Bolus 125 milliLiter(s) IV Bolus once  dextrose 5%. 1000 milliLiter(s) (100 mL/Hr) IV Continuous <Continuous>  dextrose 5%. 1000 milliLiter(s) (50 mL/Hr) IV Continuous <Continuous>  dextrose 50% Injectable 25 Gram(s) IV Push once  dextrose 50% Injectable 12.5 Gram(s) IV Push once  ertapenem  IVPB 1000 milliGRAM(s) IV Intermittent every 24 hours  glucagon  Injectable 1 milliGRAM(s) IntraMuscular once  pantoprazole  Injectable 40 milliGRAM(s) IV Push daily  sodium chloride 0.9%. 1000 milliLiter(s) (50 mL/Hr) IV Continuous <Continuous>    MEDICATIONS  (PRN):  acetaminophen     Tablet .. 650 milliGRAM(s) Oral every 6 hours PRN Temp greater or equal to 38C (100.4F), Mild Pain (1 - 3)  aluminum hydroxide/magnesium hydroxide/simethicone Suspension 30 milliLiter(s) Oral every 4 hours PRN Dyspepsia  dextrose Oral Gel 15 Gram(s) Oral once PRN Blood Glucose LESS THAN 70 milliGRAM(s)/deciliter  melatonin 3 milliGRAM(s) Oral at bedtime PRN Insomnia  melatonin 3 milliGRAM(s) Oral once PRN Sleep  ondansetron Injectable 4 milliGRAM(s) IV Push every 8 hours PRN Nausea and/or Vomiting    Vital Signs Last 24 Hrs  T(C): 37.4 (12 May 2024 05:36), Max: 38.9 (11 May 2024 19:08)  T(F): 99.4 (12 May 2024 05:36), Max: 102 (11 May 2024 19:08)  HR: 87 (12 May 2024 10:09) (82 - 98)  BP: 148/87 (12 May 2024 10:09) (118/68 - 148/87)  BP(mean): --  RR: 17 (12 May 2024 10:09) (15 - 18)  SpO2: 97% (12 May 2024 10:09) (94% - 99%)    Parameters below as of 12 May 2024 10:09  Patient On (Oxygen Delivery Method): room air        I&O's Summary    11 May 2024 07:01  -  12 May 2024 07:00  --------------------------------------------------------  IN: 540 mL / OUT: 570 mL / NET: -30 mL          Physical Exam:   GENERAL: NAD, well-groomed, well-developed  HEENT: MARTINE/   Atraumatic, Normocephalic  ENMT: No tonsillar erythema, exudates, or enlargement; Moist mucous membranes, Good dentition, No lesions  NECK: Supple, No JVD, Normal thyroid  CHEST/LUNG: Clear to auscultaion- no wheezing  CVS: Regular rate and rhythm; No murmurs, rubs, or gallops  GI: : Soft, Nontender, Nondistended; Bowel sounds present  NERVOUS SYSTEM:  Alert & Oriented X1-2  EXTREMITIES: - edema  LYMPH: No lymphadenopathy noted  SKIN: No rashes or lesions  ENDOCRINOLOGY: No Thyromegaly  PSYCH: Appropriate    Labs:                              11.0   12.29 )-----------( 299      ( 12 May 2024 05:32 )             32.1                         10.3   13.02 )-----------( 295      ( 11 May 2024 05:16 )             30.7                         11.2   15.16 )-----------( 337      ( 10 May 2024 05:40 )             34.1                         10.9   10.42 )-----------( 267      ( 09 May 2024 05:10 )             32.3     05-12    136  |  102  |  6<L>  ----------------------------<  145<H>  3.6   |  20<L>  |  0.61  05-11    136  |  103  |  7   ----------------------------<  96  3.5   |  19<L>  |  0.67  05-10    135  |  101  |  4<L>  ----------------------------<  96  3.5   |  20<L>  |  0.58  05-09    137  |  105  |  4<L>  ----------------------------<  123<H>  3.5   |  21<L>  |  0.54    Ca    9.2      12 May 2024 05:32  Ca    8.6      11 May 2024 05:16  Phos  2.6     05-12  Phos  3.4     05-11  Mg     1.70     05-12  Mg     1.80     05-11      CAPILLARY BLOOD GLUCOSE      POCT Blood Glucose.: 140 mg/dL (12 May 2024 07:05)  POCT Blood Glucose.: 196 mg/dL (11 May 2024 21:06)  POCT Blood Glucose.: 153 mg/dL (11 May 2024 16:37)  POCT Blood Glucose.: 120 mg/dL (11 May 2024 12:00)          Urinalysis Basic - ( 12 May 2024 05:32 )    Color: x / Appearance: x / SG: x / pH: x  Gluc: 145 mg/dL / Ketone: x  / Bili: x / Urobili: x   Blood: x / Protein: x / Nitrite: x   Leuk Esterase: x / RBC: x / WBC x   Sq Epi: x / Non Sq Epi: x / Bacteria: x      rad< from: CT Angio Chest PE Protocol w/ IV Cont (05.03.24 @ 02:00) >  CT Angiography of the Chest.  Sagittal and coronal reformats were performed as well as 3D (MIP)   reconstructions.    FINDINGS:  PULMONARY ANGIOGRAM: Adequate pulmonary artery opacification without   visualized pulmonary embolus.  LUNGS AND AIRWAYS: Patent central airways.  Bilateral nodular and   tree-in-bud opacities most prominent in the bilateral upper lobes,   similar to priors. Areas of mosaic attenuation, suggestive of air   trapping.  PLEURA: No pleural effusion.  MEDIASTINUM AND OLIVE: No lymphadenopathy.  VESSELS: Coronary artery and aortic calcifications.  HEART: Heart size is normal. No pericardial effusion.  CHEST WALL AND LOWER NECK: Within normal limits.  VISUALIZED UPPER ABDOMEN: Mild left hydronephrosis and right renal cyst,   stable.  BONES: Mild degenerative changes.    IMPRESSION:  No pulmonary embolus.  Bilateral nodular and tree-in-bud opacities, similar to priors.  Partially imaged mild left hydronephrosis, similar to 4/19/2024.    --- End of Report ---          JONA PERALTA MD; Resident Radiologist  This document has been electronically signed.  SLIME BROWN MD; Attending Radiologist  This document has been electronically signed. May  3 2024  3:57AM    < end of copied text >        RECENT CULTURES:        RESPIRATORY CULTURES:          Studies  Chest X-RAY  CT SCAN Chest   Venous Dopplers: LE:   CT Abdomen  Others

## 2024-05-12 NOTE — COUNSELING
[Normal Weight - ( BMI  <25 )] : normal weight - ( BMI  <25 ) [Continue diet as tolerated] : continue diet as tolerated based on goals of care [Non - Smoker] : non-smoker [Smoke/CO Detectors] : smoke/CO detectors [Remove clutter and unsafe carpeting to avoid falls] : remove clutter and unsafe carpeting to avoid falls [] : foot exam [Completed] : Aspirin use discussion completed [Improve mobility] : improve mobility [Maintain functional ability] : maintain functional ability [Discussed disease trajectory with patient/caregiver] : discussed disease trajectory with patient/caregiver [Advanced Directives discussed: ____] : Advanced directives discussed: [unfilled] [Completed Healthcare Proxy] : completed healthcare proxy [Full Code] : Code Status: Full Code [No Limitations] : Treatment Guidelines: No limitations [Trial of Intubation] : Intubation: Trial of Intubation [_____] : HCP: [unfilled] [FreeTextEntry3] : low sodium diet  [FreeTextEntry4] : Stay healthy, improve low mood. [de-identified] : MOLST deferred until next visit.

## 2024-05-12 NOTE — PROGRESS NOTE ADULT - SUBJECTIVE AND OBJECTIVE BOX
[facility] DIVISION of INFECTIOUS DISEASE  Riccardo Okeefe MD PhD, Vianca Nascimento MD, Richa Andres MD, Jarrett Lee MD, Geovani Goldman MD  and providing coverage with Mya Dumas MD  Providing Infectious Disease Consultations at [facility], [facility], [place], [place], [facility], [facility]    Office# [phone] to schedule follow up appointments  Answering Service for urgent calls or New Consults [phone]  Cell# to text for urgent issues Riccardo Okeefe [phone]     infectious diseases progress note:    TY DAVIS is a 78y y. o. Female patient    Overnight and events of the last 24hrs reviewed    Allergies    No Known Allergies    Intolerances        ANTIBIOTICS/RELEVANT:  antimicrobials  ertapenem  IVPB 1000 milliGRAM(s) IV Intermittent every 24 hours    immunologic:    OTHER:  acetaminophen     Tablet .. 650 milliGRAM(s) Oral every 6 hours PRN  aluminum hydroxide/magnesium hydroxide/simethicone Suspension 30 milliLiter(s) Oral every 4 hours PRN  aspirin  chewable 81 milliGRAM(s) Oral daily  atorvastatin 20 milliGRAM(s) Oral at bedtime  dextrose 10% Bolus 125 milliLiter(s) IV Bolus once  dextrose 5%. 1000 milliLiter(s) IV Continuous <Continuous>  dextrose 5%. 1000 milliLiter(s) IV Continuous <Continuous>  dextrose 50% Injectable 25 Gram(s) IV Push once  dextrose 50% Injectable 12.5 Gram(s) IV Push once  dextrose Oral Gel 15 Gram(s) Oral once PRN  glucagon  Injectable 1 milliGRAM(s) IntraMuscular once  melatonin 3 milliGRAM(s) Oral at bedtime PRN  melatonin 3 milliGRAM(s) Oral once PRN  ondansetron Injectable 4 milliGRAM(s) IV Push every 8 hours PRN  pantoprazole  Injectable 40 milliGRAM(s) IV Push daily  sodium chloride 0.9%. 1000 milliLiter(s) IV Continuous <Continuous>      Objective:  Vital Signs Last 24 Hrs  T(C): 37.4 (12 May 2024 05:36), Max: 38.9 (11 May 2024 19:08)  T(F): 99.4 (12 May 2024 05:36), Max: 102 (11 May 2024 19:08)  HR: 87 (12 May 2024 10:09) (82 - 98)  BP: 148/87 (12 May 2024 10:09) (118/68 - 148/87)  BP(mean): --  RR: 17 (12 May 2024 10:09) (15 - 18)  SpO2: 97% (12 May 2024 10:09) (94% - 99%)    Parameters below as of 12 May 2024 10:09  Patient On (Oxygen Delivery Method): room air        T(C): 37.4 (05-12-24 @ 05:36), Max: 38.9 (05-11-24 @ 19:08)  T(C): 37.4 (05-12-24 @ 05:36), Max: 38.9 (05-11-24 @ 19:08)  T(C): 37.4 (05-12-24 @ 05:36), Max: 38.9 (05-11-24 @ 19:08)    PHYSICAL EXAM:  HEENT: NC atraumatic  Neck: supple  Respiratory: no accessory muscle use, breathing comfortably  Cardiovascular: distant  Gastrointestinal: normal appearing, nondistended  Extremities: no clubbing, no cyanosis,        LABS:                          11.0   12.29 )-----------( 299      ( 12 May 2024 05:32 )             32.1       WBC  12.29 05-12 @ 05:32  13.02 05-11 @ 05:16  15.16 05-10 @ 05:40  10.42 05-09 @ 05:10  12.89 05-08 @ 05:18  15.28 05-07 @ 04:30  11.40 05-06 @ 05:40      05-12    136  |  102  |  6<L>  ----------------------------<  145<H>  3.6   |  20<L>  |  0.61    Ca    9.2      12 May 2024 05:32  Phos  2.6     05-12  Mg     1.70     05-12        Creatinine: 0.61 mg/dL (05-12-24 @ 05:32)  Creatinine: 0.67 mg/dL (05-11-24 @ 05:16)  Creatinine: 0.58 mg/dL (05-10-24 @ 05:40)  Creatinine: 0.54 mg/dL (05-09-24 @ 05:10)  Creatinine: 0.59 mg/dL (05-08-24 @ 05:18)  Creatinine: 0.61 mg/dL (05-07-24 @ 04:30)  Creatinine: 0.76 mg/dL (05-06-24 @ 05:40)        Urinalysis Basic - ( 12 May 2024 05:32 )    Color: x / Appearance: x / SG: x / pH: x  Gluc: 145 mg/dL / Ketone: x  / Bili: x / Urobili: x   Blood: x / Protein: x / Nitrite: x   Leuk Esterase: x / RBC: x / WBC x   Sq Epi: x / Non Sq Epi: x / Bacteria: x            INFLAMMATORY MARKERS      MICROBIOLOGY:      Influenza B (RapRVP): NotDetec (05-11 @ 19:57)      Entero/Rhinovirus (RapRVP): Detected (05.11.24 @ 19:57)        RADIOLOGY & ADDITIONAL STUDIES:

## 2024-05-12 NOTE — REASON FOR VISIT
[Post-hospitalization from ___ Hospital] : Post-hospitalization from [unfilled] Hospital [Admitted on: ___] : The patient was admitted on [unfilled] [Discharged on ___] : discharged on [unfilled] [Post Hospitalization] : a post hospitalization visit [Pre-Visit Preparation] : pre-visit preparation was done [FreeTextEntry2] : chart review

## 2024-05-12 NOTE — CHART NOTE - NSCHARTNOTEFT_GEN_A_CORE
Provider called spectra [id number] to request that full RVP added on to flu/COVID from 5/11.     Repeat CT A/P w/ contrast ordered per discussion with medicine attending. Provider notified ID attending of plan.     Provider called patient's son, Trevin, for consent to CT A/P with contrast; Trevin in agreement with plan. All questions answered. Consent for CT w/ contrast placed in chart.

## 2024-05-12 NOTE — HISTORY OF PRESENT ILLNESS
[Patient] : patient [Family Member] : family member [FreeTextEntry1] : dementia [FreeTextEntry2] : PMH: T2DM, dementia  Purpose of Visit: Post-hospitalization visit   Relevant Prior Hospitalizations: 4/19-22/2024 [facility]: Diverticulitis, pneumonia, sepsis, s/p Zosyn -> Augmentin 3/22-26/2024 [facility]: Diverticulitis, cipro/flagyl, recc outpt colonoscopy 1/22-24/2024 [facility]: pneumonia & sepsis s/p azithro/CTX, d/c with levofloxacin 12/2023 [facility]: Cholecystectomy   Social/Home Environment:  -Lives in home with her  and son Trevin -HHA: [employer] insurance, unclear [employer] but [employer], 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 -Son acts as CDPAS for dad. -Dr. Mandy Martinez PCP, neurology Dr. Suman Padilla [phone], F [phone], psychiatrist  Today's Visit & Review: -Interviewed son with patient, HHA present -Should see GI post abx for colonoscopy. Will approach PCP about referral. -4 days remaining Augmentin -Faxing PT and OT orders to [facility], who typically refer to Home Care in response. Unable to send home care referral or home PT/OT directly due to need for [facility] auth. -Awaiting call from  regarding increased aide hours from 35 to 56. Son will call  this coming week about verdict. Reviewed process, including the need to appeal and escalate to [place] state court if needed. -Recorded weight 1/2024 160lbs, which seems unlikely. 4/2024 weight 123 lbs. 120lbs today -X-ray chest due to coarse diffuse rales. Pt with chills, T 98.9 F. Denies SOB. Occasional dry cough.  -Dementia: Prominent memory impairment. Memantine ER 21mg daily. Unsteady gait with 3 falls in past year, no severe injury. Losing weight recently. Insomnia, not on meds. New trial mirtazapine 15mg QHS. -Vertigo: Active in past, meclizine 12.5mg TID PRN -Fuch's corneal dystrophy: Uncommon condition with change in corneal compartment dimensions causing glare & optical phenomena. Brinzolamide eyedrops. -Cataracts -Seasonal rhinitis/cough: Claritin 10mg daily, Robitussin DM, Flonase -Dental: Top & bottom dentures -CAD: Metoprolol ER 50mg daily, simvastatin 20mg QHS, ASA 81mg -HTN: Losartan, nifedipine ER  -GERD: Pantoprazole 20mg daily -Diverticulitis: Noted on CT 3/2024, cipro/flagyl, repeat hosp 4/2024 s/p Zosyn -> Augmentin. Advised to f/u outpt for colonscopy to r/o malignancy -Cholecystitis: S/p cholecystectomy 11/2023, no plans to return to surgeon at this time. -T2DM: Unclear control. Metformin ER 2000mg daily. -Osteoporosis: Alendronate 70mg weekly, oyster calcium/D3 500mg-5mcg daily  -Screening/Preventive: Vaccines: Covid, flu, shingles, PNA   DME: Shower chair and bars in the bathroom

## 2024-05-12 NOTE — PROGRESS NOTE ADULT - ASSESSMENT
77 y/o F PMhx Glaucoma, DM II, HTN, dementia, diverticulitis who presented w/ increased lethargy  had diverticulitis and was previously treated w/ cefpodoxime/ flagyl x 10 days and augmentin x 10 days; now w/ contained perforation & abscess    Sigmoid diverticulitis w/ contained perforation and abscess  CTAP 5/3- Persistent sigmoid diverticulitis with adjacent phlegmonous changes with small foci of extraluminal air, likely sequela of contained perforation.  repeat CT 5/7 - Acute sigmoid diverticulitis with new diverticular abscess along the cephalad aspect of the vaginal cuff measuring up to 1.8 cm.   per surgery no surgical intervention and no role for IR drainage  blood cultures- NGTD  switched form zosyn to ertapenem 5/9 evening for ease of administration at home  - w/ tentative last day 5/24  midline placed 5/24  CT-ordered 5/12-follow results  will need eventual colonoscopy    Fever  leukocytosis and temp to 102, pt is now Entero/Rhinovirus (RapRVP): Detected (05.11.24 @ 19:57) and a bit confused accusing staff of lying.   unclear if fever driven by viral process or issues with site control of intra-abdominal abscess  -continue ertapenem and follow results of repeat CT    Thank you for consulting us and involving us in the management of this most interesting and challenging case.  We will follow along in the care of this patient. Please call us at [phone] or text me directly on my cell# at [phone] with any concerns.    Starting Monday Dr Geovani Goldman will be covering this patient so please contact him with further questions office [phone] or our answering service at [phone]

## 2024-05-12 NOTE — REVIEW OF SYSTEMS
[Muscle Weakness] : muscle weakness [Confusion] : confusion [Memory Loss] : memory loss [Unsteady Walking] : ataxia [Depression] : depression [Negative] : Heme/Lymph [Muscle Pain] : no muscle pain [Back Pain] : no back pain [Headache] : no headache [Dizziness] : no dizziness [Fainting] : no fainting [FreeTextEntry7] : Poor appetite since 12/2023 after gallbladder surgery [FreeTextEntry9] : unsteady gait [de-identified] : complains of feeling depressed, quiet, not motivated, poor appetite since surgery in 12/2023

## 2024-05-12 NOTE — PROGRESS NOTE ADULT - ASSESSMENT
This is a 79 y/o F with pmhx of Diverticulitis, Type-2 DM, HTN, HLD, Glaucoma, OA of the L-knee and PERFECTO, presented to the ED for new onset lethargy. At bedside, the patient does not appear lethargic. Son is not at bedside and the patient gave me the son's phone number however as per charts it is her own phone number. The patient denies shortness of breath. She also stated that she feels fine and does not have any complaints at this time. Denies cough. "She stated that I have dementia so I do not remember why I am here." Denies abdominal pain, and diarrhea. As per ED charts, the patient was sent here for leukocytosis. She also denies having issues eating at home. (03 May 2024 06:00):  she does not know why  sheis here:  recently she was dced from the hospital : currently she has no resp complaints and is on room air     suspected pneumonia  Hx of diverticulitis  MD  HTN  Glaucoma  OA    suspected pneumonia  -NOT SURE IF SHE HAS PNEUMONIA:  SHE IS EMPIRICALLY STARTED ON ANTIBIOTICS   -SHE IS HAS No FEVER BUT wbc COUNT IS HIGH would RULE OUT uti:  DW ACP ; TO SEND UA AS WELL AS PROCAL  -oN ROOM AIR:   -CHECK LEGIONELLA    -MINIMISE ANTIBIOTICS IF WE CAN   5/4: doubt she has pneumonia:  the new ct scan with no changes from prior and no clinical symptoms too resp wise   5/5: seems stable:  no sob:  no cough : no phlegm  : on room air   5/6: seems to be doing  ok ; no sob:  no phlegm  :   5/7; doubt pneumonia: no pulm issues: on room air   5/8: pulm wise remains stable:  no sob:  no cough or phlegm    5/9: seems O K:  no sob:  no cough : no phlegm    5/10: seems to be doing  ok ; no sob:  no cough ; no phlegm  now added ertpaenem for abd source  5/11: cont antibiotics  no sob:  no cough or phlegm : on room air   5/12: seems to be doing  ok : no sob: noc ugh : no phlegm  on room air   Hx of diverticulitis  -PER PRIMARY TEAM  -5/4: the ct abd showed: Persistent sigmoid diverticulitis with adjacent phlegmonous changes with small foci of extraluminal air, likely sequela of contained perforation,   in retrospect unchanged from the prior exam. No drainable fluid collection. Moderate left hydroureteronephrosis to the level of inflammatory changes in the pelvis, unchanged.  -defer to ID and surgery   5/5: no abd pain : cont antbiotics:  id following   5/6: on zosyn ; for diverticulitis   5/7: cont antibtiocs till 22nd;  per id   5/8 : rpt ct scab and showed new abscess near diverticulitis  slight increase in ai : defer to ID:  and surgery    5/9: seems OK:  ct noted:  remains on antibiotics  5/10: now on ertapenem  5/11; cont ertapenem : id following   5/12: cont antibiotics  no new change   dm:   -MONITOR AND CONTROL   HTN  -CONTROLLED  Glaucoma  -PER DIMITRY WILSON TEAM   OA  -NO PAIN  ;      MICHAEL ACP

## 2024-05-12 NOTE — HEALTH RISK ASSESSMENT
[Independent] : feeding [Some assistance needed] : using telephone [Full assistance needed] : managing medications [] : managing finances [Any fall with injury in past year] : Patient reported fall with injury in the past year [Yes] : The patient has visual impairment [HRA Reviewed] : Health risk assessment reviewed [Two or more falls in past year] : Patient reported two or more falls in the past year [[facility]] : 14

## 2024-05-12 NOTE — PHYSICAL EXAM
[No Acute Distress] : no acute distress [Normal Sclera/Conjunctiva] : normal sclera/conjunctiva [PERRL] : pupils equal, round and reactive to light [EOMI] : extra ocular movement intact [Normal Outer Ear/Nose] : the ears and nose were normal in appearance [Normal Oropharynx] : the oropharynx was normal [Normal TMs] : both tympanic membranes were normal [Normal Nasal Mucosa] : the nasal mucosa was normal [No JVD] : no jugular venous distention [Supple] : the neck was supple [No LAD] : no lymphadenopathy [Thyroid Normal, No Nodules] : the thyroid was normal and there were no nodules present [No Respiratory Distress] : no respiratory distress [Clear to Auscultation] : lungs were clear to auscultation bilaterally [No Accessory Muscle Use] : no accessory muscle use [Normal Rate] : heart rate was normal  [Regular Rhythm] : with a regular rhythm [Normal S1, S2] : normal S1 and S2 [No Murmurs] : no murmurs heard [Breast Exam Declined] : patient declined to have breast exam done [Normal Bowel Sounds] : normal bowel sounds [Non Tender] : non-tender [Soft] : abdomen soft [Not Distended] : not distended [Patient Refused] : rectal exam was refused by the patient [Normal Supraclavicular Nodes] : no supraclavicular lymphadenopathy [Normal Axillary Nodes] : no axillary lymphadenopathy [Normal Anterior Cervical Nodes] : no anterior cervical lymphadenopathy [No CVA Tenderness] : no ~M costovertebral angle tenderness [No Spinal Tenderness] : no spinal tenderness [No Clubbing, Cyanosis] : no clubbing  or cyanosis of the fingernails [No Rash] : no rash [No Skin Lesions] : no skin lesions [Cranial Nerves Intact] : cranial nerves 2-12 were intact [No Gross Sensory Deficits] : no gross sensory deficits [Normal Affect] : the affect was normal [Kyphosis] : no kyphosis present [Foot Ulcers] : no foot ulcers [de-identified] : Remote and recent memory not intact but able to answer most questions  [de-identified] : unsteady gait [de-identified] : AOx2, remote and recent memory not intact.

## 2024-05-13 LAB
ANION GAP SERPL CALC-SCNC: 14 MMOL/L — SIGNIFICANT CHANGE UP (ref 7–14)
BASOPHILS # BLD AUTO: 0.05 K/UL — SIGNIFICANT CHANGE UP (ref 0–0.2)
BASOPHILS NFR BLD AUTO: 0.4 % — SIGNIFICANT CHANGE UP (ref 0–2)
BUN SERPL-MCNC: 3 MG/DL — LOW (ref 7–23)
CALCIUM SERPL-MCNC: 8.9 MG/DL — SIGNIFICANT CHANGE UP (ref 8.4–10.5)
CHLORIDE SERPL-SCNC: 103 MMOL/L — SIGNIFICANT CHANGE UP (ref 98–107)
CO2 SERPL-SCNC: 21 MMOL/L — LOW (ref 22–31)
CREAT SERPL-MCNC: 0.52 MG/DL — SIGNIFICANT CHANGE UP (ref 0.5–1.3)
EGFR: 96 ML/MIN/1.73M2 — SIGNIFICANT CHANGE UP
EOSINOPHIL # BLD AUTO: 0.19 K/UL — SIGNIFICANT CHANGE UP (ref 0–0.5)
EOSINOPHIL NFR BLD AUTO: 1.6 % — SIGNIFICANT CHANGE UP (ref 0–6)
GLUCOSE BLDC GLUCOMTR-MCNC: 104 MG/DL — HIGH (ref 70–99)
GLUCOSE SERPL-MCNC: 105 MG/DL — HIGH (ref 70–99)
HCT VFR BLD CALC: 32.6 % — LOW (ref 34.5–45)
HGB BLD-MCNC: 11.2 G/DL — LOW (ref 11.5–15.5)
IANC: 8.06 K/UL — HIGH (ref 1.8–7.4)
IMM GRANULOCYTES NFR BLD AUTO: 0.5 % — SIGNIFICANT CHANGE UP (ref 0–0.9)
LYMPHOCYTES # BLD AUTO: 2.64 K/UL — SIGNIFICANT CHANGE UP (ref 1–3.3)
LYMPHOCYTES # BLD AUTO: 22.2 % — SIGNIFICANT CHANGE UP (ref 13–44)
MAGNESIUM SERPL-MCNC: 1.8 MG/DL — SIGNIFICANT CHANGE UP (ref 1.6–2.6)
MCHC RBC-ENTMCNC: 29.7 PG — SIGNIFICANT CHANGE UP (ref 27–34)
MCHC RBC-ENTMCNC: 34.4 GM/DL — SIGNIFICANT CHANGE UP (ref 32–36)
MCV RBC AUTO: 86.5 FL — SIGNIFICANT CHANGE UP (ref 80–100)
MONOCYTES # BLD AUTO: 0.87 K/UL — SIGNIFICANT CHANGE UP (ref 0–0.9)
MONOCYTES NFR BLD AUTO: 7.3 % — SIGNIFICANT CHANGE UP (ref 2–14)
NEUTROPHILS # BLD AUTO: 8.06 K/UL — HIGH (ref 1.8–7.4)
NEUTROPHILS NFR BLD AUTO: 68 % — SIGNIFICANT CHANGE UP (ref 43–77)
NRBC # BLD: 0 /100 WBCS — SIGNIFICANT CHANGE UP (ref 0–0)
NRBC # FLD: 0 K/UL — SIGNIFICANT CHANGE UP (ref 0–0)
PHOSPHATE SERPL-MCNC: 2.5 MG/DL — SIGNIFICANT CHANGE UP (ref 2.5–4.5)
PLATELET # BLD AUTO: 302 K/UL — SIGNIFICANT CHANGE UP (ref 150–400)
POTASSIUM SERPL-MCNC: 3.3 MMOL/L — LOW (ref 3.5–5.3)
POTASSIUM SERPL-SCNC: 3.3 MMOL/L — LOW (ref 3.5–5.3)
RBC # BLD: 3.77 M/UL — LOW (ref 3.8–5.2)
RBC # FLD: 14.3 % — SIGNIFICANT CHANGE UP (ref 10.3–14.5)
SODIUM SERPL-SCNC: 138 MMOL/L — SIGNIFICANT CHANGE UP (ref 135–145)
WBC # BLD: 11.87 K/UL — HIGH (ref 3.8–10.5)
WBC # FLD AUTO: 11.87 K/UL — HIGH (ref 3.8–10.5)

## 2024-05-13 PROCEDURE — 74177 CT ABD & PELVIS W/CONTRAST: CPT | Mod: 26

## 2024-05-13 RX ORDER — POTASSIUM CHLORIDE 20 MEQ
40 PACKET (EA) ORAL ONCE
Refills: 0 | Status: COMPLETED | OUTPATIENT
Start: 2024-05-13 | End: 2024-05-13

## 2024-05-13 RX ADMIN — PANTOPRAZOLE SODIUM 40 MILLIGRAM(S): 20 TABLET, DELAYED RELEASE ORAL at 11:37

## 2024-05-13 RX ADMIN — ATORVASTATIN CALCIUM 20 MILLIGRAM(S): 80 TABLET, FILM COATED ORAL at 21:21

## 2024-05-13 RX ADMIN — Medication 3 MILLIGRAM(S): at 21:20

## 2024-05-13 RX ADMIN — Medication 81 MILLIGRAM(S): at 11:49

## 2024-05-13 RX ADMIN — Medication 40 MILLIEQUIVALENT(S): at 11:38

## 2024-05-13 RX ADMIN — ERTAPENEM SODIUM 120 MILLIGRAM(S): 1 INJECTION, POWDER, LYOPHILIZED, FOR SOLUTION INTRAMUSCULAR; INTRAVENOUS at 17:15

## 2024-05-13 NOTE — PROGRESS NOTE ADULT - ASSESSMENT
This is a 77 y/o F with pmhx of Diverticulitis, Type-2 DM, HTN, HLD, Glaucoma, OA of the L-knee and PERFECTO, presented to the ED for new onset lethargy. At bedside, the patient does not appear lethargic. Son is not at bedside and the patient gave me the son's phone number however as per charts it is her own phone number. The patient denies shortness of breath. She also stated that she feels fine and does not have any complaints at this time. Denies cough. "She stated that I have dementia so I do not remember why I am here." Denies abdominal pain, and diarrhea. As per ED charts, the patient was sent here for leukocytosis. She also denies having issues eating at home. (03 May 2024 06:00):  she does not know why  sheis here:  recently she was dced from the hospital : currently she has no resp complaints and is on room air     suspected pneumonia  Hx of diverticulitis  MD  HTN  Glaucoma  OA    suspected pneumonia  -NOT SURE IF SHE HAS PNEUMONIA:  SHE IS EMPIRICALLY STARTED ON ANTIBIOTICS   -SHE IS HAS No FEVER BUT wbc COUNT IS HIGH would RULE OUT uti:  DW ACP ; TO SEND UA AS WELL AS PROCAL  -oN ROOM AIR:   -CHECK LEGIONELLA    -MINIMISE ANTIBIOTICS IF WE CAN   5/4: doubt she has pneumonia:  the new ct scan with no changes from prior and no clinical symptoms too resp wise   5/5: seems stable:  no sob:  no cough : no phlegm  : on room air   5/6: seems to be doing  ok ; no sob:  no phlegm  :   5/7; doubt pneumonia: no pulm issues: on room air   5/8: pulm wise remains stable:  no sob:  no cough or phlegm    5/9: seems O K:  no sob:  no cough : no phlegm    5/10: seems to be doing  ok ; no sob:  no cough ; no phlegm  now added ertpaenem for abd source  5/11: cont antibiotics  no sob:  no cough or phlegm : on room air   5/12: seems to be doing  ok : no sob: noc ugh : no phlegm  on room air   5/13: seems stable: no sob:  noc ugh : no phlegm    Hx of diverticulitis  -PER PRIMARY TEAM  -5/4: the ct abd showed: Persistent sigmoid diverticulitis with adjacent phlegmonous changes with small foci of extraluminal air, likely sequela of contained perforation,   in retrospect unchanged from the prior exam. No drainable fluid collection. Moderate left hydroureteronephrosis to the level of inflammatory changes in the pelvis, unchanged.  -defer to ID and surgery   5/5: no abd pain : cont antbiotics:  id following   5/6: on zosyn ; for diverticulitis   5/7: cont antibtiocs till 22nd;  per id   5/8 : rpt ct scab and showed new abscess near diverticulitis  slight increase in ai : defer to ID:  and surgery    5/9: seems OK:  ct noted:  remains on antibiotics  5/10: now on ertapenem  5/11; cont ertapenem : id following   5/12: cont antibiotics  no new change   5/13: cont antibiotics  id following  dm:   -MONITOR AND CONTROL   HTN  -CONTROLLED  Glaucoma  -PER DIMITRY WILSON TEAM   OA  -NO PAIN  ;      MICHAEL ACP

## 2024-05-13 NOTE — PROGRESS NOTE ADULT - ASSESSMENT
77 y/o F with pmhx of Diverticulitis, Type-2 DM, HTN, HLD, Glaucoma, OA of the L-knee and PERFECTO, presented to the ED for new onset lethargy. Found to have leukocytosis likely from pneumonia vs. diverticulitis. The patient does not appear lethargic. Admit for IV abx.      Problem/Plan - 1:·  Problem: Pneumonia, pneumococcal.   ·  Plan: - sepsis likely from diverticulitis  pneumonia ruled out   - ID fu     Problem/Plan - 2:·  Problem: Diverticulitis.   ·  Plan: -  CT abdomen/pelvis with contained perforation  - ivf- no indication for surgery as per colorectal   - fu  repeat CT   - ivf    Problem/Plan - 3:  ·  Problem: Hydronephrosis, left. ·  Plan: - patient with known hx of hydronephrosis  - CT chest still shows L hydronephrosis  - chronic   Problem/Plan - 4:  ·  Problem: Benign essential HTN.   ·  Plan: - hold BP meds.  restart as needed     Problem/Plan - 5:  ·  Problem: DM2 (diabetes mellitus, type 2).   ·  Plan: - low dose sliding scale insulin.      dc planning

## 2024-05-13 NOTE — PROGRESS NOTE ADULT - ASSESSMENT
79 y/o F with pmhx of Diverticulitis, Type-2 DM, HTN, HLD, Glaucoma, OA of the L-knee and PERFECTO, initially presented to the ED for new onset lethargy. Found to have leukocytosis likely from pneumonia vs. diverticulitis. Diverticulitis previously treated w/ cefpodoxime/ flagyl x 10 days and augmentin x 10 days; now w/ contained perforation & abscess. Repeat CT scan today with notable decreased abscess adjacent to vaginal cuff, however 2 new perisigmoid abscesses. Patient hemodynamically stable, on Ertapenem with downtrending WBC. Las fever on 5/11, fever workup positive for Rhinovirus.    Recommendations:  - low fiber diet  - c/w IV abx per ID  -       [facility]  [id number]   79 y/o F with pmhx of Type-2 DM, HTN, HLD, Glaucoma, OA of the L-knee and PERFECTO, and Diverticulitis previously treated w/ cefpodoxime/ flagyl x 10 days and augmentin x 10 days; now w/ contained perforation & abscess. Repeat CT scan today with notable decreased abscess adjacent to vaginal cuff, however 2 new perisigmoid abscesses. Patient hemodynamically stable, on Ertapenem with downtrending WBC. Las fever on 5/11, RVP+ for Rhinovirus.    Recommendations:  - New perirectal abscesses ?response to to IV antibiotics, will need to consider a surgical intervention  - Please keep patient NPO/IVF  - Get AM labs --> trending WBC  - c/w IV abx per ID   - Will talk with family regarding consideration of possible surgical intervention vs. outpatient follow up with antibiotics for 2 weeks.    Discussed with attending Dr. Lr    [facility]  [id number]

## 2024-05-13 NOTE — PROGRESS NOTE ADULT - SUBJECTIVE AND OBJECTIVE BOX
[facility], Division of Infectious Diseases  JORGE LUIS Chaves Y. Patel, S. Shah, G. Jones  [phone]  ([phone] - weekdays after 5pm and weekends)    Name: TY DAVIS  Age/Gender: 78y Female  MRN: [id number]    Interval History:  Notes reviewed.   No concerning overnight events.  Afebrile.     Allergies: No Known Allergies      Objective:  Vitals:   T(F): 98 (05-13-24 @ 09:19), Max: 98.8 (05-12-24 @ 14:00)  HR: 96 (05-13-24 @ 09:19) (77 - 96)  BP: 120/80 (05-13-24 @ 09:19) (120/80 - 153/90)  RR: 17 (05-13-24 @ 09:19) (16 - 18)  SpO2: 96% (05-13-24 @ 09:19) (96% - 97%)  Physical Examination:  General: no acute distress  HEENT: anicteric  Cardio: S1, S2, normal rate, no murmur  Resp: clear to auscultation bilaterally  Abd: soft, NT, ND  Ext: no LE edema  Skin: warm, dry  Psych: appropriate affect and mood for situation    Laboratory Studies:  CBC:                       11.2   11.87 )-----------( 302      ( 13 May 2024 05:20 )             32.6     WBC Trend:  11.87 05-13-24 @ 05:20  12.29 05-12-24 @ 05:32  13.02 05-11-24 @ 05:16  15.16 05-10-24 @ 05:40  10.42 05-09-24 @ 05:10  12.89 05-08-24 @ 05:18  15.28 05-07-24 @ 04:30    CMP: 05-13    138  |  103  |  3<L>  ----------------------------<  105<H>  3.3<L>   |  21<L>  |  0.52    Ca    8.9      13 May 2024 05:20  Phos  2.5     05-13  Mg     1.80     05-13            Urinalysis Basic - ( 13 May 2024 05:20 )    Color: x / Appearance: x / SG: x / pH: x  Gluc: 105 mg/dL / Ketone: x  / Bili: x / Urobili: x   Blood: x / Protein: x / Nitrite: x   Leuk Esterase: x / RBC: x / WBC x   Sq Epi: x / Non Sq Epi: x / Bacteria: x      Microbiology: reviewed     Culture - Urine (collected 05-12-24 @ 00:45)  Source: OR Collect Bladder (from O.R.)  Preliminary Report (05-13-24 @ 10:35):    <10,000 CFU/ml Gram positive organisms    Culture - Blood (collected 05-11-24 @ 19:25)  Source: .Blood Blood-Peripheral  Preliminary Report (05-13-24 @ 02:03):    No growth at 24 hours    Culture - Blood (collected 05-11-24 @ 19:18)  Source: .Blood Blood-Peripheral  Preliminary Report (05-13-24 @ 02:02):    No growth at 24 hours    Culture - Blood (collected 05-03-24 @ 18:52)  Source: .Blood Blood  Final Report (05-09-24 @ 01:00):    No growth at 5 days    Culture - Blood (collected 05-03-24 @ 18:46)  Source: .Blood Blood  Final Report (05-09-24 @ 01:00):    No growth at 5 days    Culture - Blood (collected 05-02-24 @ 22:47)  Source: .Blood Blood-Peripheral  Final Report (05-08-24 @ 11:00):    No growth at 5 days    Culture - Blood (collected 05-02-24 @ 22:35)  Source: .Blood Blood-Peripheral  Final Report (05-08-24 @ 11:00):    No growth at 5 days        Radiology: reviewed     Medications:  acetaminophen     Tablet .. 650 milliGRAM(s) Oral every 6 hours PRN  aluminum hydroxide/magnesium hydroxide/simethicone Suspension 30 milliLiter(s) Oral every 4 hours PRN  aspirin  chewable 81 milliGRAM(s) Oral daily  atorvastatin 20 milliGRAM(s) Oral at bedtime  dextrose 10% Bolus 125 milliLiter(s) IV Bolus once  dextrose 5%. 1000 milliLiter(s) IV Continuous <Continuous>  dextrose 5%. 1000 milliLiter(s) IV Continuous <Continuous>  dextrose 50% Injectable 25 Gram(s) IV Push once  dextrose 50% Injectable 12.5 Gram(s) IV Push once  dextrose Oral Gel 15 Gram(s) Oral once PRN  ertapenem  IVPB 1000 milliGRAM(s) IV Intermittent every 24 hours  glucagon  Injectable 1 milliGRAM(s) IntraMuscular once  melatonin 3 milliGRAM(s) Oral at bedtime PRN  melatonin 3 milliGRAM(s) Oral once PRN  ondansetron Injectable 4 milliGRAM(s) IV Push every 8 hours PRN  pantoprazole  Injectable 40 milliGRAM(s) IV Push daily  sodium chloride 0.9%. 1000 milliLiter(s) IV Continuous <Continuous>    Antimicrobials:  ertapenem  IVPB 1000 milliGRAM(s) IV Intermittent every 24 hours   [facility], Division of Infectious Diseases  JORGE LUIS Chaves Y. Patel, S. Shah, G. Jones  [phone]  ([phone] - weekdays after 5pm and weekends)    Name: TY DAVIS  Age/Gender: 78y Female  MRN: [id number]    Interval History:  Notes reviewed.   No concerning overnight events.  Afebrile.   denies abd pain    Allergies: No Known Allergies      Objective:  Vitals:   T(F): 98 (05-13-24 @ 09:19), Max: 98.8 (05-12-24 @ 14:00)  HR: 96 (05-13-24 @ 09:19) (77 - 96)  BP: 120/80 (05-13-24 @ 09:19) (120/80 - 153/90)  RR: 17 (05-13-24 @ 09:19) (16 - 18)  SpO2: 96% (05-13-24 @ 09:19) (96% - 97%)  Physical Examination:  General: no acute distress  HEENT: anicteric  Cardio: S1, S2, normal rate  Resp: clear to auscultation anteriorly  Abd: soft, NT, ND  Ext: no LE edema  Skin: warm, dry    Laboratory Studies:  CBC:                       11.2   11.87 )-----------( 302      ( 13 May 2024 05:20 )             32.6     WBC Trend:  11.87 05-13-24 @ 05:20  12.29 05-12-24 @ 05:32  13.02 05-11-24 @ 05:16  15.16 05-10-24 @ 05:40  10.42 05-09-24 @ 05:10  12.89 05-08-24 @ 05:18  15.28 05-07-24 @ 04:30    CMP: 05-13    138  |  103  |  3<L>  ----------------------------<  105<H>  3.3<L>   |  21<L>  |  0.52    Ca    8.9      13 May 2024 05:20  Phos  2.5     05-13  Mg     1.80     05-13            Urinalysis Basic - ( 13 May 2024 05:20 )    Color: x / Appearance: x / SG: x / pH: x  Gluc: 105 mg/dL / Ketone: x  / Bili: x / Urobili: x   Blood: x / Protein: x / Nitrite: x   Leuk Esterase: x / RBC: x / WBC x   Sq Epi: x / Non Sq Epi: x / Bacteria: x      Microbiology: reviewed     Culture - Urine (collected 05-12-24 @ 00:45)  Source: OR Collect Bladder (from O.R.)  Preliminary Report (05-13-24 @ 10:35):    <10,000 CFU/ml Gram positive organisms    Culture - Blood (collected 05-11-24 @ 19:25)  Source: .Blood Blood-Peripheral  Preliminary Report (05-13-24 @ 02:03):    No growth at 24 hours    Culture - Blood (collected 05-11-24 @ 19:18)  Source: .Blood Blood-Peripheral  Preliminary Report (05-13-24 @ 02:02):    No growth at 24 hours    Culture - Blood (collected 05-03-24 @ 18:52)  Source: .Blood Blood  Final Report (05-09-24 @ 01:00):    No growth at 5 days    Culture - Blood (collected 05-03-24 @ 18:46)  Source: .Blood Blood  Final Report (05-09-24 @ 01:00):    No growth at 5 days    Culture - Blood (collected 05-02-24 @ 22:47)  Source: .Blood Blood-Peripheral  Final Report (05-08-24 @ 11:00):    No growth at 5 days    Culture - Blood (collected 05-02-24 @ 22:35)  Source: .Blood Blood-Peripheral  Final Report (05-08-24 @ 11:00):    No growth at 5 days        Radiology: reviewed     Medications:  acetaminophen     Tablet .. 650 milliGRAM(s) Oral every 6 hours PRN  aluminum hydroxide/magnesium hydroxide/simethicone Suspension 30 milliLiter(s) Oral every 4 hours PRN  aspirin  chewable 81 milliGRAM(s) Oral daily  atorvastatin 20 milliGRAM(s) Oral at bedtime  dextrose 10% Bolus 125 milliLiter(s) IV Bolus once  dextrose 5%. 1000 milliLiter(s) IV Continuous <Continuous>  dextrose 5%. 1000 milliLiter(s) IV Continuous <Continuous>  dextrose 50% Injectable 25 Gram(s) IV Push once  dextrose 50% Injectable 12.5 Gram(s) IV Push once  dextrose Oral Gel 15 Gram(s) Oral once PRN  ertapenem  IVPB 1000 milliGRAM(s) IV Intermittent every 24 hours  glucagon  Injectable 1 milliGRAM(s) IntraMuscular once  melatonin 3 milliGRAM(s) Oral at bedtime PRN  melatonin 3 milliGRAM(s) Oral once PRN  ondansetron Injectable 4 milliGRAM(s) IV Push every 8 hours PRN  pantoprazole  Injectable 40 milliGRAM(s) IV Push daily  sodium chloride 0.9%. 1000 milliLiter(s) IV Continuous <Continuous>    Antimicrobials:  ertapenem  IVPB 1000 milliGRAM(s) IV Intermittent every 24 hours

## 2024-05-13 NOTE — PROGRESS NOTE ADULT - SUBJECTIVE AND OBJECTIVE BOX
Date of Service: 05-13-24 @ 10:33    Patient is a 78y old  Female who presents with a chief complaint of abnormal labs (12 May 2024 13:30)      Any change in ROS: seems to be doing  ok : no sob:  no cough ;     MEDICATIONS  (STANDING):  aspirin  chewable 81 milliGRAM(s) Oral daily  atorvastatin 20 milliGRAM(s) Oral at bedtime  dextrose 10% Bolus 125 milliLiter(s) IV Bolus once  dextrose 5%. 1000 milliLiter(s) (100 mL/Hr) IV Continuous <Continuous>  dextrose 5%. 1000 milliLiter(s) (50 mL/Hr) IV Continuous <Continuous>  dextrose 50% Injectable 25 Gram(s) IV Push once  dextrose 50% Injectable 12.5 Gram(s) IV Push once  ertapenem  IVPB 1000 milliGRAM(s) IV Intermittent every 24 hours  glucagon  Injectable 1 milliGRAM(s) IntraMuscular once  pantoprazole  Injectable 40 milliGRAM(s) IV Push daily  potassium chloride   Powder 40 milliEquivalent(s) Oral once  sodium chloride 0.9%. 1000 milliLiter(s) (50 mL/Hr) IV Continuous <Continuous>    MEDICATIONS  (PRN):  acetaminophen     Tablet .. 650 milliGRAM(s) Oral every 6 hours PRN Temp greater or equal to 38C (100.4F), Mild Pain (1 - 3)  aluminum hydroxide/magnesium hydroxide/simethicone Suspension 30 milliLiter(s) Oral every 4 hours PRN Dyspepsia  dextrose Oral Gel 15 Gram(s) Oral once PRN Blood Glucose LESS THAN 70 milliGRAM(s)/deciliter  melatonin 3 milliGRAM(s) Oral at bedtime PRN Insomnia  melatonin 3 milliGRAM(s) Oral once PRN Sleep  ondansetron Injectable 4 milliGRAM(s) IV Push every 8 hours PRN Nausea and/or Vomiting    Vital Signs Last 24 Hrs  T(C): 36.7 (13 May 2024 09:19), Max: 37.1 (12 May 2024 14:00)  T(F): 98 (13 May 2024 09:19), Max: 98.8 (12 May 2024 14:00)  HR: 96 (13 May 2024 09:19) (77 - 96)  BP: 120/80 (13 May 2024 09:19) (120/80 - 153/90)  BP(mean): --  RR: 17 (13 May 2024 09:19) (16 - 18)  SpO2: 96% (13 May 2024 09:19) (96% - 97%)    Parameters below as of 13 May 2024 09:19  Patient On (Oxygen Delivery Method): room air        I&O's Summary    12 May 2024 07:01  -  13 May 2024 07:00  --------------------------------------------------------  IN: 410 mL / OUT: 120 mL / NET: 290 mL          Physical Exam:   GENERAL: NAD, well-groomed, well-developed  HEENT: MARTINE/   Atraumatic, Normocephalic  ENMT: No tonsillar erythema, exudates, or enlargement; Moist mucous membranes, Good dentition, No lesions  NECK: Supple, No JVD, Normal thyroid  CHEST/LUNG: Clear to auscultaion  CVS: Regular rate and rhythm; No murmurs, rubs, or gallops  GI: : Soft, Nontender, Nondistended; Bowel sounds present  NERVOUS SYSTEM:  Alert & Oriented X1  EXTREMITIES: - edema  LYMPH: No lymphadenopathy noted  SKIN: No rashes or lesions  ENDOCRINOLOGY: No Thyromegaly  PSYCH: Appropriate    Labs:                              11.2   11.87 )-----------( 302      ( 13 May 2024 05:20 )             32.6                         11.0   12.29 )-----------( 299      ( 12 May 2024 05:32 )             32.1                         10.3   13.02 )-----------( 295      ( 11 May 2024 05:16 )             30.7                         11.2   15.16 )-----------( 337      ( 10 May 2024 05:40 )             34.1     05-13    138  |  103  |  3<L>  ----------------------------<  105<H>  3.3<L>   |  21<L>  |  0.52  05-12    136  |  102  |  6<L>  ----------------------------<  145<H>  3.6   |  20<L>  |  0.61  05-11    136  |  103  |  7   ----------------------------<  96  3.5   |  19<L>  |  0.67  05-10    135  |  101  |  4<L>  ----------------------------<  96  3.5   |  20<L>  |  0.58    Ca    8.9      13 May 2024 05:20  Ca    9.2      12 May 2024 05:32  Phos  2.5     05-13  Phos  2.6     05-12  Mg     1.80     05-13  Mg     1.70     05-12      CAPILLARY BLOOD GLUCOSE      rad< from: Xray Chest 1 View- PORTABLE-Urgent (Xray Chest 1 View- PORTABLE-Urgent .) (05.11.24 @ 19:59) >  ROSINA INDICATION: Infectious workup.    TECHNIQUE: Single frontal, portable view of the chest was obtained.    COMPARISON: Chest x-ray 5/2/2024, abdominal CT 5/7/2024.    FINDINGS:  Heart/Vascular: Heart is normal in size.  Pulmonary:No focal consolidations. No pleural effusion. No pneumothorax.   Normal pulmonary vasculature  Bones: No acute bony abnormalities.  Lines and tubes: None. Cholecystectomy clips in the right upper quadrant.      IMPRESSION:  No focal consolidations.    --- End of Report ---          BEATA DE LEÓN MD; Resident Radiologist  This document has been electronically signed.  SHIV SMITH DO; Attending Radiologist  This document has been electronically signed. May 12 2024 10:32AM    < end of copied text >  < from: CT Abdomen and Pelvis w/ IV Cont (05.07.24 @ 16:45) >  RETROPERITONEUM/LYMPH NODES: No lymphadenopathy.  ABDOMINAL WALL: Fat-containing left inguinal hernia.  BONES: Degenerative changes. Superior endplate deformity of L2. Grade 2   anterolisthesis of L5 on S1. Bilateral L5 spondylolysis.    IMPRESSION:  *  Acute sigmoid diverticulitis with new diverticular abscess along the   cephalad aspect of the vaginal cuff measuring up to 1.8 cm.  *  Small amount of extraluminal air tracks along the lateral aspect of   the sigmoid colon, slightly increased compared to prior exam.  *  Persistent moderate left hydroureteronephrosis to the level of the   sigmoid colon.    < end of copied text >          Urinalysis Basic - ( 13 May 2024 05:20 )    Color: x / Appearance: x / SG: x / pH: x  Gluc: 105 mg/dL / Ketone: x  / Bili: x / Urobili: x   Blood: x / Protein: x / Nitrite: x   Leuk Esterase: x / RBC: x / WBC x   Sq Epi: x / Non Sq Epi: x / Bacteria: x            RECENT CULTURES:  05-11 @ 19:25 .Blood Blood-Peripheral                No growth at 24 hours    05-11 @ 19:18 .Blood Blood-Peripheral                No growth at 24 hours          RESPIRATORY CULTURES:          Studies  Chest X-RAY  CT SCAN Chest   Venous Dopplers: LE:   CT Abdomen  Others

## 2024-05-13 NOTE — PROGRESS NOTE ADULT - SUBJECTIVE AND OBJECTIVE BOX
A TEAM SURGERY DAILY PROGRESS NOTE:     INTERVAL EVENTS: Reconsult from primary team given new CT A/P findings of:    Sigmoid diverticulitis with adjacent bowel wall thickening and surrounding inflammatory changes.   Slightly decreased size of an abscess at the vaginal cuff, measuring 1.9   x 1.2 cm, previously 1.8 x 1.7 cm. Collection in the vaginal cuff   demonstrates a track to the sigmoid colon. Possible additional tract to a   new more superior and fluid collection along the inferior aspect of the   sigmoid colon, measuring 2.6 x 2.4 cm, which contacts the superior aspect   of the urinary bladder. Additional new air and fluid collection more   superiorly along the lateral aspect of the sigmoid colon, measuring 2.6 x   1.6 cm.    SUBJECTIVE/ROS: Patient seen and examined at bedside by surgical team.     OBJECTIVE:  Vital Signs Last 24 Hrs  T(C): 36.7 (13 May 2024 09:19), Max: 37 (12 May 2024 17:00)  T(F): 98 (13 May 2024 09:19), Max: 98.6 (12 May 2024 17:00)  HR: 96 (13 May 2024 09:19) (77 - 96)  BP: 120/80 (13 May 2024 09:19) (120/80 - 153/90)  BP(mean): --  RR: 17 (13 May 2024 09:19) (16 - 18)  SpO2: 96% (13 May 2024 09:19) (96% - 97%)    Parameters below as of 13 May 2024 09:19  Patient On (Oxygen Delivery Method): room air                            11.2   11.87 )-----------( 302      ( 13 May 2024 05:20 )             32.6     05-13    138  |  103  |  3<L>  ----------------------------<  105<H>  3.3<L>   |  21<L>  |  0.52    Ca    8.9      13 May 2024 05:20  Phos  2.5     05-13  Mg     1.80     05-13       I&O's Detail    12 May 2024 07:01  -  13 May 2024 07:00  --------------------------------------------------------  IN:    IV PiggyBack: 50 mL    Oral Fluid: 360 mL  Total IN: 410 mL    OUT:    Voided (mL): 120 mL  Total OUT: 120 mL    Total NET: 290 mL          IMAGING:      PHYSICAL EXAM:  Constitutional: NAD  Respiratory: non-labored breathing, patent airway  Gastrointestinal: abdomen soft, nontender, nondistended  Extremities: warm  Neurological: intact           A TEAM SURGERY DAILY PROGRESS NOTE:     INTERVAL EVENTS: Reconsult from primary team given new CT A/P findings of:    SUBJECTIVE/ROS: Patient seen and examined at bedside by surgical team. Denies nausea/vomit, fever/chills, sob/chest pain. Son bedside reports that has seen the patient more sleepy today, but was told by the nurse that the patient overnight stays awake. Patient denies abdominal pain, and reports adequate bowel function.     OBJECTIVE:  Vital Signs Last 24 Hrs  T(C): 36.7 (13 May 2024 09:19), Max: 37 (12 May 2024 17:00)  T(F): 98 (13 May 2024 09:19), Max: 98.6 (12 May 2024 17:00)  HR: 96 (13 May 2024 09:19) (77 - 96)  BP: 120/80 (13 May 2024 09:19) (120/80 - 153/90)  BP(mean): --  RR: 17 (13 May 2024 09:19) (16 - 18)  SpO2: 96% (13 May 2024 09:19) (96% - 97%)    Parameters below as of 13 May 2024 09:19  Patient On (Oxygen Delivery Method): room air                            11.2   11.87 )-----------( 302      ( 13 May 2024 05:20 )             32.6     05-13    138  |  103  |  3<L>  ----------------------------<  105<H>  3.3<L>   |  21<L>  |  0.52    Ca    8.9      13 May 2024 05:20  Phos  2.5     05-13  Mg     1.80     05-13       I&O's Detail    12 May 2024 07:01  -  13 May 2024 07:00  --------------------------------------------------------  IN:    IV PiggyBack: 50 mL    Oral Fluid: 360 mL  Total IN: 410 mL    OUT:    Voided (mL): 120 mL  Total OUT: 120 mL    Total NET: 290 mL      IMAGING:    < from: CT Abdomen and Pelvis w/ IV Cont (05.13.24 @ 11:09) >  INTERPRETATION:  CLINICAL INFORMATION: Worsening leukocytosis and history   of perforated sigmoid diverticulitis    COMPARISON: CT abdomen and pelvis 5/7/2024.    CONTRAST/COMPLICATIONS:  IV Contrast: Omnipaque 350  90 cc administered   10 cc discarded  Oral Contrast: NONE  Complications: None reported at time of study completion    PROCEDURE:  CT of the Abdomen and Pelvis was performed.  Sagittal and coronal reformats were performed.    FINDINGS:  LOWER CHEST: Coronary artery calcifications. There is a 5.0 mm peripheral   pulmonary nodule in the right middle lobe (2-1).    LIVER: Within normal limits.  BILE DUCTS: Normal caliber.  GALLBLADDER: Cholecystectomy.  SPLEEN: Within normal limits.  PANCREAS: Within normal limits.  ADRENALS: Within normal limits.  KIDNEYS/URETERS: There is a 1.3 cm cyst in right upper pole. Unchanged   left hydroureteronephrosis to the level of the distal ureter, where there   is inflammation related to the sigmoid diverticulitis.    BLADDER: Reactive thickening of the left superior aspect of the urinary   bladder  REPRODUCTIVE ORGANS: Hysterectomy.    BOWEL: No bowel obstruction. Appendix is normal. Sigmoid diverticulitis   with adjacent bowel wall thickening and surrounding inflammatory changes.   Slightly decreased size of an abscess at the vaginal cuff, measuring 1.9   x 1.2 cm, previously 1.8 x 1.7 cm. Collection in the vaginal cuff   demonstrates a track to the sigmoid colon. Possible additional tract to a   new more superior and fluid collection along the inferior aspect of the   sigmoid colon, measuring 2.6 x 2.4 cm, which contacts the superior aspect   of the urinary bladder. Additional new air and fluid collection more   superiorly along the lateral aspect of the sigmoid colon, measuring 2.6 x   1.6 cm.  PERITONEUM: No ascites.  VESSELS: Atherosclerotic changes. Left-sided infrarenal IVC.  RETROPERITONEUM/LYMPH NODES: No lymphadenopathy.  ABDOMINAL WALL: Fat containing umbilical hernia.  BONES: Degenerative changes. Unchanged loss of height/end plate deformity   of the L2 vertebral body. Bilateral L5 pars defects with grade 2   anterolisthesis of L5 over S1.    IMPRESSION:  Sigmoid diverticulitis with decreased size of an abscess adjacent to the   vaginal cuff. Collection in the vaginal cuff appears to demonstrate   communication with the sigmoid colon.    Interval development of 2 new perisigmoid abscesses, one of which   contacts the superior aspect of the urinary bladder, with probable   reactive thickening.    Unchanged moderate left hydroureteronephrosis, probably reactive to the   sigmoid diverticulitis.    --- End of Report ---    < end of copied text >        PHYSICAL EXAM:  Constitutional: NAD,   Respiratory: non-labored breathing, patent airway  Gastrointestinal: abdomen soft, nontender, nondistended  Extremities: warm  Neurological: AOOx2, slightly sleepy during examination. Adequate ROM, no neurological deficits.           A TEAM SURGERY DAILY PROGRESS NOTE:     INTERVAL EVENTS: Reconsult from primary team given persistence leukocytosis and new CT A/P findings of 2 new perirectal collections.    SUBJECTIVE/ROS: Patient seen and examined at bedside by surgical team. Denies nausea/vomit, fever/chills, sob/chest pain. Son bedside reports that has seen the patient more sleepy today, but was told by the nurse that the patient overnight stays awake. Patient denies abdominal pain, and reports adequate bowel function.     OBJECTIVE:  Vital Signs Last 24 Hrs  T(C): 36.7 (13 May 2024 09:19), Max: 37 (12 May 2024 17:00)  T(F): 98 (13 May 2024 09:19), Max: 98.6 (12 May 2024 17:00)  HR: 96 (13 May 2024 09:19) (77 - 96)  BP: 120/80 (13 May 2024 09:19) (120/80 - 153/90)  BP(mean): --  RR: 17 (13 May 2024 09:19) (16 - 18)  SpO2: 96% (13 May 2024 09:19) (96% - 97%)    Parameters below as of 13 May 2024 09:19  Patient On (Oxygen Delivery Method): room air                            11.2   11.87 )-----------( 302      ( 13 May 2024 05:20 )             32.6     05-13    138  |  103  |  3<L>  ----------------------------<  105<H>  3.3<L>   |  21<L>  |  0.52    Ca    8.9      13 May 2024 05:20  Phos  2.5     05-13  Mg     1.80     05-13       I&O's Detail    12 May 2024 07:01  -  13 May 2024 07:00  --------------------------------------------------------  IN:    IV PiggyBack: 50 mL    Oral Fluid: 360 mL  Total IN: 410 mL    OUT:    Voided (mL): 120 mL  Total OUT: 120 mL    Total NET: 290 mL      IMAGING:    < from: CT Abdomen and Pelvis w/ IV Cont (05.13.24 @ 11:09) >  INTERPRETATION:  CLINICAL INFORMATION: Worsening leukocytosis and history   of perforated sigmoid diverticulitis    COMPARISON: CT abdomen and pelvis 5/7/2024.    CONTRAST/COMPLICATIONS:  IV Contrast: Omnipaque 350  90 cc administered   10 cc discarded  Oral Contrast: NONE  Complications: None reported at time of study completion    PROCEDURE:  CT of the Abdomen and Pelvis was performed.  Sagittal and coronal reformats were performed.    FINDINGS:  LOWER CHEST: Coronary artery calcifications. There is a 5.0 mm peripheral   pulmonary nodule in the right middle lobe (2-1).    LIVER: Within normal limits.  BILE DUCTS: Normal caliber.  GALLBLADDER: Cholecystectomy.  SPLEEN: Within normal limits.  PANCREAS: Within normal limits.  ADRENALS: Within normal limits.  KIDNEYS/URETERS: There is a 1.3 cm cyst in right upper pole. Unchanged   left hydroureteronephrosis to the level of the distal ureter, where there   is inflammation related to the sigmoid diverticulitis.    BLADDER: Reactive thickening of the left superior aspect of the urinary   bladder  REPRODUCTIVE ORGANS: Hysterectomy.    BOWEL: No bowel obstruction. Appendix is normal. Sigmoid diverticulitis   with adjacent bowel wall thickening and surrounding inflammatory changes.   Slightly decreased size of an abscess at the vaginal cuff, measuring 1.9   x 1.2 cm, previously 1.8 x 1.7 cm. Collection in the vaginal cuff   demonstrates a track to the sigmoid colon. Possible additional tract to a   new more superior and fluid collection along the inferior aspect of the   sigmoid colon, measuring 2.6 x 2.4 cm, which contacts the superior aspect   of the urinary bladder. Additional new air and fluid collection more   superiorly along the lateral aspect of the sigmoid colon, measuring 2.6 x   1.6 cm.  PERITONEUM: No ascites.  VESSELS: Atherosclerotic changes. Left-sided infrarenal IVC.  RETROPERITONEUM/LYMPH NODES: No lymphadenopathy.  ABDOMINAL WALL: Fat containing umbilical hernia.  BONES: Degenerative changes. Unchanged loss of height/end plate deformity   of the L2 vertebral body. Bilateral L5 pars defects with grade 2   anterolisthesis of L5 over S1.    IMPRESSION:  Sigmoid diverticulitis with decreased size of an abscess adjacent to the   vaginal cuff. Collection in the vaginal cuff appears to demonstrate   communication with the sigmoid colon.    Interval development of 2 new perisigmoid abscesses, one of which   contacts the superior aspect of the urinary bladder, with probable   reactive thickening.    Unchanged moderate left hydroureteronephrosis, probably reactive to the   sigmoid diverticulitis.    --- End of Report ---    < end of copied text >        PHYSICAL EXAM:  Constitutional: NAD,   Respiratory: non-labored breathing, patent airway  Gastrointestinal: abdomen soft, nontender, nondistended  Extremities: warm  Neurological: AOOx2, slightly sleepy during examination. Adequate ROM, no neurological deficits.

## 2024-05-13 NOTE — PROGRESS NOTE ADULT - ASSESSMENT
77 y/o F PMhx Glaucoma, DM II, HTN, dementia, diverticulitis who presented w/ increased lethargy  had diverticulitis and was previously treated w/ cefpodoxime/ flagyl x 10 days and augmentin x 10 days; now w/ contained perforation & abscess    Sigmoid diverticulitis w/ contained perforation and abscess  CTAP- Persistent sigmoid diverticulitis with adjacent phlegmonous changes with small foci of extraluminal air, likely sequela of contained perforation.  repeat CT - Acute sigmoid diverticulitis with new diverticular abscess along the cephalad aspect of the vaginal cuff measuring up to 1.8 cm. Small amount of extraluminal air tracks along the lateral aspect of the sigmoid colon, slightly increased compared to prior exam. Persistent moderate left hydroureteronephrosis  per surgery no surgical intervention and no role for IR drainage  blood cultures- NGTD  switched form zosyn to ertapenem 5/9 evening for ease of administration at home  midline placed 5/24    leukocytosis increased today  Recommendations  c/w ertapenem w/ tentative last day 5/24  trend wbc, fine to place midline and complete outside hospital   will need repeat CT prior to completion of antibiotics  will need eventual colonoscopy    Geovani Goldman M.D.  [facility], Division of Infectious Diseases  [phone]  After 5pm on weekdays and all day on weekends - please call [phone]  77 y/o F PMhx Glaucoma, DM II, HTN, dementia, diverticulitis who presented w/ increased lethargy  had diverticulitis and was previously treated w/ cefpodoxime/ flagyl x 10 days and augmentin x 10 days; now w/ contained perforation & abscess    Sigmoid diverticulitis w/ contained perforation and abscess  CTAP- Persistent sigmoid diverticulitis with adjacent phlegmonous changes with small foci of extraluminal air, likely sequela of contained perforation.  repeat CT - Acute sigmoid diverticulitis with new diverticular abscess along the cephalad aspect of the vaginal cuff measuring up to 1.8 cm. Small amount of extraluminal air tracks along the lateral aspect of the sigmoid colon, slightly increased compared to prior exam. Persistent moderate left hydroureteronephrosis  per surgery no surgical intervention and no role for IR drainage  blood cultures- NGTD  switched form zosyn to ertapenem 5/9 evening for ease of administration at home  midline placed 5/24  will need eventual colonoscopy    Recommendations  c/w ertapenem   f/u repeat CT abd/pelvis    Geovani Goldman M.D.  [facility], Division of Infectious Diseases  [phone]  After 5pm on weekdays and all day on weekends - please call [phone]

## 2024-05-13 NOTE — PROGRESS NOTE ADULT - SUBJECTIVE AND OBJECTIVE BOX
Patient is a 78y old  Female who presents with a chief complaint of abnormal labs (13 May 2024 12:44)    Date of servie : 05-13-24 @ 14:57  INTERVAL HPI/OVERNIGHT EVENTS:  T(C): 36.7 (05-13-24 @ 09:19), Max: 37 (05-12-24 @ 17:00)  HR: 96 (05-13-24 @ 09:19) (77 - 96)  BP: 120/80 (05-13-24 @ 09:19) (120/80 - 153/90)  RR: 17 (05-13-24 @ 09:19) (16 - 18)  SpO2: 96% (05-13-24 @ 09:19) (96% - 97%)  Wt(kg): --  I&O's Summary    12 May 2024 07:01  -  13 May 2024 07:00  --------------------------------------------------------  IN: 410 mL / OUT: 120 mL / NET: 290 mL        LABS:                        11.2   11.87 )-----------( 302      ( 13 May 2024 05:20 )             32.6     05-13    138  |  103  |  3<L>  ----------------------------<  105<H>  3.3<L>   |  21<L>  |  0.52    Ca    8.9      13 May 2024 05:20  Phos  2.5     05-13  Mg     1.80     05-13        Urinalysis Basic - ( 13 May 2024 05:20 )    Color: x / Appearance: x / SG: x / pH: x  Gluc: 105 mg/dL / Ketone: x  / Bili: x / Urobili: x   Blood: x / Protein: x / Nitrite: x   Leuk Esterase: x / RBC: x / WBC x   Sq Epi: x / Non Sq Epi: x / Bacteria: x      CAPILLARY BLOOD GLUCOSE            Urinalysis Basic - ( 13 May 2024 05:20 )    Color: x / Appearance: x / SG: x / pH: x  Gluc: 105 mg/dL / Ketone: x  / Bili: x / Urobili: x   Blood: x / Protein: x / Nitrite: x   Leuk Esterase: x / RBC: x / WBC x   Sq Epi: x / Non Sq Epi: x / Bacteria: x        MEDICATIONS  (STANDING):  aspirin  chewable 81 milliGRAM(s) Oral daily  atorvastatin 20 milliGRAM(s) Oral at bedtime  dextrose 10% Bolus 125 milliLiter(s) IV Bolus once  dextrose 5%. 1000 milliLiter(s) (100 mL/Hr) IV Continuous <Continuous>  dextrose 5%. 1000 milliLiter(s) (50 mL/Hr) IV Continuous <Continuous>  dextrose 50% Injectable 25 Gram(s) IV Push once  dextrose 50% Injectable 12.5 Gram(s) IV Push once  ertapenem  IVPB 1000 milliGRAM(s) IV Intermittent every 24 hours  glucagon  Injectable 1 milliGRAM(s) IntraMuscular once  pantoprazole  Injectable 40 milliGRAM(s) IV Push daily  sodium chloride 0.9%. 1000 milliLiter(s) (50 mL/Hr) IV Continuous <Continuous>    MEDICATIONS  (PRN):  acetaminophen     Tablet .. 650 milliGRAM(s) Oral every 6 hours PRN Temp greater or equal to 38C (100.4F), Mild Pain (1 - 3)  aluminum hydroxide/magnesium hydroxide/simethicone Suspension 30 milliLiter(s) Oral every 4 hours PRN Dyspepsia  dextrose Oral Gel 15 Gram(s) Oral once PRN Blood Glucose LESS THAN 70 milliGRAM(s)/deciliter  melatonin 3 milliGRAM(s) Oral at bedtime PRN Insomnia  melatonin 3 milliGRAM(s) Oral once PRN Sleep  ondansetron Injectable 4 milliGRAM(s) IV Push every 8 hours PRN Nausea and/or Vomiting          PHYSICAL EXAM:  GENERAL: NAD, well-groomed, well-developed  HEAD:  Atraumatic, Normocephalic  CHEST/LUNG: Clear to percussion bilaterally; No rales, rhonchi, wheezing, or rubs  HEART: Regular rate and rhythm; No murmurs, rubs, or gallops  ABDOMEN: Soft, Nontender, Nondistended; Bowel sounds present  EXTREMITIES:  2+ Peripheral Pulses, No clubbing, cyanosis, or edema  LYMPH: No lymphadenopathy noted  SKIN: No rashes or lesions    Care Discussed with Consultants/Other Providers [ ] YES  [ ] NO

## 2024-05-14 LAB
-  AMPICILLIN: SIGNIFICANT CHANGE UP
-  CIPROFLOXACIN: SIGNIFICANT CHANGE UP
-  DAPTOMYCIN: SIGNIFICANT CHANGE UP
-  DAPTOMYCIN: SIGNIFICANT CHANGE UP
-  LEVOFLOXACIN: SIGNIFICANT CHANGE UP
-  LINEZOLID: SIGNIFICANT CHANGE UP
-  LINEZOLID: SIGNIFICANT CHANGE UP
-  NITROFURANTOIN: SIGNIFICANT CHANGE UP
-  TETRACYCLINE: SIGNIFICANT CHANGE UP
-  VANCOMYCIN: SIGNIFICANT CHANGE UP
ANION GAP SERPL CALC-SCNC: 13 MMOL/L — SIGNIFICANT CHANGE UP (ref 7–14)
BASOPHILS # BLD AUTO: 0.07 K/UL — SIGNIFICANT CHANGE UP (ref 0–0.2)
BASOPHILS NFR BLD AUTO: 0.7 % — SIGNIFICANT CHANGE UP (ref 0–2)
BUN SERPL-MCNC: 6 MG/DL — LOW (ref 7–23)
CALCIUM SERPL-MCNC: 9.1 MG/DL — SIGNIFICANT CHANGE UP (ref 8.4–10.5)
CHLORIDE SERPL-SCNC: 103 MMOL/L — SIGNIFICANT CHANGE UP (ref 98–107)
CO2 SERPL-SCNC: 20 MMOL/L — LOW (ref 22–31)
CREAT SERPL-MCNC: 0.65 MG/DL — SIGNIFICANT CHANGE UP (ref 0.5–1.3)
CULTURE RESULTS: ABNORMAL
CULTURE RESULTS: ABNORMAL
EGFR: 91 ML/MIN/1.73M2 — SIGNIFICANT CHANGE UP
EOSINOPHIL # BLD AUTO: 0.34 K/UL — SIGNIFICANT CHANGE UP (ref 0–0.5)
EOSINOPHIL NFR BLD AUTO: 3.6 % — SIGNIFICANT CHANGE UP (ref 0–6)
GLUCOSE BLDC GLUCOMTR-MCNC: 91 MG/DL — SIGNIFICANT CHANGE UP (ref 70–99)
GLUCOSE SERPL-MCNC: 100 MG/DL — HIGH (ref 70–99)
HCT VFR BLD CALC: 33.6 % — LOW (ref 34.5–45)
HGB BLD-MCNC: 11.1 G/DL — LOW (ref 11.5–15.5)
IANC: 4.22 K/UL — SIGNIFICANT CHANGE UP (ref 1.8–7.4)
IMM GRANULOCYTES NFR BLD AUTO: 0.4 % — SIGNIFICANT CHANGE UP (ref 0–0.9)
LYMPHOCYTES # BLD AUTO: 3.8 K/UL — HIGH (ref 1–3.3)
LYMPHOCYTES # BLD AUTO: 40.6 % — SIGNIFICANT CHANGE UP (ref 13–44)
MAGNESIUM SERPL-MCNC: 1.9 MG/DL — SIGNIFICANT CHANGE UP (ref 1.6–2.6)
MCHC RBC-ENTMCNC: 29 PG — SIGNIFICANT CHANGE UP (ref 27–34)
MCHC RBC-ENTMCNC: 33 GM/DL — SIGNIFICANT CHANGE UP (ref 32–36)
MCV RBC AUTO: 87.7 FL — SIGNIFICANT CHANGE UP (ref 80–100)
METHOD TYPE: SIGNIFICANT CHANGE UP
MONOCYTES # BLD AUTO: 0.9 K/UL — SIGNIFICANT CHANGE UP (ref 0–0.9)
MONOCYTES NFR BLD AUTO: 9.6 % — SIGNIFICANT CHANGE UP (ref 2–14)
NEUTROPHILS # BLD AUTO: 4.22 K/UL — SIGNIFICANT CHANGE UP (ref 1.8–7.4)
NEUTROPHILS NFR BLD AUTO: 45.1 % — SIGNIFICANT CHANGE UP (ref 43–77)
NRBC # BLD: 0 /100 WBCS — SIGNIFICANT CHANGE UP (ref 0–0)
NRBC # FLD: 0.02 K/UL — HIGH (ref 0–0)
ORGANISM # SPEC MICROSCOPIC CNT: ABNORMAL
PHOSPHATE SERPL-MCNC: 3.4 MG/DL — SIGNIFICANT CHANGE UP (ref 2.5–4.5)
PLATELET # BLD AUTO: 330 K/UL — SIGNIFICANT CHANGE UP (ref 150–400)
POTASSIUM SERPL-MCNC: 3.5 MMOL/L — SIGNIFICANT CHANGE UP (ref 3.5–5.3)
POTASSIUM SERPL-SCNC: 3.5 MMOL/L — SIGNIFICANT CHANGE UP (ref 3.5–5.3)
RBC # BLD: 3.83 M/UL — SIGNIFICANT CHANGE UP (ref 3.8–5.2)
RBC # FLD: 14.7 % — HIGH (ref 10.3–14.5)
SODIUM SERPL-SCNC: 136 MMOL/L — SIGNIFICANT CHANGE UP (ref 135–145)
SPECIMEN SOURCE: SIGNIFICANT CHANGE UP
SPECIMEN SOURCE: SIGNIFICANT CHANGE UP
WBC # BLD: 9.37 K/UL — SIGNIFICANT CHANGE UP (ref 3.8–10.5)
WBC # FLD AUTO: 9.37 K/UL — SIGNIFICANT CHANGE UP (ref 3.8–10.5)

## 2024-05-14 RX ADMIN — ATORVASTATIN CALCIUM 20 MILLIGRAM(S): 80 TABLET, FILM COATED ORAL at 22:03

## 2024-05-14 RX ADMIN — ERTAPENEM SODIUM 120 MILLIGRAM(S): 1 INJECTION, POWDER, LYOPHILIZED, FOR SOLUTION INTRAMUSCULAR; INTRAVENOUS at 22:03

## 2024-05-14 RX ADMIN — Medication 81 MILLIGRAM(S): at 11:15

## 2024-05-14 RX ADMIN — Medication 3 MILLIGRAM(S): at 22:04

## 2024-05-14 RX ADMIN — PANTOPRAZOLE SODIUM 40 MILLIGRAM(S): 20 TABLET, DELAYED RELEASE ORAL at 11:14

## 2024-05-14 NOTE — PROGRESS NOTE ADULT - SUBJECTIVE AND OBJECTIVE BOX
Patient is a 78y old  Female who presents with a chief complaint of abnormal labs (14 May 2024 12:06)    Date of servie : 05-14-24 @ 14:12  INTERVAL HPI/OVERNIGHT EVENTS:  T(C): 36.8 (05-14-24 @ 10:17), Max: 36.8 (05-14-24 @ 10:17)  HR: 88 (05-14-24 @ 10:17) (69 - 88)  BP: 116/80 (05-14-24 @ 10:17) (116/80 - 145/90)  RR: 18 (05-14-24 @ 10:17) (16 - 18)  SpO2: 100% (05-14-24 @ 10:17) (97% - 100%)  Wt(kg): --  I&O's Summary    13 May 2024 07:01  -  14 May 2024 07:00  --------------------------------------------------------  IN: 120 mL / OUT: 0 mL / NET: 120 mL        LABS:                        11.1   9.37  )-----------( 330      ( 14 May 2024 06:56 )             33.6     05-14    136  |  103  |  6<L>  ----------------------------<  100<H>  3.5   |  20<L>  |  0.65    Ca    9.1      14 May 2024 06:56  Phos  3.4     05-14  Mg     1.90     05-14        Urinalysis Basic - ( 14 May 2024 06:56 )    Color: x / Appearance: x / SG: x / pH: x  Gluc: 100 mg/dL / Ketone: x  / Bili: x / Urobili: x   Blood: x / Protein: x / Nitrite: x   Leuk Esterase: x / RBC: x / WBC x   Sq Epi: x / Non Sq Epi: x / Bacteria: x      CAPILLARY BLOOD GLUCOSE      POCT Blood Glucose.: 91 mg/dL (14 May 2024 11:44)  POCT Blood Glucose.: 104 mg/dL (13 May 2024 16:20)        Urinalysis Basic - ( 14 May 2024 06:56 )    Color: x / Appearance: x / SG: x / pH: x  Gluc: 100 mg/dL / Ketone: x  / Bili: x / Urobili: x   Blood: x / Protein: x / Nitrite: x   Leuk Esterase: x / RBC: x / WBC x   Sq Epi: x / Non Sq Epi: x / Bacteria: x        MEDICATIONS  (STANDING):  aspirin  chewable 81 milliGRAM(s) Oral daily  atorvastatin 20 milliGRAM(s) Oral at bedtime  dextrose 10% Bolus 125 milliLiter(s) IV Bolus once  dextrose 5%. 1000 milliLiter(s) (50 mL/Hr) IV Continuous <Continuous>  dextrose 5%. 1000 milliLiter(s) (100 mL/Hr) IV Continuous <Continuous>  dextrose 50% Injectable 25 Gram(s) IV Push once  dextrose 50% Injectable 12.5 Gram(s) IV Push once  ertapenem  IVPB 1000 milliGRAM(s) IV Intermittent every 24 hours  glucagon  Injectable 1 milliGRAM(s) IntraMuscular once  pantoprazole  Injectable 40 milliGRAM(s) IV Push daily  sodium chloride 0.9%. 1000 milliLiter(s) (50 mL/Hr) IV Continuous <Continuous>    MEDICATIONS  (PRN):  acetaminophen     Tablet .. 650 milliGRAM(s) Oral every 6 hours PRN Temp greater or equal to 38C (100.4F), Mild Pain (1 - 3)  aluminum hydroxide/magnesium hydroxide/simethicone Suspension 30 milliLiter(s) Oral every 4 hours PRN Dyspepsia  dextrose Oral Gel 15 Gram(s) Oral once PRN Blood Glucose LESS THAN 70 milliGRAM(s)/deciliter  melatonin 3 milliGRAM(s) Oral at bedtime PRN Insomnia  melatonin 3 milliGRAM(s) Oral once PRN Sleep  ondansetron Injectable 4 milliGRAM(s) IV Push every 8 hours PRN Nausea and/or Vomiting          PHYSICAL EXAM:  GENERAL: NAD, well-groomed, well-developed  HEAD:  Atraumatic, Normocephalic  CHEST/LUNG: Clear to percussion bilaterally; No rales, rhonchi, wheezing, or rubs  HEART: Regular rate and rhythm; No murmurs, rubs, or gallops  ABDOMEN: Soft, Nontender, Nondistended; Bowel sounds present  EXTREMITIES:  2+ Peripheral Pulses, No clubbing, cyanosis, or edema  LYMPH: No lymphadenopathy noted  SKIN: No rashes or lesions    Care Discussed with Consultants/Other Providers [ ] YES  [ ] NO

## 2024-05-14 NOTE — PROGRESS NOTE ADULT - SUBJECTIVE AND OBJECTIVE BOX
[facility], Division of Infectious Diseases  JORGE LUIS Chaves Y. Patel, S. Shah, G. Jones  [phone]  ([phone] - weekdays after 5pm and weekends)    Name: TY DAVIS  Age/Gender: 78y Female  MRN: [id number]    Interval History:  Notes reviewed.   No concerning overnight events.  Afebrile.   denies abd pain or diarrhea    Allergies: No Known Allergies      Objective:  Vitals:   T(F): 98.3 (05-14-24 @ 10:17), Max: 98.3 (05-14-24 @ 10:17)  HR: 88 (05-14-24 @ 10:17) (69 - 88)  BP: 116/80 (05-14-24 @ 10:17) (116/80 - 145/90)  RR: 18 (05-14-24 @ 10:17) (16 - 18)  SpO2: 100% (05-14-24 @ 10:17) (97% - 100%)  Physical Examination:  General: no acute distress  HEENT: anicteric  Cardio: S1, S2, normal rate  Resp: clear to auscultation anteriorly  Abd: soft, NT, ND  Ext: no LE edema  Skin: warm, dry    Laboratory Studies:  CBC:                       11.1   9.37  )-----------( 330      ( 14 May 2024 06:56 )             33.6     WBC Trend:  9.37 05-14-24 @ 06:56  11.87 05-13-24 @ 05:20  12.29 05-12-24 @ 05:32  13.02 05-11-24 @ 05:16  15.16 05-10-24 @ 05:40  10.42 05-09-24 @ 05:10  12.89 05-08-24 @ 05:18    CMP: 05-14    136  |  103  |  6<L>  ----------------------------<  100<H>  3.5   |  20<L>  |  0.65    Ca    9.1      14 May 2024 06:56  Phos  3.4     05-14  Mg     1.90     05-14            Urinalysis Basic - ( 14 May 2024 06:56 )    Color: x / Appearance: x / SG: x / pH: x  Gluc: 100 mg/dL / Ketone: x  / Bili: x / Urobili: x   Blood: x / Protein: x / Nitrite: x   Leuk Esterase: x / RBC: x / WBC x   Sq Epi: x / Non Sq Epi: x / Bacteria: x      Microbiology: reviewed     Culture - Urine (collected 05-12-24 @ 00:45)  Source: OR Collect Bladder (from O.R.)  Preliminary Report (05-14-24 @ 06:51):    <10,000 CFU/ml Enterococcus faecium (vancomycin resistant)    <10,000 CFU/ml Enterococcus faecalis Susceptibility to follow.  Organism: Enterococcus faecium (vancomycin resistant) (05-14-24 @ 06:51)  Organism: Enterococcus faecium (vancomycin resistant) (05-14-24 @ 06:51)      Method Type: MAYELIN      -  Ampicillin: R >8 Predicts results to ampicillin/sulbactam, amoxacillin-clavulanate and  piperacillin-tazobactam.      -  Ciprofloxacin: R >2      -  Daptomycin: SDD 2 The breakpoint for SDD (susceptible dose dependent)is based on a dosage regimen of 8-12 mg/kg administered every 24 h in adults and is intended for serious infections due to E. faecium. Consultation with an infectious diseases specialist is recommended.      -  Levofloxacin: R >4      -  Linezolid: S 2      -  Vancomycin: R >16    Culture - Urine (collected 05-11-24 @ 19:40)  Source: Clean Catch Clean Catch (Midstream)  Preliminary Report (05-13-24 @ 18:54):    10,000 - 49,000 CFU/mL Enterococcus faecium    <10,000 CFU/ml Normal Urogenital helen present    Culture - Blood (collected 05-11-24 @ 19:25)  Source: .Blood Blood-Peripheral  Preliminary Report (05-14-24 @ 02:02):    No growth at 48 Hours    Culture - Blood (collected 05-11-24 @ 19:18)  Source: .Blood Blood-Peripheral  Preliminary Report (05-14-24 @ 02:02):    No growth at 48 Hours    Culture - Blood (collected 05-03-24 @ 18:52)  Source: .Blood Blood  Final Report (05-09-24 @ 01:00):    No growth at 5 days    Culture - Blood (collected 05-03-24 @ 18:46)  Source: .Blood Blood  Final Report (05-09-24 @ 01:00):    No growth at 5 days    Culture - Blood (collected 05-02-24 @ 22:47)  Source: .Blood Blood-Peripheral  Final Report (05-08-24 @ 11:00):    No growth at 5 days    Culture - Blood (collected 05-02-24 @ 22:35)  Source: .Blood Blood-Peripheral  Final Report (05-08-24 @ 11:00):    No growth at 5 days        Radiology: reviewed     Medications:  acetaminophen     Tablet .. 650 milliGRAM(s) Oral every 6 hours PRN  aluminum hydroxide/magnesium hydroxide/simethicone Suspension 30 milliLiter(s) Oral every 4 hours PRN  aspirin  chewable 81 milliGRAM(s) Oral daily  atorvastatin 20 milliGRAM(s) Oral at bedtime  dextrose 10% Bolus 125 milliLiter(s) IV Bolus once  dextrose 5%. 1000 milliLiter(s) IV Continuous <Continuous>  dextrose 5%. 1000 milliLiter(s) IV Continuous <Continuous>  dextrose 50% Injectable 25 Gram(s) IV Push once  dextrose 50% Injectable 12.5 Gram(s) IV Push once  dextrose Oral Gel 15 Gram(s) Oral once PRN  ertapenem  IVPB 1000 milliGRAM(s) IV Intermittent every 24 hours  glucagon  Injectable 1 milliGRAM(s) IntraMuscular once  melatonin 3 milliGRAM(s) Oral once PRN  melatonin 3 milliGRAM(s) Oral at bedtime PRN  ondansetron Injectable 4 milliGRAM(s) IV Push every 8 hours PRN  pantoprazole  Injectable 40 milliGRAM(s) IV Push daily  sodium chloride 0.9%. 1000 milliLiter(s) IV Continuous <Continuous>    Antimicrobials:  ertapenem  IVPB 1000 milliGRAM(s) IV Intermittent every 24 hours

## 2024-05-14 NOTE — CHART NOTE - NSCHARTNOTESELECT_GEN_ALL_CORE
Event Note
Follow Up/Nutrition Services
Event Note
hospitalist

## 2024-05-14 NOTE — PROGRESS NOTE ADULT - ATTENDING COMMENTS
DATE OF SERVICE: 05-05-24 @ 13:01    No pain  Abd soft NT/ND    Advance to LRD  No plan for surgery at this time  Continue abx  DC planning per primary
DATE OF SERVICE: 05-06-24 @ 12:03    No pain, tolerating diet  abd soft nt/nd    No plan for surgical intervention  Can continue abx course per ID recs  FU outpatient  Surgery to sign off, please recall with any questions
DATE OF SERVICE: 05-09-24 @ 16:48    Repeat CT scan reviewed, new collection noted  Abdominal exam remains benign  WBC downtrending    No plan for surgery  will likely require course of IV abx upon DC, defer to ID recs  Diet as tolerated
DATE OF SERVICE: 05-14-24 @ 16:57    WBC NL today  Abd soft NT/ND    Continue abx  No plan for surgery at this point would benefit from interval surgery after time to cool off inflammation
Date of service 5/13    recalled due to persistent leukocytosis and new CT with small pericolic abscesses  No abdominal pain, tolerating diet  Nontender on exam, abdomen soft    Given patient stability would hold off on urgent surgery at this time  likely has a very hostile pelvis given the chronic inflammation and she would benefit from a course of IV abx and a plan for outpatient surgery should she remain stable  Her exam has not worsened and she remains asymptomatic and tolerating a diet  Will monitor closely for now

## 2024-05-14 NOTE — CHART NOTE - NSCHARTNOTEFT_GEN_A_CORE
Source: Patient A&Ox [ ]    Family [ ]     other [ ]         Diet, Pureed:   Consistent Carbohydrate {No Snacks} (CSTCHO)  Low Fat (LOWFAT) (05-14-24 @ 12:00)      GI: WDL. Last BM     PO intake:  < 50% [ ] 50-75% [ ]   % [ ]  other :    Enteral /Parenteral Nutrition:     Anthropometrics: Height (cm): 154.9 (05-03), 162.6 (04-19), 165.1 (03-21)  Weight (kg): 52 (05-03), 56 (04-19), 53.6 (03-22)  BMI (kg/m2): 21.7 (05-03), 21.2 (04-19), 19.7 (03-22)    Edema:  Pressure Injuries:    __________________ Pertinent Medications__________________   MEDICATIONS  (STANDING):  aspirin  chewable 81 milliGRAM(s) Oral daily  atorvastatin 20 milliGRAM(s) Oral at bedtime  dextrose 10% Bolus 125 milliLiter(s) IV Bolus once  dextrose 5%. 1000 milliLiter(s) (50 mL/Hr) IV Continuous <Continuous>  dextrose 5%. 1000 milliLiter(s) (100 mL/Hr) IV Continuous <Continuous>  dextrose 50% Injectable 12.5 Gram(s) IV Push once  dextrose 50% Injectable 25 Gram(s) IV Push once  ertapenem  IVPB 1000 milliGRAM(s) IV Intermittent every 24 hours  glucagon  Injectable 1 milliGRAM(s) IntraMuscular once  pantoprazole  Injectable 40 milliGRAM(s) IV Push daily  sodium chloride 0.9%. 1000 milliLiter(s) (50 mL/Hr) IV Continuous <Continuous>    MEDICATIONS  (PRN):  acetaminophen     Tablet .. 650 milliGRAM(s) Oral every 6 hours PRN Temp greater or equal to 38C (100.4F), Mild Pain (1 - 3)  aluminum hydroxide/magnesium hydroxide/simethicone Suspension 30 milliLiter(s) Oral every 4 hours PRN Dyspepsia  dextrose Oral Gel 15 Gram(s) Oral once PRN Blood Glucose LESS THAN 70 milliGRAM(s)/deciliter  melatonin 3 milliGRAM(s) Oral once PRN Sleep  melatonin 3 milliGRAM(s) Oral at bedtime PRN Insomnia  ondansetron Injectable 4 milliGRAM(s) IV Push every 8 hours PRN Nausea and/or Vomiting      __________________ Pertinent Labs__________________   05-14 Na136 mmol/L Glu 100 mg/dL<H> K+ 3.5 mmol/L Cr  0.65 mg/dL BUN 6 mg/dL<L> 05-14 Phos 3.4 mg/dL        POCT Blood Glucose.: 91 mg/dL (05-14-24 @ 11:44)  POCT Blood Glucose.: 104 mg/dL (05-13-24 @ 16:20)  POCT Blood Glucose.: 140 mg/dL (05-12-24 @ 07:05)  POCT Blood Glucose.: 196 mg/dL (05-11-24 @ 21:06)  POCT Blood Glucose.: 153 mg/dL (05-11-24 @ 16:37)          Estimated Needs:   [x ] no change since previous assessment        Previous Nutrition Diagnosis: Severe malnutrition     Nutrition Diagnosis is [x ] ongoing      Recommendations:  1) Recommend continue with current diet, which remains appropriate at this time.   2) Encourage PO intake and honor food preferences as able.   3) Monitor PO intake, Labs, weights, BMs, and skin integrity.   4) RD to remain available for further nutritional interventions as indicated.     Monitoring and Evaluation:      [ x] Tolerance to diet prescription [x ] weights [x ] follow up per protocol  [ ] other:

## 2024-05-14 NOTE — PROGRESS NOTE ADULT - SUBJECTIVE AND OBJECTIVE BOX
Date of Service: 05-14-24 @ 11:23    Patient is a 78y old  Female who presents with a chief complaint of abnormal labs (14 May 2024 08:50)      Any change in ROS: seems O K: no sob:  on room a ir     MEDICATIONS  (STANDING):  aspirin  chewable 81 milliGRAM(s) Oral daily  atorvastatin 20 milliGRAM(s) Oral at bedtime  dextrose 10% Bolus 125 milliLiter(s) IV Bolus once  dextrose 5%. 1000 milliLiter(s) (50 mL/Hr) IV Continuous <Continuous>  dextrose 5%. 1000 milliLiter(s) (100 mL/Hr) IV Continuous <Continuous>  dextrose 50% Injectable 25 Gram(s) IV Push once  dextrose 50% Injectable 12.5 Gram(s) IV Push once  ertapenem  IVPB 1000 milliGRAM(s) IV Intermittent every 24 hours  glucagon  Injectable 1 milliGRAM(s) IntraMuscular once  pantoprazole  Injectable 40 milliGRAM(s) IV Push daily  sodium chloride 0.9%. 1000 milliLiter(s) (50 mL/Hr) IV Continuous <Continuous>    MEDICATIONS  (PRN):  acetaminophen     Tablet .. 650 milliGRAM(s) Oral every 6 hours PRN Temp greater or equal to 38C (100.4F), Mild Pain (1 - 3)  aluminum hydroxide/magnesium hydroxide/simethicone Suspension 30 milliLiter(s) Oral every 4 hours PRN Dyspepsia  dextrose Oral Gel 15 Gram(s) Oral once PRN Blood Glucose LESS THAN 70 milliGRAM(s)/deciliter  melatonin 3 milliGRAM(s) Oral at bedtime PRN Insomnia  melatonin 3 milliGRAM(s) Oral once PRN Sleep  ondansetron Injectable 4 milliGRAM(s) IV Push every 8 hours PRN Nausea and/or Vomiting    Vital Signs Last 24 Hrs  T(C): 36.8 (14 May 2024 10:17), Max: 36.8 (14 May 2024 10:17)  T(F): 98.3 (14 May 2024 10:17), Max: 98.3 (14 May 2024 10:17)  HR: 88 (14 May 2024 10:17) (69 - 88)  BP: 116/80 (14 May 2024 10:17) (116/80 - 145/90)  BP(mean): --  RR: 18 (14 May 2024 10:17) (16 - 18)  SpO2: 100% (14 May 2024 10:17) (97% - 100%)    Parameters below as of 14 May 2024 10:17  Patient On (Oxygen Delivery Method): room air        I&O's Summary    13 May 2024 07:01  -  14 May 2024 07:00  --------------------------------------------------------  IN: 120 mL / OUT: 0 mL / NET: 120 mL          Physical Exam:   GENERAL: NAD, well-groomed, well-developed  HEENT: MARTINE/   Atraumatic, Normocephalic  ENMT: No tonsillar erythema, exudates, or enlargement; Moist mucous membranes, Good dentition, No lesions  NECK: Supple, No JVD, Normal thyroid  CHEST/LUNG: Clear to auscultaion  CVS: Regular rate and rhythm; No murmurs, rubs, or gallops  GI: : Soft, Nontender, Nondistended; Bowel sounds present  NERVOUS SYSTEM:  Alert & awake  EXTREMITIES: - edema  LYMPH: No lymphadenopathy noted  SKIN: No rashes or lesions  ENDOCRINOLOGY: No Thyromegaly  PSYCH: Appropriate    Labs:                              11.1   9.37  )-----------( 330      ( 14 May 2024 06:56 )             33.6                         11.2   11.87 )-----------( 302      ( 13 May 2024 05:20 )             32.6                         11.0   12.29 )-----------( 299      ( 12 May 2024 05:32 )             32.1                         10.3   13.02 )-----------( 295      ( 11 May 2024 05:16 )             30.7     05-14    136  |  103  |  6<L>  ----------------------------<  100<H>  3.5   |  20<L>  |  0.65  05-13    138  |  103  |  3<L>  ----------------------------<  105<H>  3.3<L>   |  21<L>  |  0.52  05-12    136  |  102  |  6<L>  ----------------------------<  145<H>  3.6   |  20<L>  |  0.61  05-11    136  |  103  |  7   ----------------------------<  96  3.5   |  19<L>  |  0.67    Ca    9.1      14 May 2024 06:56  Ca    8.9      13 May 2024 05:20  Phos  3.4     05-14  Phos  2.5     05-13  Mg     1.90     05-14  Mg     1.80     05-13      CAPILLARY BLOOD GLUCOSE      POCT Blood Glucose.: 104 mg/dL (13 May 2024 16:20)          Urinalysis Basic - ( 14 May 2024 06:56 )    Color: x / Appearance: x / SG: x / pH: x  Gluc: 100 mg/dL / Ketone: x  / Bili: x / Urobili: x   Blood: x / Protein: x / Nitrite: x   Leuk Esterase: x / RBC: x / WBC x   Sq Epi: x / Non Sq Epi: x / Bacteria: x            RECENT CULTURES:  05-12 @ 00:45 OR Collect Bladder (from O.R.)   MAYELIN      Enterococcus faecium (vancomycin resistant)  Enterococcus faecium (vancomycin resistant)     <10,000 CFU/ml Enterococcus faecium (vancomycin resistant)  <10,000 CFU/ml Enterococcus faecalis Susceptibility to follow.    05-11 @ 19:40 Clean Catch Clean Catch (Midstream)                10,000 - 49,000 CFU/mL Enterococcus faecium  <10,000 CFU/ml Normal Urogenital helen present    05-11 @ 19:25 .Blood Blood-Peripheral       rad< from: Xray Chest 1 View- PORTABLE-Urgent (Xray Chest 1 View- PORTABLE-Urgent .) (05.11.24 @ 19:59) >  CLINICAL INDICATION: Infectious workup.    TECHNIQUE: Single frontal, portable view of the chest was obtained.    COMPARISON: Chest x-ray 5/2/2024, abdominal CT 5/7/2024.    FINDINGS:  Heart/Vascular: Heart is normal in size.  Pulmonary:No focal consolidations. No pleural effusion. No pneumothorax.   Normal pulmonary vasculature  Bones: No acute bony abnormalities.  Lines and tubes: None. Cholecystectomy clips in the right upper quadrant.      IMPRESSION:  No focal consolidations.    --- End of Report ---          BEATA DE LEÓN MD; Resident Radiologist  This document has been electronically signed.  SHIV SMITH DO; Attending Radiologist  This document has been electronically signed. May 12 2024 10:32AM    < end of copied text >  < from: CT Abdomen and Pelvis w/ IV Cont (05.07.24 @ 16:45) >  cephalad portion of the vaginal cuff measuring 1.7 x 1.8 cm (2:94)   extraluminal air tracks along the lateral aspect of the proximal sigmoid   colon. Compared to the prior examination, wall thickening of the colon is   not significantly changed.  PERITONEUM: As above.  VESSELS: Atherosclerotic calcifications. Left-sided infrarenal IVC.  RETROPERITONEUM/LYMPH NODES: No lymphadenopathy.  ABDOMINAL WALL: Fat-containing left inguinal hernia.  BONES: Degenerative changes. Superior endplate deformity of L2. Grade 2   anterolisthesis of L5 on S1. Bilateral L5 spondylolysis.    IMPRESSION:  *  Acute sigmoid diverticulitis with new diverticular abscess along the   cephalad aspect of the vaginal cuff measuring up to 1.8 cm.  *  Small amount of extraluminal air tracks along the lateral aspect of   the sigmoid colon, slightly increased compared to prior exam.  *  Persistent moderate left hydroureteronephrosis to the level of the   sigmoid colon.        < end of copied text >           No growth at 48 Hours    05-11 @ 19:18 .Blood Blood-Peripheral                No growth at 48 Hours          RESPIRATORY CULTURES:          Studies  Chest X-RAY  CT SCAN Chest   Venous Dopplers: LE:   CT Abdomen  Others

## 2024-05-14 NOTE — PROGRESS NOTE ADULT - ASSESSMENT
77 y/o F with pmhx of Type-2 DM, HTN, HLD, Glaucoma, OA of the L-knee and PERFECTO, and Diverticulitis previously treated w/ cefpodoxime/ flagyl x 10 days and augmentin x 10 days; now w/ contained perforation & abscess. Repeat CT scan today with notable decreased abscess adjacent to vaginal cuff, however 2 new perisigmoid abscesses. Patient hemodynamically stable, on Ertapenem with downtrending WBC. Las fever on 5/11, RVP+ for Rhinovirus.    Recommendations:  - Diet per primary team, advance as tolerated  - observe one more day and if stable, dc with IV antibiotics per ID   - rest of care per primary team     Discussed with attending Dr. Lr    A-Team Surgery  [id number]

## 2024-05-14 NOTE — PROGRESS NOTE ADULT - ASSESSMENT
77 y/o F with pmhx of Diverticulitis, Type-2 DM, HTN, HLD, Glaucoma, OA of the L-knee and PERFECTO, presented to the ED for new onset lethargy. Found to have leukocytosis likely from pneumonia vs. diverticulitis. The patient does not appear lethargic. Admit for IV abx.      Problem/Plan - 1:·  Problem: Pneumonia, pneumococcal.   ·  Plan: - ruled out   sepsis likely from diverticulitis  - ID fu     Problem/Plan - 2:·  Problem: Diverticulitis.   ·  Plan: -  CT abdomen/pelvis with contained perforation  - ivf- no indication for surgery as per colorectal   - reviewed  repeat CT   - surgery fu appreciated     Problem/Plan - 3:  ·  Problem: Hydronephrosis, left. ·  Plan: - patient with known hx of hydronephrosis  - CT chest still shows L hydronephrosis  - chronic   Problem/Plan - 4:  ·  Problem: Benign essential HTN.   ·  Plan: - hold BP meds.  restart as needed     Problem/Plan - 5:  ·  Problem: DM2 (diabetes mellitus, type 2).   ·  Plan: - low dose sliding scale insulin.      dc planning in am if stable

## 2024-05-14 NOTE — PROGRESS NOTE ADULT - ASSESSMENT
This is a 79 y/o F with pmhx of Diverticulitis, Type-2 DM, HTN, HLD, Glaucoma, OA of the L-knee and PERFECTO, presented to the ED for new onset lethargy. At bedside, the patient does not appear lethargic. Son is not at bedside and the patient gave me the son's phone number however as per charts it is her own phone number. The patient denies shortness of breath. She also stated that she feels fine and does not have any complaints at this time. Denies cough. "She stated that I have dementia so I do not remember why I am here." Denies abdominal pain, and diarrhea. As per ED charts, the patient was sent here for leukocytosis. She also denies having issues eating at home. (03 May 2024 06:00):  she does not know why  sheis here:  recently she was dced from the hospital : currently she has no resp complaints and is on room air     suspected pneumonia  Hx of diverticulitis  MD  HTN  Glaucoma  OA    suspected pneumonia  -NOT SURE IF SHE HAS PNEUMONIA:  SHE IS EMPIRICALLY STARTED ON ANTIBIOTICS   -SHE IS HAS No FEVER BUT wbc COUNT IS HIGH would RULE OUT uti:  DW ACP ; TO SEND UA AS WELL AS PROCAL  -oN ROOM AIR:   -CHECK LEGIONELLA    -MINIMISE ANTIBIOTICS IF WE CAN   5/4: doubt she has pneumonia:  the new ct scan with no changes from prior and no clinical symptoms too resp wise   5/5: seems stable:  no sob:  no cough : no phlegm  : on room air   5/6: seems to be doing  ok ; no sob:  no phlegm  :   5/7; doubt pneumonia: no pulm issues: on room air   5/8: pulm wise remains stable:  no sob:  no cough or phlegm    5/9: seems O K:  no sob:  no cough : no phlegm    5/10: seems to be doing  ok ; no sob:  no cough ; no phlegm  now added ertpaenem for abd source  5/11: cont antibiotics  no sob:  no cough or phlegm : on room air   5/12: seems to be doing  ok : no sob: noc ugh : no phlegm  on room air   5/13: seems stable: no sob:  noc ugh : no phlegm    5/14; no resp symptoms  on room air:     Hx of diverticulitis  -PER PRIMARY TEAM  -5/4: the ct abd showed: Persistent sigmoid diverticulitis with adjacent phlegmonous changes with small foci of extraluminal air, likely sequela of contained perforation,   in retrospect unchanged from the prior exam. No drainable fluid collection. Moderate left hydroureteronephrosis to the level of inflammatory changes in the pelvis, unchanged.  -defer to ID and surgery   5/5: no abd pain : cont antbiotics:  id following   5/6: on zosyn ; for diverticulitis   5/7: cont antibtiocs till 22nd;  per id   5/8 : rpt ct scab and showed new abscess near diverticulitis  slight increase in ai : defer to ID:  and surgery    5/9: seems OK:  ct noted:  remains on antibiotics  5/10: now on ertapenem  5/11; cont ertapenem : id following   5/12: cont antibiotics  no new change   5/13: cont antibiotics  id following  5/14: cont iv antibiotics     dm:   -MONITOR AND CONTROL   HTN  -CONTROLLED  Glaucoma  -PER [facility] TEAM   OA  -NO PAIN  ;      DW ACP : pulm wise stable:  will follow prn if required

## 2024-05-14 NOTE — PROGRESS NOTE ADULT - SUBJECTIVE AND OBJECTIVE BOX
Surgery Progress Note    OVERNIGHT EVENTS: NAEO    SUBJECTIVE: Pt seen and examined at bedside. Patient comfortable and in no-apparent distress.    Vital Signs Last 24 Hrs  T(C): 36.6 (14 May 2024 07:08), Max: 36.7 (13 May 2024 09:19)  T(F): 97.9 (14 May 2024 07:08), Max: 98 (13 May 2024 09:19)  HR: 69 (14 May 2024 07:08) (69 - 96)  BP: 125/67 (14 May 2024 07:08) (120/80 - 145/90)  BP(mean): --  RR: 17 (14 May 2024 07:08) (16 - 18)  SpO2: 98% (14 May 2024 07:08) (96% - 98%)    Parameters below as of 14 May 2024 07:08  Patient On (Oxygen Delivery Method): room air        PHYSICAL EXAM:  General Appearance:  NAD  Respiratory: No labored breathing  CV: Pulse regularly present  Abdomen: Soft, mild tenderness in left lower quadrant     INs and OUTs:    05-13-24 @ 07:01  -  05-14-24 @ 07:00  --------------------------------------------------------  IN: 120 mL / OUT: 0 mL / NET: 120 mL        LABS:                        11.1   9.37  )-----------( 330      ( 14 May 2024 06:56 )             33.6     05-14    136  |  103  |  6<L>  ----------------------------<  100<H>  3.5   |  20<L>  |  0.65    Ca    9.1      14 May 2024 06:56  Phos  3.4     05-14  Mg     1.90     05-14        Urinalysis Basic - ( 14 May 2024 06:56 )    Color: x / Appearance: x / SG: x / pH: x  Gluc: 100 mg/dL / Ketone: x  / Bili: x / Urobili: x   Blood: x / Protein: x / Nitrite: x   Leuk Esterase: x / RBC: x / WBC x   Sq Epi: x / Non Sq Epi: x / Bacteria: x

## 2024-05-14 NOTE — PROGRESS NOTE ADULT - ASSESSMENT
77 y/o F PMhx Glaucoma, DM II, HTN, dementia, diverticulitis who presented w/ increased lethargy  had diverticulitis and was previously treated w/ cefpodoxime/ flagyl x 10 days and augmentin x 10 days; now w/ contained perforation & abscess    Sigmoid diverticulitis w/ contained perforation and abscess  CTAP 5/3- Persistent sigmoid diverticulitis with adjacent phlegmonous changes with small foci of extraluminal air, likely sequela of contained perforation.  CT 5/7- Acute sigmoid diverticulitis with new diverticular abscess along the cephalad aspect of the vaginal cuff measuring up to 1.8 cm. Small amount of extraluminal air, slightly increased compared to prior exam.  switched form zosyn to ertapenem 5/9 evening for ease of administration at home  midline placed 5/24  CT 5/13- Sigmoid diverticulitis with decreased size of an abscess adjacent to the vaginal cuff. Collection in the vaginal cuff appears to demonstrate communication with the sigmoid colon. Interval development of 2 new perisigmoid abscesses, one of which   contacts the superior aspect of the urinary bladder, with probable reactive thickening.  will need eventual colonoscopy    asymptomatic bacteriuria  urine cx w/ enterococcus noted  patient w/o urinary symptoms  not clinically relevant to abdominal infection as wbc improving w/o coverage    Recommendations  concern for source control  surgery f/u noted- monitor for 1 more day  c/w ertapenem  trend wbc    Geovani Goldman M.D.  [facility], Division of Infectious Diseases  [phone]  After 5pm on weekdays and all day on weekends - please call [phone]

## 2024-05-15 VITALS
HEART RATE: 80 BPM | RESPIRATION RATE: 18 BRPM | DIASTOLIC BLOOD PRESSURE: 80 MMHG | TEMPERATURE: 98 F | OXYGEN SATURATION: 95 % | SYSTOLIC BLOOD PRESSURE: 136 MMHG

## 2024-05-15 LAB
ANION GAP SERPL CALC-SCNC: 13 MMOL/L — SIGNIFICANT CHANGE UP (ref 7–14)
BASOPHILS # BLD AUTO: 0.05 K/UL — SIGNIFICANT CHANGE UP (ref 0–0.2)
BASOPHILS NFR BLD AUTO: 0.5 % — SIGNIFICANT CHANGE UP (ref 0–2)
BUN SERPL-MCNC: 8 MG/DL — SIGNIFICANT CHANGE UP (ref 7–23)
CALCIUM SERPL-MCNC: 9.3 MG/DL — SIGNIFICANT CHANGE UP (ref 8.4–10.5)
CHLORIDE SERPL-SCNC: 103 MMOL/L — SIGNIFICANT CHANGE UP (ref 98–107)
CO2 SERPL-SCNC: 20 MMOL/L — LOW (ref 22–31)
CREAT SERPL-MCNC: 0.67 MG/DL — SIGNIFICANT CHANGE UP (ref 0.5–1.3)
EGFR: 91 ML/MIN/1.73M2 — SIGNIFICANT CHANGE UP
EOSINOPHIL # BLD AUTO: 0.25 K/UL — SIGNIFICANT CHANGE UP (ref 0–0.5)
EOSINOPHIL NFR BLD AUTO: 2.7 % — SIGNIFICANT CHANGE UP (ref 0–6)
GLUCOSE SERPL-MCNC: 105 MG/DL — HIGH (ref 70–99)
HCT VFR BLD CALC: 32.3 % — LOW (ref 34.5–45)
HGB BLD-MCNC: 11 G/DL — LOW (ref 11.5–15.5)
IANC: 4.92 K/UL — SIGNIFICANT CHANGE UP (ref 1.8–7.4)
IMM GRANULOCYTES NFR BLD AUTO: 0.3 % — SIGNIFICANT CHANGE UP (ref 0–0.9)
LYMPHOCYTES # BLD AUTO: 3.28 K/UL — SIGNIFICANT CHANGE UP (ref 1–3.3)
LYMPHOCYTES # BLD AUTO: 35 % — SIGNIFICANT CHANGE UP (ref 13–44)
MAGNESIUM SERPL-MCNC: 1.9 MG/DL — SIGNIFICANT CHANGE UP (ref 1.6–2.6)
MCHC RBC-ENTMCNC: 29.3 PG — SIGNIFICANT CHANGE UP (ref 27–34)
MCHC RBC-ENTMCNC: 34.1 GM/DL — SIGNIFICANT CHANGE UP (ref 32–36)
MCV RBC AUTO: 86.1 FL — SIGNIFICANT CHANGE UP (ref 80–100)
MONOCYTES # BLD AUTO: 0.83 K/UL — SIGNIFICANT CHANGE UP (ref 0–0.9)
MONOCYTES NFR BLD AUTO: 8.9 % — SIGNIFICANT CHANGE UP (ref 2–14)
NEUTROPHILS # BLD AUTO: 4.92 K/UL — SIGNIFICANT CHANGE UP (ref 1.8–7.4)
NEUTROPHILS NFR BLD AUTO: 52.6 % — SIGNIFICANT CHANGE UP (ref 43–77)
NRBC # BLD: 0 /100 WBCS — SIGNIFICANT CHANGE UP (ref 0–0)
NRBC # FLD: 0 K/UL — SIGNIFICANT CHANGE UP (ref 0–0)
PHOSPHATE SERPL-MCNC: 3.5 MG/DL — SIGNIFICANT CHANGE UP (ref 2.5–4.5)
PLATELET # BLD AUTO: 335 K/UL — SIGNIFICANT CHANGE UP (ref 150–400)
POTASSIUM SERPL-MCNC: 3.5 MMOL/L — SIGNIFICANT CHANGE UP (ref 3.5–5.3)
POTASSIUM SERPL-SCNC: 3.5 MMOL/L — SIGNIFICANT CHANGE UP (ref 3.5–5.3)
RBC # BLD: 3.75 M/UL — LOW (ref 3.8–5.2)
RBC # FLD: 14.3 % — SIGNIFICANT CHANGE UP (ref 10.3–14.5)
SODIUM SERPL-SCNC: 136 MMOL/L — SIGNIFICANT CHANGE UP (ref 135–145)
WBC # BLD: 9.36 K/UL — SIGNIFICANT CHANGE UP (ref 3.8–10.5)
WBC # FLD AUTO: 9.36 K/UL — SIGNIFICANT CHANGE UP (ref 3.8–10.5)

## 2024-05-15 RX ADMIN — PANTOPRAZOLE SODIUM 40 MILLIGRAM(S): 20 TABLET, DELAYED RELEASE ORAL at 11:20

## 2024-05-15 RX ADMIN — ERTAPENEM SODIUM 120 MILLIGRAM(S): 1 INJECTION, POWDER, LYOPHILIZED, FOR SOLUTION INTRAMUSCULAR; INTRAVENOUS at 17:09

## 2024-05-15 RX ADMIN — Medication 81 MILLIGRAM(S): at 11:19

## 2024-05-15 NOTE — PROGRESS NOTE ADULT - SUBJECTIVE AND OBJECTIVE BOX
[facility], Division of Infectious Diseases  JORGE LUIS Chaves Y. Patel, S. Shah, G. Jones  [phone]  ([phone] - weekdays after 5pm and weekends)    Name: TY DAVIS  Age/Gender: 78y Female  MRN: [id number]    Interval History:  Notes reviewed.   No concerning overnight events.  Afebrile.   denies abd pain, diarrhea  wants to go home soon    Allergies: No Known Allergies      Objective:  Vitals:   T(F): 98.4 (05-15-24 @ 05:43), Max: 98.4 (05-15-24 @ 05:43)  HR: 90 (05-15-24 @ 05:43) (84 - 95)  BP: 118/79 (05-15-24 @ 05:43) (118/79 - 134/75)  RR: 19 (05-15-24 @ 05:43) (18 - 19)  SpO2: 99% (05-15-24 @ 05:43) (96% - 99%)  Physical Examination:  General: no acute distress  HEENT: anicteric  Cardio: S1, S2, normal rate  Resp: clear to auscultation anteriorly  Abd: soft, NT, ND  Ext: no LE edema  Skin: warm, dry    Laboratory Studies:  CBC:                       11.0   9.36  )-----------( 335      ( 15 May 2024 05:00 )             32.3     WBC Trend:  9.36 05-15-24 @ 05:00  9.37 05-14-24 @ 06:56  11.87 05-13-24 @ 05:20  12.29 05-12-24 @ 05:32  13.02 05-11-24 @ 05:16  15.16 05-10-24 @ 05:40  10.42 05-09-24 @ 05:10    CMP: 05-15    136  |  103  |  8   ----------------------------<  105<H>  3.5   |  20<L>  |  0.67    Ca    9.3      15 May 2024 05:00  Phos  3.5     05-15  Mg     1.90     05-15            Urinalysis Basic - ( 15 May 2024 05:00 )    Color: x / Appearance: x / SG: x / pH: x  Gluc: 105 mg/dL / Ketone: x  / Bili: x / Urobili: x   Blood: x / Protein: x / Nitrite: x   Leuk Esterase: x / RBC: x / WBC x   Sq Epi: x / Non Sq Epi: x / Bacteria: x      Microbiology: reviewed     Culture - Urine (collected 05-12-24 @ 00:45)  Source: OR Collect Bladder (from O.R.)  Final Report (05-14-24 @ 15:18):    <10,000 CFU/ml Enterococcus faecium (vancomycin resistant)    <10,000 CFU/ml Enterococcus faecalis  Organism: Enterococcus faecium (vancomycin resistant)  Enterococcus faecalis (05-14-24 @ 15:18)  Organism: Enterococcus faecalis (05-14-24 @ 15:18)      Method Type: MAYELIN      -  Ampicillin: S <=2 Predicts results to ampicillin/sulbactam, amoxacillin-clavulanate and  piperacillin-tazobactam.      -  Ciprofloxacin: R >2      -  Levofloxacin: R >4      -  Vancomycin: S 2  Organism: Enterococcus faecium (vancomycin resistant) (05-14-24 @ 15:18)      Method Type: MAYELIN      -  Ampicillin: R >8 Predicts results to ampicillin/sulbactam, amoxacillin-clavulanate and  piperacillin-tazobactam.      -  Ciprofloxacin: R >2      -  Daptomycin: SDD 2 The breakpoint for SDD (susceptible dose dependent)is based on a dosage regimen of 8-12 mg/kg administered every 24 h in adults and is intended for serious infections due to E. faecium. Consultation with an infectious diseases specialist is recommended.      -  Levofloxacin: R >4      -  Linezolid: S 2      -  Vancomycin: R >16    Culture - Urine (collected 05-11-24 @ 19:40)  Source: Clean Catch Clean Catch (Midstream)  Final Report (05-14-24 @ 16:55):    10,000 - 49,000 CFU/mL Enterococcus faecium (vancomycin resistant)    <10,000 CFU/ml Normal Urogenital helen present  Organism: Enterococcus faecium (vancomycin resistant) (05-14-24 @ 16:55)  Organism: Enterococcus faecium (vancomycin resistant) (05-14-24 @ 16:55)      Method Type: MAYELIN      -  Ampicillin: R >8 Predicts results to ampicillin/sulbactam, amoxacillin-clavulanate and  piperacillin-tazobactam.      -  Ciprofloxacin: R >2      -  Daptomycin: SDD 4 The breakpoint for SDD (susceptible dose dependent)is based on a dosage regimen of 8-12 mg/kg administered every 24 h in adults and is intended for serious infections due to E. faecium. Consultation with an infectious diseases specialist is recommended.      -  Levofloxacin: R >4      -  Linezolid: S 2      -  Nitrofurantoin: S <=32 Should not be used to treat pyelonephritis.      -  Tetracycline: R >8      -  Vancomycin: R >16    Culture - Blood (collected 05-11-24 @ 19:25)  Source: .Blood Blood-Peripheral  Preliminary Report (05-15-24 @ 02:02):    No growth at 72 Hours    Culture - Blood (collected 05-11-24 @ 19:18)  Source: .Blood Blood-Peripheral  Preliminary Report (05-15-24 @ 02:02):    No growth at 72 Hours    Culture - Blood (collected 05-03-24 @ 18:52)  Source: .Blood Blood  Final Report (05-09-24 @ 01:00):    No growth at 5 days    Culture - Blood (collected 05-03-24 @ 18:46)  Source: .Blood Blood  Final Report (05-09-24 @ 01:00):    No growth at 5 days    Culture - Blood (collected 05-02-24 @ 22:47)  Source: .Blood Blood-Peripheral  Final Report (05-08-24 @ 11:00):    No growth at 5 days    Culture - Blood (collected 05-02-24 @ 22:35)  Source: .Blood Blood-Peripheral  Final Report (05-08-24 @ 11:00):    No growth at 5 days        Radiology: reviewed     Medications:  acetaminophen     Tablet .. 650 milliGRAM(s) Oral every 6 hours PRN  aluminum hydroxide/magnesium hydroxide/simethicone Suspension 30 milliLiter(s) Oral every 4 hours PRN  aspirin  chewable 81 milliGRAM(s) Oral daily  atorvastatin 20 milliGRAM(s) Oral at bedtime  dextrose 10% Bolus 125 milliLiter(s) IV Bolus once  dextrose 5%. 1000 milliLiter(s) IV Continuous <Continuous>  dextrose 5%. 1000 milliLiter(s) IV Continuous <Continuous>  dextrose 50% Injectable 25 Gram(s) IV Push once  dextrose 50% Injectable 12.5 Gram(s) IV Push once  dextrose Oral Gel 15 Gram(s) Oral once PRN  ertapenem  IVPB 1000 milliGRAM(s) IV Intermittent every 24 hours  glucagon  Injectable 1 milliGRAM(s) IntraMuscular once  melatonin 3 milliGRAM(s) Oral once PRN  melatonin 3 milliGRAM(s) Oral at bedtime PRN  ondansetron Injectable 4 milliGRAM(s) IV Push every 8 hours PRN  pantoprazole  Injectable 40 milliGRAM(s) IV Push daily  sodium chloride 0.9%. 1000 milliLiter(s) IV Continuous <Continuous>    Antimicrobials:  ertapenem  IVPB 1000 milliGRAM(s) IV Intermittent every 24 hours

## 2024-05-15 NOTE — PROGRESS NOTE ADULT - PROVIDER SPECIALTY LIST ADULT
Colorectal Surgery
Hospitalist
Hospitalist
Infectious Disease
Pulmonology
Surgery
Surgery
Hospitalist
Infectious Disease
Pulmonology
Pulmonology
Surgery
Surgery
Hospitalist
Infectious Disease
Pulmonology
Pulmonology
Surgery
Hospitalist
Hospitalist
Infectious Disease
Pulmonology
Pulmonology
Surgery
Surgery
Hospitalist

## 2024-05-15 NOTE — PROGRESS NOTE ADULT - ASSESSMENT
78F with diverticulitis    Continue IV abx upon dc  will need surgical fu with repeat imaging and likely resection in the future  Diet as tolerated, low fiber

## 2024-05-15 NOTE — PROGRESS NOTE ADULT - SUBJECTIVE AND OBJECTIVE BOX
DATE OF SERVICE: 05-15-24 @ 09:44    INTERVAL HPI/OVERNIGHT EVENTS:  No pain today, WBC NL    MEDICATIONS  (STANDING):  aspirin  chewable 81 milliGRAM(s) Oral daily  atorvastatin 20 milliGRAM(s) Oral at bedtime  dextrose 10% Bolus 125 milliLiter(s) IV Bolus once  dextrose 5%. 1000 milliLiter(s) (50 mL/Hr) IV Continuous <Continuous>  dextrose 5%. 1000 milliLiter(s) (100 mL/Hr) IV Continuous <Continuous>  dextrose 50% Injectable 25 Gram(s) IV Push once  dextrose 50% Injectable 12.5 Gram(s) IV Push once  ertapenem  IVPB 1000 milliGRAM(s) IV Intermittent every 24 hours  glucagon  Injectable 1 milliGRAM(s) IntraMuscular once  pantoprazole  Injectable 40 milliGRAM(s) IV Push daily  sodium chloride 0.9%. 1000 milliLiter(s) (50 mL/Hr) IV Continuous <Continuous>    MEDICATIONS  (PRN):  acetaminophen     Tablet .. 650 milliGRAM(s) Oral every 6 hours PRN Temp greater or equal to 38C (100.4F), Mild Pain (1 - 3)  aluminum hydroxide/magnesium hydroxide/simethicone Suspension 30 milliLiter(s) Oral every 4 hours PRN Dyspepsia  dextrose Oral Gel 15 Gram(s) Oral once PRN Blood Glucose LESS THAN 70 milliGRAM(s)/deciliter  melatonin 3 milliGRAM(s) Oral once PRN Sleep  melatonin 3 milliGRAM(s) Oral at bedtime PRN Insomnia  ondansetron Injectable 4 milliGRAM(s) IV Push every 8 hours PRN Nausea and/or Vomiting      Vital Signs Last 24 Hrs  T(C): 36.9 (15 May 2024 05:43), Max: 36.9 (15 May 2024 05:43)  T(F): 98.4 (15 May 2024 05:43), Max: 98.4 (15 May 2024 05:43)  HR: 90 (15 May 2024 05:43) (84 - 95)  BP: 118/79 (15 May 2024 05:43) (116/80 - 134/75)  BP(mean): --  RR: 19 (15 May 2024 05:43) (18 - 19)  SpO2: 99% (15 May 2024 05:43) (96% - 100%)    Parameters below as of 15 May 2024 05:43  Patient On (Oxygen Delivery Method): room air      I&O's Detail        Physical Exam:  General: WN/WD NAD  Respiratory: airway patent, respirations unlabored, no increased WoB  Neurology: A&Ox3, nonfocal, MCCANN x 4  Abdominal: Soft NT/ND      LABS:                        11.0   9.36  )-----------( 335      ( 15 May 2024 05:00 )             32.3     05-15    136  |  103  |  8   ----------------------------<  105<H>  3.5   |  20<L>  |  0.67    Ca    9.3      15 May 2024 05:00  Phos  3.5     05-15  Mg     1.90     05-15        Urinalysis Basic - ( 15 May 2024 05:00 )    Color: x / Appearance: x / SG: x / pH: x  Gluc: 105 mg/dL / Ketone: x  / Bili: x / Urobili: x   Blood: x / Protein: x / Nitrite: x   Leuk Esterase: x / RBC: x / WBC x   Sq Epi: x / Non Sq Epi: x / Bacteria: x        RADIOLOGY & ADDITIONAL STUDIES:

## 2024-05-15 NOTE — PROGRESS NOTE ADULT - NUTRITIONAL ASSESSMENT
This patient has been assessed with a concern for Malnutrition and has been determined to have a diagnosis/diagnoses of Severe protein-calorie malnutrition.    This patient is being managed with:   Diet NPO-  Except Medications  Entered: May 13 2024  8:01PM  
This patient has been assessed with a concern for Malnutrition and has been determined to have a diagnosis/diagnoses of Severe protein-calorie malnutrition.    This patient is being managed with:   Diet Pureed-  Consistent Carbohydrate {No Snacks} (CSTCHO)  Low Fat (LOWFAT)  Entered: May 14 2024 12:00PM  
This patient has been assessed with a concern for Malnutrition and has been determined to have a diagnosis/diagnoses of Severe protein-calorie malnutrition.    This patient is being managed with:   Diet Pureed-  Consistent Carbohydrate {No Snacks} (CSTCHO)  Low Fat (LOWFAT)  Entered: May  7 2024  4:04PM  
This patient has been assessed with a concern for Malnutrition and has been determined to have a diagnosis/diagnoses of Severe protein-calorie malnutrition.    This patient is being managed with:   Diet Pureed-  Consistent Carbohydrate {No Snacks} (CSTCHO)  Low Fat (LOWFAT)  Entered: May 14 2024 12:00PM  
This patient has been assessed with a concern for Malnutrition and has been determined to have a diagnosis/diagnoses of Severe protein-calorie malnutrition.    This patient is being managed with:   Diet Pureed-  Consistent Carbohydrate {No Snacks} (CSTCHO)  Low Fat (LOWFAT)  Entered: May  7 2024  4:04PM  
This patient has been assessed with a concern for Malnutrition and has been determined to have a diagnosis/diagnoses of Severe protein-calorie malnutrition.    This patient is being managed with:   Diet Pureed-  Consistent Carbohydrate {No Snacks} (CSTCHO)  Low Fat (LOWFAT)  Entered: May 14 2024 12:00PM

## 2024-05-15 NOTE — PROGRESS NOTE ADULT - ASSESSMENT
79 y/o F PMhx Glaucoma, DM II, HTN, dementia, diverticulitis who presented w/ increased lethargy  had diverticulitis and was previously treated w/ cefpodoxime/ flagyl x 10 days and augmentin x 10 days; now w/ contained perforation & abscess    Sigmoid diverticulitis w/ contained perforation and abscess  CTAP 5/3- Persistent sigmoid diverticulitis with adjacent phlegmonous changes with small foci of extraluminal air, likely sequela of contained perforation.  CT 5/7- Acute sigmoid diverticulitis with new diverticular abscess along the cephalad aspect of the vaginal cuff measuring up to 1.8 cm. Small amount of extraluminal air, slightly increased compared to prior exam.  switched form zosyn to ertapenem 5/9 evening for ease of administration at home  midline placed 5/24  CT 5/13- Sigmoid diverticulitis with decreased size of an abscess adjacent to the vaginal cuff. Collection in the vaginal cuff appears to demonstrate communication with the sigmoid colon. Interval development of 2 new perisigmoid abscesses, one of which   contacts the superior aspect of the urinary bladder, with probable reactive thickening.  will need eventual colonoscopy  per surgery no acute surgical intervention, possible outpatient surgery after inflammation improves    asymptomatic bacteriuria  urine cx w/ enterococcus noted  patient w/o urinary symptoms  not clinically relevant to abdominal infection as wbc improving w/o coverage    Recommendations  c/w ertapenem  - tentative plan for 3 week course from last CT scan w/ EOT 6/3 pending repeat imaging  will need repeat CT outpatient prior to completion of antibiotics  will need surgical f/u outpatient  f/u in [facility] office next week    Geovani Goldman M.D.  [facility], Division of Infectious Diseases  [phone]  After 5pm on weekdays and all day on weekends - please call [phone]  77 y/o F PMhx Glaucoma, DM II, HTN, dementia, diverticulitis who presented w/ increased lethargy  had diverticulitis and was previously treated w/ cefpodoxime/ flagyl x 10 days and augmentin x 10 days; now w/ contained perforation & abscess    Sigmoid diverticulitis w/ perforation and abscess  CTAP 5/3- Persistent sigmoid diverticulitis with adjacent phlegmonous changes with small foci of extraluminal air, likely sequela of contained perforation.  CT 5/7- Acute sigmoid diverticulitis with new diverticular abscess along the cephalad aspect of the vaginal cuff measuring up to 1.8 cm. Small amount of extraluminal air, slightly increased compared to prior exam.  switched form zosyn to ertapenem 5/9 evening for ease of administration at home  midline placed 5/24  CT 5/13- Sigmoid diverticulitis with decreased size of an abscess adjacent to the vaginal cuff. Collection in the vaginal cuff appears to demonstrate communication with the sigmoid colon. Interval development of 2 new perisigmoid abscesses, one of which   contacts the superior aspect of the urinary bladder, with probable reactive thickening.  will need eventual colonoscopy  per surgery no acute surgical intervention, possible outpatient surgery after inflammation improves    asymptomatic bacteriuria  urine cx w/ enterococcus noted  patient w/o urinary symptoms  not clinically relevant to abdominal infection as wbc improving w/o coverage    Recommendations  c/w ertapenem  - tentative plan for 3 week course from last CT scan w/ EOT 6/3 pending repeat imaging  will need repeat CT outpatient prior to completion of antibiotics  will need surgical f/u outpatient  f/u in [facility] office next week    Geovani Goldman M.D.  [facility], Division of Infectious Diseases  [phone]  After 5pm on weekdays and all day on weekends - please call [phone]  79 y/o F PMhx Glaucoma, DM II, HTN, dementia, diverticulitis who presented w/ increased lethargy  had diverticulitis and was previously treated w/ cefpodoxime/ flagyl x 10 days and augmentin x 10 days; now w/ contained perforation & abscess    Sigmoid diverticulitis w/ perforation and abscess  CTAP 5/3- Persistent sigmoid diverticulitis with adjacent phlegmonous changes with small foci of extraluminal air, likely sequela of contained perforation.  CT 5/7- Acute sigmoid diverticulitis with new diverticular abscess along the cephalad aspect of the vaginal cuff measuring up to 1.8 cm. Small amount of extraluminal air, slightly increased compared to prior exam.  switched form zosyn to ertapenem 5/9 evening for ease of administration at home  midline placed 5/9  CT 5/13- Sigmoid diverticulitis with decreased size of an abscess adjacent to the vaginal cuff. Collection in the vaginal cuff appears to demonstrate communication with the sigmoid colon. Interval development of 2 new perisigmoid abscesses, one of which   contacts the superior aspect of the urinary bladder, with probable reactive thickening.  will need eventual colonoscopy  per surgery no acute surgical intervention, possible outpatient surgery after inflammation improves    asymptomatic bacteriuria  urine cx w/ enterococcus noted  patient w/o urinary symptoms  not clinically relevant to abdominal infection as wbc improving w/o coverage    Recommendations  c/w ertapenem  - tentative plan for 3 week course from last CT scan w/ EOT 6/3 pending repeat imaging  will need repeat CT outpatient prior to completion of antibiotics  will need surgical f/u outpatient  f/u in [facility] office next week    Geovani Goldman M.D.  [facility], Division of Infectious Diseases  [phone]  After 5pm on weekdays and all day on weekends - please call [phone]

## 2024-05-15 NOTE — PROGRESS NOTE ADULT - ASSESSMENT
77 y/o F with pmhx of Diverticulitis, Type-2 DM, HTN, HLD, Glaucoma, OA of the L-knee and PERFECTO, presented to the ED for new onset lethargy. Found to have leukocytosis likely from pneumonia vs. diverticulitis. The patient does not appear lethargic. Admit for IV abx.      Problem/Plan - 1:·  Problem: Pneumonia, pneumococcal.   ·  Plan: - ruled out   sepsis likely from diverticulitis  - ID fu     Problem/Plan - 2:·  Problem: Diverticulitis.   ·  Plan: -  CT abdomen/pelvis with contained perforation  - ivf- no indication for surgery as per colorectal   - reviewed  repeat CT   - surgery fu appreciated     Problem/Plan - 3:  ·  Problem: Hydronephrosis, left. ·  Plan: - patient with known hx of hydronephrosis  - CT chest still shows L hydronephrosis  - chronic     Problem/Plan - 4:  ·  Problem: Benign essential HTN.   ·  Plan: - hold BP meds.  restart as needed     Problem/Plan - 5:  ·  Problem: DM2 (diabetes mellitus, type 2).   ·  Plan: - low dose sliding scale insulin.

## 2024-05-15 NOTE — PROGRESS NOTE ADULT - REASON FOR ADMISSION
abnormal labs

## 2024-05-15 NOTE — PROGRESS NOTE ADULT - SUBJECTIVE AND OBJECTIVE BOX
Patient is a 78y old  Female who presents with a chief complaint of abnormal labs (15 May 2024 10:27)    Date of servie : 05-15-24 @ 14:26  INTERVAL HPI/OVERNIGHT EVENTS:  T(C): 36.9 (05-15-24 @ 10:00), Max: 36.9 (05-15-24 @ 05:43)  HR: 86 (05-15-24 @ 10:00) (84 - 95)  BP: 117/80 (05-15-24 @ 10:00) (117/80 - 134/75)  RR: 19 (05-15-24 @ 10:00) (18 - 19)  SpO2: 99% (05-15-24 @ 10:00) (96% - 99%)  Wt(kg): --  I&O's Summary      LABS:                        11.0   9.36  )-----------( 335      ( 15 May 2024 05:00 )             32.3     05-15    136  |  103  |  8   ----------------------------<  105<H>  3.5   |  20<L>  |  0.67    Ca    9.3      15 May 2024 05:00  Phos  3.5     05-15  Mg     1.90     05-15        Urinalysis Basic - ( 15 May 2024 05:00 )    Color: x / Appearance: x / SG: x / pH: x  Gluc: 105 mg/dL / Ketone: x  / Bili: x / Urobili: x   Blood: x / Protein: x / Nitrite: x   Leuk Esterase: x / RBC: x / WBC x   Sq Epi: x / Non Sq Epi: x / Bacteria: x      CAPILLARY BLOOD GLUCOSE            Urinalysis Basic - ( 15 May 2024 05:00 )    Color: x / Appearance: x / SG: x / pH: x  Gluc: 105 mg/dL / Ketone: x  / Bili: x / Urobili: x   Blood: x / Protein: x / Nitrite: x   Leuk Esterase: x / RBC: x / WBC x   Sq Epi: x / Non Sq Epi: x / Bacteria: x        MEDICATIONS  (STANDING):  aspirin  chewable 81 milliGRAM(s) Oral daily  atorvastatin 20 milliGRAM(s) Oral at bedtime  dextrose 10% Bolus 125 milliLiter(s) IV Bolus once  dextrose 5%. 1000 milliLiter(s) (100 mL/Hr) IV Continuous <Continuous>  dextrose 5%. 1000 milliLiter(s) (50 mL/Hr) IV Continuous <Continuous>  dextrose 50% Injectable 25 Gram(s) IV Push once  dextrose 50% Injectable 12.5 Gram(s) IV Push once  ertapenem  IVPB 1000 milliGRAM(s) IV Intermittent every 24 hours  glucagon  Injectable 1 milliGRAM(s) IntraMuscular once  pantoprazole  Injectable 40 milliGRAM(s) IV Push daily  sodium chloride 0.9%. 1000 milliLiter(s) (50 mL/Hr) IV Continuous <Continuous>    MEDICATIONS  (PRN):  acetaminophen     Tablet .. 650 milliGRAM(s) Oral every 6 hours PRN Temp greater or equal to 38C (100.4F), Mild Pain (1 - 3)  aluminum hydroxide/magnesium hydroxide/simethicone Suspension 30 milliLiter(s) Oral every 4 hours PRN Dyspepsia  dextrose Oral Gel 15 Gram(s) Oral once PRN Blood Glucose LESS THAN 70 milliGRAM(s)/deciliter  melatonin 3 milliGRAM(s) Oral at bedtime PRN Insomnia  melatonin 3 milliGRAM(s) Oral once PRN Sleep  ondansetron Injectable 4 milliGRAM(s) IV Push every 8 hours PRN Nausea and/or Vomiting          PHYSICAL EXAM:  GENERAL: NAD, well-groomed, well-developed  HEAD:  Atraumatic, Normocephalic  CHEST/LUNG: Clear to percussion bilaterally; No rales, rhonchi, wheezing, or rubs  HEART: Regular rate and rhythm; No murmurs, rubs, or gallops  ABDOMEN: Soft, Nontender, Nondistended; Bowel sounds present  EXTREMITIES:  2+ Peripheral Pulses, No clubbing, cyanosis, or edema  LYMPH: No lymphadenopathy noted  SKIN: No rashes or lesions    Care Discussed with Consultants/Other Providers [ ] YES  [ ] NO

## 2024-05-16 ENCOUNTER — NON-APPOINTMENT (OUTPATIENT)
Age: 79
End: 2024-05-16

## 2024-05-16 ENCOUNTER — APPOINTMENT (OUTPATIENT)
Dept: HOME HEALTH SERVICES | Facility: HOME HEALTH | Age: 79
End: 2024-05-16

## 2024-05-16 VITALS
RESPIRATION RATE: 16 BRPM | OXYGEN SATURATION: 93 % | TEMPERATURE: 99.4 F | SYSTOLIC BLOOD PRESSURE: 116 MMHG | HEART RATE: 70 BPM | DIASTOLIC BLOOD PRESSURE: 78 MMHG

## 2024-05-16 RX ORDER — AMOXICILLIN AND CLAVULANATE POTASSIUM 875; 125 MG/1; MG/1
875-125 TABLET, COATED ORAL
Qty: 14 | Refills: 0 | Status: DISCONTINUED | COMMUNITY
Start: 2024-04-23 | End: 2024-05-16

## 2024-05-16 RX ORDER — METOPROLOL TARTRATE 25 MG/1
25 TABLET, FILM COATED ORAL TWICE DAILY
Refills: 0 | Status: DISCONTINUED | COMMUNITY
Start: 2024-04-23 | End: 2024-05-16

## 2024-05-19 ENCOUNTER — TRANSCRIPTION ENCOUNTER (OUTPATIENT)
Age: 79
End: 2024-05-19

## 2024-05-19 ENCOUNTER — INPATIENT (INPATIENT)
Facility: HOSPITAL | Age: 79
LOS: 14 days | Discharge: HOME | End: 2024-06-03
Attending: STUDENT IN AN ORGANIZED HEALTH CARE EDUCATION/TRAINING PROGRAM | Admitting: STUDENT IN AN ORGANIZED HEALTH CARE EDUCATION/TRAINING PROGRAM
Payer: MEDICARE

## 2024-05-19 VITALS
TEMPERATURE: 100 F | SYSTOLIC BLOOD PRESSURE: 122 MMHG | RESPIRATION RATE: 15 BRPM | HEIGHT: 61 IN | DIASTOLIC BLOOD PRESSURE: 73 MMHG | HEART RATE: 88 BPM | OXYGEN SATURATION: 92 %

## 2024-05-19 DIAGNOSIS — Z87.81 PERSONAL HISTORY OF (HEALED) TRAUMATIC FRACTURE: Chronic | ICD-10-CM

## 2024-05-19 LAB
ALBUMIN SERPL ELPH-MCNC: 3.5 G/DL — SIGNIFICANT CHANGE UP (ref 3.3–5)
ALP SERPL-CCNC: 96 U/L — SIGNIFICANT CHANGE UP (ref 40–120)
ALT FLD-CCNC: 10 U/L — SIGNIFICANT CHANGE UP (ref 4–33)
ANION GAP SERPL CALC-SCNC: 13 MMOL/L — SIGNIFICANT CHANGE UP (ref 7–14)
APAP SERPL-MCNC: 29 UG/ML — HIGH (ref 15–25)
APTT BLD: 34.4 SEC — SIGNIFICANT CHANGE UP (ref 24.5–35.6)
AST SERPL-CCNC: 15 U/L — SIGNIFICANT CHANGE UP (ref 4–32)
BASE EXCESS BLDV CALC-SCNC: 2 MMOL/L — SIGNIFICANT CHANGE UP (ref -2–3)
BASOPHILS # BLD AUTO: 0.05 K/UL — SIGNIFICANT CHANGE UP (ref 0–0.2)
BASOPHILS NFR BLD AUTO: 0.5 % — SIGNIFICANT CHANGE UP (ref 0–2)
BILIRUB SERPL-MCNC: 0.8 MG/DL — SIGNIFICANT CHANGE UP (ref 0.2–1.2)
BLD GP AB SCN SERPL QL: NEGATIVE — SIGNIFICANT CHANGE UP
BLOOD GAS VENOUS COMPREHENSIVE RESULT: SIGNIFICANT CHANGE UP
BUN SERPL-MCNC: 13 MG/DL — SIGNIFICANT CHANGE UP (ref 7–23)
CALCIUM SERPL-MCNC: 9.7 MG/DL — SIGNIFICANT CHANGE UP (ref 8.4–10.5)
CHLORIDE BLDV-SCNC: 106 MMOL/L — SIGNIFICANT CHANGE UP (ref 96–108)
CHLORIDE SERPL-SCNC: 102 MMOL/L — SIGNIFICANT CHANGE UP (ref 98–107)
CO2 BLDV-SCNC: 27.7 MMOL/L — HIGH (ref 22–26)
CO2 SERPL-SCNC: 23 MMOL/L — SIGNIFICANT CHANGE UP (ref 22–31)
CREAT SERPL-MCNC: 0.68 MG/DL — SIGNIFICANT CHANGE UP (ref 0.5–1.3)
EGFR: 90 ML/MIN/1.73M2 — SIGNIFICANT CHANGE UP
EOSINOPHIL # BLD AUTO: 0.13 K/UL — SIGNIFICANT CHANGE UP (ref 0–0.5)
EOSINOPHIL NFR BLD AUTO: 1.2 % — SIGNIFICANT CHANGE UP (ref 0–6)
ETHANOL SERPL-MCNC: <10 MG/DL — SIGNIFICANT CHANGE UP
GAS PNL BLDV: 137 MMOL/L — SIGNIFICANT CHANGE UP (ref 136–145)
GLUCOSE BLDV-MCNC: 126 MG/DL — HIGH (ref 70–99)
GLUCOSE SERPL-MCNC: 131 MG/DL — HIGH (ref 70–99)
HCO3 BLDV-SCNC: 26 MMOL/L — SIGNIFICANT CHANGE UP (ref 22–29)
HCT VFR BLD CALC: 33 % — LOW (ref 34.5–45)
HCT VFR BLDA CALC: 35 % — SIGNIFICANT CHANGE UP (ref 34.5–46.5)
HGB BLD CALC-MCNC: 11.6 G/DL — LOW (ref 11.7–16.1)
HGB BLD-MCNC: 11.1 G/DL — LOW (ref 11.5–15.5)
IANC: 7.78 K/UL — HIGH (ref 1.8–7.4)
IMM GRANULOCYTES NFR BLD AUTO: 0.4 % — SIGNIFICANT CHANGE UP (ref 0–0.9)
INR BLD: 1.06 RATIO — SIGNIFICANT CHANGE UP (ref 0.85–1.18)
LACTATE BLDV-MCNC: 1.8 MMOL/L — SIGNIFICANT CHANGE UP (ref 0.5–2)
LYMPHOCYTES # BLD AUTO: 2.26 K/UL — SIGNIFICANT CHANGE UP (ref 1–3.3)
LYMPHOCYTES # BLD AUTO: 20.6 % — SIGNIFICANT CHANGE UP (ref 13–44)
MCHC RBC-ENTMCNC: 29.6 PG — SIGNIFICANT CHANGE UP (ref 27–34)
MCHC RBC-ENTMCNC: 33.6 GM/DL — SIGNIFICANT CHANGE UP (ref 32–36)
MCV RBC AUTO: 88 FL — SIGNIFICANT CHANGE UP (ref 80–100)
MONOCYTES # BLD AUTO: 0.71 K/UL — SIGNIFICANT CHANGE UP (ref 0–0.9)
MONOCYTES NFR BLD AUTO: 6.5 % — SIGNIFICANT CHANGE UP (ref 2–14)
NEUTROPHILS # BLD AUTO: 7.78 K/UL — HIGH (ref 1.8–7.4)
NEUTROPHILS NFR BLD AUTO: 70.8 % — SIGNIFICANT CHANGE UP (ref 43–77)
NRBC # BLD: 0 /100 WBCS — SIGNIFICANT CHANGE UP (ref 0–0)
NRBC # FLD: 0 K/UL — SIGNIFICANT CHANGE UP (ref 0–0)
PCO2 BLDV: 40 MMHG — SIGNIFICANT CHANGE UP (ref 39–52)
PH BLDV: 7.43 — SIGNIFICANT CHANGE UP (ref 7.32–7.43)
PLATELET # BLD AUTO: 334 K/UL — SIGNIFICANT CHANGE UP (ref 150–400)
PO2 BLDV: 35 MMHG — SIGNIFICANT CHANGE UP (ref 25–45)
POTASSIUM BLDV-SCNC: 4.2 MMOL/L — SIGNIFICANT CHANGE UP (ref 3.5–5.1)
POTASSIUM SERPL-MCNC: 4.2 MMOL/L — SIGNIFICANT CHANGE UP (ref 3.5–5.3)
POTASSIUM SERPL-SCNC: 4.2 MMOL/L — SIGNIFICANT CHANGE UP (ref 3.5–5.3)
PROT SERPL-MCNC: 8.6 G/DL — HIGH (ref 6–8.3)
PROTHROM AB SERPL-ACNC: 11.8 SEC — SIGNIFICANT CHANGE UP (ref 9.5–13)
RBC # BLD: 3.75 M/UL — LOW (ref 3.8–5.2)
RBC # FLD: 14.6 % — HIGH (ref 10.3–14.5)
RH IG SCN BLD-IMP: POSITIVE — SIGNIFICANT CHANGE UP
SALICYLATES SERPL-MCNC: <0.3 MG/DL — LOW (ref 15–30)
SAO2 % BLDV: 48.9 % — LOW (ref 67–88)
SODIUM SERPL-SCNC: 138 MMOL/L — SIGNIFICANT CHANGE UP (ref 135–145)
TOXICOLOGY SCREEN, DRUGS OF ABUSE, SERUM RESULT: SIGNIFICANT CHANGE UP
TROPONIN T, HIGH SENSITIVITY RESULT: 10 NG/L — SIGNIFICANT CHANGE UP
TSH SERPL-MCNC: 0.64 UIU/ML — SIGNIFICANT CHANGE UP (ref 0.27–4.2)
WBC # BLD: 10.97 K/UL — HIGH (ref 3.8–10.5)
WBC # FLD AUTO: 10.97 K/UL — HIGH (ref 3.8–10.5)

## 2024-05-19 PROCEDURE — 70498 CT ANGIOGRAPHY NECK: CPT | Mod: 26,MC

## 2024-05-19 PROCEDURE — 70450 CT HEAD/BRAIN W/O DYE: CPT | Mod: 26,XU,MC

## 2024-05-19 PROCEDURE — 71250 CT THORAX DX C-: CPT | Mod: 26,MC

## 2024-05-19 PROCEDURE — 0042T: CPT | Mod: MC

## 2024-05-19 PROCEDURE — 70496 CT ANGIOGRAPHY HEAD: CPT | Mod: 26,MC

## 2024-05-19 PROCEDURE — 99285 EMERGENCY DEPT VISIT HI MDM: CPT

## 2024-05-19 RX ORDER — ACETAMINOPHEN 500 MG
1000 TABLET ORAL ONCE
Refills: 0 | Status: COMPLETED | OUTPATIENT
Start: 2024-05-19 | End: 2024-05-19

## 2024-05-19 RX ADMIN — Medication 400 MILLIGRAM(S): at 21:58

## 2024-05-19 NOTE — ED ADULT TRIAGE NOTE - CHIEF COMPLAINT QUOTE
presents C/O S/P fall + head strike. neg AC use. Pt C/O generalized weakness. noted to have left sided droop and slurred speech.  No complaints of chest pain, headache, nausea, dizziness, vomiting  SOB, fever, chills verbalized. Phx HLD Osteoarthritis

## 2024-05-19 NOTE — ED PROVIDER NOTE - OBJECTIVE STATEMENT
78-year-old female past medical history of hypertension, diverticulitis, type 2 diabetes, hyperlipidemia, osteoarthritis of left knee presents to ED as code stroke.  Per son, patient had a fall yesterday, last known well 6 PM 1 day ago.  He reports that since that time she has been having slurred speech, left-sided facial droop, weakness.  EMS called today when patient had loss of balance and fall to the floor.  On arrival, patient denies pain or other complaints.  Stroke code called in triage.

## 2024-05-19 NOTE — ED ADULT NURSE NOTE - OBJECTIVE STATEMENT
78 year old female received as code stroke in CT area. pt brought in by EMS, AAOx2-3 ambulatory 1 assist. Hx: HTN, DM2. pt brought in for fall at home with weakness. per pt's son pt last known well 1800 yesterday with symptoms of slurred speech, weakness and left sided facial droop after another fall last night and patient "is not improving". pt with multiple falls at home per son. PICC line to left arm noted for IV abx at home being treated for "abd infection". skin hot dry, afebrile with EMS. PIV #20 left AC established by ALEXANDRA Navarro. MD Hill at bedside. Neuro MD at bedside. Code stroke cancelled. respirations even and unlabored. labs drawn and sent. report given to primary RN

## 2024-05-19 NOTE — ED CLERICAL - NS ED CLERK NOTE PRE-ARRIVAL INFORMATION; ADDITIONAL PRE-ARRIVAL INFORMATION

## 2024-05-19 NOTE — ED PROVIDER NOTE - ATTENDING CONTRIBUTION TO CARE
Brief HPI:  78-year-old female past medical history of hypertension, diverticulitis, type 2 diabetes, hyperlipidemia, osteoarthritis of left knee, recent hospitalization discharge 5/15/2024 for perforated diverticulitis with abscess treated with ertapenem presents with fall.  Per son, patient last known well 6 PM 1 day ago.  He reports that since that time she has been having slurred speech, left-sided facial droop, weakness.  EMS called today when patient had loss of balance and fall to the floor.  On arrival, patient denies pain or other complaints.  Stroke code called in triage.    Vitals:   Reviewed    Exam:    GEN:  Non-toxic appearing, non-distressed, speaking full sentences, non-diaphoretic, AAOx3  HEENT:  NCAT, neck supple, EOMI, PERRLA, sclera anicteric, no conjunctival pallor or injection, no stridor, normal voice, no tonsillar exudate, uvula midline  CV:  regular rhythm and rate, s1/s2 audible, no murmurs, rubs or gallops, peripheral pulses 2+ and symmetric  PULM:  non-labored respirations, lungs clear to auscultation bilaterally, no wheezes, crackles or rales  ABD:  non distended, non-tender, no rebound, no guarding, negative Rolle's sign, bowel sounds normal, no cvat  MSK:  no gross deformity, non-tender extremities and joints, range of motion grossly normal appearing, no extremity edema, extremities warm and well perfused   NEURO:  AAOx3, Left-sided nasolabial fold flattening, Decreased motor strength bilateral upper extremities, sensation grossly intact in extremities and trunk, finger to nose testing wnl, no nystagmus, negative Romberg, no pronator drift, no gait deficit  SKIN:  warm, dry, no rash or vesicles     A/P: 78-year-old female past medical history of hypertension, diverticulitis, type 2 diabetes, hyperlipidemia, osteoarthritis of left knee, recent hospitalization discharge 5/15/2024 for perforated diverticulitis with abscess treated with ertapenem presents with fall.  Febrile on arrival.  Exam nontoxic-appearing, left-sided nasolabial fold flattening, upper extremities with symmetric motor weakness.  Stroke code called on arrival, however fall and weakness may be secondary to metabolic or infectious etiology.  No abdominal tenderness, low concern for acute surgical pathology of abdomen.  Will send labs, cultures, follow CT imaging, CT chest for pneumonia, appreciate neuro recs.  Anticipate admission. Detail Level: Zone Plan: Cryo test performed. Will reevaluate at follow up visit in 4-6 wekks Initiate Treatment: AlphaRet Intensive

## 2024-05-19 NOTE — ED PROVIDER NOTE - CLINICAL SUMMARY MEDICAL DECISION MAKING FREE TEXT BOX
78-year-old female past medical history of hypertension, diverticulitis, type 2 diabetes, hyperlipidemia, osteoarthritis of left knee presents to ED as code stroke.  Per son, patient had a fall yesterday, last known well 6 PM 1 day ago.  He reports that since that time she has been having slurred speech, left-sided facial droop, weakness.  EMS called today when patient had loss of balance and fall to the floor.  On arrival, patient denies pain or other complaints.  Stroke code called in triage.    VS febrile to 101 in triage.  AAOx2, lethargic appearing. NCAT, EOMI, dysarhtric speech, L facial droop, normal conjunctiva, mucous membranes moist, LCTAB no w/r/c, no MRG, RRR, abd NDNT, no rebound tenderness or guarding, no CVA ttp, no focal neuro deficits, neurovascularly intact, dp 2+, no bruising, rashes, or erythema. Suspicion for stroke vs metabolic vs infectious. will assess w stroke labs. dispo pending neuro eval, CTs, and infectrious arias

## 2024-05-19 NOTE — CONSULT NOTE ADULT - SUBJECTIVE AND OBJECTIVE BOX
Neurology - Consult Note    -  Spectra: [phone] ([facility]), [phone] ([facility]). For new consults, please page: [phone] ([facility]), [phone] ([facility]).  -    HPI: Patient TY DAVIS is a 78y (1945) RIGHT-handed woman who presents to [facility] ED on 5/19/2024, as a CODE STROKE, with c/o left facial droop and slurred speech.    PMH significant for: sigmoid diverticulitis, multiple chronic infarcts (without clear residual deficits), dementia (on memantine), T2DM, HTN, HLD, glaucoma, OA of the L knee, PERFECTO    Patient accompanied by her son, Trevin ([phone]). Both patient and son are limited historians. Per son and EMS, patient had a fall with head strike PTA. Son was sleeping. Patient's  called 911. EMS noted L facial droop and slurred speech. CODE STROKE was called - however, per son, patient had a fall (unclear if with head strike) yesterday (5/18/2024) at 18:00. At this time - patient was noted to have a possible L facial droop and was slurring her speech. Therefore, CODE STROKE cancelled d/t LKN >24 hours prior to presentation. CT imaging was obtained at time of CODE STROKE.    Patient recently admitted to [facility] from 5/3/2024 to 5/15/2024. Reportedly presented with new onset lethargy. Noted to have a leukocytosis. Found to have sigmoid diverticulitis with associated abscesses. Evaluated by Colorectal Surgery - no indication for surgery. Patient on ertapenem via LUE midlines to 6/3. Per chart:  "Patient will have to follow up in one week as outpatient with Dr Geovani Goldman M.D. Patient will have to have repeat CAT scan of the abdomen and pelvis as outpatient and outpatient colonoscopy." Patient was also noted to have left hydroureteronephrosis (chronic). Son reports he has been giving patient her antibiotics every day.    Per son, when patient presented to the hospital in early May, her mental status was altered from baseline. She was still encephalopathic when she left the hospital, but he reports that her mental status has worsened in the last few days. Patient has been diagnosed with dementia and is on memantine ER 21mg daily. She sees an outpatient neurologist (son does not know why) - who has told her she has numerous infarcts on her brain imaging. Patient's son denies patient ever having symptoms of stroke. He reports that patient had bowel surgery approximately 6 months ago in [place] and she has declined since then. Patient's sister, Gemini ([phone], [place]), is patient's POA and lives in [place]. She manages patient's medical issues.     Per son, patient has been falling a lot since returning from hospital. He reports her legs are weak. She is not walking with a cane or walker. Usually able to climb stairs, but cannot right now. Does not drive. Fell yesterday at 18:00. Son heard patient hit the floor - he is not sure if she hit her head. He got her up and took her to the bedroom. At this time, patient says he noted she had drooping of left face and speech was slurred. This AM, patient went to open the door for the aide, and reportedly fell again. Unclear if there was head strike. Later this PM, patient fell and hit her head. Patient's son was sleeping upstairs. Patient's  called 911 because he felt patient "wasn't right." EMS reports they noted a left facial droop, although it is not readily evident on exam. Patient's speech is moderately slurred - dentures are not in, but son says slurred despite this. EMS also noted b/l  weakness. At baseline - patient would not know the date. But she would know her age and could hold a conversation. Son says patient's communication has been getting progressively worse over time. However, he is able to provide very limited history. Of note, he says current presentation is similar to how patient presented in early May.    Patient currently on aspirin 81mg daily. Is on atorvastatin 20mg daily. No tobacco/alcohol/recreational drug use. Lives with son and . Patient asking for coffee at time of exam.    Review of Systems:  ABIOLA d/t mental status, dementia    Allergies:  No Known Allergies      PMHx/PSHx/Family Hx: As above, otherwise see below   Hypertension, unspecified type    Diabetes    Glaucoma    Osteoarthritis, knee    HLD (hyperlipidemia)        Social Hx:  Per HPI    Medications:  MEDICATIONS  (STANDING):    MEDICATIONS  (PRN):      Vitals:  T(C): 38.4 (05-19-24 @ 21:55), Max: 38.4 (05-19-24 @ 21:55)  HR: 88 (05-19-24 @ 21:17) (88 - 88)  BP: 122/73 (05-19-24 @ 21:17) (122/73 - 122/73)  RR: 15 (05-19-24 @ 21:17) (15 - 15)  SpO2: 92% (05-19-24 @ 21:17) (92% - 92%)    Physical Examination:  General - Sitting up on ED cart, ill-appearing, disheveled  Cardiovascular - No LE edema  Eyes - Non-injected conjunctivae, anicteric sclerae    Neurologic Exam:  Mental status:  - Awake. Eyes open spontaneously, but frequently closes them throughout exam. Attends to examiner.  - Oriented to: person (but says age is "69") and place (says "[facility]"). Says month is November. Knows year is 2024.  - Speech: fluent  - Repetition and naming: intact   - Follows simple and cross commands   - Attention/concentration: cannot spell WORLD forward, not tested backward  - Recent and remote memory (including registration and recall): registration intact, 0/3 on 3-word recall - even with category cues    Cranial nerves - pinpoint pupils, BTT b/l (poor fixation with confrontational testing), EOMI - no nystagmus, face sensation (V1-V3) intact b/l, facial strength - no obvious facial droop with somewhat poor participation, she has b/l eyelid closure weakness, hearing intact b/l with finger rub test, palate with symmetric elevation (grossly - poor participation), shoulder shrug intact b/l, tongue midline on protrusion with full lateral movement  Dysarthria: moderate (edentulous and no dentures in place - son states speech is still worse than baseline)    Motor - Bulk normal for age. Paratonia. Likely cogwheel rigidity of the b/l UEs. Does not cooperate with pronator drift.  Strength testing - does not cooperate with MMT:  LUE - slight drift but does not hit the bed in 10 seconds, evidence of proximal/distal weakness  RUE - slight drift but does not hit the bed in 10 seconds, evidence of proximal/distal weakness  LLE - drift within 5 seconds but does not hit the bed, evidence of proximal/distal weakness  RLE - drift within 5 seconds but does not hit the bed, evidence of proximal/distal weakness    Sensation - Light touch intact throughout    DTRs (R/L)  Biceps:  2+/2+        Triceps:  2+/2+       Brachioradialis:  2+/2+        Unable to test LE DTRs d/t paratonia, poor cooperation   Plantar response: L upgoing, R possible upgoing    Coordination - Finger to Nose intact b/l. Does not participate in Heel to Shin. Foot to finger intact b/l. No tremors appreciated.    Gait and station - Unable to assess d/t fall risk/safety concerns.    Labs:                        11.1   10.97 )-----------( 334      ( 19 May 2024 21:53 )             33.0     05-19    138  |  102  |  13  ----------------------------<  131<H>  4.2   |  23  |  0.68    Ca    9.7      19 May 2024 21:53    TPro  8.6<H>  /  Alb  3.5  /  TBili  0.8  /  DBili  x   /  AST  15  /  ALT  10  /  AlkPhos  96  05-19    CAPILLARY BLOOD GLUCOSE      POCT Blood Glucose.: 141 mg/dL (19 May 2024 21:23)    LIVER FUNCTIONS - ( 19 May 2024 21:53 )  Alb: 3.5 g/dL / Pro: 8.6 g/dL / ALK PHOS: 96 U/L / ALT: 10 U/L / AST: 15 U/L / GGT: x             PT/INR - ( 19 May 2024 21:53 )   PT: 11.8 sec;   INR: 1.06 ratio         PTT - ( 19 May 2024 21:53 )  PTT:34.4 sec  CSF:                  Radiology:  CTH: No acute intracranial hemorrhage or mass effect. Extensive chronic small   vessel ischemic changes in the frontoparietal periventricular and   subcortical white matter. Indeterminate age bilateral thalamic lacunar   infarcts accommodation of dilated perivascular spaces and lacunar   infarcts in the bilateral basal ganglia. Small chronic appearing   bilateral cerebellar infarcts.    CTA NECK:  No significant stenosis of the cervical carotid arteries based   on NASCET criteria. Patent cervical vertebral arteries. No evidence of   cervical carotid or vertebral artery dissection.    CTA HEAD:  No high-grade stenosis or occlusion of the major proximal   arterial branches.    CT PERFUSION:  Perfusion imaging shows no evidence of a core infarct or   tissue at risk.   Neurology - Consult Note    -  Spectra: [phone] ([facility]), [phone] ([facility]). For new consults, please page: [phone] ([facility]), [phone] ([facility]).  -    HPI: Patient TY DAVIS is a 78y (1945) RIGHT-handed woman who presents to [facility] ED on 5/19/2024, as a CODE STROKE, with c/o left facial droop and slurred speech.    PMH significant for: sigmoid diverticulitis, multiple chronic infarcts (with unclear residual deficits), dementia (on memantine), T2DM, HTN, HLD, glaucoma, OA of the L knee, PERFECTO    Patient accompanied by her son, Trevin ([phone]). Both patient and son are limited historians. Per son and EMS, patient had a fall with head strike PTA. Son was sleeping. Patient's  called 911. EMS noted L facial droop and slurred speech. CODE STROKE was called - however, per son, patient had a fall (unclear if with head strike) yesterday (5/18/2024) at 18:00. At this time - patient was noted to have a possible L facial droop and was slurring her speech. Therefore, CODE STROKE cancelled d/t LKN >24 hours prior to presentation. CT imaging was obtained at time of CODE STROKE.    Patient recently admitted to [facility] from 5/3/2024 to 5/15/2024. Reportedly presented with new onset lethargy. Noted to have a leukocytosis. Found to have sigmoid diverticulitis with associated abscesses. Evaluated by Colorectal Surgery - no indication for surgery. Patient on ertapenem via LUE midlines to 6/3. Per chart:  "Patient will have to follow up in one week as outpatient with Dr Geovani Goldman M.D. Patient will have to have repeat CAT scan of the abdomen and pelvis as outpatient and outpatient colonoscopy." Patient was also noted to have left hydroureteronephrosis (chronic). Son reports he has been giving patient her antibiotics every day.    Per son, when patient presented to the hospital in early May, her mental status was altered from baseline. She was still encephalopathic when she left the hospital, but he reports that her mental status has worsened in the last few days. Patient has been diagnosed with dementia and is on memantine ER 21mg daily. She sees an outpatient neurologist (son does not know why - says "for checkups") - who has told her she has numerous infarcts on her brain imaging. Patient's son denies patient ever having symptoms of stroke. He reports that patient had bowel surgery approximately 6 months ago in [place] and she has declined since then. Patient's sister, Gemini ([phone], [place]), is patient's POA and lives in [place]. She manages patient's medical issues.     Per son, patient has been falling a lot since returning from hospital. He reports her legs are weak. She is not walking with a cane or walker. Usually able to climb stairs, but cannot right now. Does not drive. Fell yesterday at 18:00. Son heard patient hit the floor - he is not sure if she hit her head. He got her up and took her to the bedroom. At this time, patient says he noted she had drooping of left face and speech was slurred. This AM, patient went to open the door for the aide, and reportedly fell again. Unclear if there was head strike. Later this PM, patient fell and hit her head. Patient's son was sleeping upstairs. Patient's  called 911 because he felt patient "wasn't right." EMS reports they noted a left facial droop, although it is not readily evident on my exam. Patient's speech is moderately slurred - dentures are not in, but son says slurred despite this. EMS also noted b/l  weakness. At baseline - patient would not know the date. But she would know her age and could hold a conversation. Son says patient's communication has been getting progressively worse over time. However, he is only able to provide very limited history. Of note, he says current presentation is similar to how patient presented to hospital in early May.    Patient currently on aspirin 81mg daily. Is on atorvastatin 20mg daily. No tobacco/alcohol/recreational drug use. Lives with son and . Patient asking for coffee at time of exam.    Review of Systems:  ABIOLA d/t mental status, dementia    Allergies:  No Known Allergies      PMHx/PSHx/Family Hx: As above, otherwise see below   Hypertension, unspecified type    Diabetes    Glaucoma    Osteoarthritis, knee    HLD (hyperlipidemia)        Social Hx:  Per HPI    Medications:  MEDICATIONS  (STANDING):    MEDICATIONS  (PRN):      Vitals:  T(C): 38.4 (05-19-24 @ 21:55), Max: 38.4 (05-19-24 @ 21:55)  HR: 88 (05-19-24 @ 21:17) (88 - 88)  BP: 122/73 (05-19-24 @ 21:17) (122/73 - 122/73)  RR: 15 (05-19-24 @ 21:17) (15 - 15)  SpO2: 92% (05-19-24 @ 21:17) (92% - 92%)    Physical Examination:  General - Sitting up on ED cart, ill-appearing, disheveled  Cardiovascular - No LE edema  Eyes - Non-injected conjunctivae, anicteric sclerae    Neurologic Exam:  Mental status:  - Awake. Eyes open spontaneously, but frequently closes them throughout exam. Attends to examiner.  - Oriented to: person (but says age is "69") and place (says "[facility]"). Says month is November (currently May). Knows year is 2024.  - Speech: fluent  - Repetition and naming: intact   - Follows simple and cross commands   - Attention/concentration: cannot spell WORLD forward, not tested backward  - Recent and remote memory (including registration and recall): registration intact, 0/3 on 3-word recall - even with category cues    Cranial nerves - pinpoint pupils, BTT b/l (poor fixation with confrontational testing), EOMI - no nystagmus, face sensation (V1-V3) intact b/l, facial strength - no obvious facial droop with somewhat poor participation, she has b/l eyelid closure weakness but question effort, hearing intact b/l with finger rub test, palate with symmetric elevation (grossly - poor participation), shoulder shrug intact b/l, tongue midline on protrusion with full lateral movement  Dysarthria: moderate (edentulous and no dentures in place - son states speech is still worse than baseline)    Motor - Bulk normal for age. Paratonia. Likely cogwheel rigidity of the b/l UEs. Does not cooperate with pronator drift.  Strength testing - does not cooperate with MMT:  LUE - slight drift but does not hit the bed in 10 seconds, evidence of proximal/distal weakness  RUE - slight drift but does not hit the bed in 10 seconds, evidence of proximal/distal weakness  LLE - drift within 5 seconds but does not hit the bed, evidence of proximal/distal weakness  RLE - drift within 5 seconds but does not hit the bed, evidence of proximal/distal weakness    Sensation - Light touch intact throughout    DTRs (R/L)  Biceps:  2+/2+        Triceps:  2+/2+       Brachioradialis:  2+/2+        Unable to test LE DTRs d/t paratonia, poor cooperation   Plantar response: L upgoing, R possible upgoing    Coordination - Finger to Nose intact b/l. Does not participate in Heel to Shin. Foot to finger intact b/l. No tremors appreciated.    Gait and station - Unable to assess d/t fall risk/safety concerns.    Labs:                        11.1   10.97 )-----------( 334      ( 19 May 2024 21:53 )             33.0     05-19    138  |  102  |  13  ----------------------------<  131<H>  4.2   |  23  |  0.68    Ca    9.7      19 May 2024 21:53    TPro  8.6<H>  /  Alb  3.5  /  TBili  0.8  /  DBili  x   /  AST  15  /  ALT  10  /  AlkPhos  96  05-19    CAPILLARY BLOOD GLUCOSE      POCT Blood Glucose.: 141 mg/dL (19 May 2024 21:23)    LIVER FUNCTIONS - ( 19 May 2024 21:53 )  Alb: 3.5 g/dL / Pro: 8.6 g/dL / ALK PHOS: 96 U/L / ALT: 10 U/L / AST: 15 U/L / GGT: x             PT/INR - ( 19 May 2024 21:53 )   PT: 11.8 sec;   INR: 1.06 ratio         PTT - ( 19 May 2024 21:53 )  PTT:34.4 sec  CSF:                  Radiology:  CTH: No acute intracranial hemorrhage or mass effect. Extensive chronic small   vessel ischemic changes in the frontoparietal periventricular and   subcortical white matter. Indeterminate age bilateral thalamic lacunar   infarcts accommodation of dilated perivascular spaces and lacunar   infarcts in the bilateral basal ganglia. Small chronic appearing   bilateral cerebellar infarcts.    CTA NECK:  No significant stenosis of the cervical carotid arteries based   on NASCET criteria. Patent cervical vertebral arteries. No evidence of   cervical carotid or vertebral artery dissection.    CTA HEAD:  No high-grade stenosis or occlusion of the major proximal   arterial branches.    CT PERFUSION:  Perfusion imaging shows no evidence of a core infarct or   tissue at risk.

## 2024-05-19 NOTE — CONSULT NOTE ADULT - ASSESSMENT
TY DAVIS is a 78y RIGHT-handed woman, with PMH significant for sigmoid diverticulitis, multiple chronic infarcts (without clear residual deficits), dementia (on memantine), T2DM, HTN, HLD, glaucoma, OA of the L knee, PERFECTO, who presents to [facility] ED on 5/19/2024 with c/o left facial droop and slurred speech. Presenting as a CODE STROKE - however, LKN is >24 hours PTA - therefore, cancelled. Patient recently admitted to [facility] from 5/3/2024 to 5/15/2024, when she was treated for sigmoid diverticulitis and associated abscesses. Was discharge with LUE midline - to complete ertapenem to 6/3. Per son, has been falling frequently since discharge. Fell yesterday, 5/18, at 18:00 - at this point was noted to have a ?facial droop on the left and slurred speech. Fell again this AM. Fell again in the PM -  called 911. On aspirin 81mg daily. Follows with outpatient neurologist - is known to have numerous chronic infarcts, but son denies history of clinically apparent stroke.    ED vitals notable for: Tmax 38.4 C (rectal), HR 88, /73, RR 15, SpO2 92% on RA. Labs notable for: WBC 10.97 with absolute neutrophilia, Hg 11.1, RDW 14.6, glucose 131, TSH 0.64, lactate 1.8. CT imaging disclosed: "Indeterminate age" bilateral thalamic lacunar infarcts and combination of dilated perivascular spaces and lacunar infarcts in the bilateral basal ganglia. There are small chronic appearing bilateral cerebellar infarcts.  On CTA head there is mild luminal narrowing secondary to atherosclerotic calcification of the cavernous and supraclinoid segments of the ICAs. The A2 segments come from the left side. Exam notable for pinpoint pupils, b/l eyelid closure weakness, moderate dysarthria, weakness (proximal/distal) in all 4 extremities, paratonia and likely b/l cogwheeling of the UEs. Obvious NLF flattening not noted on exam.    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    Recommendations:  [] Toxic/metabolic/infectious workup per primary  [] Telemetry monitoring  [] Dysphagia screen in ED  [] SBP goal <220  [] Continue aspirin 81mg  [] LDL 49 (1/24/24) - continue home statin  [] A1c 6.3 (5/11/2024)  [] MRI brain w/o contrast  [] Seizure rate with ertapenem is <1%. Story not extremely suspicious for seizure - patient with recurrent falls but with retained awareness throughout. If mental status does not improve with treatment of underlying illness - could consider rEEG/vEEG.  [] Neurochecks, vitals q4h  [] Fall and aspiration precautions  [] PT/OT/SLP/CM  [] DVT prophylaxis: enoxaparin 40mg q24h (unless medically contraindicated) AND SCDs  [] Stroke education provided  [] Smoking cessation education not needed - non-smoker  [] Please document MRS on discharge  [] I personally held [facility] discussion with son - in his discussion with patient and her POA (daughter, Gemini - ([phone], Kaylin)) - patient is to remain FULL CODE    NO tenecteplase d/t outside therapeutic window.  NO thrombectomy d/t no LVO, outside therapeutic window.    To be seen by attending in the AM with attestation to follow. TY DAVIS is a 78y RIGHT-handed woman, with PMH significant for sigmoid diverticulitis, multiple chronic infarcts (with unclear residual deficits), dementia (on memantine), T2DM, HTN, HLD, glaucoma, OA of the L knee, PERFECTO, who presents to [facility] ED on 5/19/2024 with c/o left facial droop and slurred speech. Presenting as a CODE STROKE - however, LKN is >24 hours PTA - therefore, cancelled. Patient recently admitted to [facility] from 5/3/2024 to 5/15/2024, when she was treated for sigmoid diverticulitis and associated abscesses. Was discharged with LUE midline - to complete ertapenem to 6/3. Per son, has been falling frequently since discharge. Fell yesterday, 5/18, at 18:00 - at this point was noted to have a ?facial droop on the left and slurred speech. Fell again this AM. Fell again in the PM -  called 911. On aspirin 81mg daily. Follows with outpatient neurologist - is known to have numerous chronic infarcts, but son denies history of clinically apparent stroke.    ED vitals notable for: Tmax 38.4 C (rectal), HR 88, /73, RR 15, SpO2 92% on RA. Labs notable for: WBC 10.97 with absolute neutrophilia, Hg 11.1, RDW 14.6, glucose 131, TSH 0.64, lactate 1.8. CT imaging disclosed: "Indeterminate age" bilateral thalamic lacunar infarcts and lacunar infarcts in the bilateral basal ganglia. There are small chronic appearing bilateral cerebellar infarcts.  On CTA head there is mild luminal narrowing secondary to atherosclerotic calcification of the cavernous and supraclinoid segments of the ICAs. The A2 segments come from the left side. Exam notable for pinpoint pupils, b/l eyelid closure weakness (questionable effort), moderate dysarthria, weakness (proximal/distal) in all 4 extremities, paratonia and likely b/l cogwheeling of the UEs. Obvious NLF flattening not noted on exam.    NIHSS on admission: 7 (+2 questions, +1 LUE, +1 RUE, +1 LLE, +1 RLE, +1 dysarthria)  LKN: 18:00, 5/18/2024  pre-MRS: 3    Impression:  1) Worsening encephalopathy d/t diffuse cerebral dysfunction - likely toxic/metabolic/septic encephelopathy i/s/o pyrexia, sigmoid diverticulitis with associated abscesses; superimposed upon existing dementia  2) Dysarthria and report of L facial droop (not appreciated on exam) [pure motor syndrome], d/t R brain dysfunction; r/o acute ischemic stroke    Recommendations:  [] Toxic/metabolic/infectious workup per primary  [] Telemetry monitoring  [] Dysphagia screen in ED  [] SBP goal <220  [] Continue home aspirin 81mg  [] LDL 49 (1/24/24) - continue home statin  [] A1c 6.3 (5/11/2024)  [] MRI brain w/o contrast  [] Seizure rate with ertapenem is <1%. Story not extremely suspicious for seizure - patient with recurrent falls but with retained awareness throughout. If mental status does not improve with treatment of underlying illness - could consider rEEG/vEEG.  [] Neurochecks, vitals q4h  [] Fall and aspiration precautions  [] PT/OT/SLP/CM  [] DVT prophylaxis: enoxaparin 40mg q24h (unless medically contraindicated) AND SCDs  [] Stroke education provided  [] Smoking cessation education not needed - non-smoker  [] Please document MRS on discharge  [] I personally held [facility] discussion with son - in his discussion with patient and her POA (daughter, Gemini - ([phone], Kaylin)) - patient is to remain FULL CODE    NO tenecteplase d/t outside therapeutic window.  NO thrombectomy d/t no LVO, outside therapeutic window.    To be seen by attending in the AM with attestation to follow. TY DAVIS is a 78y RIGHT-handed woman, with PMH significant for sigmoid diverticulitis, multiple chronic infarcts (with unclear residual deficits), dementia (on memantine), T2DM, HTN, HLD, glaucoma, OA of the L knee, PERFECTO, who presents to [facility] ED on 5/19/2024 with c/o left facial droop and slurred speech. Presenting as a CODE STROKE - however, LKN is >24 hours PTA - therefore, cancelled. Patient recently admitted to [facility] from 5/3/2024 to 5/15/2024, when she was treated for sigmoid diverticulitis and associated abscesses. Was discharged with LUE midline - to complete ertapenem to 6/3. Per son, has been falling frequently since discharge. Fell yesterday, 5/18, at 18:00 - at this point was noted to have a ?facial droop on the left and slurred speech. Fell again this AM. Fell again in the PM -  called 911. On aspirin 81mg daily. Follows with outpatient neurologist - is known to have numerous chronic infarcts, but son denies history of clinically apparent stroke.    ED vitals notable for: Tmax 38.4 C (rectal), HR 88, /73, RR 15, SpO2 92% on RA. Labs notable for: WBC 10.97 with absolute neutrophilia, Hg 11.1, RDW 14.6, glucose 131, TSH 0.64, lactate 1.8. CT imaging disclosed: "Indeterminate age" bilateral thalamic lacunar infarcts and lacunar infarcts in the bilateral basal ganglia. There are small chronic appearing bilateral cerebellar infarcts.  On CTA head there is mild luminal narrowing secondary to atherosclerotic calcification of the cavernous and supraclinoid segments of the ICAs. The A2 segments come from the left side. Exam notable for pinpoint pupils, b/l eyelid closure weakness (questionable effort), moderate dysarthria, weakness (proximal/distal) in all 4 extremities, paratonia and likely b/l cogwheeling of the UEs. Obvious NLF flattening not noted on exam.    NIHSS on admission: 7 (+2 questions, +1 LUE, +1 RUE, +1 LLE, +1 RLE, +1 dysarthria)  LKN: 18:00, 5/18/2024  pre-MRS: 3    Impression:  1) Worsening encephalopathy d/t diffuse cerebral dysfunction - likely toxic/metabolic/septic encephelopathy i/s/o pyrexia, sigmoid diverticulitis with associated abscesses; superimposed upon existing dementia  2) Dysarthria and report of L NLF flattening (not appreciated on exam) [pure motor syndrome], d/t R brain dysfunction; r/o acute ischemic stroke    Recommendations:  [] Toxic/metabolic/infectious workup per primary  [] Telemetry monitoring  [] Dysphagia screen in ED  [] SBP goal <220  [] Continue home aspirin 81mg  [] LDL 49 (1/24/24) - continue home statin  [] A1c 6.3 (5/11/2024)  [] MRI brain w/o contrast  [] Seizure rate with ertapenem is <1%. Story not extremely suspicious for seizure - patient with recurrent falls but with retained awareness throughout. If mental status does not improve with treatment of underlying illness - could consider rEEG/vEEG.  [] Neurochecks, vitals q4h  [] Fall and aspiration precautions  [] PT/OT/SLP/CM  [] DVT prophylaxis: enoxaparin 40mg q24h (unless medically contraindicated) AND SCDs  [] Stroke education provided  [] Smoking cessation education not needed - non-smoker  [] Please document MRS on discharge  [] I personally held [facility] discussion with son - in his discussion with patient and her POA (daughter, Gemini - ([phone], Kaylin)) - patient is to remain FULL CODE    NO tenecteplase d/t outside therapeutic window.  NO thrombectomy d/t no LVO, outside therapeutic window.    To be seen by attending in the AM with attestation to follow.

## 2024-05-19 NOTE — ED ADULT NURSE NOTE - DOES PATIENT HAVE ADVANCE DIRECTIVE
Now resolved. RSV+. CT chest showing RML and RLL opacifications likely combination atelectasis and pneumonia.  - Completed 7-day coures of levofloxacin 750mg q48h (renal dose)  - ID following; appreciate recs AXR 3/5 demonstrates dilated bowel with changes compatible with small bowel obstruction.   - Still having nausea. Loose BM on the night of 3/6, but does not exclude partial SBO  - Awaiting results of CT A/P with oral contrast to evaluate for SBO; f/u results  - Will check HBsAg and HCV Ab  - NGT and surgery consult if imaging is positive for SBO No AXR 3/5 demonstrates dilated bowel with changes compatible with small bowel obstruction.   - Still having nausea. Loose BM on the night of 3/6, but does not exclude partial SBO  - will place NGT and obtain surgery consult if imaging is positive for SBO  - however CT without evidence of evidence of small bowel obstruction  - continue with diet as tolerated

## 2024-05-19 NOTE — ED PROVIDER NOTE - PHYSICAL EXAMINATION
Gen: AAOx3, non-toxic  Head: NCAT  HEENT: EOMI, oral mucosa moist, normal conjunctiva  Lung: CTAB, no respiratory distress, no wheezes/rhonchi/rales B/L,   CV: RRR, no murmurs, rubs or gallops  Abd: soft, NTND, no guarding, no CVA tenderness  MSK: no visible deformities  Neuro: L sided facial droop w forehead sparing, dysarthria. motor/sensation intact   Skin: Warm, well perfused, no rash  Psych: normal affect.

## 2024-05-19 NOTE — CONSULT NOTE ADULT - ATTENDING COMMENTS
78F with  sigmoid diverticulitis, multiple chronic infarcts (with unclear residual deficits), dementia (on memantine), T2DM, HTN, HLD, glaucoma, OA of the L knee, PERFECTO, who presents to [facility] ED on 5/19/2024 with c/o left facial droop and slurred speech. Stroke code cancelled as out of 24h window. Unclear stroke hx and baseline neurologic deficits.   In ED was febrile. CTH with bilateral thalamic and basal ganglia infarcts. Thalamic infarcts appear more subacute. Also with chronic cerebellar infarcts. CTA with mld ICA narrowing  o/e with mild to moderate dysarthria, R facial palsy , RUE and RLE drift. AAOx2   Would r/o new L hemispheric infarct prior to OR    - continue aspirin 81mg  - would not add Plavix as patient going for surgery soon  - check MRI brain w/o conrtast  -  LDL 49 (1/24/24) - continue home statin  -  A1c 6.3 (5/11/2024)  - Seizure rate with ertapenem is <1%. Story not extremely suspicious for seizure - patient with recurrent falls but with retained awareness throughout. If mental status does not improve with treatment of underlying illness - could consider rEEG/vEEG if MR neg  - rest of plan as above    Allison Vinson,   Vascular Neurology  Office [phone]

## 2024-05-19 NOTE — ED PROVIDER NOTE - SHIFT CHANGE DETAILS
ALEE:  Patient signed out to Dr. Smith pending ct abdomen and pelvis given recent hospitalization for perforated diverticulitis with abscess and fever today in ED.  HD stable at time of signout.

## 2024-05-19 NOTE — ED ADULT NURSE NOTE - NSFALLRISKINTERV_ED_ALL_ED
Assistance OOB with selected safe patient handling equipment if applicable/Assistance with ambulation/Communicate fall risk and risk factors to all staff, patient, and family/Monitor gait and stability/Monitor for mental status changes and reorient to person, place, and time, as needed/Provide visual cue: yellow wristband, yellow gown, etc/Reinforce activity limits and safety measures with patient and family/Toileting schedule using arm’s reach rule for commode and bathroom/Use of alarms - bed, stretcher, chair and/or video monitoring/Call bell, personal items and telephone in reach/Instruct patient to call for assistance before getting out of bed/chair/stretcher/Non-slip footwear applied when patient is off stretcher/[place] to call system/Physically safe environment - no spills, clutter or unnecessary equipment/Purposeful Proactive Rounding/Room/bathroom lighting operational, light cord in reach

## 2024-05-20 ENCOUNTER — TRANSCRIPTION ENCOUNTER (OUTPATIENT)
Age: 79
End: 2024-05-20

## 2024-05-20 ENCOUNTER — NON-APPOINTMENT (OUTPATIENT)
Age: 79
End: 2024-05-20

## 2024-05-20 DIAGNOSIS — K57.80 DIVERTICULITIS OF INTESTINE, PART UNSPECIFIED, WITH PERFORATION AND ABSCESS WITHOUT BLEEDING: ICD-10-CM

## 2024-05-20 LAB
ANION GAP SERPL CALC-SCNC: 14 MMOL/L — SIGNIFICANT CHANGE UP (ref 7–14)
APPEARANCE UR: ABNORMAL
BACTERIA # UR AUTO: NEGATIVE /HPF — SIGNIFICANT CHANGE UP
BILIRUB UR-MCNC: NEGATIVE — SIGNIFICANT CHANGE UP
BUN SERPL-MCNC: 11 MG/DL — SIGNIFICANT CHANGE UP (ref 7–23)
CALCIUM SERPL-MCNC: 9.1 MG/DL — SIGNIFICANT CHANGE UP (ref 8.4–10.5)
CAST: 1 /LPF — SIGNIFICANT CHANGE UP (ref 0–4)
CHLORIDE SERPL-SCNC: 100 MMOL/L — SIGNIFICANT CHANGE UP (ref 98–107)
CO2 SERPL-SCNC: 23 MMOL/L — SIGNIFICANT CHANGE UP (ref 22–31)
COLOR SPEC: YELLOW — SIGNIFICANT CHANGE UP
CREAT SERPL-MCNC: 0.68 MG/DL — SIGNIFICANT CHANGE UP (ref 0.5–1.3)
DIFF PNL FLD: ABNORMAL
EGFR: 90 ML/MIN/1.73M2 — SIGNIFICANT CHANGE UP
GLUCOSE SERPL-MCNC: 127 MG/DL — HIGH (ref 70–99)
GLUCOSE UR QL: NEGATIVE MG/DL — SIGNIFICANT CHANGE UP
HCT VFR BLD CALC: 33 % — LOW (ref 34.5–45)
HGB BLD-MCNC: 11.2 G/DL — LOW (ref 11.5–15.5)
KETONES UR-MCNC: NEGATIVE MG/DL — SIGNIFICANT CHANGE UP
LEUKOCYTE ESTERASE UR-ACNC: ABNORMAL
MAGNESIUM SERPL-MCNC: 2 MG/DL — SIGNIFICANT CHANGE UP (ref 1.6–2.6)
MCHC RBC-ENTMCNC: 29.6 PG — SIGNIFICANT CHANGE UP (ref 27–34)
MCHC RBC-ENTMCNC: 33.9 GM/DL — SIGNIFICANT CHANGE UP (ref 32–36)
MCV RBC AUTO: 87.3 FL — SIGNIFICANT CHANGE UP (ref 80–100)
NITRITE UR-MCNC: NEGATIVE — SIGNIFICANT CHANGE UP
NRBC # BLD: 0 /100 WBCS — SIGNIFICANT CHANGE UP (ref 0–0)
NRBC # FLD: 0 K/UL — SIGNIFICANT CHANGE UP (ref 0–0)
PH UR: 7.5 — SIGNIFICANT CHANGE UP (ref 5–8)
PHOSPHATE SERPL-MCNC: 4.2 MG/DL — SIGNIFICANT CHANGE UP (ref 2.5–4.5)
PLATELET # BLD AUTO: 330 K/UL — SIGNIFICANT CHANGE UP (ref 150–400)
POTASSIUM SERPL-MCNC: 3.5 MMOL/L — SIGNIFICANT CHANGE UP (ref 3.5–5.3)
POTASSIUM SERPL-SCNC: 3.5 MMOL/L — SIGNIFICANT CHANGE UP (ref 3.5–5.3)
PROT UR-MCNC: 30 MG/DL
RBC # BLD: 3.78 M/UL — LOW (ref 3.8–5.2)
RBC # FLD: 15 % — HIGH (ref 10.3–14.5)
RBC CASTS # UR COMP ASSIST: 19 /HPF — HIGH (ref 0–4)
REVIEW: SIGNIFICANT CHANGE UP
SODIUM SERPL-SCNC: 137 MMOL/L — SIGNIFICANT CHANGE UP (ref 135–145)
SP GR SPEC: 1.1 — HIGH (ref 1–1.03)
SQUAMOUS # UR AUTO: 0 /HPF — SIGNIFICANT CHANGE UP (ref 0–5)
UROBILINOGEN FLD QL: 1 MG/DL — SIGNIFICANT CHANGE UP (ref 0.2–1)
WBC # BLD: 11.48 K/UL — HIGH (ref 3.8–10.5)
WBC # FLD AUTO: 11.48 K/UL — HIGH (ref 3.8–10.5)
WBC UR QL: SIGNIFICANT CHANGE UP /HPF (ref 0–5)

## 2024-05-20 PROCEDURE — 74177 CT ABD & PELVIS W/CONTRAST: CPT | Mod: 26,MC

## 2024-05-20 PROCEDURE — 70551 MRI BRAIN STEM W/O DYE: CPT | Mod: 26

## 2024-05-20 RX ORDER — ENOXAPARIN SODIUM 100 MG/ML
40 INJECTION SUBCUTANEOUS EVERY 24 HOURS
Refills: 0 | Status: DISCONTINUED | OUTPATIENT
Start: 2024-05-20 | End: 2024-05-21

## 2024-05-20 RX ORDER — ASPIRIN/CALCIUM CARB/MAGNESIUM 324 MG
81 TABLET ORAL DAILY
Refills: 0 | Status: DISCONTINUED | OUTPATIENT
Start: 2024-05-20 | End: 2024-06-03

## 2024-05-20 RX ORDER — PIPERACILLIN AND TAZOBACTAM 4; .5 G/20ML; G/20ML
3.38 INJECTION, POWDER, LYOPHILIZED, FOR SOLUTION INTRAVENOUS EVERY 8 HOURS
Refills: 0 | Status: DISCONTINUED | OUTPATIENT
Start: 2024-05-20 | End: 2024-05-24

## 2024-05-20 RX ORDER — ACETAMINOPHEN 500 MG
1000 TABLET ORAL EVERY 6 HOURS
Refills: 0 | Status: COMPLETED | OUTPATIENT
Start: 2024-05-20 | End: 2024-05-21

## 2024-05-20 RX ORDER — PIPERACILLIN AND TAZOBACTAM 4; .5 G/20ML; G/20ML
3.38 INJECTION, POWDER, LYOPHILIZED, FOR SOLUTION INTRAVENOUS EVERY 8 HOURS
Refills: 0 | Status: DISCONTINUED | OUTPATIENT
Start: 2024-05-20 | End: 2024-05-20

## 2024-05-20 RX ORDER — METRONIDAZOLE 500 MG
250 TABLET ORAL
Refills: 0 | Status: COMPLETED | OUTPATIENT
Start: 2024-05-20 | End: 2024-05-21

## 2024-05-20 RX ORDER — PANTOPRAZOLE SODIUM 20 MG/1
40 TABLET, DELAYED RELEASE ORAL
Refills: 0 | Status: DISCONTINUED | OUTPATIENT
Start: 2024-05-20 | End: 2024-06-03

## 2024-05-20 RX ORDER — PIPERACILLIN AND TAZOBACTAM 4; .5 G/20ML; G/20ML
3.38 INJECTION, POWDER, LYOPHILIZED, FOR SOLUTION INTRAVENOUS ONCE
Refills: 0 | Status: COMPLETED | OUTPATIENT
Start: 2024-05-20 | End: 2024-05-20

## 2024-05-20 RX ORDER — ATORVASTATIN CALCIUM 80 MG/1
20 TABLET, FILM COATED ORAL AT BEDTIME
Refills: 0 | Status: DISCONTINUED | OUTPATIENT
Start: 2024-05-20 | End: 2024-05-20

## 2024-05-20 RX ORDER — ASPIRIN/CALCIUM CARB/MAGNESIUM 324 MG
81 TABLET ORAL DAILY
Refills: 0 | Status: DISCONTINUED | OUTPATIENT
Start: 2024-05-20 | End: 2024-05-20

## 2024-05-20 RX ORDER — POLYETHYLENE GLYCOL 3350 17 G/17G
17 POWDER, FOR SOLUTION ORAL
Refills: 0 | Status: COMPLETED | OUTPATIENT
Start: 2024-05-20 | End: 2024-05-21

## 2024-05-20 RX ORDER — POTASSIUM CHLORIDE 20 MEQ
10 PACKET (EA) ORAL
Refills: 0 | Status: COMPLETED | OUTPATIENT
Start: 2024-05-20 | End: 2024-05-20

## 2024-05-20 RX ORDER — NEOMYCIN SULFATE 500 MG/1
500 TABLET ORAL
Refills: 0 | Status: COMPLETED | OUTPATIENT
Start: 2024-05-20 | End: 2024-05-21

## 2024-05-20 RX ORDER — ATORVASTATIN CALCIUM 80 MG/1
20 TABLET, FILM COATED ORAL AT BEDTIME
Refills: 0 | Status: DISCONTINUED | OUTPATIENT
Start: 2024-05-20 | End: 2024-06-03

## 2024-05-20 RX ORDER — DEXTROSE MONOHYDRATE, SODIUM CHLORIDE, AND POTASSIUM CHLORIDE 50; .745; 4.5 G/1000ML; G/1000ML; G/1000ML
1000 INJECTION, SOLUTION INTRAVENOUS
Refills: 0 | Status: DISCONTINUED | OUTPATIENT
Start: 2024-05-20 | End: 2024-05-21

## 2024-05-20 RX ADMIN — PIPERACILLIN AND TAZOBACTAM 25 GRAM(S): 4; .5 INJECTION, POWDER, LYOPHILIZED, FOR SOLUTION INTRAVENOUS at 14:59

## 2024-05-20 RX ADMIN — NEOMYCIN SULFATE 500 MILLIGRAM(S): 500 TABLET ORAL at 22:03

## 2024-05-20 RX ADMIN — Medication 1000 MILLIGRAM(S): at 12:30

## 2024-05-20 RX ADMIN — Medication 100 MILLIEQUIVALENT(S): at 08:58

## 2024-05-20 RX ADMIN — POLYETHYLENE GLYCOL 3350 17 GRAM(S): 17 POWDER, FOR SOLUTION ORAL at 15:56

## 2024-05-20 RX ADMIN — NEOMYCIN SULFATE 500 MILLIGRAM(S): 500 TABLET ORAL at 14:56

## 2024-05-20 RX ADMIN — PIPERACILLIN AND TAZOBACTAM 25 GRAM(S): 4; .5 INJECTION, POWDER, LYOPHILIZED, FOR SOLUTION INTRAVENOUS at 08:50

## 2024-05-20 RX ADMIN — POLYETHYLENE GLYCOL 3350 17 GRAM(S): 17 POWDER, FOR SOLUTION ORAL at 13:48

## 2024-05-20 RX ADMIN — POLYETHYLENE GLYCOL 3350 17 GRAM(S): 17 POWDER, FOR SOLUTION ORAL at 16:50

## 2024-05-20 RX ADMIN — Medication 250 MILLIGRAM(S): at 22:03

## 2024-05-20 RX ADMIN — POLYETHYLENE GLYCOL 3350 17 GRAM(S): 17 POWDER, FOR SOLUTION ORAL at 11:50

## 2024-05-20 RX ADMIN — ATORVASTATIN CALCIUM 20 MILLIGRAM(S): 80 TABLET, FILM COATED ORAL at 22:03

## 2024-05-20 RX ADMIN — Medication 400 MILLIGRAM(S): at 06:36

## 2024-05-20 RX ADMIN — PIPERACILLIN AND TAZOBACTAM 200 GRAM(S): 4; .5 INJECTION, POWDER, LYOPHILIZED, FOR SOLUTION INTRAVENOUS at 04:43

## 2024-05-20 RX ADMIN — POLYETHYLENE GLYCOL 3350 17 GRAM(S): 17 POWDER, FOR SOLUTION ORAL at 14:50

## 2024-05-20 RX ADMIN — Medication 400 MILLIGRAM(S): at 12:00

## 2024-05-20 RX ADMIN — Medication 250 MILLIGRAM(S): at 14:55

## 2024-05-20 RX ADMIN — Medication 100 MILLIEQUIVALENT(S): at 09:55

## 2024-05-20 RX ADMIN — DEXTROSE MONOHYDRATE, SODIUM CHLORIDE, AND POTASSIUM CHLORIDE 90 MILLILITER(S): 50; .745; 4.5 INJECTION, SOLUTION INTRAVENOUS at 09:30

## 2024-05-20 RX ADMIN — Medication 100 MILLIEQUIVALENT(S): at 11:00

## 2024-05-20 RX ADMIN — POLYETHYLENE GLYCOL 3350 17 GRAM(S): 17 POWDER, FOR SOLUTION ORAL at 22:12

## 2024-05-20 RX ADMIN — POLYETHYLENE GLYCOL 3350 17 GRAM(S): 17 POWDER, FOR SOLUTION ORAL at 12:51

## 2024-05-20 RX ADMIN — Medication 400 MILLIGRAM(S): at 17:25

## 2024-05-20 RX ADMIN — PIPERACILLIN AND TAZOBACTAM 25 GRAM(S): 4; .5 INJECTION, POWDER, LYOPHILIZED, FOR SOLUTION INTRAVENOUS at 22:03

## 2024-05-20 RX ADMIN — Medication 81 MILLIGRAM(S): at 12:50

## 2024-05-20 RX ADMIN — Medication 1000 MILLIGRAM(S): at 17:35

## 2024-05-20 RX ADMIN — POLYETHYLENE GLYCOL 3350 17 GRAM(S): 17 POWDER, FOR SOLUTION ORAL at 22:02

## 2024-05-20 NOTE — CHART NOTE - NSCHARTNOTEFT_GEN_A_CORE
As per discussion with in house Neurology- no absolute contraindication to surgery. Recommend avoiding intraoperative hypotension (sys >100). In house Neurology to discuss w/ private attending and reevaluate in 5/21 AM. As per discussion with in house Neurology- no absolute contraindication to surgery. Recommend avoiding intraoperative hypotension (sys >100). In house Neurology to discuss w/ private attending and reevaluate in 5/21 AM. Currently scheduled for 12:00PM. Will continue w/ bowel prep and AM labs.

## 2024-05-20 NOTE — PROGRESS NOTE ADULT - SUBJECTIVE AND OBJECTIVE BOX
Patient is a 78y old  Female who presents with a chief complaint of Perforated diverticulitis (20 May 2024 09:20)    Date of servie : 05-20-24 @ 16:12  INTERVAL HPI/OVERNIGHT EVENTS:  T(C): 36.6 (05-20-24 @ 14:16), Max: 38.4 (05-19-24 @ 21:55)  HR: 74 (05-20-24 @ 14:16) (65 - 88)  BP: 125/69 (05-20-24 @ 14:16) (103/63 - 125/69)  RR: 18 (05-20-24 @ 14:16) (15 - 18)  SpO2: 98% (05-20-24 @ 14:16) (92% - 98%)  Wt(kg): --  I&O's Summary    20 May 2024 07:01  -  20 May 2024 16:12  --------------------------------------------------------  IN: 985 mL / OUT: 101 mL / NET: 884 mL        LABS:                        11.2   11.48 )-----------( 330      ( 20 May 2024 06:36 )             33.0     05-20    137  |  100  |  11  ----------------------------<  127<H>  3.5   |  23  |  0.68    Ca    9.1      20 May 2024 06:36  Phos  4.2     05-20  Mg     2.00     05-20    TPro  8.6<H>  /  Alb  3.5  /  TBili  0.8  /  DBili  x   /  AST  15  /  ALT  10  /  AlkPhos  96  05-19    PT/INR - ( 19 May 2024 21:53 )   PT: 11.8 sec;   INR: 1.06 ratio         PTT - ( 19 May 2024 21:53 )  PTT:34.4 sec  Urinalysis Basic - ( 20 May 2024 06:36 )    Color: x / Appearance: x / SG: x / pH: x  Gluc: 127 mg/dL / Ketone: x  / Bili: x / Urobili: x   Blood: x / Protein: x / Nitrite: x   Leuk Esterase: x / RBC: x / WBC x   Sq Epi: x / Non Sq Epi: x / Bacteria: x      CAPILLARY BLOOD GLUCOSE      POCT Blood Glucose.: 141 mg/dL (19 May 2024 21:23)        Urinalysis Basic - ( 20 May 2024 06:36 )    Color: x / Appearance: x / SG: x / pH: x  Gluc: 127 mg/dL / Ketone: x  / Bili: x / Urobili: x   Blood: x / Protein: x / Nitrite: x   Leuk Esterase: x / RBC: x / WBC x   Sq Epi: x / Non Sq Epi: x / Bacteria: x        MEDICATIONS  (STANDING):  acetaminophen   IVPB .. 1000 milliGRAM(s) IV Intermittent every 6 hours  aspirin enteric coated 81 milliGRAM(s) Oral daily  atorvastatin 20 milliGRAM(s) Oral at bedtime  dextrose 5% + sodium chloride 0.45% with potassium chloride 20 mEq/L 1000 milliLiter(s) (90 mL/Hr) IV Continuous <Continuous>  enoxaparin Injectable 40 milliGRAM(s) SubCutaneous every 24 hours  metroNIDAZOLE    Tablet 250 milliGRAM(s) Oral <User Schedule>  neomycin 500 milliGRAM(s) Oral <User Schedule>  pantoprazole    Tablet 40 milliGRAM(s) Oral before breakfast  piperacillin/tazobactam IVPB.. 3.375 Gram(s) IV Intermittent every 8 hours  polyethylene glycol 3350 17 Gram(s) Oral every 1 hour    MEDICATIONS  (PRN):          PHYSICAL EXAM:  GENERAL: NAD, well-groomed, well-developed  HEAD:  Atraumatic, Normocephalic  CHEST/LUNG: Clear to percussion bilaterally; No rales, rhonchi, wheezing, or rubs  HEART: Regular rate and rhythm; No murmurs, rubs, or gallops  ABDOMEN: Soft, Nontender, Nondistended; Bowel sounds present  EXTREMITIES:  2+ Peripheral Pulses, No clubbing, cyanosis, or edema  LYMPH: No lymphadenopathy noted  SKIN: No rashes or lesions    Care Discussed with Consultants/Other Providers [ ] YES  [ ] NO

## 2024-05-20 NOTE — H&P ADULT - ASSESSMENT
78F with PMHx diverticulitis, type-2 DM, HTN, HLD, Glaucoma, OA of the L-knee and PERFECTO with recent hospitalization for complicated diverticulitis, discharged 5/15 on long-term IV erta presents after fall from home. Code Stroke called for L facial droop, but workup negative. TMax 101.2. WBC 11. Benign abd. CT AP showing persistent perforated sigmoid diverticulitis with thick-walled pericolonic multilocular abscess measuring at least up to 3.5 x 1.5 x 4.8 cm.    PLAN  - IV abx: zosyn  - Diet: NPO/IVF  - f/u blood cx  - Home meds    Discussed with Dr. Sergio ALEX Team Surgery  [phone]

## 2024-05-20 NOTE — H&P ADULT - NSHPLABSRESULTS_GEN_ALL_CORE
Vital Signs Last 24 Hrs  T(C): 36.4 (20 May 2024 05:12), Max: 38.4 (19 May 2024 21:55)  T(F): 97.5 (20 May 2024 05:12), Max: 101.2 (19 May 2024 21:55)  HR: 65 (20 May 2024 05:12) (65 - 88)  BP: 119/61 (20 May 2024 05:12) (103/63 - 122/73)  BP(mean): --  RR: 17 (20 May 2024 05:12) (15 - 17)  SpO2: 97% (20 May 2024 05:12) (92% - 98%)    LABS:                        11.1   10.97 )-----------( 334      ( 19 May 2024 21:53 )             33.0     05-    138  |  102  |  13  ----------------------------<  131<H>  4.2   |  23  |  0.68    Ca    9.7      19 May 2024 21:53    TPro  8.6<H>  /  Alb  3.5  /  TBili  0.8  /  DBili  x   /  AST  15  /  ALT  10  /  AlkPhos  96  -    PT/INR - ( 19 May 2024 21:53 )   PT: 11.8 sec;   INR: 1.06 ratio         PTT - ( 19 May 2024 21:53 )  PTT:34.4 sec  Urinalysis Basic - ( 20 May 2024 00:45 )    Color: Yellow / Appearance: Cloudy / S.097 / pH: x  Gluc: x / Ketone: Negative mg/dL  / Bili: Negative / Urobili: 1.0 mg/dL   Blood: x / Protein: 30 mg/dL / Nitrite: Negative   Leuk Esterase: Small / RBC: 19 /HPF / WBC 10-20 /HPF   Sq Epi: x / Non Sq Epi: 0 /HPF / Bacteria: Negative /HPF    POCT Blood Glucose.: 141 mg/dL (19 May 2024 21:23)    LIVER FUNCTIONS - ( 19 May 2024 21:53 )  Alb: 3.5 g/dL / Pro: 8.6 g/dL / ALK PHOS: 96 U/L / ALT: 10 U/L / AST: 15 U/L / GGT: x           RADIOLOGY & ADDITIONAL STUDIES:  < from: CT Abdomen and Pelvis w/ IV Cont (24 @ 02:26) >  ACC: [id number] EXAM:  CT ABDOMEN AND PELVIS IC   ORDERED BY: TONYA VICKERS     FINDINGS:  LOWER CHEST: Bibasilar dependent and platelike atelectasis. Heart is   enlarged. Thoracic aortic and coronary artery atherosclerotic   calcifications.    LIVER: Within normal limits.  BILE DUCTS: Normal caliber.  GALLBLADDER: Cholecystectomy.  SPLEEN: Within normal limits.  PANCREAS: Within normal limits.  ADRENALS: Within normal limits.  KIDNEYS/URETERS: Kidneys enhance symmetrically. Mild left-sided   hydroureteronephrosis with tapering of the left ureter at the pelvic brim   in the region of known sigmoid inflammation. Right renal cyst.    BLADDER: Contrast in the urinary bladder. Mild thickening of the urinary   bladder walls.  REPRODUCTIVE ORGANS: Uterus is absent.    BOWEL AND PERITONEUM: Small hiatus hernia. No bowel obstruction. Appendix   is normal. Descending and sigmoid colonic diverticulosis with thickening   and narrowing of the proximal to mid sigmoid colon with marked   surrounding inflammatory change and pericolonic thick-walled collection   measuring at least up to 3.5 x 1.5 x 4.8 cm extending towards the pelvis   and vagina with reactive thickening of the walls of the bladder fundus.   Surrounding inflammatory change and small volume left lower quadrant free   fluid. No pneumoperitoneum. Moderate stool burden distending the colon   proximal to the sigmoid.  VESSELS: Atherosclerotic calcifications of the aortoiliac tree.   Left-sided IVC.  RETROPERITONEUM/LYMPH NODES: No lymphadenopathy.  ABDOMINAL WALL: Tiny fat-containing and local hernia. Postsurgical   changes.  BONES: Degenerative changes of the spine. Grade 2 spondylolisthesis of L5   on S1 due to bilateral spondylolysis of L5. Unchanged mild compression   deformity of L2.    IMPRESSION:  Perforated sigmoid diverticulitis with thick-walled pericolonic   multilocular abscess measuring at least up to 3.5 x 1.5 x 4.8 cm   extending towards the pelvis and vagina with reactive thickening of the   walls of the bladder fundus. Moderate stool burden distending the colon   proximal to the sigmoid. Endoscopic evaluation is recommended after   appropriate clinical management to exclude underlying malignancy.    Mild left-sided hydroureteronephrosis with tapering of the left ureter at   the pelvic brim in the region of known sigmoid inflammation.    < end of copied text >

## 2024-05-20 NOTE — H&P ADULT - HISTORY OF PRESENT ILLNESS
A TEAM COLORECTAL SURGERY  78F with PMHx diverticulitis, type-2 DM, HTN, HLD, Glaucoma, OA of the L-knee and PERFECTO with recent hospitalization for complicated diverticulitis, discharged 5/15 on long-term IV erta presents after fall from home. Code Stroke called for L facial droop, but workup negative. TMax 101.2. CT AP showing persistent perforated sigmoid diverticulitis with thick-walled pericolonic multilocular abscess measuring at least up to 3.5 x 1.5 x 4.8 cm.

## 2024-05-20 NOTE — CHART NOTE - NSCHARTNOTEFT_GEN_A_CORE
Briefly, this is a 79 yo F who was seen in early May for smoldering diverticulitis. She was sent home on IV abx in the interim. She presented last night with altered mental status. So far her workup has been negative for stroke and she was admitted for treatment of perforated diverticulitis.     Vitals stable since admission  WBC 11  CT reviewed with enlarged collection and thickened area of diverticulitis     Abd is soft, nondistended, mild LLQ TTP to deep palpation, no rebound/guarding    Given smoldering nature of diverticulitis and likely metabolic contribution to her new AMS, patient would likely benefit from surgery  I discussed this plan with her Son and Daughter (Trevin and Gemini) via phone and they are both in agreement with the plan  Will start a prep today with Miralax and abx  Medicine consulted for clearance  Pending MRI brain and neuro clearance   Will tentatively plan for OR tomorrow for lap sigmoidectomy possible ostomy

## 2024-05-20 NOTE — H&P ADULT - NSHPPHYSICALEXAM_GEN_ALL_CORE
General: NAD, resting comfortably  HEENT: NC/AT, EOMI, normal hearing  Pulmonary: normal resp effort, patent airway  Abdominal: soft, nondistended, nontender, benign  Extremities: WWP, normal strength, no clubbing/cyanosis/edema  Neuro: A/O x 3, CNs II-XII grossly intact, normal sensation, no focal deficits

## 2024-05-20 NOTE — CONSULT NOTE ADULT - ASSESSMENT
Interventional Radiology    Evaluate for Procedure: pericolonic collection drainage     HPI: 78F with PMHx diverticulitis, type-2 DM, HTN, HLD, Glaucoma, OA of the L-knee and PERFECTO with recent hospitalization for complicated diverticulitis, discharged 5/15 on long-term IV erta presents after fall from home. Code Stroke called for L facial droop, but workup negative. TMax 101.2. CT AP showing persistent perforated sigmoid diverticulitis with thick-walled pericolonic multilocular abscess measuring at least up to 3.5 x 1.5 x 4.8 cm.    Allergies: No Known Allergies    Medications (Abx/Cardiac/Anticoagulation/Blood Products)    piperacillin/tazobactam IVPB...: 200 mL/Hr IV Intermittent (05-20 @ 04:43)    Data:  154.9  T(C): 36.6  HR: 68  BP: 118/67  RR: 18  SpO2: 94%    -WBC 11.48 / HgB 11.2 / Hct 33.0 / Plt 330  -Na 137 / Cl 100 / BUN 11 / Glucose 127  -K 3.5 / CO2 23 / Cr 0.68  -ALT -- / Alk Phos -- / T.Bili --  -INR 1.06 / PTT 34.4      Radiology:   - relevant imaging reviewed     Assessment/Plan:   78F PMH HTN, DMII, recent hospitalization for complicated diverticulitis on long term antibiotics presenting as code stroke. Found to be febrile to 101, collection along sigmoid colon.     - Imaging reviewed, collection ill defined with surrounding inflammation and closely associated with bowel, no role for percutaneous drainage at this time.   - IR will sign off. If increasing collection, reconsult as needed and IR will reevaluate for intervention.     --  Arnold Brian MD, PGY-3  Vascular and Interventional Radiology   Available on [employer] Teams    - Non-emergent consults: Place IR consult order in [place]  - Emergent issues (pager): [facility] [phone]; [facility] [phone]; [phone]  - Scheduling questions: [facility] [phone]; [facility] [phone]  - Clinic/outpatient booking: [facility] [phone]; [facility] [phone]

## 2024-05-20 NOTE — PATIENT PROFILE ADULT - FALL HARM RISK - HARM RISK INTERVENTIONS

## 2024-05-20 NOTE — CHART NOTE - NSCHARTNOTEFT_GEN_A_CORE
Called by Surgery overnight regarding patient's MR brain results:    MR BRAIN    PROCEDURE DATE:  05/20/2024      INTERPRETATION:  CLINICAL INDICATION: Patient recently fell at home.   Confused.    TECHNIQUE: MRI of the brain was performed without contrast.    COMPARISON: CT brain 5/19/2024    FINDINGS:  Acute infarct involving the anterior left midbrain.    No acute intracranial hemorrhage, mass effect, or hydrocephalus. No   extra-axial collection. Basal cisterns are patent.    Age-related involutional changes and chronic microvascular ischemic   changes. Chronic lacunar infarcts involving the bilateral thalami. Small   chronic infarcts involving the bilateral cerebral hemispheres.    Ventricles and sulci are age-appropriate in size.    Major intracranial flow-voids are preserved.    Left cataract surgery. The orbits, sellar and suprasellar structures, and   craniocervical junction are otherwise unremarkable. Visualized paranasal   sinuses and mastoid air cells are clear.    IMPRESSION:  Acute infarct involving the anterior left midbrain.    -----------------------------------------------------------------------------  Surgery team inquiring about clearance for surgery that is to occur some time in the AM, 5/21.    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 Per initial consult note, clopidogrel not started d/t plan for OR. Will discuss patient in the AM (prior to OR) with attending vascular neurologist, Dr. Vinson, who is following the patient.

## 2024-05-20 NOTE — PROGRESS NOTE ADULT - ASSESSMENT
79 y/o F with pmhx of Diverticulitis, Type-2 DM, HTN, HLD, Glaucoma, OA of the L-knee and PERFECTO, presented to the ED for new onset lethargy. Found to have leukocytosis likely from pneumonia vs. diverticulitis. The patient does not appear lethargic. Admit for IV abx.      Perforated  Diverticulitis.   - surgery as per colorectal   - reviewed  repeat CT   - cw abx  - will monitor     Benign essential HTN.   hold BP meds.  restart as needed      DM2 (diabetes mellitus, type 2).   - NPO  - ISS    Medically cleared for OR

## 2024-05-20 NOTE — ED ADULT NURSE REASSESSMENT NOTE - NS ED NURSE REASSESS COMMENT FT1
Break RN: Pt received in stretcher in room 6. Pt resting comfortably, A&Ox3, oriented to name/time/placed, unsure why she is in the hospital. Slurred speech noted. Pt offered no complains at present. respiration even and non-labored. in NAD. Pending CT. Break RN: Pt received in stretcher in room 6. Pt resting comfortably, A&Ox3, oriented to name/time/placed, unsure why she is in the hospital. Slurred speech noted. Pt offered no complains at present. Tolerating 2L NC. in NAD. NSR on monitor. Pending CT.
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
## 2024-05-21 ENCOUNTER — APPOINTMENT (OUTPATIENT)
Dept: HOME HEALTH SERVICES | Facility: HOME HEALTH | Age: 79
End: 2024-05-21

## 2024-05-21 ENCOUNTER — TRANSCRIPTION ENCOUNTER (OUTPATIENT)
Age: 79
End: 2024-05-21

## 2024-05-21 LAB
ANION GAP SERPL CALC-SCNC: 10 MMOL/L — SIGNIFICANT CHANGE UP (ref 7–14)
APTT BLD: 35.1 SEC — SIGNIFICANT CHANGE UP (ref 24.5–35.6)
BLD GP AB SCN SERPL QL: NEGATIVE — SIGNIFICANT CHANGE UP
BUN SERPL-MCNC: 10 MG/DL — SIGNIFICANT CHANGE UP (ref 7–23)
CALCIUM SERPL-MCNC: 8.2 MG/DL — LOW (ref 8.4–10.5)
CHLORIDE SERPL-SCNC: 103 MMOL/L — SIGNIFICANT CHANGE UP (ref 98–107)
CO2 SERPL-SCNC: 21 MMOL/L — LOW (ref 22–31)
CREAT SERPL-MCNC: 0.7 MG/DL — SIGNIFICANT CHANGE UP (ref 0.5–1.3)
EGFR: 90 ML/MIN/1.73M2 — SIGNIFICANT CHANGE UP
GAS PNL BLDA: SIGNIFICANT CHANGE UP
GLUCOSE BLDC GLUCOMTR-MCNC: 128 MG/DL — HIGH (ref 70–99)
GLUCOSE BLDC GLUCOMTR-MCNC: 136 MG/DL — HIGH (ref 70–99)
GLUCOSE BLDC GLUCOMTR-MCNC: 155 MG/DL — HIGH (ref 70–99)
GLUCOSE SERPL-MCNC: 122 MG/DL — HIGH (ref 70–99)
HCT VFR BLD CALC: 29.6 % — LOW (ref 34.5–45)
HGB BLD-MCNC: 9.9 G/DL — LOW (ref 11.5–15.5)
INR BLD: 1.17 RATIO — SIGNIFICANT CHANGE UP (ref 0.85–1.18)
MAGNESIUM SERPL-MCNC: 1.9 MG/DL — SIGNIFICANT CHANGE UP (ref 1.6–2.6)
MCHC RBC-ENTMCNC: 29.7 PG — SIGNIFICANT CHANGE UP (ref 27–34)
MCHC RBC-ENTMCNC: 33.4 GM/DL — SIGNIFICANT CHANGE UP (ref 32–36)
MCV RBC AUTO: 88.9 FL — SIGNIFICANT CHANGE UP (ref 80–100)
NRBC # BLD: 0 /100 WBCS — SIGNIFICANT CHANGE UP (ref 0–0)
NRBC # FLD: 0 K/UL — SIGNIFICANT CHANGE UP (ref 0–0)
PHOSPHATE SERPL-MCNC: 3.3 MG/DL — SIGNIFICANT CHANGE UP (ref 2.5–4.5)
PLATELET # BLD AUTO: 303 K/UL — SIGNIFICANT CHANGE UP (ref 150–400)
POTASSIUM SERPL-MCNC: 3.9 MMOL/L — SIGNIFICANT CHANGE UP (ref 3.5–5.3)
POTASSIUM SERPL-SCNC: 3.9 MMOL/L — SIGNIFICANT CHANGE UP (ref 3.5–5.3)
PROTHROM AB SERPL-ACNC: 13 SEC — SIGNIFICANT CHANGE UP (ref 9.5–13)
RBC # BLD: 3.33 M/UL — LOW (ref 3.8–5.2)
RBC # FLD: 14.6 % — HIGH (ref 10.3–14.5)
RH IG SCN BLD-IMP: POSITIVE — SIGNIFICANT CHANGE UP
SODIUM SERPL-SCNC: 134 MMOL/L — LOW (ref 135–145)
WBC # BLD: 9.17 K/UL — SIGNIFICANT CHANGE UP (ref 3.8–10.5)
WBC # FLD AUTO: 9.17 K/UL — SIGNIFICANT CHANGE UP (ref 3.8–10.5)

## 2024-05-21 PROCEDURE — 88307 TISSUE EXAM BY PATHOLOGIST: CPT | Mod: 26

## 2024-05-21 PROCEDURE — 88305 TISSUE EXAM BY PATHOLOGIST: CPT | Mod: 26

## 2024-05-21 DEVICE — STAPLER COVIDIEN TRI-STAPLE 2MM PURPLE RADIAL INTELLIGENT RELOAD: Type: IMPLANTABLE DEVICE | Status: FUNCTIONAL

## 2024-05-21 DEVICE — LIGATING CLIPS WECK HORIZON LARGE (ORANGE) 24: Type: IMPLANTABLE DEVICE | Status: FUNCTIONAL

## 2024-05-21 DEVICE — STAPLER COVIDIEN TRI-STAPLE 60MM PURPLE RELOAD: Type: IMPLANTABLE DEVICE | Status: FUNCTIONAL

## 2024-05-21 DEVICE — LIGATING CLIPS WECK HORIZON MEDIUM (BLUE) 24: Type: IMPLANTABLE DEVICE | Status: FUNCTIONAL

## 2024-05-21 RX ORDER — ACETAMINOPHEN 500 MG
1000 TABLET ORAL EVERY 6 HOURS
Refills: 0 | Status: COMPLETED | OUTPATIENT
Start: 2024-05-21 | End: 2024-05-22

## 2024-05-21 RX ORDER — HEPARIN SODIUM 5000 [USP'U]/ML
5000 INJECTION INTRAVENOUS; SUBCUTANEOUS EVERY 8 HOURS
Refills: 0 | Status: DISCONTINUED | OUTPATIENT
Start: 2024-05-21 | End: 2024-06-03

## 2024-05-21 RX ORDER — GLUCAGON INJECTION, SOLUTION 0.5 MG/.1ML
1 INJECTION, SOLUTION SUBCUTANEOUS ONCE
Refills: 0 | Status: DISCONTINUED | OUTPATIENT
Start: 2024-05-21 | End: 2024-05-24

## 2024-05-21 RX ORDER — DEXTROSE 50 % IN WATER 50 %
12.5 SYRINGE (ML) INTRAVENOUS ONCE
Refills: 0 | Status: DISCONTINUED | OUTPATIENT
Start: 2024-05-21 | End: 2024-05-28

## 2024-05-21 RX ORDER — DEXTROSE 10 % IN WATER 10 %
125 INTRAVENOUS SOLUTION INTRAVENOUS ONCE
Refills: 0 | Status: DISCONTINUED | OUTPATIENT
Start: 2024-05-21 | End: 2024-05-24

## 2024-05-21 RX ORDER — SODIUM CHLORIDE 9 MG/ML
1000 INJECTION, SOLUTION INTRAVENOUS
Refills: 0 | Status: DISCONTINUED | OUTPATIENT
Start: 2024-05-21 | End: 2024-05-23

## 2024-05-21 RX ORDER — OXYCODONE HYDROCHLORIDE 5 MG/1
2.5 TABLET ORAL EVERY 4 HOURS
Refills: 0 | Status: DISCONTINUED | OUTPATIENT
Start: 2024-05-21 | End: 2024-05-23

## 2024-05-21 RX ORDER — INSULIN LISPRO 100/ML
VIAL (ML) SUBCUTANEOUS
Refills: 0 | Status: DISCONTINUED | OUTPATIENT
Start: 2024-05-21 | End: 2024-05-21

## 2024-05-21 RX ORDER — DEXTROSE 50 % IN WATER 50 %
25 SYRINGE (ML) INTRAVENOUS ONCE
Refills: 0 | Status: DISCONTINUED | OUTPATIENT
Start: 2024-05-21 | End: 2024-05-28

## 2024-05-21 RX ORDER — POTASSIUM CHLORIDE 20 MEQ
10 PACKET (EA) ORAL ONCE
Refills: 0 | Status: COMPLETED | OUTPATIENT
Start: 2024-05-21 | End: 2024-05-21

## 2024-05-21 RX ORDER — MAGNESIUM SULFATE 500 MG/ML
1 VIAL (ML) INJECTION ONCE
Refills: 0 | Status: COMPLETED | OUTPATIENT
Start: 2024-05-21 | End: 2024-05-21

## 2024-05-21 RX ORDER — OXYCODONE HYDROCHLORIDE 5 MG/1
5 TABLET ORAL EVERY 4 HOURS
Refills: 0 | Status: DISCONTINUED | OUTPATIENT
Start: 2024-05-21 | End: 2024-05-28

## 2024-05-21 RX ORDER — SODIUM CHLORIDE 9 MG/ML
1000 INJECTION, SOLUTION INTRAVENOUS
Refills: 0 | Status: DISCONTINUED | OUTPATIENT
Start: 2024-05-21 | End: 2024-05-24

## 2024-05-21 RX ORDER — ONDANSETRON 8 MG/1
4 TABLET, FILM COATED ORAL ONCE
Refills: 0 | Status: DISCONTINUED | OUTPATIENT
Start: 2024-05-21 | End: 2024-05-21

## 2024-05-21 RX ORDER — DEXTROSE 50 % IN WATER 50 %
15 SYRINGE (ML) INTRAVENOUS ONCE
Refills: 0 | Status: DISCONTINUED | OUTPATIENT
Start: 2024-05-21 | End: 2024-05-24

## 2024-05-21 RX ORDER — INSULIN LISPRO 100/ML
VIAL (ML) SUBCUTANEOUS EVERY 6 HOURS
Refills: 0 | Status: DISCONTINUED | OUTPATIENT
Start: 2024-05-21 | End: 2024-05-22

## 2024-05-21 RX ORDER — FENTANYL CITRATE 50 UG/ML
25 INJECTION INTRAVENOUS
Refills: 0 | Status: DISCONTINUED | OUTPATIENT
Start: 2024-05-21 | End: 2024-05-21

## 2024-05-21 RX ADMIN — POLYETHYLENE GLYCOL 3350 17 GRAM(S): 17 POWDER, FOR SOLUTION ORAL at 01:12

## 2024-05-21 RX ADMIN — HEPARIN SODIUM 5000 UNIT(S): 5000 INJECTION INTRAVENOUS; SUBCUTANEOUS at 21:56

## 2024-05-21 RX ADMIN — FENTANYL CITRATE 25 MICROGRAM(S): 50 INJECTION INTRAVENOUS at 20:05

## 2024-05-21 RX ADMIN — DEXTROSE MONOHYDRATE, SODIUM CHLORIDE, AND POTASSIUM CHLORIDE 90 MILLILITER(S): 50; .745; 4.5 INJECTION, SOLUTION INTRAVENOUS at 05:45

## 2024-05-21 RX ADMIN — Medication 250 MILLIGRAM(S): at 06:52

## 2024-05-21 RX ADMIN — Medication 100 GRAM(S): at 07:28

## 2024-05-21 RX ADMIN — SODIUM CHLORIDE 90 MILLILITER(S): 9 INJECTION, SOLUTION INTRAVENOUS at 18:10

## 2024-05-21 RX ADMIN — POLYETHYLENE GLYCOL 3350 17 GRAM(S): 17 POWDER, FOR SOLUTION ORAL at 06:54

## 2024-05-21 RX ADMIN — PIPERACILLIN AND TAZOBACTAM 25 GRAM(S): 4; .5 INJECTION, POWDER, LYOPHILIZED, FOR SOLUTION INTRAVENOUS at 05:42

## 2024-05-21 RX ADMIN — PANTOPRAZOLE SODIUM 40 MILLIGRAM(S): 20 TABLET, DELAYED RELEASE ORAL at 06:52

## 2024-05-21 RX ADMIN — FENTANYL CITRATE 25 MICROGRAM(S): 50 INJECTION INTRAVENOUS at 19:50

## 2024-05-21 RX ADMIN — Medication 400 MILLIGRAM(S): at 23:37

## 2024-05-21 RX ADMIN — ATORVASTATIN CALCIUM 20 MILLIGRAM(S): 80 TABLET, FILM COATED ORAL at 21:56

## 2024-05-21 RX ADMIN — NEOMYCIN SULFATE 500 MILLIGRAM(S): 500 TABLET ORAL at 06:52

## 2024-05-21 RX ADMIN — DEXTROSE MONOHYDRATE, SODIUM CHLORIDE, AND POTASSIUM CHLORIDE 90 MILLILITER(S): 50; .745; 4.5 INJECTION, SOLUTION INTRAVENOUS at 08:02

## 2024-05-21 RX ADMIN — Medication 400 MILLIGRAM(S): at 01:11

## 2024-05-21 RX ADMIN — PIPERACILLIN AND TAZOBACTAM 25 GRAM(S): 4; .5 INJECTION, POWDER, LYOPHILIZED, FOR SOLUTION INTRAVENOUS at 22:56

## 2024-05-21 RX ADMIN — Medication 100 MILLIEQUIVALENT(S): at 07:26

## 2024-05-21 RX ADMIN — Medication 1000 MILLIGRAM(S): at 01:11

## 2024-05-21 NOTE — DISCHARGE NOTE PROVIDER - PROVIDER RX CONTACT NUMBER
[Verbal consent obtained from patient] : the patient, [unfilled] [Time Spent: ___ minutes] : I have spent [unfilled] minutes with the patient on the telephone [FreeTextEntry1] : I called pt back because he asked me to call him for his return to work clearance. he said that he had a weird condition on Friday night and felt that has pneumonia again and checked his Pulse oxy 95% and heart rate of heart rate 115. he had taken two cup of coffee earlier during the day.. he still is coughing and has low grade fever about 99 range but less than 100. He has no other symptoms an stated that he stopped taking Tylenol feeling that it is affecting his liver.  Now his wife is sick with same condition but didn't go for testing. [phone]

## 2024-05-21 NOTE — DISCHARGE NOTE PROVIDER - NSDCFUADDAPPT_GEN_ALL_CORE_FT
Please follow up with your primary care provider 1-2 weeks after discharge regarding recent hospitalization.  Please follow up with your primary care provider 1-2 weeks after discharge regarding recent hospitalization.     PLEASE ALSO CALL TO MAKE APPOINTMENT FOR FOLLOW UP:  SURGEON   DR. LASSITER  NEUROLOGIST   DR. SAUER  CARDIOLOGIST DR. CONRAD

## 2024-05-21 NOTE — CONSULT NOTE ADULT - CONSULT REASON
Pre OP
?Left facial droop, slurred speech, b/l UE Weakness
drainage of collection from perforated diverticulitis
Perforated diverticulitis w/ abscess

## 2024-05-21 NOTE — PHYSICAL THERAPY INITIAL EVALUATION ADULT - GAIT DEVIATIONS NOTED, PT EVAL
decreased segundo/decreased velocity of limb motion/decreased step length/decreased stride length/decreased weight-shifting ability

## 2024-05-21 NOTE — ADVANCED PRACTICE NURSE CONSULT - ASSESSMENT
Abdomen assessed in lying and sitting position. Stoma marking made in all four quadrants, not impinging on the midline, avoiding creases/folds, within the rectus muscle. Explained stoma creation, and introduced pouching system to patient. Other questions answered about lifestyle after surgery with pouching system. Will continue to monitor patient after surgery for post-operative ostomy teaching.

## 2024-05-21 NOTE — CHART NOTE - NSCHARTNOTEFT_GEN_A_CORE
Patient presenting with dysarthria and right hemiparesis. Patient found to have an acute left anterior midbrain infarct on MRI brain.    No absolute neurovascular contraindications to OR given risk of delaying procedure likely outweighs benefit.    Please avoid perioperative hypotension.    D/w Dr. Vinson. Patient presenting with dysarthria and right hemiparesis. Patient found to have an acute left anterior midbrain infarct on MRI brain.    No absolute neurovascular contraindications to OR given risk of delaying procedure likely outweighs benefit.    Please avoid perioperative hypotension.    D/w Dr. Vinson

## 2024-05-21 NOTE — CHART NOTE - NSCHARTNOTEFT_GEN_A_CORE
Surgery called by nursing regarding low grade rectal temp to 100.7degF in the setting of know pericolonic abscess on IV abx. Otherwise, VSS. Patient seen and examined bedside. Abdominal exam benign- soft, nondistended, non ttp. Plan to give IV tylenol + ISS bedside. Surgery to reevaluate in AM. Surgery called by nursing regarding low grade rectal temp to 100.7degF in the setting of know pericolonic abscess on IV abx. Otherwise, VSS. Patient seen and examined bedside. Abdominal exam benign- soft, nondistended, non ttp. Plan to give IV tylenol + ISS bedside. Surgery to reevaluate in AM. Continue with bowel prep for OR tomorrow. Follow up Neurology clearance in AM.

## 2024-05-21 NOTE — DISCHARGE NOTE PROVIDER - HOSPITAL COURSE
78F with PMHx diverticulitis, type-2 DM, HTN, HLD, Glaucoma, OA of the L-knee and PERFECTO with recent hospitalization for complicated diverticulitis, discharged 5/15 on long-term IV erta presents AMS and fall from home. Code Stroke called for L facial droop CTH in ED were negative. Pt. found with TMax 101.2. WBC 11. Benign abd. CT AP showing persistent perforated sigmoid diverticulitis with thick-walled pericolonic multilocular abscess measuring at least up to 3.5 x 1.5 x 4.8 cm. Pt. was admitted to Surgical service for IV antibiotic and management.   Pt. was evaluated by IR and deems that abscess was not drainable by IR. Decision to take pt. to OR drainage of collection for source control. Prior to OR pt. was seen by ID, Cardiology for optimization of her status. Pt. found on MRI with acute left anterior mid brain infarct. However, was stable from neurological perspective for OR given her condition.   On 5/21/2024 Pt. underwent --------    Surgery was-------        Post op -------- 78F with PMHx diverticulitis, type-2 DM, HTN, HLD, Glaucoma, OA of the L-knee and PERFECTO with recent hospitalization for complicated diverticulitis, discharged 5/15 on long-term IV erta presents AMS and fall from home. Code Stroke called for L facial droop CTH in ED were negative. Pt. found with TMax 101.2. WBC 11. Benign abd. CT AP showing persistent perforated sigmoid diverticulitis with thick-walled pericolonic multilocular abscess measuring at least up to 3.5 x 1.5 x 4.8 cm. Pt. was admitted to Surgical service for IV antibiotic and management.   Pt. was evaluated by IR and deems that abscess was not drainable by IR. Decision to take pt. to OR drainage of collection for source control. Prior to OR pt. was seen by ID, Cardiology for optimization of her status. Pt. found on MRI with acute left anterior mid brain infarct. However, was stable from neurological perspective for OR given her condition.   On 5/21/2024 Pt. underwent a Ruiz procedure operation was uneventful       PO-------   78F with PMHx diverticulitis, type-2 DM, HTN, HLD, Glaucoma, OA of the L-knee and PERFECTO with recent hospitalization for complicated diverticulitis, discharged 5/15 on long-term IV erta presents AMS and fall from home. Code Stroke called for L facial droop CTH in ED were negative. Pt. found with TMax 101.2. WBC 11. Benign abd. CT AP showing persistent perforated sigmoid diverticulitis with thick-walled pericolonic multilocular abscess measuring at least up to 3.5 x 1.5 x 4.8 cm. Pt. was admitted to Surgical service for IV antibiotic and management.   Pt. was evaluated by IR and deems that abscess was not drainable by IR. Decision to take pt. to OR drainage of collection for source control. Prior to OR pt. was seen by ID, Cardiology for optimization of her status. Pt. found on MRI with acute left anterior mid brain infarct. However, was stable from neurological perspective for OR given her condition.   On 5/21/2024 Pt. underwent a Ruiz procedure operation The patient tolerated the procedure well. Post-operatively the patient was sent to the PACU. The patient was hemodynamically stable and was transferred to a surgical floor. Patient tolerated operation well and there were no post operative complications identified. The patient had daily wound care and was seen by physical therapy which recommended discharge to rehab. Ostomy RN team consulted for assessment and ostomy teaching. The patient's pain was controlled by IV pain medications and then by PO pain medications.   **pending below**  The patient was advanced to a regular diet and tolerated it well. The patient was placed on home medications. At the time of discharge, the patient was hemodynamically stable, was tolerating PO diet, was voiding urine and passing stool, was ambulating, and was comfortable with adequate pain control. The patient was instructed to follow up with  _ within _ weeks after discharge from the hospital. The patient/family felt comfortable with discharge. The patient had no other issues. 78F with PMHx diverticulitis, type-2 DM, HTN, HLD, Glaucoma, OA of the L-knee and PERFECTO with recent hospitalization for complicated diverticulitis, discharged 5/15 on long-term IV erta presents AMS and fall from home. Code Stroke called for L facial droop CTH in ED were negative. Pt. found with TMax 101.2. WBC 11. Benign abd. CT AP showing persistent perforated sigmoid diverticulitis with thick-walled pericolonic multilocular abscess measuring at least up to 3.5 x 1.5 x 4.8 cm. Pt. was admitted to Surgical service for IV antibiotic and management.   Pt. was evaluated by IR and deems that abscess was not drainable by IR. Decision to take pt. to OR drainage of collection for source control. Prior to OR pt. was seen by ID, Cardiology for optimization of her status. Pt. found on MRI with acute left anterior mid brain infarct. However, was stable from neurological perspective for OR given her condition.   On 5/21/2024 Pt. underwent a Ruiz procedure operation The patient tolerated the procedure well. Post-operatively the patient was sent to the PACU. The patient was hemodynamically stable and was transferred to a surgical floor. Patient tolerated operation well and there were no post operative complications identified. The patient had daily wound care and was seen by physical therapy which recommended discharge to rehab. Ostomy RN team consulted for assessment and ostomy teaching. The patient's pain was controlled by IV pain medications and then by PO pain medications.     Patient tolerated operation well and there were no post-operative complications identified. Patient remained hemodynamically stable in the PACU and transferred to the surgical floor. Diet was restarted and advanced as tolerated. Pain control was transitioned from IV to PO pain meds. At this time, patient is currently ambulating, voiding, tolerating a regular diet. Patient has been deemed stable for discharge rehab with follow up as an outpatient.

## 2024-05-21 NOTE — DISCHARGE NOTE PROVIDER - NSDCCPCAREPLAN_GEN_ALL_CORE_FT
PRINCIPAL DISCHARGE DIAGNOSIS  Diagnosis: Perforated diverticulum  Assessment and Plan of Treatment: OSTOMY: You are being discharged with an ostomy. During your admission you were seen by the Ostomy Nursing Team who provided you and your family with education and reinforcement. Please continue to follow their recommendations at home.  Supplies were given to you for your ostomy prior to your discharge. A home nurse will be scheduled to visit you at home to reinforce your ostomy care.  Please monitor your ostomy output for volumes greater than 1L, any black/red coloring concerning for blood, no production of gas and/or stool. If you see any of these changes please call your surgeon.   Supplies utilized:   Stoma size:   Stoma powder- item #[id number]    Liquid barrier film    One piece drainable pouch- item #[id number]  WOUND CARE:  Please keep incisions clean and dry. Please do not Scrub or rub incisions. Do not use lotion or powder on incisions.   BATHING: You may shower and/or sponge bathe. You may use warm soapy water in the shower and rinse, pat dry.  ACTIVITY: No heavy lifting or straining. Otherwise, you may return to your usual level of physical activity. If you are taking narcotic pain medication DO NOT drive a car, operate machinery or make important decisions.  DIET: low fiber  NOTIFY YOUR SURGEON IF YOU HAVE: any bleeding that does not stop, any pus draining from your wound(s), any fever (over 100.4 F) persistent nausea/vomiting, or if your pain is not controlled on your discharge pain medications, unable to urinate.  Please follow up with your primary care physician in one week regarding your hospitalization, bring copies of your discharge paperwork.  Please follow up with your surgeon, Dr. Lr within 2 weeks of discharge. Please call to schedule your appointment.

## 2024-05-21 NOTE — PROGRESS NOTE ADULT - ASSESSMENT
8F with diverticulitis    Continue IV abx   NPO for OR today  Will follow up with Neuro     A team Surgery   [id number]

## 2024-05-21 NOTE — CHART NOTE - NSCHARTNOTEFT_GEN_A_CORE
General Surgery Post op Check    Pt seen and examined without complaints. Pain is controlled.    Vital Signs Last 24 Hrs  T(C): 36.4 (21 May 2024 21:00), Max: 38.2 (21 May 2024 00:38)  T(F): 97.5 (21 May 2024 21:00), Max: 100.7 (21 May 2024 00:38)  HR: 93 (21 May 2024 21:30) (61 - 93)  BP: 93/60 (21 May 2024 21:30) (90/54 - 129/79)  BP(mean): 67 (21 May 2024 21:30) (62 - 80)  RR: 17 (21 May 2024 21:30) (14 - 22)  SpO2: 99% (21 May 2024 21:30) (94% - 100%)    Parameters below as of 21 May 2024 21:30  Patient On (Oxygen Delivery Method): nasal cannula  O2 Flow (L/min): 2      I&O's Summary    20 May 2024 07:01  -  21 May 2024 07:00  --------------------------------------------------------  IN: 2485 mL / OUT: 201 mL / NET: 2284 mL    21 May 2024 07:01  -  21 May 2024 21:51  --------------------------------------------------------  IN: 480 mL / OUT: 430 mL / NET: 50 mL      Physical Exam  Gen: NAD  Pulm: No respiratory distress  CV: RRR  Abd: Soft, appropriately tender, nondistended, prevena w/ suction, port sites c/d/i, ostomy w/o gas  RLQ KULWANT ss      A/P: 78F s/p diagnostic laparoscopy, Incision and drainage of deep pelvic abscess, umbilical hernia repair, Jania's procedure (5/21) - progressing appropriately.     -CLD  -SQH   -Slade  -Strict I&O's  -Monitor KULWANT drain output  -Analgesia and antiemetics as needed  -Dispo: PACU to floor General Surgery Post op Check    Pt seen and examined without complaints. Pain is controlled.    Vital Signs Last 24 Hrs  T(C): 36.4 (21 May 2024 21:00), Max: 38.2 (21 May 2024 00:38)  T(F): 97.5 (21 May 2024 21:00), Max: 100.7 (21 May 2024 00:38)  HR: 93 (21 May 2024 21:30) (61 - 93)  BP: 93/60 (21 May 2024 21:30) (90/54 - 129/79)  BP(mean): 67 (21 May 2024 21:30) (62 - 80)  RR: 17 (21 May 2024 21:30) (14 - 22)  SpO2: 99% (21 May 2024 21:30) (94% - 100%)    Parameters below as of 21 May 2024 21:30  Patient On (Oxygen Delivery Method): nasal cannula  O2 Flow (L/min): 2      I&O's Summary    20 May 2024 07:01  -  21 May 2024 07:00  --------------------------------------------------------  IN: 2485 mL / OUT: 201 mL / NET: 2284 mL    21 May 2024 07:01  -  21 May 2024 21:51  --------------------------------------------------------  IN: 480 mL / OUT: 430 mL / NET: 50 mL      Physical Exam  Gen: NAD  Pulm: No respiratory distress  CV: RRR  Abd: Soft, appropriately tender, nondistended, prevena w/ suction, port sites c/d/i, ostomy w/o output/gas  Drains: RLQ KULWANT ss; Slade       A/P: 78F s/p diagnostic laparoscopy, Incision and drainage of deep pelvic abscess, umbilical hernia repair, Jania's procedure (5/21) - progressing appropriately.     -CLD  -SQH   -Slade  -Strict I&O's  -Monitor KULWANT drain output  -Analgesia and antiemetics as needed  -Dispo: PACU to floor

## 2024-05-21 NOTE — DISCHARGE NOTE PROVIDER - NSDCACTIVITY_GEN_ALL_CORE
Follow Instructions Provided by your Surgical Team No restrictions/Showering allowed/Stairs allowed/Walking - Indoors allowed/Walking - Outdoors allowed/Follow Instructions Provided by your Surgical Team

## 2024-05-21 NOTE — BRIEF OPERATIVE NOTE - NSICDXBRIEFPOSTOP_GEN_ALL_CORE_FT
POST-OP DIAGNOSIS:  Pericolonic abscess due to diverticulitis 21-May-2024 18:43:43  Blanca Umanzor

## 2024-05-21 NOTE — DISCHARGE NOTE PROVIDER - INSTRUCTIONS
If you had part of your intestine removed, please consume a low fiber diet for the first 2-3 weeks. You should avoid raw fruits/vegetables, nuts, seeds, corn, and leafy greens (spinach, kale, gina greens, lettuce) during this period of time.

## 2024-05-21 NOTE — PROGRESS NOTE ADULT - SUBJECTIVE AND OBJECTIVE BOX
DATE OF SERVICE: 05-21-24    MRI showed acute infarct, planning for OR today    MEDICATIONS:  aspirin enteric coated 81 milliGRAM(s) Oral daily  atorvastatin 20 milliGRAM(s) Oral at bedtime  dextrose 5% + sodium chloride 0.45% with potassium chloride 20 mEq/L 1000 milliLiter(s) IV Continuous <Continuous>  heparin   Injectable 5000 Unit(s) SubCutaneous every 8 hours  pantoprazole    Tablet 40 milliGRAM(s) Oral before breakfast  piperacillin/tazobactam IVPB.. 3.375 Gram(s) IV Intermittent every 8 hours      LABS:                        9.9    9.17  )-----------( 303      ( 21 May 2024 03:54 )             29.6       Hemoglobin: 9.9 g/dL (05-21 @ 03:54)  Hemoglobin: 11.2 g/dL (05-20 @ 06:36)  Hemoglobin: 11.1 g/dL (05-19 @ 21:53)      05-21    134<L>  |  103  |  10  ----------------------------<  122<H>  3.9   |  21<L>  |  0.70    Ca    8.2<L>      21 May 2024 03:54  Phos  3.3     05-21  Mg     1.90     05-21    TPro  8.6<H>  /  Alb  3.5  /  TBili  0.8  /  DBili  x   /  AST  15  /  ALT  10  /  AlkPhos  96  05-19    Creatinine Trend: 0.70<--, 0.68<--, 0.68<--, 0.67<--, 0.65<--, 0.52<--    COAGS: PT/INR - ( 21 May 2024 03:54 )   PT: 13.0 sec;   INR: 1.17 ratio         PTT - ( 21 May 2024 03:54 )  PTT:35.1 sec          PHYSICAL EXAM:  T(C): 36.3 (05-21-24 @ 10:00), Max: 38.2 (05-21-24 @ 00:38)  HR: 61 (05-21-24 @ 10:00) (61 - 86)  BP: 102/61 (05-21-24 @ 10:00) (102/61 - 128/86)  RR: 18 (05-21-24 @ 10:00) (18 - 18)  SpO2: 100% (05-21-24 @ 10:00) (95% - 100%)  Wt(kg): --    I&O's Summary    20 May 2024 07:01  -  21 May 2024 07:00  --------------------------------------------------------  IN: 2485 mL / OUT: 201 mL / NET: 2284 mL    General:  Alert and Oriented * 3.   Head: Normocephalic and atraumatic.   Neck: No JVD. No bruitsNo M/R/G  Lungs: CTA b/l. No rhonchi, rales or wheezes.   Abdomen: + BS, soft. Non tender. Non distended. No rebound. No guarding.   Extremities: No clubbing/cyanosis/edema.   Neurologic: Moves all four extremities. Full range of motion.   Skin: Warm and moist. The patient's skin has normal elasticity and good skin turgor.   Psychiatric: Appropriate mood and affect.  Musculoskeletal: Normal range of motion, normal strength     TELEMETRY: 	  NONE    ECG:  	NSR        ASSESSMENT/PLAN: Pt is a 78F with PMHx diverticulitis, DM2, HTN, HLD,with recent hospitalization for complicated diverticulitis, discharged 5/15 on long-term IV ABX presents after fall from home with AMS, found to have fevers  and a  CT AP showing perforated sigmoid diverticulitis with thick-walled pericolonic multilocular abscess measuring at least up to 3.5 x 1.5 x 4.8 cm. Cardiology called for pre-op risk stratification prior to OR. Currently denies any chest pain, SOB, orthopnea.      Pre-OP/HTN/HLD  - planning for  lap sigmoidectomy possible ostomy.  - no anginal chest pain, no q waves on EKG euvolemic on exam, no severely stenotic valvular disease on PE, recent acute CVA  - Elevated risk for for intraperitoneal surgery, can proceed to OR with no further CV work-up  - IF BP tolerates can restart BB prior to OR previosuly on Lopressor 25 mg q 12  - c/w Lipitor 20 mg    Negar Eagle MD  Pager: [phone]

## 2024-05-21 NOTE — PHYSICAL THERAPY INITIAL EVALUATION ADULT - PERTINENT HX OF CURRENT PROBLEM, REHAB EVAL
Pt is a 78 year old female presenting s/p fall at home. Code Stroke called for L facial droop. CT head/neck negative for acute findings. CT A/P revealed perforated sigmoid diverticulitis with thick-walled pericolonic multilocular abscess extending towards the pelvis and vagina with reactive thickening of the walls of the bladder fundus, moderate stool burden distending the colon proximal to the sigmoid, and mild left-sided hydroureteronephrosis with tapering of the left ureter at the pelvic brim in the region of known sigmoid inflammation. 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 Pt admitted and awaiting OR for surgery. Pt is a 78 year old female presenting s/p fall at home. Code Stroke called for L facial droop. CT head/neck negative for acute findings. CT A/P revealed perforated sigmoid diverticulitis with thick-walled pericolonic multilocular abscess extending towards the pelvis and vagina with reactive thickening of the walls of the bladder fundus, moderate stool burden distending the colon proximal to the sigmoid, and mild left-sided hydroureteronephrosis with tapering of the left ureter at the pelvic brim in the region of known sigmoid inflammation. 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 Pt admitted and awaiting OR for surgery.    **NO ACTIVITY ORDER PLACED; HOWEVER, SURGERY PA MANNIE SCHWARZ CONTACTED PRIOR TO PT EVALUATION AND CLEARED FOR OOB MOBILITY. ACTIVITY ORDER TO BE UPDATED.

## 2024-05-21 NOTE — CHART NOTE - NSCHARTNOTEFT_GEN_A_CORE
MRI resulted last night showing an acute infarct. We reached out to the neurology team for further guidance and clearance.    I personally discussed the case with Dr. Vinson from neurology this AM. Per Dr. Vinson, given the chronicity of her infectious symptoms, the risks of postponing surgery seem to outweigh the benefits at this time. There is no absolute contraindication for surgery, just that we attempt to avoid hypotension intraoperatively. Given that the patient has failed a long term IV abx plan and has returned with a larger abscess in the interim, the concern is that postponing surgery may lead to long term issues and potentially sepsis from diverticulitis which would require urgent operation. Additionally, if she will require antiplatelet therapy, this would potentially complicate any future operation. After discussion with neurology, we agree that these risks of postponing will outweigh the benefits of postponing surgery.    I spoke with the patient's proxy this morning, Gemini ([phone]) and explained these new findings as well as the decision making process. I explained that she is now at a higher risk for surgery given the MRI results, however she may not benefit from postponing. Gemini understood the increased risk and agreed to proceed with surgery.     Will proceed to OR today

## 2024-05-21 NOTE — DISCHARGE NOTE PROVIDER - NSDCMRMEDTOKEN_GEN_ALL_CORE_FT
alendronate 70 mg oral tablet: 1 tab(s) orally once a day  aspirin 81 mg oral tablet, chewable: 1 tab(s) chewed once a day  atorvastatin 20 mg oral tablet: 1 tab(s) orally once a day  memantine 28 mg oral capsule, extended release: 1 cap(s) orally once a day  metoprolol succinate 50 mg oral capsule, extended release: 1 cap(s) orally once a day  mirtazapine 30 mg oral tablet: 1 tab(s) orally once a day  NIFEdipine 60 mg oral tablet, extended release: 1 tab(s) orally once a day  simvastatin 20 mg oral tablet: 1 tab(s) orally once a day   alendronate 70 mg oral tablet: 1 tab(s) orally once a day  aspirin 81 mg oral tablet, chewable: 1 tab(s) chewed once a day  citalopram 10 mg oral tablet: 1 tab(s) orally once a day  clopidogrel 75 mg oral tablet: 1 tab(s) orally once a day Last day 6/19/24  memantine 28 mg oral capsule, extended release: 1 cap(s) orally once a day  metFORMIN 500 mg oral tablet: 1 tab(s) orally once a day  metoprolol succinate 50 mg oral capsule, extended release: 1 cap(s) orally once a day  mirtazapine 30 mg oral tablet: 1 tab(s) orally once a day  NIFEdipine 60 mg oral tablet, extended release: 1 tab(s) orally once a day  Protonix 20 mg oral delayed release tablet: orally once a day  simvastatin 20 mg oral tablet: 1 tab(s) orally once a day

## 2024-05-21 NOTE — PHYSICAL THERAPY INITIAL EVALUATION ADULT - IMPAIRED TRANSFERS: TOILET, REHAB EVAL
[Chills] : no chills [Nausea] : no nausea [Fever] : no fever [Vomiting] : no vomiting [Erythema] : not erythematous [Clean/Dry/Intact] : clean, dry and intact [Excellent Pain Control] : has excellent pain control [Doing Well] : is doing well [Sutures Removed] : sutures were removed [No Sign of Infection] : is showing no signs of infection [Steri-Strips Removed & Replaced] : steri-strips removed and replaced [None] : None [de-identified] : s/o partial meniscetomy, s/p ACL patellar tendon reconstruction [de-identified] : Today they're doing well. No pain, no limitations, no numbness. No complaints\par  [de-identified] : We'll start PT as per the protocole\par f/u in 4 weejks with Knee xrays standing in full extnetion  [de-identified] : Healed incsuions\par Able to flex to 90\par  impaired balance/impaired coordination/impaired motor control/impaired postural control/decreased strength

## 2024-05-21 NOTE — CONSULT NOTE ADULT - ASSESSMENT
79 y/o F PMhx Glaucoma, DM II, HTN, dementia, diverticulitis who presented after a fall found to have enlarged abscess    Sigmoid diverticulitis w/ perforation and abscess  sepsis- fever, leukocytosis on admission  CT- perforated sigmoid diverticulitis with thick-walled pericolonic multilocular abscess measuring at least up to 3.5 x 1.5 x 4.8 cm w/ moderate stool burden distending the colon proximal to the sigmoid  s/p IR eval- collection ill defined with surrounding inflammation and closely associated with bowel, no role for percutaneous drainage at this time    CVA  L facial droop  MRI- Acute infarct involving the anterior left midbrain.    Recommendations  c/w zosyn for now  f/u blood cultures    Geovani Goldman M.D.  [facility], Division of Infectious Diseases  [phone]  After 5pm on weekdays and all day on weekends - please call [phone]

## 2024-05-21 NOTE — PROGRESS NOTE ADULT - SUBJECTIVE AND OBJECTIVE BOX
TEAM [ A ] Surgery Daily Progress Note  =====================================================    SUBJECTIVE: Patient seen and examined at bedside on AM rounds. Patient reports feeling well, pain controlled on current regimen. NPO denies nausea, vomiting.  Denies fever, chills.    ALLERGIES:  No Known Allergies      --------------------------------------------------------------------------------------    MEDICATIONS:    Neurologic Medications    Respiratory Medications    Cardiovascular Medications    Gastrointestinal Medications  dextrose 5% + sodium chloride 0.45% with potassium chloride 20 mEq/L 1000 milliLiter(s) IV Continuous <Continuous>  magnesium sulfate  IVPB 1 Gram(s) IV Intermittent once  pantoprazole    Tablet 40 milliGRAM(s) Oral before breakfast  potassium chloride  10 mEq/100 mL IVPB 10 milliEquivalent(s) IV Intermittent once    Genitourinary Medications    Hematologic/Oncologic Medications  aspirin enteric coated 81 milliGRAM(s) Oral daily  heparin   Injectable 5000 Unit(s) SubCutaneous every 8 hours    Antimicrobial/Immunologic Medications  piperacillin/tazobactam IVPB.. 3.375 Gram(s) IV Intermittent every 8 hours    Endocrine/Metabolic Medications  atorvastatin 20 milliGRAM(s) Oral at bedtime    Topical/Other Medications    --------------------------------------------------------------------------------------    VITAL SIGNS:  T(C): 36.4 (05-21-24 @ 05:49), Max: 38.2 (05-21-24 @ 00:38)  HR: 63 (05-21-24 @ 05:49) (63 - 86)  BP: 128/86 (05-21-24 @ 05:49) (114/66 - 128/86)  RR: 18 (05-21-24 @ 05:49) (18 - 18)  SpO2: 98% (05-21-24 @ 05:49) (94% - 98%)  --------------------------------------------------------------------------------------    EXAM    General: NAD, resting in bed comfortably.  Cardiac:  warm and well perfused  Respiratory: Nonlabored respirations, normal cw expansion.  Abdomen: soft,+ mild TTP, nondistended.  Extremities:  no deformities    --------------------------------------------------------------------------------------    LABS                          9.9    9.17  )-----------( 303      ( 21 May 2024 03:54 )             29.6     --------------------------------------------------------------------------------------    05-21    134<L>  |  103  |  10  ----------------------------<  122<H>  3.9   |  21<L>  |  0.70    Ca    8.2<L>      21 May 2024 03:54  Phos  3.3     05-21  Mg     1.90     05-21    TPro  8.6<H>  /  Alb  3.5  /  TBili  0.8  /  DBili  x   /  AST  15  /  ALT  10  /  AlkPhos  96  05-19  INS AND OUTS:    05-20-24 @ 07:01  -  05-21-24 @ 07:00  --------------------------------------------------------  IN: 2485 mL / OUT: 201 mL / NET: 2284 mL      --------------------------------------------------------------------------------------

## 2024-05-21 NOTE — PHYSICAL THERAPY INITIAL EVALUATION ADULT - IMPAIRMENTS CONTRIBUTING TO GAIT DEVIATIONS, PT EVAL
impaired endurance/impaired balance/cognition/impaired coordination/impaired motor control/impaired postural control/decreased strength

## 2024-05-21 NOTE — BRIEF OPERATIVE NOTE - OPERATION/FINDINGS
Diagnostic laparoscopy. Completely firm sigmoid adhered to the left pelvic sidewall with small hernia in the abdominal wall. Unable to mobilize. Loose adhesions to the anterior abdominal wall lysed. Procedure converted to open and Ruiz procedure performed. Pus cavity entered in lateral colonic wall and culture obtained. At beginning of case, pus seen coming from vagina but no colovaginal fistula seen intraop. KULWANT placed in pelvis. Descending colostomy matured in LUQ.  Diagnostic laparoscopy. Completely firm sigmoid adhered to the left pelvic sidewall with small hernia in the abdominal wall. Unable to mobilize. Loose adhesions to the anterior abdominal wall lysed. Procedure converted to open and Ruiz procedure performed. Pus cavity entered in lateral colonic wall and culture obtained. KULWANT placed in pelvis. Descending colostomy matured in LUQ.

## 2024-05-21 NOTE — DISCHARGE NOTE PROVIDER - CARE PROVIDER_API CALL
Cinda Hill  Surgery  [address] [place] [address]  Phone: [phone]  Fax: [phone]  Follow Up Time:    Lucas Lr (DO)  Surgery  [address] [facility], [address]  [place] [address]  Phone: [phone]  Fax: [phone]  Follow Up Time: 2 weeks   Lucas Lr (DO)  Surgery  [address] [facility], [address]  [place] [address]  Phone: [phone]  Fax: [phone]  Follow Up Time: 1 week    Allison Vinson  Neurology  [address] [facility], [address]  [place] [address]  Phone: [phone]  Fax: [phone]  Follow Up Time: 1 week    Negar Eagle  Interventional Cardiology  [address] [place] [address], [id number]  [place] [address]  Phone: [phone]  Fax: [phone]  Follow Up Time: 1 week

## 2024-05-21 NOTE — CONSULT NOTE ADULT - SUBJECTIVE AND OBJECTIVE BOX
[facility], Division of Infectious Diseases  JORGE LUIS Chaves S. Shah, Y. Patel, G. Jones  [phone]  ([phone] - weekdays after 5pm and weekends)    TY DAVIS  78y, Female  [id number]    HPI--  HPI:  77 y/o F PMhx Glaucoma, DM II, HTN, dementia, diverticulitis who presented after a fall. She was recently discharged on ertapenem for perforated diverticulitis c/b abscess after surgical evaluation. CT on admission demonstrated perforated sigmoid diverticulitis with thick-walled pericolonic multilocular abscess measuring at least up to 3.5 x 1.5 x 4.8 cm w/ moderate stool burden distending the colon proximal to the sigmoid. She was also noted to have L facial droop for which code stroke was called. CTH, CTA head and neck were negative. 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    Patient unable to provide history therefore history obtained from chart review.     ROS: 10 point review of systems completed, pertinent positives and negatives as per HPI.    Allergies: No Known Allergies    PMH -- Hypertension, unspecified type    Diabetes    Glaucoma    Osteoarthritis, knee    HLD (hyperlipidemia)      PSH -- No significant past surgical history    H/O knee surgery    H/O fracture of leg      FH -- No pertinent family history in first degree relatives    Family history of diabetes mellitus (DM)      Social History -- denies tobacco, alcohol or illicit drug use    Physical Exam--  Vital Signs Last 24 Hrs  T(F): 97.5 (21 May 2024 05:49), Max: 100.7 (21 May 2024 00:38)  HR: 63 (21 May 2024 05:49) (63 - 86)  BP: 128/86 (21 May 2024 05:49) (114/66 - 128/86)  RR: 18 (21 May 2024 05:49) (18 - 18)  SpO2: 98% (21 May 2024 05:49) (95% - 98%)  General: nontoxic-appearing, no acute distress  HEENT: anicteric  Lungs: Clear bilaterally without rales  Heart: S1, S2, normal rate.   Abdomen: Soft. Nondistended. Nontender.   Neuro: L facial droop  Back: No costovertebral angle tenderness.  Extremities: No LE edema.   Skin: Warm. Dry.   Psychiatric: flat affect    Laboratory & Imaging Data--  CBC:                       9.9    9.17  )-----------( 303      ( 21 May 2024 03:54 )             29.6     WBC Count: 9.17 K/uL (05-21-24 @ 03:54)  WBC Count: 11.48 K/uL (05-20-24 @ 06:36)  WBC Count: 10.97 K/uL (05-19-24 @ 21:53)  WBC Count: 9.36 K/uL (05-15-24 @ 05:00)    CMP: 05-21    134<L>  |  103  |  10  ----------------------------<  122<H>  3.9   |  21<L>  |  0.70    Ca    8.2<L>      21 May 2024 03:54  Phos  3.3     05-21  Mg     1.90     05-21    TPro  8.6<H>  /  Alb  3.5  /  TBili  0.8  /  DBili  x   /  AST  15  /  ALT  10  /  AlkPhos  96  05-19    LIVER FUNCTIONS - ( 19 May 2024 21:53 )  Alb: 3.5 g/dL / Pro: 8.6 g/dL / ALK PHOS: 96 U/L / ALT: 10 U/L / AST: 15 U/L / GGT: x           Urinalysis Basic - ( 21 May 2024 03:54 )    Color: x / Appearance: x / SG: x / pH: x  Gluc: 122 mg/dL / Ketone: x  / Bili: x / Urobili: x   Blood: x / Protein: x / Nitrite: x   Leuk Esterase: x / RBC: x / WBC x   Sq Epi: x / Non Sq Epi: x / Bacteria: x      Microbiology:     Culture - Blood (collected 05-19-24 @ 21:39)  Source: .Blood Blood-Peripheral  Preliminary Report (05-21-24 @ 03:02):    No growth at 24 hours    Culture - Blood (collected 05-19-24 @ 21:30)  Source: .Blood Blood-Peripheral  Preliminary Report (05-21-24 @ 03:02):    No growth at 24 hours    Culture - Urine (collected 05-12-24 @ 00:45)  Source: OR Collect Bladder (from O.R.)  Final Report (05-14-24 @ 15:18):    <10,000 CFU/ml Enterococcus faecium (vancomycin resistant)    <10,000 CFU/ml Enterococcus faecalis  Organism: Enterococcus faecium (vancomycin resistant)  Enterococcus faecalis (05-14-24 @ 15:18)  Organism: Enterococcus faecalis (05-14-24 @ 15:18)      Method Type: MAYELIN      -  Ampicillin: S <=2 Predicts results to ampicillin/sulbactam, amoxacillin-clavulanate and  piperacillin-tazobactam.      -  Ciprofloxacin: R >2      -  Levofloxacin: R >4      -  Vancomycin: S 2  Organism: Enterococcus faecium (vancomycin resistant) (05-14-24 @ 15:18)      Method Type: MAYELIN      -  Ampicillin: R >8 Predicts results to ampicillin/sulbactam, amoxacillin-clavulanate and  piperacillin-tazobactam.      -  Ciprofloxacin: R >2      -  Daptomycin: SDD 2 The breakpoint for SDD (susceptible dose dependent)is based on a dosage regimen of 8-12 mg/kg administered every 24 h in adults and is intended for serious infections due to E. faecium. Consultation with an infectious diseases specialist is recommended.      -  Levofloxacin: R >4      -  Linezolid: S 2      -  Vancomycin: R >16    Culture - Urine (collected 05-11-24 @ 19:40)  Source: Clean Catch Clean Catch (Midstream)  Final Report (05-14-24 @ 16:55):    10,000 - 49,000 CFU/mL Enterococcus faecium (vancomycin resistant)    <10,000 CFU/ml Normal Urogenital helen present  Organism: Enterococcus faecium (vancomycin resistant) (05-14-24 @ 16:55)  Organism: Enterococcus faecium (vancomycin resistant) (05-14-24 @ 16:55)      Method Type: MAYELIN      -  Ampicillin: R >8 Predicts results to ampicillin/sulbactam, amoxacillin-clavulanate and  piperacillin-tazobactam.      -  Ciprofloxacin: R >2      -  Daptomycin: SDD 4 The breakpoint for SDD (susceptible dose dependent)is based on a dosage regimen of 8-12 mg/kg administered every 24 h in adults and is intended for serious infections due to E. faecium. Consultation with an infectious diseases specialist is recommended.      -  Levofloxacin: R >4      -  Linezolid: S 2      -  Nitrofurantoin: S <=32 Should not be used to treat pyelonephritis.      -  Tetracycline: R >8      -  Vancomycin: R >16    Culture - Blood (collected 05-11-24 @ 19:25)  Source: .Blood Blood-Peripheral  Final Report (05-17-24 @ 02:01):    No growth at 5 days    Culture - Blood (collected 05-11-24 @ 19:18)  Source: .Blood Blood-Peripheral  Final Report (05-17-24 @ 02:01):    No growth at 5 days        Radiology--  ***  Active Medications--  aspirin enteric coated 81 milliGRAM(s) Oral daily  atorvastatin 20 milliGRAM(s) Oral at bedtime  dextrose 5% + sodium chloride 0.45% with potassium chloride 20 mEq/L 1000 milliLiter(s) IV Continuous <Continuous>  heparin   Injectable 5000 Unit(s) SubCutaneous every 8 hours  pantoprazole    Tablet 40 milliGRAM(s) Oral before breakfast  piperacillin/tazobactam IVPB.. 3.375 Gram(s) IV Intermittent every 8 hours    Antimicrobials:   piperacillin/tazobactam IVPB.. 3.375 Gram(s) IV Intermittent every 8 hours    Immunologic:

## 2024-05-21 NOTE — DISCHARGE NOTE PROVIDER - PROVIDER TOKENS
PROVIDER:[TOKEN:[[id number]]] PROVIDER:[TOKEN:[[id number]],FOLLOWUP:[2 weeks]] PROVIDER:[TOKEN:[[id number]],FOLLOWUP:[1 week]],PROVIDER:[TOKEN:[[id number]],FOLLOWUP:[1 week]],PROVIDER:[TOKEN:[[id number]],FOLLOWUP:[1 week]]

## 2024-05-21 NOTE — PROVIDER CONTACT NOTE (OTHER) - SITUATION
Pt NPO except meds for surgx. Pt unable to tolerate 90mL of liquid. Pt unable to tolerate bowel prep.

## 2024-05-21 NOTE — DISCHARGE NOTE PROVIDER - NSDCFUADDINST_GEN_ALL_CORE_FT
WOUND CARE:  Please keep incisions clean and dry. Please do not Scrub or rub incisions. Do not use lotion or powder on incisions.   BATHING: You may shower and/or sponge bathe. You may use warm soapy water in the shower and rinse, pat dry.  ACTIVITY: No heavy lifting or straining. Otherwise, you may return to your usual level of physical activity. If you are taking narcotic pain medication DO NOT drive a car, operate machinery or make important decisions.  DIET: Return to your usual diet.  NOTIFY YOUR SURGEON IF YOU HAVE: any bleeding that does not stop, any pus draining from your wound(s), any fever (over 100.4 F) persistent nausea/vomiting, or if your pain is not controlled on your discharge pain medications, unable to urinate.  Please follow up with your primary care physician in one week regarding your hospitalization, bring copies of your discharge paperwork.  Please follow up with your surgeon, Dr. Lr

## 2024-05-21 NOTE — BRIEF OPERATIVE NOTE - NSICDXBRIEFPROCEDURE_GEN_ALL_CORE_FT
PROCEDURES:  Diagnostic laparoscopy 21-May-2024 18:42:12  Blanca Umanzor  Jania's procedure 21-May-2024 18:42:19  Blanca Umanzor  Repair, hernia, umbilical, with lipectomy 21-May-2024 18:42:28  Blanca Umanzor  Incision and drainage of deep pelvic abscess 21-May-2024 18:42:56  Blanca Umanzor

## 2024-05-21 NOTE — DISCHARGE NOTE PROVIDER - DETAILS OF MALNUTRITION DIAGNOSIS/DIAGNOSES
This patient has been assessed with a concern for Malnutrition and was treated during this hospitalization for the following Nutrition diagnosis/diagnoses:     -  05/24/2024: Severe protein-calorie malnutrition

## 2024-05-22 ENCOUNTER — TRANSCRIPTION ENCOUNTER (OUTPATIENT)
Age: 79
End: 2024-05-22

## 2024-05-22 LAB
-  AMPICILLIN: SIGNIFICANT CHANGE UP
-  CIPROFLOXACIN: SIGNIFICANT CHANGE UP
-  DAPTOMYCIN: SIGNIFICANT CHANGE UP
-  LEVOFLOXACIN: SIGNIFICANT CHANGE UP
-  LINEZOLID: SIGNIFICANT CHANGE UP
-  NITROFURANTOIN: SIGNIFICANT CHANGE UP
-  TETRACYCLINE: SIGNIFICANT CHANGE UP
-  VANCOMYCIN: SIGNIFICANT CHANGE UP
ANION GAP SERPL CALC-SCNC: 8 MMOL/L — SIGNIFICANT CHANGE UP (ref 7–14)
BUN SERPL-MCNC: 7 MG/DL — SIGNIFICANT CHANGE UP (ref 7–23)
CALCIUM SERPL-MCNC: 7.8 MG/DL — LOW (ref 8.4–10.5)
CHLORIDE SERPL-SCNC: 107 MMOL/L — SIGNIFICANT CHANGE UP (ref 98–107)
CO2 SERPL-SCNC: 21 MMOL/L — LOW (ref 22–31)
CREAT SERPL-MCNC: 0.55 MG/DL — SIGNIFICANT CHANGE UP (ref 0.5–1.3)
CULTURE RESULTS: ABNORMAL
EGFR: 95 ML/MIN/1.73M2 — SIGNIFICANT CHANGE UP
GLUCOSE BLDC GLUCOMTR-MCNC: 112 MG/DL — HIGH (ref 70–99)
GLUCOSE BLDC GLUCOMTR-MCNC: 124 MG/DL — HIGH (ref 70–99)
GLUCOSE BLDC GLUCOMTR-MCNC: 135 MG/DL — HIGH (ref 70–99)
GLUCOSE BLDC GLUCOMTR-MCNC: 139 MG/DL — HIGH (ref 70–99)
GLUCOSE SERPL-MCNC: 136 MG/DL — HIGH (ref 70–99)
GRAM STN FLD: ABNORMAL
HCT VFR BLD CALC: 26.8 % — LOW (ref 34.5–45)
HGB BLD-MCNC: 9 G/DL — LOW (ref 11.5–15.5)
MAGNESIUM SERPL-MCNC: 1.9 MG/DL — SIGNIFICANT CHANGE UP (ref 1.6–2.6)
MCHC RBC-ENTMCNC: 29.7 PG — SIGNIFICANT CHANGE UP (ref 27–34)
MCHC RBC-ENTMCNC: 33.6 GM/DL — SIGNIFICANT CHANGE UP (ref 32–36)
MCV RBC AUTO: 88.4 FL — SIGNIFICANT CHANGE UP (ref 80–100)
METHOD TYPE: SIGNIFICANT CHANGE UP
NRBC # BLD: 0 /100 WBCS — SIGNIFICANT CHANGE UP (ref 0–0)
NRBC # FLD: 0 K/UL — SIGNIFICANT CHANGE UP (ref 0–0)
ORGANISM # SPEC MICROSCOPIC CNT: ABNORMAL
ORGANISM # SPEC MICROSCOPIC CNT: ABNORMAL
PHOSPHATE SERPL-MCNC: 2.2 MG/DL — LOW (ref 2.5–4.5)
PLATELET # BLD AUTO: 253 K/UL — SIGNIFICANT CHANGE UP (ref 150–400)
POTASSIUM SERPL-MCNC: 4.2 MMOL/L — SIGNIFICANT CHANGE UP (ref 3.5–5.3)
POTASSIUM SERPL-SCNC: 4.2 MMOL/L — SIGNIFICANT CHANGE UP (ref 3.5–5.3)
RBC # BLD: 3.03 M/UL — LOW (ref 3.8–5.2)
RBC # FLD: 14.8 % — HIGH (ref 10.3–14.5)
SODIUM SERPL-SCNC: 136 MMOL/L — SIGNIFICANT CHANGE UP (ref 135–145)
SPECIMEN SOURCE: SIGNIFICANT CHANGE UP
SPECIMEN SOURCE: SIGNIFICANT CHANGE UP
WBC # BLD: 11.41 K/UL — HIGH (ref 3.8–10.5)
WBC # FLD AUTO: 11.41 K/UL — HIGH (ref 3.8–10.5)

## 2024-05-22 RX ORDER — SODIUM CHLORIDE 9 MG/ML
500 INJECTION, SOLUTION INTRAVENOUS ONCE
Refills: 0 | Status: COMPLETED | OUTPATIENT
Start: 2024-05-22 | End: 2024-05-22

## 2024-05-22 RX ORDER — MAGNESIUM SULFATE 500 MG/ML
1 VIAL (ML) INJECTION ONCE
Refills: 0 | Status: COMPLETED | OUTPATIENT
Start: 2024-05-22 | End: 2024-05-22

## 2024-05-22 RX ORDER — INSULIN LISPRO 100/ML
VIAL (ML) SUBCUTANEOUS
Refills: 0 | Status: DISCONTINUED | OUTPATIENT
Start: 2024-05-22 | End: 2024-05-26

## 2024-05-22 RX ORDER — METOPROLOL TARTRATE 50 MG
5 TABLET ORAL EVERY 6 HOURS
Refills: 0 | Status: DISCONTINUED | OUTPATIENT
Start: 2024-05-22 | End: 2024-05-23

## 2024-05-22 RX ORDER — ACETAMINOPHEN 500 MG
1000 TABLET ORAL ONCE
Refills: 0 | Status: DISCONTINUED | OUTPATIENT
Start: 2024-05-22 | End: 2024-05-22

## 2024-05-22 RX ORDER — POTASSIUM PHOSPHATE, MONOBASIC POTASSIUM PHOSPHATE, DIBASIC 236; 224 MG/ML; MG/ML
15 INJECTION, SOLUTION INTRAVENOUS ONCE
Refills: 0 | Status: COMPLETED | OUTPATIENT
Start: 2024-05-22 | End: 2024-05-22

## 2024-05-22 RX ADMIN — Medication 400 MILLIGRAM(S): at 11:01

## 2024-05-22 RX ADMIN — PIPERACILLIN AND TAZOBACTAM 25 GRAM(S): 4; .5 INJECTION, POWDER, LYOPHILIZED, FOR SOLUTION INTRAVENOUS at 22:06

## 2024-05-22 RX ADMIN — Medication 5 MILLIGRAM(S): at 23:59

## 2024-05-22 RX ADMIN — Medication 1000 MILLIGRAM(S): at 11:47

## 2024-05-22 RX ADMIN — Medication 400 MILLIGRAM(S): at 17:05

## 2024-05-22 RX ADMIN — ATORVASTATIN CALCIUM 20 MILLIGRAM(S): 80 TABLET, FILM COATED ORAL at 22:07

## 2024-05-22 RX ADMIN — PIPERACILLIN AND TAZOBACTAM 25 GRAM(S): 4; .5 INJECTION, POWDER, LYOPHILIZED, FOR SOLUTION INTRAVENOUS at 05:48

## 2024-05-22 RX ADMIN — PANTOPRAZOLE SODIUM 40 MILLIGRAM(S): 20 TABLET, DELAYED RELEASE ORAL at 05:48

## 2024-05-22 RX ADMIN — Medication 81 MILLIGRAM(S): at 11:02

## 2024-05-22 RX ADMIN — Medication 1000 MILLIGRAM(S): at 18:09

## 2024-05-22 RX ADMIN — HEPARIN SODIUM 5000 UNIT(S): 5000 INJECTION INTRAVENOUS; SUBCUTANEOUS at 22:07

## 2024-05-22 RX ADMIN — SODIUM CHLORIDE 500 MILLILITER(S): 9 INJECTION, SOLUTION INTRAVENOUS at 09:34

## 2024-05-22 RX ADMIN — Medication 400 MILLIGRAM(S): at 05:48

## 2024-05-22 RX ADMIN — PIPERACILLIN AND TAZOBACTAM 25 GRAM(S): 4; .5 INJECTION, POWDER, LYOPHILIZED, FOR SOLUTION INTRAVENOUS at 13:46

## 2024-05-22 RX ADMIN — POTASSIUM PHOSPHATE, MONOBASIC POTASSIUM PHOSPHATE, DIBASIC 62.5 MILLIMOLE(S): 236; 224 INJECTION, SOLUTION INTRAVENOUS at 08:48

## 2024-05-22 RX ADMIN — Medication 100 GRAM(S): at 11:35

## 2024-05-22 RX ADMIN — SODIUM CHLORIDE 90 MILLILITER(S): 9 INJECTION, SOLUTION INTRAVENOUS at 05:48

## 2024-05-22 RX ADMIN — HEPARIN SODIUM 5000 UNIT(S): 5000 INJECTION INTRAVENOUS; SUBCUTANEOUS at 05:48

## 2024-05-22 RX ADMIN — HEPARIN SODIUM 5000 UNIT(S): 5000 INJECTION INTRAVENOUS; SUBCUTANEOUS at 13:47

## 2024-05-22 RX ADMIN — SODIUM CHLORIDE 500 MILLILITER(S): 9 INJECTION, SOLUTION INTRAVENOUS at 14:50

## 2024-05-22 RX ADMIN — Medication 5 MILLIGRAM(S): at 17:05

## 2024-05-22 NOTE — PHYSICAL THERAPY INITIAL EVALUATION ADULT - CRITERIA FOR SKILLED THERAPEUTIC INTERVENTIONS
impairments found/functional limitations in following categories/risk reduction/prevention/rehab potential
impairments found

## 2024-05-22 NOTE — ADVANCED PRACTICE NURSE CONSULT - ASSESSMENT
Patient AxOx2, confused, and somnolent, periodically nodding out and falling asleep during education, unable to follow education 2/2 confusion at this time. Verbalized process of pouch change and demonstrated pouch change for patient. Demonstrated utilizing push-pull technique to remove wafer gently. Stoma then cleansed with tap water and patted dry.     Stoma is observed to be entirely retracted on assessment and sutured in place circumferentially. Stoma color is pale pink, but no overt signs of ischemia noted. Mini-endoscopy performed to ensure no ischemia present. Stoma is also observed to be within fold; in order to appropriately place pouch, patient's folds must be held taught to prevent any gapping between wafer and skin.    WIth skin held taught, stoma is measuring oval sized L x W: 1" x 1 3/8". Once piece applied to help fit in contour and folds. Educational folder left at bedside for patient and family, ostomy clinic information left at bedside.  Patient AxOx2, confused, and somnolent, periodically nodding out and falling asleep during education, unable to follow education 2/2 confusion at this time. Verbalized process of pouch change and demonstrated pouch change for patient. Prior to pouch change, approximately 5-7ccs of serosanguinous drainage observed in pouch. Demonstrated utilizing push-pull technique to remove wafer gently. Stoma then cleansed with tap water and patted dry.     Stoma is observed to be entirely retracted on assessment and sutured in place circumferentially. Stoma color is pale pink, but no overt signs of ischemia noted. Mini-endoscopy performed to ensure no ischemia present. Stoma is also observed to be within fold; in order to appropriately place pouch, patient's folds must be held taught to prevent any gapping between wafer and skin.    WIth skin held taught, stoma is measuring oval sized L x W: 1" x 1 3/8". Once piece applied to help fit in contour and folds. Educational folder left at bedside for patient and family, ostomy clinic information left at bedside.

## 2024-05-22 NOTE — PROGRESS NOTE ADULT - ASSESSMENT
77 y/o F PMhx Glaucoma, DM II, HTN, dementia, diverticulitis who presented after a fall found to have enlarged abscess    Sigmoid diverticulitis w/ perforation and abscess  sepsis- fever, leukocytosis on admission  CT- perforated sigmoid diverticulitis with thick-walled pericolonic multilocular abscess measuring at least up to 3.5 x 1.5 x 4.8 cm w/ moderate stool burden distending the colon proximal to the sigmoid  s/p OR 5/21- Diagnostic laparoscopy converted to open and Ruzi procedure, pus cavity in lateral colonic wall and culture obtained. colostomy   gram stain w/ GPCs and GNRs    CVA  L facial droop  MRI- Acute infarct involving the anterior left midbrain.    asymptomatic bacteriuria  denies symptoms    Recommendations  c/w zosyn for now  f/u blood cultures  f/u OR cultures    Geovani Goldman M.D.  [facility], Division of Infectious Diseases  [phone]  After 5pm on weekdays and all day on weekends - please call [phone]  Continue Regimen: - daily renova Detail Level: Zone Render In Strict Bullet Format?: No

## 2024-05-22 NOTE — PHYSICAL THERAPY INITIAL EVALUATION ADULT - BALANCE DISTURBANCE, IDENTIFIED IMPAIRMENT CONTRIBUTE, REHAB EVAL
impaired coordination/impaired motor control/impaired postural control/decreased strength
pain/decreased strength

## 2024-05-22 NOTE — PROGRESS NOTE ADULT - SUBJECTIVE AND OBJECTIVE BOX
[facility], Division of Infectious Diseases  JORGE LUIS Chaves Y. Patel, S. Shah, G. Jones  [phone]  ([phone] - weekdays after 5pm and weekends)    Name: TY DAVIS  Age/Gender: 78y Female  MRN: [id number]    Interval History:  Notes reviewed.   No concerning overnight events.  Afebrile.   s/p OR yesterday    Allergies: No Known Allergies      Objective:  Vitals:   T(F): 98.4 (05-22-24 @ 09:06), Max: 98.9 (05-21-24 @ 22:30)  HR: 99 (05-22-24 @ 09:06) (65 - 107)  BP: 98/56 (05-22-24 @ 09:06) (90/54 - 131/81)  RR: 18 (05-22-24 @ 09:06) (14 - 22)  SpO2: 93% (05-22-24 @ 09:06) (93% - 100%)  Physical Examination:  General: no acute distress  HEENT: anicteric  Cardio: S1, S2, normal rate  Resp: clear to auscultation anteriorly   Abd: abdominal dressing, colostomy  Ext: no LE edema  Skin: warm, dry    Laboratory Studies:  CBC:                       9.0    11.41 )-----------( 253      ( 22 May 2024 05:09 )             26.8     WBC Trend:  11.41 05-22-24 @ 05:09  9.17 05-21-24 @ 03:54  11.48 05-20-24 @ 06:36  10.97 05-19-24 @ 21:53    CMP: 05-22    136  |  107  |  7   ----------------------------<  136<H>  4.2   |  21<L>  |  0.55    Ca    7.8<L>      22 May 2024 05:09  Phos  2.2     05-22  Mg     1.90     05-22            Urinalysis Basic - ( 22 May 2024 05:09 )    Color: x / Appearance: x / SG: x / pH: x  Gluc: 136 mg/dL / Ketone: x  / Bili: x / Urobili: x   Blood: x / Protein: x / Nitrite: x   Leuk Esterase: x / RBC: x / WBC x   Sq Epi: x / Non Sq Epi: x / Bacteria: x      Microbiology: reviewed     Culture - Abscess with Gram Stain (collected 05-21-24 @ 15:20)  Source: .Abscess Perforated Diverticulitis  Gram Stain (05-22-24 @ 05:51):    Numerous polymorphonuclear leukocytes seen per low power field    Few Gram positive cocci in pairs seen per oil power field    Rare Gram Negative Rods seen per oil power field    Culture - Urine (collected 05-20-24 @ 00:45)  Source: Clean Catch Clean Catch (Midstream)  Preliminary Report (05-21-24 @ 19:27):    10,000 - 49,000 CFU/mL Enterococcus faecium    Culture - Blood (collected 05-19-24 @ 21:39)  Source: .Blood Blood-Peripheral  Preliminary Report (05-22-24 @ 03:03):    No growth at 48 Hours    Culture - Blood (collected 05-19-24 @ 21:30)  Source: .Blood Blood-Peripheral  Preliminary Report (05-22-24 @ 03:03):    No growth at 48 Hours    Culture - Urine (collected 05-12-24 @ 00:45)  Source: OR Collect Bladder (from O.R.)  Final Report (05-14-24 @ 15:18):    <10,000 CFU/ml Enterococcus faecium (vancomycin resistant)    <10,000 CFU/ml Enterococcus faecalis  Organism: Enterococcus faecium (vancomycin resistant)  Enterococcus faecalis (05-14-24 @ 15:18)  Organism: Enterococcus faecalis (05-14-24 @ 15:18)      Method Type: MAYELIN      -  Ampicillin: S <=2 Predicts results to ampicillin/sulbactam, amoxacillin-clavulanate and  piperacillin-tazobactam.      -  Ciprofloxacin: R >2      -  Levofloxacin: R >4      -  Vancomycin: S 2  Organism: Enterococcus faecium (vancomycin resistant) (05-14-24 @ 15:18)      Method Type: MAYELIN      -  Ampicillin: R >8 Predicts results to ampicillin/sulbactam, amoxacillin-clavulanate and  piperacillin-tazobactam.      -  Ciprofloxacin: R >2      -  Daptomycin: SDD 2 The breakpoint for SDD (susceptible dose dependent)is based on a dosage regimen of 8-12 mg/kg administered every 24 h in adults and is intended for serious infections due to E. faecium. Consultation with an infectious diseases specialist is recommended.      -  Levofloxacin: R >4      -  Linezolid: S 2      -  Vancomycin: R >16    Culture - Urine (collected 05-11-24 @ 19:40)  Source: Clean Catch Clean Catch (Midstream)  Final Report (05-14-24 @ 16:55):    10,000 - 49,000 CFU/mL Enterococcus faecium (vancomycin resistant)    <10,000 CFU/ml Normal Urogenital helen present  Organism: Enterococcus faecium (vancomycin resistant) (05-14-24 @ 16:55)  Organism: Enterococcus faecium (vancomycin resistant) (05-14-24 @ 16:55)      Method Type: MAYELIN      -  Ampicillin: R >8 Predicts results to ampicillin/sulbactam, amoxacillin-clavulanate and  piperacillin-tazobactam.      -  Ciprofloxacin: R >2      -  Daptomycin: SDD 4 The breakpoint for SDD (susceptible dose dependent)is based on a dosage regimen of 8-12 mg/kg administered every 24 h in adults and is intended for serious infections due to E. faecium. Consultation with an infectious diseases specialist is recommended.      -  Levofloxacin: R >4      -  Linezolid: S 2      -  Nitrofurantoin: S <=32 Should not be used to treat pyelonephritis.      -  Tetracycline: R >8      -  Vancomycin: R >16    Culture - Blood (collected 05-11-24 @ 19:25)  Source: .Blood Blood-Peripheral  Final Report (05-17-24 @ 02:01):    No growth at 5 days    Culture - Blood (collected 05-11-24 @ 19:18)  Source: .Blood Blood-Peripheral  Final Report (05-17-24 @ 02:01):    No growth at 5 days        Radiology: reviewed     Medications:  acetaminophen   IVPB .. 1000 milliGRAM(s) IV Intermittent every 6 hours  aspirin enteric coated 81 milliGRAM(s) Oral daily  atorvastatin 20 milliGRAM(s) Oral at bedtime  dextrose 10% Bolus 125 milliLiter(s) IV Bolus once  dextrose 5%. 1000 milliLiter(s) IV Continuous <Continuous>  dextrose 5%. 1000 milliLiter(s) IV Continuous <Continuous>  dextrose 50% Injectable 25 Gram(s) IV Push once  dextrose 50% Injectable 12.5 Gram(s) IV Push once  dextrose Oral Gel 15 Gram(s) Oral once PRN  glucagon  Injectable 1 milliGRAM(s) IntraMuscular once  heparin   Injectable 5000 Unit(s) SubCutaneous every 8 hours  insulin lispro (ADMELOG) corrective regimen sliding scale   SubCutaneous Before meals and at bedtime  lactated ringers. 1000 milliLiter(s) IV Continuous <Continuous>  magnesium sulfate  IVPB 1 Gram(s) IV Intermittent once  oxyCODONE    IR 5 milliGRAM(s) Oral every 4 hours PRN  oxyCODONE    IR 2.5 milliGRAM(s) Oral every 4 hours PRN  pantoprazole    Tablet 40 milliGRAM(s) Oral before breakfast  piperacillin/tazobactam IVPB.. 3.375 Gram(s) IV Intermittent every 8 hours    Antimicrobials:  piperacillin/tazobactam IVPB.. 3.375 Gram(s) IV Intermittent every 8 hours

## 2024-05-22 NOTE — PHYSICAL THERAPY INITIAL EVALUATION ADULT - PRECAUTIONS/LIMITATIONS, REHAB EVAL
cardiac precautions/fall precautions/surgical precautions
NPO/aspiration precautions/fall precautions/seizure precautions

## 2024-05-22 NOTE — PHYSICAL THERAPY INITIAL EVALUATION ADULT - GENERAL OBSERVATIONS, REHAB EVAL
Pt encountered in semisupine position, no distress, AxOx3, with +IV, +pulse oximeter, +2L of O2 through nasal cannula, +young, +wound vac, and family at bedside. Vitals taken; /74mmHg, heart 93bpm
Upon entry, pt semi-supine in bed in NAD. HR 61 bpm. Pt left as received with all tubes/lines intact, bed alarm on, call bell in reach and in NAD.

## 2024-05-22 NOTE — PHYSICAL THERAPY INITIAL EVALUATION ADULT - PERTINENT HX OF CURRENT PROBLEM, REHAB EVAL
Pt is a 78 year old female presenting s/p fall at home. Code Stroke called for Left facial droop. CT head/neck negative for acute findings. CT A/P revealed perforated sigmoid diverticulitis with thick-walled pericolonic multilocular abscess extending towards the pelvis and vagina with reactive thickening of the walls of the bladder fundus, moderate stool burden distending the colon proximal to the sigmoid, and mild left-sided hydroureteronephrosis with tapering of the left ureter at the pelvic brim in the region of known sigmoid inflammation. 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

## 2024-05-22 NOTE — PROGRESS NOTE ADULT - SUBJECTIVE AND OBJECTIVE BOX
DATE OF SERVICE: 05-22-24    resting comfortably, unable to obtain ROS    Review of Systems:   Constitutional: [ ] fevers, [ ] chills.   Skin: [ ] dry skin. [ ] rashes.  Psychiatric: [ ] depression, [ ] anxiety.   Gastrointestinal: [ ] BRBPR, [ ] melena.   Neurological: [ ] confusion. [ ] seizures. [ ] shuffling gait.   Ears,Nose,Mouth and Throat: [ ] ear pain [ ] sore throat.   Eyes: [ ] diplopia.   Respiratory: [ ] hemoptysis. [ ] shortness of breath  Cardiovascular: See HPI above  Hematologic/Lymphatic: [ ] anemia. [ ] painful nodes. [ ] prolonged bleeding.   Genitourinary: [ ] hematuria. [ ] flank pain.   Endocrine: [ ] significant change in weight. [ ] intolerance to heat and cold.     Review of systems [ x] otherwise negative, [ ] otherwise unable to obtain    FH: no family history of sudden cardiac death in first degree relatives    SH: [ ] tobacco, [ ] alcohol, [ ] drugs    acetaminophen   IVPB .. 1000 milliGRAM(s) IV Intermittent every 6 hours  aspirin enteric coated 81 milliGRAM(s) Oral daily  atorvastatin 20 milliGRAM(s) Oral at bedtime  dextrose 10% Bolus 125 milliLiter(s) IV Bolus once  dextrose 5%. 1000 milliLiter(s) IV Continuous <Continuous>  dextrose 5%. 1000 milliLiter(s) IV Continuous <Continuous>  dextrose 50% Injectable 25 Gram(s) IV Push once  dextrose 50% Injectable 12.5 Gram(s) IV Push once  dextrose Oral Gel 15 Gram(s) Oral once PRN  glucagon  Injectable 1 milliGRAM(s) IntraMuscular once  heparin   Injectable 5000 Unit(s) SubCutaneous every 8 hours  insulin lispro (ADMELOG) corrective regimen sliding scale   SubCutaneous Before meals and at bedtime  lactated ringers. 1000 milliLiter(s) IV Continuous <Continuous>  metoprolol tartrate Injectable 5 milliGRAM(s) IV Push every 6 hours  oxyCODONE    IR 5 milliGRAM(s) Oral every 4 hours PRN  oxyCODONE    IR 2.5 milliGRAM(s) Oral every 4 hours PRN  pantoprazole    Tablet 40 milliGRAM(s) Oral before breakfast  piperacillin/tazobactam IVPB.. 3.375 Gram(s) IV Intermittent every 8 hours                            9.0    11.41 )-----------( 253      ( 22 May 2024 05:09 )             26.8       136  |  107  |  7   ----------------------------<  136<H>  4.2   |  21<L>  |  0.55    Ca    7.8<L>      22 May 2024 05:09  Phos  2.2     05-22  Mg     1.90     05-22      T(C): 36.4 (05-22-24 @ 14:15), Max: 37.2 (05-21-24 @ 22:30)  HR: 85 (05-22-24 @ 14:15) (85 - 107)  BP: 106/68 (05-22-24 @ 14:15) (90/54 - 131/81)  RR: 17 (05-22-24 @ 14:15) (14 - 19)  SpO2: 95% (05-22-24 @ 14:15) (93% - 100%)  Wt(kg): --    I&O's Summary    21 May 2024 07:01  -  22 May 2024 07:00  --------------------------------------------------------  IN: 630 mL / OUT: 620 mL / NET: 10 mL    22 May 2024 07:01  -  22 May 2024 15:35  --------------------------------------------------------  IN: 2230 mL / OUT: 630 mL / NET: 1600 mL      General:  Alert and Oriented * 3.   Head: Normocephalic and atraumatic.   Neck: No JVD. No bruitsNo M/R/G  Lungs: CTA b/l. No rhonchi, rales or wheezes.   Abdomen: + BS, soft. Non tender. Non distended. No rebound. No guarding.   Extremities: No clubbing/cyanosis/edema.   Neurologic: Moves all four extremities. Full range of motion.   Skin: Warm and moist. The patient's skin has normal elasticity and good skin turgor.   Psychiatric: Appropriate mood and affect.  Musculoskeletal: Normal range of motion, normal strength     TELEMETRY: 	  NONE    ECG:  	NSR      ASSESSMENT/PLAN: Pt is a 78F with PMHx diverticulitis, DM2, HTN, HLD,with recent hospitalization for complicated diverticulitis, discharged 5/15 on long-term IV ABX presents after fall from home with AMS, found to have fevers  and a  CT AP showing perforated sigmoid diverticulitis with thick-walled pericolonic multilocular abscess measuring at least up to 3.5 x 1.5 x 4.8 cm. Cardiology called for pre-op risk stratification prior to OR. Currently denies any chest pain, SOB, orthopnea.    Pre-OP/HTN/HLD  - no anginal chest pain, no q waves on EKG euvolemic on exam, no severely stenotic valvular disease on PE, recent acute CVA  - Elevated risk for for intraperitoneal surgery, can proceed to OR with no further CV work-up  --tolerated procedure well from CV perspective  - s/p open Ruiz procedure  - IF BP tolerates can restart BB, Lopressor 25 mg q 12  - c/w Lipitor 20 mg

## 2024-05-22 NOTE — PHYSICAL THERAPY INITIAL EVALUATION ADULT - PATIENT PROFILE REVIEW, REHAB EVAL
PT initial evaluation received and chart review completed. Pt agreeable to participate in PT evaluation. ACTIVITY:/yes
ACTIVITY ORDER: Ambulate with Assistance; Spoke with ALEXANDRA Mata prior to PT evaluation--> Pt OK for PT consult/OOB activity./yes

## 2024-05-22 NOTE — PHYSICAL THERAPY INITIAL EVALUATION ADULT - ADDITIONAL COMMENTS
Pt lives in a private house with her  with 4 steps to enter; (+)bilateral handrail; bedroom/bathroom is on the first floor. Prior to hospital admission, pt was ambulating independently using no assistive device/holding onto furniture/walls. Pt does own a rolling walker. Pt has a  home health aide 7 days/week 5 hours/day. Pt has had multiple recent falls.    Pt left comfortable in bed, NAD, all lines intact, all precautions maintained, with call bell in reach, bed alarm on, family at bedside, and RN aware of PT evaluation and pt's pain.
Pt confused; unable to provide information regarding prior level of function and living situation. Information obtained from care coordinator note. Pt lives in a private house with family; there are 4 steps to enter. Has a home health aide 35hrs/wk.

## 2024-05-22 NOTE — PHYSICAL THERAPY INITIAL EVALUATION ADULT - IMPAIRMENTS FOUND, PT EVAL
gait, locomotion, and balance/muscle strength
aerobic capacity/endurance/decreased midline orientation/ergonomics and body mechanics/gait, locomotion, and balance/gross motor/muscle strength/poor safety awareness/posture

## 2024-05-22 NOTE — PHYSICAL THERAPY INITIAL EVALUATION ADULT - LEVEL OF INDEPENDENCE: GAIT, REHAB EVAL
To be assessed; pt deferred secondary to abdomen pain; RN made aware
minimum assist (75% patients effort)

## 2024-05-22 NOTE — PROGRESS NOTE ADULT - ASSESSMENT
78F with  sigmoid diverticulitis, multiple chronic infarcts (with unclear residual deficits), dementia (on memantine), T2DM, HTN, HLD, glaucoma, OA of the L knee, PERFECTO, who presents to [facility] ED on 5/19/2024 with c/o left facial droop and slurred speech. Stroke code cancelled as out of 24h window. Unclear stroke hx and baseline neurologic deficits.   In ED was febrile. CTH with bilateral thalamic and basal ganglia infarcts. Thalamic infarcts appear more subacute. Also with chronic cerebellar infarcts. CTA with mld ICA narrowing  o/e with mild to moderate dysarthria, R facial palsy , RUE and RLE drift. AAOx2   MRI brain with L anterior midbrain infarct    - continue aspirin 81mg  - can add Plavix 75mg x 3 weeks post OP if ok per surgery team   - check TTE   - tele, consider ILR on d/c   - cont home memantine   - gradual normotension  -  LDL 49 (1/24/24) - continue home statin  -  A1c 6.3 (5/11/2024)  - Seizure rate with ertapenem is <1%. Story not extremely suspicious for seizure - patient with recurrent falls but with retained awareness throughout. Would hold off on eeg for now  - PT/OT  - DVT ppx     Allison Vinson,   Vascular Neurology  Office [phone] .

## 2024-05-22 NOTE — PHYSICAL THERAPY INITIAL EVALUATION ADULT - PLANNED THERAPY INTERVENTIONS, PT EVAL
bed mobility training/gait training/neuromuscular re-education/postural re-education/strengthening/transfer training
balance training/bed mobility training/gait training/strengthening/transfer training

## 2024-05-22 NOTE — PHYSICAL THERAPY INITIAL EVALUATION ADULT - LEVEL OF INDEPENDENCE: SIT/STAND, REHAB EVAL
Verified Results  COMP METABOLIC PNL [id number] 01:56PM BENEDICT MONGE   [[place] 10, 2018 10:19AM BENEDICT MONGE]  Glucose 175  Potassium little high at 5.5I am going to send you out of sheet with list of foods particularly high in potassium to try and avoid as best you can  Going to place a lab test at [facility] - come for blood tests in about 1 month  Kidney function is a little worse than it was last time, but was like this a few years ago and seemed to improve  Alkaline phosphatase, and enzyme from the liver is always mildly elevated. Rest of liver tests are normal     Test Name Result Flag Reference   SODIUM 139 mmol/L  135-145   POTASSIUM 5.5 mmol/L H 3.4-5.1   CHLORIDE 105 mmol/L     CARBON DIOXIDE 25 mmol/L  21-32   ANION GAP 14 mmol/L  10-20   GLUCOSE 175 mg/dl H 65-99   BUN 32 mg/dl H 6-20   CREATININE 1.36 mg/dl H 0.51-0.95   GFR EST.AFRICAN AMER 41     eGFR 30-59 mL/min/1.73m2 = Moderate decrease in kidney function. Stage 3 CKD (chronic kidney disease) or moderate kidney disease.   GFR EST.NONAFRI AMER 36     eGFR 30-59 mL/min/1.73m2 = Moderate decrease in kidney function. Stage 3 CKD (chronic kidney disease) or moderate kidney disease.   BUN/CREATININE RATIO 24  7-25   BILIRUBIN TOTAL 0.4 mg/dl  0.2-1.0   GOT/AST 19 Units/L  <38   ALKALINE PHOSPHATASE 157 Units/L H    ALBUMIN 3.7 g/dl  3.6-5.1   TOTAL PROTEIN 7.2 g/dl  6.4-8.2   GLOBULIN (CALCULATED) 3.5 g/dl  2.0-4.0   A/G RATIO 1.1  1.0-2.4   CALCIUM 8.9 mg/dl  8.4-10.2   GPT/ALT 35 Units/L  <79   FASTING STATUS 0 hrs       HEMOGLOBIN A1C GLYCOSYLATED [id number] 01:56PM BENEDICT MONGE   [[place] 10, 2018 10:14AM BENEDICT MONGE]  Blood glucose averages creeping up at 7.0, 7.3 and now 7.8.  Need to watch those cookies and other sweets  Need to stay at least below 8%     Test Name Result Flag Reference   HEMOGLOBIN A1C GLYH 7.8 % H 4.5-5.6   ----DIABETIC SCREENING---  NON DIABETIC                 <5.7%  INCREASED RISK                5.7-6.4%  DIAGNOSTIC 
FOR DIABETES      >6.4%     ----DIABETIC CONTROL---     A1C%           eAG mg/dL  6.0            126  6.5            140  7.0            154  7.5            169  8.0            183  8.5            197  9.0            212  9.5            226  10.0           240     LIPID PNL [id number] 01:56PM BENEDICT MONGE   [[place] 10, 2018 10:15AM BENEDICT MONGE]  Cholesterol is quite good with good cholesterol 97amazing bad cholesterol 64very good     Test Name Result Flag Reference   FASTING STATUS 0 hrs     CHOLESTEROL 189 mg/dl  <200   Desirable            <200  Borderline High      200 to 239  High                 >=240   HDL CHOLESTEROL 97 mg/dl  >49   Low            <40  Borderline Low 40 to 49  Near Optimal   50 to 59  Optimal        >=60   TRIGLYCERIDES 142 mg/dl  <150   Normal                   <150  Borderline High          150 to 199  High                     200 to 499  Very High                >=500   LDL CHOLESTEROL (CALCULATED) 64 mg/dl  <130   OPTIMAL               <100  NEAR OPTIMAL          100-129  BORDERLINE HIGH       130-159  HIGH                  160-189  VERY HIGH             >=190   NON-HDL CHOLESTEROL 92 mg/dl     Therapeutic Target:  CHD and risk equivalents <130  Multiple risk factors    <160  0 to 1 risk factors      <190   CHOLESTEROL/HDL RATIO 1.9  <4.5       Message   cc to dr vargas     
To be assessed; pt deferred secondary to abdomen pain; RN made aware
minimum assist (75% patients effort)

## 2024-05-22 NOTE — PHYSICAL THERAPY INITIAL EVALUATION ADULT - MANUAL MUSCLE TESTING RESULTS, REHAB EVAL
surgical precautions and pain; bilateral upper extremities at least 3+/5, bilateral lower extremities at least 3/5/grossly assessed due to
Bilateral LE grossly 3+/5

## 2024-05-22 NOTE — PHYSICAL THERAPY INITIAL EVALUATION ADULT - NSPTDISCHREC_GEN_A_CORE
Rehabilitation to improve strength and balance for return to prior level of function.
to address impairments in transfers, balance, gait, and strength./Home PT

## 2024-05-22 NOTE — PHYSICAL THERAPY INITIAL EVALUATION ADULT - DIAGNOSIS, PT EVAL
Impairments in transfers, strength, balance, and gait
Pt presents with decreased strength and decreased balance.

## 2024-05-22 NOTE — PROGRESS NOTE ADULT - ASSESSMENT
79 y/o F with pmhx of Diverticulitis, Type-2 DM, HTN, HLD, Glaucoma, OA of the L-knee and PERFECTO, presented to the ED for new onset lethargy. Found to have leukocytosis likely from pneumonia vs. diverticulitis. The patient does not appear lethargic. Admit for IV abx.      Perforated  Diverticulitis.   - surgery as per colorectal   - sp OR   - cw abx  - will monitor     CVA  - neuro fu appreciated  - imaging reviewed    Benign essential HTN.   hold BP meds.  restart as needed      DM2 (diabetes mellitus, type 2).   - monitor FS  - ISS      Heparin sc

## 2024-05-22 NOTE — PROGRESS NOTE ADULT - SUBJECTIVE AND OBJECTIVE BOX
Neurology Progress Note    S: Patient seen and examined. s/p OR for diverticulitis yesterday    Medication:  acetaminophen   IVPB .. 1000 milliGRAM(s) IV Intermittent every 6 hours  aspirin enteric coated 81 milliGRAM(s) Oral daily  atorvastatin 20 milliGRAM(s) Oral at bedtime  dextrose 10% Bolus 125 milliLiter(s) IV Bolus once  dextrose 5%. 1000 milliLiter(s) IV Continuous <Continuous>  dextrose 5%. 1000 milliLiter(s) IV Continuous <Continuous>  dextrose 50% Injectable 25 Gram(s) IV Push once  dextrose 50% Injectable 12.5 Gram(s) IV Push once  dextrose Oral Gel 15 Gram(s) Oral once PRN  glucagon  Injectable 1 milliGRAM(s) IntraMuscular once  heparin   Injectable 5000 Unit(s) SubCutaneous every 8 hours  insulin lispro (ADMELOG) corrective regimen sliding scale   SubCutaneous Before meals and at bedtime  lactated ringers. 1000 milliLiter(s) IV Continuous <Continuous>  metoprolol tartrate Injectable 5 milliGRAM(s) IV Push every 6 hours  oxyCODONE    IR 5 milliGRAM(s) Oral every 4 hours PRN  oxyCODONE    IR 2.5 milliGRAM(s) Oral every 4 hours PRN  pantoprazole    Tablet 40 milliGRAM(s) Oral before breakfast  piperacillin/tazobactam IVPB.. 3.375 Gram(s) IV Intermittent every 8 hours      Vitals:  Vital Signs Last 24 Hrs  T(C): 36.4 (22 May 2024 14:15), Max: 37.2 (21 May 2024 22:30)  T(F): 97.6 (22 May 2024 14:15), Max: 98.9 (21 May 2024 22:30)  HR: 85 (22 May 2024 14:15) (85 - 107)  BP: 106/68 (22 May 2024 14:15) (90/54 - 131/81)  BP(mean): 72 (21 May 2024 21:45) (62 - 80)  RR: 17 (22 May 2024 14:15) (14 - 19)  SpO2: 95% (22 May 2024 14:15) (93% - 100%)    Parameters below as of 22 May 2024 14:15  Patient On (Oxygen Delivery Method): nasal cannula  O2 Flow (L/min): 2      General Exam:   General Appearance: Appropriately dressed and in no acute distress       Head: Normocephalic, atraumatic and no dysmorphic features  Ear, Nose, and Throat: Moist mucous membranes  CVS: S1S2+  Resp: No SOB, no wheeze or rhonchi  Abd: soft NTND  Extremities: No edema, no cyanosis  Skin: No bruises, no rashes     Neurological Exam:  Mental Status: Awake, alert and oriented x 2.  Able to follow simple commands. Able to name and repeat. fluent speech. No obvious aphasia, mild to moderate dysarthria   Cranial Nerves: PERRL, EOMI, VFFC, sensation V1-V3 intact,  moderate L facial palsy , equal elevation of palate, scm/trap 5/5, tongue is midline on protrusion. Hearing is grossly intact.   Motor: LUE and LLE drift  Sensation: dec on the L   Reflexes: 1+ throughout at biceps, brachioradialis, triceps, patellars and ankles bilaterally and equal. No clonus. R toe and L toe were both downgoing.  Coordination: No dysmetria on FNF  Gait: deferred    I personally reviewed the below data/images/labs:      CBC Full  -  ( 22 May 2024 05:09 )  WBC Count : 11.41 K/uL  RBC Count : 3.03 M/uL  Hemoglobin : 9.0 g/dL  Hematocrit : 26.8 %  Platelet Count - Automated : 253 K/uL  Mean Cell Volume : 88.4 fL  Mean Cell Hemoglobin : 29.7 pg  Mean Cell Hemoglobin Concentration : 33.6 gm/dL  Auto Neutrophil # : x  Auto Lymphocyte # : x  Auto Monocyte # : x  Auto Eosinophil # : x  Auto Basophil # : x  Auto Neutrophil % : x  Auto Lymphocyte % : x  Auto Monocyte % : x  Auto Eosinophil % : x  Auto Basophil % : x    05-22    136  |  107  |  7   ----------------------------<  136<H>  4.2   |  21<L>  |  0.55    Ca    7.8<L>      22 May 2024 05:09  Phos  2.2     05-22  Mg     1.90     05-22        PT/INR - ( 21 May 2024 03:54 )   PT: 13.0 sec;   INR: 1.17 ratio         PTT - ( 21 May 2024 03:54 )  PTT:35.1 sec  Urinalysis Basic - ( 22 May 2024 05:09 )    Color: x / Appearance: x / SG: x / pH: x  Gluc: 136 mg/dL / Ketone: x  / Bili: x / Urobili: x   Blood: x / Protein: x / Nitrite: x   Leuk Esterase: x / RBC: x / WBC x   Sq Epi: x / Non Sq Epi: x / Bacteria: x    Radiology:  CTH: No acute intracranial hemorrhage or mass effect. Extensive chronic small   vessel ischemic changes in the frontoparietal periventricular and   subcortical white matter. Indeterminate age bilateral thalamic lacunar   infarcts accommodation of dilated perivascular spaces and lacunar   infarcts in the bilateral basal ganglia. Small chronic appearing   bilateral cerebellar infarcts.    CTA NECK:  No significant stenosis of the cervical carotid arteries based   on NASCET criteria. Patent cervical vertebral arteries. No evidence of   cervical carotid or vertebral artery dissection.    CTA HEAD:  No high-grade stenosis or occlusion of the major proximal   arterial branches.    CT PERFUSION:  Perfusion imaging shows no evidence of a core infarct or   tissue at risk.    < from: MR Head No Cont (05.20.24 @ 20:13) >    ACC: [id number] EXAM:  MR BRAIN   ORDERED BY: ARY POE     PROCEDURE DATE:  05/20/2024          INTERPRETATION:  CLINICAL INDICATION: Patient recently fell at home.   Confused.    TECHNIQUE: MRI of the brain was performed without contrast.    COMPARISON: CT brain 5/19/2024    FINDINGS:  Acute infarct involving the anterior left midbrain.    No acute intracranial hemorrhage, mass effect, or hydrocephalus. No   extra-axial collection. Basal cisterns are patent.    Age-related involutional changes and chronic microvascular ischemic   changes. Chronic lacunar infarcts involving the bilateral thalami. Small   chronic infarcts involving the bilateral cerebral hemispheres.    Ventricles and sulci are age-appropriate in size.    Major intracranial flow-voids are preserved.    Left cataract surgery. The orbits, sellar and suprasellar structures, and   craniocervical junction are otherwise unremarkable. Visualized paranasal   sinuses and mastoid air cells are clear.    IMPRESSION:  Acute infarct involving the anterior left midbrain.    --- End of Report ---              < end of copied text >     Self

## 2024-05-22 NOTE — PROGRESS NOTE ADULT - SUBJECTIVE AND OBJECTIVE BOX
A TEAM SURGERY DAILY PROGRESS NOTE:     INTERVAL EVENTS: Tachycardia in the AM    SUBJECTIVE/ROS: Patient seen and examined at bedside by surgical team. Reports no abdominal pain, fever/chills, sob/chest pain. Patient provides information but not reliable since baseline altered cognition.     OBJECTIVE:  Vital Signs Last 24 Hrs  T(C): 37 (22 May 2024 06:00), Max: 37.2 (21 May 2024 22:30)  T(F): 98.6 (22 May 2024 06:00), Max: 98.9 (21 May 2024 22:30)  HR: 107 (22 May 2024 06:00) (61 - 107)  BP: 129/71 (22 May 2024 06:00) (90/54 - 131/81)  BP(mean): 72 (21 May 2024 21:45) (62 - 80)  RR: 17 (22 May 2024 06:00) (14 - 22)  SpO2: 93% (22 May 2024 06:00) (93% - 100%)    Parameters below as of 22 May 2024 06:00  Patient On (Oxygen Delivery Method): nasal cannula  O2 Flow (L/min): 2                          9.0    11.41 )-----------( 253      ( 22 May 2024 05:09 )             26.8     05-22    136  |  107  |  7   ----------------------------<  136<H>  4.2   |  21<L>  |  0.55    Ca    7.8<L>      22 May 2024 05:09  Phos  3.3     05-21  Mg     1.90     05-22     PT/INR - ( 21 May 2024 03:54 )   PT: 13.0 sec;   INR: 1.17 ratio         PTT - ( 21 May 2024 03:54 )  PTT:35.1 sec  I&O's Detail    21 May 2024 07:01  -  22 May 2024 07:00  --------------------------------------------------------  IN:    Lactated Ringers: 450 mL    Oral Fluid: 180 mL  Total IN: 630 mL    OUT:    Bulb (mL): 205 mL    Colostomy (mL): 0 mL    Indwelling Catheter - Urethral (mL): 415 mL  Total OUT: 620 mL    Total NET: 10 mL          IMAGING:      PHYSICAL EXAM:  Constitutional: NAD  Respiratory: non-labored breathing, patent airway  Gastrointestinal: abdomen soft, nontender, nondistended, colostomy -/-, midline aquacel, KULWANT with SS output.  Extremities: warm  Neurological: intact

## 2024-05-22 NOTE — PROGRESS NOTE ADULT - ASSESSMENT
78F w/ PMHx of Diverticulitis now s/p diagnostic laparoscopy, Incision and drainage of deep pelvic abscess, umbilical hernia repair, Jania's procedure (5/21). Recovering appropriately.    PLAN:  - CLD   - dc [place]  - Strict I&O's  - Monitor KULWANT drain output  - Analgesia and antiemetics as needed  - DVTppx  - PT --> Rehab    A team [id number]

## 2024-05-22 NOTE — PROGRESS NOTE ADULT - SUBJECTIVE AND OBJECTIVE BOX
Patient is a 78y old  Female who presents with a chief complaint of Perforated diverticulitis (22 May 2024 15:34)    Date of servie : 05-22-24 @ 17:01  INTERVAL HPI/OVERNIGHT EVENTS:  T(C): 36.4 (05-22-24 @ 14:15), Max: 37.2 (05-21-24 @ 22:30)  HR: 85 (05-22-24 @ 14:15) (85 - 107)  BP: 106/68 (05-22-24 @ 14:15) (90/54 - 131/81)  RR: 17 (05-22-24 @ 14:15) (14 - 19)  SpO2: 95% (05-22-24 @ 14:15) (93% - 100%)  Wt(kg): --  I&O's Summary    21 May 2024 07:01  -  22 May 2024 07:00  --------------------------------------------------------  IN: 630 mL / OUT: 620 mL / NET: 10 mL    22 May 2024 07:01  -  22 May 2024 17:01  --------------------------------------------------------  IN: 2230 mL / OUT: 630 mL / NET: 1600 mL        LABS:                        9.0    11.41 )-----------( 253      ( 22 May 2024 05:09 )             26.8     05-22    136  |  107  |  7   ----------------------------<  136<H>  4.2   |  21<L>  |  0.55    Ca    7.8<L>      22 May 2024 05:09  Phos  2.2     05-22  Mg     1.90     05-22      PT/INR - ( 21 May 2024 03:54 )   PT: 13.0 sec;   INR: 1.17 ratio         PTT - ( 21 May 2024 03:54 )  PTT:35.1 sec  Urinalysis Basic - ( 22 May 2024 05:09 )    Color: x / Appearance: x / SG: x / pH: x  Gluc: 136 mg/dL / Ketone: x  / Bili: x / Urobili: x   Blood: x / Protein: x / Nitrite: x   Leuk Esterase: x / RBC: x / WBC x   Sq Epi: x / Non Sq Epi: x / Bacteria: x      CAPILLARY BLOOD GLUCOSE      POCT Blood Glucose.: 135 mg/dL (22 May 2024 11:46)  POCT Blood Glucose.: 139 mg/dL (22 May 2024 05:57)  POCT Blood Glucose.: 136 mg/dL (21 May 2024 23:36)  POCT Blood Glucose.: 155 mg/dL (21 May 2024 20:50)    ABG - ( 21 May 2024 15:15 )  pH, Arterial: 7.34  pH, Blood: x     /  pCO2: 41    /  pO2: 130   / HCO3: 22    / Base Excess: -3.5  /  SaO2: 98.9                Urinalysis Basic - ( 22 May 2024 05:09 )    Color: x / Appearance: x / SG: x / pH: x  Gluc: 136 mg/dL / Ketone: x  / Bili: x / Urobili: x   Blood: x / Protein: x / Nitrite: x   Leuk Esterase: x / RBC: x / WBC x   Sq Epi: x / Non Sq Epi: x / Bacteria: x        MEDICATIONS  (STANDING):  acetaminophen   IVPB .. 1000 milliGRAM(s) IV Intermittent once  acetaminophen   IVPB .. 1000 milliGRAM(s) IV Intermittent every 6 hours  aspirin enteric coated 81 milliGRAM(s) Oral daily  atorvastatin 20 milliGRAM(s) Oral at bedtime  dextrose 10% Bolus 125 milliLiter(s) IV Bolus once  dextrose 5%. 1000 milliLiter(s) (100 mL/Hr) IV Continuous <Continuous>  dextrose 5%. 1000 milliLiter(s) (50 mL/Hr) IV Continuous <Continuous>  dextrose 50% Injectable 25 Gram(s) IV Push once  dextrose 50% Injectable 12.5 Gram(s) IV Push once  glucagon  Injectable 1 milliGRAM(s) IntraMuscular once  heparin   Injectable 5000 Unit(s) SubCutaneous every 8 hours  insulin lispro (ADMELOG) corrective regimen sliding scale   SubCutaneous Before meals and at bedtime  lactated ringers. 1000 milliLiter(s) (90 mL/Hr) IV Continuous <Continuous>  metoprolol tartrate Injectable 5 milliGRAM(s) IV Push every 6 hours  pantoprazole    Tablet 40 milliGRAM(s) Oral before breakfast  piperacillin/tazobactam IVPB.. 3.375 Gram(s) IV Intermittent every 8 hours    MEDICATIONS  (PRN):  dextrose Oral Gel 15 Gram(s) Oral once PRN Blood Glucose LESS THAN 70 milliGRAM(s)/deciliter  oxyCODONE    IR 5 milliGRAM(s) Oral every 4 hours PRN Severe Pain (7 - 10)  oxyCODONE    IR 2.5 milliGRAM(s) Oral every 4 hours PRN Moderate Pain (4 - 6)          PHYSICAL EXAM:  GENERAL: NAD, well-groomed, well-developed  HEAD:  Atraumatic, Normocephalic  CHEST/LUNG: Clear to percussion bilaterally; No rales, rhonchi, wheezing, or rubs  HEART: Regular rate and rhythm; No murmurs, rubs, or gallops  ABDOMEN: Soft, Nontender, Nondistended; Bowel sounds present  EXTREMITIES:  2+ Peripheral Pulses, No clubbing, cyanosis, or edema  LYMPH: No lymphadenopathy noted  SKIN: No rashes or lesions    Care Discussed with Consultants/Other Providers [ ] YES  [ ] NO

## 2024-05-23 ENCOUNTER — RESULT REVIEW (OUTPATIENT)
Age: 79
End: 2024-05-23

## 2024-05-23 LAB
ANION GAP SERPL CALC-SCNC: 8 MMOL/L — SIGNIFICANT CHANGE UP (ref 7–14)
ANION GAP SERPL CALC-SCNC: 8 MMOL/L — SIGNIFICANT CHANGE UP (ref 7–14)
BUN SERPL-MCNC: 5 MG/DL — LOW (ref 7–23)
BUN SERPL-MCNC: 5 MG/DL — LOW (ref 7–23)
CALCIUM SERPL-MCNC: 7.7 MG/DL — LOW (ref 8.4–10.5)
CALCIUM SERPL-MCNC: 8 MG/DL — LOW (ref 8.4–10.5)
CHLORIDE SERPL-SCNC: 104 MMOL/L — SIGNIFICANT CHANGE UP (ref 98–107)
CHLORIDE SERPL-SCNC: 108 MMOL/L — HIGH (ref 98–107)
CO2 SERPL-SCNC: 21 MMOL/L — LOW (ref 22–31)
CO2 SERPL-SCNC: 23 MMOL/L — SIGNIFICANT CHANGE UP (ref 22–31)
CREAT SERPL-MCNC: 0.51 MG/DL — SIGNIFICANT CHANGE UP (ref 0.5–1.3)
CREAT SERPL-MCNC: 0.58 MG/DL — SIGNIFICANT CHANGE UP (ref 0.5–1.3)
EGFR: 94 ML/MIN/1.73M2 — SIGNIFICANT CHANGE UP
EGFR: 97 ML/MIN/1.73M2 — SIGNIFICANT CHANGE UP
GLUCOSE BLDC GLUCOMTR-MCNC: 101 MG/DL — HIGH (ref 70–99)
GLUCOSE BLDC GLUCOMTR-MCNC: 112 MG/DL — HIGH (ref 70–99)
GLUCOSE BLDC GLUCOMTR-MCNC: 119 MG/DL — HIGH (ref 70–99)
GLUCOSE BLDC GLUCOMTR-MCNC: 129 MG/DL — HIGH (ref 70–99)
GLUCOSE SERPL-MCNC: 128 MG/DL — HIGH (ref 70–99)
GLUCOSE SERPL-MCNC: 97 MG/DL — SIGNIFICANT CHANGE UP (ref 70–99)
HCT VFR BLD CALC: 24.4 % — LOW (ref 34.5–45)
HGB BLD-MCNC: 8.1 G/DL — LOW (ref 11.5–15.5)
MAGNESIUM SERPL-MCNC: 1.9 MG/DL — SIGNIFICANT CHANGE UP (ref 1.6–2.6)
MAGNESIUM SERPL-MCNC: 2.3 MG/DL — SIGNIFICANT CHANGE UP (ref 1.6–2.6)
MCHC RBC-ENTMCNC: 29.9 PG — SIGNIFICANT CHANGE UP (ref 27–34)
MCHC RBC-ENTMCNC: 33.2 GM/DL — SIGNIFICANT CHANGE UP (ref 32–36)
MCV RBC AUTO: 90 FL — SIGNIFICANT CHANGE UP (ref 80–100)
NRBC # BLD: 0 /100 WBCS — SIGNIFICANT CHANGE UP (ref 0–0)
NRBC # FLD: 0 K/UL — SIGNIFICANT CHANGE UP (ref 0–0)
PHOSPHATE SERPL-MCNC: 1.5 MG/DL — LOW (ref 2.5–4.5)
PHOSPHATE SERPL-MCNC: 2.8 MG/DL — SIGNIFICANT CHANGE UP (ref 2.5–4.5)
PLATELET # BLD AUTO: 292 K/UL — SIGNIFICANT CHANGE UP (ref 150–400)
POTASSIUM SERPL-MCNC: 3.4 MMOL/L — LOW (ref 3.5–5.3)
POTASSIUM SERPL-MCNC: 4.7 MMOL/L — SIGNIFICANT CHANGE UP (ref 3.5–5.3)
POTASSIUM SERPL-SCNC: 3.4 MMOL/L — LOW (ref 3.5–5.3)
POTASSIUM SERPL-SCNC: 4.7 MMOL/L — SIGNIFICANT CHANGE UP (ref 3.5–5.3)
RBC # BLD: 2.71 M/UL — LOW (ref 3.8–5.2)
RBC # FLD: 14.5 % — SIGNIFICANT CHANGE UP (ref 10.3–14.5)
SODIUM SERPL-SCNC: 133 MMOL/L — LOW (ref 135–145)
SODIUM SERPL-SCNC: 139 MMOL/L — SIGNIFICANT CHANGE UP (ref 135–145)
WBC # BLD: 11.64 K/UL — HIGH (ref 3.8–10.5)
WBC # FLD AUTO: 11.64 K/UL — HIGH (ref 3.8–10.5)

## 2024-05-23 PROCEDURE — 71045 X-RAY EXAM CHEST 1 VIEW: CPT | Mod: 26

## 2024-05-23 PROCEDURE — 93306 TTE W/DOPPLER COMPLETE: CPT | Mod: 26

## 2024-05-23 RX ORDER — MEMANTINE HYDROCHLORIDE 10 MG/1
28 TABLET ORAL DAILY
Refills: 0 | Status: DISCONTINUED | OUTPATIENT
Start: 2024-05-23 | End: 2024-05-23

## 2024-05-23 RX ORDER — POTASSIUM CHLORIDE 20 MEQ
10 PACKET (EA) ORAL
Refills: 0 | Status: DISCONTINUED | OUTPATIENT
Start: 2024-05-23 | End: 2024-05-23

## 2024-05-23 RX ORDER — MEMANTINE HYDROCHLORIDE 10 MG/1
10 TABLET ORAL EVERY 12 HOURS
Refills: 0 | Status: DISCONTINUED | OUTPATIENT
Start: 2024-05-23 | End: 2024-06-03

## 2024-05-23 RX ORDER — DEXTROSE MONOHYDRATE, SODIUM CHLORIDE, AND POTASSIUM CHLORIDE 50; .745; 4.5 G/1000ML; G/1000ML; G/1000ML
1000 INJECTION, SOLUTION INTRAVENOUS
Refills: 0 | Status: DISCONTINUED | OUTPATIENT
Start: 2024-05-23 | End: 2024-05-27

## 2024-05-23 RX ORDER — POTASSIUM CHLORIDE 20 MEQ
40 PACKET (EA) ORAL ONCE
Refills: 0 | Status: COMPLETED | OUTPATIENT
Start: 2024-05-23 | End: 2024-05-23

## 2024-05-23 RX ORDER — MAGNESIUM SULFATE 500 MG/ML
1 VIAL (ML) INJECTION ONCE
Refills: 0 | Status: COMPLETED | OUTPATIENT
Start: 2024-05-23 | End: 2024-05-23

## 2024-05-23 RX ORDER — METOPROLOL TARTRATE 50 MG
25 TABLET ORAL EVERY 12 HOURS
Refills: 0 | Status: DISCONTINUED | OUTPATIENT
Start: 2024-05-23 | End: 2024-06-03

## 2024-05-23 RX ORDER — POTASSIUM PHOSPHATE, MONOBASIC POTASSIUM PHOSPHATE, DIBASIC 236; 224 MG/ML; MG/ML
30 INJECTION, SOLUTION INTRAVENOUS ONCE
Refills: 0 | Status: COMPLETED | OUTPATIENT
Start: 2024-05-23 | End: 2024-05-23

## 2024-05-23 RX ORDER — ACETAMINOPHEN 500 MG
975 TABLET ORAL EVERY 6 HOURS
Refills: 0 | Status: DISCONTINUED | OUTPATIENT
Start: 2024-05-23 | End: 2024-05-29

## 2024-05-23 RX ORDER — SODIUM,POTASSIUM PHOSPHATES 278-250MG
2 POWDER IN PACKET (EA) ORAL ONCE
Refills: 0 | Status: COMPLETED | OUTPATIENT
Start: 2024-05-23 | End: 2024-05-23

## 2024-05-23 RX ADMIN — Medication 5 MILLIGRAM(S): at 06:05

## 2024-05-23 RX ADMIN — Medication 975 MILLIGRAM(S): at 12:01

## 2024-05-23 RX ADMIN — PANTOPRAZOLE SODIUM 40 MILLIGRAM(S): 20 TABLET, DELAYED RELEASE ORAL at 06:06

## 2024-05-23 RX ADMIN — PIPERACILLIN AND TAZOBACTAM 25 GRAM(S): 4; .5 INJECTION, POWDER, LYOPHILIZED, FOR SOLUTION INTRAVENOUS at 13:28

## 2024-05-23 RX ADMIN — Medication 81 MILLIGRAM(S): at 11:24

## 2024-05-23 RX ADMIN — Medication 975 MILLIGRAM(S): at 06:04

## 2024-05-23 RX ADMIN — HEPARIN SODIUM 5000 UNIT(S): 5000 INJECTION INTRAVENOUS; SUBCUTANEOUS at 13:29

## 2024-05-23 RX ADMIN — Medication 40 MILLIEQUIVALENT(S): at 11:21

## 2024-05-23 RX ADMIN — DEXTROSE MONOHYDRATE, SODIUM CHLORIDE, AND POTASSIUM CHLORIDE 100 MILLILITER(S): 50; .745; 4.5 INJECTION, SOLUTION INTRAVENOUS at 14:46

## 2024-05-23 RX ADMIN — PIPERACILLIN AND TAZOBACTAM 25 GRAM(S): 4; .5 INJECTION, POWDER, LYOPHILIZED, FOR SOLUTION INTRAVENOUS at 06:05

## 2024-05-23 RX ADMIN — Medication 25 MILLIGRAM(S): at 21:43

## 2024-05-23 RX ADMIN — MEMANTINE HYDROCHLORIDE 10 MILLIGRAM(S): 10 TABLET ORAL at 17:03

## 2024-05-23 RX ADMIN — OXYCODONE HYDROCHLORIDE 2.5 MILLIGRAM(S): 5 TABLET ORAL at 06:04

## 2024-05-23 RX ADMIN — Medication 975 MILLIGRAM(S): at 11:24

## 2024-05-23 RX ADMIN — HEPARIN SODIUM 5000 UNIT(S): 5000 INJECTION INTRAVENOUS; SUBCUTANEOUS at 21:43

## 2024-05-23 RX ADMIN — OXYCODONE HYDROCHLORIDE 2.5 MILLIGRAM(S): 5 TABLET ORAL at 06:35

## 2024-05-23 RX ADMIN — Medication 100 GRAM(S): at 10:24

## 2024-05-23 RX ADMIN — PIPERACILLIN AND TAZOBACTAM 25 GRAM(S): 4; .5 INJECTION, POWDER, LYOPHILIZED, FOR SOLUTION INTRAVENOUS at 21:43

## 2024-05-23 RX ADMIN — Medication 2 PACKET(S): at 16:23

## 2024-05-23 RX ADMIN — POTASSIUM PHOSPHATE, MONOBASIC POTASSIUM PHOSPHATE, DIBASIC 83.33 MILLIMOLE(S): 236; 224 INJECTION, SOLUTION INTRAVENOUS at 11:21

## 2024-05-23 RX ADMIN — ATORVASTATIN CALCIUM 20 MILLIGRAM(S): 80 TABLET, FILM COATED ORAL at 21:43

## 2024-05-23 RX ADMIN — HEPARIN SODIUM 5000 UNIT(S): 5000 INJECTION INTRAVENOUS; SUBCUTANEOUS at 06:05

## 2024-05-23 RX ADMIN — Medication 975 MILLIGRAM(S): at 17:04

## 2024-05-23 RX ADMIN — Medication 975 MILLIGRAM(S): at 06:35

## 2024-05-23 NOTE — PROGRESS NOTE ADULT - SUBJECTIVE AND OBJECTIVE BOX
Neurology Progress Note    S: Patient seen and examined.     Medications: MEDICATIONS  (STANDING):  acetaminophen     Tablet .. 975 milliGRAM(s) Oral every 6 hours  aspirin enteric coated 81 milliGRAM(s) Oral daily  atorvastatin 20 milliGRAM(s) Oral at bedtime  dextrose 10% Bolus 125 milliLiter(s) IV Bolus once  dextrose 5%. 1000 milliLiter(s) (50 mL/Hr) IV Continuous <Continuous>  dextrose 5%. 1000 milliLiter(s) (100 mL/Hr) IV Continuous <Continuous>  dextrose 50% Injectable 25 Gram(s) IV Push once  dextrose 50% Injectable 12.5 Gram(s) IV Push once  glucagon  Injectable 1 milliGRAM(s) IntraMuscular once  heparin   Injectable 5000 Unit(s) SubCutaneous every 8 hours  insulin lispro (ADMELOG) corrective regimen sliding scale   SubCutaneous Before meals and at bedtime  lactated ringers. 1000 milliLiter(s) (90 mL/Hr) IV Continuous <Continuous>  memantine 10 milliGRAM(s) Oral every 12 hours  metoprolol tartrate 25 milliGRAM(s) Oral every 12 hours  pantoprazole    Tablet 40 milliGRAM(s) Oral before breakfast  piperacillin/tazobactam IVPB.. 3.375 Gram(s) IV Intermittent every 8 hours    MEDICATIONS  (PRN):  dextrose Oral Gel 15 Gram(s) Oral once PRN Blood Glucose LESS THAN 70 milliGRAM(s)/deciliter  oxyCODONE    IR 5 milliGRAM(s) Oral every 4 hours PRN Severe Pain (7 - 10)  oxyCODONE    IR 2.5 milliGRAM(s) Oral every 4 hours PRN Moderate Pain (4 - 6)       Vitals:  Vital Signs Last 24 Hrs  T(C): 37.1 (23 May 2024 06:00), Max: 37.1 (23 May 2024 06:00)  T(F): 98.8 (23 May 2024 06:00), Max: 98.8 (23 May 2024 06:00)  HR: 98 (23 May 2024 06:00) (79 - 99)  BP: 134/95 (23 May 2024 06:00) (98/56 - 135/85)  BP(mean): --  RR: 18 (23 May 2024 06:00) (17 - 18)  SpO2: 95% (23 May 2024 06:00) (93% - 97%)    Parameters below as of 23 May 2024 06:00  Patient On (Oxygen Delivery Method): nasal cannula  O2 Flow (L/min): 2        General Exam:   General Appearance: Appropriately dressed and in no acute distress       Head: Normocephalic, atraumatic and no dysmorphic features  Ear, Nose, and Throat: Moist mucous membranes  CVS: S1S2+  Resp: No SOB, no wheeze or rhonchi  Abd: soft NTND  Extremities: No edema, no cyanosis  Skin: No bruises, no rashes     Neurological Exam:  Mental Status: Awake, alert and oriented x 2.  Able to follow simple commands. Able to name and repeat. fluent speech. No obvious aphasia, mild to moderate dysarthria   Cranial Nerves: PERRL, EOMI, VFFC, sensation V1-V3 intact,  moderate L facial palsy , equal elevation of palate, scm/trap 5/5, tongue is midline on protrusion. Hearing is grossly intact.   Motor: LUE and LLE drift  Sensation: dec on the L   Reflexes: 1+ throughout at biceps, brachioradialis, triceps, patellars and ankles bilaterally and equal. No clonus. R toe and L toe were both downgoing.  Coordination: No dysmetria on FNF  Gait: deferred    I personally reviewed the below data/images/labs:    LABS:                          8.1    11.64 )-----------( 292      ( 23 May 2024 07:01 )             24.4     05-23    133<L>  |  104  |  5<L>  ----------------------------<  97  3.4<L>   |  21<L>  |  0.51    Ca    7.7<L>      23 May 2024 07:01  Phos  1.5     05-23  Mg     1.90     05-23          Urinalysis Basic - ( 23 May 2024 07:01 )    Color: x / Appearance: x / SG: x / pH: x  Gluc: 97 mg/dL / Ketone: x  / Bili: x / Urobili: x   Blood: x / Protein: x / Nitrite: x   Leuk Esterase: x / RBC: x / WBC x   Sq Epi: x / Non Sq Epi: x / Bacteria: x      Radiology:  CTH: No acute intracranial hemorrhage or mass effect. Extensive chronic small   vessel ischemic changes in the frontoparietal periventricular and   subcortical white matter. Indeterminate age bilateral thalamic lacunar   infarcts accommodation of dilated perivascular spaces and lacunar   infarcts in the bilateral basal ganglia. Small chronic appearing   bilateral cerebellar infarcts.    CTA NECK:  No significant stenosis of the cervical carotid arteries based   on NASCET criteria. Patent cervical vertebral arteries. No evidence of   cervical carotid or vertebral artery dissection.    CTA HEAD:  No high-grade stenosis or occlusion of the major proximal   arterial branches.    CT PERFUSION:  Perfusion imaging shows no evidence of a core infarct or   tissue at risk.    < from: MR Head No Cont (05.20.24 @ 20:13) >    ACC: [id number] EXAM:  MR BRAIN   ORDERED BY: ARY POE     PROCEDURE DATE:  05/20/2024          INTERPRETATION:  CLINICAL INDICATION: Patient recently fell at home.   Confused.    TECHNIQUE: MRI of the brain was performed without contrast.    COMPARISON: CT brain 5/19/2024    FINDINGS:  Acute infarct involving the anterior left midbrain.    No acute intracranial hemorrhage, mass effect, or hydrocephalus. No   extra-axial collection. Basal cisterns are patent.    Age-related involutional changes and chronic microvascular ischemic   changes. Chronic lacunar infarcts involving the bilateral thalami. Small   chronic infarcts involving the bilateral cerebral hemispheres.    Ventricles and sulci are age-appropriate in size.    Major intracranial flow-voids are preserved.    Left cataract surgery. The orbits, sellar and suprasellar structures, and   craniocervical junction are otherwise unremarkable. Visualized paranasal   sinuses and mastoid air cells are clear.    IMPRESSION:  Acute infarct involving the anterior left midbrain.    --- End of Report ---              < end of copied text >     Neurology Progress Note    S: Patient seen and examined. Daughter at bedside    Medications: MEDICATIONS  (STANDING):  acetaminophen     Tablet .. 975 milliGRAM(s) Oral every 6 hours  aspirin enteric coated 81 milliGRAM(s) Oral daily  atorvastatin 20 milliGRAM(s) Oral at bedtime  dextrose 10% Bolus 125 milliLiter(s) IV Bolus once  dextrose 5%. 1000 milliLiter(s) (50 mL/Hr) IV Continuous <Continuous>  dextrose 5%. 1000 milliLiter(s) (100 mL/Hr) IV Continuous <Continuous>  dextrose 50% Injectable 25 Gram(s) IV Push once  dextrose 50% Injectable 12.5 Gram(s) IV Push once  glucagon  Injectable 1 milliGRAM(s) IntraMuscular once  heparin   Injectable 5000 Unit(s) SubCutaneous every 8 hours  insulin lispro (ADMELOG) corrective regimen sliding scale   SubCutaneous Before meals and at bedtime  lactated ringers. 1000 milliLiter(s) (90 mL/Hr) IV Continuous <Continuous>  memantine 10 milliGRAM(s) Oral every 12 hours  metoprolol tartrate 25 milliGRAM(s) Oral every 12 hours  pantoprazole    Tablet 40 milliGRAM(s) Oral before breakfast  piperacillin/tazobactam IVPB.. 3.375 Gram(s) IV Intermittent every 8 hours    MEDICATIONS  (PRN):  dextrose Oral Gel 15 Gram(s) Oral once PRN Blood Glucose LESS THAN 70 milliGRAM(s)/deciliter  oxyCODONE    IR 5 milliGRAM(s) Oral every 4 hours PRN Severe Pain (7 - 10)  oxyCODONE    IR 2.5 milliGRAM(s) Oral every 4 hours PRN Moderate Pain (4 - 6)       Vitals:  Vital Signs Last 24 Hrs  T(C): 37.1 (23 May 2024 06:00), Max: 37.1 (23 May 2024 06:00)  T(F): 98.8 (23 May 2024 06:00), Max: 98.8 (23 May 2024 06:00)  HR: 98 (23 May 2024 06:00) (79 - 99)  BP: 134/95 (23 May 2024 06:00) (98/56 - 135/85)  BP(mean): --  RR: 18 (23 May 2024 06:00) (17 - 18)  SpO2: 95% (23 May 2024 06:00) (93% - 97%)    Parameters below as of 23 May 2024 06:00  Patient On (Oxygen Delivery Method): nasal cannula  O2 Flow (L/min): 2        General Exam:   General Appearance: Appropriately dressed and in no acute distress       Head: Normocephalic, atraumatic and no dysmorphic features  Ear, Nose, and Throat: Moist mucous membranes  CVS: S1S2+  Resp: No SOB, no wheeze or rhonchi  Abd: soft NTND  Extremities: No edema, no cyanosis  Skin: No bruises, no rashes     Neurological Exam:  Mental Status: Awake, alert and oriented x 2.  Able to follow simple commands. Able to name and repeat. fluent speech. No obvious aphasia, mild to moderate dysarthria   Cranial Nerves: PERRL, EOMI, VFFC, sensation V1-V3 intact,  mild L facial palsy - improving , equal elevation of palate, scm/trap 5/5, tongue is midline on protrusion. Hearing is grossly intact.   Motor: LUE and LLE drift  Sensation: dec on the L   Reflexes: 1+ throughout at biceps, brachioradialis, triceps, patellars and ankles bilaterally and equal. No clonus. R toe and L toe were both downgoing.  Coordination: No dysmetria on FNF  Gait: deferred    I personally reviewed the below data/images/labs:    LABS:                          8.1    11.64 )-----------( 292      ( 23 May 2024 07:01 )             24.4     05-23    133<L>  |  104  |  5<L>  ----------------------------<  97  3.4<L>   |  21<L>  |  0.51    Ca    7.7<L>      23 May 2024 07:01  Phos  1.5     05-23  Mg     1.90     05-23          Urinalysis Basic - ( 23 May 2024 07:01 )    Color: x / Appearance: x / SG: x / pH: x  Gluc: 97 mg/dL / Ketone: x  / Bili: x / Urobili: x   Blood: x / Protein: x / Nitrite: x   Leuk Esterase: x / RBC: x / WBC x   Sq Epi: x / Non Sq Epi: x / Bacteria: x      Radiology:  CTH: No acute intracranial hemorrhage or mass effect. Extensive chronic small   vessel ischemic changes in the frontoparietal periventricular and   subcortical white matter. Indeterminate age bilateral thalamic lacunar   infarcts accommodation of dilated perivascular spaces and lacunar   infarcts in the bilateral basal ganglia. Small chronic appearing   bilateral cerebellar infarcts.    CTA NECK:  No significant stenosis of the cervical carotid arteries based   on NASCET criteria. Patent cervical vertebral arteries. No evidence of   cervical carotid or vertebral artery dissection.    CTA HEAD:  No high-grade stenosis or occlusion of the major proximal   arterial branches.    CT PERFUSION:  Perfusion imaging shows no evidence of a core infarct or   tissue at risk.    < from: MR Head No Cont (05.20.24 @ 20:13) >    ACC: [id number] EXAM:  MR BRAIN   ORDERED BY: ARY POE     PROCEDURE DATE:  05/20/2024          INTERPRETATION:  CLINICAL INDICATION: Patient recently fell at home.   Confused.    TECHNIQUE: MRI of the brain was performed without contrast.    COMPARISON: CT brain 5/19/2024    FINDINGS:  Acute infarct involving the anterior left midbrain.    No acute intracranial hemorrhage, mass effect, or hydrocephalus. No   extra-axial collection. Basal cisterns are patent.    Age-related involutional changes and chronic microvascular ischemic   changes. Chronic lacunar infarcts involving the bilateral thalami. Small   chronic infarcts involving the bilateral cerebral hemispheres.    Ventricles and sulci are age-appropriate in size.    Major intracranial flow-voids are preserved.    Left cataract surgery. The orbits, sellar and suprasellar structures, and   craniocervical junction are otherwise unremarkable. Visualized paranasal   sinuses and mastoid air cells are clear.    IMPRESSION:  Acute infarct involving the anterior left midbrain.    --- End of Report ---              < end of copied text >

## 2024-05-23 NOTE — PROGRESS NOTE ADULT - SUBJECTIVE AND OBJECTIVE BOX
Patient is a 78y old  Female who presents with a chief complaint of Perforated diverticulitis (23 May 2024 12:26)    Date of servie : 05-23-24 @ 15:21  INTERVAL HPI/OVERNIGHT EVENTS:  T(C): 37 (05-23-24 @ 14:18), Max: 37.1 (05-23-24 @ 06:00)  HR: 89 (05-23-24 @ 14:18) (79 - 98)  BP: 134/74 (05-23-24 @ 14:18) (105/58 - 135/85)  RR: 17 (05-23-24 @ 14:18) (17 - 19)  SpO2: 95% (05-23-24 @ 14:18) (95% - 97%)  Wt(kg): --  I&O's Summary    22 May 2024 07:01  -  23 May 2024 07:00  --------------------------------------------------------  IN: 3940 mL / OUT: 2500 mL / NET: 1440 mL    23 May 2024 07:01  -  23 May 2024 15:21  --------------------------------------------------------  IN: 1270 mL / OUT: 907.5 mL / NET: 362.5 mL        LABS:                        8.1    11.64 )-----------( 292      ( 23 May 2024 07:01 )             24.4     05-23    133<L>  |  104  |  5<L>  ----------------------------<  97  3.4<L>   |  21<L>  |  0.51    Ca    7.7<L>      23 May 2024 07:01  Phos  1.5     05-23  Mg     1.90     05-23        Urinalysis Basic - ( 23 May 2024 07:01 )    Color: x / Appearance: x / SG: x / pH: x  Gluc: 97 mg/dL / Ketone: x  / Bili: x / Urobili: x   Blood: x / Protein: x / Nitrite: x   Leuk Esterase: x / RBC: x / WBC x   Sq Epi: x / Non Sq Epi: x / Bacteria: x      CAPILLARY BLOOD GLUCOSE      POCT Blood Glucose.: 101 mg/dL (23 May 2024 12:02)  POCT Blood Glucose.: 112 mg/dL (23 May 2024 08:18)  POCT Blood Glucose.: 112 mg/dL (22 May 2024 21:18)  POCT Blood Glucose.: 124 mg/dL (22 May 2024 17:26)        Urinalysis Basic - ( 23 May 2024 07:01 )    Color: x / Appearance: x / SG: x / pH: x  Gluc: 97 mg/dL / Ketone: x  / Bili: x / Urobili: x   Blood: x / Protein: x / Nitrite: x   Leuk Esterase: x / RBC: x / WBC x   Sq Epi: x / Non Sq Epi: x / Bacteria: x        MEDICATIONS  (STANDING):  acetaminophen     Tablet .. 975 milliGRAM(s) Oral every 6 hours  aspirin enteric coated 81 milliGRAM(s) Oral daily  atorvastatin 20 milliGRAM(s) Oral at bedtime  dextrose 10% Bolus 125 milliLiter(s) IV Bolus once  dextrose 5% + sodium chloride 0.9% with potassium chloride 20 mEq/L 1000 milliLiter(s) (100 mL/Hr) IV Continuous <Continuous>  dextrose 5%. 1000 milliLiter(s) (100 mL/Hr) IV Continuous <Continuous>  dextrose 5%. 1000 milliLiter(s) (50 mL/Hr) IV Continuous <Continuous>  dextrose 50% Injectable 25 Gram(s) IV Push once  dextrose 50% Injectable 12.5 Gram(s) IV Push once  glucagon  Injectable 1 milliGRAM(s) IntraMuscular once  heparin   Injectable 5000 Unit(s) SubCutaneous every 8 hours  insulin lispro (ADMELOG) corrective regimen sliding scale   SubCutaneous Before meals and at bedtime  memantine 10 milliGRAM(s) Oral every 12 hours  metoprolol tartrate 25 milliGRAM(s) Oral every 12 hours  pantoprazole    Tablet 40 milliGRAM(s) Oral before breakfast  piperacillin/tazobactam IVPB.. 3.375 Gram(s) IV Intermittent every 8 hours  potassium phosphate / sodium phosphate Powder (PHOS-NaK) 2 Packet(s) Oral once    MEDICATIONS  (PRN):  dextrose Oral Gel 15 Gram(s) Oral once PRN Blood Glucose LESS THAN 70 milliGRAM(s)/deciliter  oxyCODONE    IR 5 milliGRAM(s) Oral every 4 hours PRN Severe Pain (7 - 10)  oxyCODONE    IR 2.5 milliGRAM(s) Oral every 4 hours PRN Moderate Pain (4 - 6)          PHYSICAL EXAM:  GENERAL: NAD, well-groomed, well-developed  HEAD:  Atraumatic, Normocephalic  CHEST/LUNG: Clear to percussion bilaterally; No rales, rhonchi, wheezing, or rubs  HEART: Regular rate and rhythm; No murmurs, rubs, or gallops  ABDOMEN: Soft, Nontender, Nondistended; Bowel sounds present  EXTREMITIES:  2+ Peripheral Pulses, No clubbing, cyanosis, or edema  LYMPH: No lymphadenopathy noted  SKIN: No rashes or lesions    Care Discussed with Consultants/Other Providers [ ] YES  [ ] NO

## 2024-05-23 NOTE — PROGRESS NOTE ADULT - ASSESSMENT
78F with  sigmoid diverticulitis, multiple chronic infarcts (with unclear residual deficits), dementia (on memantine), T2DM, HTN, HLD, glaucoma, OA of the L knee, PERFECTO, who presents to [facility] ED on 5/19/2024 with c/o left facial droop and slurred speech. Stroke code cancelled as out of 24h window. Unclear stroke hx and baseline neurologic deficits.   In ED was febrile. CTH with bilateral thalamic and basal ganglia infarcts. Thalamic infarcts appear more subacute. Also with chronic cerebellar infarcts. CTA with mld ICA narrowing  o/e with mild to moderate dysarthria, R facial palsy , RUE and RLE drift. AAOx2   MRI brain with L anterior midbrain infarct    - continue aspirin 81mg  - can add Plavix 75mg x 3 weeks post OP if ok per surgery team   - check TTE   - tele, consider ILR on d/c   - on zosyn per primary team  - cont home memantine   - gradual normotension, avoid hypotension  -  LDL 49 (1/24/24) - continue home statin  -  A1c 6.3 (5/11/2024)  - PT/OT  - DVT ppx     Allison Vinson DO  Vascular Neurology  Office [phone] .

## 2024-05-23 NOTE — PROGRESS NOTE ADULT - SUBJECTIVE AND OBJECTIVE BOX
A TEAM SURGERY DAILY PROGRESS NOTE:     INTERVAL EVENTS: None    SUBJECTIVE/ROS: Patient seen and examined at bedside by surgical team. Reports mild abdominal pain, denies nausea/vomit, fever/chills, sob/chest pain. Has not been able to provide reliable information due to baseline altered mental status.      OBJECTIVE:  Vital Signs Last 24 Hrs  T(C): 37.1 (23 May 2024 06:00), Max: 37.1 (23 May 2024 06:00)  T(F): 98.8 (23 May 2024 06:00), Max: 98.8 (23 May 2024 06:00)  HR: 98 (23 May 2024 06:00) (79 - 99)  BP: 134/95 (23 May 2024 06:00) (98/56 - 135/85)  BP(mean): --  RR: 18 (23 May 2024 06:00) (17 - 18)  SpO2: 95% (23 May 2024 06:00) (93% - 97%)    Parameters below as of 23 May 2024 06:00  Patient On (Oxygen Delivery Method): nasal cannula  O2 Flow (L/min): 2                          9.0    11.41 )-----------( 253      ( 22 May 2024 05:09 )             26.8     05-22    136  |  107  |  7   ----------------------------<  136<H>  4.2   |  21<L>  |  0.55    Ca    7.8<L>      22 May 2024 05:09  Phos  2.2     05-22  Mg     1.90     05-22       I&O's Detail    22 May 2024 07:01  -  23 May 2024 07:00  --------------------------------------------------------  IN:    IV PiggyBack: 900 mL    Lactated Ringers: 1620 mL    Lactated Ringers Bolus: 500 mL    Oral Fluid: 920 mL  Total IN: 3940 mL    OUT:    Bulb (mL): 90 mL    Colostomy (mL): 10 mL    Indwelling Catheter - Urethral (mL): 2400 mL  Total OUT: 2500 mL    Total NET: 1440 mL          IMAGING:      PHYSICAL EXAM:  Constitutional: NAD  Respiratory: non-labored breathing, patent airway  Gastrointestinal: abdomen soft, nontender, nondistended. LUQ ostomy pink, with no output in the bag, KULWANT SS.  Extremities: warm  Neurological: intact

## 2024-05-23 NOTE — PROGRESS NOTE ADULT - SUBJECTIVE AND OBJECTIVE BOX
DATE OF SERVICE: 05-23-24    resting comfortably, unable to obtain ROS    Review of Systems:   Constitutional: [ ] fevers, [ ] chills.   Skin: [ ] dry skin. [ ] rashes.  Psychiatric: [ ] depression, [ ] anxiety.   Gastrointestinal: [ ] BRBPR, [ ] melena.   Neurological: [ ] confusion. [ ] seizures. [ ] shuffling gait.   Ears,Nose,Mouth and Throat: [ ] ear pain [ ] sore throat.   Eyes: [ ] diplopia.   Respiratory: [ ] hemoptysis. [ ] shortness of breath  Cardiovascular: See HPI above  Hematologic/Lymphatic: [ ] anemia. [ ] painful nodes. [ ] prolonged bleeding.   Genitourinary: [ ] hematuria. [ ] flank pain.   Endocrine: [ ] significant change in weight. [ ] intolerance to heat and cold.     Review of systems [x ] otherwise negative, [ ] otherwise unable to obtain    FH: no family history of sudden cardiac death in first degree relatives    SH: [ ] tobacco, [ ] alcohol, [ ] drugs    acetaminophen     Tablet .. 975 milliGRAM(s) Oral every 6 hours  aspirin enteric coated 81 milliGRAM(s) Oral daily  atorvastatin 20 milliGRAM(s) Oral at bedtime  glucagon  Injectable 1 milliGRAM(s) IntraMuscular once  heparin   Injectable 5000 Unit(s) SubCutaneous every 8 hours  insulin lispro (ADMELOG) corrective regimen sliding scale   SubCutaneous Before meals and at bedtime  lactated ringers. 1000 milliLiter(s) IV Continuous <Continuous>  memantine 10 milliGRAM(s) Oral every 12 hours  metoprolol tartrate 25 milliGRAM(s) Oral every 12 hours  oxyCODONE    IR 5 milliGRAM(s) Oral every 4 hours PRN  oxyCODONE    IR 2.5 milliGRAM(s) Oral every 4 hours PRN  pantoprazole    Tablet 40 milliGRAM(s) Oral before breakfast  piperacillin/tazobactam IVPB.. 3.375 Gram(s) IV Intermittent every 8 hours                            8.1    11.64 )-----------( 292      ( 23 May 2024 07:01 )             24.4       05-23    133<L>  |  104  |  5<L>  ----------------------------<  97  3.4<L>   |  21<L>  |  0.51    Ca    7.7<L>      23 May 2024 07:01  Phos  1.5     05-23  Mg     1.90     05-23      T(C): 37.1 (05-23-24 @ 10:00), Max: 37.1 (05-23-24 @ 06:00)  HR: 91 (05-23-24 @ 10:00) (79 - 98)  BP: 105/58 (05-23-24 @ 10:00) (105/58 - 135/85)  RR: 19 (05-23-24 @ 10:00) (17 - 19)  SpO2: 95% (05-23-24 @ 10:00) (95% - 97%)  Wt(kg): --    I&O's Summary    22 May 2024 07:01  -  23 May 2024 07:00  --------------------------------------------------------  IN: 3940 mL / OUT: 2500 mL / NET: 1440 mL    23 May 2024 07:01  -  23 May 2024 12:27  --------------------------------------------------------  IN: 830 mL / OUT: 600 mL / NET: 230 mL      General:  Alert and Oriented * 3.   Head: Normocephalic and atraumatic.   Neck: No JVD. No bruitsNo M/R/G  Lungs: CTA b/l. No rhonchi, rales or wheezes.   Abdomen: + BS, soft. Non tender. Non distended. No rebound. No guarding.   Extremities: No clubbing/cyanosis/edema.   Neurologic: Moves all four extremities. Full range of motion.   Skin: Warm and moist. The patient's skin has normal elasticity and good skin turgor.   Psychiatric: Appropriate mood and affect.  Musculoskeletal: Normal range of motion, normal strength     TELEMETRY: 	  NONE    ECG:  	NSR      ASSESSMENT/PLAN: Pt is a 78F with PMHx diverticulitis, DM2, HTN, HLD,with recent hospitalization for complicated diverticulitis, discharged 5/15 on long-term IV ABX presents after fall from home with AMS, found to have fevers  and a  CT AP showing perforated sigmoid diverticulitis with thick-walled pericolonic multilocular abscess measuring at least up to 3.5 x 1.5 x 4.8 cm. Cardiology called for pre-op risk stratification prior to OR. Currently denies any chest pain, SOB, orthopnea.    Pre-OP/HTN/HLD  - no anginal chest pain, no q waves on EKG euvolemic on exam, no severely stenotic valvular disease on PE, recent acute CVA  - Elevated risk for for intraperitoneal surgery, can proceed to OR with no further CV work-up  --tolerated procedure well from CV perspective  - s/p open Ruiz procedure  - IF BP tolerates can restart BB, Lopressor 25 mg q 12  - c/w Lipitor 20 mg           DATE OF SERVICE: 05-23-24    resting comfortably, unable to obtain ROS    Review of Systems:   Constitutional: [ ] fevers, [ ] chills.   Skin: [ ] dry skin. [ ] rashes.  Psychiatric: [ ] depression, [ ] anxiety.   Gastrointestinal: [ ] BRBPR, [ ] melena.   Neurological: [ ] confusion. [ ] seizures. [ ] shuffling gait.   Ears,Nose,Mouth and Throat: [ ] ear pain [ ] sore throat.   Eyes: [ ] diplopia.   Respiratory: [ ] hemoptysis. [ ] shortness of breath  Cardiovascular: See HPI above  Hematologic/Lymphatic: [ ] anemia. [ ] painful nodes. [ ] prolonged bleeding.   Genitourinary: [ ] hematuria. [ ] flank pain.   Endocrine: [ ] significant change in weight. [ ] intolerance to heat and cold.     Review of systems [x ] otherwise negative, [ ] otherwise unable to obtain    FH: no family history of sudden cardiac death in first degree relatives    SH: [ ] tobacco, [ ] alcohol, [ ] drugs    acetaminophen     Tablet .. 975 milliGRAM(s) Oral every 6 hours  aspirin enteric coated 81 milliGRAM(s) Oral daily  atorvastatin 20 milliGRAM(s) Oral at bedtime  glucagon  Injectable 1 milliGRAM(s) IntraMuscular once  heparin   Injectable 5000 Unit(s) SubCutaneous every 8 hours  insulin lispro (ADMELOG) corrective regimen sliding scale   SubCutaneous Before meals and at bedtime  lactated ringers. 1000 milliLiter(s) IV Continuous <Continuous>  memantine 10 milliGRAM(s) Oral every 12 hours  metoprolol tartrate 25 milliGRAM(s) Oral every 12 hours  oxyCODONE    IR 5 milliGRAM(s) Oral every 4 hours PRN  oxyCODONE    IR 2.5 milliGRAM(s) Oral every 4 hours PRN  pantoprazole    Tablet 40 milliGRAM(s) Oral before breakfast  piperacillin/tazobactam IVPB.. 3.375 Gram(s) IV Intermittent every 8 hours                            8.1    11.64 )-----------( 292      ( 23 May 2024 07:01 )             24.4       05-23    133<L>  |  104  |  5<L>  ----------------------------<  97  3.4<L>   |  21<L>  |  0.51    Ca    7.7<L>      23 May 2024 07:01  Phos  1.5     05-23  Mg     1.90     05-23      T(C): 37.1 (05-23-24 @ 10:00), Max: 37.1 (05-23-24 @ 06:00)  HR: 91 (05-23-24 @ 10:00) (79 - 98)  BP: 105/58 (05-23-24 @ 10:00) (105/58 - 135/85)  RR: 19 (05-23-24 @ 10:00) (17 - 19)  SpO2: 95% (05-23-24 @ 10:00) (95% - 97%)  Wt(kg): --    I&O's Summary    22 May 2024 07:01  -  23 May 2024 07:00  --------------------------------------------------------  IN: 3940 mL / OUT: 2500 mL / NET: 1440 mL    23 May 2024 07:01  -  23 May 2024 12:27  --------------------------------------------------------  IN: 830 mL / OUT: 600 mL / NET: 230 mL      General: pleasant, confused, comfortable  Head: Normocephalic and atraumatic.   Neck: No JVD. No bruitsNo M/R/G  Lungs: CTA b/l. diminshed at bases A/L limited exam  Abdomen: deferred  Extremities: No clubbing/cyanosis/edema.   Neurologic: Moves all four extremities.   Skin: Warm and moist. The patient's skin has normal elasticity and good skin turgor.   Psychiatric: cannot fully assess  Musculoskeletal: Normal range of motion, normal strength     TELEMETRY: 	  NONE    ECG:  	NSR      ASSESSMENT/PLAN: Pt is a 78F with PMHx diverticulitis, DM2, HTN, HLD,with recent hospitalization for complicated diverticulitis, discharged 5/15 on long-term IV ABX presents after fall from home with AMS, found to have fevers  and a  CT AP showing perforated sigmoid diverticulitis with thick-walled pericolonic multilocular abscess measuring at least up to 3.5 x 1.5 x 4.8 cm. Cardiology called for pre-op risk stratification prior to OR. Currently denies any chest pain, SOB, orthopnea.    Pre-OP/HTN/HLD  - no anginal chest pain, no q waves on EKG euvolemic on exam, no severely stenotic valvular disease on PE, recent acute CVA  - Elevated risk for for intraperitoneal surgery, can proceed to OR with no further CV work-up  --tolerated procedure well from CV perspective  - s/p open Ruiz procedure  - IF BP tolerates can restart BB, Lopressor 25 mg q 12  - c/w Lipitor 20 mg  -post op O2 requirements increasing, consider check CXR, unable to use incentive spirometer correctly           DATE OF SERVICE: 05-23-24    resting comfortably, unable to obtain ROS    Review of Systems:   Constitutional: [ ] fevers, [ ] chills.   Skin: [ ] dry skin. [ ] rashes.  Psychiatric: [ ] depression, [ ] anxiety.   Gastrointestinal: [ ] BRBPR, [ ] melena.   Neurological: [ ] confusion. [ ] seizures. [ ] shuffling gait.   Ears,Nose,Mouth and Throat: [ ] ear pain [ ] sore throat.   Eyes: [ ] diplopia.   Respiratory: [ ] hemoptysis. [ ] shortness of breath  Cardiovascular: See HPI above  Hematologic/Lymphatic: [ ] anemia. [ ] painful nodes. [ ] prolonged bleeding.   Genitourinary: [ ] hematuria. [ ] flank pain.   Endocrine: [ ] significant change in weight. [ ] intolerance to heat and cold.     Review of systems [x ] otherwise negative, [ ] otherwise unable to obtain    FH: no family history of sudden cardiac death in first degree relatives    SH: [ ] tobacco, [ ] alcohol, [ ] drugs    acetaminophen     Tablet .. 975 milliGRAM(s) Oral every 6 hours  aspirin enteric coated 81 milliGRAM(s) Oral daily  atorvastatin 20 milliGRAM(s) Oral at bedtime  glucagon  Injectable 1 milliGRAM(s) IntraMuscular once  heparin   Injectable 5000 Unit(s) SubCutaneous every 8 hours  insulin lispro (ADMELOG) corrective regimen sliding scale   SubCutaneous Before meals and at bedtime  lactated ringers. 1000 milliLiter(s) IV Continuous <Continuous>  memantine 10 milliGRAM(s) Oral every 12 hours  metoprolol tartrate 25 milliGRAM(s) Oral every 12 hours  oxyCODONE    IR 5 milliGRAM(s) Oral every 4 hours PRN  oxyCODONE    IR 2.5 milliGRAM(s) Oral every 4 hours PRN  pantoprazole    Tablet 40 milliGRAM(s) Oral before breakfast  piperacillin/tazobactam IVPB.. 3.375 Gram(s) IV Intermittent every 8 hours                            8.1    11.64 )-----------( 292      ( 23 May 2024 07:01 )             24.4       05-23    133<L>  |  104  |  5<L>  ----------------------------<  97  3.4<L>   |  21<L>  |  0.51    Ca    7.7<L>      23 May 2024 07:01  Phos  1.5     05-23  Mg     1.90     05-23      T(C): 37.1 (05-23-24 @ 10:00), Max: 37.1 (05-23-24 @ 06:00)  HR: 91 (05-23-24 @ 10:00) (79 - 98)  BP: 105/58 (05-23-24 @ 10:00) (105/58 - 135/85)  RR: 19 (05-23-24 @ 10:00) (17 - 19)  SpO2: 95% (05-23-24 @ 10:00) (95% - 97%)  Wt(kg): --    I&O's Summary    22 May 2024 07:01  -  23 May 2024 07:00  --------------------------------------------------------  IN: 3940 mL / OUT: 2500 mL / NET: 1440 mL    23 May 2024 07:01  -  23 May 2024 12:27  --------------------------------------------------------  IN: 830 mL / OUT: 600 mL / NET: 230 mL      General: pleasant, confused, comfortable  Head: Normocephalic and atraumatic.   Neck: No JVD. No bruitsNo M/R/G  Lungs: CTA b/l. diminshed at bases A/L limited exam  Abdomen: deferred  Extremities: No clubbing/cyanosis/edema.   Neurologic: Moves all four extremities.   Skin: Warm and moist. The patient's skin has normal elasticity and good skin turgor.   Psychiatric: cannot fully assess  Musculoskeletal: Normal range of motion, normal strength     TELEMETRY: 	  NONE    ECG:  	NSR      ASSESSMENT/PLAN: Pt is a 78F with PMHx diverticulitis, DM2, HTN, HLD,with recent hospitalization for complicated diverticulitis, discharged 5/15 on long-term IV ABX presents after fall from home with AMS, found to have fevers  and a  CT AP showing perforated sigmoid diverticulitis with thick-walled pericolonic multilocular abscess measuring at least up to 3.5 x 1.5 x 4.8 cm. Cardiology called for pre-op risk stratification prior to OR. Currently denies any chest pain, SOB, orthopnea.    Pre-OP/HTN/HLD  - no anginal chest pain, no q waves on EKG euvolemic on exam, no severely stenotic valvular disease on PE, recent acute CVA  - Elevated risk for for intraperitoneal surgery, can proceed to OR with no further CV work-up  --tolerated procedure well from CV perspective  - s/p open Ruiz procedure  -c/w Lopressor 25 mg q 12  - c/w Lipitor 20 mg  -post op O2 requirements increasing, consider check CXR, unable to use incentive spirometer correctly

## 2024-05-23 NOTE — ADVANCED PRACTICE NURSE CONSULT - RECOMMEDATIONS
Ostomy will continue post-op for ostomy education.   Please contact Wound/Ostomy Care Service Line if we can be of further assistance (ext [phone]). 
Discussed with patient whether she has family that visits, states she is unsure if they know where she is. Will collaborate with RNs to educate patient and family when present at bedside. At this time, patient unable to follow education and perform ostomy change due to acute status with somnolence and confusion, but ostomy team will continue to follow and educate with appropriate change of status.    Stoma size:  L x W: 1" x 1 3/8"   Supplies Utilized  Liquid barrier film    One piece drainable pouch- item #[id number]    Ostomy team to continue to follow patient, please contact Wound/Ostomy Care Service Line if we can be of further assistance (ext [phone]). 
Supplies utilized:   Stoma size:   Stoma powder- item #[id number]    Liquid barrier film    One piece drainable pouch- item #[id number]    Please reconsult if we can be of further assistance (ext [phone]).

## 2024-05-23 NOTE — PROGRESS NOTE ADULT - ASSESSMENT
79 y/o F PMhx Glaucoma, DM II, HTN, dementia, diverticulitis who presented after a fall found to have enlarged abscess    Sigmoid diverticulitis w/ perforation and abscess  sepsis- fever, leukocytosis on admission  CT- perforated sigmoid diverticulitis with thick-walled pericolonic multilocular abscess measuring at least up to 3.5 x 1.5 x 4.8 cm w/ moderate stool burden distending the colon proximal to the sigmoid  s/p OR 5/21- Diagnostic laparoscopy converted to open and Ruiz procedure, pus cavity in lateral colonic wall and culture obtained. colostomy   gram stain w/ GPCs and GNRs    CVA  L facial droop  MRI- Acute infarct involving the anterior left midbrain.    asymptomatic bacteriuria  denies symptoms    Recommendations  c/w zosyn for now  f/u OR cultures    Geovani Goldman M.D.  [facility], Division of Infectious Diseases  [phone]  After 5pm on weekdays and all day on weekends - please call [phone]

## 2024-05-23 NOTE — ADVANCED PRACTICE NURSE CONSULT - REASON FOR CONSULT
Attempted to contact son to set up appointment for ostomy teaching, without success will continue to attempt.   Primary RN notified to let [facility] know if family at bedside.     Please reconsult  if we can be of further assistance (ext [phone]). 
Patient seen for initial education and evaluation of LUQ Jania's colostomy. As per H&P, patient is a 78F with PMHx diverticulitis, type-2 DM, HTN, HLD, Glaucoma, OA of the L-knee and PERFECTO with recent hospitalization for complicated diverticulitis, discharged 5/15 on long-term IV erta presents after fall from home. Code Stroke called for L facial droop, but workup negative. TMax 101.2. CT AP showing persistent perforated sigmoid diverticulitis with thick-walled pericolonic multilocular abscess measuring at least up to 3.5 x 1.5 x 4.8 cm.  
Patient seen for pre-op stoma marking. As per H&P, patient is a 78F with PMHx diverticulitis, type-2 DM, HTN, HLD, Glaucoma, OA of the L-knee and PERFECTO with recent hospitalization for complicated diverticulitis, discharged 5/15 on long-term IV erta presents after fall from home. Code Stroke called for L facial droop, but workup negative. Patient currently planned for OR procedure today. 
Patient seen with daughter at bedside for ostomy teaching. Daughter stating she lives in [place] and comes to see her mother periodically. Patients son has "upper extremity weakness?" so unable to assist with pouch change. Dispo plan to rehab as per daughter and .

## 2024-05-23 NOTE — ADVANCED PRACTICE NURSE CONSULT - ASSESSMENT
Patient seen for follow up colostomy teaching. Patient continues to be confused, drifting in and out of sleep. Education provided to daughter, participating in pouch care. Overview of surgical procedure and need of ostomy reinforced. Terms defined utilized: Ostomy, Colostomy, wafer, pouch, stoma. Frequency of pouch change and emptying discussed, teach back method utilized. Stool consistency with Colostomy reviewed. Possibility of close ended pouches discussed. Daughter able to show back how to open, empty and close pouch. Removed ostomy pouch using pull and push technique, cleansed skin with water, patted dry. Reviewed how to properly assess stoma viability and peristomal skin.  Actively participated in stoma measurement, creating template, cutting wafer, applying pouch. Reviewed s/s to report to MD. Showering with a pouch discussed. Questions answered.     LUQ colostomy- stoma size: 1" x 1 1/2". stoma completely retracted within a deep crease, see A&I flowsheet for full assessment details.

## 2024-05-23 NOTE — PROGRESS NOTE ADULT - ASSESSMENT
77 y/o F with pmhx of Diverticulitis, Type-2 DM, HTN, HLD, Glaucoma, OA of the L-knee and PERFECTO, presented to the ED for new onset lethargy. Found to have leukocytosis likely from pneumonia vs. diverticulitis. The patient does not appear lethargic. Admit for IV abx.      Perforated  Diverticulitis.   - surgery as per colorectal   - sp OR   - cw abx  - will monitor     CVA  - neuro fu appreciated  - imaging reviewed  - Plavix after 3 weeks     Benign essential HTN.   hold BP meds.  restart as needed      DM2 (diabetes mellitus, type 2).   - monitor FS  - ISS      Heparin sc for DVT prophylaxis

## 2024-05-23 NOTE — PROGRESS NOTE ADULT - ASSESSMENT
78F w/ PMHx of Diverticulitis now s/p diagnostic laparoscopy, Incision and drainage of deep pelvic abscess, umbilical hernia repair, Jania's procedure (5/21). Recovering appropriately.    PLAN:  - CLD/IVF   - dc [place]  - Strict I&O's  - Monitor KULWANT drain output  - Restart lopressor 25mg  - Analgesia and antiemetics as needed  - DVTppx  - PT --> Rehab    A team [id number]

## 2024-05-24 LAB
-  AMPICILLIN: SIGNIFICANT CHANGE UP
-  DAPTOMYCIN: SIGNIFICANT CHANGE UP
-  LEVOFLOXACIN: SIGNIFICANT CHANGE UP
-  LINEZOLID: SIGNIFICANT CHANGE UP
-  VANCOMYCIN: SIGNIFICANT CHANGE UP
ANION GAP SERPL CALC-SCNC: 8 MMOL/L — SIGNIFICANT CHANGE UP (ref 7–14)
BUN SERPL-MCNC: 3 MG/DL — LOW (ref 7–23)
CALCIUM SERPL-MCNC: 7.6 MG/DL — LOW (ref 8.4–10.5)
CHLORIDE SERPL-SCNC: 106 MMOL/L — SIGNIFICANT CHANGE UP (ref 98–107)
CO2 SERPL-SCNC: 21 MMOL/L — LOW (ref 22–31)
CREAT SERPL-MCNC: 0.43 MG/DL — LOW (ref 0.5–1.3)
EGFR: 101 ML/MIN/1.73M2 — SIGNIFICANT CHANGE UP
GLUCOSE BLDC GLUCOMTR-MCNC: 106 MG/DL — HIGH (ref 70–99)
GLUCOSE BLDC GLUCOMTR-MCNC: 108 MG/DL — HIGH (ref 70–99)
GLUCOSE BLDC GLUCOMTR-MCNC: 212 MG/DL — HIGH (ref 70–99)
GLUCOSE BLDC GLUCOMTR-MCNC: 99 MG/DL — SIGNIFICANT CHANGE UP (ref 70–99)
GLUCOSE SERPL-MCNC: 154 MG/DL — HIGH (ref 70–99)
HCT VFR BLD CALC: 24.6 % — LOW (ref 34.5–45)
HGB BLD-MCNC: 8.1 G/DL — LOW (ref 11.5–15.5)
MAGNESIUM SERPL-MCNC: 1.8 MG/DL — SIGNIFICANT CHANGE UP (ref 1.6–2.6)
MCHC RBC-ENTMCNC: 29.5 PG — SIGNIFICANT CHANGE UP (ref 27–34)
MCHC RBC-ENTMCNC: 32.9 GM/DL — SIGNIFICANT CHANGE UP (ref 32–36)
MCV RBC AUTO: 89.5 FL — SIGNIFICANT CHANGE UP (ref 80–100)
METHOD TYPE: SIGNIFICANT CHANGE UP
NRBC # BLD: 0 /100 WBCS — SIGNIFICANT CHANGE UP (ref 0–0)
NRBC # FLD: 0 K/UL — SIGNIFICANT CHANGE UP (ref 0–0)
PHOSPHATE SERPL-MCNC: 1.8 MG/DL — LOW (ref 2.5–4.5)
PLATELET # BLD AUTO: 294 K/UL — SIGNIFICANT CHANGE UP (ref 150–400)
POTASSIUM SERPL-MCNC: 4 MMOL/L — SIGNIFICANT CHANGE UP (ref 3.5–5.3)
POTASSIUM SERPL-SCNC: 4 MMOL/L — SIGNIFICANT CHANGE UP (ref 3.5–5.3)
RBC # BLD: 2.75 M/UL — LOW (ref 3.8–5.2)
RBC # FLD: 14.7 % — HIGH (ref 10.3–14.5)
SODIUM SERPL-SCNC: 135 MMOL/L — SIGNIFICANT CHANGE UP (ref 135–145)
WBC # BLD: 9.85 K/UL — SIGNIFICANT CHANGE UP (ref 3.8–10.5)
WBC # FLD AUTO: 9.85 K/UL — SIGNIFICANT CHANGE UP (ref 3.8–10.5)

## 2024-05-24 RX ORDER — LINEZOLID 600 MG/300ML
600 INJECTION, SOLUTION INTRAVENOUS EVERY 12 HOURS
Refills: 0 | Status: DISCONTINUED | OUTPATIENT
Start: 2024-05-24 | End: 2024-05-28

## 2024-05-24 RX ORDER — POTASSIUM PHOSPHATE, MONOBASIC POTASSIUM PHOSPHATE, DIBASIC 236; 224 MG/ML; MG/ML
15 INJECTION, SOLUTION INTRAVENOUS ONCE
Refills: 0 | Status: COMPLETED | OUTPATIENT
Start: 2024-05-24 | End: 2024-05-24

## 2024-05-24 RX ORDER — MAGNESIUM SULFATE 500 MG/ML
2 VIAL (ML) INJECTION ONCE
Refills: 0 | Status: COMPLETED | OUTPATIENT
Start: 2024-05-24 | End: 2024-05-24

## 2024-05-24 RX ORDER — SODIUM,POTASSIUM PHOSPHATES 278-250MG
2 POWDER IN PACKET (EA) ORAL ONCE
Refills: 0 | Status: COMPLETED | OUTPATIENT
Start: 2024-05-24 | End: 2024-05-24

## 2024-05-24 RX ADMIN — LINEZOLID 300 MILLIGRAM(S): 600 INJECTION, SOLUTION INTRAVENOUS at 07:56

## 2024-05-24 RX ADMIN — POTASSIUM PHOSPHATE, MONOBASIC POTASSIUM PHOSPHATE, DIBASIC 62.5 MILLIMOLE(S): 236; 224 INJECTION, SOLUTION INTRAVENOUS at 08:49

## 2024-05-24 RX ADMIN — Medication 975 MILLIGRAM(S): at 13:00

## 2024-05-24 RX ADMIN — HEPARIN SODIUM 5000 UNIT(S): 5000 INJECTION INTRAVENOUS; SUBCUTANEOUS at 15:42

## 2024-05-24 RX ADMIN — Medication 2: at 08:49

## 2024-05-24 RX ADMIN — ATORVASTATIN CALCIUM 20 MILLIGRAM(S): 80 TABLET, FILM COATED ORAL at 22:25

## 2024-05-24 RX ADMIN — Medication 975 MILLIGRAM(S): at 18:24

## 2024-05-24 RX ADMIN — Medication 975 MILLIGRAM(S): at 05:16

## 2024-05-24 RX ADMIN — Medication 25 GRAM(S): at 08:50

## 2024-05-24 RX ADMIN — Medication 25 MILLIGRAM(S): at 09:56

## 2024-05-24 RX ADMIN — Medication 2 TABLET(S): at 08:49

## 2024-05-24 RX ADMIN — HEPARIN SODIUM 5000 UNIT(S): 5000 INJECTION INTRAVENOUS; SUBCUTANEOUS at 22:25

## 2024-05-24 RX ADMIN — MEMANTINE HYDROCHLORIDE 10 MILLIGRAM(S): 10 TABLET ORAL at 17:53

## 2024-05-24 RX ADMIN — OXYCODONE HYDROCHLORIDE 5 MILLIGRAM(S): 5 TABLET ORAL at 04:36

## 2024-05-24 RX ADMIN — Medication 975 MILLIGRAM(S): at 05:34

## 2024-05-24 RX ADMIN — DEXTROSE MONOHYDRATE, SODIUM CHLORIDE, AND POTASSIUM CHLORIDE 50 MILLILITER(S): 50; .745; 4.5 INJECTION, SOLUTION INTRAVENOUS at 08:50

## 2024-05-24 RX ADMIN — Medication 25 MILLIGRAM(S): at 22:25

## 2024-05-24 RX ADMIN — Medication 975 MILLIGRAM(S): at 01:57

## 2024-05-24 RX ADMIN — MEMANTINE HYDROCHLORIDE 10 MILLIGRAM(S): 10 TABLET ORAL at 05:17

## 2024-05-24 RX ADMIN — Medication 975 MILLIGRAM(S): at 02:20

## 2024-05-24 RX ADMIN — LINEZOLID 300 MILLIGRAM(S): 600 INJECTION, SOLUTION INTRAVENOUS at 17:52

## 2024-05-24 RX ADMIN — PIPERACILLIN AND TAZOBACTAM 25 GRAM(S): 4; .5 INJECTION, POWDER, LYOPHILIZED, FOR SOLUTION INTRAVENOUS at 05:16

## 2024-05-24 RX ADMIN — Medication 975 MILLIGRAM(S): at 17:54

## 2024-05-24 RX ADMIN — Medication 975 MILLIGRAM(S): at 12:30

## 2024-05-24 RX ADMIN — HEPARIN SODIUM 5000 UNIT(S): 5000 INJECTION INTRAVENOUS; SUBCUTANEOUS at 05:17

## 2024-05-24 RX ADMIN — PANTOPRAZOLE SODIUM 40 MILLIGRAM(S): 20 TABLET, DELAYED RELEASE ORAL at 05:18

## 2024-05-24 RX ADMIN — Medication 81 MILLIGRAM(S): at 12:30

## 2024-05-24 RX ADMIN — OXYCODONE HYDROCHLORIDE 5 MILLIGRAM(S): 5 TABLET ORAL at 05:00

## 2024-05-24 NOTE — DIETITIAN INITIAL EVALUATION ADULT - OTHER INFO
Pt on NPO/clears, s/p OR on 5/21. Pt with poor intake of clear liquid meals. Pt currently denied N/V/D or abdominal pain. Daughter requesting additional foods. Discussed clear liquid diet order and recommendation for ensure clears to supplement current intake.  Last BM noted 5/21. S/p colostomy 0ml ouput noted

## 2024-05-24 NOTE — PROGRESS NOTE ADULT - ASSESSMENT
77 y/o F PMhx Glaucoma, DM II, HTN, dementia, diverticulitis who presented after a fall found to have enlarged abscess    Sigmoid diverticulitis w/ perforation and abscess  sepsis- fever, leukocytosis on admission  CT- perforated sigmoid diverticulitis with thick-walled pericolonic multilocular abscess measuring at least up to 3.5 x 1.5 x 4.8 cm w/ moderate stool burden distending the colon proximal to the sigmoid  s/p OR 5/21- Diagnostic laparoscopy converted to open and Ruiz procedure, pus cavity in lateral colonic wall and culture obtained. 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    asymptomatic bacteriuria  denies symptoms    Recommendations  c/w zosyn for now  added linezolid  f/u OR cultures    Geovani Goldman M.D.  [facility], Division of Infectious Diseases  [phone]  After 5pm on weekdays and all day on weekends - please call [phone]

## 2024-05-24 NOTE — DIETITIAN INITIAL EVALUATION ADULT - EDUCATION DIETARY MODIFICATIONS
Diet progression as medically feasible pending tolerance GI function, oral nutrition supplements. Fiber content/modification s/p colostomy/(1) partially meets; needs review/practice/verbalization

## 2024-05-24 NOTE — PROGRESS NOTE ADULT - ASSESSMENT
78F with  sigmoid diverticulitis, multiple chronic infarcts (with unclear residual deficits), dementia (on memantine), T2DM, HTN, HLD, glaucoma, OA of the L knee, PERFECTO, who presents to [facility] ED on 5/19/2024 with c/o left facial droop and slurred speech. Stroke code cancelled as out of 24h window. Unclear stroke hx and baseline neurologic deficits.   In ED was febrile. CTH with bilateral thalamic and basal ganglia infarcts. Thalamic infarcts appear more subacute. Also with chronic cerebellar infarcts. CTA with mld ICA narrowing  o/e with mild to moderate dysarthria, R facial palsy , RUE and RLE drift. AAOx2   MRI brain with L anterior midbrain infarct    - continue aspirin 81mg  - can add Plavix 75mg x 3 weeks post OP if ok per surgery team   - check TTE   - tele, consider ILR on d/c   - on zosyn per primary team  - cont home memantine   - gradual normotension, avoid hypotension  -  LDL 49 (1/24/24) - continue home statin  -  A1c 6.3 (5/11/2024)  - PT/OT  - DVT ppx     Allison Vinson DO  Vascular Neurology  Office [phone] .

## 2024-05-24 NOTE — PROGRESS NOTE ADULT - ASSESSMENT
78F w/ PMHx of Diverticulitis now s/p diagnostic laparoscopy, Incision and drainage of deep pelvic abscess, umbilical hernia repair, Jania's procedure (5/21). Recovering appropriately.    PLAN:  - CLD/IVF   - Strict I&O's  - Monitor KULWANT drain output  - Restart lopressor 25mg  - Analgesia and antiemetics as needed  - DVTppx  - PT --> Rehab    A team [id number]   78F w/ PMHx of Diverticulitis now s/p diagnostic laparoscopy, Incision and drainage of deep pelvic abscess, umbilical hernia repair, Jania's procedure (5/21).     PLAN:  - CLD/IVF, continue   - Strict I&O's  - culture showes VRE- linezolid started   - Monitor KULWANT drain output  -lopressor 25mg  - Analgesia and antiemetics as needed  - DVTppx  - PT --> Rehab    A team [id number]

## 2024-05-24 NOTE — DIETITIAN INITIAL EVALUATION ADULT - PERTINENT LABORATORY DATA
05-24    135  |  106  |  3<L>  ----------------------------<  154<H>  4.0   |  21<L>  |  0.43<L>    Ca    7.6<L>      24 May 2024 06:35  Phos  1.8     05-24  Mg     1.80     05-24    POCT Blood Glucose.: 108 mg/dL (05-24-24 @ 12:08)  A1C with Estimated Average Glucose Result: 6.3 % (05-11-24 @ 05:16)  A1C with Estimated Average Glucose Result: 6.4 % (04-20-24 @ 05:00)  A1C with Estimated Average Glucose Result: 6.3 % (03-23-24 @ 03:10)

## 2024-05-24 NOTE — PROGRESS NOTE ADULT - SUBJECTIVE AND OBJECTIVE BOX
Neurology Progress Note    S: Patient seen and examined. Daughter at bedside    Medications: MEDICATIONS  (STANDING):  acetaminophen     Tablet .. 975 milliGRAM(s) Oral every 6 hours  aspirin enteric coated 81 milliGRAM(s) Oral daily  atorvastatin 20 milliGRAM(s) Oral at bedtime  dextrose 5% + sodium chloride 0.9% with potassium chloride 20 mEq/L 1000 milliLiter(s) (50 mL/Hr) IV Continuous <Continuous>  dextrose 50% Injectable 25 Gram(s) IV Push once  dextrose 50% Injectable 12.5 Gram(s) IV Push once  heparin   Injectable 5000 Unit(s) SubCutaneous every 8 hours  insulin lispro (ADMELOG) corrective regimen sliding scale   SubCutaneous Before meals and at bedtime  linezolid  IVPB 600 milliGRAM(s) IV Intermittent every 12 hours  memantine 10 milliGRAM(s) Oral every 12 hours  metoprolol tartrate 25 milliGRAM(s) Oral every 12 hours  pantoprazole    Tablet 40 milliGRAM(s) Oral before breakfast    MEDICATIONS  (PRN):  oxyCODONE    IR 5 milliGRAM(s) Oral every 4 hours PRN Severe Pain (7 - 10)  oxyCODONE    IR 2.5 milliGRAM(s) Oral every 4 hours PRN Moderate Pain (4 - 6)       Vitals:  Vital Signs Last 24 Hrs  T(C): 37.3 (24 May 2024 06:00), Max: 37.3 (24 May 2024 06:00)  T(F): 99.1 (24 May 2024 06:00), Max: 99.1 (24 May 2024 06:00)  HR: 70 (24 May 2024 06:00) (67 - 91)  BP: 155/84 (24 May 2024 06:00) (105/58 - 155/84)  BP(mean): --  RR: 18 (24 May 2024 06:00) (17 - 19)  SpO2: 100% (24 May 2024 06:00) (95% - 100%)    Parameters below as of 24 May 2024 06:00  Patient On (Oxygen Delivery Method): nasal cannula  O2 Flow (L/min): 3          General Exam:   General Appearance: Appropriately dressed and in no acute distress       Head: Normocephalic, atraumatic and no dysmorphic features  Ear, Nose, and Throat: Moist mucous membranes  CVS: S1S2+  Resp: No SOB, no wheeze or rhonchi  Abd: soft NTND  Extremities: No edema, no cyanosis  Skin: No bruises, no rashes     Neurological Exam:  Mental Status: Awake, alert and oriented x 2.  Able to follow simple commands. Able to name and repeat. fluent speech. No obvious aphasia, mild to moderate dysarthria   Cranial Nerves: PERRL, EOMI, VFFC, sensation V1-V3 intact,  mild L facial palsy - improving , equal elevation of palate, scm/trap 5/5, tongue is midline on protrusion. Hearing is grossly intact.   Motor: LUE and LLE drift  Sensation: dec on the L   Reflexes: 1+ throughout at biceps, brachioradialis, triceps, patellars and ankles bilaterally and equal. No clonus. R toe and L toe were both downgoing.  Coordination: No dysmetria on FNF  Gait: deferred    I personally reviewed the below data/images/labs:    LABS:                          8.1    9.85  )-----------( 294      ( 24 May 2024 06:35 )             24.6     05-24    135  |  106  |  3<L>  ----------------------------<  154<H>  4.0   |  21<L>  |  0.43<L>    Ca    7.6<L>      24 May 2024 06:35  Phos  1.8     05-24  Mg     1.80     05-24          Urinalysis Basic - ( 24 May 2024 06:35 )    Color: x / Appearance: x / SG: x / pH: x  Gluc: 154 mg/dL / Ketone: x  / Bili: x / Urobili: x   Blood: x / Protein: x / Nitrite: x   Leuk Esterase: x / RBC: x / WBC x   Sq Epi: x / Non Sq Epi: x / Bacteria: x          Radiology:  CTH: No acute intracranial hemorrhage or mass effect. Extensive chronic small   vessel ischemic changes in the frontoparietal periventricular and   subcortical white matter. Indeterminate age bilateral thalamic lacunar   infarcts accommodation of dilated perivascular spaces and lacunar   infarcts in the bilateral basal ganglia. Small chronic appearing   bilateral cerebellar infarcts.    CTA NECK:  No significant stenosis of the cervical carotid arteries based   on NASCET criteria. Patent cervical vertebral arteries. No evidence of   cervical carotid or vertebral artery dissection.    CTA HEAD:  No high-grade stenosis or occlusion of the major proximal   arterial branches.    CT PERFUSION:  Perfusion imaging shows no evidence of a core infarct or   tissue at risk.    < from: MR Head No Cont (05.20.24 @ 20:13) >    ACC: [id number] EXAM:  MR BRAIN   ORDERED BY: ARY POE     PROCEDURE DATE:  05/20/2024          INTERPRETATION:  CLINICAL INDICATION: Patient recently fell at home.   Confused.    TECHNIQUE: MRI of the brain was performed without contrast.    COMPARISON: CT brain 5/19/2024    FINDINGS:  Acute infarct involving the anterior left midbrain.    No acute intracranial hemorrhage, mass effect, or hydrocephalus. No   extra-axial collection. Basal cisterns are patent.    Age-related involutional changes and chronic microvascular ischemic   changes. Chronic lacunar infarcts involving the bilateral thalami. Small   chronic infarcts involving the bilateral cerebral hemispheres.    Ventricles and sulci are age-appropriate in size.    Major intracranial flow-voids are preserved.    Left cataract surgery. The orbits, sellar and suprasellar structures, and   craniocervical junction are otherwise unremarkable. Visualized paranasal   sinuses and mastoid air cells are clear.    IMPRESSION:  Acute infarct involving the anterior left midbrain.    --- End of Report ---              < end of copied text >     Neurology Progress Note    S: Patient seen and examined.     Medications: MEDICATIONS  (STANDING):  acetaminophen     Tablet .. 975 milliGRAM(s) Oral every 6 hours  aspirin enteric coated 81 milliGRAM(s) Oral daily  atorvastatin 20 milliGRAM(s) Oral at bedtime  dextrose 5% + sodium chloride 0.9% with potassium chloride 20 mEq/L 1000 milliLiter(s) (50 mL/Hr) IV Continuous <Continuous>  dextrose 50% Injectable 25 Gram(s) IV Push once  dextrose 50% Injectable 12.5 Gram(s) IV Push once  heparin   Injectable 5000 Unit(s) SubCutaneous every 8 hours  insulin lispro (ADMELOG) corrective regimen sliding scale   SubCutaneous Before meals and at bedtime  linezolid  IVPB 600 milliGRAM(s) IV Intermittent every 12 hours  memantine 10 milliGRAM(s) Oral every 12 hours  metoprolol tartrate 25 milliGRAM(s) Oral every 12 hours  pantoprazole    Tablet 40 milliGRAM(s) Oral before breakfast    MEDICATIONS  (PRN):  oxyCODONE    IR 5 milliGRAM(s) Oral every 4 hours PRN Severe Pain (7 - 10)  oxyCODONE    IR 2.5 milliGRAM(s) Oral every 4 hours PRN Moderate Pain (4 - 6)       Vitals:  Vital Signs Last 24 Hrs  T(C): 37.3 (24 May 2024 06:00), Max: 37.3 (24 May 2024 06:00)  T(F): 99.1 (24 May 2024 06:00), Max: 99.1 (24 May 2024 06:00)  HR: 70 (24 May 2024 06:00) (67 - 91)  BP: 155/84 (24 May 2024 06:00) (105/58 - 155/84)  BP(mean): --  RR: 18 (24 May 2024 06:00) (17 - 19)  SpO2: 100% (24 May 2024 06:00) (95% - 100%)    Parameters below as of 24 May 2024 06:00  Patient On (Oxygen Delivery Method): nasal cannula  O2 Flow (L/min): 3          General Exam:   General Appearance: Appropriately dressed and in no acute distress       Head: Normocephalic, atraumatic and no dysmorphic features  Ear, Nose, and Throat: Moist mucous membranes  CVS: S1S2+  Resp: No SOB, no wheeze or rhonchi  Abd: soft NTND  Extremities: No edema, no cyanosis  Skin: No bruises, no rashes     Neurological Exam:  Mental Status: Awake, alert and oriented x 2.  Able to follow simple commands. Able to name and repeat. fluent speech. No obvious aphasia, mild to moderate dysarthria   Cranial Nerves: PERRL, EOMI, VFFC, sensation V1-V3 intact,  mild L facial palsy - improving , equal elevation of palate, scm/trap 5/5, tongue is midline on protrusion. Hearing is grossly intact.   Motor: LUE and LLE drift  Sensation: dec on the L   Reflexes: 1+ throughout at biceps, brachioradialis, triceps, patellars and ankles bilaterally and equal. No clonus. R toe and L toe were both downgoing.  Coordination: No dysmetria on FNF  Gait: deferred    I personally reviewed the below data/images/labs:    LABS:                          8.1    9.85  )-----------( 294      ( 24 May 2024 06:35 )             24.6     05-24    135  |  106  |  3<L>  ----------------------------<  154<H>  4.0   |  21<L>  |  0.43<L>    Ca    7.6<L>      24 May 2024 06:35  Phos  1.8     05-24  Mg     1.80     05-24          Urinalysis Basic - ( 24 May 2024 06:35 )    Color: x / Appearance: x / SG: x / pH: x  Gluc: 154 mg/dL / Ketone: x  / Bili: x / Urobili: x   Blood: x / Protein: x / Nitrite: x   Leuk Esterase: x / RBC: x / WBC x   Sq Epi: x / Non Sq Epi: x / Bacteria: x          Radiology:  CTH: No acute intracranial hemorrhage or mass effect. Extensive chronic small   vessel ischemic changes in the frontoparietal periventricular and   subcortical white matter. Indeterminate age bilateral thalamic lacunar   infarcts accommodation of dilated perivascular spaces and lacunar   infarcts in the bilateral basal ganglia. Small chronic appearing   bilateral cerebellar infarcts.    CTA NECK:  No significant stenosis of the cervical carotid arteries based   on NASCET criteria. Patent cervical vertebral arteries. No evidence of   cervical carotid or vertebral artery dissection.    CTA HEAD:  No high-grade stenosis or occlusion of the major proximal   arterial branches.    CT PERFUSION:  Perfusion imaging shows no evidence of a core infarct or   tissue at risk.    < from: MR Head No Cont (05.20.24 @ 20:13) >    ACC: [id number] EXAM:  MR BRAIN   ORDERED BY: ARY POE     PROCEDURE DATE:  05/20/2024          INTERPRETATION:  CLINICAL INDICATION: Patient recently fell at home.   Confused.    TECHNIQUE: MRI of the brain was performed without contrast.    COMPARISON: CT brain 5/19/2024    FINDINGS:  Acute infarct involving the anterior left midbrain.    No acute intracranial hemorrhage, mass effect, or hydrocephalus. No   extra-axial collection. Basal cisterns are patent.    Age-related involutional changes and chronic microvascular ischemic   changes. Chronic lacunar infarcts involving the bilateral thalami. Small   chronic infarcts involving the bilateral cerebral hemispheres.    Ventricles and sulci are age-appropriate in size.    Major intracranial flow-voids are preserved.    Left cataract surgery. The orbits, sellar and suprasellar structures, and   craniocervical junction are otherwise unremarkable. Visualized paranasal   sinuses and mastoid air cells are clear.    IMPRESSION:  Acute infarct involving the anterior left midbrain.    --- End of Report ---              < end of copied text >

## 2024-05-24 NOTE — PROGRESS NOTE ADULT - SUBJECTIVE AND OBJECTIVE BOX
DATE OF SERVICE: 05-24-24    resting comfortably, unable to obtain ROS    Review of Systems:   Constitutional: [ ] fevers, [ ] chills.   Skin: [ ] dry skin. [ ] rashes.  Psychiatric: [ ] depression, [ ] anxiety.   Gastrointestinal: [ ] BRBPR, [ ] melena.   Neurological: [ ] confusion. [ ] seizures. [ ] shuffling gait.   Ears,Nose,Mouth and Throat: [ ] ear pain [ ] sore throat.   Eyes: [ ] diplopia.   Respiratory: [ ] hemoptysis. [ ] shortness of breath  Cardiovascular: See HPI above  Hematologic/Lymphatic: [ ] anemia. [ ] painful nodes. [ ] prolonged bleeding.   Genitourinary: [ ] hematuria. [ ] flank pain.   Endocrine: [ ] significant change in weight. [ ] intolerance to heat and cold.     Review of systems [x ] otherwise negative, [ ] otherwise unable to obtain    FH: no family history of sudden cardiac death in first degree relatives    SH: [ ] tobacco, [ ] alcohol, [ ] drugs    acetaminophen     Tablet .. 975 milliGRAM(s) Oral every 6 hours  aspirin enteric coated 81 milliGRAM(s) Oral daily  atorvastatin 20 milliGRAM(s) Oral at bedtime  dextrose 5% + sodium chloride 0.9% with potassium chloride 20 mEq/L 1000 milliLiter(s) IV Continuous <Continuous>  dextrose 50% Injectable 25 Gram(s) IV Push once  dextrose 50% Injectable 12.5 Gram(s) IV Push once  heparin   Injectable 5000 Unit(s) SubCutaneous every 8 hours  insulin lispro (ADMELOG) corrective regimen sliding scale   SubCutaneous Before meals and at bedtime  linezolid  IVPB 600 milliGRAM(s) IV Intermittent every 12 hours  memantine 10 milliGRAM(s) Oral every 12 hours  metoprolol tartrate 25 milliGRAM(s) Oral every 12 hours  oxyCODONE    IR 5 milliGRAM(s) Oral every 4 hours PRN  oxyCODONE    IR 2.5 milliGRAM(s) Oral every 4 hours PRN  pantoprazole    Tablet 40 milliGRAM(s) Oral before breakfast                            8.1    9.85  )-----------( 294      ( 24 May 2024 06:35 )             24.6       135  |  106  |  3<L>  ----------------------------<  154<H>  4.0   |  21<L>  |  0.43<L>    Ca    7.6<L>      24 May 2024 06:35  Phos  1.8     05-24  Mg     1.80     05-24      T(C): 36.8 (05-24-24 @ 09:45), Max: 37.3 (05-24-24 @ 06:00)  HR: 69 (05-24-24 @ 09:45) (67 - 89)  BP: 139/72 (05-24-24 @ 09:45) (128/72 - 155/84)  RR: 16 (05-24-24 @ 09:45) (16 - 18)  SpO2: 97% (05-24-24 @ 09:45) (95% - 100%)  Wt(kg): --    I&O's Summary    23 May 2024 07:01  -  24 May 2024 07:00  --------------------------------------------------------  IN: 1985 mL / OUT: 2677.5 mL / NET: -692.5 mL    24 May 2024 07:01  -  24 May 2024 11:25  --------------------------------------------------------  IN: 0 mL / OUT: 550 mL / NET: -550 mL    General: pleasant, confused, comfortable  Head: Normocephalic and atraumatic.   Neck: No JVD. No bruitsNo M/R/G  Lungs: CTA b/l. diminshed at bases A/L limited exam  Abdomen: deferred  Extremities: No clubbing/cyanosis/edema.   Neurologic: Moves all four extremities.   Skin: Warm and moist. The patient's skin has normal elasticity and good skin turgor.   Psychiatric: cannot fully assess  Musculoskeletal: Normal range of motion, normal strength     TELEMETRY: 	  NONE    ECG:  	NSR    < from: Xray Chest 1 View- PORTABLE-Urgent (Xray Chest 1 View- PORTABLE-Urgent .) (05.23.24 @ 09:35) >  FINDINGS:    Cardiopericardial silhouette is magnified with this projection.  Lungs show no focal consolidations.  Trace left effusion.  There is no pneumothorax.  No acute bony abnormality.    IMPRESSION: Clear lungs with trace left effusion.    < end of copied text >      ASSESSMENT/PLAN: Pt is a 78F with PMHx diverticulitis, DM2, HTN, HLD,with recent hospitalization for complicated diverticulitis, discharged 5/15 on long-term IV ABX presents after fall from home with AMS, found to have fevers  and a  CT AP showing perforated sigmoid diverticulitis with thick-walled pericolonic multilocular abscess measuring at least up to 3.5 x 1.5 x 4.8 cm. Cardiology called for pre-op risk stratification prior to OR. Currently denies any chest pain, SOB, orthopnea.    Pre-OP/HTN/HLD  - no anginal chest pain, no q waves on EKG euvolemic on exam, no severely stenotic valvular disease on PE, recent acute CVA  - Elevated risk for for intraperitoneal surgery, can proceed to OR with no further CV work-up  --tolerated procedure well from CV perspective  - s/p drainage of deep pelvic abscess and open Ruiz procedure  -c/w Lopressor 25 mg q 12  -c/w Lipitor 20 mg  -cont post op incentive spirometer

## 2024-05-24 NOTE — DIETITIAN NUTRITION RISK NOTIFICATION - ADDITIONAL COMMENTS/DIETITIAN RECOMMENDATIONS
Please see Dietitian Initial Assessment for complete recommendations.  Lauren Pathak MS, RD, CDN, CNSC (pg #[phone])

## 2024-05-24 NOTE — DIETITIAN INITIAL EVALUATION ADULT - NSFNSGIIOFT_GEN_A_CORE
05-23-24 @ 07:01  -  05-24-24 @ 07:00  --------------------------------------------------------  OUT:    Colostomy (mL): 0 mL  Total OUT: 0 mL    Total NET: 0 mL      05-24-24 @ 07:01  -  05-24-24 @ 15:44  --------------------------------------------------------  OUT:    Colostomy (mL): 0 mL  Total OUT: 0 mL    Total NET: 0 mL

## 2024-05-24 NOTE — DIETITIAN INITIAL EVALUATION ADULT - ORAL NUTRITION SUPPLEMENTS
Currently Recommend Ensure Clear 8oz Daily (240 kcal, 8g pro)  LPS SF  kcal, 15g pro/30mL to supplement intake

## 2024-05-24 NOTE — PROGRESS NOTE ADULT - ASSESSMENT
79 y/o F with pmhx of Diverticulitis, Type-2 DM, HTN, HLD, Glaucoma, OA of the L-knee and PERFECTO, presented to the ED for new onset lethargy. Found to have leukocytosis likely from pneumonia vs. diverticulitis. The patient does not appear lethargic. Admit for IV abx.      Perforated  Diverticulitis.   - surgery as per colorectal   - sp OR   - cw abx  - will monitor     CVA  - neuro fu appreciated  - imaging reviewed  - Plavix after 3 weeks     Benign essential HTN.   hold BP meds.  restart as needed      DM2 (diabetes mellitus, type 2).   - monitor FS  - ISS      Heparin sc for DVT prophylaxis

## 2024-05-24 NOTE — DIETITIAN INITIAL EVALUATION ADULT - PERTINENT MEDS FT
MEDICATIONS  (STANDING):  acetaminophen     Tablet .. 975 milliGRAM(s) Oral every 6 hours  aspirin enteric coated 81 milliGRAM(s) Oral daily  atorvastatin 20 milliGRAM(s) Oral at bedtime  dextrose 5% + sodium chloride 0.9% with potassium chloride 20 mEq/L 1000 milliLiter(s) (50 mL/Hr) IV Continuous <Continuous>  dextrose 50% Injectable 25 Gram(s) IV Push once  dextrose 50% Injectable 12.5 Gram(s) IV Push once  heparin   Injectable 5000 Unit(s) SubCutaneous every 8 hours  insulin lispro (ADMELOG) corrective regimen sliding scale   SubCutaneous Before meals and at bedtime  linezolid  IVPB 600 milliGRAM(s) IV Intermittent every 12 hours  memantine 10 milliGRAM(s) Oral every 12 hours  metoprolol tartrate 25 milliGRAM(s) Oral every 12 hours  pantoprazole    Tablet 40 milliGRAM(s) Oral before breakfast    MEDICATIONS  (PRN):  oxyCODONE    IR 5 milliGRAM(s) Oral every 4 hours PRN Severe Pain (7 - 10)  oxyCODONE    IR 2.5 milliGRAM(s) Oral every 4 hours PRN Moderate Pain (4 - 6)

## 2024-05-24 NOTE — DIETITIAN INITIAL EVALUATION ADULT - ADD RECOMMEND
Monitor I/Os, diet tolerance/progression, labs/electrolytes  Continue review of % intake on RN flowsheet   New Weights, monitor weight trends

## 2024-05-24 NOTE — PROGRESS NOTE ADULT - SUBJECTIVE AND OBJECTIVE BOX
[facility], Division of Infectious Diseases  JORGE LUIS Chaves Y. Patel, S. Shah, G. Jones  [phone]  ([phone] - weekdays after 5pm and weekends)    Name: TY DAVIS  Age/Gender: 78y Female  MRN: [id number]    Interval History:  Notes reviewed.   No concerning overnight events.  Afebrile.     Allergies: No Known Allergies      Objective:  Vitals:   T(F): 98.2 (05-24-24 @ 09:45), Max: 99.1 (05-24-24 @ 06:00)  HR: 69 (05-24-24 @ 09:45) (67 - 89)  BP: 139/72 (05-24-24 @ 09:45) (128/72 - 155/84)  RR: 16 (05-24-24 @ 09:45) (16 - 18)  SpO2: 97% (05-24-24 @ 09:45) (95% - 100%)  Physical Examination:  General: no acute distress  HEENT: anicteric  Cardio: S1, S2, normal rate  Resp: clear to auscultation anteriorly   Abd: abdominal dressing, colostomy  Ext: no LE edema  Skin: warm, dry    Laboratory Studies:  CBC:                       8.1    9.85  )-----------( 294      ( 24 May 2024 06:35 )             24.6     WBC Trend:  9.85 05-24-24 @ 06:35  11.64 05-23-24 @ 07:01  11.41 05-22-24 @ 05:09  9.17 05-21-24 @ 03:54  11.48 05-20-24 @ 06:36  10.97 05-19-24 @ 21:53    CMP: 05-24    135  |  106  |  3<L>  ----------------------------<  154<H>  4.0   |  21<L>  |  0.43<L>    Ca    7.6<L>      24 May 2024 06:35  Phos  1.8     05-24  Mg     1.80     05-24            Urinalysis Basic - ( 24 May 2024 06:35 )    Color: x / Appearance: x / SG: x / pH: x  Gluc: 154 mg/dL / Ketone: x  / Bili: x / Urobili: x   Blood: x / Protein: x / Nitrite: x   Leuk Esterase: x / RBC: x / WBC x   Sq Epi: x / Non Sq Epi: x / Bacteria: x      Microbiology: reviewed     Culture - Abscess with Gram Stain (collected 05-21-24 @ 15:20)  Source: .Abscess Perforated Diverticulitis  Gram Stain (05-22-24 @ 05:51):    Numerous polymorphonuclear leukocytes seen per low power field    Few Gram positive cocci in pairs seen per oil power field    Rare Gram Negative Rods seen per oil power field  Preliminary Report (05-24-24 @ 07:55):    Moderate Enterococcus faecium (vancomycin resistant)  Organism: Enterococcus faecium (vancomycin resistant) (05-24-24 @ 07:54)  Organism: Enterococcus faecium (vancomycin resistant) (05-24-24 @ 07:54)      -  Levofloxacin: R >4      -  Daptomycin: SDD 2 The breakpoint for SDD (susceptible dose dependent)is based on a dosage regimen of 8-12 mg/kg administered every 24 h in adults and is intended for serious infections due to E. faecium. Consultation with an infectious diseases specialist is recommended.      -  Linezolid: S 2      -  Vancomycin: R >16      -  Ampicillin: R >8 Predicts results to ampicillin/sulbactam, amoxacillin-clavulanate and  piperacillin-tazobactam.      Method Type: MAYELIN    Culture - Urine (collected 05-20-24 @ 00:45)  Source: Clean Catch Clean Catch (Midstream)  Final Report (05-22-24 @ 17:47):    10,000 - 49,000 CFU/mL Enterococcus faecium (vancomycin resistant)  Organism: Enterococcus faecium (vancomycin resistant) (05-22-24 @ 17:47)  Organism: Enterococcus faecium (vancomycin resistant) (05-22-24 @ 17:47)      -  Nitrofurantoin: S <=32 Should not be used to treat pyelonephritis.      -  Daptomycin: SDD 4 The breakpoint for SDD (susceptible dose dependent)is based on a dosage regimen of 8-12 mg/kg administered every 24 h in adults and is intended for serious infections due to E. faecium. Consultation with an infectious diseases specialist is recommended.      -  Linezolid: S 2      -  Vancomycin: R >16      -  Ciprofloxacin: R >2      -  Ampicillin: R >8 Predicts results to ampicillin/sulbactam, amoxacillin-clavulanate and  piperacillin-tazobactam.      -  Tetracycline: R >8      Method Type: MAYELIN      -  Levofloxacin: R >4    Culture - Blood (collected 05-19-24 @ 21:39)  Source: .Blood Blood-Peripheral  Preliminary Report (05-24-24 @ 03:02):    No growth at 4 days    Culture - Blood (collected 05-19-24 @ 21:30)  Source: .Blood Blood-Peripheral  Preliminary Report (05-24-24 @ 03:02):    No growth at 4 days    Culture - Urine (collected 05-12-24 @ 00:45)  Source: OR Collect Bladder (from O.R.)  Final Report (05-14-24 @ 15:18):    <10,000 CFU/ml Enterococcus faecium (vancomycin resistant)    <10,000 CFU/ml Enterococcus faecalis  Organism: Enterococcus faecium (vancomycin resistant)  Enterococcus faecalis (05-14-24 @ 15:18)  Organism: Enterococcus faecalis (05-14-24 @ 15:18)      -  Vancomycin: S 2      -  Ciprofloxacin: R >2      -  Ampicillin: S <=2 Predicts results to ampicillin/sulbactam, amoxacillin-clavulanate and  piperacillin-tazobactam.      Method Type: MAYELIN      -  Levofloxacin: R >4  Organism: Enterococcus faecium (vancomycin resistant) (05-14-24 @ 15:18)      -  Daptomycin: SDD 2 The breakpoint for SDD (susceptible dose dependent)is based on a dosage regimen of 8-12 mg/kg administered every 24 h in adults and is intended for serious infections due to E. faecium. Consultation with an infectious diseases specialist is recommended.      -  Linezolid: S 2      -  Vancomycin: R >16      -  Ciprofloxacin: R >2      -  Ampicillin: R >8 Predicts results to ampicillin/sulbactam, amoxacillin-clavulanate and  piperacillin-tazobactam.      Method Type: MAYELIN      -  Levofloxacin: R >4    Culture - Urine (collected 05-11-24 @ 19:40)  Source: Clean Catch Clean Catch (Midstream)  Final Report (05-14-24 @ 16:55):    10,000 - 49,000 CFU/mL Enterococcus faecium (vancomycin resistant)    <10,000 CFU/ml Normal Urogenital helen present  Organism: Enterococcus faecium (vancomycin resistant) (05-14-24 @ 16:55)  Organism: Enterococcus faecium (vancomycin resistant) (05-14-24 @ 16:55)      -  Levofloxacin: R >4      -  Nitrofurantoin: S <=32 Should not be used to treat pyelonephritis.      -  Daptomycin: SDD 4 The breakpoint for SDD (susceptible dose dependent)is based on a dosage regimen of 8-12 mg/kg administered every 24 h in adults and is intended for serious infections due to E. faecium. Consultation with an infectious diseases specialist is recommended.      -  Linezolid: S 2      -  Vancomycin: R >16      -  Ciprofloxacin: R >2      -  Ampicillin: R >8 Predicts results to ampicillin/sulbactam, amoxacillin-clavulanate and  piperacillin-tazobactam.      -  Tetracycline: R >8      Method Type: MAYELIN    Culture - Blood (collected 05-11-24 @ 19:25)  Source: .Blood Blood-Peripheral  Final Report (05-17-24 @ 02:01):    No growth at 5 days    Culture - Blood (collected 05-11-24 @ 19:18)  Source: .Blood Blood-Peripheral  Final Report (05-17-24 @ 02:01):    No growth at 5 days        Radiology: reviewed     Medications:  acetaminophen     Tablet .. 975 milliGRAM(s) Oral every 6 hours  aspirin enteric coated 81 milliGRAM(s) Oral daily  atorvastatin 20 milliGRAM(s) Oral at bedtime  dextrose 5% + sodium chloride 0.9% with potassium chloride 20 mEq/L 1000 milliLiter(s) IV Continuous <Continuous>  dextrose 50% Injectable 25 Gram(s) IV Push once  dextrose 50% Injectable 12.5 Gram(s) IV Push once  heparin   Injectable 5000 Unit(s) SubCutaneous every 8 hours  insulin lispro (ADMELOG) corrective regimen sliding scale   SubCutaneous Before meals and at bedtime  linezolid  IVPB 600 milliGRAM(s) IV Intermittent every 12 hours  memantine 10 milliGRAM(s) Oral every 12 hours  metoprolol tartrate 25 milliGRAM(s) Oral every 12 hours  oxyCODONE    IR 5 milliGRAM(s) Oral every 4 hours PRN  oxyCODONE    IR 2.5 milliGRAM(s) Oral every 4 hours PRN  pantoprazole    Tablet 40 milliGRAM(s) Oral before breakfast    Antimicrobials:  linezolid  IVPB 600 milliGRAM(s) IV Intermittent every 12 hours

## 2024-05-24 NOTE — PROGRESS NOTE ADULT - SUBJECTIVE AND OBJECTIVE BOX
Surgery Progress Note    OVERNIGHT EVENTS: NAEO    SUBJECTIVE: Pt seen and examined at bedside. Patient comfortable and in no-apparent distress. Pain is controlled. Patient tolerating clears diet without N/v     Vital Signs Last 24 Hrs  T(C): 37.3 (24 May 2024 06:00), Max: 37.3 (24 May 2024 06:00)  T(F): 99.1 (24 May 2024 06:00), Max: 99.1 (24 May 2024 06:00)  HR: 70 (24 May 2024 06:00) (67 - 91)  BP: 155/84 (24 May 2024 06:00) (105/58 - 155/84)  BP(mean): --  RR: 18 (24 May 2024 06:00) (17 - 19)  SpO2: 100% (24 May 2024 06:00) (95% - 100%)    Parameters below as of 24 May 2024 06:00  Patient On (Oxygen Delivery Method): nasal cannula  O2 Flow (L/min): 3      PHYSICAL EXAM:    Constitutional: NAD  Respiratory: non-labored breathing, patent airway  Gastrointestinal: abdomen soft, nontender, nondistended. LUQ ostomy pink, with no output in the bag, KULWANT SS.  Extremities: warm  Neurological: intact        INs and OUTs:    05-23-24 @ 07:01  -  05-24-24 @ 07:00  --------------------------------------------------------  IN: 1985 mL / OUT: 2677.5 mL / NET: -692.5 mL        LABS:                        8.1    9.85  )-----------( 294      ( 24 May 2024 06:35 )             24.6     05-24    135  |  106  |  3<L>  ----------------------------<  154<H>  4.0   |  21<L>  |  0.43<L>    Ca    7.6<L>      24 May 2024 06:35  Phos  1.8     05-24  Mg     1.80     05-24        Urinalysis Basic - ( 24 May 2024 06:35 )    Color: x / Appearance: x / SG: x / pH: x  Gluc: 154 mg/dL / Ketone: x  / Bili: x / Urobili: x   Blood: x / Protein: x / Nitrite: x   Leuk Esterase: x / RBC: x / WBC x   Sq Epi: x / Non Sq Epi: x / Bacteria: x         Surgery Progress Note    OVERNIGHT EVENTS: NAEO, abscess growing VRE    SUBJECTIVE: Pt seen and examined at bedside. Patient comfortable and in no-apparent distress. Pain is controlled. Patient tolerating clears diet without N/v     Vital Signs Last 24 Hrs  T(C): 37.3 (24 May 2024 06:00), Max: 37.3 (24 May 2024 06:00)  T(F): 99.1 (24 May 2024 06:00), Max: 99.1 (24 May 2024 06:00)  HR: 70 (24 May 2024 06:00) (67 - 91)  BP: 155/84 (24 May 2024 06:00) (105/58 - 155/84)  BP(mean): --  RR: 18 (24 May 2024 06:00) (17 - 19)  SpO2: 100% (24 May 2024 06:00) (95% - 100%)    Parameters below as of 24 May 2024 06:00  Patient On (Oxygen Delivery Method): nasal cannula  O2 Flow (L/min): 3      PHYSICAL EXAM:    Constitutional: NAD  Respiratory: non-labored breathing, patent airway  Gastrointestinal: abdomen soft, nontender, nondistended. LUQ ostomy pink, with no output in the bag, KULWANT SS.  Extremities: warm  Neurological: intact        INs and OUTs:    05-23-24 @ 07:01  -  05-24-24 @ 07:00  --------------------------------------------------------  IN: 1985 mL / OUT: 2677.5 mL / NET: -692.5 mL        LABS:                        8.1    9.85  )-----------( 294      ( 24 May 2024 06:35 )             24.6     05-24    135  |  106  |  3<L>  ----------------------------<  154<H>  4.0   |  21<L>  |  0.43<L>    Ca    7.6<L>      24 May 2024 06:35  Phos  1.8     05-24  Mg     1.80     05-24        Urinalysis Basic - ( 24 May 2024 06:35 )    Color: x / Appearance: x / SG: x / pH: x  Gluc: 154 mg/dL / Ketone: x  / Bili: x / Urobili: x   Blood: x / Protein: x / Nitrite: x   Leuk Esterase: x / RBC: x / WBC x   Sq Epi: x / Non Sq Epi: x / Bacteria: x

## 2024-05-24 NOTE — DIETITIAN INITIAL EVALUATION ADULT - ORAL INTAKE PTA/DIET HISTORY
79 y/o F with pmhx of Diverticulitis, Type-2 DM, HTN, HLD, Glaucoma, OA of the L-knee and PERFECTO, presented to the ED for new onset lethargy. Found to have leukocytosis likely from pneumonia vs. diverticulitis s/p OR 5/21- Diagnostic laparoscopy converted to open and Ruiz procedure, pus cavity in lateral colonic wall and culture obtained. colostomy  per chart review    Per patient daughter eating poorly for atleast 3 months, only eats a few bites at meals. No consistent meal pattern due to poor appetite. Daughter recent concerns for N/V/D or abdominal pain. Denied chewing or swallowing concerns. No known food allergies. Specified preference for no beef or pork. Pt has top and bottom dentures. Unable to specify UBW. Current weight noted 119 lbs/54 kg) Recent [facility] HIE: 4/19/24 123 lbs (-4lbs/3.3% in 1 month weight change). 56 kg, 3/21/24 118 lbs/53.6kg and 1/22/2024 160 lbs/72.6 kg (-41 lbs/25% in 4 months time frame, questionable accuracy of weight)

## 2024-05-24 NOTE — PROGRESS NOTE ADULT - SUBJECTIVE AND OBJECTIVE BOX
Patient is a 78y old  Female who presents with a chief complaint of Perforated diverticulitis (24 May 2024 13:24)    Date of servie : 05-24-24 @ 14:26  INTERVAL HPI/OVERNIGHT EVENTS:  T(C): 36.5 (05-24-24 @ 14:00), Max: 37.3 (05-24-24 @ 06:00)  HR: 564 (05-24-24 @ 14:00) (67 - 564)  BP: 128/79 (05-24-24 @ 14:00) (128/72 - 155/84)  RR: 16 (05-24-24 @ 14:00) (16 - 18)  SpO2: 100% (05-24-24 @ 14:00) (96% - 100%)  Wt(kg): --  I&O's Summary    23 May 2024 07:01  -  24 May 2024 07:00  --------------------------------------------------------  IN: 1985 mL / OUT: 2677.5 mL / NET: -692.5 mL    24 May 2024 07:01  -  24 May 2024 14:26  --------------------------------------------------------  IN: 850 mL / OUT: 555 mL / NET: 295 mL        LABS:                        8.1    9.85  )-----------( 294      ( 24 May 2024 06:35 )             24.6     05-24    135  |  106  |  3<L>  ----------------------------<  154<H>  4.0   |  21<L>  |  0.43<L>    Ca    7.6<L>      24 May 2024 06:35  Phos  1.8     05-24  Mg     1.80     05-24        Urinalysis Basic - ( 24 May 2024 06:35 )    Color: x / Appearance: x / SG: x / pH: x  Gluc: 154 mg/dL / Ketone: x  / Bili: x / Urobili: x   Blood: x / Protein: x / Nitrite: x   Leuk Esterase: x / RBC: x / WBC x   Sq Epi: x / Non Sq Epi: x / Bacteria: x      CAPILLARY BLOOD GLUCOSE      POCT Blood Glucose.: 108 mg/dL (24 May 2024 12:08)  POCT Blood Glucose.: 212 mg/dL (24 May 2024 08:33)  POCT Blood Glucose.: 129 mg/dL (23 May 2024 21:23)  POCT Blood Glucose.: 119 mg/dL (23 May 2024 17:39)        Urinalysis Basic - ( 24 May 2024 06:35 )    Color: x / Appearance: x / SG: x / pH: x  Gluc: 154 mg/dL / Ketone: x  / Bili: x / Urobili: x   Blood: x / Protein: x / Nitrite: x   Leuk Esterase: x / RBC: x / WBC x   Sq Epi: x / Non Sq Epi: x / Bacteria: x        MEDICATIONS  (STANDING):  acetaminophen     Tablet .. 975 milliGRAM(s) Oral every 6 hours  aspirin enteric coated 81 milliGRAM(s) Oral daily  atorvastatin 20 milliGRAM(s) Oral at bedtime  dextrose 5% + sodium chloride 0.9% with potassium chloride 20 mEq/L 1000 milliLiter(s) (50 mL/Hr) IV Continuous <Continuous>  dextrose 50% Injectable 25 Gram(s) IV Push once  dextrose 50% Injectable 12.5 Gram(s) IV Push once  heparin   Injectable 5000 Unit(s) SubCutaneous every 8 hours  insulin lispro (ADMELOG) corrective regimen sliding scale   SubCutaneous Before meals and at bedtime  linezolid  IVPB 600 milliGRAM(s) IV Intermittent every 12 hours  memantine 10 milliGRAM(s) Oral every 12 hours  metoprolol tartrate 25 milliGRAM(s) Oral every 12 hours  pantoprazole    Tablet 40 milliGRAM(s) Oral before breakfast    MEDICATIONS  (PRN):  oxyCODONE    IR 2.5 milliGRAM(s) Oral every 4 hours PRN Moderate Pain (4 - 6)  oxyCODONE    IR 5 milliGRAM(s) Oral every 4 hours PRN Severe Pain (7 - 10)          PHYSICAL EXAM:  GENERAL: frail  CHEST/LUNG: Clear to percussion bilaterally; No rales, rhonchi, wheezing, or rubs  HEART: Regular rate and rhythm; No murmurs, rubs, or gallops  ABDOMEN: Soft, Nontender, Nondistended; Bowel sounds present  EXTREMITIES: edema +    Care Discussed with Consultants/Other Providers [ ] YES  [ ] NO

## 2024-05-25 LAB
ANION GAP SERPL CALC-SCNC: 9 MMOL/L — SIGNIFICANT CHANGE UP (ref 7–14)
BUN SERPL-MCNC: 3 MG/DL — LOW (ref 7–23)
CALCIUM SERPL-MCNC: 8.5 MG/DL — SIGNIFICANT CHANGE UP (ref 8.4–10.5)
CHLORIDE SERPL-SCNC: 107 MMOL/L — SIGNIFICANT CHANGE UP (ref 98–107)
CO2 SERPL-SCNC: 23 MMOL/L — SIGNIFICANT CHANGE UP (ref 22–31)
CREAT SERPL-MCNC: 0.51 MG/DL — SIGNIFICANT CHANGE UP (ref 0.5–1.3)
CULTURE RESULTS: SIGNIFICANT CHANGE UP
CULTURE RESULTS: SIGNIFICANT CHANGE UP
EGFR: 97 ML/MIN/1.73M2 — SIGNIFICANT CHANGE UP
GLUCOSE BLDC GLUCOMTR-MCNC: 103 MG/DL — HIGH (ref 70–99)
GLUCOSE BLDC GLUCOMTR-MCNC: 112 MG/DL — HIGH (ref 70–99)
GLUCOSE BLDC GLUCOMTR-MCNC: 152 MG/DL — HIGH (ref 70–99)
GLUCOSE BLDC GLUCOMTR-MCNC: 95 MG/DL — SIGNIFICANT CHANGE UP (ref 70–99)
GLUCOSE SERPL-MCNC: 98 MG/DL — SIGNIFICANT CHANGE UP (ref 70–99)
HCT VFR BLD CALC: 28.8 % — LOW (ref 34.5–45)
HGB BLD-MCNC: 9.3 G/DL — LOW (ref 11.5–15.5)
MAGNESIUM SERPL-MCNC: 2 MG/DL — SIGNIFICANT CHANGE UP (ref 1.6–2.6)
MCHC RBC-ENTMCNC: 29.7 PG — SIGNIFICANT CHANGE UP (ref 27–34)
MCHC RBC-ENTMCNC: 32.3 GM/DL — SIGNIFICANT CHANGE UP (ref 32–36)
MCV RBC AUTO: 92 FL — SIGNIFICANT CHANGE UP (ref 80–100)
NRBC # BLD: 0 /100 WBCS — SIGNIFICANT CHANGE UP (ref 0–0)
NRBC # FLD: 0 K/UL — SIGNIFICANT CHANGE UP (ref 0–0)
PHOSPHATE SERPL-MCNC: 2.2 MG/DL — LOW (ref 2.5–4.5)
PLATELET # BLD AUTO: 391 K/UL — SIGNIFICANT CHANGE UP (ref 150–400)
POTASSIUM SERPL-MCNC: 4.6 MMOL/L — SIGNIFICANT CHANGE UP (ref 3.5–5.3)
POTASSIUM SERPL-SCNC: 4.6 MMOL/L — SIGNIFICANT CHANGE UP (ref 3.5–5.3)
RBC # BLD: 3.13 M/UL — LOW (ref 3.8–5.2)
RBC # FLD: 14.6 % — HIGH (ref 10.3–14.5)
SODIUM SERPL-SCNC: 139 MMOL/L — SIGNIFICANT CHANGE UP (ref 135–145)
SPECIMEN SOURCE: SIGNIFICANT CHANGE UP
SPECIMEN SOURCE: SIGNIFICANT CHANGE UP
WBC # BLD: 8.47 K/UL — SIGNIFICANT CHANGE UP (ref 3.8–10.5)
WBC # FLD AUTO: 8.47 K/UL — SIGNIFICANT CHANGE UP (ref 3.8–10.5)

## 2024-05-25 RX ORDER — SODIUM,POTASSIUM PHOSPHATES 278-250MG
2 POWDER IN PACKET (EA) ORAL ONCE
Refills: 0 | Status: COMPLETED | OUTPATIENT
Start: 2024-05-25 | End: 2024-05-25

## 2024-05-25 RX ADMIN — Medication 25 MILLIGRAM(S): at 10:28

## 2024-05-25 RX ADMIN — HEPARIN SODIUM 5000 UNIT(S): 5000 INJECTION INTRAVENOUS; SUBCUTANEOUS at 21:12

## 2024-05-25 RX ADMIN — HEPARIN SODIUM 5000 UNIT(S): 5000 INJECTION INTRAVENOUS; SUBCUTANEOUS at 05:08

## 2024-05-25 RX ADMIN — Medication 975 MILLIGRAM(S): at 17:21

## 2024-05-25 RX ADMIN — ATORVASTATIN CALCIUM 20 MILLIGRAM(S): 80 TABLET, FILM COATED ORAL at 21:12

## 2024-05-25 RX ADMIN — Medication 2 PACKET(S): at 10:29

## 2024-05-25 RX ADMIN — Medication 25 MILLIGRAM(S): at 21:12

## 2024-05-25 RX ADMIN — Medication 975 MILLIGRAM(S): at 01:21

## 2024-05-25 RX ADMIN — Medication 81 MILLIGRAM(S): at 12:51

## 2024-05-25 RX ADMIN — Medication 975 MILLIGRAM(S): at 12:51

## 2024-05-25 RX ADMIN — Medication 975 MILLIGRAM(S): at 13:21

## 2024-05-25 RX ADMIN — MEMANTINE HYDROCHLORIDE 10 MILLIGRAM(S): 10 TABLET ORAL at 05:09

## 2024-05-25 RX ADMIN — Medication 975 MILLIGRAM(S): at 05:55

## 2024-05-25 RX ADMIN — HEPARIN SODIUM 5000 UNIT(S): 5000 INJECTION INTRAVENOUS; SUBCUTANEOUS at 13:51

## 2024-05-25 RX ADMIN — LINEZOLID 300 MILLIGRAM(S): 600 INJECTION, SOLUTION INTRAVENOUS at 17:21

## 2024-05-25 RX ADMIN — LINEZOLID 300 MILLIGRAM(S): 600 INJECTION, SOLUTION INTRAVENOUS at 05:08

## 2024-05-25 RX ADMIN — MEMANTINE HYDROCHLORIDE 10 MILLIGRAM(S): 10 TABLET ORAL at 17:21

## 2024-05-25 RX ADMIN — Medication 975 MILLIGRAM(S): at 01:50

## 2024-05-25 RX ADMIN — Medication 1: at 18:03

## 2024-05-25 RX ADMIN — Medication 975 MILLIGRAM(S): at 05:08

## 2024-05-25 RX ADMIN — PANTOPRAZOLE SODIUM 40 MILLIGRAM(S): 20 TABLET, DELAYED RELEASE ORAL at 07:03

## 2024-05-25 NOTE — PROGRESS NOTE ADULT - ASSESSMENT
78F w/ PMHx of Diverticulitis now s/p diagnostic laparoscopy, Incision and drainage of deep pelvic abscess, umbilical hernia repair, Jania's procedure (5/21).     PLAN:  - advance to LRD  - Strict I&O's  - culture showes VRE- linezolid started   - Monitor KULWANT drain output  -lopressor 25mg  - Analgesia and antiemetics as needed  - DVTppx  - PT --> Rehab    A team [id number]   78F w/ PMHx of Diverticulitis now s/p diagnostic laparoscopy, Incision and drainage of deep pelvic abscess, umbilical hernia repair, Jania's procedure (5/21).     PLAN:  - advance to LRD  - Strict I&O's  - culture showes VRE- linezolid started   - Monitor KULWANT drain output  -lopressor 25mg  - Analgesia and antiemetics as needed  - DVTppx  - PT --> Rehab    A team [id number]    Pt seen and examined  agree with above   Phys tx faby Camejo MD

## 2024-05-25 NOTE — PROGRESS NOTE ADULT - ASSESSMENT
79 y/o F PMhx Glaucoma, DM II, HTN, dementia, diverticulitis who presented after a fall found to have enlarged abscess    Sigmoid diverticulitis w/ perforation and abscess  sepsis- fever, leukocytosis on admission  CT- perforated sigmoid diverticulitis with thick-walled pericolonic multilocular abscess measuring at least up to 3.5 x 1.5 x 4.8 cm w/ moderate stool burden distending the colon proximal to the sigmoid  s/p OR 5/21- Diagnostic laparoscopy converted to open and Ruiz procedure, pus cavity in lateral colonic wall and culture obtained. 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    asymptomatic bacteriuria  denies symptoms    Recommendations  c/w zosyn and linezolid  f/u OR cultures    Geovani Goldman M.D.  [facility], Division of Infectious Diseases  [phone]  After 5pm on weekdays and all day on weekends - please call [phone]

## 2024-05-25 NOTE — PROGRESS NOTE ADULT - SUBJECTIVE AND OBJECTIVE BOX
DATE OF SERVICE: 05-25-24    resting comfortably, denies pain or SOB, ROS otherwise limited    Review of Systems:   Constitutional: [ ] fevers, [ ] chills.   Skin: [ ] dry skin. [ ] rashes.  Psychiatric: [ ] depression, [ ] anxiety.   Gastrointestinal: [ ] BRBPR, [ ] melena.   Neurological: [ ] confusion. [ ] seizures. [ ] shuffling gait.   Ears,Nose,Mouth and Throat: [ ] ear pain [ ] sore throat.   Eyes: [ ] diplopia.   Respiratory: [ ] hemoptysis. [ ] shortness of breath  Cardiovascular: See HPI above  Hematologic/Lymphatic: [ ] anemia. [ ] painful nodes. [ ] prolonged bleeding.   Genitourinary: [ ] hematuria. [ ] flank pain.   Endocrine: [ ] significant change in weight. [ ] intolerance to heat and cold.     Review of systems [ x] otherwise negative, [ ] otherwise unable to obtain    FH: no family history of sudden cardiac death in first degree relatives    SH: [ ] tobacco, [ ] alcohol, [ ] drugs    acetaminophen     Tablet .. 975 milliGRAM(s) Oral every 6 hours  aspirin enteric coated 81 milliGRAM(s) Oral daily  atorvastatin 20 milliGRAM(s) Oral at bedtime  dextrose 5% + sodium chloride 0.9% with potassium chloride 20 mEq/L 1000 milliLiter(s) IV Continuous <Continuous>  dextrose 50% Injectable 25 Gram(s) IV Push once  dextrose 50% Injectable 12.5 Gram(s) IV Push once  heparin   Injectable 5000 Unit(s) SubCutaneous every 8 hours  insulin lispro (ADMELOG) corrective regimen sliding scale   SubCutaneous Before meals and at bedtime  linezolid  IVPB 600 milliGRAM(s) IV Intermittent every 12 hours  memantine 10 milliGRAM(s) Oral every 12 hours  metoprolol tartrate 25 milliGRAM(s) Oral every 12 hours  oxyCODONE    IR 2.5 milliGRAM(s) Oral every 4 hours PRN  oxyCODONE    IR 5 milliGRAM(s) Oral every 4 hours PRN  pantoprazole    Tablet 40 milliGRAM(s) Oral before breakfast                            9.3    8.47  )-----------( 391      ( 25 May 2024 05:22 )             28.8       05-25    139  |  107  |  3<L>  ----------------------------<  98  4.6   |  23  |  0.51    Ca    8.5      25 May 2024 05:22  Phos  2.2     05-25  Mg     2.00     05-25        T(C): 36.8 (05-25-24 @ 10:00), Max: 36.8 (05-25-24 @ 10:00)  HR: 78 (05-25-24 @ 10:00) (59 - 78)  BP: 123/76 (05-25-24 @ 10:00) (120/76 - 133/74)  RR: 17 (05-25-24 @ 10:00) (16 - 18)  SpO2: 90% (05-25-24 @ 10:00) (90% - 100%)  Wt(kg): --    I&O's Summary    24 May 2024 07:01  -  25 May 2024 07:00  --------------------------------------------------------  IN: 2750 mL / OUT: 2650 mL / NET: 100 mL    25 May 2024 07:01  -  25 May 2024 13:29  --------------------------------------------------------  IN: 600 mL / OUT: 560 mL / NET: 40 mL      General: pleasant, comfortable  Head: Normocephalic and atraumatic.   Neck: No JVD. No bruitsNo M/R/G  Lungs: CTA b/l. diminshed at bases A/L limited exam  Abdomen: deferred  Extremities: No clubbing/cyanosis/edema.   Neurologic: Moves all four extremities.   Skin: Warm and moist. The patient's skin has normal elasticity and good skin turgor.   Psychiatric: cannot fully assess  Musculoskeletal: Normal range of motion, normal strength     TELEMETRY: 	  NONE    ECG:  	NSR    < from: Xray Chest 1 View- PORTABLE-Urgent (Xray Chest 1 View- PORTABLE-Urgent .) (05.23.24 @ 09:35) >  FINDINGS:    Cardiopericardial silhouette is magnified with this projection.  Lungs show no focal consolidations.  Trace left effusion.  There is no pneumothorax.  No acute bony abnormality.    IMPRESSION: Clear lungs with trace left effusion.    < end of copied text >      ASSESSMENT/PLAN: Pt is a 78F with PMHx diverticulitis, DM2, HTN, HLD,with recent hospitalization for complicated diverticulitis, discharged 5/15 on long-term IV ABX presents after fall from home with AMS, found to have fevers  and a  CT AP showing perforated sigmoid diverticulitis with thick-walled pericolonic multilocular abscess measuring at least up to 3.5 x 1.5 x 4.8 cm. Cardiology called for pre-op risk stratification prior to OR. Currently denies any chest pain, SOB, orthopnea.    Pre-OP/HTN/HLD  - no anginal chest pain, no q waves on EKG euvolemic on exam, no severely stenotic valvular disease on PE, recent acute CVA  - Elevated risk for for intraperitoneal surgery, can proceed to OR with no further CV work-up  --tolerated procedure well from CV perspective  - s/p drainage of deep pelvic abscess and open Ruiz procedure  -cont Lopressor 25 mg q 12  -cont Lipitor 20 mg  -cont post op incentive spirometer/ PT    Aurea MATHUR  [phone]

## 2024-05-25 NOTE — PROGRESS NOTE ADULT - SUBJECTIVE AND OBJECTIVE BOX
[facility], Division of Infectious Diseases  JORGE LUIS Chaves Y. Patel, S. Shah, G. Jones  [phone]  ([phone] - weekdays after 5pm and weekends)    Name: TY DAVIS  Age/Gender: 78y Female  MRN: [id number]    Interval History:  Notes reviewed.   No concerning overnight events.  Afebrile.     Allergies: No Known Allergies      Objective:  Vitals:   T(F): 97.8 (05-25-24 @ 06:00), Max: 98.2 (05-24-24 @ 09:45)  HR: 66 (05-25-24 @ 06:00) (59 - 73)  BP: 132/76 (05-25-24 @ 06:00) (120/76 - 139/72)  RR: 18 (05-25-24 @ 06:00) (16 - 18)  SpO2: 100% (05-25-24 @ 06:00) (96% - 100%)  Physical Examination:  General: no acute distress  HEENT: anicteric  Cardio: S1, S2, normal rate  Resp: clear to auscultation anteriorly   Abd: abdominal dressing, colostomy  Ext: no LE edema  Skin: warm, dry    Laboratory Studies:  CBC:                       9.3    8.47  )-----------( 391      ( 25 May 2024 05:22 )             28.8     WBC Trend:  8.47 05-25-24 @ 05:22  9.85 05-24-24 @ 06:35  11.64 05-23-24 @ 07:01  11.41 05-22-24 @ 05:09  9.17 05-21-24 @ 03:54  11.48 05-20-24 @ 06:36  10.97 05-19-24 @ 21:53    CMP: 05-24    135  |  106  |  3<L>  ----------------------------<  154<H>  4.0   |  21<L>  |  0.43<L>    Ca    7.6<L>      24 May 2024 06:35  Phos  1.8     05-24  Mg     1.80     05-24            Urinalysis Basic - ( 24 May 2024 06:35 )    Color: x / Appearance: x / SG: x / pH: x  Gluc: 154 mg/dL / Ketone: x  / Bili: x / Urobili: x   Blood: x / Protein: x / Nitrite: x   Leuk Esterase: x / RBC: x / WBC x   Sq Epi: x / Non Sq Epi: x / Bacteria: x      Microbiology: reviewed     Culture - Abscess with Gram Stain (collected 05-21-24 @ 15:20)  Source: .Abscess Perforated Diverticulitis  Gram Stain (05-22-24 @ 05:51):    Numerous polymorphonuclear leukocytes seen per low power field    Few Gram positive cocci in pairs seen per oil power field    Rare Gram Negative Rods seen per oil power field  Preliminary Report (05-24-24 @ 07:55):    Moderate Enterococcus faecium (vancomycin resistant)  Organism: Enterococcus faecium (vancomycin resistant) (05-24-24 @ 07:54)  Organism: Enterococcus faecium (vancomycin resistant) (05-24-24 @ 07:54)      Method Type: MAYELIN      -  Ampicillin: R >8 Predicts results to ampicillin/sulbactam, amoxacillin-clavulanate and  piperacillin-tazobactam.      -  Daptomycin: SDD 2 The breakpoint for SDD (susceptible dose dependent)is based on a dosage regimen of 8-12 mg/kg administered every 24 h in adults and is intended for serious infections due to E. faecium. Consultation with an infectious diseases specialist is recommended.      -  Levofloxacin: R >4      -  Linezolid: S 2      -  Vancomycin: R >16    Culture - Urine (collected 05-20-24 @ 00:45)  Source: Clean Catch Clean Catch (Midstream)  Final Report (05-22-24 @ 17:47):    10,000 - 49,000 CFU/mL Enterococcus faecium (vancomycin resistant)  Organism: Enterococcus faecium (vancomycin resistant) (05-22-24 @ 17:47)  Organism: Enterococcus faecium (vancomycin resistant) (05-22-24 @ 17:47)      Method Type: MAYELIN      -  Ampicillin: R >8 Predicts results to ampicillin/sulbactam, amoxacillin-clavulanate and  piperacillin-tazobactam.      -  Ciprofloxacin: R >2      -  Daptomycin: SDD 4 The breakpoint for SDD (susceptible dose dependent)is based on a dosage regimen of 8-12 mg/kg administered every 24 h in adults and is intended for serious infections due to E. faecium. Consultation with an infectious diseases specialist is recommended.      -  Levofloxacin: R >4      -  Linezolid: S 2      -  Nitrofurantoin: S <=32 Should not be used to treat pyelonephritis.      -  Tetracycline: R >8      -  Vancomycin: R >16    Culture - Blood (collected 05-19-24 @ 21:39)  Source: .Blood Blood-Peripheral  Final Report (05-25-24 @ 03:01):    No growth at 5 days    Culture - Blood (collected 05-19-24 @ 21:30)  Source: .Blood Blood-Peripheral  Final Report (05-25-24 @ 03:01):    No growth at 5 days    Culture - Urine (collected 05-12-24 @ 00:45)  Source: OR Collect Bladder (from O.R.)  Final Report (05-14-24 @ 15:18):    <10,000 CFU/ml Enterococcus faecium (vancomycin resistant)    <10,000 CFU/ml Enterococcus faecalis  Organism: Enterococcus faecium (vancomycin resistant)  Enterococcus faecalis (05-14-24 @ 15:18)  Organism: Enterococcus faecalis (05-14-24 @ 15:18)      Method Type: MAYELIN      -  Ampicillin: S <=2 Predicts results to ampicillin/sulbactam, amoxacillin-clavulanate and  piperacillin-tazobactam.      -  Ciprofloxacin: R >2      -  Levofloxacin: R >4      -  Vancomycin: S 2  Organism: Enterococcus faecium (vancomycin resistant) (05-14-24 @ 15:18)      Method Type: MAYELIN      -  Ampicillin: R >8 Predicts results to ampicillin/sulbactam, amoxacillin-clavulanate and  piperacillin-tazobactam.      -  Ciprofloxacin: R >2      -  Daptomycin: SDD 2 The breakpoint for SDD (susceptible dose dependent)is based on a dosage regimen of 8-12 mg/kg administered every 24 h in adults and is intended for serious infections due to E. faecium. Consultation with an infectious diseases specialist is recommended.      -  Levofloxacin: R >4      -  Linezolid: S 2      -  Vancomycin: R >16    Culture - Urine (collected 05-11-24 @ 19:40)  Source: Clean Catch Clean Catch (Midstream)  Final Report (05-14-24 @ 16:55):    10,000 - 49,000 CFU/mL Enterococcus faecium (vancomycin resistant)    <10,000 CFU/ml Normal Urogenital helen present  Organism: Enterococcus faecium (vancomycin resistant) (05-14-24 @ 16:55)  Organism: Enterococcus faecium (vancomycin resistant) (05-14-24 @ 16:55)      Method Type: MAYELIN      -  Ampicillin: R >8 Predicts results to ampicillin/sulbactam, amoxacillin-clavulanate and  piperacillin-tazobactam.      -  Ciprofloxacin: R >2      -  Daptomycin: SDD 4 The breakpoint for SDD (susceptible dose dependent)is based on a dosage regimen of 8-12 mg/kg administered every 24 h in adults and is intended for serious infections due to E. faecium. Consultation with an infectious diseases specialist is recommended.      -  Levofloxacin: R >4      -  Linezolid: S 2      -  Nitrofurantoin: S <=32 Should not be used to treat pyelonephritis.      -  Tetracycline: R >8      -  Vancomycin: R >16    Culture - Blood (collected 05-11-24 @ 19:25)  Source: .Blood Blood-Peripheral  Final Report (05-17-24 @ 02:01):    No growth at 5 days    Culture - Blood (collected 05-11-24 @ 19:18)  Source: .Blood Blood-Peripheral  Final Report (05-17-24 @ 02:01):    No growth at 5 days        Radiology: reviewed     Medications:  acetaminophen     Tablet .. 975 milliGRAM(s) Oral every 6 hours  aspirin enteric coated 81 milliGRAM(s) Oral daily  atorvastatin 20 milliGRAM(s) Oral at bedtime  dextrose 5% + sodium chloride 0.9% with potassium chloride 20 mEq/L 1000 milliLiter(s) IV Continuous <Continuous>  dextrose 50% Injectable 12.5 Gram(s) IV Push once  dextrose 50% Injectable 25 Gram(s) IV Push once  heparin   Injectable 5000 Unit(s) SubCutaneous every 8 hours  insulin lispro (ADMELOG) corrective regimen sliding scale   SubCutaneous Before meals and at bedtime  linezolid  IVPB 600 milliGRAM(s) IV Intermittent every 12 hours  memantine 10 milliGRAM(s) Oral every 12 hours  metoprolol tartrate 25 milliGRAM(s) Oral every 12 hours  oxyCODONE    IR 5 milliGRAM(s) Oral every 4 hours PRN  oxyCODONE    IR 2.5 milliGRAM(s) Oral every 4 hours PRN  pantoprazole    Tablet 40 milliGRAM(s) Oral before breakfast    Antimicrobials:  linezolid  IVPB 600 milliGRAM(s) IV Intermittent every 12 hours

## 2024-05-25 NOTE — PROGRESS NOTE ADULT - SUBJECTIVE AND OBJECTIVE BOX
Vital Signs Last 24 Hrs  T(C): 36.6 (25 May 2024 06:00), Max: 36.8 (24 May 2024 09:45)  T(F): 97.8 (25 May 2024 06:00), Max: 98.2 (24 May 2024 09:45)  HR: 66 (25 May 2024 06:00) (59 - 73)  BP: 132/76 (25 May 2024 06:00) (120/76 - 139/72)  BP(mean): --  RR: 18 (25 May 2024 06:00) (16 - 18)  SpO2: 100% (25 May 2024 06:00) (96% - 100%)    Parameters below as of 25 May 2024 06:00  Patient On (Oxygen Delivery Method): nasal cannula  O2 Flow (L/min): 3      SUBJECTIVE: Pt seen and examined bedside on morning rounds. States that she is feeling well, pain is controlled. Patient would like to try solid foods. Denies any fever, chills, n/v/d, SOB, or chest pain.    Constitutional: NAD  Respiratory: non-labored breathing, patent airway  Gastrointestinal: abdomen soft, nontender, nondistended. LUQ ostomy pink, bag with bile tinged fluid and small gas, KULWANT SS.  Extremities: warm  Neurological: intact    I&O's Summary    24 May 2024 07:01  -  25 May 2024 07:00  --------------------------------------------------------  IN: 2750 mL / OUT: 2650 mL / NET: 100 mL      I&O's Detail    24 May 2024 07:01  -  25 May 2024 07:00  --------------------------------------------------------  IN:    dextrose 5% + sodium chloride 0.9% w/ Additives: 1050 mL    IV PiggyBack: 650 mL    Oral Fluid: 1050 mL  Total IN: 2750 mL    OUT:    Bulb (mL): 100 mL    Colostomy (mL): 0 mL    Voided (mL): 2550 mL  Total OUT: 2650 mL    Total NET: 100 mL          MEDICATIONS  (STANDING):  acetaminophen     Tablet .. 975 milliGRAM(s) Oral every 6 hours  aspirin enteric coated 81 milliGRAM(s) Oral daily  atorvastatin 20 milliGRAM(s) Oral at bedtime  dextrose 5% + sodium chloride 0.9% with potassium chloride 20 mEq/L 1000 milliLiter(s) (50 mL/Hr) IV Continuous <Continuous>  dextrose 50% Injectable 25 Gram(s) IV Push once  dextrose 50% Injectable 12.5 Gram(s) IV Push once  heparin   Injectable 5000 Unit(s) SubCutaneous every 8 hours  insulin lispro (ADMELOG) corrective regimen sliding scale   SubCutaneous Before meals and at bedtime  linezolid  IVPB 600 milliGRAM(s) IV Intermittent every 12 hours  memantine 10 milliGRAM(s) Oral every 12 hours  metoprolol tartrate 25 milliGRAM(s) Oral every 12 hours  pantoprazole    Tablet 40 milliGRAM(s) Oral before breakfast  potassium phosphate / sodium phosphate Powder (PHOS-NaK) 2 Packet(s) Oral once    MEDICATIONS  (PRN):  oxyCODONE    IR 5 milliGRAM(s) Oral every 4 hours PRN Severe Pain (7 - 10)  oxyCODONE    IR 2.5 milliGRAM(s) Oral every 4 hours PRN Moderate Pain (4 - 6)      LABS:                        9.3    8.47  )-----------( 391      ( 25 May 2024 05:22 )             28.8     05-25    139  |  107  |  3<L>  ----------------------------<  98  4.6   |  23  |  0.51    Ca    8.5      25 May 2024 05:22  Phos  2.2     05-25  Mg     2.00     05-25

## 2024-05-26 LAB
ANION GAP SERPL CALC-SCNC: 10 MMOL/L — SIGNIFICANT CHANGE UP (ref 7–14)
BUN SERPL-MCNC: 3 MG/DL — LOW (ref 7–23)
CALCIUM SERPL-MCNC: 8.2 MG/DL — LOW (ref 8.4–10.5)
CHLORIDE SERPL-SCNC: 103 MMOL/L — SIGNIFICANT CHANGE UP (ref 98–107)
CO2 SERPL-SCNC: 21 MMOL/L — LOW (ref 22–31)
CREAT SERPL-MCNC: 0.43 MG/DL — LOW (ref 0.5–1.3)
EGFR: 101 ML/MIN/1.73M2 — SIGNIFICANT CHANGE UP
GLUCOSE BLDC GLUCOMTR-MCNC: 112 MG/DL — HIGH (ref 70–99)
GLUCOSE BLDC GLUCOMTR-MCNC: 121 MG/DL — HIGH (ref 70–99)
GLUCOSE BLDC GLUCOMTR-MCNC: 127 MG/DL — HIGH (ref 70–99)
GLUCOSE BLDC GLUCOMTR-MCNC: 143 MG/DL — HIGH (ref 70–99)
GLUCOSE BLDC GLUCOMTR-MCNC: 167 MG/DL — HIGH (ref 70–99)
GLUCOSE SERPL-MCNC: 158 MG/DL — HIGH (ref 70–99)
HCT VFR BLD CALC: 28.9 % — LOW (ref 34.5–45)
HGB BLD-MCNC: 9.4 G/DL — LOW (ref 11.5–15.5)
MAGNESIUM SERPL-MCNC: 1.6 MG/DL — SIGNIFICANT CHANGE UP (ref 1.6–2.6)
MCHC RBC-ENTMCNC: 29.1 PG — SIGNIFICANT CHANGE UP (ref 27–34)
MCHC RBC-ENTMCNC: 32.5 GM/DL — SIGNIFICANT CHANGE UP (ref 32–36)
MCV RBC AUTO: 89.5 FL — SIGNIFICANT CHANGE UP (ref 80–100)
NRBC # BLD: 0 /100 WBCS — SIGNIFICANT CHANGE UP (ref 0–0)
NRBC # FLD: 0 K/UL — SIGNIFICANT CHANGE UP (ref 0–0)
PHOSPHATE SERPL-MCNC: 2.8 MG/DL — SIGNIFICANT CHANGE UP (ref 2.5–4.5)
PLATELET # BLD AUTO: 397 K/UL — SIGNIFICANT CHANGE UP (ref 150–400)
POTASSIUM SERPL-MCNC: 3.9 MMOL/L — SIGNIFICANT CHANGE UP (ref 3.5–5.3)
POTASSIUM SERPL-SCNC: 3.9 MMOL/L — SIGNIFICANT CHANGE UP (ref 3.5–5.3)
RBC # BLD: 3.23 M/UL — LOW (ref 3.8–5.2)
RBC # FLD: 14.9 % — HIGH (ref 10.3–14.5)
SODIUM SERPL-SCNC: 134 MMOL/L — LOW (ref 135–145)
WBC # BLD: 11.4 K/UL — HIGH (ref 3.8–10.5)
WBC # FLD AUTO: 11.4 K/UL — HIGH (ref 3.8–10.5)

## 2024-05-26 PROCEDURE — 74018 RADEX ABDOMEN 1 VIEW: CPT | Mod: 26

## 2024-05-26 RX ORDER — PIPERACILLIN AND TAZOBACTAM 4; .5 G/20ML; G/20ML
3.38 INJECTION, POWDER, LYOPHILIZED, FOR SOLUTION INTRAVENOUS EVERY 8 HOURS
Refills: 0 | Status: DISCONTINUED | OUTPATIENT
Start: 2024-05-26 | End: 2024-05-26

## 2024-05-26 RX ORDER — SODIUM CHLORIDE 9 MG/ML
500 INJECTION INTRAMUSCULAR; INTRAVENOUS; SUBCUTANEOUS ONCE
Refills: 0 | Status: COMPLETED | OUTPATIENT
Start: 2024-05-26 | End: 2024-05-26

## 2024-05-26 RX ORDER — MAGNESIUM SULFATE 500 MG/ML
2 VIAL (ML) INJECTION ONCE
Refills: 0 | Status: COMPLETED | OUTPATIENT
Start: 2024-05-26 | End: 2024-05-26

## 2024-05-26 RX ORDER — ONDANSETRON 8 MG/1
4 TABLET, FILM COATED ORAL ONCE
Refills: 0 | Status: COMPLETED | OUTPATIENT
Start: 2024-05-26 | End: 2024-05-26

## 2024-05-26 RX ORDER — INSULIN LISPRO 100/ML
VIAL (ML) SUBCUTANEOUS
Refills: 0 | Status: DISCONTINUED | OUTPATIENT
Start: 2024-05-26 | End: 2024-05-31

## 2024-05-26 RX ORDER — PIPERACILLIN AND TAZOBACTAM 4; .5 G/20ML; G/20ML
3.38 INJECTION, POWDER, LYOPHILIZED, FOR SOLUTION INTRAVENOUS EVERY 8 HOURS
Refills: 0 | Status: DISCONTINUED | OUTPATIENT
Start: 2024-05-26 | End: 2024-05-28

## 2024-05-26 RX ORDER — PIPERACILLIN AND TAZOBACTAM 4; .5 G/20ML; G/20ML
3.38 INJECTION, POWDER, LYOPHILIZED, FOR SOLUTION INTRAVENOUS ONCE
Refills: 0 | Status: COMPLETED | OUTPATIENT
Start: 2024-05-26 | End: 2024-05-26

## 2024-05-26 RX ORDER — INSULIN LISPRO 100/ML
VIAL (ML) SUBCUTANEOUS EVERY 6 HOURS
Refills: 0 | Status: DISCONTINUED | OUTPATIENT
Start: 2024-05-26 | End: 2024-05-26

## 2024-05-26 RX ADMIN — DEXTROSE MONOHYDRATE, SODIUM CHLORIDE, AND POTASSIUM CHLORIDE 50 MILLILITER(S): 50; .745; 4.5 INJECTION, SOLUTION INTRAVENOUS at 13:06

## 2024-05-26 RX ADMIN — Medication 25 GRAM(S): at 13:06

## 2024-05-26 RX ADMIN — Medication 1: at 17:58

## 2024-05-26 RX ADMIN — Medication 975 MILLIGRAM(S): at 05:42

## 2024-05-26 RX ADMIN — PIPERACILLIN AND TAZOBACTAM 25 GRAM(S): 4; .5 INJECTION, POWDER, LYOPHILIZED, FOR SOLUTION INTRAVENOUS at 15:15

## 2024-05-26 RX ADMIN — Medication 25 MILLIGRAM(S): at 11:04

## 2024-05-26 RX ADMIN — Medication 975 MILLIGRAM(S): at 05:12

## 2024-05-26 RX ADMIN — Medication 975 MILLIGRAM(S): at 11:05

## 2024-05-26 RX ADMIN — Medication 81 MILLIGRAM(S): at 11:04

## 2024-05-26 RX ADMIN — ONDANSETRON 4 MILLIGRAM(S): 8 TABLET, FILM COATED ORAL at 00:40

## 2024-05-26 RX ADMIN — PIPERACILLIN AND TAZOBACTAM 200 GRAM(S): 4; .5 INJECTION, POWDER, LYOPHILIZED, FOR SOLUTION INTRAVENOUS at 11:04

## 2024-05-26 RX ADMIN — OXYCODONE HYDROCHLORIDE 5 MILLIGRAM(S): 5 TABLET ORAL at 05:11

## 2024-05-26 RX ADMIN — LINEZOLID 300 MILLIGRAM(S): 600 INJECTION, SOLUTION INTRAVENOUS at 17:10

## 2024-05-26 RX ADMIN — MEMANTINE HYDROCHLORIDE 10 MILLIGRAM(S): 10 TABLET ORAL at 17:11

## 2024-05-26 RX ADMIN — PANTOPRAZOLE SODIUM 40 MILLIGRAM(S): 20 TABLET, DELAYED RELEASE ORAL at 06:43

## 2024-05-26 RX ADMIN — OXYCODONE HYDROCHLORIDE 5 MILLIGRAM(S): 5 TABLET ORAL at 05:41

## 2024-05-26 RX ADMIN — HEPARIN SODIUM 5000 UNIT(S): 5000 INJECTION INTRAVENOUS; SUBCUTANEOUS at 13:06

## 2024-05-26 RX ADMIN — ATORVASTATIN CALCIUM 20 MILLIGRAM(S): 80 TABLET, FILM COATED ORAL at 22:36

## 2024-05-26 RX ADMIN — PIPERACILLIN AND TAZOBACTAM 25 GRAM(S): 4; .5 INJECTION, POWDER, LYOPHILIZED, FOR SOLUTION INTRAVENOUS at 22:36

## 2024-05-26 RX ADMIN — LINEZOLID 300 MILLIGRAM(S): 600 INJECTION, SOLUTION INTRAVENOUS at 06:42

## 2024-05-26 RX ADMIN — SODIUM CHLORIDE 1000 MILLILITER(S): 9 INJECTION INTRAMUSCULAR; INTRAVENOUS; SUBCUTANEOUS at 12:25

## 2024-05-26 RX ADMIN — HEPARIN SODIUM 5000 UNIT(S): 5000 INJECTION INTRAVENOUS; SUBCUTANEOUS at 05:12

## 2024-05-26 RX ADMIN — HEPARIN SODIUM 5000 UNIT(S): 5000 INJECTION INTRAVENOUS; SUBCUTANEOUS at 22:36

## 2024-05-26 RX ADMIN — Medication 975 MILLIGRAM(S): at 17:11

## 2024-05-26 RX ADMIN — Medication 975 MILLIGRAM(S): at 11:50

## 2024-05-26 RX ADMIN — MEMANTINE HYDROCHLORIDE 10 MILLIGRAM(S): 10 TABLET ORAL at 06:42

## 2024-05-26 RX ADMIN — Medication 975 MILLIGRAM(S): at 18:05

## 2024-05-26 NOTE — PROGRESS NOTE ADULT - SUBJECTIVE AND OBJECTIVE BOX
DATE OF SERVICE: 05-26-24    resting comfortably, denies pain or SOB, ROS otherwise limited.  emesis overnight.      Review of Systems:   Constitutional: [ ] fevers, [ ] chills.   Skin: [ ] dry skin. [ ] rashes.  Psychiatric: [ ] depression, [ ] anxiety.   Gastrointestinal: [ ] BRBPR, [ ] melena.   Neurological: [ ] confusion. [ ] seizures. [ ] shuffling gait.   Ears,Nose,Mouth and Throat: [ ] ear pain [ ] sore throat.   Eyes: [ ] diplopia.   Respiratory: [ ] hemoptysis. [ ] shortness of breath  Cardiovascular: See HPI above  Hematologic/Lymphatic: [ ] anemia. [ ] painful nodes. [ ] prolonged bleeding.   Genitourinary: [ ] hematuria. [ ] flank pain.   Endocrine: [ ] significant change in weight. [ ] intolerance to heat and cold.     Review of systems [ x] otherwise negative, [ ] otherwise unable to obtain    FH: no family history of sudden cardiac death in first degree relatives    SH: [ ] tobacco, [ ] alcohol, [ ] drugs    acetaminophen     Tablet .. 975 milliGRAM(s) Oral every 6 hours  aspirin enteric coated 81 milliGRAM(s) Oral daily  atorvastatin 20 milliGRAM(s) Oral at bedtime  dextrose 5% + sodium chloride 0.9% with potassium chloride 20 mEq/L 1000 milliLiter(s) IV Continuous <Continuous>  dextrose 50% Injectable 12.5 Gram(s) IV Push once  dextrose 50% Injectable 25 Gram(s) IV Push once  heparin   Injectable 5000 Unit(s) SubCutaneous every 8 hours  insulin lispro (ADMELOG) corrective regimen sliding scale   SubCutaneous Before meals and at bedtime  linezolid  IVPB 600 milliGRAM(s) IV Intermittent every 12 hours  memantine 10 milliGRAM(s) Oral every 12 hours  metoprolol tartrate 25 milliGRAM(s) Oral every 12 hours  oxyCODONE    IR 2.5 milliGRAM(s) Oral every 4 hours PRN  oxyCODONE    IR 5 milliGRAM(s) Oral every 4 hours PRN  pantoprazole    Tablet 40 milliGRAM(s) Oral before breakfast  piperacillin/tazobactam IVPB.- 3.375 Gram(s) IV Intermittent once  piperacillin/tazobactam IVPB.. 3.375 Gram(s) IV Intermittent every 8 hours                            9.4    11.40 )-----------( 397      ( 26 May 2024 07:21 )             28.9       05-26    134<L>  |  103  |  3<L>  ----------------------------<  158<H>  3.9   |  21<L>  |  0.43<L>    Ca    8.2<L>      26 May 2024 07:21  Phos  2.8     05-26  Mg     1.60     05-26              T(C): 36.7 (05-26-24 @ 14:00), Max: 37 (05-25-24 @ 18:01)  HR: 71 (05-26-24 @ 14:00) (65 - 86)  BP: 112/68 (05-26-24 @ 14:00) (112/68 - 143/60)  RR: 17 (05-26-24 @ 14:00) (17 - 18)  SpO2: 98% (05-26-24 @ 14:00) (96% - 100%)  Wt(kg): --    I&O's Summary    25 May 2024 07:01  -  26 May 2024 07:00  --------------------------------------------------------  IN: 2840 mL / OUT: 1915 mL / NET: 925 mL    26 May 2024 07:01  -  26 May 2024 14:40  --------------------------------------------------------  IN: 1190 mL / OUT: 1450 mL / NET: -260 mL        General: pleasant, comfortable  Head: Normocephalic and atraumatic.   Neck: No JVD. No bruitsNo M/R/G  Lungs: CTA b/l. diminshed at bases A/L limited exam  Abdomen: deferred  Extremities: No clubbing/cyanosis/edema.   Neurologic: Moves all four extremities.   Skin: Warm and moist. The patient's skin has normal elasticity and good skin turgor.   Psychiatric: cannot fully assess  Musculoskeletal: Normal range of motion, normal strength     TELEMETRY: 	  NONE    ECG:  	NSR    < from: Xray Chest 1 View- PORTABLE-Urgent (Xray Chest 1 View- PORTABLE-Urgent .) (05.23.24 @ 09:35) >  FINDINGS:    Cardiopericardial silhouette is magnified with this projection.  Lungs show no focal consolidations.  Trace left effusion.  There is no pneumothorax.  No acute bony abnormality.    IMPRESSION: Clear lungs with trace left effusion.    < end of copied text >      ASSESSMENT/PLAN: Pt is a 78F with PMHx diverticulitis, DM2, HTN, HLD,with recent hospitalization for complicated diverticulitis, discharged 5/15 on long-term IV ABX presents after fall from home with AMS, found to have fevers  and a  CT AP showing perforated sigmoid diverticulitis with thick-walled pericolonic multilocular abscess measuring at least up to 3.5 x 1.5 x 4.8 cm. Cardiology called for pre-op risk stratification prior to OR. Currently denies any chest pain, SOB, orthopnea.    - no anginal chest pain, no q waves on EKG euvolemic on exam, no severely stenotic valvular disease on PE, recent acute CVA  - Elevated risk for for intraperitoneal surgery, can proceed to OR with no further CV work-up  --tolerated procedure well from CV perspective, having expected post op abdominal discomfort.  - s/p drainage of deep pelvic abscess and open Ruiz procedure  -cont Lopressor 25 mg q 12  -cont Lipitor 20 mg  -cont post op incentive spirometer/ PT    Hany Mike M.D.  Cardiac Electrophysiology  [phone]

## 2024-05-26 NOTE — PROGRESS NOTE ADULT - ASSESSMENT
77 y/o F PMhx Glaucoma, DM II, HTN, dementia, diverticulitis who presented after a fall found to have enlarged abscess    Sigmoid diverticulitis w/ perforation and abscess  sepsis- fever, leukocytosis on admission  CT- perforated sigmoid diverticulitis with thick-walled pericolonic multilocular abscess measuring at least up to 3.5 x 1.5 x 4.8 cm w/ moderate stool burden distending the colon proximal to the sigmoid  s/p OR 5/21- Diagnostic laparoscopy converted to open and Ruiz procedure, pus cavity in lateral colonic wall and culture obtained. 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    asymptomatic bacteriuria    Recommendations  would continue zosyn and linezolid to complete 7 days post OR    Geovani Goldman M.D.  [facility], Division of Infectious Diseases  [phone]  After 5pm on weekdays and all day on weekends - please call [phone]

## 2024-05-26 NOTE — PROGRESS NOTE ADULT - SUBJECTIVE AND OBJECTIVE BOX
A TEAM SURGERY DAILY PROGRESS NOTE:     INTERVAL EVENTS:     SUBJECTIVE/ROS: No acute events overnight. Patient seen and examined at bedside by surgical team.     OBJECTIVE:  Vital Signs Last 24 Hrs  T(C): 36.5 (26 May 2024 02:00), Max: 37 (25 May 2024 18:01)  T(F): 97.7 (26 May 2024 02:00), Max: 98.6 (25 May 2024 18:01)  HR: 73 (26 May 2024 02:00) (65 - 78)  BP: 143/60 (26 May 2024 02:00) (119/78 - 143/60)  BP(mean): --  RR: 18 (26 May 2024 02:00) (17 - 18)  SpO2: 100% (26 May 2024 02:00) (90% - 100%)    Parameters below as of 26 May 2024 02:00  Patient On (Oxygen Delivery Method): nasal cannula  O2 Flow (L/min): 2                          9.3    8.47  )-----------( 391      ( 25 May 2024 05:22 )             28.8     05-25    139  |  107  |  3<L>  ----------------------------<  98  4.6   |  23  |  0.51    Ca    8.5      25 May 2024 05:22  Phos  2.2     05-25  Mg     2.00     05-25       I&O's Detail    24 May 2024 07:01  -  25 May 2024 07:00  --------------------------------------------------------  IN:    dextrose 5% + sodium chloride 0.9% w/ Additives: 1050 mL    IV PiggyBack: 650 mL    Oral Fluid: 1050 mL  Total IN: 2750 mL    OUT:    Bulb (mL): 100 mL    Colostomy (mL): 0 mL    Voided (mL): 2550 mL  Total OUT: 2650 mL    Total NET: 100 mL      25 May 2024 07:01  -  26 May 2024 06:48  --------------------------------------------------------  IN:    dextrose 5% + sodium chloride 0.9% w/ Additives: 1000 mL    IV PiggyBack: 300 mL    Oral Fluid: 1340 mL  Total IN: 2640 mL    OUT:    Bulb (mL): 175 mL    Colostomy (mL): 10 mL    Voided (mL): 1500 mL  Total OUT: 1685 mL    Total NET: 955 mL          IMAGING:      PHYSICAL EXAM:  Constitutional: NAD  Respiratory: non-labored breathing, patent airway  Gastrointestinal: abdomen soft, nontender, nondistended. LUQ ostomy pink, bag with bile tinged fluid and small gas, KULWANT SS.  Extremities: warm  Neurological: intact             A TEAM SURGERY DAILY PROGRESS NOTE:     INTERVAL EVENTS: 1 emesis    SUBJECTIVE/ROS: Patient seen and examined at bedside by surgical team. Reports decreased nausea. Provides minimal information given baseline dementia. Denies chest pain/sob, fever/chills.     OBJECTIVE:  Vital Signs Last 24 Hrs  T(C): 36.5 (26 May 2024 02:00), Max: 37 (25 May 2024 18:01)  T(F): 97.7 (26 May 2024 02:00), Max: 98.6 (25 May 2024 18:01)  HR: 73 (26 May 2024 02:00) (65 - 78)  BP: 143/60 (26 May 2024 02:00) (119/78 - 143/60)  BP(mean): --  RR: 18 (26 May 2024 02:00) (17 - 18)  SpO2: 100% (26 May 2024 02:00) (90% - 100%)    Parameters below as of 26 May 2024 02:00  Patient On (Oxygen Delivery Method): nasal cannula  O2 Flow (L/min): 2                          9.3    8.47  )-----------( 391      ( 25 May 2024 05:22 )             28.8     05-25    139  |  107  |  3<L>  ----------------------------<  98  4.6   |  23  |  0.51    Ca    8.5      25 May 2024 05:22  Phos  2.2     05-25  Mg     2.00     05-25       I&O's Detail    24 May 2024 07:01  -  25 May 2024 07:00  --------------------------------------------------------  IN:    dextrose 5% + sodium chloride 0.9% w/ Additives: 1050 mL    IV PiggyBack: 650 mL    Oral Fluid: 1050 mL  Total IN: 2750 mL    OUT:    Bulb (mL): 100 mL    Colostomy (mL): 0 mL    Voided (mL): 2550 mL  Total OUT: 2650 mL    Total NET: 100 mL      25 May 2024 07:01  -  26 May 2024 06:48  --------------------------------------------------------  IN:    dextrose 5% + sodium chloride 0.9% w/ Additives: 1000 mL    IV PiggyBack: 300 mL    Oral Fluid: 1340 mL  Total IN: 2640 mL    OUT:    Bulb (mL): 175 mL    Colostomy (mL): 10 mL    Voided (mL): 1500 mL  Total OUT: 1685 mL    Total NET: 955 mL          IMAGING:      PHYSICAL EXAM:  Constitutional: NAD  Respiratory: non-labored breathing, patent airway  Gastrointestinal: abdomen soft, nontender, nondistended. LUQ ostomy pink, bag with fecal output and gas, KULWANT SS.  Extremities: warm  Neurological: AOOX2

## 2024-05-26 NOTE — PROGRESS NOTE ADULT - ASSESSMENT
78F w/ PMHx of Diverticulitis now s/p diagnostic laparoscopy, Incision and drainage of deep pelvic abscess, umbilical hernia repair, Jania's procedure (5/21).     PLAN:  - NPO/mIVF  - Strict I&O's  - Culture showes VRE- linezolid started   - Monitor KULWANT drain output  - Lopressor 25mg  - Analgesia and antiemetics as needed  - PT --> Rehab  - DVTppx    A team [id number]     78F w/ PMHx of Diverticulitis now s/p diagnostic laparoscopy, Incision and drainage of deep pelvic abscess, umbilical hernia repair, Jania's procedure (5/21).     PLAN:  - NPO/mIVF, ok to advance to [facility]  - c/w Linezolid  - Culture showes VRE- linezolid started   - Monitor KULWANT drain output  - Strict I&O's  - Lopressor 25mg  - Analgesia and antiemetics as needed  - PT --> Rehab  - DVTppx    A team [id number]     78F w/ PMHx of Diverticulitis now s/p diagnostic laparoscopy, Incision and drainage of deep pelvic abscess, umbilical hernia repair, Jania's procedure (5/21).     PLAN:  - NPO/mIVF, ok to advance to [facility]  - c/w Linezolid  - Appreciate ID recs --> Linezolid and Zosyn  - Culture showes VRE- linezolid started  - Monitor KULWANT drain output  - Strict I&O's  - Lopressor 25mg  - Analgesia and antiemetics as needed  - PT --> Rehab  - DVTppx    A team [id number]     78F w/ PMHx of Diverticulitis now s/p diagnostic laparoscopy, Incision and drainage of deep pelvic abscess, umbilical hernia repair, Jania's procedure (5/21).     PLAN:  - NPO/mIVF, ok to advance to [facility]  - c/w Linezolid  - Appreciate ID recs --> Linezolid and Zosyn  - Culture showes VRE- linezolid started  - Monitor KULWANT drain output  - Strict I&O's  - Lopressor 25mg  - Analgesia and antiemetics as needed  - PT --> Rehab  - DVTppx    A team [id number]    Pt seen and examined  stoma fxn   abd soft   ok to resume clears  give 500cc NS  cont abx and IVF   PT  Albert Camejo MD

## 2024-05-26 NOTE — PROGRESS NOTE ADULT - SUBJECTIVE AND OBJECTIVE BOX
[facility], Division of Infectious Diseases  JORGE LUIS Chaves Y. Patel, S. Shah, G. Jones  [phone]  ([phone] - weekdays after 5pm and weekends)    Name: TY DAVIS  Age/Gender: 78y Female  MRN: [id number]    Interval History:  Notes reviewed.   No concerning overnight events.  Afebrile.     Allergies: No Known Allergies      Objective:  Vitals:   T(F): 98.3 (05-26-24 @ 06:00), Max: 98.6 (05-25-24 @ 18:01)  HR: 86 (05-26-24 @ 06:00) (65 - 86)  BP: 140/79 (05-26-24 @ 06:00) (119/78 - 143/60)  RR: 18 (05-26-24 @ 06:00) (17 - 18)  SpO2: 96% (05-26-24 @ 06:00) (90% - 100%)  Physical Examination:  General: no acute distress  HEENT: anicteric  Cardio: S1, S2, normal rate  Resp: clear to auscultation anteriorly   Abd: abdominal dressing, colostomy  Ext: no LE edema  Skin: warm, dry    Laboratory Studies:  CBC:                       9.3    8.47  )-----------( 391      ( 25 May 2024 05:22 )             28.8     WBC Trend:  8.47 05-25-24 @ 05:22  9.85 05-24-24 @ 06:35  11.64 05-23-24 @ 07:01  11.41 05-22-24 @ 05:09  9.17 05-21-24 @ 03:54  11.48 05-20-24 @ 06:36  10.97 05-19-24 @ 21:53    CMP: 05-25    139  |  107  |  3<L>  ----------------------------<  98  4.6   |  23  |  0.51    Ca    8.5      25 May 2024 05:22  Phos  2.2     05-25  Mg     2.00     05-25            Urinalysis Basic - ( 25 May 2024 05:22 )    Color: x / Appearance: x / SG: x / pH: x  Gluc: 98 mg/dL / Ketone: x  / Bili: x / Urobili: x   Blood: x / Protein: x / Nitrite: x   Leuk Esterase: x / RBC: x / WBC x   Sq Epi: x / Non Sq Epi: x / Bacteria: x      Microbiology: reviewed     Culture - Abscess with Gram Stain (collected 05-21-24 @ 15:20)  Source: .Abscess Perforated Diverticulitis  Gram Stain (05-22-24 @ 05:51):    Numerous polymorphonuclear leukocytes seen per low power field    Few Gram positive cocci in pairs seen per oil power field    Rare Gram Negative Rods seen per oil power field  Preliminary Report (05-24-24 @ 07:55):    Moderate Enterococcus faecium (vancomycin resistant)  Organism: Enterococcus faecium (vancomycin resistant) (05-24-24 @ 07:54)  Organism: Enterococcus faecium (vancomycin resistant) (05-24-24 @ 07:54)      Method Type: MAYELIN      -  Ampicillin: R >8 Predicts results to ampicillin/sulbactam, amoxacillin-clavulanate and  piperacillin-tazobactam.      -  Daptomycin: SDD 2 The breakpoint for SDD (susceptible dose dependent)is based on a dosage regimen of 8-12 mg/kg administered every 24 h in adults and is intended for serious infections due to E. faecium. Consultation with an infectious diseases specialist is recommended.      -  Levofloxacin: R >4      -  Linezolid: S 2      -  Vancomycin: R >16    Culture - Urine (collected 05-20-24 @ 00:45)  Source: Clean Catch Clean Catch (Midstream)  Final Report (05-22-24 @ 17:47):    10,000 - 49,000 CFU/mL Enterococcus faecium (vancomycin resistant)  Organism: Enterococcus faecium (vancomycin resistant) (05-22-24 @ 17:47)  Organism: Enterococcus faecium (vancomycin resistant) (05-22-24 @ 17:47)      Method Type: MAYELIN      -  Ampicillin: R >8 Predicts results to ampicillin/sulbactam, amoxacillin-clavulanate and  piperacillin-tazobactam.      -  Ciprofloxacin: R >2      -  Daptomycin: SDD 4 The breakpoint for SDD (susceptible dose dependent)is based on a dosage regimen of 8-12 mg/kg administered every 24 h in adults and is intended for serious infections due to E. faecium. Consultation with an infectious diseases specialist is recommended.      -  Levofloxacin: R >4      -  Linezolid: S 2      -  Nitrofurantoin: S <=32 Should not be used to treat pyelonephritis.      -  Tetracycline: R >8      -  Vancomycin: R >16    Culture - Blood (collected 05-19-24 @ 21:39)  Source: .Blood Blood-Peripheral  Final Report (05-25-24 @ 03:01):    No growth at 5 days    Culture - Blood (collected 05-19-24 @ 21:30)  Source: .Blood Blood-Peripheral  Final Report (05-25-24 @ 03:01):    No growth at 5 days        Radiology: reviewed     Medications:  acetaminophen     Tablet .. 975 milliGRAM(s) Oral every 6 hours  aspirin enteric coated 81 milliGRAM(s) Oral daily  atorvastatin 20 milliGRAM(s) Oral at bedtime  dextrose 5% + sodium chloride 0.9% with potassium chloride 20 mEq/L 1000 milliLiter(s) IV Continuous <Continuous>  dextrose 50% Injectable 25 Gram(s) IV Push once  dextrose 50% Injectable 12.5 Gram(s) IV Push once  heparin   Injectable 5000 Unit(s) SubCutaneous every 8 hours  insulin lispro (ADMELOG) corrective regimen sliding scale   SubCutaneous every 6 hours  linezolid  IVPB 600 milliGRAM(s) IV Intermittent every 12 hours  memantine 10 milliGRAM(s) Oral every 12 hours  metoprolol tartrate 25 milliGRAM(s) Oral every 12 hours  oxyCODONE    IR 2.5 milliGRAM(s) Oral every 4 hours PRN  oxyCODONE    IR 5 milliGRAM(s) Oral every 4 hours PRN  pantoprazole    Tablet 40 milliGRAM(s) Oral before breakfast    Antimicrobials:  linezolid  IVPB 600 milliGRAM(s) IV Intermittent every 12 hours

## 2024-05-27 LAB
ANION GAP SERPL CALC-SCNC: 8 MMOL/L — SIGNIFICANT CHANGE UP (ref 7–14)
BASOPHILS # BLD AUTO: 0.04 K/UL — SIGNIFICANT CHANGE UP (ref 0–0.2)
BASOPHILS NFR BLD AUTO: 0.3 % — SIGNIFICANT CHANGE UP (ref 0–2)
BLD GP AB SCN SERPL QL: NEGATIVE — SIGNIFICANT CHANGE UP
BUN SERPL-MCNC: 5 MG/DL — LOW (ref 7–23)
CALCIUM SERPL-MCNC: 7.9 MG/DL — LOW (ref 8.4–10.5)
CHLORIDE SERPL-SCNC: 106 MMOL/L — SIGNIFICANT CHANGE UP (ref 98–107)
CO2 SERPL-SCNC: 21 MMOL/L — LOW (ref 22–31)
CREAT SERPL-MCNC: 0.53 MG/DL — SIGNIFICANT CHANGE UP (ref 0.5–1.3)
CULTURE RESULTS: ABNORMAL
EGFR: 96 ML/MIN/1.73M2 — SIGNIFICANT CHANGE UP
EOSINOPHIL # BLD AUTO: 0.51 K/UL — HIGH (ref 0–0.5)
EOSINOPHIL NFR BLD AUTO: 4.4 % — SIGNIFICANT CHANGE UP (ref 0–6)
GLUCOSE BLDC GLUCOMTR-MCNC: 158 MG/DL — HIGH (ref 70–99)
GLUCOSE BLDC GLUCOMTR-MCNC: 92 MG/DL — SIGNIFICANT CHANGE UP (ref 70–99)
GLUCOSE BLDC GLUCOMTR-MCNC: 93 MG/DL — SIGNIFICANT CHANGE UP (ref 70–99)
GLUCOSE BLDC GLUCOMTR-MCNC: 93 MG/DL — SIGNIFICANT CHANGE UP (ref 70–99)
GLUCOSE SERPL-MCNC: 99 MG/DL — SIGNIFICANT CHANGE UP (ref 70–99)
HCT VFR BLD CALC: 26.5 % — LOW (ref 34.5–45)
HGB BLD-MCNC: 8.7 G/DL — LOW (ref 11.5–15.5)
IANC: 7.22 K/UL — SIGNIFICANT CHANGE UP (ref 1.8–7.4)
IMM GRANULOCYTES NFR BLD AUTO: 0.7 % — SIGNIFICANT CHANGE UP (ref 0–0.9)
LYMPHOCYTES # BLD AUTO: 2.76 K/UL — SIGNIFICANT CHANGE UP (ref 1–3.3)
LYMPHOCYTES # BLD AUTO: 23.7 % — SIGNIFICANT CHANGE UP (ref 13–44)
MAGNESIUM SERPL-MCNC: 1.8 MG/DL — SIGNIFICANT CHANGE UP (ref 1.6–2.6)
MCHC RBC-ENTMCNC: 29.9 PG — SIGNIFICANT CHANGE UP (ref 27–34)
MCHC RBC-ENTMCNC: 32.8 GM/DL — SIGNIFICANT CHANGE UP (ref 32–36)
MCV RBC AUTO: 91.1 FL — SIGNIFICANT CHANGE UP (ref 80–100)
MONOCYTES # BLD AUTO: 1.03 K/UL — HIGH (ref 0–0.9)
MONOCYTES NFR BLD AUTO: 8.8 % — SIGNIFICANT CHANGE UP (ref 2–14)
NEUTROPHILS # BLD AUTO: 7.22 K/UL — SIGNIFICANT CHANGE UP (ref 1.8–7.4)
NEUTROPHILS NFR BLD AUTO: 62.1 % — SIGNIFICANT CHANGE UP (ref 43–77)
NRBC # BLD: 0 /100 WBCS — SIGNIFICANT CHANGE UP (ref 0–0)
NRBC # FLD: 0 K/UL — SIGNIFICANT CHANGE UP (ref 0–0)
ORGANISM # SPEC MICROSCOPIC CNT: ABNORMAL
ORGANISM # SPEC MICROSCOPIC CNT: ABNORMAL
PHOSPHATE SERPL-MCNC: 3.2 MG/DL — SIGNIFICANT CHANGE UP (ref 2.5–4.5)
PLATELET # BLD AUTO: 377 K/UL — SIGNIFICANT CHANGE UP (ref 150–400)
POTASSIUM SERPL-MCNC: 4.6 MMOL/L — SIGNIFICANT CHANGE UP (ref 3.5–5.3)
POTASSIUM SERPL-SCNC: 4.6 MMOL/L — SIGNIFICANT CHANGE UP (ref 3.5–5.3)
RBC # BLD: 2.91 M/UL — LOW (ref 3.8–5.2)
RBC # FLD: 15.3 % — HIGH (ref 10.3–14.5)
RH IG SCN BLD-IMP: POSITIVE — SIGNIFICANT CHANGE UP
SODIUM SERPL-SCNC: 135 MMOL/L — SIGNIFICANT CHANGE UP (ref 135–145)
SPECIMEN SOURCE: SIGNIFICANT CHANGE UP
WBC # BLD: 11.64 K/UL — HIGH (ref 3.8–10.5)
WBC # FLD AUTO: 11.64 K/UL — HIGH (ref 3.8–10.5)

## 2024-05-27 RX ORDER — MAGNESIUM SULFATE 500 MG/ML
1 VIAL (ML) INJECTION ONCE
Refills: 0 | Status: COMPLETED | OUTPATIENT
Start: 2024-05-27 | End: 2024-05-27

## 2024-05-27 RX ORDER — ONDANSETRON 8 MG/1
4 TABLET, FILM COATED ORAL ONCE
Refills: 0 | Status: COMPLETED | OUTPATIENT
Start: 2024-05-27 | End: 2024-05-27

## 2024-05-27 RX ADMIN — Medication 975 MILLIGRAM(S): at 06:35

## 2024-05-27 RX ADMIN — Medication 975 MILLIGRAM(S): at 11:00

## 2024-05-27 RX ADMIN — ONDANSETRON 4 MILLIGRAM(S): 8 TABLET, FILM COATED ORAL at 01:06

## 2024-05-27 RX ADMIN — Medication 975 MILLIGRAM(S): at 11:41

## 2024-05-27 RX ADMIN — Medication 81 MILLIGRAM(S): at 11:00

## 2024-05-27 RX ADMIN — Medication 975 MILLIGRAM(S): at 07:05

## 2024-05-27 RX ADMIN — ATORVASTATIN CALCIUM 20 MILLIGRAM(S): 80 TABLET, FILM COATED ORAL at 22:09

## 2024-05-27 RX ADMIN — HEPARIN SODIUM 5000 UNIT(S): 5000 INJECTION INTRAVENOUS; SUBCUTANEOUS at 22:08

## 2024-05-27 RX ADMIN — OXYCODONE HYDROCHLORIDE 5 MILLIGRAM(S): 5 TABLET ORAL at 01:45

## 2024-05-27 RX ADMIN — MEMANTINE HYDROCHLORIDE 10 MILLIGRAM(S): 10 TABLET ORAL at 06:36

## 2024-05-27 RX ADMIN — Medication 1: at 08:34

## 2024-05-27 RX ADMIN — PIPERACILLIN AND TAZOBACTAM 25 GRAM(S): 4; .5 INJECTION, POWDER, LYOPHILIZED, FOR SOLUTION INTRAVENOUS at 06:35

## 2024-05-27 RX ADMIN — OXYCODONE HYDROCHLORIDE 5 MILLIGRAM(S): 5 TABLET ORAL at 01:15

## 2024-05-27 RX ADMIN — Medication 975 MILLIGRAM(S): at 01:45

## 2024-05-27 RX ADMIN — HEPARIN SODIUM 5000 UNIT(S): 5000 INJECTION INTRAVENOUS; SUBCUTANEOUS at 13:17

## 2024-05-27 RX ADMIN — PIPERACILLIN AND TAZOBACTAM 25 GRAM(S): 4; .5 INJECTION, POWDER, LYOPHILIZED, FOR SOLUTION INTRAVENOUS at 22:09

## 2024-05-27 RX ADMIN — Medication 975 MILLIGRAM(S): at 01:14

## 2024-05-27 RX ADMIN — Medication 100 GRAM(S): at 10:48

## 2024-05-27 RX ADMIN — HEPARIN SODIUM 5000 UNIT(S): 5000 INJECTION INTRAVENOUS; SUBCUTANEOUS at 06:35

## 2024-05-27 RX ADMIN — PANTOPRAZOLE SODIUM 40 MILLIGRAM(S): 20 TABLET, DELAYED RELEASE ORAL at 06:35

## 2024-05-27 RX ADMIN — PIPERACILLIN AND TAZOBACTAM 25 GRAM(S): 4; .5 INJECTION, POWDER, LYOPHILIZED, FOR SOLUTION INTRAVENOUS at 13:17

## 2024-05-27 RX ADMIN — MEMANTINE HYDROCHLORIDE 10 MILLIGRAM(S): 10 TABLET ORAL at 17:13

## 2024-05-27 RX ADMIN — LINEZOLID 300 MILLIGRAM(S): 600 INJECTION, SOLUTION INTRAVENOUS at 06:36

## 2024-05-27 RX ADMIN — LINEZOLID 300 MILLIGRAM(S): 600 INJECTION, SOLUTION INTRAVENOUS at 17:32

## 2024-05-27 NOTE — PROGRESS NOTE ADULT - SUBJECTIVE AND OBJECTIVE BOX
A TEAM SURGERY DAILY PROGRESS NOTE:     INTERVAL EVENTS: Nauseous got zofran x1, ostomy changed due to leaking.    SUBJECTIVE/ROS: Patient seen and examined at bedside by surgical team. Provides limited information due to baseline dementia. Denies nausea/vomit, fever/chills, sob/chest pain.      OBJECTIVE:  Vital Signs Last 24 Hrs  T(C): 36.7 (27 May 2024 02:00), Max: 37 (26 May 2024 10:00)  T(F): 98.1 (27 May 2024 02:00), Max: 98.6 (26 May 2024 10:00)  HR: 69 (27 May 2024 02:00) (67 - 85)  BP: 106/65 (27 May 2024 02:00) (100/61 - 121/71)  BP(mean): --  RR: 18 (27 May 2024 02:00) (17 - 18)  SpO2: 95% (27 May 2024 02:00) (95% - 100%)    Parameters below as of 27 May 2024 02:00  Patient On (Oxygen Delivery Method): nasal cannula                            8.7    11.64 )-----------( 377      ( 27 May 2024 06:54 )             26.5     05-26    134<L>  |  103  |  3<L>  ----------------------------<  158<H>  3.9   |  21<L>  |  0.43<L>    Ca    8.2<L>      26 May 2024 07:21  Phos  2.8     05-26  Mg     1.60     05-26       I&O's Detail    26 May 2024 07:01  -  27 May 2024 07:00  --------------------------------------------------------  IN:    dextrose 5% + sodium chloride 0.9% w/ Additives: 900 mL    IV PiggyBack: 950 mL    Oral Fluid: 1020 mL  Total IN: 2870 mL    OUT:    Bulb (mL): 215 mL    Colostomy (mL): 950 mL    Voided (mL): 1050 mL  Total OUT: 2215 mL    Total NET: 655 mL          IMAGING:      PHYSICAL EXAM:  Constitutional: NAD  Respiratory: non-labored breathing, patent airway  Gastrointestinal: abdomen soft, ostomy +/+, prevena dc in the am, midline incision c/d/i.  Extremities: warm  Neurological: intact

## 2024-05-27 NOTE — PROGRESS NOTE ADULT - ASSESSMENT
78F with  sigmoid diverticulitis, multiple chronic infarcts (with unclear residual deficits), dementia (on memantine), T2DM, HTN, HLD, glaucoma, OA of the L knee, PERFECTO, who presents to [facility] ED on 5/19/2024 with c/o left facial droop and slurred speech. Stroke code cancelled as out of 24h window. Unclear stroke hx and baseline neurologic deficits.   In ED was febrile. CTH with bilateral thalamic and basal ganglia infarcts. Thalamic infarcts appear more subacute. Also with chronic cerebellar infarcts. CTA with mld ICA narrowing  o/e with mild to moderate dysarthria, R facial palsy , RUE and RLE drift. AAOx2   MRI brain with L anterior midbrain infarct    - continue aspirin 81mg  - can add Plavix 75mg x 3 weeks post OP if ok per surgery team   - check TTE   - tele, consider ILR on d/c   - on zosyn per primary team  - cont home memantine   - gradual normotension, avoid hypotension  -  LDL 49 (1/24/24) - continue home statin  -  A1c 6.3 (5/11/2024)  - PT/OT  - DVT ppx     Allison Vinson DO  Vascular Neurology  Office [phone] .

## 2024-05-27 NOTE — PROGRESS NOTE ADULT - SUBJECTIVE AND OBJECTIVE BOX
Neurology Progress Note    S: Patient seen and examined.     Medications: MEDICATIONS  (STANDING):  acetaminophen     Tablet .. 975 milliGRAM(s) Oral every 6 hours  aspirin enteric coated 81 milliGRAM(s) Oral daily  atorvastatin 20 milliGRAM(s) Oral at bedtime  dextrose 50% Injectable 25 Gram(s) IV Push once  dextrose 50% Injectable 12.5 Gram(s) IV Push once  heparin   Injectable 5000 Unit(s) SubCutaneous every 8 hours  insulin lispro (ADMELOG) corrective regimen sliding scale   SubCutaneous Before meals and at bedtime  linezolid  IVPB 600 milliGRAM(s) IV Intermittent every 12 hours  memantine 10 milliGRAM(s) Oral every 12 hours  metoprolol tartrate 25 milliGRAM(s) Oral every 12 hours  pantoprazole    Tablet 40 milliGRAM(s) Oral before breakfast  piperacillin/tazobactam IVPB.. 3.375 Gram(s) IV Intermittent every 8 hours    MEDICATIONS  (PRN):  oxyCODONE    IR 5 milliGRAM(s) Oral every 4 hours PRN Severe Pain (7 - 10)  oxyCODONE    IR 2.5 milliGRAM(s) Oral every 4 hours PRN Moderate Pain (4 - 6)       Vitals:  Vital Signs Last 24 Hrs  T(C): 36.4 (27 May 2024 18:00), Max: 36.8 (27 May 2024 14:00)  T(F): 97.6 (27 May 2024 18:00), Max: 98.3 (27 May 2024 14:00)  HR: 70 (27 May 2024 18:00) (61 - 76)  BP: 104/61 (27 May 2024 18:00) (104/60 - 118/67)  BP(mean): --  RR: 18 (27 May 2024 18:00) (16 - 18)  SpO2: 98% (27 May 2024 18:00) (95% - 98%)    Parameters below as of 27 May 2024 18:00  Patient On (Oxygen Delivery Method): room air          General Exam:   General Appearance: Appropriately dressed and in no acute distress       Head: Normocephalic, atraumatic and no dysmorphic features  Ear, Nose, and Throat: Moist mucous membranes  CVS: S1S2+  Resp: No SOB, no wheeze or rhonchi  Abd: soft NTND  Extremities: No edema, no cyanosis  Skin: No bruises, no rashes     Neurological Exam:  Mental Status: Awake, alert and oriented x 2.  Able to follow simple commands. Able to name and repeat. fluent speech. No obvious aphasia, mild to moderate dysarthria   Cranial Nerves: PERRL, EOMI, VFFC, sensation V1-V3 intact,  mild L facial palsy - improving , equal elevation of palate, scm/trap 5/5, tongue is midline on protrusion. Hearing is grossly intact.   Motor: LUE and LLE drift  Sensation: dec on the L   Reflexes: 1+ throughout at biceps, brachioradialis, triceps, patellars and ankles bilaterally and equal. No clonus. R toe and L toe were both downgoing.  Coordination: No dysmetria on FNF  Gait: deferred    I personally reviewed the below data/images/labs:    LABS:                          8.7    11.64 )-----------( 377      ( 27 May 2024 06:54 )             26.5     05-27    135  |  106  |  5<L>  ----------------------------<  99  4.6   |  21<L>  |  0.53    Ca    7.9<L>      27 May 2024 06:54  Phos  3.2     05-27  Mg     1.80     05-27          Urinalysis Basic - ( 27 May 2024 06:54 )    Color: x / Appearance: x / SG: x / pH: x  Gluc: 99 mg/dL / Ketone: x  / Bili: x / Urobili: x   Blood: x / Protein: x / Nitrite: x   Leuk Esterase: x / RBC: x / WBC x   Sq Epi: x / Non Sq Epi: x / Bacteria: x            Radiology:  CTH: No acute intracranial hemorrhage or mass effect. Extensive chronic small   vessel ischemic changes in the frontoparietal periventricular and   subcortical white matter. Indeterminate age bilateral thalamic lacunar   infarcts accommodation of dilated perivascular spaces and lacunar   infarcts in the bilateral basal ganglia. Small chronic appearing   bilateral cerebellar infarcts.    CTA NECK:  No significant stenosis of the cervical carotid arteries based   on NASCET criteria. Patent cervical vertebral arteries. No evidence of   cervical carotid or vertebral artery dissection.    CTA HEAD:  No high-grade stenosis or occlusion of the major proximal   arterial branches.    CT PERFUSION:  Perfusion imaging shows no evidence of a core infarct or   tissue at risk.    < from: MR Head No Cont (05.20.24 @ 20:13) >    ACC: [id number] EXAM:  MR BRAIN   ORDERED BY: ARY POE     PROCEDURE DATE:  05/20/2024          INTERPRETATION:  CLINICAL INDICATION: Patient recently fell at home.   Confused.    TECHNIQUE: MRI of the brain was performed without contrast.    COMPARISON: CT brain 5/19/2024    FINDINGS:  Acute infarct involving the anterior left midbrain.    No acute intracranial hemorrhage, mass effect, or hydrocephalus. No   extra-axial collection. Basal cisterns are patent.    Age-related involutional changes and chronic microvascular ischemic   changes. Chronic lacunar infarcts involving the bilateral thalami. Small   chronic infarcts involving the bilateral cerebral hemispheres.    Ventricles and sulci are age-appropriate in size.    Major intracranial flow-voids are preserved.    Left cataract surgery. The orbits, sellar and suprasellar structures, and   craniocervical junction are otherwise unremarkable. Visualized paranasal   sinuses and mastoid air cells are clear.    IMPRESSION:  Acute infarct involving the anterior left midbrain.    --- End of Report ---              < end of copied text >

## 2024-05-27 NOTE — PROGRESS NOTE ADULT - SUBJECTIVE AND OBJECTIVE BOX
DATE OF SERVICE: 05-27-24    pt seen and examined, no complaints, ROS - .     Review of Systems:   Constitutional: [ ] fevers, [ ] chills.   Skin: [ ] dry skin. [ ] rashes.  Psychiatric: [ ] depression, [ ] anxiety.   Gastrointestinal: [ ] BRBPR, [ ] melena.   Neurological: [ ] confusion. [ ] seizures. [ ] shuffling gait.   Ears,Nose,Mouth and Throat: [ ] ear pain [ ] sore throat.   Eyes: [ ] diplopia.   Respiratory: [ ] hemoptysis. [ ] shortness of breath  Cardiovascular: See HPI above  Hematologic/Lymphatic: [ ] anemia. [ ] painful nodes. [ ] prolonged bleeding.   Genitourinary: [ ] hematuria. [ ] flank pain.   Endocrine: [ ] significant change in weight. [ ] intolerance to heat and cold.     Review of systems [ x] otherwise negative, [ ] otherwise unable to obtain    FH: no family history of sudden cardiac death in first degree relatives    SH: [ ] tobacco, [ ] alcohol, [ ] drugs           acetaminophen     Tablet .. 975 milliGRAM(s) Oral every 6 hours  aspirin enteric coated 81 milliGRAM(s) Oral daily  atorvastatin 20 milliGRAM(s) Oral at bedtime  dextrose 50% Injectable 12.5 Gram(s) IV Push once  dextrose 50% Injectable 25 Gram(s) IV Push once  heparin   Injectable 5000 Unit(s) SubCutaneous every 8 hours  insulin lispro (ADMELOG) corrective regimen sliding scale   SubCutaneous Before meals and at bedtime  linezolid  IVPB 600 milliGRAM(s) IV Intermittent every 12 hours  magnesium sulfate  IVPB 1 Gram(s) IV Intermittent once  memantine 10 milliGRAM(s) Oral every 12 hours  metoprolol tartrate 25 milliGRAM(s) Oral every 12 hours  oxyCODONE    IR 5 milliGRAM(s) Oral every 4 hours PRN  oxyCODONE    IR 2.5 milliGRAM(s) Oral every 4 hours PRN  pantoprazole    Tablet 40 milliGRAM(s) Oral before breakfast  piperacillin/tazobactam IVPB.. 3.375 Gram(s) IV Intermittent every 8 hours                            8.7    11.64 )-----------( 377      ( 27 May 2024 06:54 )             26.5       Hemoglobin: 8.7 g/dL (05-27 @ 06:54)  Hemoglobin: 9.4 g/dL (05-26 @ 07:21)  Hemoglobin: 9.3 g/dL (05-25 @ 05:22)  Hemoglobin: 8.1 g/dL (05-24 @ 06:35)  Hemoglobin: 8.1 g/dL (05-23 @ 07:01)      05-27    135  |  106  |  5<L>  ----------------------------<  99  4.6   |  21<L>  |  0.53    Ca    7.9<L>      27 May 2024 06:54  Phos  3.2     05-27  Mg     1.80     05-27      Creatinine Trend: 0.53<--, 0.43<--, 0.51<--, 0.43<--, 0.58<--, 0.51<--    COAGS:           T(C): 36.5 (05-27-24 @ 06:00), Max: 37 (05-26-24 @ 10:00)  HR: 63 (05-27-24 @ 06:00) (63 - 85)  BP: 118/67 (05-27-24 @ 06:00) (100/61 - 121/71)  RR: 16 (05-27-24 @ 06:00) (16 - 18)  SpO2: 98% (05-27-24 @ 06:00) (95% - 100%)  Wt(kg): --    I&O's Summary    26 May 2024 07:01  -  27 May 2024 07:00  --------------------------------------------------------  IN: 2970 mL / OUT: 2470 mL / NET: 500 mL      General: pleasant, comfortable  Head: Normocephalic and atraumatic.   Neck: No JVD. No bruitsNo M/R/G  Lungs: CTA b/l. diminshed at bases A/L limited exam  Abdomen: deferred  Extremities: No clubbing/cyanosis/edema.   Neurologic: Moves all four extremities.   Skin: Warm and moist. The patient's skin has normal elasticity and good skin turgor.   Psychiatric: cannot fully assess  Musculoskeletal: Normal range of motion, normal strength     TELEMETRY: 	  NONE    ECG:  	NSR    < from: Xray Chest 1 View- PORTABLE-Urgent (Xray Chest 1 View- PORTABLE-Urgent .) (05.23.24 @ 09:35) >  FINDINGS:    Cardiopericardial silhouette is magnified with this projection.  Lungs show no focal consolidations.  Trace left effusion.  There is no pneumothorax.  No acute bony abnormality.    IMPRESSION: Clear lungs with trace left effusion.    < end of copied text >      ASSESSMENT/PLAN: Pt is a 78F with PMHx diverticulitis, DM2, HTN, HLD,with recent hospitalization for complicated diverticulitis, discharged 5/15 on long-term IV ABX presents after fall from home with AMS, found to have fevers  and a  CT AP showing perforated sigmoid diverticulitis with thick-walled pericolonic multilocular abscess measuring at least up to 3.5 x 1.5 x 4.8 cm. Cardiology called for pre-op risk stratification prior to OR. Currently denies any chest pain, SOB, orthopnea.  --s/p diagnostic laparoscopy, Incision and drainage of deep pelvic abscess, umbilical hernia repair, Jania's procedure (5/21).  - no anginal chest pain, no q waves on EKG euvolemic on exam, no severely stenotic valvular disease on PE, recent acute CVA  - Elevated risk for for intraperitoneal surgery, can proceed to OR with no further CV work-up  --tolerated procedure well from CV perspective, having expected post op abdominal discomfort.  -cont Lopressor 25 mg q 12  -cont Lipitor 20 mg  -cont post op incentive spirometer/ PT

## 2024-05-27 NOTE — PROGRESS NOTE ADULT - ASSESSMENT
78F w/ PMHx of Diverticulitis now s/p diagnostic laparoscopy, Incision and drainage of deep pelvic abscess, umbilical hernia repair, Jania's procedure (5/21).     PLAN:  - ADAT  - c/w Linezolid and Zosyn  - Monitor KULWANT drain output  - Strict I&O's  - Lopressor 25mg  - Analgesia and antiemetics as needed  - PT --> Rehab  - DVTppx    A team [id number]     78F w/ PMHx of Diverticulitis now s/p diagnostic laparoscopy, Incision and drainage of deep pelvic abscess, umbilical hernia repair, Jania's procedure (5/21).     PLAN:  - ADAT, LRD  - IVL  - c/w Linezolid and Zosyn, antibiotics to stop tomorrow 05/28  - Monitor KULWANT drain output, will be dc w/ KULWANT  - Strict I&O's  - Lopressor 25mg  - Analgesia and antiemetics as needed  - PT --> Rehab  - DVTppx  - Dispo: Rehab options given to family    A team [id number]

## 2024-05-28 LAB
ANION GAP SERPL CALC-SCNC: 10 MMOL/L — SIGNIFICANT CHANGE UP (ref 7–14)
BASOPHILS # BLD AUTO: 0.04 K/UL — SIGNIFICANT CHANGE UP (ref 0–0.2)
BASOPHILS NFR BLD AUTO: 0.3 % — SIGNIFICANT CHANGE UP (ref 0–2)
BUN SERPL-MCNC: 6 MG/DL — LOW (ref 7–23)
CALCIUM SERPL-MCNC: 8.1 MG/DL — LOW (ref 8.4–10.5)
CHLORIDE SERPL-SCNC: 104 MMOL/L — SIGNIFICANT CHANGE UP (ref 98–107)
CO2 SERPL-SCNC: 20 MMOL/L — LOW (ref 22–31)
CREAT SERPL-MCNC: 0.57 MG/DL — SIGNIFICANT CHANGE UP (ref 0.5–1.3)
EGFR: 94 ML/MIN/1.73M2 — SIGNIFICANT CHANGE UP
EOSINOPHIL # BLD AUTO: 0.22 K/UL — SIGNIFICANT CHANGE UP (ref 0–0.5)
EOSINOPHIL NFR BLD AUTO: 1.7 % — SIGNIFICANT CHANGE UP (ref 0–6)
GLUCOSE BLDC GLUCOMTR-MCNC: 100 MG/DL — HIGH (ref 70–99)
GLUCOSE BLDC GLUCOMTR-MCNC: 106 MG/DL — HIGH (ref 70–99)
GLUCOSE BLDC GLUCOMTR-MCNC: 116 MG/DL — HIGH (ref 70–99)
GLUCOSE BLDC GLUCOMTR-MCNC: 116 MG/DL — HIGH (ref 70–99)
GLUCOSE SERPL-MCNC: 92 MG/DL — SIGNIFICANT CHANGE UP (ref 70–99)
HCT VFR BLD CALC: 29.6 % — LOW (ref 34.5–45)
HGB BLD-MCNC: 9.9 G/DL — LOW (ref 11.5–15.5)
IANC: 10.18 K/UL — HIGH (ref 1.8–7.4)
IMM GRANULOCYTES NFR BLD AUTO: 1.2 % — HIGH (ref 0–0.9)
LYMPHOCYTES # BLD AUTO: 16 % — SIGNIFICANT CHANGE UP (ref 13–44)
LYMPHOCYTES # BLD AUTO: 2.13 K/UL — SIGNIFICANT CHANGE UP (ref 1–3.3)
MAGNESIUM SERPL-MCNC: 1.8 MG/DL — SIGNIFICANT CHANGE UP (ref 1.6–2.6)
MCHC RBC-ENTMCNC: 30.1 PG — SIGNIFICANT CHANGE UP (ref 27–34)
MCHC RBC-ENTMCNC: 33.4 GM/DL — SIGNIFICANT CHANGE UP (ref 32–36)
MCV RBC AUTO: 90 FL — SIGNIFICANT CHANGE UP (ref 80–100)
MONOCYTES # BLD AUTO: 0.6 K/UL — SIGNIFICANT CHANGE UP (ref 0–0.9)
MONOCYTES NFR BLD AUTO: 4.5 % — SIGNIFICANT CHANGE UP (ref 2–14)
NEUTROPHILS # BLD AUTO: 10.18 K/UL — HIGH (ref 1.8–7.4)
NEUTROPHILS NFR BLD AUTO: 76.3 % — SIGNIFICANT CHANGE UP (ref 43–77)
NRBC # BLD: 0 /100 WBCS — SIGNIFICANT CHANGE UP (ref 0–0)
NRBC # FLD: 0 K/UL — SIGNIFICANT CHANGE UP (ref 0–0)
PHOSPHATE SERPL-MCNC: 3 MG/DL — SIGNIFICANT CHANGE UP (ref 2.5–4.5)
PLATELET # BLD AUTO: 402 K/UL — HIGH (ref 150–400)
POTASSIUM SERPL-MCNC: 4.2 MMOL/L — SIGNIFICANT CHANGE UP (ref 3.5–5.3)
POTASSIUM SERPL-SCNC: 4.2 MMOL/L — SIGNIFICANT CHANGE UP (ref 3.5–5.3)
RBC # BLD: 3.29 M/UL — LOW (ref 3.8–5.2)
RBC # FLD: 15.7 % — HIGH (ref 10.3–14.5)
SODIUM SERPL-SCNC: 134 MMOL/L — LOW (ref 135–145)
WBC # BLD: 13.33 K/UL — HIGH (ref 3.8–10.5)
WBC # FLD AUTO: 13.33 K/UL — HIGH (ref 3.8–10.5)

## 2024-05-28 PROCEDURE — 74177 CT ABD & PELVIS W/CONTRAST: CPT | Mod: 26

## 2024-05-28 RX ORDER — PIPERACILLIN AND TAZOBACTAM 4; .5 G/20ML; G/20ML
3.38 INJECTION, POWDER, LYOPHILIZED, FOR SOLUTION INTRAVENOUS ONCE
Refills: 0 | Status: DISCONTINUED | OUTPATIENT
Start: 2024-05-28 | End: 2024-05-28

## 2024-05-28 RX ORDER — LINEZOLID 600 MG/300ML
600 INJECTION, SOLUTION INTRAVENOUS ONCE
Refills: 0 | Status: COMPLETED | OUTPATIENT
Start: 2024-05-28 | End: 2024-05-28

## 2024-05-28 RX ORDER — MAGNESIUM SULFATE 500 MG/ML
2 VIAL (ML) INJECTION ONCE
Refills: 0 | Status: COMPLETED | OUTPATIENT
Start: 2024-05-28 | End: 2024-05-28

## 2024-05-28 RX ORDER — PIPERACILLIN AND TAZOBACTAM 4; .5 G/20ML; G/20ML
3.38 INJECTION, POWDER, LYOPHILIZED, FOR SOLUTION INTRAVENOUS ONCE
Refills: 0 | Status: COMPLETED | OUTPATIENT
Start: 2024-05-28 | End: 2024-05-28

## 2024-05-28 RX ADMIN — MEMANTINE HYDROCHLORIDE 10 MILLIGRAM(S): 10 TABLET ORAL at 18:20

## 2024-05-28 RX ADMIN — HEPARIN SODIUM 5000 UNIT(S): 5000 INJECTION INTRAVENOUS; SUBCUTANEOUS at 14:54

## 2024-05-28 RX ADMIN — MEMANTINE HYDROCHLORIDE 10 MILLIGRAM(S): 10 TABLET ORAL at 06:09

## 2024-05-28 RX ADMIN — Medication 975 MILLIGRAM(S): at 06:09

## 2024-05-28 RX ADMIN — OXYCODONE HYDROCHLORIDE 5 MILLIGRAM(S): 5 TABLET ORAL at 07:08

## 2024-05-28 RX ADMIN — ATORVASTATIN CALCIUM 20 MILLIGRAM(S): 80 TABLET, FILM COATED ORAL at 22:21

## 2024-05-28 RX ADMIN — HEPARIN SODIUM 5000 UNIT(S): 5000 INJECTION INTRAVENOUS; SUBCUTANEOUS at 22:21

## 2024-05-28 RX ADMIN — Medication 975 MILLIGRAM(S): at 18:20

## 2024-05-28 RX ADMIN — LINEZOLID 300 MILLIGRAM(S): 600 INJECTION, SOLUTION INTRAVENOUS at 18:20

## 2024-05-28 RX ADMIN — PANTOPRAZOLE SODIUM 40 MILLIGRAM(S): 20 TABLET, DELAYED RELEASE ORAL at 06:09

## 2024-05-28 RX ADMIN — PIPERACILLIN AND TAZOBACTAM 200 GRAM(S): 4; .5 INJECTION, POWDER, LYOPHILIZED, FOR SOLUTION INTRAVENOUS at 14:54

## 2024-05-28 RX ADMIN — Medication 975 MILLIGRAM(S): at 12:19

## 2024-05-28 RX ADMIN — OXYCODONE HYDROCHLORIDE 5 MILLIGRAM(S): 5 TABLET ORAL at 07:23

## 2024-05-28 RX ADMIN — PIPERACILLIN AND TAZOBACTAM 25 GRAM(S): 4; .5 INJECTION, POWDER, LYOPHILIZED, FOR SOLUTION INTRAVENOUS at 22:21

## 2024-05-28 RX ADMIN — Medication 975 MILLIGRAM(S): at 13:00

## 2024-05-28 RX ADMIN — Medication 81 MILLIGRAM(S): at 12:20

## 2024-05-28 RX ADMIN — LINEZOLID 300 MILLIGRAM(S): 600 INJECTION, SOLUTION INTRAVENOUS at 06:05

## 2024-05-28 RX ADMIN — HEPARIN SODIUM 5000 UNIT(S): 5000 INJECTION INTRAVENOUS; SUBCUTANEOUS at 06:09

## 2024-05-28 RX ADMIN — Medication 25 GRAM(S): at 10:17

## 2024-05-28 RX ADMIN — PIPERACILLIN AND TAZOBACTAM 25 GRAM(S): 4; .5 INJECTION, POWDER, LYOPHILIZED, FOR SOLUTION INTRAVENOUS at 07:50

## 2024-05-28 NOTE — PROGRESS NOTE ADULT - SUBJECTIVE AND OBJECTIVE BOX
TEAM [ A ] Surgery Daily Progress Note  =====================================================    SUBJECTIVE: Patient seen and examined at bedside on AM rounds. Patient reports that they're feeling well. Denies fever, chills, N/V, chest pain, SOB    ALLERGIES:  No Known Allergies      --------------------------------------------------------------------------------------    MEDICATIONS:  acetaminophen     Tablet .. 975 milliGRAM(s) Oral every 6 hours  aspirin enteric coated 81 milliGRAM(s) Oral daily  atorvastatin 20 milliGRAM(s) Oral at bedtime  dextrose 50% Injectable 12.5 Gram(s) IV Push once  dextrose 50% Injectable 25 Gram(s) IV Push once  heparin   Injectable 5000 Unit(s) SubCutaneous every 8 hours  insulin lispro (ADMELOG) corrective regimen sliding scale   SubCutaneous Before meals and at bedtime  linezolid  IVPB 600 milliGRAM(s) IV Intermittent every 12 hours  memantine 10 milliGRAM(s) Oral every 12 hours  metoprolol tartrate 25 milliGRAM(s) Oral every 12 hours  oxyCODONE    IR 2.5 milliGRAM(s) Oral every 4 hours PRN  oxyCODONE    IR 5 milliGRAM(s) Oral every 4 hours PRN  pantoprazole    Tablet 40 milliGRAM(s) Oral before breakfast  piperacillin/tazobactam IVPB.. 3.375 Gram(s) IV Intermittent every 8 hours    --------------------------------------------------------------------------------------    VITAL SIGNS:  T(C): 37 (05-28-24 @ 01:48), Max: 37 (05-28-24 @ 01:48)  HR: 73 (05-28-24 @ 01:48) (61 - 76)  BP: 109/58 (05-28-24 @ 01:48) (104/61 - 118/67)  RR: 18 (05-28-24 @ 01:48) (16 - 18)  SpO2: 99% (05-28-24 @ 01:48) (95% - 99%)  --------------------------------------------------------------------------------------    INS AND OUTS:    05-26-24 @ 07:01  -  05-27-24 @ 07:00  --------------------------------------------------------  IN: 2970 mL / OUT: 2470 mL / NET: 500 mL    05-27-24 @ 07:01  -  05-28-24 @ 05:43  --------------------------------------------------------  IN: 1800 mL / OUT: 1945 mL / NET: -145 mL      --------------------------------------------------------------------------------------      EXAM    General: NAD, resting in bed comfortably.  Cardiac: regular rate, warm and well perfused  Respiratory: Nonlabored respirations, normal cw expansion.  Abdomen: soft, nontender, nondistended  Extremities: normal strength, FROM, no deformities    --------------------------------------------------------------------------------------    LABS                        8.7    11.64 )-----------( 377      ( 27 May 2024 06:54 )             26.5   05-27    135  |  106  |  5<L>  ----------------------------<  99  4.6   |  21<L>  |  0.53    Ca    7.9<L>      27 May 2024 06:54  Phos  3.2     05-27  Mg     1.80     05-27         TEAM [ A ] Surgery Daily Progress Note  =====================================================    SUBJECTIVE: Patient seen and examined at bedside on AM rounds. Patient reports that they're feeling well. She is tolerating a diet. Denies fever, chills, N/V, chest pain, SOB    ALLERGIES:  No Known Allergies      --------------------------------------------------------------------------------------    MEDICATIONS:  acetaminophen     Tablet .. 975 milliGRAM(s) Oral every 6 hours  aspirin enteric coated 81 milliGRAM(s) Oral daily  atorvastatin 20 milliGRAM(s) Oral at bedtime  dextrose 50% Injectable 12.5 Gram(s) IV Push once  dextrose 50% Injectable 25 Gram(s) IV Push once  heparin   Injectable 5000 Unit(s) SubCutaneous every 8 hours  insulin lispro (ADMELOG) corrective regimen sliding scale   SubCutaneous Before meals and at bedtime  linezolid  IVPB 600 milliGRAM(s) IV Intermittent every 12 hours  memantine 10 milliGRAM(s) Oral every 12 hours  metoprolol tartrate 25 milliGRAM(s) Oral every 12 hours  oxyCODONE    IR 2.5 milliGRAM(s) Oral every 4 hours PRN  oxyCODONE    IR 5 milliGRAM(s) Oral every 4 hours PRN  pantoprazole    Tablet 40 milliGRAM(s) Oral before breakfast  piperacillin/tazobactam IVPB.. 3.375 Gram(s) IV Intermittent every 8 hours    --------------------------------------------------------------------------------------    VITAL SIGNS:  T(C): 37 (05-28-24 @ 01:48), Max: 37 (05-28-24 @ 01:48)  HR: 73 (05-28-24 @ 01:48) (61 - 76)  BP: 109/58 (05-28-24 @ 01:48) (104/61 - 118/67)  RR: 18 (05-28-24 @ 01:48) (16 - 18)  SpO2: 99% (05-28-24 @ 01:48) (95% - 99%)  --------------------------------------------------------------------------------------    INS AND OUTS:    05-26-24 @ 07:01  -  05-27-24 @ 07:00  --------------------------------------------------------  IN: 2970 mL / OUT: 2470 mL / NET: 500 mL    05-27-24 @ 07:01  -  05-28-24 @ 05:43  --------------------------------------------------------  IN: 1800 mL / OUT: 1945 mL / NET: -145 mL      --------------------------------------------------------------------------------------      EXAM    General: NAD, resting in bed comfortably.  Cardiac: regular rate, warm and well perfused  Respiratory: Nonlabored respirations, normal cw expansion.  Abdomen: soft, nontender, nondistended, midline staples c/d/i, RLQ KULWANT x 1 serous, colostomy with stool and gas  Extremities: normal strength, FROM, no deformities    --------------------------------------------------------------------------------------    LABS                        8.7    11.64 )-----------( 377      ( 27 May 2024 06:54 )             26.5   05-27    135  |  106  |  5<L>  ----------------------------<  99  4.6   |  21<L>  |  0.53    Ca    7.9<L>      27 May 2024 06:54  Phos  3.2     05-27  Mg     1.80     05-27

## 2024-05-28 NOTE — PROGRESS NOTE ADULT - ASSESSMENT
78F with  sigmoid diverticulitis, multiple chronic infarcts (with unclear residual deficits), dementia (on memantine), T2DM, HTN, HLD, glaucoma, OA of the L knee, PERFECTO, who presents to [facility] ED on 5/19/2024 with c/o left facial droop and slurred speech. Stroke code cancelled as out of 24h window. Unclear stroke hx and baseline neurologic deficits.   In ED was febrile. CTH with bilateral thalamic and basal ganglia infarcts. Thalamic infarcts appear more subacute. Also with chronic cerebellar infarcts. CTA with mld ICA narrowing  o/e with mild to moderate dysarthria, R facial palsy , RUE and RLE drift. AAOx2   MRI brain with L anterior midbrain infarct    - continue aspirin 81mg  - can add Plavix 75mg x 3 weeks post OP if ok per surgery team   -  TTE  rev  - tele, consider ILR on d/c   - on zosyn per primary team + Linezolid  - cont home memantine   - gradual normotension, avoid hypotension  -  LDL 49 (1/24/24) - continue home statin  -  A1c 6.3 (5/11/2024)  - PT/OT  - DVT ppx     Allison Vinson DO  Vascular Neurology  Office [phone] .

## 2024-05-28 NOTE — PROGRESS NOTE ADULT - ASSESSMENT
77 y/o F PMhx Glaucoma, DM II, HTN, dementia, diverticulitis who presented after a fall found to have enlarged abscess    Sigmoid diverticulitis w/ perforation and abscess  sepsis- fever, leukocytosis on admission  CT- perforated sigmoid diverticulitis with thick-walled pericolonic multilocular abscess measuring at least up to 3.5 x 1.5 x 4.8 cm w/ moderate stool burden distending the colon proximal to the sigmoid  s/p OR 5/21- Diagnostic laparoscopy converted to open and Ruiz procedure, pus cavity in lateral colonic wall and culture obtained. colostomy   - cx w/ VRE faecium  CT- Postoperative changes status post Ruiz's procedure with small amount of postoperative free fluid and free air. Dilatation of small bowel loops, likely ileus. No evidence of drainable intra-abdominal fluid collection.    CVA  L facial droop  MRI- Acute infarct involving the anterior left midbrain.    Recommendations  continue zosyn and linezolid to complete 7 days post OR w/ last day today  management of ileus per surgery    Geovani Goldman M.D.  [facility], Division of Infectious Diseases  [phone]  After 5pm on weekdays and all day on weekends - please call [phone]

## 2024-05-28 NOTE — PROGRESS NOTE ADULT - ASSESSMENT
78 year old female with PMH diverticulitis, DM2, HTN, HLD presents s/p diagnostic lap converted to ex lap, TELLO, Hartmanns procedure on 5/21.     Plan  - Diet: LRD  - Culture with VRE faecium --> 7 days total zosyn and linezold, last day today  - Aspirin, DVT prophylaxis  - Appreciate neuro recs, plavix 75 mg x 3weeks?  - PT recommending rehab    A team surgery [id number]

## 2024-05-28 NOTE — PROGRESS NOTE ADULT - SUBJECTIVE AND OBJECTIVE BOX
Neurology Progress Note    S: Patient seen and examined.     Medications: MEDICATIONS  (STANDING):  acetaminophen     Tablet .. 975 milliGRAM(s) Oral every 6 hours  aspirin enteric coated 81 milliGRAM(s) Oral daily  atorvastatin 20 milliGRAM(s) Oral at bedtime  heparin   Injectable 5000 Unit(s) SubCutaneous every 8 hours  insulin lispro (ADMELOG) corrective regimen sliding scale   SubCutaneous Before meals and at bedtime  linezolid  IVPB 600 milliGRAM(s) IV Intermittent once  memantine 10 milliGRAM(s) Oral every 12 hours  metoprolol tartrate 25 milliGRAM(s) Oral every 12 hours  pantoprazole    Tablet 40 milliGRAM(s) Oral before breakfast  piperacillin/tazobactam IVPB. 3.375 Gram(s) IV Intermittent once  piperacillin/tazobactam IVPB.. 3.375 Gram(s) IV Intermittent once    MEDICATIONS  (PRN):  oxyCODONE    IR 2.5 milliGRAM(s) Oral every 4 hours PRN Moderate Pain (4 - 6)  oxyCODONE    IR 5 milliGRAM(s) Oral every 4 hours PRN Severe Pain (7 - 10)       Vitals:  Vital Signs Last 24 Hrs  T(C): 36.8 (28 May 2024 10:00), Max: 37 (28 May 2024 01:48)  T(F): 98.2 (28 May 2024 10:00), Max: 98.6 (28 May 2024 01:48)  HR: 81 (28 May 2024 10:00) (70 - 97)  BP: 100/64 (28 May 2024 10:00) (100/64 - 124/50)  BP(mean): --  RR: 18 (28 May 2024 10:00) (17 - 18)  SpO2: 98% (28 May 2024 10:00) (95% - 99%)    Parameters below as of 28 May 2024 10:00  Patient On (Oxygen Delivery Method): room air            General Exam:   General Appearance: Appropriately dressed and in no acute distress       Head: Normocephalic, atraumatic and no dysmorphic features  Ear, Nose, and Throat: Moist mucous membranes  CVS: S1S2+  Resp: No SOB, no wheeze or rhonchi  Abd: soft NTND  Extremities: No edema, no cyanosis  Skin: No bruises, no rashes     Neurological Exam:  Mental Status: Awake, alert and oriented x 2.  Able to follow simple commands. Able to name and repeat. fluent speech. No obvious aphasia, mild to moderate dysarthria   Cranial Nerves: PERRL, EOMI, VFFC, sensation V1-V3 intact,  mild L facial palsy - improving , equal elevation of palate, scm/trap 5/5, tongue is midline on protrusion. Hearing is grossly intact.   Motor: LUE and LLE drift  Sensation: dec on the L   Reflexes: 1+ throughout at biceps, brachioradialis, triceps, patellars and ankles bilaterally and equal. No clonus. R toe and L toe were both downgoing.  Coordination: No dysmetria on FNF  Gait: deferred    I personally reviewed the below data/images/labs:    LABS:                          9.9    13.33 )-----------( 402      ( 28 May 2024 05:40 )             29.6     05-28    134<L>  |  104  |  6<L>  ----------------------------<  92  4.2   |  20<L>  |  0.57    Ca    8.1<L>      28 May 2024 05:40  Phos  3.0     05-28  Mg     1.80     05-28          Urinalysis Basic - ( 28 May 2024 05:40 )    Color: x / Appearance: x / SG: x / pH: x  Gluc: 92 mg/dL / Ketone: x  / Bili: x / Urobili: x   Blood: x / Protein: x / Nitrite: x   Leuk Esterase: x / RBC: x / WBC x   Sq Epi: x / Non Sq Epi: x / Bacteria: x              Radiology:  CTH: No acute intracranial hemorrhage or mass effect. Extensive chronic small   vessel ischemic changes in the frontoparietal periventricular and   subcortical white matter. Indeterminate age bilateral thalamic lacunar   infarcts accommodation of dilated perivascular spaces and lacunar   infarcts in the bilateral basal ganglia. Small chronic appearing   bilateral cerebellar infarcts.    CTA NECK:  No significant stenosis of the cervical carotid arteries based   on NASCET criteria. Patent cervical vertebral arteries. No evidence of   cervical carotid or vertebral artery dissection.    CTA HEAD:  No high-grade stenosis or occlusion of the major proximal   arterial branches.    CT PERFUSION:  Perfusion imaging shows no evidence of a core infarct or   tissue at risk.    < from: MR Head No Cont (05.20.24 @ 20:13) >    ACC: [id number] EXAM:  MR BRAIN   ORDERED BY: ARY POE     PROCEDURE DATE:  05/20/2024          INTERPRETATION:  CLINICAL INDICATION: Patient recently fell at home.   Confused.    TECHNIQUE: MRI of the brain was performed without contrast.    COMPARISON: CT brain 5/19/2024    FINDINGS:  Acute infarct involving the anterior left midbrain.    No acute intracranial hemorrhage, mass effect, or hydrocephalus. No   extra-axial collection. Basal cisterns are patent.    Age-related involutional changes and chronic microvascular ischemic   changes. Chronic lacunar infarcts involving the bilateral thalami. Small   chronic infarcts involving the bilateral cerebral hemispheres.    Ventricles and sulci are age-appropriate in size.    Major intracranial flow-voids are preserved.    Left cataract surgery. The orbits, sellar and suprasellar structures, and   craniocervical junction are otherwise unremarkable. Visualized paranasal   sinuses and mastoid air cells are clear.    IMPRESSION:  Acute infarct involving the anterior left midbrain.    --- End of Report ---              < end of copied text >     1. Left ventricular cavity is normal in size. Left ventricular wall thickness is normal. Left ventricular systolic function is normal with an ejection fraction visually estimated at 60 to 65 %. There are no regional wall motion abnormalities seen.   2. There is mild (grade 1) left ventricular diastolic dysfunction.   3. Normal right ventricular cavity size and probably normal systolic function.   4. Structurally normal mitral valve with normal leaflet excursion. There is calcification of the mitral valve annulus. There is trace mitral regurgitation.   5. The aortic valve appears trileaflet with normal systolic excursion. There is calcification of the aortic valve leaflets. There is mild aortic regurgitation.

## 2024-05-28 NOTE — PROGRESS NOTE ADULT - SUBJECTIVE AND OBJECTIVE BOX
DATE OF SERVICE: 05-28-24    resting comfortably, denies pain or SOB, ROS otherwise limited.      Review of Systems:   Constitutional: [ ] fevers, [ ] chills.   Skin: [ ] dry skin. [ ] rashes.  Psychiatric: [ ] depression, [ ] anxiety.   Gastrointestinal: [ ] BRBPR, [ ] melena.   Neurological: [ ] confusion. [ ] seizures. [ ] shuffling gait.   Ears,Nose,Mouth and Throat: [ ] ear pain [ ] sore throat.   Eyes: [ ] diplopia.   Respiratory: [ ] hemoptysis. [ ] shortness of breath  Cardiovascular: See HPI above  Hematologic/Lymphatic: [ ] anemia. [ ] painful nodes. [ ] prolonged bleeding.   Genitourinary: [ ] hematuria. [ ] flank pain.   Endocrine: [ ] significant change in weight. [ ] intolerance to heat and cold.     Review of systems [x ] otherwise negative, [ ] otherwise unable to obtain    FH: no family history of sudden cardiac death in first degree relatives    SH: [ ] tobacco, [ ] alcohol, [ ] drugs    acetaminophen     Tablet .. 975 milliGRAM(s) Oral every 6 hours  aspirin enteric coated 81 milliGRAM(s) Oral daily  atorvastatin 20 milliGRAM(s) Oral at bedtime  heparin   Injectable 5000 Unit(s) SubCutaneous every 8 hours  insulin lispro (ADMELOG) corrective regimen sliding scale   SubCutaneous Before meals and at bedtime  linezolid  IVPB 600 milliGRAM(s) IV Intermittent every 12 hours  memantine 10 milliGRAM(s) Oral every 12 hours  metoprolol tartrate 25 milliGRAM(s) Oral every 12 hours  oxyCODONE    IR 5 milliGRAM(s) Oral every 4 hours PRN  oxyCODONE    IR 2.5 milliGRAM(s) Oral every 4 hours PRN  pantoprazole    Tablet 40 milliGRAM(s) Oral before breakfast  piperacillin/tazobactam IVPB.. 3.375 Gram(s) IV Intermittent every 8 hours                            9.9    13.33 )-----------( 402      ( 28 May 2024 05:40 )             29.6       05-28    134<L>  |  104  |  6<L>  ----------------------------<  92  4.2   |  20<L>  |  0.57    Ca    8.1<L>      28 May 2024 05:40  Phos  3.0     05-28  Mg     1.80     05-28      T(C): 37 (05-28-24 @ 06:00), Max: 37 (05-28-24 @ 01:48)  HR: 97 (05-28-24 @ 06:00) (70 - 97)  BP: 124/50 (05-28-24 @ 06:00) (104/61 - 124/50)  RR: 17 (05-28-24 @ 06:00) (17 - 18)  SpO2: 95% (05-28-24 @ 06:00) (95% - 99%)  Wt(kg): --    I&O's Summary    27 May 2024 07:01  -  28 May 2024 07:00  --------------------------------------------------------  IN: 2340 mL / OUT: 2295 mL / NET: 45 mL      General: pleasant, comfortable  Head: Normocephalic and atraumatic.   Neck: No JVD. No bruitsNo M/R/G  Lungs: CTA b/l. diminshed at bases A/L limited exam  Abdomen: deferred  Extremities: No clubbing/cyanosis/edema.   Neurologic: Moves all four extremities.   Skin: Warm and moist. The patient's skin has normal elasticity and good skin turgor.   Psychiatric: cannot fully assess  Musculoskeletal: Normal range of motion, normal strength     TELEMETRY: 	  NONE    ECG:  	NSR    < from: Xray Chest 1 View- PORTABLE-Urgent (Xray Chest 1 View- PORTABLE-Urgent .) (05.23.24 @ 09:35) >  FINDINGS:    Cardiopericardial silhouette is magnified with this projection.  Lungs show no focal consolidations.  Trace left effusion.  There is no pneumothorax.  No acute bony abnormality.    IMPRESSION: Clear lungs with trace left effusion.    < end of copied text >      ASSESSMENT/PLAN: Pt is a 78F with PMHx diverticulitis, DM2, HTN, HLD,with recent hospitalization for complicated diverticulitis, discharged 5/15 on long-term IV ABX presents after fall from home with AMS, found to have fevers  and a  CT AP showing perforated sigmoid diverticulitis with thick-walled pericolonic multilocular abscess measuring at least up to 3.5 x 1.5 x 4.8 cm. Cardiology called for pre-op risk stratification prior to OR. Currently denies any chest pain, SOB, orthopnea.    - no anginal chest pain, no q waves on EKG euvolemic on exam, no severely stenotic valvular disease on PE, recent acute CVA  --tolerated procedure well from CV perspective, having expected post op abdominal discomfort.  - s/p drainage of deep pelvic abscess and open Ruiz procedure  -cont Lopressor 25 mg q 12  -cont ASA and Lipitor 20 mg       DATE OF SERVICE: 05-28-24    resting comfortably, denies pain or SOB, ROS otherwise limited.      Review of Systems:   Constitutional: [ ] fevers, [ ] chills.   Skin: [ ] dry skin. [ ] rashes.  Psychiatric: [ ] depression, [ ] anxiety.   Gastrointestinal: [ ] BRBPR, [ ] melena.   Neurological: [ ] confusion. [ ] seizures. [ ] shuffling gait.   Ears,Nose,Mouth and Throat: [ ] ear pain [ ] sore throat.   Eyes: [ ] diplopia.   Respiratory: [ ] hemoptysis. [ ] shortness of breath  Cardiovascular: See HPI above  Hematologic/Lymphatic: [ ] anemia. [ ] painful nodes. [ ] prolonged bleeding.   Genitourinary: [ ] hematuria. [ ] flank pain.   Endocrine: [ ] significant change in weight. [ ] intolerance to heat and cold.     Review of systems [x ] otherwise negative, [ ] otherwise unable to obtain    FH: no family history of sudden cardiac death in first degree relatives    SH: [ ] tobacco, [ ] alcohol, [ ] drugs    acetaminophen     Tablet .. 975 milliGRAM(s) Oral every 6 hours  aspirin enteric coated 81 milliGRAM(s) Oral daily  atorvastatin 20 milliGRAM(s) Oral at bedtime  heparin   Injectable 5000 Unit(s) SubCutaneous every 8 hours  insulin lispro (ADMELOG) corrective regimen sliding scale   SubCutaneous Before meals and at bedtime  linezolid  IVPB 600 milliGRAM(s) IV Intermittent every 12 hours  memantine 10 milliGRAM(s) Oral every 12 hours  metoprolol tartrate 25 milliGRAM(s) Oral every 12 hours  oxyCODONE    IR 5 milliGRAM(s) Oral every 4 hours PRN  oxyCODONE    IR 2.5 milliGRAM(s) Oral every 4 hours PRN  pantoprazole    Tablet 40 milliGRAM(s) Oral before breakfast  piperacillin/tazobactam IVPB.. 3.375 Gram(s) IV Intermittent every 8 hours                            9.9    13.33 )-----------( 402      ( 28 May 2024 05:40 )             29.6       05-28    134<L>  |  104  |  6<L>  ----------------------------<  92  4.2   |  20<L>  |  0.57    Ca    8.1<L>      28 May 2024 05:40  Phos  3.0     05-28  Mg     1.80     05-28      T(C): 37 (05-28-24 @ 06:00), Max: 37 (05-28-24 @ 01:48)  HR: 97 (05-28-24 @ 06:00) (70 - 97)  BP: 124/50 (05-28-24 @ 06:00) (104/61 - 124/50)  RR: 17 (05-28-24 @ 06:00) (17 - 18)  SpO2: 95% (05-28-24 @ 06:00) (95% - 99%)  Wt(kg): --    I&O's Summary    27 May 2024 07:01  -  28 May 2024 07:00  --------------------------------------------------------  IN: 2340 mL / OUT: 2295 mL / NET: 45 mL      General: pleasant, comfortable  Head: Normocephalic and atraumatic.   Neck: No JVD. No bruitsNo M/R/G  Lungs: CTA b/l. diminshed at bases A/L limited exam  Abdomen: deferred  Extremities: No clubbing/cyanosis/edema.   Neurologic: Moves all four extremities.   Skin: Warm and moist. The patient's skin has normal elasticity and good skin turgor.   Psychiatric: cannot fully assess  Musculoskeletal: Normal range of motion, normal strength     TELEMETRY: 	  NONE    ECG:  	NSR    < from: Xray Chest 1 View- PORTABLE-Urgent (Xray Chest 1 View- PORTABLE-Urgent .) (05.23.24 @ 09:35) >  FINDINGS:    Cardiopericardial silhouette is magnified with this projection.  Lungs show no focal consolidations.  Trace left effusion.  There is no pneumothorax.  No acute bony abnormality.    IMPRESSION: Clear lungs with trace left effusion.    < end of copied text >      ASSESSMENT/PLAN: Pt is a 78F with PMHx diverticulitis, DM2, HTN, HLD,with recent hospitalization for complicated diverticulitis, discharged 5/15 on long-term IV ABX presents after fall from home with AMS, found to have fevers  and a  CT AP showing perforated sigmoid diverticulitis with thick-walled pericolonic multilocular abscess measuring at least up to 3.5 x 1.5 x 4.8 cm. Cardiology called for pre-op risk stratification prior to OR. Currently denies any chest pain, SOB, orthopnea.    - no anginal chest pain, no q waves on EKG euvolemic on exam, no severely stenotic valvular disease on PE, recent acute CVA  - tolerated procedure well from CV perspective, having expected post op abdominal discomfort.  - s/p drainage of deep pelvic abscess and open Ruiz procedure  - cont Lopressor 25 mg q 12  - cont ASA and Lipitor 20 mg

## 2024-05-28 NOTE — PROGRESS NOTE ADULT - SUBJECTIVE AND OBJECTIVE BOX
[facility], Division of Infectious Diseases  JORGE LUIS Chaves Y. Patel, S. Shah, G. Jones  [phone]  ([phone] - weekdays after 5pm and weekends)    Name: TY DAVIS  Age/Gender: 78y Female  MRN: [id number]    Interval History:  Notes reviewed.   No concerning overnight events.  Afebrile.   denies abd pain    Allergies: No Known Allergies      Objective:  Vitals:   T(F): 98.2 (05-28-24 @ 10:00), Max: 98.6 (05-28-24 @ 01:48)  HR: 81 (05-28-24 @ 10:00) (70 - 97)  BP: 100/64 (05-28-24 @ 10:00) (100/64 - 124/50)  RR: 18 (05-28-24 @ 10:00) (17 - 18)  SpO2: 98% (05-28-24 @ 10:00) (95% - 99%)  Physical Examination:  General: no acute distress  HEENT: anicteric, NC  Cardio: S1, S2, normal rate  Resp: decreased breath sounds  Abd: soft, NT, ostomy, KULWANT drain, abd incision appears well healed  Ext: no LE edema  Skin: warm, dry    Laboratory Studies:  CBC:                       9.9    13.33 )-----------( 402      ( 28 May 2024 05:40 )             29.6     WBC Trend:  13.33 05-28-24 @ 05:40  11.64 05-27-24 @ 06:54  11.40 05-26-24 @ 07:21  8.47 05-25-24 @ 05:22  9.85 05-24-24 @ 06:35  11.64 05-23-24 @ 07:01  11.41 05-22-24 @ 05:09    CMP: 05-28    134<L>  |  104  |  6<L>  ----------------------------<  92  4.2   |  20<L>  |  0.57    Ca    8.1<L>      28 May 2024 05:40  Phos  3.0     05-28  Mg     1.80     05-28            Urinalysis Basic - ( 28 May 2024 05:40 )    Color: x / Appearance: x / SG: x / pH: x  Gluc: 92 mg/dL / Ketone: x  / Bili: x / Urobili: x   Blood: x / Protein: x / Nitrite: x   Leuk Esterase: x / RBC: x / WBC x   Sq Epi: x / Non Sq Epi: x / Bacteria: x      Microbiology: reviewed     Culture - Abscess with Gram Stain (collected 05-21-24 @ 15:20)  Source: .Abscess Perforated Diverticulitis  Gram Stain (05-22-24 @ 05:51):    Numerous polymorphonuclear leukocytes seen per low power field    Few Gram positive cocci in pairs seen per oil power field    Rare Gram Negative Rods seen per oil power field  Final Report (05-27-24 @ 11:03):    Moderate Enterococcus faecium (vancomycin resistant)    Rare Eubacterium limosum "Susceptibilities not performed"  Organism: Enterococcus faecium (vancomycin resistant) (05-27-24 @ 11:03)  Organism: Enterococcus faecium (vancomycin resistant) (05-27-24 @ 11:03)      Method Type: MAYELIN      -  Ampicillin: R >8 Predicts results to ampicillin/sulbactam, amoxacillin-clavulanate and  piperacillin-tazobactam.      -  Daptomycin: SDD 2 The breakpoint for SDD (susceptible dose dependent)is based on a dosage regimen of 8-12 mg/kg administered every 24 h in adults and is intended for serious infections due to E. faecium. Consultation with an infectious diseases specialist is recommended.      -  Levofloxacin: R >4      -  Linezolid: S 2      -  Vancomycin: R >16    Culture - Urine (collected 05-20-24 @ 00:45)  Source: Clean Catch Clean Catch (Midstream)  Final Report (05-22-24 @ 17:47):    10,000 - 49,000 CFU/mL Enterococcus faecium (vancomycin resistant)  Organism: Enterococcus faecium (vancomycin resistant) (05-22-24 @ 17:47)  Organism: Enterococcus faecium (vancomycin resistant) (05-22-24 @ 17:47)      Method Type: MAYELIN      -  Ampicillin: R >8 Predicts results to ampicillin/sulbactam, amoxacillin-clavulanate and  piperacillin-tazobactam.      -  Ciprofloxacin: R >2      -  Daptomycin: SDD 4 The breakpoint for SDD (susceptible dose dependent)is based on a dosage regimen of 8-12 mg/kg administered every 24 h in adults and is intended for serious infections due to E. faecium. Consultation with an infectious diseases specialist is recommended.      -  Levofloxacin: R >4      -  Linezolid: S 2      -  Nitrofurantoin: S <=32 Should not be used to treat pyelonephritis.      -  Tetracycline: R >8      -  Vancomycin: R >16    Culture - Blood (collected 05-19-24 @ 21:39)  Source: .Blood Blood-Peripheral  Final Report (05-25-24 @ 03:01):    No growth at 5 days    Culture - Blood (collected 05-19-24 @ 21:30)  Source: .Blood Blood-Peripheral  Final Report (05-25-24 @ 03:01):    No growth at 5 days        Radiology: reviewed     Medications:  acetaminophen     Tablet .. 975 milliGRAM(s) Oral every 6 hours  aspirin enteric coated 81 milliGRAM(s) Oral daily  atorvastatin 20 milliGRAM(s) Oral at bedtime  heparin   Injectable 5000 Unit(s) SubCutaneous every 8 hours  insulin lispro (ADMELOG) corrective regimen sliding scale   SubCutaneous Before meals and at bedtime  linezolid  IVPB 600 milliGRAM(s) IV Intermittent every 12 hours  memantine 10 milliGRAM(s) Oral every 12 hours  metoprolol tartrate 25 milliGRAM(s) Oral every 12 hours  oxyCODONE    IR 2.5 milliGRAM(s) Oral every 4 hours PRN  oxyCODONE    IR 5 milliGRAM(s) Oral every 4 hours PRN  pantoprazole    Tablet 40 milliGRAM(s) Oral before breakfast  piperacillin/tazobactam IVPB.. 3.375 Gram(s) IV Intermittent every 8 hours    Antimicrobials:  linezolid  IVPB 600 milliGRAM(s) IV Intermittent every 12 hours  piperacillin/tazobactam IVPB.. 3.375 Gram(s) IV Intermittent every 8 hours

## 2024-05-29 LAB
ANION GAP SERPL CALC-SCNC: 9 MMOL/L — SIGNIFICANT CHANGE UP (ref 7–14)
APPEARANCE UR: CLEAR — SIGNIFICANT CHANGE UP
BASOPHILS # BLD AUTO: 0.02 K/UL — SIGNIFICANT CHANGE UP (ref 0–0.2)
BASOPHILS NFR BLD AUTO: 0.2 % — SIGNIFICANT CHANGE UP (ref 0–2)
BILIRUB UR-MCNC: NEGATIVE — SIGNIFICANT CHANGE UP
BUN SERPL-MCNC: 5 MG/DL — LOW (ref 7–23)
CALCIUM SERPL-MCNC: 7.8 MG/DL — LOW (ref 8.4–10.5)
CHLORIDE SERPL-SCNC: 102 MMOL/L — SIGNIFICANT CHANGE UP (ref 98–107)
CO2 SERPL-SCNC: 22 MMOL/L — SIGNIFICANT CHANGE UP (ref 22–31)
COLOR SPEC: YELLOW — SIGNIFICANT CHANGE UP
CREAT SERPL-MCNC: 0.48 MG/DL — LOW (ref 0.5–1.3)
DIFF PNL FLD: NEGATIVE — SIGNIFICANT CHANGE UP
EGFR: 98 ML/MIN/1.73M2 — SIGNIFICANT CHANGE UP
EOSINOPHIL # BLD AUTO: 0.31 K/UL — SIGNIFICANT CHANGE UP (ref 0–0.5)
EOSINOPHIL NFR BLD AUTO: 2.9 % — SIGNIFICANT CHANGE UP (ref 0–6)
FLUAV AG NPH QL: SIGNIFICANT CHANGE UP
FLUBV AG NPH QL: SIGNIFICANT CHANGE UP
GLUCOSE BLDC GLUCOMTR-MCNC: 89 MG/DL — SIGNIFICANT CHANGE UP (ref 70–99)
GLUCOSE BLDC GLUCOMTR-MCNC: 92 MG/DL — SIGNIFICANT CHANGE UP (ref 70–99)
GLUCOSE BLDC GLUCOMTR-MCNC: 94 MG/DL — SIGNIFICANT CHANGE UP (ref 70–99)
GLUCOSE BLDC GLUCOMTR-MCNC: 98 MG/DL — SIGNIFICANT CHANGE UP (ref 70–99)
GLUCOSE BLDC GLUCOMTR-MCNC: 99 MG/DL — SIGNIFICANT CHANGE UP (ref 70–99)
GLUCOSE SERPL-MCNC: 91 MG/DL — SIGNIFICANT CHANGE UP (ref 70–99)
GLUCOSE UR QL: NEGATIVE MG/DL — SIGNIFICANT CHANGE UP
HCT VFR BLD CALC: 27.7 % — LOW (ref 34.5–45)
HGB BLD-MCNC: 9 G/DL — LOW (ref 11.5–15.5)
IANC: 8.03 K/UL — HIGH (ref 1.8–7.4)
IMM GRANULOCYTES NFR BLD AUTO: 0.6 % — SIGNIFICANT CHANGE UP (ref 0–0.9)
KETONES UR-MCNC: NEGATIVE MG/DL — SIGNIFICANT CHANGE UP
LEUKOCYTE ESTERASE UR-ACNC: NEGATIVE — SIGNIFICANT CHANGE UP
LYMPHOCYTES # BLD AUTO: 1.81 K/UL — SIGNIFICANT CHANGE UP (ref 1–3.3)
LYMPHOCYTES # BLD AUTO: 16.8 % — SIGNIFICANT CHANGE UP (ref 13–44)
MAGNESIUM SERPL-MCNC: 1.7 MG/DL — SIGNIFICANT CHANGE UP (ref 1.6–2.6)
MCHC RBC-ENTMCNC: 29.3 PG — SIGNIFICANT CHANGE UP (ref 27–34)
MCHC RBC-ENTMCNC: 32.5 GM/DL — SIGNIFICANT CHANGE UP (ref 32–36)
MCV RBC AUTO: 90.2 FL — SIGNIFICANT CHANGE UP (ref 80–100)
MONOCYTES # BLD AUTO: 0.54 K/UL — SIGNIFICANT CHANGE UP (ref 0–0.9)
MONOCYTES NFR BLD AUTO: 5 % — SIGNIFICANT CHANGE UP (ref 2–14)
NEUTROPHILS # BLD AUTO: 8.03 K/UL — HIGH (ref 1.8–7.4)
NEUTROPHILS NFR BLD AUTO: 74.5 % — SIGNIFICANT CHANGE UP (ref 43–77)
NITRITE UR-MCNC: NEGATIVE — SIGNIFICANT CHANGE UP
NRBC # BLD: 0 /100 WBCS — SIGNIFICANT CHANGE UP (ref 0–0)
NRBC # FLD: 0 K/UL — SIGNIFICANT CHANGE UP (ref 0–0)
PH UR: 6.5 — SIGNIFICANT CHANGE UP (ref 5–8)
PHOSPHATE SERPL-MCNC: 2.5 MG/DL — SIGNIFICANT CHANGE UP (ref 2.5–4.5)
PLATELET # BLD AUTO: 337 K/UL — SIGNIFICANT CHANGE UP (ref 150–400)
POTASSIUM SERPL-MCNC: 3.9 MMOL/L — SIGNIFICANT CHANGE UP (ref 3.5–5.3)
POTASSIUM SERPL-SCNC: 3.9 MMOL/L — SIGNIFICANT CHANGE UP (ref 3.5–5.3)
PROT UR-MCNC: NEGATIVE MG/DL — SIGNIFICANT CHANGE UP
RBC # BLD: 3.07 M/UL — LOW (ref 3.8–5.2)
RBC # FLD: 15.6 % — HIGH (ref 10.3–14.5)
RSV RNA NPH QL NAA+NON-PROBE: SIGNIFICANT CHANGE UP
SARS-COV-2 RNA SPEC QL NAA+PROBE: SIGNIFICANT CHANGE UP
SODIUM SERPL-SCNC: 133 MMOL/L — LOW (ref 135–145)
SP GR SPEC: 1.02 — SIGNIFICANT CHANGE UP (ref 1–1.03)
UROBILINOGEN FLD QL: 0.2 MG/DL — SIGNIFICANT CHANGE UP (ref 0.2–1)
WBC # BLD: 10.78 K/UL — HIGH (ref 3.8–10.5)
WBC # FLD AUTO: 10.78 K/UL — HIGH (ref 3.8–10.5)

## 2024-05-29 PROCEDURE — 71045 X-RAY EXAM CHEST 1 VIEW: CPT | Mod: 26

## 2024-05-29 RX ORDER — MAGNESIUM SULFATE 500 MG/ML
2 VIAL (ML) INJECTION ONCE
Refills: 0 | Status: COMPLETED | OUTPATIENT
Start: 2024-05-29 | End: 2024-05-29

## 2024-05-29 RX ORDER — SODIUM,POTASSIUM PHOSPHATES 278-250MG
1 POWDER IN PACKET (EA) ORAL ONCE
Refills: 0 | Status: COMPLETED | OUTPATIENT
Start: 2024-05-29 | End: 2024-05-29

## 2024-05-29 RX ORDER — ACETAMINOPHEN 500 MG
650 TABLET ORAL EVERY 6 HOURS
Refills: 0 | Status: DISCONTINUED | OUTPATIENT
Start: 2024-05-29 | End: 2024-06-03

## 2024-05-29 RX ORDER — LANOLIN ALCOHOL/MO/W.PET/CERES
3 CREAM (GRAM) TOPICAL AT BEDTIME
Refills: 0 | Status: DISCONTINUED | OUTPATIENT
Start: 2024-05-29 | End: 2024-06-03

## 2024-05-29 RX ORDER — CLOPIDOGREL BISULFATE 75 MG/1
75 TABLET, FILM COATED ORAL DAILY
Refills: 0 | Status: DISCONTINUED | OUTPATIENT
Start: 2024-05-29 | End: 2024-06-03

## 2024-05-29 RX ADMIN — ATORVASTATIN CALCIUM 20 MILLIGRAM(S): 80 TABLET, FILM COATED ORAL at 23:00

## 2024-05-29 RX ADMIN — MEMANTINE HYDROCHLORIDE 10 MILLIGRAM(S): 10 TABLET ORAL at 17:14

## 2024-05-29 RX ADMIN — Medication 975 MILLIGRAM(S): at 06:45

## 2024-05-29 RX ADMIN — Medication 25 GRAM(S): at 10:21

## 2024-05-29 RX ADMIN — Medication 650 MILLIGRAM(S): at 15:40

## 2024-05-29 RX ADMIN — Medication 650 MILLIGRAM(S): at 15:09

## 2024-05-29 RX ADMIN — HEPARIN SODIUM 5000 UNIT(S): 5000 INJECTION INTRAVENOUS; SUBCUTANEOUS at 22:59

## 2024-05-29 RX ADMIN — MEMANTINE HYDROCHLORIDE 10 MILLIGRAM(S): 10 TABLET ORAL at 05:44

## 2024-05-29 RX ADMIN — HEPARIN SODIUM 5000 UNIT(S): 5000 INJECTION INTRAVENOUS; SUBCUTANEOUS at 05:43

## 2024-05-29 RX ADMIN — Medication 975 MILLIGRAM(S): at 05:44

## 2024-05-29 RX ADMIN — HEPARIN SODIUM 5000 UNIT(S): 5000 INJECTION INTRAVENOUS; SUBCUTANEOUS at 13:09

## 2024-05-29 RX ADMIN — Medication 975 MILLIGRAM(S): at 11:52

## 2024-05-29 RX ADMIN — Medication 81 MILLIGRAM(S): at 11:18

## 2024-05-29 RX ADMIN — PANTOPRAZOLE SODIUM 40 MILLIGRAM(S): 20 TABLET, DELAYED RELEASE ORAL at 06:15

## 2024-05-29 RX ADMIN — Medication 1 PACKET(S): at 10:20

## 2024-05-29 RX ADMIN — Medication 25 MILLIGRAM(S): at 22:58

## 2024-05-29 RX ADMIN — Medication 25 MILLIGRAM(S): at 10:21

## 2024-05-29 RX ADMIN — CLOPIDOGREL BISULFATE 75 MILLIGRAM(S): 75 TABLET, FILM COATED ORAL at 07:56

## 2024-05-29 RX ADMIN — Medication 975 MILLIGRAM(S): at 11:18

## 2024-05-29 NOTE — PROGRESS NOTE ADULT - SUBJECTIVE AND OBJECTIVE BOX
Neurology Progress Note    S: Patient seen and examined.       Medications: MEDICATIONS  (STANDING):  acetaminophen     Tablet .. 975 milliGRAM(s) Oral every 6 hours  aspirin enteric coated 81 milliGRAM(s) Oral daily  atorvastatin 20 milliGRAM(s) Oral at bedtime  clopidogrel Tablet 75 milliGRAM(s) Oral daily  heparin   Injectable 5000 Unit(s) SubCutaneous every 8 hours  insulin lispro (ADMELOG) corrective regimen sliding scale   SubCutaneous Before meals and at bedtime  memantine 10 milliGRAM(s) Oral every 12 hours  metoprolol tartrate 25 milliGRAM(s) Oral every 12 hours  pantoprazole    Tablet 40 milliGRAM(s) Oral before breakfast    MEDICATIONS  (PRN):       Vitals:  Vital Signs Last 24 Hrs  T(C): 36.8 (29 May 2024 11:05), Max: 37.9 (29 May 2024 06:15)  T(F): 98.3 (29 May 2024 11:05), Max: 100.3 (29 May 2024 06:15)  HR: 88 (29 May 2024 11:05) (81 - 94)  BP: 112/68 (29 May 2024 11:05) (102/60 - 139/76)  BP(mean): --  RR: 17 (29 May 2024 11:05) (16 - 17)  SpO2: 95% (29 May 2024 11:05) (91% - 97%)    Parameters below as of 29 May 2024 11:05  Patient On (Oxygen Delivery Method): room air          General Exam:   General Appearance: Appropriately dressed and in no acute distress       Head: Normocephalic, atraumatic and no dysmorphic features  Ear, Nose, and Throat: Moist mucous membranes  CVS: S1S2+  Resp: No SOB, no wheeze or rhonchi  Abd: soft NTND  Extremities: No edema, no cyanosis  Skin: No bruises, no rashes     Neurological Exam:  Mental Status: Awake, alert and oriented x 2.  Able to follow simple commands. Able to name and repeat. fluent speech. No obvious aphasia, mild to moderate dysarthria   Cranial Nerves: PERRL, EOMI, VFFC, sensation V1-V3 intact,  mild L facial palsy - improving , equal elevation of palate, scm/trap 5/5, tongue is midline on protrusion. Hearing is grossly intact.   Motor: LUE and LLE drift  Sensation: dec on the L   Reflexes: 1+ throughout at biceps, brachioradialis, triceps, patellars and ankles bilaterally and equal. No clonus. R toe and L toe were both downgoing.  Coordination: No dysmetria on FNF  Gait: deferred    I personally reviewed the below data/images/labs:    LABS:                          9.0    10.78 )-----------( 337      ( 29 May 2024 06:22 )             27.7     05-29    133<L>  |  102  |  5<L>  ----------------------------<  91  3.9   |  22  |  0.48<L>    Ca    7.8<L>      29 May 2024 06:22  Phos  2.5     05-29  Mg     1.70     05-29          Urinalysis Basic - ( 29 May 2024 06:22 )    Color: x / Appearance: x / SG: x / pH: x  Gluc: 91 mg/dL / Ketone: x  / Bili: x / Urobili: x   Blood: x / Protein: x / Nitrite: x   Leuk Esterase: x / RBC: x / WBC x   Sq Epi: x / Non Sq Epi: x / Bacteria: x              Radiology:  CTH: No acute intracranial hemorrhage or mass effect. Extensive chronic small   vessel ischemic changes in the frontoparietal periventricular and   subcortical white matter. Indeterminate age bilateral thalamic lacunar   infarcts accommodation of dilated perivascular spaces and lacunar   infarcts in the bilateral basal ganglia. Small chronic appearing   bilateral cerebellar infarcts.    CTA NECK:  No significant stenosis of the cervical carotid arteries based   on NASCET criteria. Patent cervical vertebral arteries. No evidence of   cervical carotid or vertebral artery dissection.    CTA HEAD:  No high-grade stenosis or occlusion of the major proximal   arterial branches.    CT PERFUSION:  Perfusion imaging shows no evidence of a core infarct or   tissue at risk.    < from: MR Head No Cont (05.20.24 @ 20:13) >    ACC: [id number] EXAM:  MR BRAIN   ORDERED BY: ARY POE     PROCEDURE DATE:  05/20/2024          INTERPRETATION:  CLINICAL INDICATION: Patient recently fell at home.   Confused.    TECHNIQUE: MRI of the brain was performed without contrast.    COMPARISON: CT brain 5/19/2024    FINDINGS:  Acute infarct involving the anterior left midbrain.    No acute intracranial hemorrhage, mass effect, or hydrocephalus. No   extra-axial collection. Basal cisterns are patent.    Age-related involutional changes and chronic microvascular ischemic   changes. Chronic lacunar infarcts involving the bilateral thalami. Small   chronic infarcts involving the bilateral cerebral hemispheres.    Ventricles and sulci are age-appropriate in size.    Major intracranial flow-voids are preserved.    Left cataract surgery. The orbits, sellar and suprasellar structures, and   craniocervical junction are otherwise unremarkable. Visualized paranasal   sinuses and mastoid air cells are clear.    IMPRESSION:  Acute infarct involving the anterior left midbrain.    --- End of Report ---              < end of copied text >     1. Left ventricular cavity is normal in size. Left ventricular wall thickness is normal. Left ventricular systolic function is normal with an ejection fraction visually estimated at 60 to 65 %. There are no regional wall motion abnormalities seen.   2. There is mild (grade 1) left ventricular diastolic dysfunction.   3. Normal right ventricular cavity size and probably normal systolic function.   4. Structurally normal mitral valve with normal leaflet excursion. There is calcification of the mitral valve annulus. There is trace mitral regurgitation.   5. The aortic valve appears trileaflet with normal systolic excursion. There is calcification of the aortic valve leaflets. There is mild aortic regurgitation.

## 2024-05-29 NOTE — PROGRESS NOTE ADULT - SUBJECTIVE AND OBJECTIVE BOX
[facility], Division of Infectious Diseases  JORGE LUIS Chaves Y. Patel, S. Shah, G. Jones  [phone]  ([phone] - weekdays after 5pm and weekends)    Name: TY DAVIS  Age/Gender: 78y Female  MRN: [id number]    Interval History:  Notes reviewed.   No concerning overnight events.  Afebrile.   denies abd pain, SOB  KULWANT drain removed    Allergies: No Known Allergies      Objective:  Vitals:   T(F): 98.3 (05-29-24 @ 11:05), Max: 100.3 (05-29-24 @ 06:15)  HR: 88 (05-29-24 @ 11:05) (81 - 94)  BP: 112/68 (05-29-24 @ 11:05) (102/60 - 139/76)  RR: 17 (05-29-24 @ 11:05) (16 - 17)  SpO2: 95% (05-29-24 @ 11:05) (91% - 97%)  Physical Examination:  General: no acute distress  HEENT: anicteric, NC  Cardio: S1, S2, normal rate  Resp: decreased breath sounds  Abd: soft, NT, ostomy, abd incision appears to be healing well, staples in place  Ext: no LE edema  Skin: warm, dry    Laboratory Studies:  CBC:                       9.0    10.78 )-----------( 337      ( 29 May 2024 06:22 )             27.7     WBC Trend:  10.78 05-29-24 @ 06:22  13.33 05-28-24 @ 05:40  11.64 05-27-24 @ 06:54  11.40 05-26-24 @ 07:21  8.47 05-25-24 @ 05:22  9.85 05-24-24 @ 06:35  11.64 05-23-24 @ 07:01    CMP: 05-29    133<L>  |  102  |  5<L>  ----------------------------<  91  3.9   |  22  |  0.48<L>    Ca    7.8<L>      29 May 2024 06:22  Phos  2.5     05-29  Mg     1.70     05-29            Urinalysis Basic - ( 29 May 2024 06:22 )    Color: x / Appearance: x / SG: x / pH: x  Gluc: 91 mg/dL / Ketone: x  / Bili: x / Urobili: x   Blood: x / Protein: x / Nitrite: x   Leuk Esterase: x / RBC: x / WBC x   Sq Epi: x / Non Sq Epi: x / Bacteria: x      Microbiology: reviewed     Culture - Abscess with Gram Stain (collected 05-21-24 @ 15:20)  Source: .Abscess Perforated Diverticulitis  Gram Stain (05-22-24 @ 05:51):    Numerous polymorphonuclear leukocytes seen per low power field    Few Gram positive cocci in pairs seen per oil power field    Rare Gram Negative Rods seen per oil power field  Final Report (05-27-24 @ 11:03):    Moderate Enterococcus faecium (vancomycin resistant)    Rare Eubacterium limosum "Susceptibilities not performed"  Organism: Enterococcus faecium (vancomycin resistant) (05-27-24 @ 11:03)  Organism: Enterococcus faecium (vancomycin resistant) (05-27-24 @ 11:03)      -  Levofloxacin: R >4      -  Daptomycin: SDD 2 The breakpoint for SDD (susceptible dose dependent)is based on a dosage regimen of 8-12 mg/kg administered every 24 h in adults and is intended for serious infections due to E. faecium. Consultation with an infectious diseases specialist is recommended.      -  Linezolid: S 2      -  Vancomycin: R >16      -  Ampicillin: R >8 Predicts results to ampicillin/sulbactam, amoxacillin-clavulanate and  piperacillin-tazobactam.      Method Type: MAYELIN    Culture - Urine (collected 05-20-24 @ 00:45)  Source: Clean Catch Clean Catch (Midstream)  Final Report (05-22-24 @ 17:47):    10,000 - 49,000 CFU/mL Enterococcus faecium (vancomycin resistant)  Organism: Enterococcus faecium (vancomycin resistant) (05-22-24 @ 17:47)  Organism: Enterococcus faecium (vancomycin resistant) (05-22-24 @ 17:47)      -  Levofloxacin: R >4      -  Nitrofurantoin: S <=32 Should not be used to treat pyelonephritis.      -  Daptomycin: SDD 4 The breakpoint for SDD (susceptible dose dependent)is based on a dosage regimen of 8-12 mg/kg administered every 24 h in adults and is intended for serious infections due to E. faecium. Consultation with an infectious diseases specialist is recommended.      -  Linezolid: S 2      -  Vancomycin: R >16      -  Ciprofloxacin: R >2      -  Ampicillin: R >8 Predicts results to ampicillin/sulbactam, amoxacillin-clavulanate and  piperacillin-tazobactam.      -  Tetracycline: R >8      Method Type: MAYELIN    Culture - Blood (collected 05-19-24 @ 21:39)  Source: .Blood Blood-Peripheral  Final Report (05-25-24 @ 03:01):    No growth at 5 days    Culture - Blood (collected 05-19-24 @ 21:30)  Source: .Blood Blood-Peripheral  Final Report (05-25-24 @ 03:01):    No growth at 5 days        Radiology: reviewed     Medications:  acetaminophen     Tablet .. 975 milliGRAM(s) Oral every 6 hours  aspirin enteric coated 81 milliGRAM(s) Oral daily  atorvastatin 20 milliGRAM(s) Oral at bedtime  clopidogrel Tablet 75 milliGRAM(s) Oral daily  heparin   Injectable 5000 Unit(s) SubCutaneous every 8 hours  insulin lispro (ADMELOG) corrective regimen sliding scale   SubCutaneous Before meals and at bedtime  memantine 10 milliGRAM(s) Oral every 12 hours  metoprolol tartrate 25 milliGRAM(s) Oral every 12 hours  pantoprazole    Tablet 40 milliGRAM(s) Oral before breakfast    Antimicrobials:

## 2024-05-29 NOTE — PROGRESS NOTE ADULT - ASSESSMENT
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 Stroke code cancelled as out of 24h window. Unclear stroke hx and baseline neurologic deficits.   In ED was febrile. CTH with bilateral thalamic and basal ganglia infarcts. Thalamic infarcts appear more subacute. Also with chronic cerebellar infarcts. CTA with mld ICA narrowing  o/e with mild to moderate dysarthria, R facial palsy , RUE and RLE drift. AAOx2   MRI brain with L anterior midbrain infarct    - continue aspirin 81mg  - can add Plavix 75mg x 3 weeks post OP if ok per surgery team   -  TTE  rev  - tele, consider ILR on d/c   - on zosyn per primary team + Linezolid  - cont home memantine   - gradual normotension, avoid hypotension  -  LDL 49 (1/24/24) - continue home statin  -  A1c 6.3 (5/11/2024)  - PT/OT  - DVT ppx     Allison Vinson DO  Vascular Neurology  Office [phone] .

## 2024-05-29 NOTE — PROGRESS NOTE ADULT - ASSESSMENT
78 year old female with PMH diverticulitis, DM2, HTN, HLD presents s/p diagnostic lap converted to ex lap, TELLO, Hartmanns procedure on 5/21. Recovering appropriately, with adequate oral tolerance and hemodynamically stable.    Plan  - Discharge planning  - Diet: LRD  - Antibiotics last day yesterday 05/28  - Aspirin, DVT prophylaxis  - Appreciate neuro recs --> plavix 75 mg x 3weeks started  - PT --> rehab    A team surgery [id number]

## 2024-05-29 NOTE — CHART NOTE - NSCHARTNOTEFT_GEN_A_CORE
Source: Patient [X]        Medical Course: 78 year old female with PMH diverticulitis, DM2, HTN, HLD recent acute CVA s/p diagnostic lap converted to ex lap, TELLO, Hartmanns procedure on 5/21. presented after a fall found to have enlarged abscess. ID neurology cardiology following patient      Nutritional Course: Nutrition follow up due to ongoing poor intake and malnutrition per previous nutrition eval. Patient reported intake is normal, appears to be less lethargic as on prev assessment. reported no issues with diet tolerance or chewing or swallowing problems. Flowsheet intake noted consistently poor ranging 0-25% to 26-50%. Rn reported intake was 25-50% at breakfast today. Colostomy output noted 200ml per current shift.       Diet : Diet, Low Fiber (05-27-24 @ 07:50)      Current Weight:    Prev wt: 119 lbs/54 kg  % Weight Change    Pertinent Medications: MEDICATIONS  (STANDING):  aspirin enteric coated 81 milliGRAM(s) Oral daily  atorvastatin 20 milliGRAM(s) Oral at bedtime  clopidogrel Tablet 75 milliGRAM(s) Oral daily  heparin   Injectable 5000 Unit(s) SubCutaneous every 8 hours  insulin lispro (ADMELOG) corrective regimen sliding scale   SubCutaneous Before meals and at bedtime  memantine 10 milliGRAM(s) Oral every 12 hours  metoprolol tartrate 25 milliGRAM(s) Oral every 12 hours  pantoprazole    Tablet 40 milliGRAM(s) Oral before breakfast    MEDICATIONS  (PRN):  acetaminophen     Tablet .. 650 milliGRAM(s) Oral every 6 hours PRN Mild Pain (1 - 3)    Pertinent Labs:  05-29 Na133 mmol/L<L> Glu 91 mg/dL K+ 3.9 mmol/L Cr  0.48 mg/dL<L> BUN 5 mg/dL<L> 05-29 Phos 2.5 mg/dL      Skin: No noted pressure injury  No noted edema    Estimated Needs:   [ ] no change since previous assessment  [ ] recalculated:     Previous Nutrition Diagnosis: Severe malnutrition    Nutrition Diagnosis is [X] ongoing    New Nutrition Diagnosis: no applicable    Education:    Type of education provided:    [X] Given on previous assessment by RD    Nutrition Recommendations  Recommend Ensure High Protein (350 kcal, 20g pro) BID  Monitor I/Os, diet tolerance/progression, labs/electrolytes  Continue review of % intake on RN flowsheet   Honor food preferences via   New Weights, monitor weight trends      Monitoring and Evaluation:       [ ] PO intake [ ] Tolerance to diet prescription [ ] weights [ ] follow up per protocol Source: Patient [X]        Medical Course: 78 year old female with PMH diverticulitis, DM2, HTN, HLD recent acute CVA s/p diagnostic lap converted to ex lap, TELLO, Hartmanns procedure on . presented after a fall found to have enlarged abscess. ID neurology cardiology following patient      Nutritional Course: Nutrition follow up due to ongoing poor intake and malnutrition per previous nutrition eval. Patient reported intake is normal, appears to be less lethargic as on prev assessment. reported no issues with diet tolerance or chewing or swallowing problems however requests soft foods. Flowsheet intake noted consistently poor ranging 0-25% to 26-50%. Rn reported intake was 25-50% at breakfast today. Colostomy output noted 200ml per current shift. RD to follow for nutrition optimization. Oral supplements to be added.       Diet : Diet, Low Fiber (24 @ 07:50)    Current Weight:  No updates since prev.   RN assisted with Bed scale wt 50.5 kg (111lbs)   Prev wt: 119 lbs/54 kg  (dosing)  % Weight Change: -8lbs/-6.75 over 8 days time frame.    Pertinent Medications: MEDICATIONS  (STANDING):  aspirin enteric coated 81 milliGRAM(s) Oral daily  atorvastatin 20 milliGRAM(s) Oral at bedtime  clopidogrel Tablet 75 milliGRAM(s) Oral daily  heparin   Injectable 5000 Unit(s) SubCutaneous every 8 hours  insulin lispro (ADMELOG) corrective regimen sliding scale   SubCutaneous Before meals and at bedtime  memantine 10 milliGRAM(s) Oral every 12 hours  metoprolol tartrate 25 milliGRAM(s) Oral every 12 hours  pantoprazole    Tablet 40 milliGRAM(s) Oral before breakfast    MEDICATIONS  (PRN):  acetaminophen     Tablet .. 650 milliGRAM(s) Oral every 6 hours PRN Mild Pain (1 - 3)    Pertinent Labs:   Na133 mmol/L<L> Glu 91 mg/dL K+ 3.9 mmol/L Cr  0.48 mg/dL<L> BUN 5 mg/dL<L>  Phos 2.5 mg/dL      Skin: No noted pressure injury  No noted edema    Estimated Needs:   [X] no change since previous assessment: (est. based on dosing weight 119lbs/53.9)  Estimated Energy Needs based on 22 to 25 (lorne/kg)[id number]  Estimated Protein Needs From 1-1.2g/k.9-64.6 g      Previous Nutrition Diagnosis: Severe malnutrition    Nutrition Diagnosis is [X] ongoing    New Nutrition Diagnosis: Inadequate energy intake related to medical course as evidenced by intake </=50% for >1 week day, wt loss    Education:    Type of education provided:    [X] Given on previous assessment by RD    Nutrition Recommendations  Recommend soft and bite sized diet texture modification as per patient request.   Recommend Ensure High Protein (350 kcal, 20g pro) BID  Monitor I/Os, diet tolerance/progression, labs/electrolytes  Continue review of % intake on RN flowsheet   Honor food preferences via   New Weights, monitor weight trends      Monitoring and Evaluation:   [X] PO intake [X] Tolerance to diet prescription [X] weights [X] follow up per protocol    Lauren Pathak MS, RD, CDN, CNSC (pg #[phone])

## 2024-05-29 NOTE — PROGRESS NOTE ADULT - SUBJECTIVE AND OBJECTIVE BOX
A TEAM SURGERY DAILY PROGRESS NOTE:     INTERVAL EVENTS:    SUBJECTIVE/ROS: No acute events overnight. Patient seen and examined at bedside by surgical team.     OBJECTIVE:  Vital Signs Last 24 Hrs  T(C): 36.9 (29 May 2024 02:00), Max: 37 (28 May 2024 14:10)  T(F): 98.4 (29 May 2024 02:00), Max: 98.6 (28 May 2024 14:10)  HR: 85 (28 May 2024 21:58) (81 - 92)  BP: 106/57 (29 May 2024 02:00) (100/64 - 113/68)  BP(mean): --  RR: 17 (29 May 2024 02:00) (16 - 18)  SpO2: 91% (29 May 2024 02:00) (91% - 98%)    Parameters below as of 29 May 2024 02:00  Patient On (Oxygen Delivery Method): nasal cannula                            9.9    13.33 )-----------( 402      ( 28 May 2024 05:40 )             29.6     05-28    134<L>  |  104  |  6<L>  ----------------------------<  92  4.2   |  20<L>  |  0.57    Ca    8.1<L>      28 May 2024 05:40  Phos  3.0     05-28  Mg     1.80     05-28       I&O's Detail    27 May 2024 07:01  -  28 May 2024 07:00  --------------------------------------------------------  IN:    IV PiggyBack: 900 mL    Oral Fluid: 1440 mL  Total IN: 2340 mL    OUT:    Bulb (mL): 220 mL    Colostomy (mL): 375 mL    Voided (mL): 1700 mL  Total OUT: 2295 mL    Total NET: 45 mL      28 May 2024 07:01  -  29 May 2024 06:05  --------------------------------------------------------  IN:    IV PiggyBack: 300 mL    Oral Fluid: 880 mL  Total IN: 1180 mL    OUT:    Bulb (mL): 50 mL    Colostomy (mL): 150 mL    Voided (mL): 900 mL  Total OUT: 1100 mL    Total NET: 80 mL          IMAGING:      PHYSICAL EXAM:  Constitutional: NAD  Respiratory: non-labored breathing, patent airway  Gastrointestinal: abdomen soft, nontender, nondistended  Extremities: warm  Neurological: intact

## 2024-05-29 NOTE — PROGRESS NOTE ADULT - SUBJECTIVE AND OBJECTIVE BOX
DATE OF SERVICE: 05-29-24    resting comfortably, denies pain or SOB, ROS otherwise limited.      Review of Systems:   Constitutional: [ ] fevers, [ ] chills.   Skin: [ ] dry skin. [ ] rashes.  Psychiatric: [ ] depression, [ ] anxiety.   Gastrointestinal: [ ] BRBPR, [ ] melena.   Neurological: [ ] confusion. [ ] seizures. [ ] shuffling gait.   Ears,Nose,Mouth and Throat: [ ] ear pain [ ] sore throat.   Eyes: [ ] diplopia.   Respiratory: [ ] hemoptysis. [ ] shortness of breath  Cardiovascular: See HPI above  Hematologic/Lymphatic: [ ] anemia. [ ] painful nodes. [ ] prolonged bleeding.   Genitourinary: [ ] hematuria. [ ] flank pain.   Endocrine: [ ] significant change in weight. [ ] intolerance to heat and cold.     Review of systems [x ] otherwise negative, [ ] otherwise unable to obtain    FH: no family history of sudden cardiac death in first degree relatives    SH: [ ] tobacco, [ ] alcohol, [ ] drugs    acetaminophen     Tablet .. 975 milliGRAM(s) Oral every 6 hours  aspirin enteric coated 81 milliGRAM(s) Oral daily  atorvastatin 20 milliGRAM(s) Oral at bedtime  clopidogrel Tablet 75 milliGRAM(s) Oral daily  heparin   Injectable 5000 Unit(s) SubCutaneous every 8 hours  insulin lispro (ADMELOG) corrective regimen sliding scale   SubCutaneous Before meals and at bedtime  memantine 10 milliGRAM(s) Oral every 12 hours  metoprolol tartrate 25 milliGRAM(s) Oral every 12 hours  pantoprazole    Tablet 40 milliGRAM(s) Oral before breakfast                            9.0    10.78 )-----------( 337      ( 29 May 2024 06:22 )             27.7       133<L>  |  102  |  5<L>  ----------------------------<  91  3.9   |  22  |  0.48<L>    Ca    7.8<L>      29 May 2024 06:22  Phos  2.5     05-29  Mg     1.70     05-29        T(C): 36.8 (05-29-24 @ 11:05), Max: 37.9 (05-29-24 @ 06:15)  HR: 88 (05-29-24 @ 11:05) (81 - 94)  BP: 112/68 (05-29-24 @ 11:05) (102/60 - 139/76)  RR: 17 (05-29-24 @ 11:05) (16 - 17)  SpO2: 95% (05-29-24 @ 11:05) (91% - 97%)  Wt(kg): --    I&O's Summary    28 May 2024 07:01  -  29 May 2024 07:00  --------------------------------------------------------  IN: 1420 mL / OUT: 1350 mL / NET: 70 mL    29 May 2024 07:01  -  29 May 2024 11:50  --------------------------------------------------------  IN: 240 mL / OUT: 200 mL / NET: 40 mL    General: pleasant, comfortable  Head: Normocephalic and atraumatic.   Neck: No JVD. No bruitsNo M/R/G  Lungs: CTA b/l. diminshed at bases A/L limited exam  Abdomen: deferred  Extremities: No clubbing/cyanosis/edema.   Neurologic: Moves all four extremities.   Skin: Warm and moist. The patient's skin has normal elasticity and good skin turgor.   Psychiatric: cannot fully assess  Musculoskeletal: Normal range of motion, normal strength     TELEMETRY: 	  NONE    ECG:  	NSR    < from: Xray Chest 1 View- PORTABLE-Urgent (Xray Chest 1 View- PORTABLE-Urgent .) (05.23.24 @ 09:35) >  FINDINGS:    Cardiopericardial silhouette is magnified with this projection.  Lungs show no focal consolidations.  Trace left effusion.  There is no pneumothorax.  No acute bony abnormality.    IMPRESSION: Clear lungs with trace left effusion.    < end of copied text >      ASSESSMENT/PLAN: Pt is a 78F with PMHx diverticulitis, DM2, HTN, HLD,with recent hospitalization for complicated diverticulitis, discharged 5/15 on long-term IV ABX presents after fall from home with AMS, found to have fevers  and a  CT AP showing perforated sigmoid diverticulitis with thick-walled pericolonic multilocular abscess measuring at least up to 3.5 x 1.5 x 4.8 cm. Cardiology called for pre-op risk stratification prior to OR. Currently denies any chest pain, SOB, orthopnea.    - no anginal chest pain, no q waves on EKG euvolemic on exam, no severely stenotic valvular disease on PE, recent acute CVA  - tolerated procedure well from CV perspective  - s/p drainage of deep pelvic abscess and open Ruiz procedure  - cont Lopressor 25 mg q 12  - cont ASA and Lipitor 20 mg    Aurea MATHUR  [phone]    DATE OF SERVICE: 05-29-24    resting comfortably, denies pain or SOB, ROS otherwise limited.      Review of Systems:   Constitutional: [ ] fevers, [ ] chills.   Skin: [ ] dry skin. [ ] rashes.  Psychiatric: [ ] depression, [ ] anxiety.   Gastrointestinal: [ ] BRBPR, [ ] melena.   Neurological: [ ] confusion. [ ] seizures. [ ] shuffling gait.   Ears,Nose,Mouth and Throat: [ ] ear pain [ ] sore throat.   Eyes: [ ] diplopia.   Respiratory: [ ] hemoptysis. [ ] shortness of breath  Cardiovascular: See HPI above  Hematologic/Lymphatic: [ ] anemia. [ ] painful nodes. [ ] prolonged bleeding.   Genitourinary: [ ] hematuria. [ ] flank pain.   Endocrine: [ ] significant change in weight. [ ] intolerance to heat and cold.     Review of systems [x ] otherwise negative, [ ] otherwise unable to obtain    FH: no family history of sudden cardiac death in first degree relatives    SH: [ ] tobacco, [ ] alcohol, [ ] drugs    acetaminophen     Tablet .. 975 milliGRAM(s) Oral every 6 hours  aspirin enteric coated 81 milliGRAM(s) Oral daily  atorvastatin 20 milliGRAM(s) Oral at bedtime  clopidogrel Tablet 75 milliGRAM(s) Oral daily  heparin   Injectable 5000 Unit(s) SubCutaneous every 8 hours  insulin lispro (ADMELOG) corrective regimen sliding scale   SubCutaneous Before meals and at bedtime  memantine 10 milliGRAM(s) Oral every 12 hours  metoprolol tartrate 25 milliGRAM(s) Oral every 12 hours  pantoprazole    Tablet 40 milliGRAM(s) Oral before breakfast                            9.0    10.78 )-----------( 337      ( 29 May 2024 06:22 )             27.7       133<L>  |  102  |  5<L>  ----------------------------<  91  3.9   |  22  |  0.48<L>    Ca    7.8<L>      29 May 2024 06:22  Phos  2.5     05-29  Mg     1.70     05-29        T(C): 36.8 (05-29-24 @ 11:05), Max: 37.9 (05-29-24 @ 06:15)  HR: 88 (05-29-24 @ 11:05) (81 - 94)  BP: 112/68 (05-29-24 @ 11:05) (102/60 - 139/76)  RR: 17 (05-29-24 @ 11:05) (16 - 17)  SpO2: 95% (05-29-24 @ 11:05) (91% - 97%)  Wt(kg): --    I&O's Summary    28 May 2024 07:01  -  29 May 2024 07:00  --------------------------------------------------------  IN: 1420 mL / OUT: 1350 mL / NET: 70 mL    29 May 2024 07:01  -  29 May 2024 11:50  --------------------------------------------------------  IN: 240 mL / OUT: 200 mL / NET: 40 mL    General: pleasant, comfortable  Head: Normocephalic and atraumatic.   Neck: No JVD. No bruitsNo M/R/G  Lungs: CTA b/l. diminshed at bases A/L limited exam  Abdomen: deferred  Extremities: No clubbing/cyanosis/edema.   Neurologic: Moves all four extremities.   Skin: Warm and moist. The patient's skin has normal elasticity and good skin turgor.   Psychiatric: cannot fully assess  Musculoskeletal: Normal range of motion, normal strength     TELEMETRY: 	  NONE    ECG:  	NSR    < from: Xray Chest 1 View- PORTABLE-Urgent (Xray Chest 1 View- PORTABLE-Urgent .) (05.23.24 @ 09:35) >  FINDINGS:    Cardiopericardial silhouette is magnified with this projection.  Lungs show no focal consolidations.  Trace left effusion.  There is no pneumothorax.  No acute bony abnormality.    IMPRESSION: Clear lungs with trace left effusion.    < end of copied text >      ASSESSMENT/PLAN: Pt is a 78F with PMHx diverticulitis, DM2, HTN, HLD,with recent hospitalization for complicated diverticulitis, discharged 5/15 on long-term IV ABX presents after fall from home with AMS, found to have fevers  and a  CT AP showing perforated sigmoid diverticulitis with thick-walled pericolonic multilocular abscess measuring at least up to 3.5 x 1.5 x 4.8 cm. Cardiology called for pre-op risk stratification prior to OR. Currently denies any chest pain, SOB, orthopnea.    - no anginal chest pain, no q waves on EKG euvolemic on exam, no severely stenotic valvular disease on PE, recent acute CVA  - tolerated procedure well from CV perspective  - s/p drainage of deep pelvic abscess and open Ruiz procedure  - cont Lopressor 25 mg q 12  - cont ASA and Lipitor 20 mg  - ON PLavix per neuro    Aurea MATHUR  [phone]

## 2024-05-29 NOTE — PROGRESS NOTE ADULT - ASSESSMENT
79 y/o F PMhx Glaucoma, DM II, HTN, dementia, diverticulitis who presented after a fall found to have enlarged abscess    Sigmoid diverticulitis w/ perforation and abscess  sepsis- fever, leukocytosis on admission  CT- perforated sigmoid diverticulitis with thick-walled pericolonic multilocular abscess measuring at least up to 3.5 x 1.5 x 4.8 cm w/ moderate stool burden distending the colon proximal to the sigmoid  s/p OR 5/21- Diagnostic laparoscopy converted to open and Ruiz procedure, pus cavity in lateral colonic wall and culture obtained. colostomy   - cx w/ VRE faecium  CT- Postoperative changes status post Ruiz's procedure with small amount of postoperative free fluid and free air. Dilatation of small bowel loops, likely ileus. No evidence of drainable intra-abdominal fluid collection.  s/p zosyn and linezolid, complete 5/28  leukocytosis improved    CVA  L facial droop  MRI- Acute infarct involving the anterior left midbrain.    Recommendations  monitor off antibiotics  discharge planning    Geovani Goldman M.D.  [facility], Division of Infectious Diseases  [phone]  After 5pm on weekdays and all day on weekends - please call [phone]

## 2024-05-30 LAB
ANION GAP SERPL CALC-SCNC: 10 MMOL/L — SIGNIFICANT CHANGE UP (ref 7–14)
BUN SERPL-MCNC: 5 MG/DL — LOW (ref 7–23)
CALCIUM SERPL-MCNC: 8 MG/DL — LOW (ref 8.4–10.5)
CHLORIDE SERPL-SCNC: 103 MMOL/L — SIGNIFICANT CHANGE UP (ref 98–107)
CO2 SERPL-SCNC: 23 MMOL/L — SIGNIFICANT CHANGE UP (ref 22–31)
CREAT SERPL-MCNC: 0.48 MG/DL — LOW (ref 0.5–1.3)
EGFR: 98 ML/MIN/1.73M2 — SIGNIFICANT CHANGE UP
GLUCOSE BLDC GLUCOMTR-MCNC: 100 MG/DL — HIGH (ref 70–99)
GLUCOSE BLDC GLUCOMTR-MCNC: 113 MG/DL — HIGH (ref 70–99)
GLUCOSE BLDC GLUCOMTR-MCNC: 124 MG/DL — HIGH (ref 70–99)
GLUCOSE BLDC GLUCOMTR-MCNC: 95 MG/DL — SIGNIFICANT CHANGE UP (ref 70–99)
GLUCOSE SERPL-MCNC: 87 MG/DL — SIGNIFICANT CHANGE UP (ref 70–99)
HCT VFR BLD CALC: 28.9 % — LOW (ref 34.5–45)
HGB BLD-MCNC: 9.6 G/DL — LOW (ref 11.5–15.5)
MAGNESIUM SERPL-MCNC: 1.9 MG/DL — SIGNIFICANT CHANGE UP (ref 1.6–2.6)
MCHC RBC-ENTMCNC: 30.4 PG — SIGNIFICANT CHANGE UP (ref 27–34)
MCHC RBC-ENTMCNC: 33.2 GM/DL — SIGNIFICANT CHANGE UP (ref 32–36)
MCV RBC AUTO: 91.5 FL — SIGNIFICANT CHANGE UP (ref 80–100)
NRBC # BLD: 0 /100 WBCS — SIGNIFICANT CHANGE UP (ref 0–0)
NRBC # FLD: 0 K/UL — SIGNIFICANT CHANGE UP (ref 0–0)
PHOSPHATE SERPL-MCNC: 2.8 MG/DL — SIGNIFICANT CHANGE UP (ref 2.5–4.5)
PLATELET # BLD AUTO: 364 K/UL — SIGNIFICANT CHANGE UP (ref 150–400)
POTASSIUM SERPL-MCNC: 3.7 MMOL/L — SIGNIFICANT CHANGE UP (ref 3.5–5.3)
POTASSIUM SERPL-SCNC: 3.7 MMOL/L — SIGNIFICANT CHANGE UP (ref 3.5–5.3)
RBC # BLD: 3.16 M/UL — LOW (ref 3.8–5.2)
RBC # FLD: 16 % — HIGH (ref 10.3–14.5)
SODIUM SERPL-SCNC: 136 MMOL/L — SIGNIFICANT CHANGE UP (ref 135–145)
SURGICAL PATHOLOGY STUDY: SIGNIFICANT CHANGE UP
WBC # BLD: 10.79 K/UL — HIGH (ref 3.8–10.5)
WBC # FLD AUTO: 10.79 K/UL — HIGH (ref 3.8–10.5)

## 2024-05-30 RX ORDER — MAGNESIUM SULFATE 500 MG/ML
2 VIAL (ML) INJECTION ONCE
Refills: 0 | Status: COMPLETED | OUTPATIENT
Start: 2024-05-30 | End: 2024-05-30

## 2024-05-30 RX ORDER — SODIUM,POTASSIUM PHOSPHATES 278-250MG
2 POWDER IN PACKET (EA) ORAL ONCE
Refills: 0 | Status: COMPLETED | OUTPATIENT
Start: 2024-05-30 | End: 2024-05-30

## 2024-05-30 RX ADMIN — PANTOPRAZOLE SODIUM 40 MILLIGRAM(S): 20 TABLET, DELAYED RELEASE ORAL at 07:17

## 2024-05-30 RX ADMIN — HEPARIN SODIUM 5000 UNIT(S): 5000 INJECTION INTRAVENOUS; SUBCUTANEOUS at 07:16

## 2024-05-30 RX ADMIN — Medication 3 MILLIGRAM(S): at 00:00

## 2024-05-30 RX ADMIN — Medication 25 MILLIGRAM(S): at 22:21

## 2024-05-30 RX ADMIN — MEMANTINE HYDROCHLORIDE 10 MILLIGRAM(S): 10 TABLET ORAL at 18:36

## 2024-05-30 RX ADMIN — HEPARIN SODIUM 5000 UNIT(S): 5000 INJECTION INTRAVENOUS; SUBCUTANEOUS at 22:21

## 2024-05-30 RX ADMIN — Medication 3 MILLIGRAM(S): at 22:21

## 2024-05-30 RX ADMIN — CLOPIDOGREL BISULFATE 75 MILLIGRAM(S): 75 TABLET, FILM COATED ORAL at 12:04

## 2024-05-30 RX ADMIN — Medication 81 MILLIGRAM(S): at 12:04

## 2024-05-30 RX ADMIN — MEMANTINE HYDROCHLORIDE 10 MILLIGRAM(S): 10 TABLET ORAL at 09:18

## 2024-05-30 RX ADMIN — HEPARIN SODIUM 5000 UNIT(S): 5000 INJECTION INTRAVENOUS; SUBCUTANEOUS at 14:08

## 2024-05-30 RX ADMIN — Medication 2 TABLET(S): at 09:18

## 2024-05-30 RX ADMIN — Medication 25 MILLIGRAM(S): at 09:18

## 2024-05-30 RX ADMIN — ATORVASTATIN CALCIUM 20 MILLIGRAM(S): 80 TABLET, FILM COATED ORAL at 22:21

## 2024-05-30 RX ADMIN — Medication 25 GRAM(S): at 09:17

## 2024-05-30 NOTE — PROGRESS NOTE ADULT - ASSESSMENT
78 year old female with PMH diverticulitis, DM2, HTN, HLD presents s/p diagnostic lap converted to ex lap, TELLO, Hartmanns procedure on 5/21. Recovering appropriately, with adequate oral tolerance and hemodynamically stable.    Plan  - Discharge today to [facility]  - Diet: LRD  - Antibiotics last day yesterday 05/28  - Aspirin, DVT prophylaxis  - Appreciate neuro recs --> plavix 75 mg x 3weeks started  - PT --> rehab    A team surgery [id number]

## 2024-05-30 NOTE — PROGRESS NOTE ADULT - NS ATTEND AMEND GEN_ALL_CORE FT
Patient seen and examined. Agree with plan as detailed in PA/NP Note.       IF BP tolerates can restart BB, Lopressor 25 mg q 12    Negar Eagle MD  Pager: [phone]
Patient seen and examined. Agree with plan as detailed in PA/NP Note.     -cont Lopressor 25 mg q 12    Negar Eagle MD  Pager: [phone]
Patient seen and examined. Agree with plan as detailed in PA/NP Note.     C/w ASA and Statin  Plavix per neuro    Negar Eagle MD  Pager: [phone]
Patient seen and examined. Agree with plan as detailed in PA/NP Note.     C/w BB and Statin    Negar Eagle MD  Pager: [phone]
uneventful overnight, no angina or palpitations. will follow with you.
Patient seen and examined. Agree with plan as detailed in PA/NP Note.     C/w ASA and Plavix per neuro for recent CVA    Negar Eagle MD  Pager: [phone]
Patient seen and examined. Agree with plan as detailed in PA/NP Note.     BB restarted tis AM      Negar Eagle MD  Pager: [phone]
DATE OF SERVICE: 05-28-24 @ 14:33    No complaints  WBC 13 today  Stoma functioning, abd soft NT/ND, KULWANT SS  CT with no drainable collection, small free fluid    Continue reg diet  Plavix to start tomorrow per neuro recs  DC KULWANT drain  DC planning

## 2024-05-30 NOTE — PROGRESS NOTE ADULT - SUBJECTIVE AND OBJECTIVE BOX
[facility], Division of Infectious Diseases  JORGE LUIS Chaves Y. Patel, S. Shah, G. Jones  [phone]  ([phone] - weekdays after 5pm and weekends)    Name: TY DAVIS  Age/Gender: 78y Female  MRN: [id number]    Interval History:  Notes reviewed.   No concerning overnight events.  Afebrile.   Tmax 100.3 yesterday AM  denies dysuria, abd pain, SOB    Allergies: No Known Allergies      Objective:  Vitals:   T(F): 98.8 (05-30-24 @ 09:15), Max: 98.9 (05-30-24 @ 06:00)  HR: 87 (05-30-24 @ 09:15) (66 - 87)  BP: 126/72 (05-30-24 @ 09:15) (120/68 - 142/66)  RR: 16 (05-30-24 @ 09:15) (16 - 18)  SpO2: 98% (05-30-24 @ 06:00) (96% - 98%)  Physical Examination:  General: no acute distress  HEENT: anicteric, NC  Cardio: S1, S2, normal rate  Resp: clear to auscultation anteriorly   Abd: soft, NT, ostomy, abd incision appears to be healing well, staples in place  Ext: no LE edema  Skin: warm, dry    Laboratory Studies:  CBC:                       9.6    10.79 )-----------( 364      ( 30 May 2024 06:00 )             28.9     WBC Trend:  10.79 05-30-24 @ 06:00  10.78 05-29-24 @ 06:22  13.33 05-28-24 @ 05:40  11.64 05-27-24 @ 06:54  11.40 05-26-24 @ 07:21  8.47 05-25-24 @ 05:22  9.85 05-24-24 @ 06:35    CMP: 05-30    136  |  103  |  5<L>  ----------------------------<  87  3.7   |  23  |  0.48<L>    Ca    8.0<L>      30 May 2024 06:00  Phos  2.8     05-30  Mg     1.90     05-30            Urinalysis Basic - ( 30 May 2024 06:00 )    Color: x / Appearance: x / SG: x / pH: x  Gluc: 87 mg/dL / Ketone: x  / Bili: x / Urobili: x   Blood: x / Protein: x / Nitrite: x   Leuk Esterase: x / RBC: x / WBC x   Sq Epi: x / Non Sq Epi: x / Bacteria: x      Microbiology: reviewed     Culture - Abscess with Gram Stain (collected 05-21-24 @ 15:20)  Source: .Abscess Perforated Diverticulitis  Gram Stain (05-22-24 @ 05:51):    Numerous polymorphonuclear leukocytes seen per low power field    Few Gram positive cocci in pairs seen per oil power field    Rare Gram Negative Rods seen per oil power field  Final Report (05-27-24 @ 11:03):    Moderate Enterococcus faecium (vancomycin resistant)    Rare Eubacterium limosum "Susceptibilities not performed"  Organism: Enterococcus faecium (vancomycin resistant) (05-27-24 @ 11:03)  Organism: Enterococcus faecium (vancomycin resistant) (05-27-24 @ 11:03)      Method Type: MAYELIN      -  Ampicillin: R >8 Predicts results to ampicillin/sulbactam, amoxacillin-clavulanate and  piperacillin-tazobactam.      -  Daptomycin: SDD 2 The breakpoint for SDD (susceptible dose dependent)is based on a dosage regimen of 8-12 mg/kg administered every 24 h in adults and is intended for serious infections due to E. faecium. Consultation with an infectious diseases specialist is recommended.      -  Levofloxacin: R >4      -  Linezolid: S 2      -  Vancomycin: R >16    Culture - Urine (collected 05-20-24 @ 00:45)  Source: Clean Catch Clean Catch (Midstream)  Final Report (05-22-24 @ 17:47):    10,000 - 49,000 CFU/mL Enterococcus faecium (vancomycin resistant)  Organism: Enterococcus faecium (vancomycin resistant) (05-22-24 @ 17:47)  Organism: Enterococcus faecium (vancomycin resistant) (05-22-24 @ 17:47)      Method Type: MAYELIN      -  Ampicillin: R >8 Predicts results to ampicillin/sulbactam, amoxacillin-clavulanate and  piperacillin-tazobactam.      -  Ciprofloxacin: R >2      -  Daptomycin: SDD 4 The breakpoint for SDD (susceptible dose dependent)is based on a dosage regimen of 8-12 mg/kg administered every 24 h in adults and is intended for serious infections due to E. faecium. Consultation with an infectious diseases specialist is recommended.      -  Levofloxacin: R >4      -  Linezolid: S 2      -  Nitrofurantoin: S <=32 Should not be used to treat pyelonephritis.      -  Tetracycline: R >8      -  Vancomycin: R >16    Culture - Blood (collected 05-19-24 @ 21:39)  Source: .Blood Blood-Peripheral  Final Report (05-25-24 @ 03:01):    No growth at 5 days    Culture - Blood (collected 05-19-24 @ 21:30)  Source: .Blood Blood-Peripheral  Final Report (05-25-24 @ 03:01):    No growth at 5 days        Radiology: reviewed     Medications:  acetaminophen     Tablet .. 650 milliGRAM(s) Oral every 6 hours PRN  aspirin enteric coated 81 milliGRAM(s) Oral daily  atorvastatin 20 milliGRAM(s) Oral at bedtime  clopidogrel Tablet 75 milliGRAM(s) Oral daily  heparin   Injectable 5000 Unit(s) SubCutaneous every 8 hours  insulin lispro (ADMELOG) corrective regimen sliding scale   SubCutaneous Before meals and at bedtime  melatonin 3 milliGRAM(s) Oral at bedtime  memantine 10 milliGRAM(s) Oral every 12 hours  metoprolol tartrate 25 milliGRAM(s) Oral every 12 hours  pantoprazole    Tablet 40 milliGRAM(s) Oral before breakfast    Antimicrobials:

## 2024-05-30 NOTE — PROGRESS NOTE ADULT - SUBJECTIVE AND OBJECTIVE BOX
A TEAM SURGERY DAILY PROGRESS NOTE:     INTERVAL EVENTS: None    SUBJECTIVE/ROS: No acute events overnight. Patient seen and examined at bedside by surgical team.     OBJECTIVE:  Vital Signs Last 24 Hrs  T(C): 37 (30 May 2024 02:00), Max: 37.9 (29 May 2024 06:15)  T(F): 98.6 (30 May 2024 02:00), Max: 100.3 (29 May 2024 06:15)  HR: 86 (30 May 2024 02:00) (66 - 88)  BP: 130/63 (30 May 2024 02:00) (112/68 - 142/66)  BP(mean): --  RR: 18 (30 May 2024 02:00) (16 - 18)  SpO2: 97% (30 May 2024 02:00) (95% - 97%)    Parameters below as of 30 May 2024 02:00  Patient On (Oxygen Delivery Method): room air                            9.0    10.78 )-----------( 337      ( 29 May 2024 06:22 )             27.7     05-29    133<L>  |  102  |  5<L>  ----------------------------<  91  3.9   |  22  |  0.48<L>    Ca    7.8<L>      29 May 2024 06:22  Phos  2.5     05-29  Mg     1.70     05-29       I&O's Detail    28 May 2024 07:01  -  29 May 2024 07:00  --------------------------------------------------------  IN:    IV PiggyBack: 300 mL    Oral Fluid: 1120 mL  Total IN: 1420 mL    OUT:    Bulb (mL): 50 mL    Colostomy (mL): 200 mL    Voided (mL): 1100 mL  Total OUT: 1350 mL    Total NET: 70 mL      29 May 2024 07:01  -  30 May 2024 06:09  --------------------------------------------------------  IN:    Oral Fluid: 480 mL  Total IN: 480 mL    OUT:    Colostomy (mL): 60 mL    Voided (mL): 350 mL  Total OUT: 410 mL    Total NET: 70 mL          IMAGING:      PHYSICAL EXAM:  Constitutional: NAD  Respiratory: non-labored breathing, patent airway  Gastrointestinal: abdomen soft, nontender, nondistended, ostomy   +/+, level skin ostomy  Extremities: warm  Neurological: intact

## 2024-05-30 NOTE — PROGRESS NOTE ADULT - SUBJECTIVE AND OBJECTIVE BOX
DATE OF SERVICE: 05-30-24    resting comfortably, denies pain or SOB, ROS otherwise limited.      Review of Systems:   Constitutional: [ ] fevers, [ ] chills.   Skin: [ ] dry skin. [ ] rashes.  Psychiatric: [ ] depression, [ ] anxiety.   Gastrointestinal: [ ] BRBPR, [ ] melena.   Neurological: [ ] confusion. [ ] seizures. [ ] shuffling gait.   Ears,Nose,Mouth and Throat: [ ] ear pain [ ] sore throat.   Eyes: [ ] diplopia.   Respiratory: [ ] hemoptysis. [ ] shortness of breath  Cardiovascular: See HPI above  Hematologic/Lymphatic: [ ] anemia. [ ] painful nodes. [ ] prolonged bleeding.   Genitourinary: [ ] hematuria. [ ] flank pain.   Endocrine: [ ] significant change in weight. [ ] intolerance to heat and cold.     Review of systems [x ] otherwise negative, [ ] otherwise unable to obtain    FH: no family history of sudden cardiac death in first degree relatives    SH: [ ] tobacco, [ ] alcohol, [ ] drugs    acetaminophen     Tablet .. 650 milliGRAM(s) Oral every 6 hours PRN  aspirin enteric coated 81 milliGRAM(s) Oral daily  atorvastatin 20 milliGRAM(s) Oral at bedtime  clopidogrel Tablet 75 milliGRAM(s) Oral daily  heparin   Injectable 5000 Unit(s) SubCutaneous every 8 hours  insulin lispro (ADMELOG) corrective regimen sliding scale   SubCutaneous Before meals and at bedtime  melatonin 3 milliGRAM(s) Oral at bedtime  memantine 10 milliGRAM(s) Oral every 12 hours  metoprolol tartrate 25 milliGRAM(s) Oral every 12 hours  pantoprazole    Tablet 40 milliGRAM(s) Oral before breakfast                            9.6    10.79 )-----------( 364      ( 30 May 2024 06:00 )             28.9       05-30    136  |  103  |  5<L>  ----------------------------<  87  3.7   |  23  |  0.48<L>    Ca    8.0<L>      30 May 2024 06:00  Phos  2.8     05-30  Mg     1.90     05-30      T(C): 37.1 (05-30-24 @ 09:15), Max: 37.2 (05-30-24 @ 06:00)  HR: 87 (05-30-24 @ 09:15) (74 - 87)  BP: 126/72 (05-30-24 @ 09:15) (126/72 - 142/66)  RR: 16 (05-30-24 @ 09:15) (16 - 18)  SpO2: 98% (05-30-24 @ 06:00) (96% - 98%)  Wt(kg): --    I&O's Summary    29 May 2024 07:01  -  30 May 2024 07:00  --------------------------------------------------------  IN: 480 mL / OUT: 1410 mL / NET: -930 mL    General: pleasant, comfortable  Head: Normocephalic and atraumatic.   Neck: No JVD. No bruitsNo M/R/G  Lungs: CTA b/l. diminshed at bases A/L limited exam  Abdomen: deferred  Extremities: No clubbing/cyanosis/edema.   Neurologic: Moves all four extremities.   Skin: Warm and moist. The patient's skin has normal elasticity and good skin turgor.   Psychiatric: cannot fully assess  Musculoskeletal: Normal range of motion, normal strength     TELEMETRY: 	  NONE    ECG:  	NSR    < from: Xray Chest 1 View- PORTABLE-Urgent (Xray Chest 1 View- PORTABLE-Urgent .) (05.23.24 @ 09:35) >  FINDINGS:    Cardiopericardial silhouette is magnified with this projection.  Lungs show no focal consolidations.  Trace left effusion.  There is no pneumothorax.  No acute bony abnormality.    IMPRESSION: Clear lungs with trace left effusion.    < end of copied text >      ASSESSMENT/PLAN: Pt is a 78F with PMHx diverticulitis, DM2, HTN, HLD,with recent hospitalization for complicated diverticulitis, discharged 5/15 on long-term IV ABX presents after fall from home with AMS, found to have fevers  and a  CT AP showing perforated sigmoid diverticulitis with thick-walled pericolonic multilocular abscess measuring at least up to 3.5 x 1.5 x 4.8 cm. Cardiology called for pre-op risk stratification prior to OR. Currently denies any chest pain, SOB, orthopnea.    - no anginal chest pain, no q waves on EKG euvolemic on exam, no severely stenotic valvular disease on PE, recent acute CVA  - tolerated procedure well from CV perspective  - s/p drainage of deep pelvic abscess and open Ruiz procedure  - cont Lopressor 25 mg q 12  - cont ASA and Lipitor 20 mg  - On PLavix per neuro    DC planning to rehab    Aurea MATHUR  [phone]

## 2024-05-30 NOTE — PROGRESS NOTE ADULT - ASSESSMENT
79 y/o F PMhx Glaucoma, DM II, HTN, dementia, diverticulitis who presented after a fall found to have enlarged abscess    Sigmoid diverticulitis w/ perforation and abscess- treated  sepsis- fever, leukocytosis on admission  CT- perforated sigmoid diverticulitis with thick-walled pericolonic multilocular abscess measuring at least up to 3.5 x 1.5 x 4.8 cm w/ moderate stool burden distending the colon proximal to the sigmoid  s/p OR 5/21- Diagnostic laparoscopy converted to open and Ruiz procedure, pus cavity in lateral colonic wall and culture obtained. colostomy   - cx w/ VRE faecium  CT- Postoperative changes status post Ruiz's procedure with small amount of postoperative free fluid and free air. Dilatation of small bowel loops, likely ileus. No evidence of drainable intra-abdominal fluid collection.  s/p zosyn and linezolid, complete 5/28  leukocytosis improved    CVA  L facial droop  MRI- Acute infarct involving the anterior left midbrain.    yesterday AM Tmax 100.3  RVP negative  UA negative  CXR clear  CT w/o intra-abdominal fluid collection  no abd tenderness  incision site appears c/d/i  mild leukocytosis- stable    Recommendations  monitor off antibiotics    Geovani Goldman M.D.  [facility], Division of Infectious Diseases  [phone]  After 5pm on weekdays and all day on weekends - please call [phone]

## 2024-05-30 NOTE — PROGRESS NOTE ADULT - SUBJECTIVE AND OBJECTIVE BOX
Neurology Progress Note    S: Patient seen and examined.       Medications: MEDICATIONS  (STANDING):  aspirin enteric coated 81 milliGRAM(s) Oral daily  atorvastatin 20 milliGRAM(s) Oral at bedtime  clopidogrel Tablet 75 milliGRAM(s) Oral daily  heparin   Injectable 5000 Unit(s) SubCutaneous every 8 hours  insulin lispro (ADMELOG) corrective regimen sliding scale   SubCutaneous Before meals and at bedtime  melatonin 3 milliGRAM(s) Oral at bedtime  memantine 10 milliGRAM(s) Oral every 12 hours  metoprolol tartrate 25 milliGRAM(s) Oral every 12 hours  pantoprazole    Tablet 40 milliGRAM(s) Oral before breakfast    MEDICATIONS  (PRN):  acetaminophen     Tablet .. 650 milliGRAM(s) Oral every 6 hours PRN Mild Pain (1 - 3)       Vitals:  Vital Signs Last 24 Hrs  T(C): 37.1 (30 May 2024 09:15), Max: 37.2 (30 May 2024 06:00)  T(F): 98.8 (30 May 2024 09:15), Max: 98.9 (30 May 2024 06:00)  HR: 87 (30 May 2024 09:15) (66 - 87)  BP: 126/72 (30 May 2024 09:15) (120/68 - 142/66)  BP(mean): --  RR: 16 (30 May 2024 09:15) (16 - 18)  SpO2: 98% (30 May 2024 06:00) (96% - 98%)    Parameters below as of 30 May 2024 06:00  Patient On (Oxygen Delivery Method): room air                  General Exam:   General Appearance: Appropriately dressed and in no acute distress       Head: Normocephalic, atraumatic and no dysmorphic features  Ear, Nose, and Throat: Moist mucous membranes  CVS: S1S2+  Resp: No SOB, no wheeze or rhonchi  Abd: soft NTND  Extremities: No edema, no cyanosis  Skin: No bruises, no rashes     Neurological Exam:  Mental Status: Awake, alert and oriented x 2.  Able to follow simple commands. Able to name and repeat. fluent speech. No obvious aphasia, mild to moderate dysarthria   Cranial Nerves: PERRL, EOMI, VFFC, sensation V1-V3 intact,  mild L facial palsy - improving , equal elevation of palate, scm/trap 5/5, tongue is midline on protrusion. Hearing is grossly intact.   Motor: LUE and LLE drift  Sensation: dec on the L   Reflexes: 1+ throughout at biceps, brachioradialis, triceps, patellars and ankles bilaterally and equal. No clonus. R toe and L toe were both downgoing.  Coordination: No dysmetria on FNF  Gait: deferred    I personally reviewed the below data/images/labs:    LABS:                          9.6    10.79 )-----------( 364      ( 30 May 2024 06:00 )             28.9     05-30    136  |  103  |  5<L>  ----------------------------<  87  3.7   |  23  |  0.48<L>    Ca    8.0<L>      30 May 2024 06:00  Phos  2.8     05-30  Mg     1.90     05-30          Urinalysis Basic - ( 30 May 2024 06:00 )    Color: x / Appearance: x / SG: x / pH: x  Gluc: 87 mg/dL / Ketone: x  / Bili: x / Urobili: x   Blood: x / Protein: x / Nitrite: x   Leuk Esterase: x / RBC: x / WBC x   Sq Epi: x / Non Sq Epi: x / Bacteria: x              Radiology:  CTH: No acute intracranial hemorrhage or mass effect. Extensive chronic small   vessel ischemic changes in the frontoparietal periventricular and   subcortical white matter. Indeterminate age bilateral thalamic lacunar   infarcts accommodation of dilated perivascular spaces and lacunar   infarcts in the bilateral basal ganglia. Small chronic appearing   bilateral cerebellar infarcts.    CTA NECK:  No significant stenosis of the cervical carotid arteries based   on NASCET criteria. Patent cervical vertebral arteries. No evidence of   cervical carotid or vertebral artery dissection.    CTA HEAD:  No high-grade stenosis or occlusion of the major proximal   arterial branches.    CT PERFUSION:  Perfusion imaging shows no evidence of a core infarct or   tissue at risk.    < from: MR Head No Cont (05.20.24 @ 20:13) >    ACC: [id number] EXAM:  MR BRAIN   ORDERED BY: ARY POE     PROCEDURE DATE:  05/20/2024          INTERPRETATION:  CLINICAL INDICATION: Patient recently fell at home.   Confused.    TECHNIQUE: MRI of the brain was performed without contrast.    COMPARISON: CT brain 5/19/2024    FINDINGS:  Acute infarct involving the anterior left midbrain.    No acute intracranial hemorrhage, mass effect, or hydrocephalus. No   extra-axial collection. Basal cisterns are patent.    Age-related involutional changes and chronic microvascular ischemic   changes. Chronic lacunar infarcts involving the bilateral thalami. Small   chronic infarcts involving the bilateral cerebral hemispheres.    Ventricles and sulci are age-appropriate in size.    Major intracranial flow-voids are preserved.    Left cataract surgery. The orbits, sellar and suprasellar structures, and   craniocervical junction are otherwise unremarkable. Visualized paranasal   sinuses and mastoid air cells are clear.    IMPRESSION:  Acute infarct involving the anterior left midbrain.    --- End of Report ---              < end of copied text >     1. Left ventricular cavity is normal in size. Left ventricular wall thickness is normal. Left ventricular systolic function is normal with an ejection fraction visually estimated at 60 to 65 %. There are no regional wall motion abnormalities seen.   2. There is mild (grade 1) left ventricular diastolic dysfunction.   3. Normal right ventricular cavity size and probably normal systolic function.   4. Structurally normal mitral valve with normal leaflet excursion. There is calcification of the mitral valve annulus. There is trace mitral regurgitation.   5. The aortic valve appears trileaflet with normal systolic excursion. There is calcification of the aortic valve leaflets. There is mild aortic regurgitation.

## 2024-05-30 NOTE — PROGRESS NOTE ADULT - ASSESSMENT
78F with  sigmoid diverticulitis, multiple chronic infarcts (with unclear residual deficits), dementia (on memantine), T2DM, HTN, HLD, glaucoma, OA of the L knee, PERFECTO, who presents to [facility] ED on 5/19/2024 with c/o left facial droop and slurred speech. Stroke code cancelled as out of 24h window. Unclear stroke hx and baseline neurologic deficits.   In ED was febrile. CTH with bilateral thalamic and basal ganglia infarcts. Thalamic infarcts appear more subacute. Also with chronic cerebellar infarcts. CTA with mld ICA narrowing  o/e with mild to moderate dysarthria, R facial palsy , RUE and RLE drift. AAOx2   MRI brain with L anterior midbrain infarct    - continue aspirin 81mg  - can add Plavix 75mg x 3 weeks post OP if ok per surgery team   -  TTE  rev  - tele, consider ILR on d/c   - on zosyn per primary team + Linezolid  - cont home memantine   - gradual normotension, avoid hypotension  -  LDL 49 (1/24/24) - continue home statin  -  A1c 6.3 (5/11/2024)  - PT/OT  - DVT ppx     Allison Visnon DO  Vascular Neurology  Office [phone] .

## 2024-05-31 LAB
ANION GAP SERPL CALC-SCNC: 12 MMOL/L — SIGNIFICANT CHANGE UP (ref 7–14)
BUN SERPL-MCNC: 10 MG/DL — SIGNIFICANT CHANGE UP (ref 7–23)
CALCIUM SERPL-MCNC: 8 MG/DL — LOW (ref 8.4–10.5)
CHLORIDE SERPL-SCNC: 101 MMOL/L — SIGNIFICANT CHANGE UP (ref 98–107)
CO2 SERPL-SCNC: 22 MMOL/L — SIGNIFICANT CHANGE UP (ref 22–31)
CREAT SERPL-MCNC: 0.45 MG/DL — LOW (ref 0.5–1.3)
EGFR: 100 ML/MIN/1.73M2 — SIGNIFICANT CHANGE UP
GLUCOSE BLDC GLUCOMTR-MCNC: 100 MG/DL — HIGH (ref 70–99)
GLUCOSE BLDC GLUCOMTR-MCNC: 113 MG/DL — HIGH (ref 70–99)
GLUCOSE BLDC GLUCOMTR-MCNC: 131 MG/DL — HIGH (ref 70–99)
GLUCOSE BLDC GLUCOMTR-MCNC: 142 MG/DL — HIGH (ref 70–99)
GLUCOSE SERPL-MCNC: 101 MG/DL — HIGH (ref 70–99)
HCT VFR BLD CALC: 26 % — LOW (ref 34.5–45)
HGB BLD-MCNC: 8.8 G/DL — LOW (ref 11.5–15.5)
MAGNESIUM SERPL-MCNC: 1.7 MG/DL — SIGNIFICANT CHANGE UP (ref 1.6–2.6)
MCHC RBC-ENTMCNC: 30.4 PG — SIGNIFICANT CHANGE UP (ref 27–34)
MCHC RBC-ENTMCNC: 33.8 GM/DL — SIGNIFICANT CHANGE UP (ref 32–36)
MCV RBC AUTO: 90 FL — SIGNIFICANT CHANGE UP (ref 80–100)
NRBC # BLD: 0 /100 WBCS — SIGNIFICANT CHANGE UP (ref 0–0)
NRBC # FLD: 0 K/UL — SIGNIFICANT CHANGE UP (ref 0–0)
PHOSPHATE SERPL-MCNC: 3 MG/DL — SIGNIFICANT CHANGE UP (ref 2.5–4.5)
PLATELET # BLD AUTO: 286 K/UL — SIGNIFICANT CHANGE UP (ref 150–400)
POTASSIUM SERPL-MCNC: 3.1 MMOL/L — LOW (ref 3.5–5.3)
POTASSIUM SERPL-SCNC: 3.1 MMOL/L — LOW (ref 3.5–5.3)
RBC # BLD: 2.89 M/UL — LOW (ref 3.8–5.2)
RBC # FLD: 16.1 % — HIGH (ref 10.3–14.5)
SODIUM SERPL-SCNC: 135 MMOL/L — SIGNIFICANT CHANGE UP (ref 135–145)
WBC # BLD: 11.05 K/UL — HIGH (ref 3.8–10.5)
WBC # FLD AUTO: 11.05 K/UL — HIGH (ref 3.8–10.5)

## 2024-05-31 RX ORDER — ONDANSETRON 8 MG/1
4 TABLET, FILM COATED ORAL ONCE
Refills: 0 | Status: COMPLETED | OUTPATIENT
Start: 2024-05-31 | End: 2024-05-31

## 2024-05-31 RX ORDER — INSULIN LISPRO 100/ML
VIAL (ML) SUBCUTANEOUS AT BEDTIME
Refills: 0 | Status: DISCONTINUED | OUTPATIENT
Start: 2024-05-31 | End: 2024-06-03

## 2024-05-31 RX ORDER — POTASSIUM CHLORIDE 20 MEQ
40 PACKET (EA) ORAL ONCE
Refills: 0 | Status: COMPLETED | OUTPATIENT
Start: 2024-05-31 | End: 2024-05-31

## 2024-05-31 RX ORDER — INSULIN LISPRO 100/ML
VIAL (ML) SUBCUTANEOUS
Refills: 0 | Status: DISCONTINUED | OUTPATIENT
Start: 2024-05-31 | End: 2024-06-03

## 2024-05-31 RX ORDER — ATORVASTATIN CALCIUM 80 MG/1
1 TABLET, FILM COATED ORAL
Qty: 0 | Refills: 0 | DISCHARGE

## 2024-05-31 RX ORDER — MAGNESIUM SULFATE 500 MG/ML
2 VIAL (ML) INJECTION ONCE
Refills: 0 | Status: COMPLETED | OUTPATIENT
Start: 2024-05-31 | End: 2024-05-31

## 2024-05-31 RX ORDER — CLOPIDOGREL BISULFATE 75 MG/1
1 TABLET, FILM COATED ORAL
Refills: 0 | DISCHARGE
Start: 2024-05-31

## 2024-05-31 RX ORDER — SIMVASTATIN 20 MG/1
1 TABLET, FILM COATED ORAL
Refills: 0 | DISCHARGE

## 2024-05-31 RX ORDER — PANTOPRAZOLE SODIUM 20 MG/1
1 TABLET, DELAYED RELEASE ORAL
Refills: 0 | DISCHARGE
Start: 2024-05-31

## 2024-05-31 RX ORDER — OXYCODONE HYDROCHLORIDE 5 MG/1
5 TABLET ORAL ONCE
Refills: 0 | Status: DISCONTINUED | OUTPATIENT
Start: 2024-05-31 | End: 2024-05-31

## 2024-05-31 RX ADMIN — Medication 650 MILLIGRAM(S): at 08:11

## 2024-05-31 RX ADMIN — CLOPIDOGREL BISULFATE 75 MILLIGRAM(S): 75 TABLET, FILM COATED ORAL at 12:30

## 2024-05-31 RX ADMIN — HEPARIN SODIUM 5000 UNIT(S): 5000 INJECTION INTRAVENOUS; SUBCUTANEOUS at 14:42

## 2024-05-31 RX ADMIN — Medication 40 MILLIEQUIVALENT(S): at 08:41

## 2024-05-31 RX ADMIN — ONDANSETRON 4 MILLIGRAM(S): 8 TABLET, FILM COATED ORAL at 23:39

## 2024-05-31 RX ADMIN — Medication 25 GRAM(S): at 08:41

## 2024-05-31 RX ADMIN — MEMANTINE HYDROCHLORIDE 10 MILLIGRAM(S): 10 TABLET ORAL at 18:43

## 2024-05-31 RX ADMIN — Medication 650 MILLIGRAM(S): at 22:23

## 2024-05-31 RX ADMIN — MEMANTINE HYDROCHLORIDE 10 MILLIGRAM(S): 10 TABLET ORAL at 06:02

## 2024-05-31 RX ADMIN — PANTOPRAZOLE SODIUM 40 MILLIGRAM(S): 20 TABLET, DELAYED RELEASE ORAL at 06:02

## 2024-05-31 RX ADMIN — Medication 81 MILLIGRAM(S): at 12:30

## 2024-05-31 RX ADMIN — ATORVASTATIN CALCIUM 20 MILLIGRAM(S): 80 TABLET, FILM COATED ORAL at 21:23

## 2024-05-31 RX ADMIN — Medication 3 MILLIGRAM(S): at 21:23

## 2024-05-31 RX ADMIN — HEPARIN SODIUM 5000 UNIT(S): 5000 INJECTION INTRAVENOUS; SUBCUTANEOUS at 21:23

## 2024-05-31 RX ADMIN — OXYCODONE HYDROCHLORIDE 5 MILLIGRAM(S): 5 TABLET ORAL at 23:39

## 2024-05-31 RX ADMIN — HEPARIN SODIUM 5000 UNIT(S): 5000 INJECTION INTRAVENOUS; SUBCUTANEOUS at 06:01

## 2024-05-31 RX ADMIN — Medication 25 MILLIGRAM(S): at 21:23

## 2024-05-31 RX ADMIN — Medication 25 MILLIGRAM(S): at 09:31

## 2024-05-31 RX ADMIN — Medication 650 MILLIGRAM(S): at 21:23

## 2024-05-31 RX ADMIN — Medication 650 MILLIGRAM(S): at 04:15

## 2024-05-31 NOTE — PROGRESS NOTE ADULT - ASSESSMENT
78F with  sigmoid diverticulitis, multiple chronic infarcts (with unclear residual deficits), dementia (on memantine), T2DM, HTN, HLD, glaucoma, OA of the L knee, PERFECTO, who presents to [facility] ED on 5/19/2024 with c/o left facial droop and slurred speech. Stroke code cancelled as out of 24h window. Unclear stroke hx and baseline neurologic deficits.   In ED was febrile. CTH with bilateral thalamic and basal ganglia infarcts. Thalamic infarcts appear more subacute. Also with chronic cerebellar infarcts. CTA with mld ICA narrowing  o/e with mild to moderate dysarthria, R facial palsy , RUE and RLE drift. AAOx2   MRI brain with L anterior midbrain infarct    - continue aspirin 81mg  - can add Plavix 75mg x 3 weeks   -  TTE  rev  - tele, consider ILR on d/c   - s/p zosyn, Linezolid  - cont home memantine   - gradual normotension, avoid hypotension  -  LDL 49 (1/24/24) - continue home statin  -  A1c 6.3 (5/11/2024)  - PT/OT  - DVT ppx     Allison Vinson DO  Vascular Neurology  Office [phone] .

## 2024-05-31 NOTE — PROGRESS NOTE ADULT - ASSESSMENT
78 year old female with PMH diverticulitis, DM2, HTN, HLD presents s/p diagnostic lap converted to ex lap, TELLO, Hartmanns procedure on 5/21    Plan  - Diet: regular  - Plavix started per neuro recs x 3 week duration  - S/p antibiotic therapy   - C/w aspirin, DVT prophylaxis  - Discharge to [facility] team surgery [id number]

## 2024-05-31 NOTE — PROGRESS NOTE ADULT - SUBJECTIVE AND OBJECTIVE BOX
Patient is a 78y old  Female who presents with a chief complaint of Perforated diverticulitis (31 May 2024 12:39)    Date of servie : 05-31-24 @ 15:14  INTERVAL HPI/OVERNIGHT EVENTS:  T(C): 36.7 (05-31-24 @ 14:03), Max: 37.1 (05-30-24 @ 17:35)  HR: 88 (05-31-24 @ 14:03) (77 - 88)  BP: 128/74 (05-31-24 @ 14:03) (118/66 - 143/71)  RR: 17 (05-31-24 @ 14:03) (16 - 18)  SpO2: 100% (05-31-24 @ 14:03) (98% - 100%)  Wt(kg): --  I&O's Summary    30 May 2024 07:01  -  31 May 2024 07:00  --------------------------------------------------------  IN: 0 mL / OUT: 1430 mL / NET: -1430 mL    31 May 2024 07:01  -  31 May 2024 15:14  --------------------------------------------------------  IN: 0 mL / OUT: 1000 mL / NET: -1000 mL        LABS:                        8.8    11.05 )-----------( 286      ( 31 May 2024 06:15 )             26.0     05-31    135  |  101  |  10  ----------------------------<  101<H>  3.1<L>   |  22  |  0.45<L>    Ca    8.0<L>      31 May 2024 06:15  Phos  3.0     05-31  Mg     1.70     05-31        Urinalysis Basic - ( 31 May 2024 06:15 )    Color: x / Appearance: x / SG: x / pH: x  Gluc: 101 mg/dL / Ketone: x  / Bili: x / Urobili: x   Blood: x / Protein: x / Nitrite: x   Leuk Esterase: x / RBC: x / WBC x   Sq Epi: x / Non Sq Epi: x / Bacteria: x      CAPILLARY BLOOD GLUCOSE      POCT Blood Glucose.: 113 mg/dL (31 May 2024 12:11)  POCT Blood Glucose.: 100 mg/dL (31 May 2024 08:22)  POCT Blood Glucose.: 113 mg/dL (30 May 2024 21:31)  POCT Blood Glucose.: 124 mg/dL (30 May 2024 17:17)        Urinalysis Basic - ( 31 May 2024 06:15 )    Color: x / Appearance: x / SG: x / pH: x  Gluc: 101 mg/dL / Ketone: x  / Bili: x / Urobili: x   Blood: x / Protein: x / Nitrite: x   Leuk Esterase: x / RBC: x / WBC x   Sq Epi: x / Non Sq Epi: x / Bacteria: x        MEDICATIONS  (STANDING):  aspirin enteric coated 81 milliGRAM(s) Oral daily  atorvastatin 20 milliGRAM(s) Oral at bedtime  clopidogrel Tablet 75 milliGRAM(s) Oral daily  heparin   Injectable 5000 Unit(s) SubCutaneous every 8 hours  insulin lispro (ADMELOG) corrective regimen sliding scale   SubCutaneous at bedtime  insulin lispro (ADMELOG) corrective regimen sliding scale   SubCutaneous three times a day before meals  melatonin 3 milliGRAM(s) Oral at bedtime  memantine 10 milliGRAM(s) Oral every 12 hours  metoprolol tartrate 25 milliGRAM(s) Oral every 12 hours  pantoprazole    Tablet 40 milliGRAM(s) Oral before breakfast    MEDICATIONS  (PRN):  acetaminophen     Tablet .. 650 milliGRAM(s) Oral every 6 hours PRN Mild Pain (1 - 3)          PHYSICAL EXAM:  GENERAL: frail  CHEST/LUNG: Clear to percussion bilaterally; No rales, rhonchi, wheezing, or rubs  HEART: Regular rate and rhythm; No murmurs, rubs, or gallops  ABDOMEN: Soft, Nontender, Nondistended; Bowel sounds present  EXTREMITIES:  edema +    Care Discussed with Consultants/Other Providers [ ] YES  [ ] NO

## 2024-05-31 NOTE — PROGRESS NOTE ADULT - ASSESSMENT
77 y/o F PMhx Glaucoma, DM II, HTN, dementia, diverticulitis who presented after a fall found to have enlarged abscess    Sigmoid diverticulitis w/ perforation and abscess- treated  sepsis- fever, leukocytosis on admission  CT- perforated sigmoid diverticulitis with thick-walled pericolonic multilocular abscess measuring at least up to 3.5 x 1.5 x 4.8 cm w/ moderate stool burden distending the colon proximal to the sigmoid  s/p OR 5/21- Diagnostic laparoscopy converted to open and Ruiz procedure, pus cavity in lateral colonic wall and culture obtained. colostomy   - cx w/ VRE faecium  CT- Postoperative changes status post Ruiz's procedure with small amount of postoperative free fluid and free air. Dilatation of small bowel loops, likely ileus. No evidence of drainable intra-abdominal fluid collection.  s/p zosyn and linezolid, complete 5/28  leukocytosis improved    CVA  L facial droop  MRI- Acute infarct involving the anterior left midbrain.    Recommendations  monitor off antibiotics  wound care per surgery    We will sign off. Thank you for allowing us to participate in the care of Ms. Wnin. Please feel free to call with any questions or concerns.     Geovani Goldman M.D.  [facility], Division of Infectious Diseases  [phone]  After 5pm on weekdays and all day on weekends - please call [phone]  Statement Selected

## 2024-05-31 NOTE — PROGRESS NOTE ADULT - SUBJECTIVE AND OBJECTIVE BOX
[facility], Division of Infectious Diseases  JORGE LUIS Chaves Y. Patel, S. Shah, G. Jones  [phone]  ([phone] - weekdays after 5pm and weekends)    Name: TY DAVIS  Age/Gender: 78y Female  MRN: [id number]    Interval History:  Notes reviewed.   No concerning overnight events.  Afebrile.     Allergies: No Known Allergies      Objective:  Vitals:   T(F): 98.3 (05-31-24 @ 09:00), Max: 98.7 (05-30-24 @ 17:35)  HR: 85 (05-31-24 @ 09:00) (77 - 88)  BP: 118/66 (05-31-24 @ 09:00) (118/66 - 143/71)  RR: 16 (05-31-24 @ 09:00) (16 - 18)  SpO2: 100% (05-31-24 @ 09:00) (98% - 100%)  Physical Examination:  General: no acute distress  HEENT: anicteric, NC  Cardio: S1, S2, normal rate  Resp: clear to auscultation anteriorly   Abd: soft, NT, ostomy  Ext: no LE edema  Skin: warm, dry    Laboratory Studies:  CBC:                       8.8    11.05 )-----------( 286      ( 31 May 2024 06:15 )             26.0     WBC Trend:  11.05 05-31-24 @ 06:15  10.79 05-30-24 @ 06:00  10.78 05-29-24 @ 06:22  13.33 05-28-24 @ 05:40  11.64 05-27-24 @ 06:54  11.40 05-26-24 @ 07:21  8.47 05-25-24 @ 05:22    CMP: 05-31    135  |  101  |  10  ----------------------------<  101<H>  3.1<L>   |  22  |  0.45<L>    Ca    8.0<L>      31 May 2024 06:15  Phos  3.0     05-31  Mg     1.70     05-31            Urinalysis Basic - ( 31 May 2024 06:15 )    Color: x / Appearance: x / SG: x / pH: x  Gluc: 101 mg/dL / Ketone: x  / Bili: x / Urobili: x   Blood: x / Protein: x / Nitrite: x   Leuk Esterase: x / RBC: x / WBC x   Sq Epi: x / Non Sq Epi: x / Bacteria: x      Microbiology: reviewed     Culture - Abscess with Gram Stain (collected 05-21-24 @ 15:20)  Source: .Abscess Perforated Diverticulitis  Gram Stain (05-22-24 @ 05:51):    Numerous polymorphonuclear leukocytes seen per low power field    Few Gram positive cocci in pairs seen per oil power field    Rare Gram Negative Rods seen per oil power field  Final Report (05-27-24 @ 11:03):    Moderate Enterococcus faecium (vancomycin resistant)    Rare Eubacterium limosum "Susceptibilities not performed"  Organism: Enterococcus faecium (vancomycin resistant) (05-27-24 @ 11:03)  Organism: Enterococcus faecium (vancomycin resistant) (05-27-24 @ 11:03)      Method Type: MAYELIN      -  Ampicillin: R >8 Predicts results to ampicillin/sulbactam, amoxacillin-clavulanate and  piperacillin-tazobactam.      -  Daptomycin: SDD 2 The breakpoint for SDD (susceptible dose dependent)is based on a dosage regimen of 8-12 mg/kg administered every 24 h in adults and is intended for serious infections due to E. faecium. Consultation with an infectious diseases specialist is recommended.      -  Levofloxacin: R >4      -  Linezolid: S 2      -  Vancomycin: R >16    Culture - Urine (collected 05-20-24 @ 00:45)  Source: Clean Catch Clean Catch (Midstream)  Final Report (05-22-24 @ 17:47):    10,000 - 49,000 CFU/mL Enterococcus faecium (vancomycin resistant)  Organism: Enterococcus faecium (vancomycin resistant) (05-22-24 @ 17:47)  Organism: Enterococcus faecium (vancomycin resistant) (05-22-24 @ 17:47)      Method Type: MAYELIN      -  Ampicillin: R >8 Predicts results to ampicillin/sulbactam, amoxacillin-clavulanate and  piperacillin-tazobactam.      -  Ciprofloxacin: R >2      -  Daptomycin: SDD 4 The breakpoint for SDD (susceptible dose dependent)is based on a dosage regimen of 8-12 mg/kg administered every 24 h in adults and is intended for serious infections due to E. faecium. Consultation with an infectious diseases specialist is recommended.      -  Levofloxacin: R >4      -  Linezolid: S 2      -  Nitrofurantoin: S <=32 Should not be used to treat pyelonephritis.      -  Tetracycline: R >8      -  Vancomycin: R >16    Culture - Blood (collected 05-19-24 @ 21:39)  Source: .Blood Blood-Peripheral  Final Report (05-25-24 @ 03:01):    No growth at 5 days    Culture - Blood (collected 05-19-24 @ 21:30)  Source: .Blood Blood-Peripheral  Final Report (05-25-24 @ 03:01):    No growth at 5 days        Radiology: reviewed     Medications:  acetaminophen     Tablet .. 650 milliGRAM(s) Oral every 6 hours PRN  aspirin enteric coated 81 milliGRAM(s) Oral daily  atorvastatin 20 milliGRAM(s) Oral at bedtime  clopidogrel Tablet 75 milliGRAM(s) Oral daily  heparin   Injectable 5000 Unit(s) SubCutaneous every 8 hours  insulin lispro (ADMELOG) corrective regimen sliding scale   SubCutaneous at bedtime  insulin lispro (ADMELOG) corrective regimen sliding scale   SubCutaneous three times a day before meals  melatonin 3 milliGRAM(s) Oral at bedtime  memantine 10 milliGRAM(s) Oral every 12 hours  metoprolol tartrate 25 milliGRAM(s) Oral every 12 hours  pantoprazole    Tablet 40 milliGRAM(s) Oral before breakfast    Antimicrobials:

## 2024-05-31 NOTE — PROGRESS NOTE ADULT - ASSESSMENT
79 y/o F with pmhx of Diverticulitis, Type-2 DM, HTN, HLD, Glaucoma, OA of the L-knee and PERFECTO, presented to the ED for new onset lethargy. Found to have leukocytosis likely from pneumonia vs. diverticulitis. The patient does not appear lethargic. Admit for IV abx.      Perforated  Diverticulitis.   - surgery fu   - sp OR   - will monitor     CVA  - neuro fu appreciated  - imaging reviewed  - plavix as per neuro        DM2 (diabetes mellitus, type 2).   - monitor FS  - ISS

## 2024-05-31 NOTE — PROGRESS NOTE ADULT - SUBJECTIVE AND OBJECTIVE BOX
TEAM [ A ] Surgery Daily Progress Note  =====================================================    SUBJECTIVE: Patient seen and examined at bedside on AM rounds. Patient reports that they're feeling well. Denies fever, chills, N/V, chest pain, SOB    ALLERGIES:  No Known Allergies      --------------------------------------------------------------------------------------    MEDICATIONS:  acetaminophen     Tablet .. 650 milliGRAM(s) Oral every 6 hours PRN  aspirin enteric coated 81 milliGRAM(s) Oral daily  atorvastatin 20 milliGRAM(s) Oral at bedtime  clopidogrel Tablet 75 milliGRAM(s) Oral daily  heparin   Injectable 5000 Unit(s) SubCutaneous every 8 hours  insulin lispro (ADMELOG) corrective regimen sliding scale   SubCutaneous Before meals and at bedtime  melatonin 3 milliGRAM(s) Oral at bedtime  memantine 10 milliGRAM(s) Oral every 12 hours  metoprolol tartrate 25 milliGRAM(s) Oral every 12 hours  pantoprazole    Tablet 40 milliGRAM(s) Oral before breakfast    --------------------------------------------------------------------------------------    VITAL SIGNS:  T(C): 36.9 (05-31-24 @ 01:48), Max: 37.2 (05-30-24 @ 06:00)  HR: 79 (05-31-24 @ 01:48) (74 - 88)  BP: 143/71 (05-31-24 @ 01:48) (118/76 - 143/71)  RR: 18 (05-31-24 @ 01:48) (16 - 18)  SpO2: 98% (05-31-24 @ 01:48) (98% - 100%)  --------------------------------------------------------------------------------------    INS AND OUTS:    05-29-24 @ 07:01  -  05-30-24 @ 07:00  --------------------------------------------------------  IN: 480 mL / OUT: 1410 mL / NET: -930 mL    05-30-24 @ 07:01  -  05-31-24 @ 05:55  --------------------------------------------------------  IN: 0 mL / OUT: 1300 mL / NET: -1300 mL      --------------------------------------------------------------------------------------      EXAM    General: NAD, resting in bed comfortably.  Cardiac: regular rate, warm and well perfused  Respiratory: Nonlabored respirations, normal cw expansion.  Abdomen: soft, nontender, nondistended  Extremities: normal strength, FROM, no deformities    --------------------------------------------------------------------------------------    LABS       TEAM [ A ] Surgery Daily Progress Note  =====================================================    SUBJECTIVE: Patient seen and examined at bedside on AM rounds. Patient reports that they're feeling well. Denies fever, chills, N/V, chest pain, SOB    ALLERGIES:  No Known Allergies      --------------------------------------------------------------------------------------    MEDICATIONS:  acetaminophen     Tablet .. 650 milliGRAM(s) Oral every 6 hours PRN  aspirin enteric coated 81 milliGRAM(s) Oral daily  atorvastatin 20 milliGRAM(s) Oral at bedtime  clopidogrel Tablet 75 milliGRAM(s) Oral daily  heparin   Injectable 5000 Unit(s) SubCutaneous every 8 hours  insulin lispro (ADMELOG) corrective regimen sliding scale   SubCutaneous Before meals and at bedtime  melatonin 3 milliGRAM(s) Oral at bedtime  memantine 10 milliGRAM(s) Oral every 12 hours  metoprolol tartrate 25 milliGRAM(s) Oral every 12 hours  pantoprazole    Tablet 40 milliGRAM(s) Oral before breakfast    --------------------------------------------------------------------------------------    VITAL SIGNS:  T(C): 36.9 (05-31-24 @ 01:48), Max: 37.2 (05-30-24 @ 06:00)  HR: 79 (05-31-24 @ 01:48) (74 - 88)  BP: 143/71 (05-31-24 @ 01:48) (118/76 - 143/71)  RR: 18 (05-31-24 @ 01:48) (16 - 18)  SpO2: 98% (05-31-24 @ 01:48) (98% - 100%)  --------------------------------------------------------------------------------------    INS AND OUTS:    05-29-24 @ 07:01  -  05-30-24 @ 07:00  --------------------------------------------------------  IN: 480 mL / OUT: 1410 mL / NET: -930 mL    05-30-24 @ 07:01  -  05-31-24 @ 05:55  --------------------------------------------------------  IN: 0 mL / OUT: 1300 mL / NET: -1300 mL      --------------------------------------------------------------------------------------      EXAM    General: NAD, resting in bed comfortably.  Cardiac: regular rate, warm and well perfused  Respiratory: Nonlabored respirations, normal cw expansion.  Abdomen: soft, nontender, nondistended, colostomy with stool and gas, midline staples c/d/i  Extremities: normal strength, FROM, no deformities    --------------------------------------------------------------------------------------    LABS                          8.8    11.05 )-----------( 286      ( 31 May 2024 06:15 )             26.0   05-30    136  |  103  |  5<L>  ----------------------------<  87  3.7   |  23  |  0.48<L>    Ca    8.0<L>      30 May 2024 06:00  Phos  2.8     05-30  Mg     1.90     05-30

## 2024-05-31 NOTE — PROGRESS NOTE ADULT - SUBJECTIVE AND OBJECTIVE BOX
DATE OF SERVICE: 05-31-24    No overnight event, awaiting discharge to rehab. Patient denies chest pain or shortness of breath.   Review of symptoms otherwise negative.    MEDICATIONS:  acetaminophen     Tablet .. 650 milliGRAM(s) Oral every 6 hours PRN  aspirin enteric coated 81 milliGRAM(s) Oral daily  atorvastatin 20 milliGRAM(s) Oral at bedtime  clopidogrel Tablet 75 milliGRAM(s) Oral daily  heparin   Injectable 5000 Unit(s) SubCutaneous every 8 hours  insulin lispro (ADMELOG) corrective regimen sliding scale   SubCutaneous Before meals and at bedtime  melatonin 3 milliGRAM(s) Oral at bedtime  memantine 10 milliGRAM(s) Oral every 12 hours  metoprolol tartrate 25 milliGRAM(s) Oral every 12 hours  pantoprazole    Tablet 40 milliGRAM(s) Oral before breakfast      LABS:                        8.8    11.05 )-----------( 286      ( 31 May 2024 06:15 )             26.0       Hemoglobin: 8.8 g/dL (05-31 @ 06:15)  Hemoglobin: 9.6 g/dL (05-30 @ 06:00)  Hemoglobin: 9.0 g/dL (05-29 @ 06:22)  Hemoglobin: 9.9 g/dL (05-28 @ 05:40)  Hemoglobin: 8.7 g/dL (05-27 @ 06:54)      05-31    135  |  101  |  10  ----------------------------<  101<H>  3.1<L>   |  22  |  0.45<L>    Ca    8.0<L>      31 May 2024 06:15  Phos  3.0     05-31  Mg     1.70     05-31      Creatinine Trend: 0.45<--, 0.48<--, 0.48<--, 0.57<--, 0.53<--, 0.43<--    COAGS:           PHYSICAL EXAM:  T(C): 36.8 (05-31-24 @ 09:00), Max: 37.1 (05-30-24 @ 17:35)  HR: 85 (05-31-24 @ 09:00) (77 - 88)  BP: 118/66 (05-31-24 @ 09:00) (118/66 - 143/71)  RR: 16 (05-31-24 @ 09:00) (16 - 18)  SpO2: 100% (05-31-24 @ 09:00) (98% - 100%)  Wt(kg): --    I&O's Summary    30 May 2024 07:01  -  31 May 2024 07:00  --------------------------------------------------------  IN: 0 mL / OUT: 1430 mL / NET: -1430 mL        General: pleasant, comfortable  Head: Normocephalic and atraumatic.   Neck: No JVD. No bruitsNo M/R/G  Lungs: CTA b/l. diminshed at bases A/L limited exam  Abdomen: deferred  Extremities: No clubbing/cyanosis/edema.   Neurologic: Moves all four extremities.   Skin: Warm and moist. The patient's skin has normal elasticity and good skin turgor.   Psychiatric: cannot fully assess  Musculoskeletal: Normal range of motion, normal strength     TELEMETRY: 	  NONE    ECG:  	NSR    < from: Xray Chest 1 View- PORTABLE-Urgent (Xray Chest 1 View- PORTABLE-Urgent .) (05.23.24 @ 09:35) >  FINDINGS:    Cardiopericardial silhouette is magnified with this projection.  Lungs show no focal consolidations.  Trace left effusion.  There is no pneumothorax.  No acute bony abnormality.    IMPRESSION: Clear lungs with trace left effusion.    < end of copied text >      ASSESSMENT/PLAN: Pt is a 78F with PMHx diverticulitis, DM2, HTN, HLD,with recent hospitalization for complicated diverticulitis, discharged 5/15 on long-term IV ABX presents after fall from home with AMS, found to have fevers  and a  CT AP showing perforated sigmoid diverticulitis with thick-walled pericolonic multilocular abscess measuring at least up to 3.5 x 1.5 x 4.8 cm. Cardiology called for pre-op risk stratification prior to OR. Currently denies any chest pain, SOB, orthopnea.    - no anginal chest pain, no q waves on EKG euvolemic on exam, no severely stenotic valvular disease on PE, recent acute CVA  - tolerated procedure well from CV perspective  - s/p drainage of deep pelvic abscess and open Ruiz procedure  - cont Lopressor 25 mg q 12  - cont ASA and Lipitor 20 mg  - On Plavix per neuro for 3 weeks    DC planning to rehab    Negar Eagle MD  Pager: [phone]

## 2024-05-31 NOTE — PROGRESS NOTE ADULT - SUBJECTIVE AND OBJECTIVE BOX
Neurology Progress Note    S: Patient seen and examined.     Medications: MEDICATIONS  (STANDING):  aspirin enteric coated 81 milliGRAM(s) Oral daily  atorvastatin 20 milliGRAM(s) Oral at bedtime  clopidogrel Tablet 75 milliGRAM(s) Oral daily  heparin   Injectable 5000 Unit(s) SubCutaneous every 8 hours  insulin lispro (ADMELOG) corrective regimen sliding scale   SubCutaneous Before meals and at bedtime  melatonin 3 milliGRAM(s) Oral at bedtime  memantine 10 milliGRAM(s) Oral every 12 hours  metoprolol tartrate 25 milliGRAM(s) Oral every 12 hours  pantoprazole    Tablet 40 milliGRAM(s) Oral before breakfast    MEDICATIONS  (PRN):  acetaminophen     Tablet .. 650 milliGRAM(s) Oral every 6 hours PRN Mild Pain (1 - 3)       Vitals:  Vital Signs Last 24 Hrs  T(C): 36.9 (31 May 2024 01:48), Max: 37.1 (30 May 2024 17:35)  T(F): 98.4 (31 May 2024 01:48), Max: 98.7 (30 May 2024 17:35)  HR: 79 (31 May 2024 01:48) (77 - 88)  BP: 143/71 (31 May 2024 01:48) (118/76 - 143/71)  BP(mean): --  RR: 18 (31 May 2024 01:48) (17 - 18)  SpO2: 98% (31 May 2024 01:48) (98% - 100%)    Parameters below as of 31 May 2024 01:48  Patient On (Oxygen Delivery Method): room air                  General Exam:   General Appearance: Appropriately dressed and in no acute distress       Head: Normocephalic, atraumatic and no dysmorphic features  Ear, Nose, and Throat: Moist mucous membranes  CVS: S1S2+  Resp: No SOB, no wheeze or rhonchi  Abd: soft NTND  Extremities: No edema, no cyanosis  Skin: No bruises, no rashes     Neurological Exam:  Mental Status: Awake, alert and oriented x 2.  Able to follow simple commands. Able to name and repeat. fluent speech. No obvious aphasia, mild to moderate dysarthria   Cranial Nerves: PERRL, EOMI, VFFC, sensation V1-V3 intact,  mild L facial palsy - improving , equal elevation of palate, scm/trap 5/5, tongue is midline on protrusion. Hearing is grossly intact.   Motor: LUE and LLE drift  Sensation: dec on the L   Reflexes: 1+ throughout at biceps, brachioradialis, triceps, patellars and ankles bilaterally and equal. No clonus. R toe and L toe were both downgoing.  Coordination: No dysmetria on FNF  Gait: deferred    I personally reviewed the below data/images/labs:    LABS:                          8.8    11.05 )-----------( 286      ( 31 May 2024 06:15 )             26.0     05-31    135  |  101  |  10  ----------------------------<  101<H>  3.1<L>   |  22  |  0.45<L>    Ca    8.0<L>      31 May 2024 06:15  Phos  3.0     05-31  Mg     1.70     05-31          Urinalysis Basic - ( 31 May 2024 06:15 )    Color: x / Appearance: x / SG: x / pH: x  Gluc: 101 mg/dL / Ketone: x  / Bili: x / Urobili: x   Blood: x / Protein: x / Nitrite: x   Leuk Esterase: x / RBC: x / WBC x   Sq Epi: x / Non Sq Epi: x / Bacteria: x              Radiology:  CTH: No acute intracranial hemorrhage or mass effect. Extensive chronic small   vessel ischemic changes in the frontoparietal periventricular and   subcortical white matter. Indeterminate age bilateral thalamic lacunar   infarcts accommodation of dilated perivascular spaces and lacunar   infarcts in the bilateral basal ganglia. Small chronic appearing   bilateral cerebellar infarcts.    CTA NECK:  No significant stenosis of the cervical carotid arteries based   on NASCET criteria. Patent cervical vertebral arteries. No evidence of   cervical carotid or vertebral artery dissection.    CTA HEAD:  No high-grade stenosis or occlusion of the major proximal   arterial branches.    CT PERFUSION:  Perfusion imaging shows no evidence of a core infarct or   tissue at risk.    < from: MR Head No Cont (05.20.24 @ 20:13) >    ACC: [id number] EXAM:  MR BRAIN   ORDERED BY: ARY POE     PROCEDURE DATE:  05/20/2024          INTERPRETATION:  CLINICAL INDICATION: Patient recently fell at home.   Confused.    TECHNIQUE: MRI of the brain was performed without contrast.    COMPARISON: CT brain 5/19/2024    FINDINGS:  Acute infarct involving the anterior left midbrain.    No acute intracranial hemorrhage, mass effect, or hydrocephalus. No   extra-axial collection. Basal cisterns are patent.    Age-related involutional changes and chronic microvascular ischemic   changes. Chronic lacunar infarcts involving the bilateral thalami. Small   chronic infarcts involving the bilateral cerebral hemispheres.    Ventricles and sulci are age-appropriate in size.    Major intracranial flow-voids are preserved.    Left cataract surgery. The orbits, sellar and suprasellar structures, and   craniocervical junction are otherwise unremarkable. Visualized paranasal   sinuses and mastoid air cells are clear.    IMPRESSION:  Acute infarct involving the anterior left midbrain.    --- End of Report ---              < end of copied text >     1. Left ventricular cavity is normal in size. Left ventricular wall thickness is normal. Left ventricular systolic function is normal with an ejection fraction visually estimated at 60 to 65 %. There are no regional wall motion abnormalities seen.   2. There is mild (grade 1) left ventricular diastolic dysfunction.   3. Normal right ventricular cavity size and probably normal systolic function.   4. Structurally normal mitral valve with normal leaflet excursion. There is calcification of the mitral valve annulus. There is trace mitral regurgitation.   5. The aortic valve appears trileaflet with normal systolic excursion. There is calcification of the aortic valve leaflets. There is mild aortic regurgitation.

## 2024-06-01 LAB
GLUCOSE BLDC GLUCOMTR-MCNC: 104 MG/DL — HIGH (ref 70–99)
GLUCOSE BLDC GLUCOMTR-MCNC: 125 MG/DL — HIGH (ref 70–99)
GLUCOSE BLDC GLUCOMTR-MCNC: 140 MG/DL — HIGH (ref 70–99)
GLUCOSE BLDC GLUCOMTR-MCNC: 142 MG/DL — HIGH (ref 70–99)

## 2024-06-01 RX ORDER — POTASSIUM CHLORIDE 20 MEQ
20 PACKET (EA) ORAL
Refills: 0 | Status: DISCONTINUED | OUTPATIENT
Start: 2024-06-01 | End: 2024-06-01

## 2024-06-01 RX ADMIN — Medication 25 MILLIGRAM(S): at 22:06

## 2024-06-01 RX ADMIN — HEPARIN SODIUM 5000 UNIT(S): 5000 INJECTION INTRAVENOUS; SUBCUTANEOUS at 22:33

## 2024-06-01 RX ADMIN — PANTOPRAZOLE SODIUM 40 MILLIGRAM(S): 20 TABLET, DELAYED RELEASE ORAL at 06:14

## 2024-06-01 RX ADMIN — HEPARIN SODIUM 5000 UNIT(S): 5000 INJECTION INTRAVENOUS; SUBCUTANEOUS at 06:13

## 2024-06-01 RX ADMIN — Medication 3 MILLIGRAM(S): at 22:32

## 2024-06-01 RX ADMIN — Medication 650 MILLIGRAM(S): at 16:37

## 2024-06-01 RX ADMIN — Medication 650 MILLIGRAM(S): at 17:07

## 2024-06-01 RX ADMIN — ATORVASTATIN CALCIUM 20 MILLIGRAM(S): 80 TABLET, FILM COATED ORAL at 22:06

## 2024-06-01 RX ADMIN — CLOPIDOGREL BISULFATE 75 MILLIGRAM(S): 75 TABLET, FILM COATED ORAL at 13:14

## 2024-06-01 RX ADMIN — Medication 81 MILLIGRAM(S): at 13:14

## 2024-06-01 RX ADMIN — Medication 25 MILLIGRAM(S): at 10:03

## 2024-06-01 RX ADMIN — MEMANTINE HYDROCHLORIDE 10 MILLIGRAM(S): 10 TABLET ORAL at 18:32

## 2024-06-01 RX ADMIN — Medication 20 MILLIEQUIVALENT(S): at 14:20

## 2024-06-01 RX ADMIN — HEPARIN SODIUM 5000 UNIT(S): 5000 INJECTION INTRAVENOUS; SUBCUTANEOUS at 13:14

## 2024-06-01 RX ADMIN — MEMANTINE HYDROCHLORIDE 10 MILLIGRAM(S): 10 TABLET ORAL at 06:14

## 2024-06-01 RX ADMIN — OXYCODONE HYDROCHLORIDE 5 MILLIGRAM(S): 5 TABLET ORAL at 00:39

## 2024-06-01 NOTE — PROGRESS NOTE ADULT - SUBJECTIVE AND OBJECTIVE BOX
Vital Signs Last 24 Hrs  T(C): 36.7 (01 Jun 2024 06:14), Max: 37.7 (31 May 2024 21:21)  T(F): 98 (01 Jun 2024 06:14), Max: 99.9 (31 May 2024 21:21)  HR: 71 (01 Jun 2024 06:14) (71 - 109)  BP: 143/70 (01 Jun 2024 06:14) (116/67 - 143/70)  BP(mean): --  RR: 18 (01 Jun 2024 06:14) (16 - 18)  SpO2: 96% (01 Jun 2024 06:14) (95% - 100%)    Parameters below as of 01 Jun 2024 06:14  Patient On (Oxygen Delivery Method): nasal cannula  O2 Flow (L/min): 2    SUBJECTIVE: Pt seen and examined bedside on morning rounds. Patient feeling well, no signs of distress. Denies any fever, chills, n/v/d, SOB, or chest pain.     General: NAD, resting in bed comfortably.  Cardiac: regular rate, warm and well perfused  Respiratory: Nonlabored respirations, normal cw expansion.  Abdomen: soft, nontender, nondistended, colostomy with stool and gas, midline staples c/d/i  Extremities: normal strength, FROM, no deformities    I&O's Summary    31 May 2024 07:01  -  01 Jun 2024 07:00  --------------------------------------------------------  IN: 0 mL / OUT: 1065 mL / NET: -1065 mL      I&O's Detail    31 May 2024 07:01  -  01 Jun 2024 07:00  --------------------------------------------------------  IN:  Total IN: 0 mL    OUT:    Colostomy (mL): 265 mL    Voided (mL): 800 mL  Total OUT: 1065 mL    Total NET: -1065 mL    MEDICATIONS  (STANDING):  aspirin enteric coated 81 milliGRAM(s) Oral daily  atorvastatin 20 milliGRAM(s) Oral at bedtime  clopidogrel Tablet 75 milliGRAM(s) Oral daily  heparin   Injectable 5000 Unit(s) SubCutaneous every 8 hours  insulin lispro (ADMELOG) corrective regimen sliding scale   SubCutaneous three times a day before meals  insulin lispro (ADMELOG) corrective regimen sliding scale   SubCutaneous at bedtime  melatonin 3 milliGRAM(s) Oral at bedtime  memantine 10 milliGRAM(s) Oral every 12 hours  metoprolol tartrate 25 milliGRAM(s) Oral every 12 hours  pantoprazole    Tablet 40 milliGRAM(s) Oral before breakfast    MEDICATIONS  (PRN):  acetaminophen     Tablet .. 650 milliGRAM(s) Oral every 6 hours PRN Mild Pain (1 - 3)      LABS:                        8.8    11.05 )-----------( 286      ( 31 May 2024 06:15 )             26.0     05-31    135  |  101  |  10  ----------------------------<  101<H>  3.1<L>   |  22  |  0.45<L>    Ca    8.0<L>      31 May 2024 06:15  Phos  3.0     05-31  Mg     1.70     05-31

## 2024-06-01 NOTE — PROGRESS NOTE ADULT - ASSESSMENT
78 year old female with PMH diverticulitis, DM2, HTN, HLD presents s/p diagnostic lap converted to ex lap, TELLO, Hartmanns procedure on 5/21    Plan  - Diet: regular  - Plavix started per neuro recs x 3 week duration  - S/p antibiotic therapy   - C/w aspirin, DVT prophylaxis  - Discharge to [facility] team surgery [id number]

## 2024-06-01 NOTE — PROGRESS NOTE ADULT - SUBJECTIVE AND OBJECTIVE BOX
DATE OF SERVICE: 06-1-24      pt seen and examined, no complaints, ROS - .        acetaminophen     Tablet .. 650 milliGRAM(s) Oral every 6 hours PRN  aspirin enteric coated 81 milliGRAM(s) Oral daily  atorvastatin 20 milliGRAM(s) Oral at bedtime  clopidogrel Tablet 75 milliGRAM(s) Oral daily  heparin   Injectable 5000 Unit(s) SubCutaneous every 8 hours  insulin lispro (ADMELOG) corrective regimen sliding scale   SubCutaneous three times a day before meals  insulin lispro (ADMELOG) corrective regimen sliding scale   SubCutaneous at bedtime  melatonin 3 milliGRAM(s) Oral at bedtime  memantine 10 milliGRAM(s) Oral every 12 hours  metoprolol tartrate 25 milliGRAM(s) Oral every 12 hours  pantoprazole    Tablet 40 milliGRAM(s) Oral before breakfast                            8.8    11.05 )-----------( 286      ( 31 May 2024 06:15 )             26.0       Hemoglobin: 8.8 g/dL (05-31 @ 06:15)  Hemoglobin: 9.6 g/dL (05-30 @ 06:00)  Hemoglobin: 9.0 g/dL (05-29 @ 06:22)  Hemoglobin: 9.9 g/dL (05-28 @ 05:40)      05-31    135  |  101  |  10  ----------------------------<  101<H>  3.1<L>   |  22  |  0.45<L>    Ca    8.0<L>      31 May 2024 06:15  Phos  3.0     05-31  Mg     1.70     05-31      Creatinine Trend: 0.45<--, 0.48<--, 0.48<--, 0.57<--, 0.53<--, 0.43<--    COAGS:           T(C): 36.5 (06-01-24 @ 10:03), Max: 37.7 (05-31-24 @ 21:21)  HR: 90 (06-01-24 @ 10:03) (71 - 109)  BP: 121/66 (06-01-24 @ 10:03) (116/67 - 143/70)  RR: 18 (06-01-24 @ 10:03) (17 - 18)  SpO2: 97% (06-01-24 @ 10:03) (95% - 100%)  Wt(kg): --    I&O's Summary    31 May 2024 07:01  -  01 Jun 2024 07:00  --------------------------------------------------------  IN: 0 mL / OUT: 1065 mL / NET: -1065 mL        General: pleasant, comfortable  Head: Normocephalic and atraumatic.   Neck: No JVD. No bruitsNo M/R/G  Lungs: CTA b/l. diminshed at bases A/L limited exam  Abdomen: deferred  Extremities: No clubbing/cyanosis/edema.   Neurologic: Moves all four extremities.   Skin: Warm and moist. The patient's skin has normal elasticity and good skin turgor.   Psychiatric: cannot fully assess  Musculoskeletal: Normal range of motion, normal strength     TELEMETRY: 	  NONE    ECG:  	NSR    < from: Xray Chest 1 View- PORTABLE-Urgent (Xray Chest 1 View- PORTABLE-Urgent .) (05.23.24 @ 09:35) >  FINDINGS:    Cardiopericardial silhouette is magnified with this projection.  Lungs show no focal consolidations.  Trace left effusion.  There is no pneumothorax.  No acute bony abnormality.    IMPRESSION: Clear lungs with trace left effusion.    < end of copied text >      ASSESSMENT/PLAN: Pt is a 78F with PMHx diverticulitis, DM2, HTN, HLD,with recent hospitalization for complicated diverticulitis, discharged 5/15 on long-term IV ABX presents after fall from home with AMS, found to have fevers  and a  CT AP showing perforated sigmoid diverticulitis with thick-walled pericolonic multilocular abscess measuring at least up to 3.5 x 1.5 x 4.8 cm. Cardiology called for pre-op risk stratification prior to OR. Currently denies any chest pain, SOB, orthopnea.    - no anginal chest pain, no q waves on EKG euvolemic on exam, no severely stenotic valvular disease on PE, recent acute CVA  - tolerated procedure well from CV perspective  - s/p drainage of deep pelvic abscess and open Ruiz procedure  - cont Lopressor 25 mg q 12  - cont ASA and Lipitor 20 mg  - On Plavix per neuro for 3 weeks    DC planning to rehab

## 2024-06-02 LAB
GLUCOSE BLDC GLUCOMTR-MCNC: 122 MG/DL — HIGH (ref 70–99)
GLUCOSE BLDC GLUCOMTR-MCNC: 126 MG/DL — HIGH (ref 70–99)
GLUCOSE BLDC GLUCOMTR-MCNC: 128 MG/DL — HIGH (ref 70–99)
GLUCOSE BLDC GLUCOMTR-MCNC: 147 MG/DL — HIGH (ref 70–99)

## 2024-06-02 RX ADMIN — Medication 25 MILLIGRAM(S): at 10:45

## 2024-06-02 RX ADMIN — PANTOPRAZOLE SODIUM 40 MILLIGRAM(S): 20 TABLET, DELAYED RELEASE ORAL at 06:00

## 2024-06-02 RX ADMIN — Medication 25 MILLIGRAM(S): at 21:34

## 2024-06-02 RX ADMIN — ATORVASTATIN CALCIUM 20 MILLIGRAM(S): 80 TABLET, FILM COATED ORAL at 21:34

## 2024-06-02 RX ADMIN — MEMANTINE HYDROCHLORIDE 10 MILLIGRAM(S): 10 TABLET ORAL at 17:58

## 2024-06-02 RX ADMIN — Medication 3 MILLIGRAM(S): at 21:34

## 2024-06-02 RX ADMIN — CLOPIDOGREL BISULFATE 75 MILLIGRAM(S): 75 TABLET, FILM COATED ORAL at 11:13

## 2024-06-02 RX ADMIN — HEPARIN SODIUM 5000 UNIT(S): 5000 INJECTION INTRAVENOUS; SUBCUTANEOUS at 21:34

## 2024-06-02 RX ADMIN — MEMANTINE HYDROCHLORIDE 10 MILLIGRAM(S): 10 TABLET ORAL at 06:00

## 2024-06-02 RX ADMIN — HEPARIN SODIUM 5000 UNIT(S): 5000 INJECTION INTRAVENOUS; SUBCUTANEOUS at 14:43

## 2024-06-02 RX ADMIN — HEPARIN SODIUM 5000 UNIT(S): 5000 INJECTION INTRAVENOUS; SUBCUTANEOUS at 06:00

## 2024-06-02 RX ADMIN — Medication 81 MILLIGRAM(S): at 11:13

## 2024-06-02 RX ADMIN — Medication 650 MILLIGRAM(S): at 12:21

## 2024-06-02 NOTE — PROGRESS NOTE ADULT - ASSESSMENT
79 y/o F with pmhx of Diverticulitis, Type-2 DM, HTN, HLD, Glaucoma, OA of the L-knee and PERFECTO, presented to the ED for new onset lethargy. Found to have leukocytosis likely from pneumonia vs. diverticulitis. The patient does not appear lethargic. Admit for IV abx.      Perforated  Diverticulitis.   - surgery fu   - sp OR   - will monitor     CVA  - neuro fu appreciated  - imaging reviewed  - plavix as per neuro        DM2 (diabetes mellitus, type 2  - monitor FS  - ISS

## 2024-06-02 NOTE — PROGRESS NOTE ADULT - ASSESSMENT
78 year old female with PMH diverticulitis, DM2, HTN, HLD presents s/p diagnostic lap converted to ex lap, TELLO, Hartmanns procedure on 5/21    Plan  - Pending authorization for discharge  - Diet: regular  - Plavix started per neuro recs x 3 week duration  - S/p antibiotic therapy   - C/w aspirin, DVT prophylaxis  - Continue to work with PT    A team surgery [id number]

## 2024-06-02 NOTE — PROGRESS NOTE ADULT - SUBJECTIVE AND OBJECTIVE BOX
Patient is a 78y old  Female who presents with a chief complaint of Perforated diverticulitis (02 Jun 2024 10:08)    Date of servie : 06-02-24 @ 13:52  INTERVAL HPI/OVERNIGHT EVENTS:  T(C): 36.8 (06-02-24 @ 10:28), Max: 37.6 (06-02-24 @ 06:00)  HR: 84 (06-02-24 @ 10:28) (74 - 84)  BP: 110/67 (06-02-24 @ 10:28) (110/67 - 148/69)  RR: 18 (06-02-24 @ 10:28) (15 - 18)  SpO2: 92% (06-02-24 @ 10:28) (92% - 100%)  Wt(kg): --  I&O's Summary    01 Jun 2024 07:01  -  02 Jun 2024 07:00  --------------------------------------------------------  IN: 350 mL / OUT: 2250 mL / NET: -1900 mL        LABS:              CAPILLARY BLOOD GLUCOSE      POCT Blood Glucose.: 122 mg/dL (02 Jun 2024 12:12)  POCT Blood Glucose.: 128 mg/dL (02 Jun 2024 08:23)  POCT Blood Glucose.: 125 mg/dL (01 Jun 2024 22:08)  POCT Blood Glucose.: 142 mg/dL (01 Jun 2024 18:04)            MEDICATIONS  (STANDING):  aspirin enteric coated 81 milliGRAM(s) Oral daily  atorvastatin 20 milliGRAM(s) Oral at bedtime  clopidogrel Tablet 75 milliGRAM(s) Oral daily  heparin   Injectable 5000 Unit(s) SubCutaneous every 8 hours  insulin lispro (ADMELOG) corrective regimen sliding scale   SubCutaneous three times a day before meals  insulin lispro (ADMELOG) corrective regimen sliding scale   SubCutaneous at bedtime  melatonin 3 milliGRAM(s) Oral at bedtime  memantine 10 milliGRAM(s) Oral every 12 hours  metoprolol tartrate 25 milliGRAM(s) Oral every 12 hours  pantoprazole    Tablet 40 milliGRAM(s) Oral before breakfast    MEDICATIONS  (PRN):  acetaminophen     Tablet .. 650 milliGRAM(s) Oral every 6 hours PRN Mild Pain (1 - 3)          PHYSICAL EXAM:  GENERAL: NAD, well-groomed, well-developed  HEAD:  Atraumatic, Normocephalic  CHEST/LUNG: Clear to percussion bilaterally; No rales, rhonchi, wheezing, or rubs  HEART: Regular rate and rhythm; No murmurs, rubs, or gallops  ABDOMEN: Soft, Nontender, Nondistended; Bowel sounds present  EXTREMITIES:  2+ Peripheral Pulses, No clubbing, cyanosis, or edema  LYMPH: No lymphadenopathy noted  SKIN: No rashes or lesions    Care Discussed with Consultants/Other Providers [ ] YES  [ ] NO

## 2024-06-02 NOTE — PROGRESS NOTE ADULT - SUBJECTIVE AND OBJECTIVE BOX
A TEAM SURGERY DAILY PROGRESS NOTE:     INTERVAL EVENTS: None    SUBJECTIVE/ROS: No acute events overnight. Patient seen and examined at bedside by surgical team.     OBJECTIVE:  Vital Signs Last 24 Hrs  T(C): 37.6 (02 Jun 2024 06:00), Max: 37.6 (02 Jun 2024 06:00)  T(F): 99.6 (02 Jun 2024 06:00), Max: 99.6 (02 Jun 2024 06:00)  HR: 74 (02 Jun 2024 06:00) (74 - 90)  BP: 131/70 (02 Jun 2024 06:00) (121/63 - 148/69)  BP(mean): --  RR: 17 (02 Jun 2024 06:00) (15 - 18)  SpO2: 97% (02 Jun 2024 06:00) (96% - 100%)    Parameters below as of 02 Jun 2024 06:00  Patient On (Oxygen Delivery Method): nasal cannula  O2 Flow (L/min): 2             I&O's Detail    01 Jun 2024 07:01  -  02 Jun 2024 07:00  --------------------------------------------------------  IN:    Oral Fluid: 350 mL  Total IN: 350 mL    OUT:    Colostomy (mL): 750 mL    Voided (mL): 1500 mL  Total OUT: 2250 mL    Total NET: -1900 mL          IMAGING:      PHYSICAL EXAM:  Constitutional: NAD  Respiratory: non-labored breathing, patent airway  Gastrointestinal: abdomen soft, nontender, nondistended  Extremities: warm  Neurological: intact

## 2024-06-03 ENCOUNTER — TRANSCRIPTION ENCOUNTER (OUTPATIENT)
Age: 79
End: 2024-06-03

## 2024-06-03 VITALS — HEART RATE: 92 BPM | OXYGEN SATURATION: 92 %

## 2024-06-03 LAB
GLUCOSE BLDC GLUCOMTR-MCNC: 139 MG/DL — HIGH (ref 70–99)
GLUCOSE BLDC GLUCOMTR-MCNC: 144 MG/DL — HIGH (ref 70–99)

## 2024-06-03 RX ADMIN — Medication 650 MILLIGRAM(S): at 14:48

## 2024-06-03 RX ADMIN — PANTOPRAZOLE SODIUM 40 MILLIGRAM(S): 20 TABLET, DELAYED RELEASE ORAL at 06:31

## 2024-06-03 RX ADMIN — HEPARIN SODIUM 5000 UNIT(S): 5000 INJECTION INTRAVENOUS; SUBCUTANEOUS at 05:48

## 2024-06-03 RX ADMIN — CLOPIDOGREL BISULFATE 75 MILLIGRAM(S): 75 TABLET, FILM COATED ORAL at 11:27

## 2024-06-03 RX ADMIN — Medication 650 MILLIGRAM(S): at 14:18

## 2024-06-03 RX ADMIN — MEMANTINE HYDROCHLORIDE 10 MILLIGRAM(S): 10 TABLET ORAL at 05:49

## 2024-06-03 RX ADMIN — Medication 81 MILLIGRAM(S): at 11:27

## 2024-06-03 RX ADMIN — HEPARIN SODIUM 5000 UNIT(S): 5000 INJECTION INTRAVENOUS; SUBCUTANEOUS at 13:29

## 2024-06-03 NOTE — CHART NOTE - NSCHARTNOTEFT_GEN_A_CORE
Source: [X] Patient, Disoriented/confused per chart.      [x] EMR        [] RN        [] Family at bedside       [X] Other: Interdisciplinary rounds, A team      Nutrition Follow Up Note, severe malnutrition. Per chart review, 78 year old female with PMH diverticulitis, DM2 (A1c 6.3% on 5/11/24), HTN, HLD presents s/p diagnostic lap converted to ex lap, TELLO, Hartmanns procedure on 5/21.     Patient able to participate in interview despite indication of disoriented/confused per chart/IDR. Patient with moments of confusion noted. Patient wearing dentures and having breakfast at time of encounter. Consumed about 75% of breakfast and had about 50% of Ensure max at that point. Reports appetite and po intake improved. Continued documentation of PO intake 26-50% recorded in nursing flowsheets. Family is also bringing food for patient. Denies any nausea/vomiting/diarrhea/constipation or difficulty chewing and swallowing to current consistency -despite last RD note -request for soft foods- no request of this at this time and considering patient with dentures and no chewing/swallowing issues. Also, patient s/p rock's procedure on low fiber diet (per medical team) receiving GI soft foods. +Colostomy output of 400mL recorded over last 24hours. RD provided patient with written instructions of low fiber diet for family as they're bringing food for her at times. Plan for d/c to rehab noted.       Diet Prescription: Diet, Low Fiber:   Consistent Carbohydrate {No Snacks} (CSTCHO)  Supplement Feeding Modality:  Oral  Ensure Max Cans or Servings Per Day:  1       Frequency:  Two Times a day (05-29-24 @ 16:41)    Food Allergy: NKFA    Pertinent Medications: MEDICATIONS  (STANDING):  aspirin enteric coated 81 milliGRAM(s) Oral daily  atorvastatin 20 milliGRAM(s) Oral at bedtime  clopidogrel Tablet 75 milliGRAM(s) Oral daily  heparin   Injectable 5000 Unit(s) SubCutaneous every 8 hours  insulin lispro (ADMELOG) corrective regimen sliding scale   SubCutaneous three times a day before meals  insulin lispro (ADMELOG) corrective regimen sliding scale   SubCutaneous at bedtime  melatonin 3 milliGRAM(s) Oral at bedtime  memantine 10 milliGRAM(s) Oral every 12 hours  metoprolol tartrate 25 milliGRAM(s) Oral every 12 hours  pantoprazole    Tablet 40 milliGRAM(s) Oral before breakfast    MEDICATIONS  (PRN):  acetaminophen     Tablet .. 650 milliGRAM(s) Oral every 6 hours PRN Mild Pain (1 - 3)    Pertinent Labs:  05-31 Phos 3.0 mg/dL     CAPILLARY BLOOD GLUCOSE      POCT Blood Glucose.: 139 mg/dL (03 Jun 2024 08:25)  POCT Blood Glucose.: 126 mg/dL (02 Jun 2024 21:11)  POCT Blood Glucose.: 147 mg/dL (02 Jun 2024 17:31)  POCT Blood Glucose.: 122 mg/dL (02 Jun 2024 12:12)        Weight: 54kg  5/20/24   Weight Assessment: no new weight recorded in chart, RN to obtain new weight and weekly weights -record in chart.  Height: 154.9cm  IBW: 47.7kg +/-10%  BMI: 22.5 kg/m^2      Edema: no edema per nursing flowsheets  Pressure Injury: midline abdomen surgical incision per nursing flowsheet. No pressure ulcers/DTI noted in flowsheets.       Estimated Needs:   [ ] No change since previous assessment, based on weight    [X] recalculated, with consideration for, based on weight-54kg  Estimated Energy Needs (kcal/kg): 25-30  = 1350-1620kcal/d  Estimated Protein Needs(g/kg):      1-1.2 = 54-65g pro/d    Previous Nutrition Diagnosis:    [ X] Malnutrition, severe  Nutrition Diagnosis is [X ] ongoing  [ ] resolved [ ] not applicable     New Nutrition Diagnosis: [X ] not applicable     Education:  [X ] Provided- low fiber diet instructions as mentioned above  [ ] Provided on previous assessment by RD  [ ] Not applicable secondary to   [ ] Pt refused     Nutrition Interventions:   1) Continue current diet order, which remains appropriate at this time.    2) Monitor weights, labs, BM's, skin integrity, p.o. intake.   3) Please document % PO intake in nursing flowsheet.   4) Please Encourage po intake, assist with meals and menu selections, provide alternatives PRN.         Marcus Rees, CARLOS, MS, CDN pager [phone]  Also available on [employer] Teams

## 2024-06-03 NOTE — PROVIDER CONTACT NOTE (OTHER) - RECOMMENDATIONS
Re-assess temp in 1 hour and order a blood workup
make provider aware.
Administer standing IV Ofirmev
notify provider. please put in blood glucose monitoring q6 since patient is npo except meds
Notify the team, order for NC.
As per provider
notify provider
notify provider.
English

## 2024-06-03 NOTE — PROVIDER CONTACT NOTE (OTHER) - ASSESSMENT
pt aox3, VSS, had an episode of emesis
Pt lethargic and in and out of sleep.
Pt is axox3. Lethargic and arouses to gentle touch.
Pt's temp 99.0 oral, 100.3 axillary. pt c/o feeling cold , warm to touch. Hr 94  o2 95 room air. pt alert.
Pt refuse CPAP and try to takeout, pt had PMH of PERFECTO.aox3 forgetfully at times, saturation was 85% while sleeping.
patient has PMH of diabetes, no blood glucose monitoring or insulin sliding scale ordered
Patient is somnolent but vitally stable except febrile 100.7 rectally. Standing dose of IV Tylenol was administered.
patient refused 6pm tylenol, confused and refusing to eat

## 2024-06-03 NOTE — PROGRESS NOTE ADULT - ASSESSMENT
78F with  sigmoid diverticulitis, multiple chronic infarcts (with unclear residual deficits), dementia (on memantine), T2DM, HTN, HLD, glaucoma, OA of the L knee, PERFECTO, who presents to [facility] ED on 5/19/2024 with c/o left facial droop and slurred speech. Stroke code cancelled as out of 24h window. Unclear stroke hx and baseline neurologic deficits.   In ED was febrile. CTH with bilateral thalamic and basal ganglia infarcts. Thalamic infarcts appear more subacute. Also with chronic cerebellar infarcts. CTA with mld ICA narrowing  o/e with mild to moderate dysarthria, R facial palsy , RUE and RLE drift. AAOx2   MRI brain with L anterior midbrain infarct    - continue aspirin 81mg  - Plavix 75mg x 3 weeks   -  TTE  rev  - tele, consider ILR on d/c   - s/p zosyn, Linezolid  - cont home memantine   - gradual normotension, avoid hypotension  -  LDL 49 (1/24/24) - continue home statin  -  A1c 6.3 (5/11/2024)  - PT/OT  - DVT ppx

## 2024-06-03 NOTE — PROGRESS NOTE ADULT - ATTENDING SUPERVISION STATEMENT
Spoke with Mom who states he has had the stomach bug since Tuesday  He has been throwing up once every hour any time he drinks or eats  Has had 2 wet diapers and 3 loose stools in 24 hours  No fever, cough, or congestion  Mom tried giving him water and milk this morning and he ate 1/4 of a waffle but then threw it up  Please advise  Thanks!
Resident

## 2024-06-03 NOTE — PROGRESS NOTE ADULT - SUBJECTIVE AND OBJECTIVE BOX
DATE OF SERVICE: 06-03-24    Patient denies chest pain or shortness of breath. Resting in BEd  Review of symptoms otherwise negative.    MEDICATIONS:  acetaminophen     Tablet .. 650 milliGRAM(s) Oral every 6 hours PRN  aspirin enteric coated 81 milliGRAM(s) Oral daily  atorvastatin 20 milliGRAM(s) Oral at bedtime  clopidogrel Tablet 75 milliGRAM(s) Oral daily  heparin   Injectable 5000 Unit(s) SubCutaneous every 8 hours  insulin lispro (ADMELOG) corrective regimen sliding scale   SubCutaneous three times a day before meals  insulin lispro (ADMELOG) corrective regimen sliding scale   SubCutaneous at bedtime  melatonin 3 milliGRAM(s) Oral at bedtime  memantine 10 milliGRAM(s) Oral every 12 hours  metoprolol tartrate 25 milliGRAM(s) Oral every 12 hours  pantoprazole    Tablet 40 milliGRAM(s) Oral before breakfast      LABS:      Hemoglobin: 8.8 g/dL (05-31 @ 06:15)  Hemoglobin: 9.6 g/dL (05-30 @ 06:00)      Creatinine Trend: 0.45<--, 0.48<--, 0.48<--, 0.57<--, 0.53<--, 0.43<--    PHYSICAL EXAM:  T(C): 37.3 (06-03-24 @ 10:00), Max: 37.3 (06-03-24 @ 10:00)  HR: 88 (06-03-24 @ 10:00) (75 - 88)  BP: 106/59 (06-03-24 @ 10:00) (104/73 - 122/72)  RR: 17 (06-03-24 @ 10:00) (16 - 17)  SpO2: 96% (06-03-24 @ 10:00) (94% - 99%)  Wt(kg): --    I&O's Summary    02 Jun 2024 07:01  -  03 Jun 2024 07:00  --------------------------------------------------------  IN: 200 mL / OUT: 1900 mL / NET: -1700 mL          General: pleasant, comfortable  Head: Normocephalic and atraumatic.   Neck: No JVD. No bruitsNo M/R/G  Lungs: CTA b/l. diminshed at bases A/L limited exam  Abdomen: deferred  Extremities: No clubbing/cyanosis/edema.   Neurologic: Moves all four extremities.   Skin: Warm and moist. The patient's skin has normal elasticity and good skin turgor.   Psychiatric: cannot fully assess  Musculoskeletal: Normal range of motion, normal strength     TELEMETRY: 	  NONE    ECG:  	NSR    < from: Xray Chest 1 View- PORTABLE-Urgent (Xray Chest 1 View- PORTABLE-Urgent .) (05.23.24 @ 09:35) >  FINDINGS:    Cardiopericardial silhouette is magnified with this projection.  Lungs show no focal consolidations.  Trace left effusion.  There is no pneumothorax.  No acute bony abnormality.    IMPRESSION: Clear lungs with trace left effusion.    < end of copied text >      ASSESSMENT/PLAN: Pt is a 78F with PMHx diverticulitis, DM2, HTN, HLD,with recent hospitalization for complicated diverticulitis, discharged 5/15 on long-term IV ABX presents after fall from home with AMS, found to have fevers  and a  CT AP showing perforated sigmoid diverticulitis with thick-walled pericolonic multilocular abscess measuring at least up to 3.5 x 1.5 x 4.8 cm. Cardiology called for pre-op risk stratification prior to OR. Currently denies any chest pain, SOB, orthopnea.    - no anginal chest pain, no q waves on EKG euvolemic on exam, no severely stenotic valvular disease on PE, recent acute CVA  - tolerated procedure well from CV perspective  - s/p drainage of deep pelvic abscess and open Ruiz procedure  - cont Lopressor 25 mg q 12  - cont ASA and Lipitor 20 mg  - On Plavix per neuro for 3 weeks    DC planning to rehab pending auth    Negar Eagle MD  Pager: [phone]

## 2024-06-03 NOTE — PROGRESS NOTE ADULT - NUTRITIONAL ASSESSMENT
This patient has been assessed with a concern for Malnutrition and has been determined to have a diagnosis/diagnoses of Severe protein-calorie malnutrition.    This patient is being managed with:   Diet Clear Liquid-  Entered: May 26 2024 10:52AM  
This patient has been assessed with a concern for Malnutrition and has been determined to have a diagnosis/diagnoses of Severe protein-calorie malnutrition.    This patient is being managed with:   Diet Clear Liquid-  Entered: May 26 2024 10:52AM  
This patient has been assessed with a concern for Malnutrition and has been determined to have a diagnosis/diagnoses of Severe protein-calorie malnutrition.    This patient is being managed with:   Diet Low Fiber-  Consistent Carbohydrate {No Snacks} (CSTCHO)  Supplement Feeding Modality:  Oral  Ensure Max Cans or Servings Per Day:  1       Frequency:  Two Times a day  Entered: May 29 2024  4:42PM  
This patient has been assessed with a concern for Malnutrition and has been determined to have a diagnosis/diagnoses of Severe protein-calorie malnutrition.    This patient is being managed with:   Diet Low Fiber-  Entered: May 27 2024  7:50AM  
This patient has been assessed with a concern for Malnutrition and has been determined to have a diagnosis/diagnoses of Severe protein-calorie malnutrition.    This patient is being managed with:   Diet Low Fiber-  Consistent Carbohydrate {No Snacks} (CSTCHO)  Supplement Feeding Modality:  Oral  Ensure Max Cans or Servings Per Day:  1       Frequency:  Two Times a day  Entered: May 29 2024  4:42PM  
This patient has been assessed with a concern for Malnutrition and has been determined to have a diagnosis/diagnoses of Severe protein-calorie malnutrition.    This patient is being managed with:   Diet NPO-  Except Medications  With Ice Chips/Sips of Water  Entered: May 26 2024 12:36AM  
This patient has been assessed with a concern for Malnutrition and has been determined to have a diagnosis/diagnoses of Severe protein-calorie malnutrition.    This patient is being managed with:   Diet Clear Liquid-  Consistent Carbohydrate {Evening Snack} (CSTCHOSN)  Supplement Feeding Modality:  Oral  Glucerna Shake Cans or Servings Per Day:  1       Frequency:  Three Times a day  Entered: May 24 2024  3:47PM  
This patient has been assessed with a concern for Malnutrition and has been determined to have a diagnosis/diagnoses of Severe protein-calorie malnutrition.    This patient is being managed with:   Diet Low Fiber-  Consistent Carbohydrate {No Snacks} (CSTCHO)  Supplement Feeding Modality:  Oral  Ensure Max Cans or Servings Per Day:  1       Frequency:  Two Times a day  Entered: May 29 2024  4:42PM  
This patient has been assessed with a concern for Malnutrition and has been determined to have a diagnosis/diagnoses of Severe protein-calorie malnutrition.    This patient is being managed with:   Diet Low Fiber-  Entered: May 27 2024  7:50AM  
This patient has been assessed with a concern for Malnutrition and has been determined to have a diagnosis/diagnoses of Severe protein-calorie malnutrition.    This patient is being managed with:   Diet Low Fiber-  Consistent Carbohydrate {No Snacks} (CSTCHO)  Supplement Feeding Modality:  Oral  Ensure Max Cans or Servings Per Day:  1       Frequency:  Two Times a day  Entered: May 29 2024  4:42PM  
This patient has been assessed with a concern for Malnutrition and has been determined to have a diagnosis/diagnoses of Severe protein-calorie malnutrition.    This patient is being managed with:   Diet Low Fiber-  Entered: May 27 2024  7:50AM  
This patient has been assessed with a concern for Malnutrition and has been determined to have a diagnosis/diagnoses of Severe protein-calorie malnutrition.    This patient is being managed with:   Diet Low Fiber-  Consistent Carbohydrate {No Snacks} (CSTCHO)  Supplement Feeding Modality:  Oral  Ensure Max Cans or Servings Per Day:  1       Frequency:  Two Times a day  Entered: May 29 2024  4:42PM

## 2024-06-03 NOTE — DISCHARGE NOTE NURSING/CASE MANAGEMENT/SOCIAL WORK - PATIENT PORTAL LINK FT
You can access the FollowMyHealth Patient Portal offered by [facility] by registering at the following website: [email or web address]. By joining [employer]’s FollowMyHealth portal, you will also be able to view your health information using other applications (apps) compatible with our system.
You can access the FollowMyHealth Patient Portal offered by [facility] by registering at the following website: [email or web address]. By joining [employer]’s FollowMyHealth portal, you will also be able to view your health information using other applications (apps) compatible with our system.

## 2024-06-03 NOTE — PROGRESS NOTE ADULT - ATTENDING COMMENTS
Date of service 5/23    Doing better today, no pain  No stoma fx yet  Labs stable   Abd soft, appropriate ttp, stoma pink, KULWANT SS    OOB  Abx  CLD  await stoma fx
DATE OF SERVICE: 05-22-24 @ 14:53    Seen and examined, POD 1 s/p Hartmanns, drainage pelvic abscess. Overall doing well, minimal complaints of pain. hr 100s overnight, improved with IVF and pain control  Urine clear, improved with IVF (415 overnight ~600 so far today)  Abd soft, appropriate incisional ttp; stoma pink no output yet; KULWANT ss    Continue IVF, monitor UOP  Poss dc young today vs tomorrow pending UOP  Continue abx  OOB with PT  WOCN FU  Await bowel fx, sips of clears today
DATE OF SERVICE: 05-30-24 @ 17:59    No complaints  abd soft nt/nd    await rehab
DATE OF SERVICE: 06-02-24 @ 13:38    Doing well  Await rehab
DATE OF SERVICE: 05-29-24 @ 10:26    No complaints. Tm 100.3.   WBC 10  Abd soft NT/ND; stoma functioning     Reg diet  Plavix today per neuro  abx per ID  Dispo planning
DATE OF SERVICE: 06-01-24 @ 18:05    No complaints  Abd soft, stoma functioning    Pending dispo
DATE OF SERVICE: 06-03-24 @ 16:14    No complaints  +stoma fx  Abd soft NT/ND    DC to rehab today  Off abx  3wk plavix per neuro  DC staples prior to DC
DATE OF SERVICE: 05-31-24 @ 13:13    No complaints, stoma functioning.  Abd soft NT/ND    DC planning

## 2024-06-03 NOTE — PROGRESS NOTE ADULT - ASSESSMENT
Assessment: 78 year old female with PMH diverticulitis, DM2, HTN, HLD presents s/p diagnostic lap converted to ex lap, TELLO, Hartmanns procedure on 5/21.    Plan  - Pending authorization for discharge  - Diet: Regular  - Plavix started per neuro recs x 3 week duration  - S/p antibiotic therapy, doing well off abx  - C/w aspirin, DVT prophylaxis  - PT  - OOB as tolerated    A team surgery [id number]

## 2024-06-03 NOTE — PROGRESS NOTE ADULT - PROVIDER SPECIALTY LIST ADULT
Cardiology
Cardiology
Colorectal Surgery
Hospitalist
Infectious Disease
Neurology
Cardiology
Colorectal Surgery
Hospitalist
Infectious Disease
Neurology
Cardiology
Colorectal Surgery
Colorectal Surgery
Hospitalist
Infectious Disease
Neurology
Neurology
Surgery
Cardiology
Colorectal Surgery
Infectious Disease
Infectious Disease
Neurology
Surgery
Colorectal Surgery

## 2024-06-03 NOTE — PROGRESS NOTE ADULT - SUBJECTIVE AND OBJECTIVE BOX
SURGERY DAILY PROGRESS NOTE    SUBJECTIVE: No acute events overnight. Patient seen and evaluated on AM rounds. Patient tolerating diet, having bowel function. Pain is well controlled.    OBJECTIVE:  Vital Signs Last 24 Hrs  T(C): 36.8 (03 Jun 2024 06:00), Max: 37.2 (03 Jun 2024 02:00)  T(F): 98.2 (03 Jun 2024 06:00), Max: 99 (03 Jun 2024 02:00)  HR: 75 (03 Jun 2024 07:32) (75 - 86)  BP: 122/68 (03 Jun 2024 06:00) (104/73 - 122/72)  BP(mean): --  RR: 17 (03 Jun 2024 06:00) (16 - 18)  SpO2: 96% (03 Jun 2024 07:32) (92% - 99%)    Parameters below as of 03 Jun 2024 06:00  Patient On (Oxygen Delivery Method): room air      I&O's Detail    02 Jun 2024 07:01  -  03 Jun 2024 07:00  --------------------------------------------------------  IN:    Oral Fluid: 200 mL  Total IN: 200 mL    OUT:    Colostomy (mL): 400 mL    Voided (mL): 1500 mL  Total OUT: 1900 mL    Total NET: -1700 mL        Daily     Daily   MEDICATIONS  (STANDING):  aspirin enteric coated 81 milliGRAM(s) Oral daily  atorvastatin 20 milliGRAM(s) Oral at bedtime  clopidogrel Tablet 75 milliGRAM(s) Oral daily  heparin   Injectable 5000 Unit(s) SubCutaneous every 8 hours  insulin lispro (ADMELOG) corrective regimen sliding scale   SubCutaneous three times a day before meals  insulin lispro (ADMELOG) corrective regimen sliding scale   SubCutaneous at bedtime  melatonin 3 milliGRAM(s) Oral at bedtime  memantine 10 milliGRAM(s) Oral every 12 hours  metoprolol tartrate 25 milliGRAM(s) Oral every 12 hours  pantoprazole    Tablet 40 milliGRAM(s) Oral before breakfast    MEDICATIONS  (PRN):  acetaminophen     Tablet .. 650 milliGRAM(s) Oral every 6 hours PRN Mild Pain (1 - 3)    PHYSICAL EXAM:  Constitutional: NAD  Respiratory: non-labored breathing, patent airway  Gastrointestinal: abdomen soft, nontender, nondistended, midline incision c/d/i, staples in place  Extremities: warm  Neurological: intact

## 2024-06-03 NOTE — PROVIDER CONTACT NOTE (OTHER) - ACTION/TREATMENT ORDERED:
team aware, ordered NC 2L.
As per provider re-assess temp in 1 hour and no need for other interventions at this time except just to continue the bowel prep
provider notified. says he will put in orders.
provider notified
standing tylenol given. provider made aware. recheck in 30 mins.
As per provider
provider notified. zofran ordered, diet changed to NPO except meds
Re check temp after IV tylenol.

## 2024-06-03 NOTE — PROVIDER CONTACT NOTE (OTHER) - DATE AND TIME:
21-May-2024 04:21
26-May-2024 00:34
21-May-2024 00:40
27-May-2024 18:42
29-May-2024 06:15
20-May-2024 21:39
03-Jun-2024 01:29
21-May-2024 00:38

## 2024-06-03 NOTE — PROVIDER CONTACT NOTE (OTHER) - NAME OF MD/NP/PA/DO NOTIFIED:
A team- vanesa
Riccardo Mckenna
GWEN Mckenna
Irving Lopez
Katie Higgins
Riccardo Mckenna (A team)
Darwin Bond
GWEN Mckenna

## 2024-06-03 NOTE — PROGRESS NOTE ADULT - REASON FOR ADMISSION
Perforated diverticulitis

## 2024-06-03 NOTE — PROVIDER CONTACT NOTE (OTHER) - SITUATION
Pt refuse CPAP and try to takeout. Pt refuse CPAP, pt had PMH of PERFECTO, saturation was 85% while sleeping.

## 2024-06-03 NOTE — PROGRESS NOTE ADULT - SUBJECTIVE AND OBJECTIVE BOX
Neurology Progress Note    S: Patient seen and examined.       MEDICATIONS:    acetaminophen     Tablet .. 650 milliGRAM(s) Oral every 6 hours PRN  aspirin enteric coated 81 milliGRAM(s) Oral daily  atorvastatin 20 milliGRAM(s) Oral at bedtime  clopidogrel Tablet 75 milliGRAM(s) Oral daily  heparin   Injectable 5000 Unit(s) SubCutaneous every 8 hours  insulin lispro (ADMELOG) corrective regimen sliding scale   SubCutaneous three times a day before meals  insulin lispro (ADMELOG) corrective regimen sliding scale   SubCutaneous at bedtime  melatonin 3 milliGRAM(s) Oral at bedtime  memantine 10 milliGRAM(s) Oral every 12 hours  metoprolol tartrate 25 milliGRAM(s) Oral every 12 hours  pantoprazole    Tablet 40 milliGRAM(s) Oral before breakfast      LABS:            CAPILLARY BLOOD GLUCOSE      POCT Blood Glucose.: 144 mg/dL (03 Jun 2024 12:02)  POCT Blood Glucose.: 139 mg/dL (03 Jun 2024 08:25)  POCT Blood Glucose.: 126 mg/dL (02 Jun 2024 21:11)  POCT Blood Glucose.: 147 mg/dL (02 Jun 2024 17:31)        I&O's Summary    02 Jun 2024 07:01  -  03 Jun 2024 07:00  --------------------------------------------------------  IN: 200 mL / OUT: 1900 mL / NET: -1700 mL    03 Jun 2024 07:01  -  03 Jun 2024 13:47  --------------------------------------------------------  IN: 60 mL / OUT: 175 mL / NET: -115 mL      Vital Signs Last 24 Hrs  T(C): 37.3 (03 Jun 2024 10:00), Max: 37.3 (03 Jun 2024 10:00)  T(F): 99.1 (03 Jun 2024 10:00), Max: 99.1 (03 Jun 2024 10:00)  HR: 88 (03 Jun 2024 10:00) (75 - 88)  BP: 106/59 (03 Jun 2024 10:00) (104/73 - 122/72)  BP(mean): --  RR: 17 (03 Jun 2024 10:00) (16 - 17)  SpO2: 96% (03 Jun 2024 10:00) (94% - 99%)    Parameters below as of 03 Jun 2024 10:00  Patient On (Oxygen Delivery Method): room air        General Exam:   General Appearance: Appropriately dressed and in no acute distress       Head: Normocephalic, atraumatic and no dysmorphic features  Ear, Nose, and Throat: Moist mucous membranes  CVS: S1S2+  Resp: No SOB, no wheeze or rhonchi  Abd: soft NTND  Extremities: No edema, no cyanosis  Skin: No bruises, no rashes     Neurological Exam:  Mental Status: Awake, alert and oriented x 2.  Able to follow simple commands. Able to name and repeat. fluent speech. No obvious aphasia, mild to moderate dysarthria   Cranial Nerves: PERRL, EOMI, VFFC, sensation V1-V3 intact,  mild L facial palsy - improving , equal elevation of palate, scm/trap 5/5, tongue is midline on protrusion. Hearing is grossly intact.   Motor: LUE and LLE drift  Sensation: dec on the L   Reflexes: 1+ throughout at biceps, brachioradialis, triceps, patellars and ankles bilaterally and equal. No clonus. R toe and L toe were both downgoing.  Coordination: No dysmetria on FNF  Gait: deferred    I personally reviewed the below data/images/labs:      Radiology:  CTH: No acute intracranial hemorrhage or mass effect. Extensive chronic small   vessel ischemic changes in the frontoparietal periventricular and   subcortical white matter. Indeterminate age bilateral thalamic lacunar   infarcts accommodation of dilated perivascular spaces and lacunar   infarcts in the bilateral basal ganglia. Small chronic appearing   bilateral cerebellar infarcts.    CTA NECK:  No significant stenosis of the cervical carotid arteries based   on NASCET criteria. Patent cervical vertebral arteries. No evidence of   cervical carotid or vertebral artery dissection.    CTA HEAD:  No high-grade stenosis or occlusion of the major proximal   arterial branches.    CT PERFUSION:  Perfusion imaging shows no evidence of a core infarct or   tissue at risk.    < from: MR Head No Cont (05.20.24 @ 20:13) >    ACC: [id number] EXAM:  MR BRAIN   ORDERED BY: ARY POE     PROCEDURE DATE:  05/20/2024          INTERPRETATION:  CLINICAL INDICATION: Patient recently fell at home.   Confused.    TECHNIQUE: MRI of the brain was performed without contrast.    COMPARISON: CT brain 5/19/2024    FINDINGS:  Acute infarct involving the anterior left midbrain.    No acute intracranial hemorrhage, mass effect, or hydrocephalus. No   extra-axial collection. Basal cisterns are patent.    Age-related involutional changes and chronic microvascular ischemic   changes. Chronic lacunar infarcts involving the bilateral thalami. Small   chronic infarcts involving the bilateral cerebral hemispheres.    Ventricles and sulci are age-appropriate in size.    Major intracranial flow-voids are preserved.    Left cataract surgery. The orbits, sellar and suprasellar structures, and   craniocervical junction are otherwise unremarkable. Visualized paranasal   sinuses and mastoid air cells are clear.    IMPRESSION:  Acute infarct involving the anterior left midbrain.    --- End of Report ---              < end of copied text >     1. Left ventricular cavity is normal in size. Left ventricular wall thickness is normal. Left ventricular systolic function is normal with an ejection fraction visually estimated at 60 to 65 %. There are no regional wall motion abnormalities seen.   2. There is mild (grade 1) left ventricular diastolic dysfunction.   3. Normal right ventricular cavity size and probably normal systolic function.   4. Structurally normal mitral valve with normal leaflet excursion. There is calcification of the mitral valve annulus. There is trace mitral regurgitation.   5. The aortic valve appears trileaflet with normal systolic excursion. There is calcification of the aortic valve leaflets. There is mild aortic regurgitation.

## 2024-06-03 NOTE — DISCHARGE NOTE NURSING/CASE MANAGEMENT/SOCIAL WORK - NSDCPEFALRISK_GEN_ALL_CORE
For information on Fall & Injury Prevention, visit: [email or web address] OR  [email or web address] OR  https://www.cdc.gov/steadi/patient.html
For information on Fall & Injury Prevention, visit: [email or web address] OR  [email or web address] OR  https://www.cdc.gov/steadi/patient.html

## 2024-06-03 NOTE — DISCHARGE NOTE NURSING/CASE MANAGEMENT/SOCIAL WORK - NSDCFUADDAPPT_GEN_ALL_CORE_FT
Please follow up with your primary care provider 1-2 weeks after discharge regarding recent hospitalization.     PLEASE ALSO CALL TO MAKE APPOINTMENT FOR FOLLOW UP:  SURGEON   DR. LASSITER  NEUROLOGIST   DR. SAUER  CARDIOLOGIST DR. CONRAD

## 2024-06-05 ENCOUNTER — NON-APPOINTMENT (OUTPATIENT)
Age: 79
End: 2024-06-05

## 2024-06-14 ENCOUNTER — EMERGENCY (EMERGENCY)
Facility: HOSPITAL | Age: 79
LOS: 1 days | Discharge: HOME | End: 2024-06-14
Attending: EMERGENCY MEDICINE | Admitting: EMERGENCY MEDICINE
Payer: MEDICARE

## 2024-06-14 ENCOUNTER — TRANSCRIPTION ENCOUNTER (OUTPATIENT)
Age: 79
End: 2024-06-14

## 2024-06-14 VITALS
HEART RATE: 88 BPM | DIASTOLIC BLOOD PRESSURE: 78 MMHG | HEIGHT: 61 IN | OXYGEN SATURATION: 96 % | RESPIRATION RATE: 18 BRPM | TEMPERATURE: 98 F | SYSTOLIC BLOOD PRESSURE: 120 MMHG

## 2024-06-14 DIAGNOSIS — Z87.81 PERSONAL HISTORY OF (HEALED) TRAUMATIC FRACTURE: Chronic | ICD-10-CM

## 2024-06-14 LAB
ALBUMIN SERPL ELPH-MCNC: 3.4 G/DL — SIGNIFICANT CHANGE UP (ref 3.3–5)
ALP SERPL-CCNC: 95 U/L — SIGNIFICANT CHANGE UP (ref 40–120)
ALT FLD-CCNC: 12 U/L — SIGNIFICANT CHANGE UP (ref 4–33)
ANION GAP SERPL CALC-SCNC: 11 MMOL/L — SIGNIFICANT CHANGE UP (ref 7–14)
AST SERPL-CCNC: 20 U/L — SIGNIFICANT CHANGE UP (ref 4–32)
BASOPHILS # BLD AUTO: 0.05 K/UL — SIGNIFICANT CHANGE UP (ref 0–0.2)
BASOPHILS NFR BLD AUTO: 0.7 % — SIGNIFICANT CHANGE UP (ref 0–2)
BILIRUB SERPL-MCNC: 0.8 MG/DL — SIGNIFICANT CHANGE UP (ref 0.2–1.2)
BUN SERPL-MCNC: 9 MG/DL — SIGNIFICANT CHANGE UP (ref 7–23)
CALCIUM SERPL-MCNC: 8.5 MG/DL — SIGNIFICANT CHANGE UP (ref 8.4–10.5)
CHLORIDE SERPL-SCNC: 105 MMOL/L — SIGNIFICANT CHANGE UP (ref 98–107)
CO2 SERPL-SCNC: 23 MMOL/L — SIGNIFICANT CHANGE UP (ref 22–31)
CREAT SERPL-MCNC: 0.59 MG/DL — SIGNIFICANT CHANGE UP (ref 0.5–1.3)
EGFR: 93 ML/MIN/1.73M2 — SIGNIFICANT CHANGE UP
EOSINOPHIL # BLD AUTO: 0.37 K/UL — SIGNIFICANT CHANGE UP (ref 0–0.5)
EOSINOPHIL NFR BLD AUTO: 5 % — SIGNIFICANT CHANGE UP (ref 0–6)
GLUCOSE SERPL-MCNC: 103 MG/DL — HIGH (ref 70–99)
HCT VFR BLD CALC: 30.1 % — LOW (ref 34.5–45)
HGB BLD-MCNC: 9.4 G/DL — LOW (ref 11.5–15.5)
IANC: 3.01 K/UL — SIGNIFICANT CHANGE UP (ref 1.8–7.4)
IMM GRANULOCYTES NFR BLD AUTO: 0.3 % — SIGNIFICANT CHANGE UP (ref 0–0.9)
LIDOCAIN IGE QN: 37 U/L — SIGNIFICANT CHANGE UP (ref 7–60)
LYMPHOCYTES # BLD AUTO: 3.15 K/UL — SIGNIFICANT CHANGE UP (ref 1–3.3)
LYMPHOCYTES # BLD AUTO: 42.7 % — SIGNIFICANT CHANGE UP (ref 13–44)
MCHC RBC-ENTMCNC: 29.9 PG — SIGNIFICANT CHANGE UP (ref 27–34)
MCHC RBC-ENTMCNC: 31.2 GM/DL — LOW (ref 32–36)
MCV RBC AUTO: 95.9 FL — SIGNIFICANT CHANGE UP (ref 80–100)
MONOCYTES # BLD AUTO: 0.77 K/UL — SIGNIFICANT CHANGE UP (ref 0–0.9)
MONOCYTES NFR BLD AUTO: 10.4 % — SIGNIFICANT CHANGE UP (ref 2–14)
NEUTROPHILS # BLD AUTO: 3.01 K/UL — SIGNIFICANT CHANGE UP (ref 1.8–7.4)
NEUTROPHILS NFR BLD AUTO: 40.9 % — LOW (ref 43–77)
NRBC # BLD: 0 /100 WBCS — SIGNIFICANT CHANGE UP (ref 0–0)
NRBC # FLD: 0 K/UL — SIGNIFICANT CHANGE UP (ref 0–0)
PLATELET # BLD AUTO: 297 K/UL — SIGNIFICANT CHANGE UP (ref 150–400)
POTASSIUM SERPL-MCNC: 3.8 MMOL/L — SIGNIFICANT CHANGE UP (ref 3.5–5.3)
POTASSIUM SERPL-SCNC: 3.8 MMOL/L — SIGNIFICANT CHANGE UP (ref 3.5–5.3)
PROT SERPL-MCNC: 7.6 G/DL — SIGNIFICANT CHANGE UP (ref 6–8.3)
RBC # BLD: 3.14 M/UL — LOW (ref 3.8–5.2)
RBC # FLD: 16.2 % — HIGH (ref 10.3–14.5)
SODIUM SERPL-SCNC: 139 MMOL/L — SIGNIFICANT CHANGE UP (ref 135–145)
WBC # BLD: 7.37 K/UL — SIGNIFICANT CHANGE UP (ref 3.8–10.5)
WBC # FLD AUTO: 7.37 K/UL — SIGNIFICANT CHANGE UP (ref 3.8–10.5)

## 2024-06-14 PROCEDURE — 99285 EMERGENCY DEPT VISIT HI MDM: CPT

## 2024-06-14 RX ORDER — ACETAMINOPHEN 500 MG
1000 TABLET ORAL ONCE
Refills: 0 | Status: COMPLETED | OUTPATIENT
Start: 2024-06-14 | End: 2024-06-14

## 2024-06-14 RX ORDER — HALOPERIDOL DECANOATE 100 MG/ML
5 INJECTION INTRAMUSCULAR ONCE
Refills: 0 | Status: COMPLETED | OUTPATIENT
Start: 2024-06-14 | End: 2024-06-14

## 2024-06-14 RX ADMIN — HALOPERIDOL DECANOATE 5 MILLIGRAM(S): 100 INJECTION INTRAMUSCULAR at 20:43

## 2024-06-14 RX ADMIN — Medication 400 MILLIGRAM(S): at 20:44

## 2024-06-14 NOTE — ED PROVIDER NOTE - ATTENDING CONTRIBUTION TO CARE
78F h/o divertic, dementia HLD GERD, DM; p/w abd pain.  Per triage pulled out colostomy bag.  Pt has a dressing there.  Pt denies having a colostomy.  Per note here ostomy placed late May.  Pt also walking around.  Pt appears to have dementia and doesn't have capacity to refuse care.  Rx PO haldol. Abd tenderness over site.  Colostomy site eventrated but appears pink, not mushroom shaped as usual- complication vs stoma maturation process?  No surrounding redness, purulence, crepitus.  Pt w/o family here, pt wandering around ED, picking at dressing and IV; somewhat redirectable but begins wandering again.  Rx haldol PO, CO.  Check CT, labs, surg consult.    VS:  unremarkable    GEN - NAD;   well appearing;   A+O x1-2  HEAD - NC/AT     ENT - PEERL, EOMI, mucous membranes    moist , no discharge      NECK: Neck supple, non-tender without lymphadenopathy, no masses, no JVD  PULM - CTA b/l,  symmetric breath sounds  COR -  normal heart sounds    ABD - , ND, soft,  Abd tenderness over colostomy site.  Colostomy site eventrated but appears pink, not mushroom shaped as usual- No surrounding redness, purulence, crepitus.  BACK - no CVA tenderness, nontender spine     EXTREMS - no edema, no deformity, warm and well perfused    SKIN - no rash    or bruising      NEUROLOGIC - alert, face symmetric, speech fluent, sensation nl, motor no focal deficit.

## 2024-06-14 NOTE — ED PROVIDER NOTE - CLINICAL SUMMARY MEDICAL DECISION MAKING FREE TEXT BOX
78-year-old female with history of diverticulitis status post Jania procedure, dementia, HLD, HTN, GERD, diabetes mellitus presents to the ED with abdominal pain.  Collateral gained from son.  Patient was taking a shower earlier today by herself and she was found in the shower with her colostomy bag unattached.  Patient does not remember this event but does endorse abdominal pain.  Patient is ANO x 2.  Afebrile hemodynamically stable.  Will evaluate for any signs of infection and intra-abdominal pathology.  Dispo pending labs, imaging and reassessment. 78-year-old female with history of diverticulitis status post Jania procedure, dementia, HLD, HTN, GERD, diabetes mellitus presents to the ED with abdominal pain.  Collateral gained from son.  Patient was taking a shower earlier today by herself and she was found in the shower with her colostomy appliance unattached.  Patient does not remember this event but does endorse abdominal pain.  Patient is A%O x 2.  Afebrile hemodynamically stable.  Will evaluate for any signs of infection and intra-abdominal pathology.  Dispo pending labs, imaging and reassessment.

## 2024-06-14 NOTE — ED ADULT NURSE NOTE - OBJECTIVE STATEMENT
Pt A&Ox2 ambulatory, PMH HLD, DM, HTN, presenting to the ED (RM 16) c/o abdominal pain. As per triage note, states patient was brought to ED due to pulling colostomy bag. Pt states "I don't know, I had a stinging pain to my belly". Pt at this moment, denies any complaints. Denies cp, sob, palpitations, dizziness, lightheadedness, n/v/d, fever, chills, cough, HA, blurry vision. Respirations are even and unlabored. NAD, Red, beefy stoma noted, colostomy bag replaced, 20G IV LAC, labs sent, medicated as per orders. Safety precautions implemented as per protocol, awaiting further MD orders, plan of care ongoing.

## 2024-06-14 NOTE — ED ADULT TRIAGE NOTE - CHIEF COMPLAINT QUOTE
pt from home, a&ox2, pulled out colostomy bag. c/o abdominal pain. past medical history: diverticulitis, dementia, HLD, HTN, GERD, DM

## 2024-06-14 NOTE — ED PROVIDER NOTE - PATIENT PORTAL LINK FT
You can access the FollowMyHealth Patient Portal offered by [facility] by registering at the following website: [email or web address]. By joining [employer]’s FollowMyHealth portal, you will also be able to view your health information using other applications (apps) compatible with our system.

## 2024-06-14 NOTE — ED PROVIDER NOTE - PROGRESS NOTE DETAILS
Radha Heaton MD (PGY1) Surgery consulted and will come see the pt. Received signout on patient pending surgical reevaluation.  Surgery reassessed patient, no acute intervention at this time.  Patient will continue to follow-up with Dr. Lr.  Discussed findings with patient and patient's son.  Will have social work help arrange transportation back to patient's house.

## 2024-06-14 NOTE — ED PROVIDER NOTE - PHYSICAL EXAMINATION
Const: Awake, alert, no acute distress.  Well appearing.  Moving comfortably on stretcher.  Cardiac: Regular rate and regular rhythm. S1 S2 present.  Peripheral pulses 2+ and symmetric. No LE edema.  Resp: Speaking in full sentences. No evidence of respiratory distress.  Breath sounds clear to auscultation b/l. Normal chest excursion.   Abd: Colostomy site pink, w/ ttp.  Soft, no guarding, no rigidity, no rebound tenderness.  No palpable masses.  Normal bowel sounds in all 4 quadrants.  Skin: Normal coloration.  No rashes, abrasions or lacerations.  Neuro: Awake, alert & oriented x 2.  Moves all extremities spontaneously.  No focal deficits.

## 2024-06-14 NOTE — ED PROVIDER NOTE - CARE PROVIDER_API CALL
Lucas Lr (DO)  Surgery  [address], [address]  [place] [address]  Phone: [phone]  Fax: [phone]  Follow Up Time:

## 2024-06-14 NOTE — ED CLERICAL - NS ED CLERK NOTE PRE-ARRIVAL INFORMATION; ADDITIONAL PRE-ARRIVAL INFORMATION

## 2024-06-14 NOTE — ED PROVIDER NOTE - OBJECTIVE STATEMENT
see mdm see mdm    DD ED ATTF h/o divertic, dementia HLD GERD, DM; p/w abd pain.  Per triage pulled out colostomy bag.  Pt has a dressing there.  Pt denies having a colostomy.  Per note here ostomy placed late May.  Pt also walking around.  Pt appears to have dementia and doesn't have capacity to refuse care.  Rx PO haldol. Abd tenderness over site.  Colostomy site eventrated but appears pink, not mushroom shaped as usual- complication vs stoma maturation process?  No surrounding redness, purulence, crepitus.  Pt w/o family here, pt wandering around ED, picking at dressing and IV; somewhat redirectable but begins wandering again.  Rx haldol PO, CO.  Check CT, labs, surg consult.

## 2024-06-15 ENCOUNTER — TRANSCRIPTION ENCOUNTER (OUTPATIENT)
Age: 79
End: 2024-06-15

## 2024-06-15 ENCOUNTER — NON-APPOINTMENT (OUTPATIENT)
Age: 79
End: 2024-06-15

## 2024-06-15 ENCOUNTER — APPOINTMENT (OUTPATIENT)
Dept: HOME HEALTH SERVICES | Facility: HOME HEALTH | Age: 79
End: 2024-06-15

## 2024-06-15 ENCOUNTER — INPATIENT (INPATIENT)
Facility: HOSPITAL | Age: 79
LOS: 1 days | Discharge: HOME | End: 2024-06-17
Attending: STUDENT IN AN ORGANIZED HEALTH CARE EDUCATION/TRAINING PROGRAM | Admitting: STUDENT IN AN ORGANIZED HEALTH CARE EDUCATION/TRAINING PROGRAM
Payer: MEDICARE

## 2024-06-15 VITALS
TEMPERATURE: 98 F | OXYGEN SATURATION: 95 % | SYSTOLIC BLOOD PRESSURE: 115 MMHG | DIASTOLIC BLOOD PRESSURE: 77 MMHG | RESPIRATION RATE: 18 BRPM | HEART RATE: 81 BPM | HEIGHT: 61 IN

## 2024-06-15 VITALS
TEMPERATURE: 97 F | RESPIRATION RATE: 17 BRPM | SYSTOLIC BLOOD PRESSURE: 141 MMHG | HEART RATE: 90 BPM | DIASTOLIC BLOOD PRESSURE: 76 MMHG | OXYGEN SATURATION: 100 %

## 2024-06-15 DIAGNOSIS — R41.82 ALTERED MENTAL STATUS, UNSPECIFIED: ICD-10-CM

## 2024-06-15 DIAGNOSIS — Z87.81 PERSONAL HISTORY OF (HEALED) TRAUMATIC FRACTURE: Chronic | ICD-10-CM

## 2024-06-15 LAB
ALBUMIN SERPL ELPH-MCNC: 3.7 G/DL — SIGNIFICANT CHANGE UP (ref 3.3–5)
ALP SERPL-CCNC: 115 U/L — SIGNIFICANT CHANGE UP (ref 40–120)
ALT FLD-CCNC: 13 U/L — SIGNIFICANT CHANGE UP (ref 4–33)
ANION GAP SERPL CALC-SCNC: 10 MMOL/L — SIGNIFICANT CHANGE UP (ref 7–14)
APPEARANCE UR: CLEAR — SIGNIFICANT CHANGE UP
APPEARANCE UR: CLEAR — SIGNIFICANT CHANGE UP
AST SERPL-CCNC: 17 U/L — SIGNIFICANT CHANGE UP (ref 4–32)
BACTERIA # UR AUTO: NEGATIVE /HPF — SIGNIFICANT CHANGE UP
BACTERIA # UR AUTO: NEGATIVE /HPF — SIGNIFICANT CHANGE UP
BASOPHILS # BLD AUTO: 0.06 K/UL — SIGNIFICANT CHANGE UP (ref 0–0.2)
BASOPHILS NFR BLD AUTO: 0.8 % — SIGNIFICANT CHANGE UP (ref 0–2)
BILIRUB SERPL-MCNC: 0.6 MG/DL — SIGNIFICANT CHANGE UP (ref 0.2–1.2)
BILIRUB UR-MCNC: NEGATIVE — SIGNIFICANT CHANGE UP
BILIRUB UR-MCNC: NEGATIVE — SIGNIFICANT CHANGE UP
BUN SERPL-MCNC: 10 MG/DL — SIGNIFICANT CHANGE UP (ref 7–23)
CALCIUM SERPL-MCNC: 8.4 MG/DL — SIGNIFICANT CHANGE UP (ref 8.4–10.5)
CAST: 0 /LPF — SIGNIFICANT CHANGE UP (ref 0–4)
CAST: 1 /LPF — SIGNIFICANT CHANGE UP (ref 0–4)
CHLORIDE SERPL-SCNC: 105 MMOL/L — SIGNIFICANT CHANGE UP (ref 98–107)
CO2 SERPL-SCNC: 25 MMOL/L — SIGNIFICANT CHANGE UP (ref 22–31)
COLOR SPEC: YELLOW — SIGNIFICANT CHANGE UP
COLOR SPEC: YELLOW — SIGNIFICANT CHANGE UP
CREAT SERPL-MCNC: 0.61 MG/DL — SIGNIFICANT CHANGE UP (ref 0.5–1.3)
DIFF PNL FLD: NEGATIVE — SIGNIFICANT CHANGE UP
DIFF PNL FLD: NEGATIVE — SIGNIFICANT CHANGE UP
EGFR: 93 ML/MIN/1.73M2 — SIGNIFICANT CHANGE UP
EOSINOPHIL # BLD AUTO: 0.37 K/UL — SIGNIFICANT CHANGE UP (ref 0–0.5)
EOSINOPHIL NFR BLD AUTO: 4.9 % — SIGNIFICANT CHANGE UP (ref 0–6)
GLUCOSE BLDC GLUCOMTR-MCNC: 120 MG/DL — HIGH (ref 70–99)
GLUCOSE SERPL-MCNC: 116 MG/DL — HIGH (ref 70–99)
GLUCOSE UR QL: NEGATIVE MG/DL — SIGNIFICANT CHANGE UP
GLUCOSE UR QL: NEGATIVE MG/DL — SIGNIFICANT CHANGE UP
HCT VFR BLD CALC: 31.6 % — LOW (ref 34.5–45)
HGB BLD-MCNC: 9.8 G/DL — LOW (ref 11.5–15.5)
IANC: 2.66 K/UL — SIGNIFICANT CHANGE UP (ref 1.8–7.4)
IMM GRANULOCYTES NFR BLD AUTO: 0.3 % — SIGNIFICANT CHANGE UP (ref 0–0.9)
KETONES UR-MCNC: NEGATIVE MG/DL — SIGNIFICANT CHANGE UP
KETONES UR-MCNC: NEGATIVE MG/DL — SIGNIFICANT CHANGE UP
LEUKOCYTE ESTERASE UR-ACNC: NEGATIVE — SIGNIFICANT CHANGE UP
LEUKOCYTE ESTERASE UR-ACNC: NEGATIVE — SIGNIFICANT CHANGE UP
LYMPHOCYTES # BLD AUTO: 3.77 K/UL — HIGH (ref 1–3.3)
LYMPHOCYTES # BLD AUTO: 49.6 % — HIGH (ref 13–44)
MCHC RBC-ENTMCNC: 29.9 PG — SIGNIFICANT CHANGE UP (ref 27–34)
MCHC RBC-ENTMCNC: 31 GM/DL — LOW (ref 32–36)
MCV RBC AUTO: 96.3 FL — SIGNIFICANT CHANGE UP (ref 80–100)
MONOCYTES # BLD AUTO: 0.72 K/UL — SIGNIFICANT CHANGE UP (ref 0–0.9)
MONOCYTES NFR BLD AUTO: 9.5 % — SIGNIFICANT CHANGE UP (ref 2–14)
NEUTROPHILS # BLD AUTO: 2.66 K/UL — SIGNIFICANT CHANGE UP (ref 1.8–7.4)
NEUTROPHILS NFR BLD AUTO: 34.9 % — LOW (ref 43–77)
NITRITE UR-MCNC: NEGATIVE — SIGNIFICANT CHANGE UP
NITRITE UR-MCNC: NEGATIVE — SIGNIFICANT CHANGE UP
NRBC # BLD: 0 /100 WBCS — SIGNIFICANT CHANGE UP (ref 0–0)
NRBC # FLD: 0 K/UL — SIGNIFICANT CHANGE UP (ref 0–0)
PH UR: 6.5 — SIGNIFICANT CHANGE UP (ref 5–8)
PH UR: 7.5 — SIGNIFICANT CHANGE UP (ref 5–8)
PLATELET # BLD AUTO: 321 K/UL — SIGNIFICANT CHANGE UP (ref 150–400)
POTASSIUM SERPL-MCNC: 3.3 MMOL/L — LOW (ref 3.5–5.3)
POTASSIUM SERPL-SCNC: 3.3 MMOL/L — LOW (ref 3.5–5.3)
PROT SERPL-MCNC: 7.9 G/DL — SIGNIFICANT CHANGE UP (ref 6–8.3)
PROT UR-MCNC: NEGATIVE MG/DL — SIGNIFICANT CHANGE UP
PROT UR-MCNC: SIGNIFICANT CHANGE UP MG/DL
RBC # BLD: 3.28 M/UL — LOW (ref 3.8–5.2)
RBC # FLD: 15.9 % — HIGH (ref 10.3–14.5)
RBC CASTS # UR COMP ASSIST: 0 /HPF — SIGNIFICANT CHANGE UP (ref 0–4)
RBC CASTS # UR COMP ASSIST: 1 /HPF — SIGNIFICANT CHANGE UP (ref 0–4)
SODIUM SERPL-SCNC: 140 MMOL/L — SIGNIFICANT CHANGE UP (ref 135–145)
SP GR SPEC: 1.01 — SIGNIFICANT CHANGE UP (ref 1–1.03)
SP GR SPEC: 1.06 — HIGH (ref 1–1.03)
SQUAMOUS # UR AUTO: 1 /HPF — SIGNIFICANT CHANGE UP (ref 0–5)
SQUAMOUS # UR AUTO: 2 /HPF — SIGNIFICANT CHANGE UP (ref 0–5)
UROBILINOGEN FLD QL: 0.2 MG/DL — SIGNIFICANT CHANGE UP (ref 0.2–1)
UROBILINOGEN FLD QL: 1 MG/DL — SIGNIFICANT CHANGE UP (ref 0.2–1)
WBC # BLD: 7.6 K/UL — SIGNIFICANT CHANGE UP (ref 3.8–10.5)
WBC # FLD AUTO: 7.6 K/UL — SIGNIFICANT CHANGE UP (ref 3.8–10.5)
WBC UR QL: 1 /HPF — SIGNIFICANT CHANGE UP (ref 0–5)
WBC UR QL: 9 /HPF — HIGH (ref 0–5)

## 2024-06-15 PROCEDURE — 74177 CT ABD & PELVIS W/CONTRAST: CPT | Mod: 26,MC

## 2024-06-15 PROCEDURE — 99223 1ST HOSP IP/OBS HIGH 75: CPT

## 2024-06-15 PROCEDURE — 99285 EMERGENCY DEPT VISIT HI MDM: CPT

## 2024-06-15 PROCEDURE — 93010 ELECTROCARDIOGRAM REPORT: CPT

## 2024-06-15 RX ORDER — HALOPERIDOL DECANOATE 100 MG/ML
5 VIAL (ML) INTRAMUSCULAR ONCE
Refills: 0 | Status: COMPLETED | OUTPATIENT
Start: 2024-06-15 | End: 2024-06-15

## 2024-06-15 RX ORDER — SODIUM CHLORIDE 0.9 % (FLUSH) 0.9 %
500 SYRINGE (ML) INJECTION ONCE
Refills: 0 | Status: COMPLETED | OUTPATIENT
Start: 2024-06-15 | End: 2024-06-15

## 2024-06-15 RX ORDER — OLANZAPINE 15 MG/1
5 TABLET, FILM COATED ORAL ONCE
Refills: 0 | Status: COMPLETED | OUTPATIENT
Start: 2024-06-15 | End: 2024-06-15

## 2024-06-15 RX ORDER — OLANZAPINE 2.5 MG/1
5 TABLET, FILM COATED ORAL ONCE
Refills: 0 | Status: DISCONTINUED | OUTPATIENT
Start: 2024-06-15 | End: 2024-06-15

## 2024-06-15 RX ORDER — HALOPERIDOL DECANOATE 100 MG/ML
2.5 VIAL (ML) INTRAMUSCULAR ONCE
Refills: 0 | Status: COMPLETED | OUTPATIENT
Start: 2024-06-15 | End: 2024-06-15

## 2024-06-15 RX ADMIN — Medication 1 MILLIGRAM(S): at 00:06

## 2024-06-15 RX ADMIN — Medication 500 MILLILITER(S): at 21:52

## 2024-06-15 RX ADMIN — OLANZAPINE 5 MILLIGRAM(S): 15 TABLET, FILM COATED ORAL at 08:06

## 2024-06-15 RX ADMIN — Medication 5 MILLIGRAM(S): at 23:40

## 2024-06-15 RX ADMIN — Medication 2.5 MILLIGRAM(S): at 21:35

## 2024-06-15 RX ADMIN — HALOPERIDOL DECANOATE 5 MILLIGRAM(S): 100 INJECTION INTRAMUSCULAR at 00:06

## 2024-06-15 NOTE — CONSULT NOTE ADULT - ASSESSMENT
ASSESSMENT: 78 year old female with PMH diverticulitis, DM2, HTN, HLD s/p diagnostic lap converted to ex lap, TELLO, Hartmanns procedure on 5/21 for perforated diverticulitis presenting after family member found her stoma bag not attached to her.     PLAN:    - no acute surgical intervention indicated   - exam benign, labs and imaging unremarkable   - recommend po trial  - re-eval in a few hrs to evaluate for stool and air in colostomy       A Team Surgery  [id number]

## 2024-06-15 NOTE — ED ADULT NURSE REASSESSMENT NOTE - NS ED NURSE REASSESS COMMENT FT1
Break RN: Pt on CO for AMS. PCA at bedside for CO. Patient resting in stretcher, no acute distress noted at this time. Respirations equal and unlabored. Medicated as ordered. Care plan continued. Comfort measures provided. Safety maintained. Awaiting results.
Mobile Critical Care RN: pt received in rm 16, colostomy stoma to L lower quadrant, appliance was removed by patient. Stoma noted to be retracted/recessed, ED MD Fernandes at beside, aware. Colostomy appliance, wafer and bag applied, stoma powder applied to excoriated areas. Pt endorsed to primary ED RN.
Pt appears to be resting comfortably, NAD, respirations are even and unlabored, no complaints at this moment, VS noted. PCA at bedside for 1:1. CO in progress. Safety precautions implemented as per protocol, awaiting further MD orders, plan of care ongoing.

## 2024-06-15 NOTE — CONSULT NOTE ADULT - ATTENDING COMMENTS
Brought in by family after colostomy bag/appliance fell off in the shower  CT reviewed - no acute findings   Colostomy with stool  Continue scheduled follow up with Dr. Lr
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
## 2024-06-15 NOTE — PROVIDER CONTACT NOTE (OTHER) - ASSESSMENT
Medical team referred this case as pt has been medically cleared for discharge. pts son  Trevin [phone] informed pt usually travels via cab and requested to send the pt to the same address on the file. Writer set up trip via [employer] online portal trip invoice number #  [id number].  Writer contacted [employer] at [phone] and spoke with Ms. Contreras  informed that pt will be picked up at 11:00 am and Cab will come to Adult ED ramp to  the pt.  Medical team and pt was informed about this ETA and  location and in agreement with the plan. No further social work intervention is needed for this visit.

## 2024-06-16 ENCOUNTER — NON-APPOINTMENT (OUTPATIENT)
Age: 79
End: 2024-06-16

## 2024-06-16 DIAGNOSIS — R10.9 UNSPECIFIED ABDOMINAL PAIN: ICD-10-CM

## 2024-06-16 DIAGNOSIS — Z79.899 OTHER LONG TERM (CURRENT) DRUG THERAPY: ICD-10-CM

## 2024-06-16 DIAGNOSIS — I63.9 CEREBRAL INFARCTION, UNSPECIFIED: ICD-10-CM

## 2024-06-16 DIAGNOSIS — Z29.9 ENCOUNTER FOR PROPHYLACTIC MEASURES, UNSPECIFIED: ICD-10-CM

## 2024-06-16 DIAGNOSIS — I10 ESSENTIAL (PRIMARY) HYPERTENSION: ICD-10-CM

## 2024-06-16 DIAGNOSIS — E11.9 TYPE 2 DIABETES MELLITUS WITHOUT COMPLICATIONS: ICD-10-CM

## 2024-06-16 DIAGNOSIS — F03.90 UNSPECIFIED DEMENTIA, UNSPECIFIED SEVERITY, WITHOUT BEHAVIORAL DISTURBANCE, PSYCHOTIC DISTURBANCE, MOOD DISTURBANCE, AND ANXIETY: ICD-10-CM

## 2024-06-16 LAB
GLUCOSE BLDC GLUCOMTR-MCNC: 105 MG/DL — HIGH (ref 70–99)
GLUCOSE BLDC GLUCOMTR-MCNC: 121 MG/DL — HIGH (ref 70–99)
GLUCOSE BLDC GLUCOMTR-MCNC: 126 MG/DL — HIGH (ref 70–99)
GLUCOSE BLDC GLUCOMTR-MCNC: 130 MG/DL — HIGH (ref 70–99)
GLUCOSE BLDC GLUCOMTR-MCNC: 185 MG/DL — HIGH (ref 70–99)

## 2024-06-16 PROCEDURE — 99232 SBSQ HOSP IP/OBS MODERATE 35: CPT

## 2024-06-16 RX ORDER — POTASSIUM CHLORIDE 600 MG/1
20 TABLET, FILM COATED, EXTENDED RELEASE ORAL ONCE
Refills: 0 | Status: COMPLETED | OUTPATIENT
Start: 2024-06-16 | End: 2024-06-16

## 2024-06-16 RX ORDER — CALCIUM CARBONATE/VITAMIN D2 250 MG-125
1 TABLET ORAL
Refills: 0 | DISCHARGE

## 2024-06-16 RX ORDER — CITALOPRAM 10 MG/1
1 TABLET, FILM COATED ORAL
Refills: 0 | DISCHARGE

## 2024-06-16 RX ORDER — DEXTROMETHORPHAN HBR, GUAIFENESIN, PHENYLEPHRINE 20; 200; 10 MG/10ML; MG/10ML; MG/10ML
5-10-100 LIQUID ORAL EVERY 4 HOURS
Refills: 0 | Status: ACTIVE | COMMUNITY
Start: 2024-02-22

## 2024-06-16 RX ORDER — ALENDRONATE SODIUM 70 MG/1
1 TABLET ORAL
Refills: 0 | DISCHARGE

## 2024-06-16 RX ORDER — CLOPIDOGREL BISULFATE 75 MG/1
75 TABLET, FILM COATED ORAL DAILY
Refills: 0 | Status: DISCONTINUED | OUTPATIENT
Start: 2024-06-16 | End: 2024-06-17

## 2024-06-16 RX ORDER — CITALOPRAM 10 MG/1
10 TABLET, FILM COATED ORAL DAILY
Refills: 0 | Status: DISCONTINUED | OUTPATIENT
Start: 2024-06-16 | End: 2024-06-17

## 2024-06-16 RX ORDER — ENOXAPARIN SODIUM 100 MG/ML
40 INJECTION SUBCUTANEOUS EVERY 24 HOURS
Refills: 0 | Status: DISCONTINUED | OUTPATIENT
Start: 2024-06-16 | End: 2024-06-17

## 2024-06-16 RX ORDER — DEXTROSE 30 % IN WATER 30 %
15 VIAL (ML) INTRAVENOUS ONCE
Refills: 0 | Status: DISCONTINUED | OUTPATIENT
Start: 2024-06-16 | End: 2024-06-17

## 2024-06-16 RX ORDER — DEXTROSE MONOHYDRATE AND SODIUM CHLORIDE 5; .3 G/100ML; G/100ML
1000 INJECTION, SOLUTION INTRAVENOUS
Refills: 0 | Status: DISCONTINUED | OUTPATIENT
Start: 2024-06-16 | End: 2024-06-17

## 2024-06-16 RX ORDER — ASPIRIN/CALCIUM CARB/MAGNESIUM 324 MG
1 TABLET ORAL
Refills: 0 | DISCHARGE

## 2024-06-16 RX ORDER — MIRTAZAPINE 45 MG/1
1 TABLET, ORALLY DISINTEGRATING ORAL
Refills: 0 | DISCHARGE

## 2024-06-16 RX ORDER — METFORMIN HYDROCHLORIDE 850 MG/1
1 TABLET ORAL
Refills: 0 | DISCHARGE

## 2024-06-16 RX ORDER — SIMVASTATIN 20 MG/1
1 TABLET, FILM COATED ORAL
Refills: 0 | DISCHARGE

## 2024-06-16 RX ORDER — BRINZOLAMIDE 10 MG/ML
1 SUSPENSION/ DROPS OPHTHALMIC
Refills: 0 | DISCHARGE

## 2024-06-16 RX ORDER — ASPIRIN 325 MG/1
81 TABLET, FILM COATED ORAL DAILY
Refills: 0 | Status: DISCONTINUED | OUTPATIENT
Start: 2024-06-16 | End: 2024-06-17

## 2024-06-16 RX ORDER — INSULIN LISPRO 100 [IU]/ML
INJECTION, SOLUTION SUBCUTANEOUS AT BEDTIME
Refills: 0 | Status: DISCONTINUED | OUTPATIENT
Start: 2024-06-16 | End: 2024-06-17

## 2024-06-16 RX ORDER — CYANOCOBALAMIN (VITAMIN B-12) 1000 MCG
1 TABLET, EXTENDED RELEASE ORAL
Refills: 0 | DISCHARGE

## 2024-06-16 RX ORDER — DEXTROSE 30 % IN WATER 30 %
25 VIAL (ML) INTRAVENOUS ONCE
Refills: 0 | Status: DISCONTINUED | OUTPATIENT
Start: 2024-06-16 | End: 2024-06-17

## 2024-06-16 RX ORDER — METOPROLOL TARTRATE 50 MG
1 TABLET ORAL
Refills: 0 | DISCHARGE

## 2024-06-16 RX ORDER — INSULIN LISPRO 100 [IU]/ML
INJECTION, SOLUTION SUBCUTANEOUS
Refills: 0 | Status: DISCONTINUED | OUTPATIENT
Start: 2024-06-16 | End: 2024-06-17

## 2024-06-16 RX ORDER — GLUCAGON HYDROCHLORIDE 1 MG/ML
1 INJECTION, POWDER, FOR SOLUTION INTRAMUSCULAR; INTRAVENOUS; SUBCUTANEOUS ONCE
Refills: 0 | Status: DISCONTINUED | OUTPATIENT
Start: 2024-06-16 | End: 2024-06-17

## 2024-06-16 RX ORDER — SODIUM CHLORIDE 0.9 % (FLUSH) 0.9 %
1000 SYRINGE (ML) INJECTION
Refills: 0 | Status: DISCONTINUED | OUTPATIENT
Start: 2024-06-16 | End: 2024-06-17

## 2024-06-16 RX ORDER — DEXTROSE 30 % IN WATER 30 %
12.5 VIAL (ML) INTRAVENOUS ONCE
Refills: 0 | Status: DISCONTINUED | OUTPATIENT
Start: 2024-06-16 | End: 2024-06-17

## 2024-06-16 RX ORDER — NIFEDIPINE 30 MG
1 TABLET, EXTENDED RELEASE 24 HR ORAL
Refills: 0 | DISCHARGE

## 2024-06-16 RX ORDER — ASPIRIN 325 MG/1
1 TABLET, FILM COATED ORAL
Refills: 0 | DISCHARGE

## 2024-06-16 RX ORDER — MEMANTINE HYDROCHLORIDE 10 MG/1
1 TABLET ORAL
Refills: 0 | DISCHARGE

## 2024-06-16 RX ORDER — SIMVASTATIN 40 MG
20 TABLET ORAL AT BEDTIME
Refills: 0 | Status: DISCONTINUED | OUTPATIENT
Start: 2024-06-16 | End: 2024-06-17

## 2024-06-16 RX ORDER — MULTIVITAMIN/IRON/FOLIC ACID 18MG-0.4MG
TABLET ORAL
Refills: 0 | Status: ACTIVE | COMMUNITY
Start: 2024-02-22

## 2024-06-16 RX ORDER — METOPROLOL TARTRATE 50 MG
50 TABLET ORAL DAILY
Refills: 0 | Status: DISCONTINUED | OUTPATIENT
Start: 2024-06-16 | End: 2024-06-17

## 2024-06-16 RX ORDER — DEXTROSE MONOHYDRATE 100 MG/ML
125 INJECTION, SOLUTION INTRAVENOUS ONCE
Refills: 0 | Status: DISCONTINUED | OUTPATIENT
Start: 2024-06-16 | End: 2024-06-17

## 2024-06-16 RX ORDER — MIRTAZAPINE 15 MG/1
30 TABLET, FILM COATED ORAL DAILY
Refills: 0 | Status: DISCONTINUED | OUTPATIENT
Start: 2024-06-16 | End: 2024-06-17

## 2024-06-16 RX ADMIN — POTASSIUM CHLORIDE 20 MILLIEQUIVALENT(S): 600 TABLET, FILM COATED, EXTENDED RELEASE ORAL at 06:16

## 2024-06-16 RX ADMIN — Medication 75 MILLILITER(S): at 06:16

## 2024-06-16 RX ADMIN — ENOXAPARIN SODIUM 40 MILLIGRAM(S): 100 INJECTION SUBCUTANEOUS at 06:17

## 2024-06-16 RX ADMIN — Medication 20 MILLIGRAM(S): at 21:37

## 2024-06-16 RX ADMIN — CITALOPRAM 10 MILLIGRAM(S): 10 TABLET, FILM COATED ORAL at 11:41

## 2024-06-16 RX ADMIN — Medication 50 MILLIGRAM(S): at 06:16

## 2024-06-16 RX ADMIN — CLOPIDOGREL BISULFATE 75 MILLIGRAM(S): 75 TABLET, FILM COATED ORAL at 11:41

## 2024-06-16 RX ADMIN — INSULIN LISPRO 1: 100 INJECTION, SOLUTION SUBCUTANEOUS at 11:42

## 2024-06-16 RX ADMIN — MIRTAZAPINE 30 MILLIGRAM(S): 15 TABLET, FILM COATED ORAL at 11:41

## 2024-06-16 RX ADMIN — ASPIRIN 81 MILLIGRAM(S): 325 TABLET, FILM COATED ORAL at 11:41

## 2024-06-16 RX ADMIN — Medication 60 MILLIGRAM(S): at 06:16

## 2024-06-16 RX ADMIN — Medication 75 MILLILITER(S): at 06:57

## 2024-06-17 ENCOUNTER — TRANSCRIPTION ENCOUNTER (OUTPATIENT)
Age: 79
End: 2024-06-17

## 2024-06-17 VITALS
DIASTOLIC BLOOD PRESSURE: 80 MMHG | RESPIRATION RATE: 16 BRPM | OXYGEN SATURATION: 97 % | SYSTOLIC BLOOD PRESSURE: 117 MMHG | TEMPERATURE: 98 F | HEART RATE: 80 BPM

## 2024-06-17 LAB
CULTURE RESULTS: SIGNIFICANT CHANGE UP
GLUCOSE BLDC GLUCOMTR-MCNC: 123 MG/DL — HIGH (ref 70–99)
GLUCOSE BLDC GLUCOMTR-MCNC: 133 MG/DL — HIGH (ref 70–99)
GLUCOSE BLDC GLUCOMTR-MCNC: 134 MG/DL — HIGH (ref 70–99)
GLUCOSE BLDC GLUCOMTR-MCNC: 154 MG/DL — HIGH (ref 70–99)
SPECIMEN SOURCE: SIGNIFICANT CHANGE UP

## 2024-06-17 PROCEDURE — 99239 HOSP IP/OBS DSCHRG MGMT >30: CPT

## 2024-06-17 RX ADMIN — MIRTAZAPINE 30 MILLIGRAM(S): 15 TABLET, FILM COATED ORAL at 11:50

## 2024-06-17 RX ADMIN — ASPIRIN 81 MILLIGRAM(S): 325 TABLET, FILM COATED ORAL at 11:50

## 2024-06-17 RX ADMIN — CLOPIDOGREL BISULFATE 75 MILLIGRAM(S): 75 TABLET, FILM COATED ORAL at 11:50

## 2024-06-17 RX ADMIN — INSULIN LISPRO 1: 100 INJECTION, SOLUTION SUBCUTANEOUS at 07:14

## 2024-06-17 RX ADMIN — ENOXAPARIN SODIUM 40 MILLIGRAM(S): 100 INJECTION SUBCUTANEOUS at 06:58

## 2024-06-17 RX ADMIN — Medication 60 MILLIGRAM(S): at 06:58

## 2024-06-17 RX ADMIN — CITALOPRAM 10 MILLIGRAM(S): 10 TABLET, FILM COATED ORAL at 11:50

## 2024-06-17 RX ADMIN — Medication 50 MILLIGRAM(S): at 06:58

## 2024-06-18 ENCOUNTER — APPOINTMENT (OUTPATIENT)
Dept: HOME HEALTH SERVICES | Facility: HOME HEALTH | Age: 79
End: 2024-06-18
Payer: MEDICARE

## 2024-06-18 VITALS — DIASTOLIC BLOOD PRESSURE: 64 MMHG | HEART RATE: 79 BPM | OXYGEN SATURATION: 95 % | SYSTOLIC BLOOD PRESSURE: 104 MMHG

## 2024-06-18 DIAGNOSIS — E08.49 DIABETES MELLITUS DUE TO UNDERLYING CONDITION WITH OTHER DIABETIC NEUROLOGICAL COMPLICATION: ICD-10-CM

## 2024-06-18 DIAGNOSIS — Z79.4 DIABETES MELLITUS DUE TO UNDERLYING CONDITION WITH OTHER DIABETIC NEUROLOGICAL COMPLICATION: ICD-10-CM

## 2024-06-18 DIAGNOSIS — E11.9 TYPE 2 DIABETES MELLITUS W/OUT COMPLICATIONS: ICD-10-CM

## 2024-06-18 DIAGNOSIS — F03.90 UNSPECIFIED DEMENTIA W/OUT BEHAVIORAL DISTURBANCE: ICD-10-CM

## 2024-06-18 PROCEDURE — 99495 TRANSJ CARE MGMT MOD F2F 14D: CPT

## 2024-06-18 RX ORDER — NIFEDIPINE 60 MG/1
60 TABLET, EXTENDED RELEASE ORAL DAILY
Qty: 90 | Refills: 3 | Status: COMPLETED | COMMUNITY
Start: 2024-03-29 | End: 2024-06-18

## 2024-06-18 RX ORDER — MEMANTINE HYDROCHLORIDE 21 MG/1
21 CAPSULE, EXTENDED RELEASE ORAL
Qty: 90 | Refills: 3 | Status: COMPLETED | COMMUNITY
Start: 2024-02-22 | End: 2024-06-18

## 2024-06-19 ENCOUNTER — TRANSCRIPTION ENCOUNTER (OUTPATIENT)
Age: 79
End: 2024-06-19

## 2024-06-19 ENCOUNTER — NON-APPOINTMENT (OUTPATIENT)
Age: 79
End: 2024-06-19

## 2024-06-19 ENCOUNTER — EMERGENCY (EMERGENCY)
Facility: HOSPITAL | Age: 79
LOS: 1 days | Discharge: HOME | End: 2024-06-19
Attending: EMERGENCY MEDICINE | Admitting: EMERGENCY MEDICINE
Payer: MEDICARE

## 2024-06-19 VITALS
RESPIRATION RATE: 18 BRPM | OXYGEN SATURATION: 97 % | HEART RATE: 73 BPM | SYSTOLIC BLOOD PRESSURE: 144 MMHG | HEIGHT: 61 IN | TEMPERATURE: 98 F | WEIGHT: 123.9 LBS | DIASTOLIC BLOOD PRESSURE: 83 MMHG

## 2024-06-19 DIAGNOSIS — Z87.81 PERSONAL HISTORY OF (HEALED) TRAUMATIC FRACTURE: Chronic | ICD-10-CM

## 2024-06-19 LAB
-  AMOXICILLIN/CLAVULANIC ACID: SIGNIFICANT CHANGE UP
-  AMPICILLIN/SULBACTAM: SIGNIFICANT CHANGE UP
-  AMPICILLIN: SIGNIFICANT CHANGE UP
-  AZTREONAM: SIGNIFICANT CHANGE UP
-  CEFAZOLIN: SIGNIFICANT CHANGE UP
-  CEFEPIME: SIGNIFICANT CHANGE UP
-  CEFOXITIN: SIGNIFICANT CHANGE UP
-  CEFTRIAXONE: SIGNIFICANT CHANGE UP
-  CEFUROXIME: SIGNIFICANT CHANGE UP
-  CIPROFLOXACIN: SIGNIFICANT CHANGE UP
-  ERTAPENEM: SIGNIFICANT CHANGE UP
-  GENTAMICIN: SIGNIFICANT CHANGE UP
-  IMIPENEM: SIGNIFICANT CHANGE UP
-  LEVOFLOXACIN: SIGNIFICANT CHANGE UP
-  MEROPENEM: SIGNIFICANT CHANGE UP
-  NITROFURANTOIN: SIGNIFICANT CHANGE UP
-  PIPERACILLIN/TAZOBACTAM: SIGNIFICANT CHANGE UP
-  TOBRAMYCIN: SIGNIFICANT CHANGE UP
-  TRIMETHOPRIM/SULFAMETHOXAZOLE: SIGNIFICANT CHANGE UP
METHOD TYPE: SIGNIFICANT CHANGE UP

## 2024-06-19 PROCEDURE — 99283 EMERGENCY DEPT VISIT LOW MDM: CPT

## 2024-06-19 NOTE — ED PROVIDER NOTE - OBJECTIVE STATEMENT
78-year-old female past medical history of diverticulitis, status post process sigmoidectomy, history of ischemic left anterior midbrain infarct, dementia, hyperlipidemia, hypertension, diabetes presents emergency department secondary to dysuria with colostomy bag.  Patient previously seen and discharged on June 17, 2024 secondary to issue with colostomy bag. 78-year-old female past medical history of diverticulitis, status post process sigmoidectomy, history of ischemic left anterior midbrain infarct, dementia, hyperlipidemia, hypertension, diabetes presents emergency department previously seen and discharged on June 17, 2024 secondary to issue with colostomy bag presents w/ colostomy bag which was removed. seen at bedside w/  who states she removed it around 4pm. as per  wife is in her regular state of half 78-year-old female past medical history of diverticulitis, status post process sigmoidectomy, history of ischemic left anterior midbrain infarct, dementia, hyperlipidemia, hypertension, diabetes presents emergency department previously seen and discharged on June 17, 2024 secondary to issue with colostomy bag presents w/ colostomy bag which was removed. seen at bedside w/  who states she removed it around 4pm. as per  wife is in her regular state, no recent illness.

## 2024-06-19 NOTE — ED PROVIDER NOTE - NSFOLLOWUPINSTRUCTIONS_ED_ALL_ED_FT
YOU WERE SEEN IN THE ED FOR: colostomy bag malfunction     while in ED you had colostomy bag replaced.    PLEASE FOLLOW UP WITH YOUR PRIVATE PHYSICIAN WITHIN THE NEXT 48 HOURS. BRING COPIES OF YOUR RESULTS.    RETURN TO THE EMERGENCY DEPARTMENT IF YOU EXPERIENCE ANY NEW/CONCERNING/WORSENING SYMPTOMS SUCH AS BUT NOT LIMITED TO: chest pain, shortness of breath, fevers, colostomy bag issues

## 2024-06-19 NOTE — ED PROVIDER NOTE - CLINICAL SUMMARY MEDICAL DECISION MAKING FREE TEXT BOX
79y/o presents after colostomy bag fell off. will replace bag and dc. no concern for infx at this time.  agreeable to plan.

## 2024-06-19 NOTE — ED CLERICAL - NS ED CLERK NOTE PRE-ARRIVAL INFORMATION; ADDITIONAL PRE-ARRIVAL INFORMATION

## 2024-06-19 NOTE — ED PROVIDER NOTE - NSICDXPASTMEDICALHX_GEN_ALL_CORE_FT
PAST MEDICAL HISTORY:  Diabetes     Glaucoma     HLD (hyperlipidemia)     Hypertension, unspecified type     Osteoarthritis, knee left    
172.72

## 2024-06-19 NOTE — ED ADULT TRIAGE NOTE - CHIEF COMPLAINT QUOTE
pt brought in by EMS from home for difficulty with colostomy bag. As per  they are unable to re attach colostomy bag.

## 2024-06-19 NOTE — ED ADULT NURSE NOTE - NSFALLHARMRISKINTERV_ED_ALL_ED

## 2024-06-19 NOTE — ED PROVIDER NOTE - PHYSICAL EXAMINATION
PHYSICAL EXAM    GENERAL: Well appearing in no acute distress.  HEAD: Atraumatic.  NECK: Trachea midline.  RESPIRATORY: No respiratory distress.  EXTREMITIES: No deformities.  NEUROLOGICAL: Alert and oriented, appears non-focal.  SKIN: Warm and dry as visualized  PSYCH:  Normal affect and mood  ABD: LLQ stoma, without colostomy bag, beefy. abd nontender

## 2024-06-19 NOTE — ED ADULT NURSE NOTE - OBJECTIVE STATEMENT
Pt received in spot 3a. Pt brought to ed by , has Hx of  diverticulitis, status post process sigmoidectomy, dementia, hyperlipidemia, hypertension, diabetes presents emergency department due to issue with colostomy bag.  states colostomy bag was accidently removed. Per  no other complaints. Respirations even and unlabored, no accessory muscle use. Colostomy bag replaced and  educted on bag. No other complaints by  or patient

## 2024-06-19 NOTE — ED PROVIDER NOTE - PATIENT PORTAL LINK FT
You can access the FollowMyHealth Patient Portal offered by [facility] by registering at the following website: [email or web address]. By joining [employer]’s FollowMyHealth portal, you will also be able to view your health information using other applications (apps) compatible with our system.

## 2024-06-20 ENCOUNTER — NON-APPOINTMENT (OUTPATIENT)
Age: 79
End: 2024-06-20

## 2024-06-20 DIAGNOSIS — R21 RASH AND OTHER NONSPECIFIC SKIN ERUPTION: ICD-10-CM

## 2024-06-20 LAB
-  AMPICILLIN: SIGNIFICANT CHANGE UP
-  CIPROFLOXACIN: SIGNIFICANT CHANGE UP
-  LEVOFLOXACIN: SIGNIFICANT CHANGE UP
-  NITROFURANTOIN: SIGNIFICANT CHANGE UP
-  TETRACYCLINE: SIGNIFICANT CHANGE UP
-  VANCOMYCIN: SIGNIFICANT CHANGE UP
CULTURE RESULTS: ABNORMAL
METHOD TYPE: SIGNIFICANT CHANGE UP
ORGANISM # SPEC MICROSCOPIC CNT: ABNORMAL
SPECIMEN SOURCE: SIGNIFICANT CHANGE UP

## 2024-06-21 ENCOUNTER — APPOINTMENT (OUTPATIENT)
Dept: HOME HEALTH SERVICES | Facility: HOME HEALTH | Age: 79
End: 2024-06-21

## 2024-06-21 RX ORDER — METFORMIN HYDROCHLORIDE 500 MG/1
500 TABLET, FILM COATED, EXTENDED RELEASE ORAL
Qty: 90 | Refills: 3 | Status: ACTIVE | COMMUNITY
Start: 2020-08-14

## 2024-06-21 RX ORDER — FOLIC ACID 1 MG/1
1 TABLET ORAL
Qty: 90 | Refills: 3 | Status: ACTIVE | COMMUNITY
Start: 2024-06-18

## 2024-06-21 RX ORDER — CITALOPRAM HYDROBROMIDE 10 MG/1
10 TABLET, FILM COATED ORAL DAILY
Qty: 90 | Refills: 3 | Status: ACTIVE | COMMUNITY
Start: 2024-06-18

## 2024-06-21 RX ORDER — METOPROLOL SUCCINATE 25 MG/1
25 TABLET, EXTENDED RELEASE ORAL DAILY
Qty: 90 | Refills: 3 | Status: ACTIVE | COMMUNITY
Start: 2024-06-18

## 2024-06-21 RX ORDER — BRINZOLAMIDE 10 MG/ML
1 SUSPENSION/ DROPS OPHTHALMIC 3 TIMES DAILY
Qty: 1 | Refills: 5 | Status: ACTIVE | COMMUNITY
Start: 2024-02-22

## 2024-06-21 RX ORDER — PANTOPRAZOLE 20 MG/1
20 TABLET, DELAYED RELEASE ORAL
Qty: 90 | Refills: 3 | Status: ACTIVE | COMMUNITY
Start: 2024-02-22

## 2024-06-21 RX ORDER — MEMANTINE HYDROCHLORIDE 28 MG/1
28 CAPSULE, EXTENDED RELEASE ORAL
Qty: 90 | Refills: 3 | Status: ACTIVE | COMMUNITY
Start: 2024-06-18

## 2024-06-21 RX ORDER — ASPIRIN ENTERIC COATED TABLETS 81 MG 81 MG/1
81 TABLET, DELAYED RELEASE ORAL
Qty: 90 | Refills: 3 | Status: ACTIVE | COMMUNITY
Start: 2020-03-18

## 2024-06-21 RX ORDER — FLUTICASONE PROPIONATE 50 UG/1
50 SPRAY, METERED NASAL
Qty: 16 | Refills: 0 | Status: ACTIVE | COMMUNITY
Start: 2020-06-30

## 2024-06-21 RX ORDER — OMEGA-3/DHA/EPA/FISH OIL 300-1000MG
1000 CAPSULE ORAL
Refills: 0 | Status: ACTIVE | COMMUNITY
Start: 2024-02-22

## 2024-06-21 RX ORDER — HYDROXYZINE HYDROCHLORIDE 25 MG/1
25 TABLET ORAL
Qty: 90 | Refills: 3 | Status: ACTIVE | COMMUNITY
Start: 2024-06-20

## 2024-06-21 RX ORDER — SIMVASTATIN 20 MG/1
20 TABLET, FILM COATED ORAL
Qty: 90 | Refills: 3 | Status: ACTIVE | COMMUNITY
Start: 2024-02-22

## 2024-06-21 RX ORDER — MIRTAZAPINE 15 MG/1
15 TABLET, FILM COATED ORAL
Qty: 1 | Refills: 3 | Status: ACTIVE | COMMUNITY
Start: 2024-03-20

## 2024-06-21 RX ORDER — LORATADINE 10 MG/1
10 TABLET ORAL
Qty: 90 | Refills: 3 | Status: ACTIVE | COMMUNITY
Start: 2024-06-20

## 2024-06-21 RX ORDER — NIFEDIPINE 30 MG/1
30 TABLET, EXTENDED RELEASE ORAL DAILY
Qty: 90 | Refills: 3 | Status: ACTIVE | COMMUNITY
Start: 2024-06-18

## 2024-06-23 ENCOUNTER — TRANSCRIPTION ENCOUNTER (OUTPATIENT)
Age: 79
End: 2024-06-23

## 2024-06-23 RX ORDER — ALENDRONATE SODIUM 70 MG/1
70 TABLET ORAL
Qty: 13 | Refills: 3 | Status: ACTIVE | COMMUNITY
Start: 2024-02-22 | End: 1900-01-01

## 2024-06-24 NOTE — HISTORY OF PRESENT ILLNESS
[Patient] : patient [Family Member] : family member [FreeTextEntry1] : dementia [FreeTextEntry2] : PMH: T2DM, dementia, diverticulitis c/b perf s/p exlap with colostomy placement 5/2024.  Purpose of Visit: Post-hospitalization visit   Relevant Prior Hospitalizations: 5-6/2024 [facility]: Colon perf s/p exlap, colostomy, [facility] stay, ultimatel sent home 6/17/2024 4/19-22/2024 [facility]: Diverticulitis, pneumonia, sepsis, s/p Zosyn -> Augmentin 3/22-26/2024 [facility]: Diverticulitis, cipro/flagyl, recc outpt colonoscopy 1/22-24/2024 [facility]: pneumonia & sepsis s/p azithro/CTX, d/c with levofloxacin 12/2023 [facility]: Cholecystectomy   Social/Home Environment:  -Lives in home with her  and son Trevin -HHA: [employer] insurance, unclear [employer] but [employer], 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 -Son acts as CDPAS for dad. -Dr. Mandy Martinez PCP, neurology Dr. Suman Padilla [phone], F [phone], psychiatrist  Today's Visit & Review: -Interviewed son with patient, HHA present -Pt has blister packs from [facility], med changes recorded below: -Placing PT referral to [facility] at home. Explained to family that it is likely that the PT referral prescription will have to be with [employer]. -Meds reconciled, with following changes/additions: Folic Acid 1mg tab daily, Citalopram 10mg daily, Nifedipine XR 30mg daily, Metoprolol Succinate 25mg daily -Discussed making f/u visit with general surgery regarding role of colonoscopy/endoscopy at this point, pathology report, and ultimate takedown of colostomy.  -Dementia: Prominent memory impairment. Memantine ER 21mg daily. Unsteady gait with 3 falls in past year, no severe injury. Losing weight recently. Insomnia, not on meds. New trial mirtazapine 15mg QHS. -Vertigo: Active in past, meclizine 12.5mg TID PRN -Fuch's corneal dystrophy: Uncommon condition with change in corneal compartment dimensions causing glare & optical phenomena. Brinzolamide eyedrops. -Cataracts -Seasonal rhinitis/cough: Claritin 10mg daily, Robitussin DM, Flonase -Dental: Top & bottom dentures -CAD: Metoprolol ER 50mg daily, simvastatin 20mg QHS, ASA 81mg -HTN: nifedipine ER  -GERD: Pantoprazole 20mg daily -Diverticulitis: Noted on CT 3/2024, cipro/flagyl, repeat hosp 4/2024 s/p Zosyn -> Augmentin. Advised to f/u outpt for colonscopy to r/o malignancy -Cholecystitis: S/p cholecystectomy 11/2023, no plans to return to surgeon at this time. -T2DM: Unclear control. Metformin ER 2000mg daily. -Osteoporosis: Alendronate 70mg weekly, oyster calcium/D3 500mg-5mcg daily  -Screening/Preventive: Vaccines: Covid, flu, shingles, PNA   DME: Shower chair and bars in the bathroom  OSTOMY SUPPLIES: (Ordered in effort to provide pt with alternative stoma paste, since Coloplast is causing itching/contact dermatitis) -Convatec stomahesive paste, 1 large tube per month  NEED FOR ABDOMINAL BINDER: -Patient has dementia and picks at/attempts to remove colostomy bag. -Bag has been dislodged several times due to this tendency. -Ordering abdominal binder to help cover colostomy and reduce risk of dislodgement by patient.

## 2024-06-24 NOTE — PHYSICAL EXAM
[No Acute Distress] : no acute distress [Normal Sclera/Conjunctiva] : normal sclera/conjunctiva [PERRL] : pupils equal, round and reactive to light [EOMI] : extra ocular movement intact [Normal Outer Ear/Nose] : the ears and nose were normal in appearance [Normal Oropharynx] : the oropharynx was normal [Normal TMs] : both tympanic membranes were normal [Normal Nasal Mucosa] : the nasal mucosa was normal [No JVD] : no jugular venous distention [Supple] : the neck was supple [No LAD] : no lymphadenopathy [Thyroid Normal, No Nodules] : the thyroid was normal and there were no nodules present [No Respiratory Distress] : no respiratory distress [Clear to Auscultation] : lungs were clear to auscultation bilaterally [No Accessory Muscle Use] : no accessory muscle use [Normal Rate] : heart rate was normal  [Regular Rhythm] : with a regular rhythm [Normal S1, S2] : normal S1 and S2 [No Murmurs] : no murmurs heard [Breast Exam Declined] : patient declined to have breast exam done [Normal Bowel Sounds] : normal bowel sounds [Non Tender] : non-tender [Soft] : abdomen soft [Not Distended] : not distended [Patient Refused] : rectal exam was refused by the patient [Normal Supraclavicular Nodes] : no supraclavicular lymphadenopathy [Normal Axillary Nodes] : no axillary lymphadenopathy [Normal Anterior Cervical Nodes] : no anterior cervical lymphadenopathy [No CVA Tenderness] : no ~M costovertebral angle tenderness [No Spinal Tenderness] : no spinal tenderness [No Clubbing, Cyanosis] : no clubbing  or cyanosis of the fingernails [No Rash] : no rash [No Skin Lesions] : no skin lesions [Cranial Nerves Intact] : cranial nerves 2-12 were intact [No Gross Sensory Deficits] : no gross sensory deficits [Normal Affect] : the affect was normal [Kyphosis] : no kyphosis present [Foot Ulcers] : no foot ulcers [de-identified] : Remote and recent memory not intact but able to answer most questions  [de-identified] : unsteady gait [de-identified] : AOx2, remote and recent memory not intact.

## 2024-06-24 NOTE — REVIEW OF SYSTEMS
[Muscle Weakness] : muscle weakness [Confusion] : confusion [Memory Loss] : memory loss [Unsteady Walking] : ataxia [Depression] : depression [Negative] : Heme/Lymph [Muscle Pain] : no muscle pain [Back Pain] : no back pain [Headache] : no headache [Dizziness] : no dizziness [Fainting] : no fainting [FreeTextEntry7] : Poor appetite since 12/2023 after gallbladder surgery [FreeTextEntry9] : unsteady gait [de-identified] : complains of feeling depressed, quiet, not motivated, poor appetite since surgery in 12/2023

## 2024-06-24 NOTE — COUNSELING
[Normal Weight - ( BMI  <25 )] : normal weight - ( BMI  <25 ) [Continue diet as tolerated] : continue diet as tolerated based on goals of care [Non - Smoker] : non-smoker [Smoke/CO Detectors] : smoke/CO detectors [Remove clutter and unsafe carpeting to avoid falls] : remove clutter and unsafe carpeting to avoid falls [] : foot exam [Completed] : Aspirin use discussion completed [Improve mobility] : improve mobility [Maintain functional ability] : maintain functional ability [Discussed disease trajectory with patient/caregiver] : discussed disease trajectory with patient/caregiver [Advanced Directives discussed: ____] : Advanced directives discussed: [unfilled] [Completed Healthcare Proxy] : completed healthcare proxy [Full Code] : Code Status: Full Code [No Limitations] : Treatment Guidelines: No limitations [Trial of Intubation] : Intubation: Trial of Intubation [_____] : HCP: [unfilled] [FreeTextEntry3] : low sodium diet  [FreeTextEntry4] : Stay healthy, improve low mood. [de-identified] : MOLST deferred until next visit.

## 2024-06-24 NOTE — END OF VISIT
[Time Spent: ___ minutes] : I have spent [unfilled] minutes of time on the encounter. [FreeTextEntry3] : All medical record entries made by the Scribe were at my, Dr. Church, direction and personally dictated by me on 06/18/2024. I have reviewed the chart and agree that the record accurately reflects my personal performance of the history, physical exam, assessment, and plan. I have also personally directed, reviewed, and agreed with the chart.

## 2024-06-24 NOTE — HEALTH RISK ASSESSMENT
[Independent] : feeding [Some assistance needed] : using telephone [Full assistance needed] : managing medications [] : managing finances [Any fall with injury in past year] : Patient reported fall with injury in the past year [Yes] : The patient has visual impairment [HRA Reviewed] : Health risk assessment reviewed [Two or more falls in past year] : Patient reported two or more falls in the past year [[facility]] : 14

## 2024-07-01 ENCOUNTER — NON-APPOINTMENT (OUTPATIENT)
Age: 79
End: 2024-07-01

## 2024-07-03 ENCOUNTER — APPOINTMENT (OUTPATIENT)
Dept: HOME HEALTH SERVICES | Facility: HOME HEALTH | Age: 79
End: 2024-07-03

## 2024-07-05 ENCOUNTER — EMERGENCY (EMERGENCY)
Facility: HOSPITAL | Age: 79
LOS: 1 days | Discharge: HOME | End: 2024-07-05
Attending: EMERGENCY MEDICINE | Admitting: EMERGENCY MEDICINE
Payer: MEDICARE

## 2024-07-05 ENCOUNTER — TRANSCRIPTION ENCOUNTER (OUTPATIENT)
Age: 79
End: 2024-07-05

## 2024-07-05 VITALS
OXYGEN SATURATION: 100 % | TEMPERATURE: 98 F | SYSTOLIC BLOOD PRESSURE: 98 MMHG | HEART RATE: 57 BPM | DIASTOLIC BLOOD PRESSURE: 59 MMHG | HEIGHT: 61 IN | RESPIRATION RATE: 18 BRPM

## 2024-07-05 PROCEDURE — 99283 EMERGENCY DEPT VISIT LOW MDM: CPT | Mod: 25

## 2024-07-06 ENCOUNTER — APPOINTMENT (OUTPATIENT)
Dept: HOME HEALTH SERVICES | Facility: HOME HEALTH | Age: 79
End: 2024-07-06

## 2024-07-06 ENCOUNTER — NON-APPOINTMENT (OUTPATIENT)
Age: 79
End: 2024-07-06

## 2024-07-10 ENCOUNTER — NON-APPOINTMENT (OUTPATIENT)
Age: 79
End: 2024-07-10

## 2024-07-10 ENCOUNTER — TRANSCRIPTION ENCOUNTER (OUTPATIENT)
Age: 79
End: 2024-07-10

## 2024-07-15 ENCOUNTER — EMERGENCY (EMERGENCY)
Facility: HOSPITAL | Age: 79
LOS: 1 days | Discharge: HOME | End: 2024-07-15
Attending: STUDENT IN AN ORGANIZED HEALTH CARE EDUCATION/TRAINING PROGRAM | Admitting: STUDENT IN AN ORGANIZED HEALTH CARE EDUCATION/TRAINING PROGRAM
Payer: MEDICARE

## 2024-07-15 VITALS
SYSTOLIC BLOOD PRESSURE: 167 MMHG | OXYGEN SATURATION: 95 % | DIASTOLIC BLOOD PRESSURE: 93 MMHG | HEART RATE: 67 BPM | TEMPERATURE: 98 F | RESPIRATION RATE: 18 BRPM

## 2024-07-15 VITALS
WEIGHT: 130.07 LBS | TEMPERATURE: 97 F | SYSTOLIC BLOOD PRESSURE: 168 MMHG | OXYGEN SATURATION: 99 % | DIASTOLIC BLOOD PRESSURE: 83 MMHG | HEIGHT: 61 IN | HEART RATE: 62 BPM | RESPIRATION RATE: 18 BRPM

## 2024-07-15 DIAGNOSIS — Z87.81 PERSONAL HISTORY OF (HEALED) TRAUMATIC FRACTURE: Chronic | ICD-10-CM

## 2024-07-15 LAB
ALBUMIN SERPL ELPH-MCNC: 4.4 G/DL — SIGNIFICANT CHANGE UP (ref 3.3–5)
ALP SERPL-CCNC: 127 U/L — HIGH (ref 40–120)
ALT FLD-CCNC: 9 U/L — SIGNIFICANT CHANGE UP (ref 4–33)
ANION GAP SERPL CALC-SCNC: 13 MMOL/L — SIGNIFICANT CHANGE UP (ref 7–14)
AST SERPL-CCNC: 20 U/L — SIGNIFICANT CHANGE UP (ref 4–32)
BASE EXCESS BLDV CALC-SCNC: 1.9 MMOL/L — SIGNIFICANT CHANGE UP (ref -2–3)
BASOPHILS # BLD AUTO: 0.04 K/UL — SIGNIFICANT CHANGE UP (ref 0–0.2)
BASOPHILS NFR BLD AUTO: 0.3 % — SIGNIFICANT CHANGE UP (ref 0–2)
BILIRUB SERPL-MCNC: 0.5 MG/DL — SIGNIFICANT CHANGE UP (ref 0.2–1.2)
BLOOD GAS VENOUS COMPREHENSIVE RESULT: SIGNIFICANT CHANGE UP
BUN SERPL-MCNC: 11 MG/DL — SIGNIFICANT CHANGE UP (ref 7–23)
CALCIUM SERPL-MCNC: 9.8 MG/DL — SIGNIFICANT CHANGE UP (ref 8.4–10.5)
CHLORIDE BLDV-SCNC: 107 MMOL/L — SIGNIFICANT CHANGE UP (ref 96–108)
CHLORIDE SERPL-SCNC: 106 MMOL/L — SIGNIFICANT CHANGE UP (ref 98–107)
CO2 BLDV-SCNC: 31.5 MMOL/L — HIGH (ref 22–26)
CO2 SERPL-SCNC: 22 MMOL/L — SIGNIFICANT CHANGE UP (ref 22–31)
CREAT SERPL-MCNC: 0.59 MG/DL — SIGNIFICANT CHANGE UP (ref 0.5–1.3)
EGFR: 93 ML/MIN/1.73M2 — SIGNIFICANT CHANGE UP
EOSINOPHIL # BLD AUTO: 0.04 K/UL — SIGNIFICANT CHANGE UP (ref 0–0.5)
EOSINOPHIL NFR BLD AUTO: 0.3 % — SIGNIFICANT CHANGE UP (ref 0–6)
GAS PNL BLDV: 141 MMOL/L — SIGNIFICANT CHANGE UP (ref 136–145)
GAS PNL BLDV: SIGNIFICANT CHANGE UP
GLUCOSE BLDV-MCNC: 153 MG/DL — HIGH (ref 70–99)
GLUCOSE SERPL-MCNC: 150 MG/DL — HIGH (ref 70–99)
HCO3 BLDV-SCNC: 30 MMOL/L — HIGH (ref 22–29)
HCT VFR BLD CALC: 40.5 % — SIGNIFICANT CHANGE UP (ref 34.5–45)
HCT VFR BLDA CALC: 42 % — SIGNIFICANT CHANGE UP (ref 34.5–46.5)
HGB BLD CALC-MCNC: 14 G/DL — SIGNIFICANT CHANGE UP (ref 11.7–16.1)
HGB BLD-MCNC: 13.6 G/DL — SIGNIFICANT CHANGE UP (ref 11.5–15.5)
IANC: 9.36 K/UL — HIGH (ref 1.8–7.4)
IMM GRANULOCYTES NFR BLD AUTO: 0.3 % — SIGNIFICANT CHANGE UP (ref 0–0.9)
LACTATE BLDV-MCNC: 2.1 MMOL/L — HIGH (ref 0.5–2)
LACTATE SERPL-SCNC: 2.1 MMOL/L — HIGH (ref 0.5–2)
LIDOCAIN IGE QN: 41 U/L — SIGNIFICANT CHANGE UP (ref 7–60)
LYMPHOCYTES # BLD AUTO: 18.4 % — SIGNIFICANT CHANGE UP (ref 13–44)
LYMPHOCYTES # BLD AUTO: 2.19 K/UL — SIGNIFICANT CHANGE UP (ref 1–3.3)
MCHC RBC-ENTMCNC: 29.9 PG — SIGNIFICANT CHANGE UP (ref 27–34)
MCHC RBC-ENTMCNC: 33.6 GM/DL — SIGNIFICANT CHANGE UP (ref 32–36)
MCV RBC AUTO: 89 FL — SIGNIFICANT CHANGE UP (ref 80–100)
MONOCYTES # BLD AUTO: 0.24 K/UL — SIGNIFICANT CHANGE UP (ref 0–0.9)
MONOCYTES NFR BLD AUTO: 2 % — SIGNIFICANT CHANGE UP (ref 2–14)
NEUTROPHILS # BLD AUTO: 9.36 K/UL — HIGH (ref 1.8–7.4)
NEUTROPHILS NFR BLD AUTO: 78.7 % — HIGH (ref 43–77)
NRBC # BLD: 0 /100 WBCS — SIGNIFICANT CHANGE UP (ref 0–0)
NRBC # FLD: 0 K/UL — SIGNIFICANT CHANGE UP (ref 0–0)
PCO2 BLDV: 59 MMHG — HIGH (ref 39–52)
PH BLDV: 7.31 — LOW (ref 7.32–7.43)
PLATELET # BLD AUTO: 275 K/UL — SIGNIFICANT CHANGE UP (ref 150–400)
PO2 BLDV: <20 MMHG — LOW (ref 25–45)
POTASSIUM BLDV-SCNC: 4.4 MMOL/L — SIGNIFICANT CHANGE UP (ref 3.5–5.1)
POTASSIUM SERPL-MCNC: 4.1 MMOL/L — SIGNIFICANT CHANGE UP (ref 3.5–5.3)
POTASSIUM SERPL-SCNC: 4.1 MMOL/L — SIGNIFICANT CHANGE UP (ref 3.5–5.3)
PROT SERPL-MCNC: 9.2 G/DL — HIGH (ref 6–8.3)
RBC # BLD: 4.55 M/UL — SIGNIFICANT CHANGE UP (ref 3.8–5.2)
RBC # FLD: 13.6 % — SIGNIFICANT CHANGE UP (ref 10.3–14.5)
SAO2 % BLDV: 11.9 % — LOW (ref 67–88)
SODIUM SERPL-SCNC: 141 MMOL/L — SIGNIFICANT CHANGE UP (ref 135–145)
TROPONIN T, HIGH SENSITIVITY RESULT: 6 NG/L — SIGNIFICANT CHANGE UP
WBC # BLD: 11.9 K/UL — HIGH (ref 3.8–10.5)
WBC # FLD AUTO: 11.9 K/UL — HIGH (ref 3.8–10.5)

## 2024-07-15 PROCEDURE — 99285 EMERGENCY DEPT VISIT HI MDM: CPT

## 2024-07-15 PROCEDURE — 71045 X-RAY EXAM CHEST 1 VIEW: CPT | Mod: 26

## 2024-07-15 PROCEDURE — 74177 CT ABD & PELVIS W/CONTRAST: CPT | Mod: 26,MC

## 2024-07-15 RX ORDER — FAMOTIDINE 40 MG
20 TABLET ORAL ONCE
Refills: 0 | Status: COMPLETED | OUTPATIENT
Start: 2024-07-15 | End: 2024-07-15

## 2024-07-15 RX ORDER — ONDANSETRON HYDROCHLORIDE 2 MG/ML
4 INJECTION INTRAMUSCULAR; INTRAVENOUS ONCE
Refills: 0 | Status: COMPLETED | OUTPATIENT
Start: 2024-07-15 | End: 2024-07-15

## 2024-07-15 RX ORDER — SODIUM CHLORIDE 0.9 % (FLUSH) 0.9 %
1000 SYRINGE (ML) INJECTION ONCE
Refills: 0 | Status: COMPLETED | OUTPATIENT
Start: 2024-07-15 | End: 2024-07-15

## 2024-07-15 RX ADMIN — Medication 20 MILLIGRAM(S): at 16:26

## 2024-07-15 RX ADMIN — Medication 1000 MILLILITER(S): at 16:26

## 2024-07-15 RX ADMIN — ONDANSETRON HYDROCHLORIDE 4 MILLIGRAM(S): 2 INJECTION INTRAMUSCULAR; INTRAVENOUS at 16:28

## 2024-07-16 ENCOUNTER — APPOINTMENT (OUTPATIENT)
Dept: HOME HEALTH SERVICES | Facility: HOME HEALTH | Age: 79
End: 2024-07-16

## 2024-07-17 LAB
CULTURE RESULTS: NO GROWTH — SIGNIFICANT CHANGE UP
SPECIMEN SOURCE: SIGNIFICANT CHANGE UP

## 2024-07-18 ENCOUNTER — APPOINTMENT (OUTPATIENT)
Dept: HOME HEALTH SERVICES | Facility: HOME HEALTH | Age: 79
End: 2024-07-18

## 2024-07-18 VITALS
TEMPERATURE: 98.1 F | RESPIRATION RATE: 16 BRPM | OXYGEN SATURATION: 96 % | HEART RATE: 81 BPM | SYSTOLIC BLOOD PRESSURE: 119 MMHG | DIASTOLIC BLOOD PRESSURE: 78 MMHG

## 2024-07-23 ENCOUNTER — TRANSCRIPTION ENCOUNTER (OUTPATIENT)
Age: 79
End: 2024-07-23

## 2024-07-23 ENCOUNTER — NON-APPOINTMENT (OUTPATIENT)
Age: 79
End: 2024-07-23

## 2024-07-29 ENCOUNTER — RX RENEWAL (OUTPATIENT)
Age: 79
End: 2024-07-29

## 2024-07-29 RX ORDER — QUETIAPINE FUMARATE 25 MG/1
25 TABLET ORAL TWICE DAILY
Qty: 60 | Refills: 1 | Status: ACTIVE | COMMUNITY
Start: 2024-07-23 | End: 1900-01-01

## 2024-07-29 RX ORDER — DONEPEZIL HYDROCHLORIDE 5 MG/1
5 TABLET ORAL
Qty: 90 | Refills: 3 | Status: ACTIVE | COMMUNITY
Start: 2024-07-29 | End: 1900-01-01

## 2024-07-31 ENCOUNTER — TRANSCRIPTION ENCOUNTER (OUTPATIENT)
Age: 79
End: 2024-07-31

## 2024-07-31 ENCOUNTER — NON-APPOINTMENT (OUTPATIENT)
Age: 79
End: 2024-07-31

## 2024-07-31 ENCOUNTER — APPOINTMENT (OUTPATIENT)
Dept: HOME HEALTH SERVICES | Facility: HOME HEALTH | Age: 79
End: 2024-07-31
Payer: MEDICARE

## 2024-07-31 DIAGNOSIS — R50.9 FEVER, UNSPECIFIED: ICD-10-CM

## 2024-07-31 PROCEDURE — 99348 HOME/RES VST EST LOW MDM 30: CPT | Mod: 95

## 2024-07-31 NOTE — PATIENT PROFILE ADULT - NSPROPTRIGHTSUPPORTPERSON_GEN_A_NUR
Group Topic:  Group OT    Date: 7/31/2024  Start Time: 0930  End Time: 1015  Facilitators: Urmila Moon OT    Focus: Therapeutic Activity/Creative Arts    Patients were provided an opportunity for creative expression while listening to music to:  1) Assess/improve tasks skills (attention span, frustration tolerance, attention to detail, decision making, organization, problem solving, motivation, communication etc).  2) Facilitate relaxation/enjoyment.  3) Assist in symptom management (reduced depression/anxiety/psychosis).  4) Improve socialization/decrease isolation.  5) Familiarize themselves with positive productive activities that are community based/accessible.    Number in attendance: 16  Pt was recruited for group but did not attend. Efforts to encourage participation in programming on the unit will continue.  Urmila Moon OTR     same name as above

## 2024-08-01 ENCOUNTER — TRANSCRIPTION ENCOUNTER (OUTPATIENT)
Age: 79
End: 2024-08-01

## 2024-08-06 LAB
INFLUENZA A RESULT: NOT DETECTED
INFLUENZA B RESULT: NOT DETECTED
RESP SYN VIRUS RESULT: NOT DETECTED
SARS-COV-2 RESULT: DETECTED

## 2024-08-06 RX ORDER — AMOXICILLIN AND CLAVULANATE POTASSIUM 875; 125 MG/1; MG/1
875-125 TABLET, COATED ORAL
Qty: 10 | Refills: 0 | Status: COMPLETED | COMMUNITY
Start: 2024-07-31 | End: 2024-08-06

## 2024-08-08 ENCOUNTER — NON-APPOINTMENT (OUTPATIENT)
Age: 79
End: 2024-08-08

## 2024-08-09 ENCOUNTER — NON-APPOINTMENT (OUTPATIENT)
Age: 79
End: 2024-08-09

## 2024-08-09 ENCOUNTER — APPOINTMENT (OUTPATIENT)
Dept: HOME HEALTH SERVICES | Facility: HOME HEALTH | Age: 79
End: 2024-08-09

## 2024-08-09 PROCEDURE — 99349 HOME/RES VST EST MOD MDM 40: CPT

## 2024-08-10 ENCOUNTER — TRANSCRIPTION ENCOUNTER (OUTPATIENT)
Age: 79
End: 2024-08-10

## 2024-08-10 ENCOUNTER — NON-APPOINTMENT (OUTPATIENT)
Age: 79
End: 2024-08-10

## 2024-08-11 ENCOUNTER — INPATIENT (INPATIENT)
Facility: HOSPITAL | Age: 79
LOS: 7 days | Discharge: HOME | End: 2024-08-19
Attending: INTERNAL MEDICINE | Admitting: INTERNAL MEDICINE
Payer: MEDICARE

## 2024-08-11 VITALS
WEIGHT: 111.99 LBS | HEART RATE: 78 BPM | SYSTOLIC BLOOD PRESSURE: 147 MMHG | TEMPERATURE: 98 F | HEIGHT: 61 IN | RESPIRATION RATE: 17 BRPM | DIASTOLIC BLOOD PRESSURE: 76 MMHG | OXYGEN SATURATION: 100 %

## 2024-08-11 DIAGNOSIS — Z87.81 PERSONAL HISTORY OF (HEALED) TRAUMATIC FRACTURE: Chronic | ICD-10-CM

## 2024-08-11 DIAGNOSIS — R09.02 HYPOXEMIA: ICD-10-CM

## 2024-08-11 PROBLEM — K94.00 COLOSTOMY COMPLICATION: Status: ACTIVE | Noted: 2024-08-11

## 2024-08-11 LAB
ADD ON TEST-SPECIMEN IN LAB: SIGNIFICANT CHANGE UP
ALBUMIN SERPL ELPH-MCNC: 4.2 G/DL — SIGNIFICANT CHANGE UP (ref 3.3–5)
ALP SERPL-CCNC: 117 U/L — SIGNIFICANT CHANGE UP (ref 40–120)
ALT FLD-CCNC: 12 U/L — SIGNIFICANT CHANGE UP (ref 4–33)
ANION GAP SERPL CALC-SCNC: 13 MMOL/L — SIGNIFICANT CHANGE UP (ref 7–14)
AST SERPL-CCNC: 21 U/L — SIGNIFICANT CHANGE UP (ref 4–32)
BASOPHILS # BLD AUTO: 0.05 K/UL — SIGNIFICANT CHANGE UP (ref 0–0.2)
BASOPHILS NFR BLD AUTO: 0.4 % — SIGNIFICANT CHANGE UP (ref 0–2)
BILIRUB SERPL-MCNC: 1.2 MG/DL — SIGNIFICANT CHANGE UP (ref 0.2–1.2)
BUN SERPL-MCNC: 15 MG/DL — SIGNIFICANT CHANGE UP (ref 7–23)
CALCIUM SERPL-MCNC: 9.2 MG/DL — SIGNIFICANT CHANGE UP (ref 8.4–10.5)
CHLORIDE SERPL-SCNC: 106 MMOL/L — SIGNIFICANT CHANGE UP (ref 98–107)
CO2 SERPL-SCNC: 22 MMOL/L — SIGNIFICANT CHANGE UP (ref 22–31)
CREAT SERPL-MCNC: 0.56 MG/DL — SIGNIFICANT CHANGE UP (ref 0.5–1.3)
EGFR: 95 ML/MIN/1.73M2 — SIGNIFICANT CHANGE UP
EOSINOPHIL # BLD AUTO: 0.07 K/UL — SIGNIFICANT CHANGE UP (ref 0–0.5)
EOSINOPHIL NFR BLD AUTO: 0.5 % — SIGNIFICANT CHANGE UP (ref 0–6)
FLUAV AG NPH QL: SIGNIFICANT CHANGE UP
FLUBV AG NPH QL: SIGNIFICANT CHANGE UP
GLUCOSE SERPL-MCNC: 123 MG/DL — HIGH (ref 70–99)
HCT VFR BLD CALC: 38.6 % — SIGNIFICANT CHANGE UP (ref 34.5–45)
HGB BLD-MCNC: 13.1 G/DL — SIGNIFICANT CHANGE UP (ref 11.5–15.5)
IANC: 10.85 K/UL — HIGH (ref 1.8–7.4)
IMM GRANULOCYTES NFR BLD AUTO: 0.5 % — SIGNIFICANT CHANGE UP (ref 0–0.9)
LYMPHOCYTES # BLD AUTO: 17.2 % — SIGNIFICANT CHANGE UP (ref 13–44)
LYMPHOCYTES # BLD AUTO: 2.4 K/UL — SIGNIFICANT CHANGE UP (ref 1–3.3)
MCHC RBC-ENTMCNC: 30.3 PG — SIGNIFICANT CHANGE UP (ref 27–34)
MCHC RBC-ENTMCNC: 33.9 GM/DL — SIGNIFICANT CHANGE UP (ref 32–36)
MCV RBC AUTO: 89.4 FL — SIGNIFICANT CHANGE UP (ref 80–100)
MONOCYTES # BLD AUTO: 0.53 K/UL — SIGNIFICANT CHANGE UP (ref 0–0.9)
MONOCYTES NFR BLD AUTO: 3.8 % — SIGNIFICANT CHANGE UP (ref 2–14)
NEUTROPHILS # BLD AUTO: 10.85 K/UL — HIGH (ref 1.8–7.4)
NEUTROPHILS NFR BLD AUTO: 77.6 % — HIGH (ref 43–77)
NRBC # BLD: 0 /100 WBCS — SIGNIFICANT CHANGE UP (ref 0–0)
NRBC # FLD: 0 K/UL — SIGNIFICANT CHANGE UP (ref 0–0)
PLATELET # BLD AUTO: 342 K/UL — SIGNIFICANT CHANGE UP (ref 150–400)
POTASSIUM SERPL-MCNC: 4.1 MMOL/L — SIGNIFICANT CHANGE UP (ref 3.5–5.3)
POTASSIUM SERPL-SCNC: 4.1 MMOL/L — SIGNIFICANT CHANGE UP (ref 3.5–5.3)
PROT SERPL-MCNC: 8.8 G/DL — HIGH (ref 6–8.3)
RBC # BLD: 4.32 M/UL — SIGNIFICANT CHANGE UP (ref 3.8–5.2)
RBC # FLD: 15.2 % — HIGH (ref 10.3–14.5)
RSV RNA NPH QL NAA+NON-PROBE: SIGNIFICANT CHANGE UP
SARS-COV-2 RNA SPEC QL NAA+PROBE: DETECTED
SODIUM SERPL-SCNC: 141 MMOL/L — SIGNIFICANT CHANGE UP (ref 135–145)
WBC # BLD: 13.97 K/UL — HIGH (ref 3.8–10.5)
WBC # FLD AUTO: 13.97 K/UL — HIGH (ref 3.8–10.5)

## 2024-08-11 PROCEDURE — 71045 X-RAY EXAM CHEST 1 VIEW: CPT | Mod: 26

## 2024-08-11 PROCEDURE — 99285 EMERGENCY DEPT VISIT HI MDM: CPT

## 2024-08-11 PROCEDURE — 74177 CT ABD & PELVIS W/CONTRAST: CPT | Mod: 26,MC

## 2024-08-11 RX ORDER — ENOXAPARIN SODIUM 100 MG/ML
40 INJECTION SUBCUTANEOUS EVERY 24 HOURS
Refills: 0 | Status: DISCONTINUED | OUTPATIENT
Start: 2024-08-11 | End: 2024-08-19

## 2024-08-11 RX ORDER — IPRATROPIUM BROMIDE AND ALBUTEROL SULFATE .5; 3 MG/3ML; MG/3ML
3 SOLUTION RESPIRATORY (INHALATION) EVERY 6 HOURS
Refills: 0 | Status: DISCONTINUED | OUTPATIENT
Start: 2024-08-11 | End: 2024-08-19

## 2024-08-11 RX ORDER — ONDANSETRON 2 MG/ML
4 INJECTION, SOLUTION INTRAMUSCULAR; INTRAVENOUS EVERY 8 HOURS
Refills: 0 | Status: DISCONTINUED | OUTPATIENT
Start: 2024-08-11 | End: 2024-08-19

## 2024-08-11 RX ORDER — PREDNISONE 10 MG
50 TABLET, DOSE PACK ORAL ONCE
Refills: 0 | Status: COMPLETED | OUTPATIENT
Start: 2024-08-11 | End: 2024-08-11

## 2024-08-11 RX ADMIN — Medication 50 MILLIGRAM(S): at 21:09

## 2024-08-11 RX ADMIN — Medication 50 MILLIGRAM(S): at 22:20

## 2024-08-11 NOTE — COUNSELING
[Normal Weight - ( BMI  <25 )] : normal weight - ( BMI  <25 ) [Continue diet as tolerated] : continue diet as tolerated based on goals of care [Non - Smoker] : non-smoker [Smoke/CO Detectors] : smoke/CO detectors [Remove clutter and unsafe carpeting to avoid falls] : remove clutter and unsafe carpeting to avoid falls [] : foot exam [Completed] : Aspirin use discussion completed [Improve mobility] : improve mobility [Maintain functional ability] : maintain functional ability [Discussed disease trajectory with patient/caregiver] : discussed disease trajectory with patient/caregiver [Advanced Directives discussed: ____] : Advanced directives discussed: [unfilled] [Completed Healthcare Proxy] : completed healthcare proxy [Full Code] : Code Status: Full Code [No Limitations] : Treatment Guidelines: No limitations [Trial of Intubation] : Intubation: Trial of Intubation [_____] : HCP: [unfilled] [FreeTextEntry3] : low sodium diet  [de-identified] : MOLST deferred until next visit. [FreeTextEntry4] : Stay healthy, improve low mood.

## 2024-08-11 NOTE — ED PROVIDER NOTE - ATTENDING CONTRIBUTION TO CARE
Dr. Gibbs, Attending Physician-  I performed a face to face bedside interview with patient regarding history of present illness, review of symptoms and past medical history. I completed an independent physical exam.  I have discussed patient's plan of care with the resident.    79y/o F history of hypertension, diverticulitis with colostomy, dementia, brought in per triage note by EMS for nausea, vomiting and pulling out her colostomy bag.  Patient unable to to provide further information regarding why she pulled out her colostomy bag, how she got to the hospital, or how she has been feeling the past few days.  She denies any complaints at this time.      VS unremarkable  General: WN/WD NAD  Head: Atraumatic  Eyes: EOM grossly in tact, no scleral icterus  ENT: moist mucous membranes  Neurology: A&Ox1 (self), nonfocal, MCCANN x 4  Respiratory: normal respiratory effort  CV: Extremities warm and well perfused  Abdominal: Soft, non-distended, nontender, track over ostomy site that appears clean dry and intact  Extremities: No edema  Skin: No rashes    Multiple attempts have been made to reach patient's son with no answer, will call SW for assistance obtaining collateral. Ostomy bag replaced.  Given triage note stating nausea and vomiting, will obtain CT scan to eval for SBO.  Labs, CT, reassess, dispo pending workup. Pt's  reportedly also has medical emergency and is unavailable at this time. - Alexandra Gibbs MD. EM Attending Dr. Gibbs, Attending Physician-  I performed a face to face bedside interview with patient regarding history of present illness, review of symptoms and past medical history. I completed an independent physical exam.  I have discussed patient's plan of care with the resident.    77y/o F history of hypertension, diverticulitis with colostomy, dementia, brought in per triage note by EMS for nausea, vomiting and pulling out her colostomy bag.  Patient unable to to provide further information regarding why she pulled out her colostomy bag, how she got to the hospital, or how she has been feeling the past few days.  She denies any complaints at this time.      VS unremarkable  General: WN/WD NAD  Head: Atraumatic  Eyes: EOM grossly in tact, no scleral icterus  ENT: moist mucous membranes  Neurology: A&Ox2 (self, location), nonfocal, MCCANN x 4  Respiratory: normal respiratory effort  CV: Extremities warm and well perfused  Abdominal: Soft, non-distended, nontender, track over ostomy site that appears clean dry and intact  Extremities: No edema  Skin: No rashes    Multiple attempts have been made to reach patient's son with no answer, will call SW for assistance obtaining collateral. Ostomy bag replaced.  Given triage note stating nausea and vomiting, will obtain CT scan to eval for SBO.  Labs, CT, reassess, dispo pending workup. Pt's  reportedly also has medical emergency and is unavailable at this time. - Alexandra Gibbs MD. EM Attending

## 2024-08-11 NOTE — ED PROVIDER NOTE - PHYSICAL EXAMINATION
GEN: NAD. Non-toxic appearing.  HEENT: normocephalic, atraumatic, EOMI, PERRL, no scleral icterus, no conjuntival pallor, moist MM  CARDIAC: RRR, S1, S2, no murmur. Radial pulses present and symmetric b/l  PULM: CTA B/L no wheeze, rhonchi, rales.   ABD: soft NT, ND, no rebound no guarding, Ostomy site c/d/i  MSK: Moving all extremities, no edema.  NEURO: no focal neurological deficits, CN 2-12 grossly intact  SKIN: warm, dry, no rash.

## 2024-08-11 NOTE — ED ADULT NURSE REASSESSMENT NOTE - NS ED NURSE REASSESS COMMENT FT1
Received report from andrés RN Mckayla. Pt lying in stretcher, not in acute distress. Pt denies N, V abdominal pain at this time. Respirations are even and unlabored, 3L nasal cannula, NSR on monitor. IV is patent and intact. Medicated as per MD order. Vitals as charted. Bed in lowest position, comfort measures provided, safety maintained. Pt tested positive for COVID, pt educated on protocol, sign placed on door. Bed in lowest position, comfort measures provided, safety maintained. pending bed assignment.

## 2024-08-11 NOTE — ED PROVIDER NOTE - PROGRESS NOTE DETAILS
[id number]. She has been taking care of the patient at home with the patient's . Patient had two days of itnermittent episodes of nausea and vomitng improved with some improvement.  Granddaughter is now leaving for school, her  is also in the ED for stroke, and her other son has alcohol use disorder and is unable to take care of her.  Given she has dementia, now lives alone, and relies on other people to change her ostomy will likely need social admisison.   Addison Kwan PGY-3 Patient with desat to mid 80s, place don 2L NC, when weaned off desat to 90. No sob at this time. Recent COVID 8/2. Will add on cxr, ekg, trop, bnp, then admit.  Addison Kwan PGY-3 Mackenzie Giron PGY3 - sign out - 78-year-old female presenting with nausea and vomiting and intermittent removal of her ostomy bag 2/2 dementia. CT abdomen pelvis without any acute findings however patient has been coughing (recent COVID diagnosis) and became hypoxemic in the ED. CXR shows clear lungs but WBC 13.97. Admit to medicine for hypoxemia and family unable to care for patient at home 2/2 complex family situation.

## 2024-08-11 NOTE — ED PROVIDER NOTE - CLINICAL SUMMARY MEDICAL DECISION MAKING FREE TEXT BOX
79y/o F history of hypertension, diverticulitis with colostomy, brought in per triage note by EMS for nausea, vomiting and pulling out her colostomy bag.  Patient unable to to provide further information regarding why she pulled out her colostomy bag, how she got to the hospital, or how she has been feeling the past few days.  She denies any complaints at this time.  Patient afebrile, not tachycardic, lungs clear, abdomen nontender, track over ostomy site that appears clean dry and intact.  Multiple attempts have been made to reach patient's son with no answer.  Discussed with  who will help with collateral.  Ostomy bag replaced.  Given triage note stating nausea and vomiting, will obtain CT scan to eval for SBO.  Labs, CT, reassess.
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
## 2024-08-11 NOTE — H&P ADULT - NSICDXPASTMEDICALHX_GEN_ALL_CORE_FT
PAST MEDICAL HISTORY:  Diabetes     Glaucoma     HLD (hyperlipidemia)     Hypertension, unspecified type     Osteoarthritis, knee left     PAST MEDICAL HISTORY:  Bilateral pneumonia     Diabetes mellitus, type 2     Diverticulitis     Glaucoma     HLD (hyperlipidemia)     Hypertension, unspecified type     Osteoarthritis, knee left    Sepsis

## 2024-08-11 NOTE — PHYSICAL EXAM
[No Acute Distress] : no acute distress [Normal Sclera/Conjunctiva] : normal sclera/conjunctiva [PERRL] : pupils equal, round and reactive to light [EOMI] : extra ocular movement intact [Normal Outer Ear/Nose] : the ears and nose were normal in appearance [Normal Oropharynx] : the oropharynx was normal [Normal TMs] : both tympanic membranes were normal [Normal Nasal Mucosa] : the nasal mucosa was normal [No JVD] : no jugular venous distention [Supple] : the neck was supple [No LAD] : no lymphadenopathy [Thyroid Normal, No Nodules] : the thyroid was normal and there were no nodules present [No Respiratory Distress] : no respiratory distress [Clear to Auscultation] : lungs were clear to auscultation bilaterally [No Accessory Muscle Use] : no accessory muscle use [Normal Rate] : heart rate was normal  [Regular Rhythm] : with a regular rhythm [Normal S1, S2] : normal S1 and S2 [No Murmurs] : no murmurs heard [Breast Exam Declined] : patient declined to have breast exam done [Normal Bowel Sounds] : normal bowel sounds [Non Tender] : non-tender [Soft] : abdomen soft [Not Distended] : not distended [Patient Refused] : rectal exam was refused by the patient [Normal Supraclavicular Nodes] : no supraclavicular lymphadenopathy [Normal Axillary Nodes] : no axillary lymphadenopathy [Normal Anterior Cervical Nodes] : no anterior cervical lymphadenopathy [No CVA Tenderness] : no ~M costovertebral angle tenderness [No Spinal Tenderness] : no spinal tenderness [No Clubbing, Cyanosis] : no clubbing  or cyanosis of the fingernails [No Rash] : no rash [No Skin Lesions] : no skin lesions [Cranial Nerves Intact] : cranial nerves 2-12 were intact [No Gross Sensory Deficits] : no gross sensory deficits [Normal Affect] : the affect was normal [Kyphosis] : no kyphosis present [Foot Ulcers] : no foot ulcers [de-identified] : Remote and recent memory not intact but able to answer most questions  [de-identified] : AOx2, remote and recent memory not intact. [de-identified] : unsteady gait

## 2024-08-11 NOTE — HISTORY OF PRESENT ILLNESS
[Patient] : patient [Family Member] : family member [FreeTextEntry1] : dementia [FreeTextEntry2] : PMH: T2DM, dementia, diverticulitis c/b perf s/p exlap with colostomy placement 5/2024.  Purpose of Visit: Routine f/u visit   Relevant Prior Hospitalizations: 6/2024 [facility]: Removed ostomy bag, needed redressing 5-6/2024 [facility]: Colon perf s/p exlap, colostomy, [facility] stay, ultimatel sent home 6/17/2024 4/19-22/2024 [facility]: Diverticulitis, pneumonia, sepsis, s/p Zosyn -> Augmentin 3/22-26/2024 [facility]: Diverticulitis, cipro/flagyl, recc outpt colonoscopy 1/22-24/2024 [facility]: pneumonia & sepsis s/p azithro/CTX, d/c with levofloxacin 12/2023 [facility]: Cholecystectomy   Social/Home Environment:  -Lives in home with her  and son Trevin -Dtr Donnajurgen, gdtr through other son Kaitlin  -HHA: [employer] insurance, unclear MLTC but apparently[employer], 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 -Son acts as CDPAS for dad. -Dr. Mandy Martinez PCP, neurology Dr. Suman Padilla [phone], ATUL [phone], psychiatrist  Today's Visit & Review: -Interviewed with  -Feeling better, more energy, more willing to get up, no clear reason why she's had this improvement -Colostomy reversal consultation with Lucas Lr 10/17/2024 planned, however realistically this was discussed as occuring at the six month lucía, so approximately 12/2024 at earliest -[employer] seeking waiver program membership, will send letter explaining problems to help & will send to daughter -PCP visit planned 08/20/2024 -Neuro visit planned 10/14/2024  -Dementia: Prominent memory impairment. Memantine ER 21mg daily. Unsteady gait with 3 falls in past year, no severe injury. Losing weight recently. Insomnia, not on meds. New trial mirtazapine 15mg QHS. -Vertigo: Active in past, meclizine 12.5mg TID PRN -Fuch's corneal dystrophy: Uncommon condition with change in corneal compartment dimensions causing glare & optical phenomena. Brinzolamide eyedrops. -Cataracts -Seasonal rhinitis/cough: Claritin 10mg daily, Robitussin DM, Flonase -Dental: Top & bottom dentures -CAD: Metoprolol ER 50mg daily, simvastatin 20mg QHS, ASA 81mg -HTN: nifedipine ER  -GERD: Pantoprazole 20mg daily -Diverticulitis: Noted on CT 3/2024, cipro/flagyl, repeat hosp 4/2024 s/p Zosyn -> Augmentin. Ultimately partial colon resection & colostomy established, to undergo potential reversal 12/2024 -Cholecystitis: S/p cholecystectomy 11/2023, no plans to return to surgeon at this time. -T2DM: Unclear control. Metformin ER 2000mg daily. -Osteoporosis: Alendronate 70mg weekly, oyster calcium/D3 500mg-5mcg daily  -Screening/Preventive: Vaccines: Covid, flu, shingles, PNA   DME: Shower chair and bars in the bathroom  OSTOMY SUPPLIES: -Stoma pouch, 90 per month (patient has dementia and frequently dislodges her ostomy bag) (Ordered in effort to provide pt with alternative stoma paste, since Coloplast is causing itching/contact dermatitis) -Convatec stomahesive paste, 1 large tube per month

## 2024-08-11 NOTE — HISTORY OF PRESENT ILLNESS
[Patient] : patient [Family Member] : family member [FreeTextEntry1] : dementia [FreeTextEntry2] : PMH: T2DM, dementia, diverticulitis c/b perf s/p exlap with colostomy placement 5/2024.  Purpose of Visit: Routine f/u visit   Relevant Prior Hospitalizations: 6/2024 [facility]: Removed ostomy bag, needed redressing 5-6/2024 [facility]: Colon perf s/p exlap, colostomy, [facility] stay, ultimatel sent home 6/17/2024 4/19-22/2024 [facility]: Diverticulitis, pneumonia, sepsis, s/p Zosyn -> Augmentin 3/22-26/2024 [facility]: Diverticulitis, cipro/flagyl, recc outpt colonoscopy 1/22-24/2024 [facility]: pneumonia & sepsis s/p azithro/CTX, d/c with levofloxacin 12/2023 [facility]: Cholecystectomy   Social/Home Environment:  -Lives in home with her  and son Trevin -Dtr Donnajurgen, gdtr through other son Kaitlin  -HHA: [employer] insurance, unclear MLTC but apparently[employer], 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 -Son acts as CDPAS for dad. -Dr. Mandy Martinez PCP, neurology Dr. Suman Padilla [phone], ATUL [phone], psychiatrist  Today's Visit & Review: -Interviewed with  -Feeling better, more energy, more willing to get up, no clear reason why she's had this improvement -Colostomy reversal consultation with Lucas Lr 10/17/2024 planned, however realistically this was discussed as occuring at the six month lucía, so approximately 12/2024 at earliest -[employer] seeking waiver program membership, will send letter explaining problems to help & will send to daughter -PCP visit planned 08/20/2024 -Neuro visit planned 10/14/2024  -Dementia: Prominent memory impairment. Memantine ER 21mg daily. Unsteady gait with 3 falls in past year, no severe injury. Losing weight recently. Insomnia, not on meds. New trial mirtazapine 15mg QHS. -Vertigo: Active in past, meclizine 12.5mg TID PRN -Fuch's corneal dystrophy: Uncommon condition with change in corneal compartment dimensions causing glare & optical phenomena. Brinzolamide eyedrops. -Cataracts -Seasonal rhinitis/cough: Claritin 10mg daily, Robitussin DM, Flonase -Dental: Top & bottom dentures -CAD: Metoprolol ER 50mg daily, simvastatin 20mg QHS, ASA 81mg -HTN: nifedipine ER  -GERD: Pantoprazole 20mg daily -Diverticulitis: Noted on CT 3/2024, cipro/flagyl, repeat hosp 4/2024 s/p Zosyn -> Augmentin. Ultimately partial colon resection & colostomy established, to undergo potential reversal 12/2024 -Cholecystitis: S/p cholecystectomy 11/2023, no plans to return to surgeon at this time. -T2DM: Unclear control. Metformin ER 2000mg daily. -Osteoporosis: Alendronate 70mg weekly, oyster calcium/D3 500mg-5mcg daily  -Screening/Preventive: Vaccines: Covid, flu, shingles, PNA   DME: Shower chair and bars in the bathroom  OSTOMY SUPPLIES: -Stoma pouch, 90 per month (patient has dementia and frequently dislodges her ostomy bag) (Ordered in effort to provide pt with alternative stoma paste, since Coloplast is causing itching/contact dermatitis) -Convatec stomahesive paste, 1 large tube per month

## 2024-08-11 NOTE — PHYSICAL EXAM
[No Acute Distress] : no acute distress [Normal Sclera/Conjunctiva] : normal sclera/conjunctiva [PERRL] : pupils equal, round and reactive to light [EOMI] : extra ocular movement intact [Normal Outer Ear/Nose] : the ears and nose were normal in appearance [Normal Oropharynx] : the oropharynx was normal [Normal TMs] : both tympanic membranes were normal [Normal Nasal Mucosa] : the nasal mucosa was normal [No JVD] : no jugular venous distention [Supple] : the neck was supple [No LAD] : no lymphadenopathy [Thyroid Normal, No Nodules] : the thyroid was normal and there were no nodules present [No Respiratory Distress] : no respiratory distress [Clear to Auscultation] : lungs were clear to auscultation bilaterally [No Accessory Muscle Use] : no accessory muscle use [Normal Rate] : heart rate was normal  [Regular Rhythm] : with a regular rhythm [Normal S1, S2] : normal S1 and S2 [No Murmurs] : no murmurs heard [Breast Exam Declined] : patient declined to have breast exam done [Normal Bowel Sounds] : normal bowel sounds [Non Tender] : non-tender [Soft] : abdomen soft [Not Distended] : not distended [Patient Refused] : rectal exam was refused by the patient [Normal Supraclavicular Nodes] : no supraclavicular lymphadenopathy [Normal Axillary Nodes] : no axillary lymphadenopathy [Normal Anterior Cervical Nodes] : no anterior cervical lymphadenopathy [No CVA Tenderness] : no ~M costovertebral angle tenderness [No Spinal Tenderness] : no spinal tenderness [No Clubbing, Cyanosis] : no clubbing  or cyanosis of the fingernails [No Rash] : no rash [No Skin Lesions] : no skin lesions [Cranial Nerves Intact] : cranial nerves 2-12 were intact [No Gross Sensory Deficits] : no gross sensory deficits [Normal Affect] : the affect was normal [Kyphosis] : no kyphosis present [Foot Ulcers] : no foot ulcers [de-identified] : Remote and recent memory not intact but able to answer most questions  [de-identified] : unsteady gait [de-identified] : AOx2, remote and recent memory not intact.

## 2024-08-11 NOTE — COUNSELING
[Normal Weight - ( BMI  <25 )] : normal weight - ( BMI  <25 ) [Continue diet as tolerated] : continue diet as tolerated based on goals of care [Non - Smoker] : non-smoker [Smoke/CO Detectors] : smoke/CO detectors [Remove clutter and unsafe carpeting to avoid falls] : remove clutter and unsafe carpeting to avoid falls [] : foot exam [Completed] : Aspirin use discussion completed [Improve mobility] : improve mobility [Maintain functional ability] : maintain functional ability [Discussed disease trajectory with patient/caregiver] : discussed disease trajectory with patient/caregiver [Advanced Directives discussed: ____] : Advanced directives discussed: [unfilled] [Completed Healthcare Proxy] : completed healthcare proxy [Full Code] : Code Status: Full Code [No Limitations] : Treatment Guidelines: No limitations [Trial of Intubation] : Intubation: Trial of Intubation [_____] : HCP: [unfilled] [FreeTextEntry3] : low sodium diet  [de-identified] : MOLST deferred until next visit. [FreeTextEntry4] : Stay healthy, improve low mood.

## 2024-08-11 NOTE — H&P ADULT - NSHPREVIEWOFSYSTEMS_GEN_ALL_CORE
REVIEW OF SYSTEMS:    CONSTITUTIONAL: No weakness, fever, chills or sweating  EYES/ENT: No visual changes.  No dysphagia  NECK: No pain or stiffness  RESPIRATORY: No cough or hemoptysis.  No shortness of breath  CARDIOVASCULAR: No chest pain or palpitations.  No lower extremity edema  GASTROINTESTINAL: Nausea and vomiting prior to coming to the Hospital.  No epigastric or abdominal pain. Burning at the colostomy bag site.  No hematemesis.  No diarrhea or constipation. No melena or hematochezia.  GENITOURINARY: No dysuria, frequency or hematuria  MUSCULOSKELETAL: No joint pain, swelling, decreased ROM, erythema, warmth  NEUROLOGICAL: No numbness or weakness  PSYCHIATRY: No anxiety, or depression.  SKIN: No itching, burning, rashes, or lesions   All other review of systems is negative unless indicated above.

## 2024-08-11 NOTE — END OF VISIT
[Time Spent: ___ minutes] : I have spent [unfilled] minutes of time on the encounter. [FreeTextEntry3] : All medical record entries made by the Scribe were at my, Dr. Church, direction and personally dictated by me on 08/09/2024. I have reviewed the chart and agree that the record accurately reflects my personal performance of the history, physical exam, assessment, and plan. I have also personally directed, reviewed, and agreed with the chart.

## 2024-08-11 NOTE — H&P ADULT - PROBLEM SELECTOR PLAN 2
- dry lips, very dry oral mucosa, hemoconcentrated lab-work, in the setting of fluid loss from vomiting, insensible losses due to infection  - prescribing LR one liter bolus, followed by LR at 100 mL/Hr x 10 hours  - encourage oral hydration, as tolerated  - f/u electrolytes, renal function, clinical status for signs of improvement/resolution

## 2024-08-11 NOTE — ED ADULT NURSE REASSESSMENT NOTE - NS ED NURSE REASSESS COMMENT FT1
Patient's O2 sat is 85% on a room air, Dr. Hill at the bedside for evaluation. Patient is cardiac monitor sinus rhythm w/O2 sat 100% on a 2L/NC. Patient denies SOB, chest pain, fever. Side rails up and safety maintained. Fall precaution in place. Call bells within reach. Patient's O2 sat is 85% on a room air, Dr. Hill notified and at the bedside for evaluation. Patient is cardiac monitor sinus rhythm w/O2 sat 100% on a 2L/NC. Patient denies SOB, chest pain, fever. Side rails up and safety maintained. Fall precaution in place. Call bells within reach.

## 2024-08-11 NOTE — H&P ADULT - NSHPLABSRESULTS_GEN_ALL_CORE
LAB-WORK/STUDIES:                          13.1   13.97 )-----------( 342      ( 11 Aug 2024 15:40 )             38.6     141  |  106  |  15  ----------------------------<  123<H>     08-11  4.1   |  22  |  0.56    Ca    9.2      11 Aug 2024 15:40    TPro  8.8<H>  /  Alb  4.2  /  TBili  1.2  /  DBili  x   /  AST  21  /  ALT  12  /  AlkPhos  117  08-11    hs Troponin, T - 10 ng/L (08-11-24 @ 15:40)    Pro-BNP: 89 (08-11-24 @ 15:40)    ==========================================================================        ==========================================================================  . LAB-WORK/STUDIES:                          13.1   13.97 )-----------( 342      ( 11 Aug 2024 15:40 )             38.6     141  |  106  |  15  ----------------------------<  123<H>     08-11  4.1   |  22  |  0.56    Ca    9.2      11 Aug 2024 15:40    TPro  8.8<H>  /  Alb  4.2  /  TBili  1.2  /  DBili  x   /  AST  21  /  ALT  12  /  AlkPhos  117  08-11    hs Troponin, T - 10 ng/L (08-11-24 @ 15:40)    Pro-BNP: 89 (08-11-24 @ 15:40)    ==========================================================================    ECG = NSR at 79 bpm, QTc = 442, TWI in III, aVF, V3, V4, V5    ==========================================================================  .

## 2024-08-11 NOTE — HEALTH RISK ASSESSMENT
[Two or more falls in past year] : Patient reported two or more falls in the past year [Independent] : feeding [Some assistance needed] : using telephone [Full assistance needed] : managing medications [] : managing finances [Any fall with injury in past year] : Patient reported fall with injury in the past year [Yes] : The patient has visual impairment [HRA Reviewed] : Health Risk Assessment reviewed [[place]] : 14

## 2024-08-11 NOTE — REVIEW OF SYSTEMS
[Muscle Weakness] : muscle weakness [Confusion] : confusion [Memory Loss] : memory loss [Unsteady Walking] : ataxia [Depression] : depression [Negative] : Heme/Lymph [Muscle Pain] : no muscle pain [Back Pain] : no back pain [Headache] : no headache [Dizziness] : no dizziness [Fainting] : no fainting [FreeTextEntry7] : Poor appetite since 12/2023 after gallbladder surgery [de-identified] : complains of feeling depressed, quiet, not motivated, poor appetite since surgery in 12/2023 [FreeTextEntry9] : unsteady gait

## 2024-08-11 NOTE — REVIEW OF SYSTEMS
[Muscle Weakness] : muscle weakness [Confusion] : confusion [Memory Loss] : memory loss [Unsteady Walking] : ataxia [Depression] : depression [Negative] : Heme/Lymph [Muscle Pain] : no muscle pain [Headache] : no headache [Back Pain] : no back pain [Dizziness] : no dizziness [Fainting] : no fainting [FreeTextEntry7] : Poor appetite since 12/2023 after gallbladder surgery [de-identified] : complains of feeling depressed, quiet, not motivated, poor appetite since surgery in 12/2023 [FreeTextEntry9] : unsteady gait

## 2024-08-11 NOTE — ED CLERICAL - NS ED CLERK NOTE PRE-ARRIVAL INFORMATION; ADDITIONAL PRE-ARRIVAL INFORMATION

## 2024-08-11 NOTE — H&P ADULT - PROBLEM SELECTOR PLAN 5
- family with multiple issues at present and is unable to adequately focus on the patient (especially in the context of repeatedly removing the colostomy bag  - Social Work consult
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
## 2024-08-11 NOTE — H&P ADULT - ASSESSMENT
[ x ]  Lab studies personally reviewed  [ x ]  Radiology personally reviewed  [ x ]  Old records personally reviewed    78 year-old female, with past history significant for Dementia, Type 2 DM, Glaucoma, HTN, Osteoarthritis, Diverticulitis and Colostomy, presented to the ED secondary to nausea, vomiting and removal of ostomy bag.  Diagnosed with Hypoxia and Nausea and Vomiting in the ED.

## 2024-08-11 NOTE — HEALTH RISK ASSESSMENT
[Two or more falls in past year] : Patient reported two or more falls in the past year [Independent] : feeding [Some assistance needed] : using telephone [Full assistance needed] : managing medications [] : managing finances [Any fall with injury in past year] : Patient reported fall with injury in the past year [Yes] : The patient has visual impairment [HRA Reviewed] : Health Risk Assessment reviewed [[facility]] : 14

## 2024-08-11 NOTE — H&P ADULT - PROBLEM SELECTOR PLAN 3
- reportedly removed same and has since been replaced in the ED, per report  - patient indicates burning sensation at the site  - Tylenol PRN mild pain  - evaluate for any signs of gross infection - especially at the site

## 2024-08-11 NOTE — ED ADULT NURSE NOTE - OBJECTIVE STATEMENT
Received patient in room 7 c/o removal of colostomy bag, abdominal pain, nausea, vomiting today. Patient denies SOB, chest pain. Patient is A&OX3, airway patent, breathing unlabored and even, radial pulses palpable, colostomy bag on LLQ abdomen noted, abdomen soft, tender. Awaiting MD orders. Side rails up and safety maintained. Fall precaution in place. Call bells within reach. Received patient in room 7 c/o abdominal pain, nausea, vomiting, pulling out colostomy bag today. Patient denies SOB, chest pain. Patient is A&OX3, airway patent, breathing unlabored and even, radial pulses palpable, colostomy bag on LLQ abdomen noted, abdomen soft, tender. Awaiting MD orders. Side rails up and safety maintained. Fall precaution in place. Call bells within reach. Received patient in room 7 c/o abdominal pain, nausea, vomiting, pulling out colostomy bag today. Patient denies SOB, chest pain. PMHX HTN, Diverticulitis. Patient is A&OX3, airway patent, breathing unlabored and even, radial pulses palpable, colostomy bag on LLQ abdomen noted, abdomen soft, tender. Patient has 20G IV on right hand from EMS. Awaiting MD orders. Side rails up and safety maintained. Fall precaution in place. Call bells within reach.

## 2024-08-11 NOTE — ED ADULT TRIAGE NOTE - CHIEF COMPLAINT QUOTE
Pt coming to ER after pulling out her own colostomy bag. Pt was found to be vomiting and experiencing abdominal pain. as per EMS this is patient's baseline other than the abdominal pain. pt arrives with no colostomy supplies and a jomar over her ostomy site. Pt noted to be pale on assessment. Pt received 4 of Zofran with EMS. Note to have #20g placed to R hand. Noted to be pale on assessment.  HxT2DM, Dementia, GERD, Diverticulitis, TIA

## 2024-08-11 NOTE — ED ADULT NURSE NOTE - NSFALLHARMRISKINTERV_ED_ALL_ED

## 2024-08-11 NOTE — ED ADULT TRIAGE NOTE - LOCATION:
Pt presents from OSH for NSTEMI trop elevated 199, pt states presented to hospital for cp started Wednesday without aggravating factors states intermittent intensity, PMH HTN. Pt denies sob dizziness, endorse nausea at times with cp no vomiting, fever chills. Pt reports mechanical fall 2 weeks  landing on her knees and hands no chest or head injury Left arm; Pt presents from OSH for NSTEMI trop elevated 199, pt received nitro Plavix aspirin started on heparin drip after bolus drip started 9.5 units at 130am, pt states presented to hospital for cp started Wednesday without aggravating factors states intermittent intensity, PMH HTN. Pt denies sob dizziness, endorse nausea at times with cp no vomiting, fever chills. Pt reports mechanical fall 2 weeks  landing on her knees and hands no chest or head injury

## 2024-08-11 NOTE — H&P ADULT - NSHPPHYSICALEXAM_GEN_ALL_CORE
Vital Signs Last 24 Hrs  T(C): 36.7 (11 Aug 2024 22:30), Max: 36.7 (11 Aug 2024 22:30)  T(F): 98 (11 Aug 2024 22:30), Max: 98 (11 Aug 2024 22:30)  HR: 63 (11 Aug 2024 22:30) (62 - 78)  BP: 114/68 (11 Aug 2024 22:30) (106/65 - 147/76)  BP(mean): 82 (11 Aug 2024 22:30) (78 - 82)  RR: 17 (11 Aug 2024 22:30) (16 - 18)  SpO2: 98% (11 Aug 2024 22:30) (96% - 100%)    Parameters below as of 11 Aug 2024 22:30  Patient On (Oxygen Delivery Method): nasal cannula  O2 Flow (L/min): 3  =============================================== Vital Signs Last 24 Hrs  T(C): 36.7 (11 Aug 2024 22:30), Max: 36.7 (11 Aug 2024 22:30)  T(F): 98 (11 Aug 2024 22:30), Max: 98 (11 Aug 2024 22:30)  HR: 63 (11 Aug 2024 22:30) (62 - 78)  BP: 114/68 (11 Aug 2024 22:30) (106/65 - 147/76)  BP(mean): 82 (11 Aug 2024 22:30) (78 - 82)  RR: 17 (11 Aug 2024 22:30) (16 - 18)  SpO2: 98% (11 Aug 2024 22:30) (96% - 100%)    Parameters below as of 11 Aug 2024 22:30  Patient On (Oxygen Delivery Method): nasal cannula  O2 Flow (L/min): 3  ===============================================  PHYSICAL EXAMINATION:    APPEARANCE: Adequately groomed, thin female, forgetful, lying propped up in stretcher in NAD  HEENT: Very dry lips.  Parched oral mucosa.  Pupils reactive to light.  EOMI  LYMPHATIC: No lymphadenopathy appreciated  CARDIOVASCULAR: (+) S1 S2.  No JVD.  No murmurs.  No edema  RESPIRATORY: No wheezing, rhonchi, crackles appreciated  GASTROINTESTINAL: Soft.  Colostomy bag in place to left abdomen.  (+) BS.  Non-tender.  (+) BS  GENITOURINARY: No suprapubic tenderness.  No CVA tenderness B/L  EXTREMITIES: Normal range of motion.  No clubbing, cyanosis or edema  MUSCULOSKELETAL: No atrophy.  No asymmetry.  Good ROM  SKIN: Colostomy bag in place to left abdomen.  No rashes. No ecchymoses.  No cyanosis  PSYCHIATRIC: A&O x 3.  Appropriate mood and affect.  Cooperative.  Pleasant.  Forgetful  NEUROLOGICAL: Non-focal, MCCANN x 4 against gravity  VASCULAR: Peripheral pulses palpable

## 2024-08-11 NOTE — PROVIDER CONTACT NOTE (OTHER) - ASSESSMENT
medical team referred this case to get collateral. Writer reviewed the chart and contacted pt's son Trevin [phone] contacted multiple call requesting a phone call but no response yet. medical team was made aware. medical team referred this case to get collateral. Writer reviewed the chart and contacted pt's son Trevin [phone] and writer informed niece is at bedside - Andressa [phone] medical team was made aware.

## 2024-08-11 NOTE — H&P ADULT - PROBLEM SELECTOR PLAN 1
- COVID-19 PCR positive  - O2 saturation down to 82% on room; improves with supplemental oxygen (4L) via NC  - CXR without any acute findings.  CT of A/P negative for acute pathology also  - HOB elevation, aspiration precautions  - duo-nebs PRN  - s/p prednisone 50 mg in the ED; will continue w/ steroid - dexamethasone 6 mg daily to complete 10 days  - ensure optimal hydration; signs of dehydration, in the context of vomiting, infection with insensible losses  - prescribing IVF of LR one liter over 2  hours, then IVF of LR at 100 mL/Hr x 10 hours (to be re-evaluated thereafter)  - continues on oxygen support; nasal cannula 4 liters (if in place for a long period, would humidify)  - prone positioning if/as needed  - other supportive care, as needed

## 2024-08-12 ENCOUNTER — NON-APPOINTMENT (OUTPATIENT)
Age: 79
End: 2024-08-12

## 2024-08-12 DIAGNOSIS — Z93.3 COLOSTOMY STATUS: ICD-10-CM

## 2024-08-12 DIAGNOSIS — Z75.8 OTHER PROBLEMS RELATED TO MEDICAL FACILITIES AND OTHER HEALTH CARE: ICD-10-CM

## 2024-08-12 DIAGNOSIS — E11.9 TYPE 2 DIABETES MELLITUS WITHOUT COMPLICATIONS: ICD-10-CM

## 2024-08-12 DIAGNOSIS — Z29.9 ENCOUNTER FOR PROPHYLACTIC MEASURES, UNSPECIFIED: ICD-10-CM

## 2024-08-12 DIAGNOSIS — E86.0 DEHYDRATION: ICD-10-CM

## 2024-08-12 DIAGNOSIS — U07.1 COVID-19: ICD-10-CM

## 2024-08-12 DIAGNOSIS — Z93.3 COLOSTOMY STATUS: Chronic | ICD-10-CM

## 2024-08-12 LAB
24R-OH-CALCIDIOL SERPL-MCNC: 27.3 NG/ML — LOW (ref 30–80)
A1C WITH ESTIMATED AVERAGE GLUCOSE RESULT: 5.7 % — HIGH (ref 4–5.6)
ANION GAP SERPL CALC-SCNC: 12 MMOL/L — SIGNIFICANT CHANGE UP (ref 7–14)
ANISOCYTOSIS BLD QL: SLIGHT — SIGNIFICANT CHANGE UP
BASE EXCESS BLDV CALC-SCNC: -1.2 MMOL/L — SIGNIFICANT CHANGE UP (ref -2–3)
BASOPHILS # BLD AUTO: 0 K/UL — SIGNIFICANT CHANGE UP (ref 0–0.2)
BASOPHILS NFR BLD AUTO: 0 % — SIGNIFICANT CHANGE UP (ref 0–2)
BLOOD GAS VENOUS COMPREHENSIVE RESULT: SIGNIFICANT CHANGE UP
BUN SERPL-MCNC: 15 MG/DL — SIGNIFICANT CHANGE UP (ref 7–23)
CALCIUM SERPL-MCNC: 9.1 MG/DL — SIGNIFICANT CHANGE UP (ref 8.4–10.5)
CHLORIDE BLDV-SCNC: 104 MMOL/L — SIGNIFICANT CHANGE UP (ref 96–108)
CHLORIDE SERPL-SCNC: 102 MMOL/L — SIGNIFICANT CHANGE UP (ref 98–107)
CO2 BLDV-SCNC: 28.1 MMOL/L — HIGH (ref 22–26)
CO2 SERPL-SCNC: 23 MMOL/L — SIGNIFICANT CHANGE UP (ref 22–31)
CREAT SERPL-MCNC: 0.58 MG/DL — SIGNIFICANT CHANGE UP (ref 0.5–1.3)
EGFR: 94 ML/MIN/1.73M2 — SIGNIFICANT CHANGE UP
EOSINOPHIL # BLD AUTO: 0 K/UL — SIGNIFICANT CHANGE UP (ref 0–0.5)
EOSINOPHIL NFR BLD AUTO: 0 % — SIGNIFICANT CHANGE UP (ref 0–6)
ESTIMATED AVERAGE GLUCOSE: 117 — SIGNIFICANT CHANGE UP
GAS PNL BLDV: 137 MMOL/L — SIGNIFICANT CHANGE UP (ref 136–145)
GAS PNL BLDV: SIGNIFICANT CHANGE UP
GIANT PLATELETS BLD QL SMEAR: PRESENT — SIGNIFICANT CHANGE UP
GLUCOSE BLDC GLUCOMTR-MCNC: 139 MG/DL — HIGH (ref 70–99)
GLUCOSE BLDC GLUCOMTR-MCNC: 167 MG/DL — HIGH (ref 70–99)
GLUCOSE BLDC GLUCOMTR-MCNC: 179 MG/DL — HIGH (ref 70–99)
GLUCOSE BLDC GLUCOMTR-MCNC: 184 MG/DL — HIGH (ref 70–99)
GLUCOSE BLDV-MCNC: 170 MG/DL — HIGH (ref 70–99)
GLUCOSE SERPL-MCNC: 168 MG/DL — HIGH (ref 70–99)
HCO3 BLDV-SCNC: 26 MMOL/L — SIGNIFICANT CHANGE UP (ref 22–29)
HCT VFR BLD CALC: 41.1 % — SIGNIFICANT CHANGE UP (ref 34.5–45)
HCT VFR BLDA CALC: 42 % — SIGNIFICANT CHANGE UP (ref 34.5–46.5)
HGB BLD CALC-MCNC: 14 G/DL — SIGNIFICANT CHANGE UP (ref 11.7–16.1)
HGB BLD-MCNC: 13.4 G/DL — SIGNIFICANT CHANGE UP (ref 11.5–15.5)
IANC: 7.06 K/UL — SIGNIFICANT CHANGE UP (ref 1.8–7.4)
LACTATE BLDV-MCNC: 1.8 MMOL/L — SIGNIFICANT CHANGE UP (ref 0.5–2)
LYMPHOCYTES # BLD AUTO: 2.06 K/UL — SIGNIFICANT CHANGE UP (ref 1–3.3)
LYMPHOCYTES # BLD AUTO: 22.9 % — SIGNIFICANT CHANGE UP (ref 13–44)
MACROCYTES BLD QL: SLIGHT — SIGNIFICANT CHANGE UP
MAGNESIUM SERPL-MCNC: 2 MG/DL — SIGNIFICANT CHANGE UP (ref 1.6–2.6)
MANUAL SMEAR VERIFICATION: SIGNIFICANT CHANGE UP
MCHC RBC-ENTMCNC: 29.8 PG — SIGNIFICANT CHANGE UP (ref 27–34)
MCHC RBC-ENTMCNC: 32.6 GM/DL — SIGNIFICANT CHANGE UP (ref 32–36)
MCV RBC AUTO: 91.5 FL — SIGNIFICANT CHANGE UP (ref 80–100)
MONOCYTES # BLD AUTO: 0 K/UL — SIGNIFICANT CHANGE UP (ref 0–0.9)
MONOCYTES NFR BLD AUTO: 0 % — LOW (ref 2–14)
NEUTROPHILS # BLD AUTO: 6.93 K/UL — SIGNIFICANT CHANGE UP (ref 1.8–7.4)
NEUTROPHILS NFR BLD AUTO: 77.1 % — HIGH (ref 43–77)
NT-PROBNP SERPL-SCNC: 84 PG/ML — SIGNIFICANT CHANGE UP
OVALOCYTES BLD QL SMEAR: SLIGHT — SIGNIFICANT CHANGE UP
PCO2 BLDV: 55 MMHG — HIGH (ref 39–52)
PH BLDV: 7.29 — LOW (ref 7.32–7.43)
PHOSPHATE SERPL-MCNC: 3 MG/DL — SIGNIFICANT CHANGE UP (ref 2.5–4.5)
PLAT MORPH BLD: NORMAL — SIGNIFICANT CHANGE UP
PLATELET # BLD AUTO: 362 K/UL — SIGNIFICANT CHANGE UP (ref 150–400)
PLATELET COUNT - ESTIMATE: NORMAL — SIGNIFICANT CHANGE UP
PO2 BLDV: 34 MMHG — SIGNIFICANT CHANGE UP (ref 25–45)
POIKILOCYTOSIS BLD QL AUTO: SLIGHT — SIGNIFICANT CHANGE UP
POLYCHROMASIA BLD QL SMEAR: SIGNIFICANT CHANGE UP
POTASSIUM BLDV-SCNC: 3.9 MMOL/L — SIGNIFICANT CHANGE UP (ref 3.5–5.1)
POTASSIUM SERPL-MCNC: 3.7 MMOL/L — SIGNIFICANT CHANGE UP (ref 3.5–5.3)
POTASSIUM SERPL-SCNC: 3.7 MMOL/L — SIGNIFICANT CHANGE UP (ref 3.5–5.3)
PROCALCITONIN SERPL-MCNC: 0.08 NG/ML — SIGNIFICANT CHANGE UP (ref 0.02–0.1)
RBC # BLD: 4.49 M/UL — SIGNIFICANT CHANGE UP (ref 3.8–5.2)
RBC # FLD: 15.5 % — HIGH (ref 10.3–14.5)
RBC BLD AUTO: ABNORMAL
SAO2 % BLDV: 51.5 % — LOW (ref 67–88)
SODIUM SERPL-SCNC: 137 MMOL/L — SIGNIFICANT CHANGE UP (ref 135–145)
WBC # BLD: 8.99 K/UL — SIGNIFICANT CHANGE UP (ref 3.8–10.5)
WBC # FLD AUTO: 8.99 K/UL — SIGNIFICANT CHANGE UP (ref 3.8–10.5)

## 2024-08-12 PROCEDURE — 99223 1ST HOSP IP/OBS HIGH 75: CPT

## 2024-08-12 RX ORDER — REMDESIVIR 5 MG/ML
100 INJECTION INTRAVENOUS EVERY 24 HOURS
Refills: 0 | Status: COMPLETED | OUTPATIENT
Start: 2024-08-13 | End: 2024-08-16

## 2024-08-12 RX ORDER — DEXTROSE 15 G/33 G
25 GEL IN PACKET (GRAM) ORAL ONCE
Refills: 0 | Status: DISCONTINUED | OUTPATIENT
Start: 2024-08-12 | End: 2024-08-19

## 2024-08-12 RX ORDER — ASCORBIC ACID/ASCORBATE SODIUM 500 MG
500 TABLET,CHEWABLE ORAL DAILY
Refills: 0 | Status: DISCONTINUED | OUTPATIENT
Start: 2024-08-12 | End: 2024-08-19

## 2024-08-12 RX ORDER — DEXTROSE 15 G/33 G
15 GEL IN PACKET (GRAM) ORAL ONCE
Refills: 0 | Status: DISCONTINUED | OUTPATIENT
Start: 2024-08-12 | End: 2024-08-19

## 2024-08-12 RX ORDER — GLUCAGON INJECTION, SOLUTION 1 MG/.2ML
1 INJECTION, SOLUTION SUBCUTANEOUS ONCE
Refills: 0 | Status: DISCONTINUED | OUTPATIENT
Start: 2024-08-12 | End: 2024-08-19

## 2024-08-12 RX ORDER — NIFEDIPINE 60 MG/1
60 TABLET, FILM COATED, EXTENDED RELEASE ORAL DAILY
Refills: 0 | Status: DISCONTINUED | OUTPATIENT
Start: 2024-08-12 | End: 2024-08-19

## 2024-08-12 RX ORDER — MEMANTINE 7 MG/1
28 CAPSULE, EXTENDED RELEASE ORAL DAILY
Refills: 0 | Status: DISCONTINUED | OUTPATIENT
Start: 2024-08-12 | End: 2024-08-12

## 2024-08-12 RX ORDER — HALOPERIDOL 1 MG
2.5 TABLET ORAL ONCE
Refills: 0 | Status: COMPLETED | OUTPATIENT
Start: 2024-08-12 | End: 2024-08-12

## 2024-08-12 RX ORDER — ASPIRIN 81 MG
81 TABLET, DELAYED RELEASE (ENTERIC COATED) ORAL DAILY
Refills: 0 | Status: DISCONTINUED | OUTPATIENT
Start: 2024-08-12 | End: 2024-08-19

## 2024-08-12 RX ORDER — MEMANTINE 7 MG/1
10 CAPSULE, EXTENDED RELEASE ORAL EVERY 12 HOURS
Refills: 0 | Status: DISCONTINUED | OUTPATIENT
Start: 2024-08-12 | End: 2024-08-19

## 2024-08-12 RX ORDER — METOPROLOL TARTRATE 100 MG/1
25 TABLET ORAL DAILY
Refills: 0 | Status: DISCONTINUED | OUTPATIENT
Start: 2024-08-12 | End: 2024-08-19

## 2024-08-12 RX ORDER — CITALOPRAM HYDROBROMIDE 40 MG
10 TABLET ORAL DAILY
Refills: 0 | Status: DISCONTINUED | OUTPATIENT
Start: 2024-08-12 | End: 2024-08-19

## 2024-08-12 RX ORDER — FOLIC ACID/MULTIVIT,IRON,MINER 0.4MG-18MG
1000 TABLET,CHEWABLE ORAL DAILY
Refills: 0 | Status: DISCONTINUED | OUTPATIENT
Start: 2024-08-12 | End: 2024-08-19

## 2024-08-12 RX ORDER — ZINC SULFATE 50(220)MG
220 CAPSULE ORAL DAILY
Refills: 0 | Status: COMPLETED | OUTPATIENT
Start: 2024-08-12 | End: 2024-08-18

## 2024-08-12 RX ORDER — DEXTROSE 15 G/33 G
12.5 GEL IN PACKET (GRAM) ORAL ONCE
Refills: 0 | Status: DISCONTINUED | OUTPATIENT
Start: 2024-08-12 | End: 2024-08-19

## 2024-08-12 RX ORDER — ACETAMINOPHEN 325 MG/1
650 TABLET ORAL EVERY 6 HOURS
Refills: 0 | Status: DISCONTINUED | OUTPATIENT
Start: 2024-08-12 | End: 2024-08-19

## 2024-08-12 RX ORDER — DEXAMETHASONE 0.75 MG
6 TABLET ORAL DAILY
Refills: 0 | Status: DISCONTINUED | OUTPATIENT
Start: 2024-08-12 | End: 2024-08-19

## 2024-08-12 RX ORDER — REMDESIVIR 5 MG/ML
INJECTION INTRAVENOUS
Refills: 0 | Status: COMPLETED | OUTPATIENT
Start: 2024-08-12 | End: 2024-08-16

## 2024-08-12 RX ORDER — SIMVASTATIN 10 MG
20 TABLET ORAL AT BEDTIME
Refills: 0 | Status: DISCONTINUED | OUTPATIENT
Start: 2024-08-12 | End: 2024-08-19

## 2024-08-12 RX ORDER — REMDESIVIR 5 MG/ML
200 INJECTION INTRAVENOUS EVERY 24 HOURS
Refills: 0 | Status: COMPLETED | OUTPATIENT
Start: 2024-08-12 | End: 2024-08-12

## 2024-08-12 RX ADMIN — NIFEDIPINE 60 MILLIGRAM(S): 60 TABLET, FILM COATED, EXTENDED RELEASE ORAL at 14:56

## 2024-08-12 RX ADMIN — ACETAMINOPHEN 650 MILLIGRAM(S): 325 TABLET ORAL at 18:30

## 2024-08-12 RX ADMIN — Medication 81 MILLIGRAM(S): at 14:57

## 2024-08-12 RX ADMIN — Medication 75 MILLIGRAM(S): at 14:57

## 2024-08-12 RX ADMIN — REMDESIVIR 200 MILLIGRAM(S): 5 INJECTION INTRAVENOUS at 21:35

## 2024-08-12 RX ADMIN — Medication 2.5 MILLIGRAM(S): at 05:04

## 2024-08-12 RX ADMIN — Medication 6 MILLIGRAM(S): at 13:39

## 2024-08-12 RX ADMIN — Medication 500 MILLIGRAM(S): at 12:42

## 2024-08-12 RX ADMIN — Medication 100 MILLILITER(S): at 06:08

## 2024-08-12 RX ADMIN — Medication 500 MILLILITER(S): at 02:46

## 2024-08-12 RX ADMIN — Medication 10 MILLIGRAM(S): at 14:57

## 2024-08-12 RX ADMIN — Medication 1: at 09:17

## 2024-08-12 RX ADMIN — ENOXAPARIN SODIUM 40 MILLIGRAM(S): 100 INJECTION SUBCUTANEOUS at 12:42

## 2024-08-12 RX ADMIN — Medication 100 MILLILITER(S): at 08:40

## 2024-08-12 RX ADMIN — Medication 220 MILLIGRAM(S): at 13:39

## 2024-08-12 RX ADMIN — Medication 1: at 12:44

## 2024-08-12 RX ADMIN — ACETAMINOPHEN 650 MILLIGRAM(S): 325 TABLET ORAL at 12:43

## 2024-08-12 RX ADMIN — Medication 1 TABLET(S): at 12:43

## 2024-08-12 RX ADMIN — Medication 1000 UNIT(S): at 12:43

## 2024-08-12 RX ADMIN — ACETAMINOPHEN 650 MILLIGRAM(S): 325 TABLET ORAL at 18:00

## 2024-08-12 RX ADMIN — ACETAMINOPHEN 650 MILLIGRAM(S): 325 TABLET ORAL at 13:15

## 2024-08-12 RX ADMIN — METOPROLOL TARTRATE 25 MILLIGRAM(S): 100 TABLET ORAL at 14:57

## 2024-08-12 RX ADMIN — Medication 20 MILLIGRAM(S): at 21:35

## 2024-08-12 NOTE — PATIENT PROFILE ADULT - FALL HARM RISK - HARM RISK INTERVENTIONS

## 2024-08-12 NOTE — PROGRESS NOTE ADULT - PROBLEM SELECTOR PLAN 1
- COVID-19 PCR positive  - O2 saturation down to 82% on room; improves with supplemental oxygen (4L) via NC  - CXR without any acute findings.  CT of A/P negative for acute pathology also  - HOB elevation, aspiration precautions  - duo-nebs PRN  - s/p prednisone 50 mg in the ED; will continue w/ steroid - dexamethasone 6 mg daily to complete 10 days  - ensure optimal hydration; signs of dehydration, in the context of vomiting, infection with insensible losses  - prescribing IVF of LR one liter over 2  hours, then IVF of LR at 100 mL/Hr x 10 hours (to be re-evaluated thereafter)  - continues on oxygen support; nasal cannula 4 liters (if in place for a long period, would humidify)  - Remdesivir as per ID

## 2024-08-12 NOTE — PROGRESS NOTE ADULT - PROBLEM SELECTOR PLAN 4
- glucose = 123 on CMP.  Last A1c - 6.3 on 05/11  - appears to be on metformin PTA;  held  - L-ISS, per FS  - consistent carb diet

## 2024-08-12 NOTE — ED ADULT NURSE REASSESSMENT NOTE - NS ED NURSE REASSESS COMMENT FT1
Pt standing by door. Pt stating "I want to go home I will leave". Pt taking off nasal cannula and pulled out IV. Haldol given as per ACP ordered. 20G IV placed in right AC. Respirations are even and unlabored, 4L nasal cannula. NSR on monitor. IV is patent and intact. Bed in lowest position, comfort measures provided safety maintained.

## 2024-08-12 NOTE — PATIENT PROFILE ADULT - MST ORDER GENERATE MLM HIDDEN
Quality 130: Documentation Of Current Medications In The Medical Record: Current Medications Documented Quality 110: Preventive Care And Screening: Influenza Immunization: Influenza Immunization Ordered or Recommended, but not Administered due to system reason Quality 226: Preventive Care And Screening: Tobacco Use: Screening And Cessation Intervention: Patient screened for tobacco use and is an ex/non-smoker Detail Level: Detailed Quality 47: Advance Care Plan: Advance care planning not documented, reason not otherwise specified. Quality 111:Pneumonia Vaccination Status For Older Adults: Patient did not receive any pneumococcal conjugate or polysaccharide vaccine on or after their 60th birthday and before the end of the measurement period Quality 431: Preventive Care And Screening: Unhealthy Alcohol Use - Screening: Patient not identified as an unhealthy alcohol user when screened for unhealthy alcohol use using a systematic screening method MST Score 2 or >

## 2024-08-12 NOTE — CONSULT NOTE ADULT - ASSESSMENT
78 year old with dementia presents with recent covid infection and hypoxia.   Repeat testing positive for COVID.    1) COVID  associated hypoxia- documented at 82 % and 85 % in nursing assessments  Start remdesivir for 5 days. Unclear when previous recent diagnosis of COVID was.   Continue dexamethasone  Check CT chest

## 2024-08-12 NOTE — ED ADULT NURSE REASSESSMENT NOTE - NS ED NURSE REASSESS COMMENT FT1
Break RN: Pt resting in stretcher, offers no complaints. Respirations equal and unlabored. Pt at baseline mental status. IV patent. NSR on cardiac monitor. Pt had removed nasal canula and O2 sat dropped to 82%, pt redirected to keep nasal canula on face, O2 sat improved to 97%. Pt admitted, pending inpatient bed assignment. No acute distress noted. Safety maintained, bed in lowest position, side rails raised, call bell in reach.

## 2024-08-12 NOTE — ED ADULT NURSE REASSESSMENT NOTE - NS ED NURSE REASSESS COMMENT FT1
Break RN: Pt out of stretcher attempting to leave the room, pt educated on being COVID+ and needing to stay in the room due to isolation precautions. Pt redirected to stretcher, but will not let RN placed nasal canula on face. Pt yelling, "do not put this on me I will scream, call my granddaughter." O2 sat 92% on room air. CONCHITA Evans made aware, awaiting further orders at this time. Safety maintained.

## 2024-08-12 NOTE — ED ADULT NURSE REASSESSMENT NOTE - NS ED NURSE REASSESS COMMENT FT1
Received report from break ALEXANDRA Reich. pt lying in stretcher, not in acute distress, denies pain at this time. Pt states "I want to go home". pt able to be redirected to stay in bed. To hold on giving Haldol at this time as patient is being cooperative. Respirations are even and unlabored, NSR on monitor. IV is patent and intact. Vitals as charted. Bed in lowest position, comfort measures provided, safety maintained.

## 2024-08-12 NOTE — ED ADULT NURSE REASSESSMENT NOTE - NS ED NURSE REASSESS COMMENT FT1
Pt lying in stretcher, not in acute distress, denies pain at this time. Respirations are even and unlabored, NSR on monitor, IV is patent and intact. Colostomy bag drained. Morning labs sent. Maintenance fluids running as per MD order. Bed in lowest position, comfort measures provided safety maintained.

## 2024-08-13 LAB
ANION GAP SERPL CALC-SCNC: 11 MMOL/L — SIGNIFICANT CHANGE UP (ref 7–14)
BUN SERPL-MCNC: 15 MG/DL — SIGNIFICANT CHANGE UP (ref 7–23)
CALCIUM SERPL-MCNC: 9.4 MG/DL — SIGNIFICANT CHANGE UP (ref 8.4–10.5)
CHLORIDE SERPL-SCNC: 100 MMOL/L — SIGNIFICANT CHANGE UP (ref 98–107)
CO2 SERPL-SCNC: 25 MMOL/L — SIGNIFICANT CHANGE UP (ref 22–31)
CREAT SERPL-MCNC: 0.49 MG/DL — LOW (ref 0.5–1.3)
EGFR: 98 ML/MIN/1.73M2 — SIGNIFICANT CHANGE UP
GLUCOSE BLDC GLUCOMTR-MCNC: 106 MG/DL — HIGH (ref 70–99)
GLUCOSE BLDC GLUCOMTR-MCNC: 108 MG/DL — HIGH (ref 70–99)
GLUCOSE BLDC GLUCOMTR-MCNC: 156 MG/DL — HIGH (ref 70–99)
GLUCOSE BLDC GLUCOMTR-MCNC: 209 MG/DL — HIGH (ref 70–99)
GLUCOSE SERPL-MCNC: 124 MG/DL — HIGH (ref 70–99)
HCT VFR BLD CALC: 38.9 % — SIGNIFICANT CHANGE UP (ref 34.5–45)
HGB BLD-MCNC: 13.4 G/DL — SIGNIFICANT CHANGE UP (ref 11.5–15.5)
MAGNESIUM SERPL-MCNC: 2 MG/DL — SIGNIFICANT CHANGE UP (ref 1.6–2.6)
MCHC RBC-ENTMCNC: 30.5 PG — SIGNIFICANT CHANGE UP (ref 27–34)
MCHC RBC-ENTMCNC: 34.4 GM/DL — SIGNIFICANT CHANGE UP (ref 32–36)
MCV RBC AUTO: 88.4 FL — SIGNIFICANT CHANGE UP (ref 80–100)
NRBC # BLD: 0 /100 WBCS — SIGNIFICANT CHANGE UP (ref 0–0)
NRBC # FLD: 0 K/UL — SIGNIFICANT CHANGE UP (ref 0–0)
PHOSPHATE SERPL-MCNC: 2.1 MG/DL — LOW (ref 2.5–4.5)
PLATELET # BLD AUTO: 387 K/UL — SIGNIFICANT CHANGE UP (ref 150–400)
POTASSIUM SERPL-MCNC: 3.6 MMOL/L — SIGNIFICANT CHANGE UP (ref 3.5–5.3)
POTASSIUM SERPL-SCNC: 3.6 MMOL/L — SIGNIFICANT CHANGE UP (ref 3.5–5.3)
RBC # BLD: 4.4 M/UL — SIGNIFICANT CHANGE UP (ref 3.8–5.2)
RBC # FLD: 15.3 % — HIGH (ref 10.3–14.5)
SODIUM SERPL-SCNC: 136 MMOL/L — SIGNIFICANT CHANGE UP (ref 135–145)
WBC # BLD: 11.47 K/UL — HIGH (ref 3.8–10.5)
WBC # FLD AUTO: 11.47 K/UL — HIGH (ref 3.8–10.5)

## 2024-08-13 PROCEDURE — 99232 SBSQ HOSP IP/OBS MODERATE 35: CPT

## 2024-08-13 RX ORDER — POTASSIUM PHOSPHATE 236; 224 MG/ML; MG/ML
15 INJECTION, SOLUTION INTRAVENOUS ONCE
Refills: 0 | Status: COMPLETED | OUTPATIENT
Start: 2024-08-13 | End: 2024-08-13

## 2024-08-13 RX ADMIN — MEMANTINE 10 MILLIGRAM(S): 7 CAPSULE, EXTENDED RELEASE ORAL at 18:41

## 2024-08-13 RX ADMIN — MEMANTINE 10 MILLIGRAM(S): 7 CAPSULE, EXTENDED RELEASE ORAL at 06:21

## 2024-08-13 RX ADMIN — REMDESIVIR 200 MILLIGRAM(S): 5 INJECTION INTRAVENOUS at 20:54

## 2024-08-13 RX ADMIN — ENOXAPARIN SODIUM 40 MILLIGRAM(S): 100 INJECTION SUBCUTANEOUS at 12:09

## 2024-08-13 RX ADMIN — Medication 6 MILLIGRAM(S): at 06:21

## 2024-08-13 RX ADMIN — NIFEDIPINE 60 MILLIGRAM(S): 60 TABLET, FILM COATED, EXTENDED RELEASE ORAL at 06:21

## 2024-08-13 RX ADMIN — Medication 1 TABLET(S): at 12:08

## 2024-08-13 RX ADMIN — POTASSIUM PHOSPHATE 62.5 MILLIMOLE(S): 236; 224 INJECTION, SOLUTION INTRAVENOUS at 12:08

## 2024-08-13 RX ADMIN — Medication 20 MILLIGRAM(S): at 20:55

## 2024-08-13 RX ADMIN — Medication 220 MILLIGRAM(S): at 12:08

## 2024-08-13 RX ADMIN — Medication 1000 UNIT(S): at 12:09

## 2024-08-13 RX ADMIN — Medication 10 MILLIGRAM(S): at 12:09

## 2024-08-13 RX ADMIN — Medication 500 MILLIGRAM(S): at 12:09

## 2024-08-13 RX ADMIN — Medication 1: at 08:55

## 2024-08-13 RX ADMIN — Medication 2: at 13:20

## 2024-08-13 RX ADMIN — Medication 75 MILLIGRAM(S): at 12:17

## 2024-08-13 RX ADMIN — METOPROLOL TARTRATE 25 MILLIGRAM(S): 100 TABLET ORAL at 06:21

## 2024-08-13 RX ADMIN — Medication 81 MILLIGRAM(S): at 12:08

## 2024-08-13 NOTE — PHYSICAL THERAPY INITIAL EVALUATION ADULT - GENERAL OBSERVATIONS, REHAB EVAL
Upon entry, pt semi-supine in bed in NAD; + nasal cannula, + continuous pulse oximeter. HR 66 bpm SpO2 98% on 4L. Pt left as received with all tubes/lines intact, bed alarm on, call bell in reach and in NAD.

## 2024-08-13 NOTE — CONSULT NOTE ADULT - ASSESSMENT
78 year-old female, with past history significant for Dementia, Type 2 DM, Glaucoma, HTN, Osteoarthritis, Diverticulitis and Colostomy, presented to the ED secondary to nausea, vomiting and removal of ostomy bag.  Seen and evaluated at bedside; NAD.  Knows she is in [facility], but is unable to recall the reason for coming in.  When asked about nausea and vomiting, patient indicates recollection of same; states no nausea at present.  No abdominal pain.  No diarrhea or constipation.  Does not feel short of breath and states no chest pain.  No cough, fever, chills.  With regard to removal of the colostomy bag intermittently, patient reports burning discomfort at the site, which results in her molesting the area/bag.  Vital signs upon ED presentation as follows: BP = 147/76, Hr = 78, RR = 17, T = 36.4 C (97.6 F), O2 Sat = 100% on RA.  Diagnosed with Hypoxia and Nausea and Vomiting, and prescribed prednisone 50 mg and quetiapine 50 mg in the ED. (11 Aug 2024 23:28)  now she is found to have covid infection;   currently she is on 4 l of oxygen :  he also says something was leaking from her stomach      covid infection:   DM  HTN  Diveritculitis/colostomy    covid infection:   -she presented with covid infection with hypoxic resp failure;   -she is on 4 L of oxygen at this time;   -cont remdesivir as wellas dexa;   -check d di alexandrea:   -dvt prophylaxis  try to wean oxygen down    -follow lfts and renal functions   DM  -monitor and control   HTN  -controlled  Diveritculitis/colostomy  -seems stable:   dvt prophylaxis    dw acp  Ketoconazole Counseling:   Patient counseled regarding improving absorption with orange juice.  Adverse effects include but are not limited to breast enlargement, headache, diarrhea, nausea, upset stomach, liver function test abnormalities, taste disturbance, and stomach pain.  There is a rare possibility of liver failure that can occur when taking ketoconazole. The patient understands that monitoring of LFTs may be required, especially at baseline. The patient verbalized understanding of the proper use and possible adverse effects of ketoconazole.  All of the patient's questions and concerns were addressed.

## 2024-08-13 NOTE — PHYSICAL THERAPY INITIAL EVALUATION ADULT - GAIT DEVIATIONS NOTED, PT EVAL
forward flexed posture with downward gaze; narrow base of support; scissoring gait without rolling walker/decreased segundo/decreased velocity of limb motion/decreased step length/decreased stride length/decreased weight-shifting ability

## 2024-08-13 NOTE — PHYSICAL THERAPY INITIAL EVALUATION ADULT - GAIT DISTANCE, PT EVAL
40ft without assistive device requiring minimal assistance for occasional loss of balance; 20ft with rolling walker with contact-guard assistance. SpO2 100% on 4L post-ambulation.
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
## 2024-08-13 NOTE — PHYSICAL THERAPY INITIAL EVALUATION ADULT - ADDITIONAL COMMENTS
Pt lives in a private house with her  and family (reports  is hospitalized right now); there is 1 flight to enter. Has a home health aide 8 hrs/day.

## 2024-08-14 LAB
ALBUMIN SERPL ELPH-MCNC: 3.7 G/DL — SIGNIFICANT CHANGE UP (ref 3.3–5)
ALP SERPL-CCNC: 85 U/L — SIGNIFICANT CHANGE UP (ref 40–120)
ALT FLD-CCNC: 11 U/L — SIGNIFICANT CHANGE UP (ref 4–33)
ANION GAP SERPL CALC-SCNC: 12 MMOL/L — SIGNIFICANT CHANGE UP (ref 7–14)
AST SERPL-CCNC: 17 U/L — SIGNIFICANT CHANGE UP (ref 4–32)
BASOPHILS # BLD AUTO: 0.06 K/UL — SIGNIFICANT CHANGE UP (ref 0–0.2)
BASOPHILS NFR BLD AUTO: 0.5 % — SIGNIFICANT CHANGE UP (ref 0–2)
BILIRUB SERPL-MCNC: 2.2 MG/DL — HIGH (ref 0.2–1.2)
BUN SERPL-MCNC: 18 MG/DL — SIGNIFICANT CHANGE UP (ref 7–23)
CALCIUM SERPL-MCNC: 6.8 MG/DL — LOW (ref 8.4–10.5)
CHLORIDE SERPL-SCNC: 104 MMOL/L — SIGNIFICANT CHANGE UP (ref 98–107)
CO2 SERPL-SCNC: 19 MMOL/L — LOW (ref 22–31)
CREAT SERPL-MCNC: 0.52 MG/DL — SIGNIFICANT CHANGE UP (ref 0.5–1.3)
CRP SERPL-MCNC: <3 MG/L — SIGNIFICANT CHANGE UP
D DIMER BLD IA.RAPID-MCNC: 417 NG/ML DDU — HIGH
EGFR: 96 ML/MIN/1.73M2 — SIGNIFICANT CHANGE UP
EOSINOPHIL # BLD AUTO: 0.08 K/UL — SIGNIFICANT CHANGE UP (ref 0–0.5)
EOSINOPHIL NFR BLD AUTO: 0.7 % — SIGNIFICANT CHANGE UP (ref 0–6)
FERRITIN SERPL-MCNC: 133 NG/ML — SIGNIFICANT CHANGE UP (ref 13–330)
GLUCOSE BLDC GLUCOMTR-MCNC: 101 MG/DL — HIGH (ref 70–99)
GLUCOSE BLDC GLUCOMTR-MCNC: 103 MG/DL — HIGH (ref 70–99)
GLUCOSE BLDC GLUCOMTR-MCNC: 105 MG/DL — HIGH (ref 70–99)
GLUCOSE BLDC GLUCOMTR-MCNC: 173 MG/DL — HIGH (ref 70–99)
GLUCOSE SERPL-MCNC: 85 MG/DL — SIGNIFICANT CHANGE UP (ref 70–99)
HCT VFR BLD CALC: 37.4 % — SIGNIFICANT CHANGE UP (ref 34.5–45)
HGB BLD-MCNC: 12.4 G/DL — SIGNIFICANT CHANGE UP (ref 11.5–15.5)
IANC: 6.16 K/UL — SIGNIFICANT CHANGE UP (ref 1.8–7.4)
IMM GRANULOCYTES NFR BLD AUTO: 0.3 % — SIGNIFICANT CHANGE UP (ref 0–0.9)
LYMPHOCYTES # BLD AUTO: 37.5 % — SIGNIFICANT CHANGE UP (ref 13–44)
LYMPHOCYTES # BLD AUTO: 4.5 K/UL — HIGH (ref 1–3.3)
MAGNESIUM SERPL-MCNC: 2.1 MG/DL — SIGNIFICANT CHANGE UP (ref 1.6–2.6)
MCHC RBC-ENTMCNC: 30.1 PG — SIGNIFICANT CHANGE UP (ref 27–34)
MCHC RBC-ENTMCNC: 33.2 GM/DL — SIGNIFICANT CHANGE UP (ref 32–36)
MCV RBC AUTO: 90.8 FL — SIGNIFICANT CHANGE UP (ref 80–100)
MONOCYTES # BLD AUTO: 1.16 K/UL — HIGH (ref 0–0.9)
MONOCYTES NFR BLD AUTO: 9.7 % — SIGNIFICANT CHANGE UP (ref 2–14)
NEUTROPHILS # BLD AUTO: 6.16 K/UL — SIGNIFICANT CHANGE UP (ref 1.8–7.4)
NEUTROPHILS NFR BLD AUTO: 51.3 % — SIGNIFICANT CHANGE UP (ref 43–77)
NRBC # BLD: 0 /100 WBCS — SIGNIFICANT CHANGE UP (ref 0–0)
NRBC # FLD: 0 K/UL — SIGNIFICANT CHANGE UP (ref 0–0)
PHOSPHATE SERPL-MCNC: 2.1 MG/DL — LOW (ref 2.5–4.5)
PLATELET # BLD AUTO: 338 K/UL — SIGNIFICANT CHANGE UP (ref 150–400)
POTASSIUM SERPL-MCNC: 3.4 MMOL/L — LOW (ref 3.5–5.3)
POTASSIUM SERPL-SCNC: 3.4 MMOL/L — LOW (ref 3.5–5.3)
PROCALCITONIN SERPL-MCNC: 0.04 NG/ML — SIGNIFICANT CHANGE UP (ref 0.02–0.1)
PROT SERPL-MCNC: 6.9 G/DL — SIGNIFICANT CHANGE UP (ref 6–8.3)
RBC # BLD: 4.12 M/UL — SIGNIFICANT CHANGE UP (ref 3.8–5.2)
RBC # FLD: 15.3 % — HIGH (ref 10.3–14.5)
SARS-COV-2 RNA SPEC QL NAA+PROBE: DETECTED
SODIUM SERPL-SCNC: 135 MMOL/L — SIGNIFICANT CHANGE UP (ref 135–145)
WBC # BLD: 11.99 K/UL — HIGH (ref 3.8–10.5)
WBC # FLD AUTO: 11.99 K/UL — HIGH (ref 3.8–10.5)

## 2024-08-14 PROCEDURE — 99232 SBSQ HOSP IP/OBS MODERATE 35: CPT

## 2024-08-14 PROCEDURE — 93010 ELECTROCARDIOGRAM REPORT: CPT

## 2024-08-14 RX ORDER — HALOPERIDOL 1 MG
2 TABLET ORAL ONCE
Refills: 0 | Status: COMPLETED | OUTPATIENT
Start: 2024-08-14 | End: 2024-08-14

## 2024-08-14 RX ORDER — POTASSIUM CHLORIDE 10 MEQ
40 TABLET, EXT RELEASE, PARTICLES/CRYSTALS ORAL ONCE
Refills: 0 | Status: COMPLETED | OUTPATIENT
Start: 2024-08-14 | End: 2024-08-14

## 2024-08-14 RX ADMIN — Medication 40 MILLIEQUIVALENT(S): at 10:27

## 2024-08-14 RX ADMIN — Medication 10 MILLIGRAM(S): at 12:31

## 2024-08-14 RX ADMIN — NIFEDIPINE 60 MILLIGRAM(S): 60 TABLET, FILM COATED, EXTENDED RELEASE ORAL at 06:20

## 2024-08-14 RX ADMIN — Medication 500 MILLIGRAM(S): at 12:31

## 2024-08-14 RX ADMIN — Medication 75 MILLIGRAM(S): at 12:31

## 2024-08-14 RX ADMIN — Medication 1 TABLET(S): at 12:32

## 2024-08-14 RX ADMIN — Medication 1000 UNIT(S): at 12:32

## 2024-08-14 RX ADMIN — Medication 81 MILLIGRAM(S): at 12:31

## 2024-08-14 RX ADMIN — Medication 6 MILLIGRAM(S): at 06:20

## 2024-08-14 RX ADMIN — MEMANTINE 10 MILLIGRAM(S): 7 CAPSULE, EXTENDED RELEASE ORAL at 06:21

## 2024-08-14 RX ADMIN — Medication 220 MILLIGRAM(S): at 12:31

## 2024-08-14 RX ADMIN — ENOXAPARIN SODIUM 40 MILLIGRAM(S): 100 INJECTION SUBCUTANEOUS at 12:30

## 2024-08-14 RX ADMIN — METOPROLOL TARTRATE 25 MILLIGRAM(S): 100 TABLET ORAL at 06:21

## 2024-08-14 RX ADMIN — Medication 1: at 12:37

## 2024-08-14 RX ADMIN — MEMANTINE 10 MILLIGRAM(S): 7 CAPSULE, EXTENDED RELEASE ORAL at 16:31

## 2024-08-14 RX ADMIN — Medication 25 MILLIGRAM(S): at 16:31

## 2024-08-14 NOTE — DIETITIAN INITIAL EVALUATION ADULT - PERTINENT MEDS FT
MEDICATIONS  (STANDING):  ascorbic acid 500 milliGRAM(s) Oral daily  aspirin enteric coated 81 milliGRAM(s) Oral daily  cholecalciferol 1000 Unit(s) Oral daily  citalopram 10 milliGRAM(s) Oral daily  clopidogrel Tablet 75 milliGRAM(s) Oral daily  dexAMETHasone     Tablet 6 milliGRAM(s) Oral daily  dextrose 5%. 1000 milliLiter(s) (50 mL/Hr) IV Continuous <Continuous>  dextrose 5%. 1000 milliLiter(s) (100 mL/Hr) IV Continuous <Continuous>  dextrose 50% Injectable 12.5 Gram(s) IV Push once  dextrose 50% Injectable 25 Gram(s) IV Push once  dextrose 50% Injectable 25 Gram(s) IV Push once  enoxaparin Injectable 40 milliGRAM(s) SubCutaneous every 24 hours  glucagon  Injectable 1 milliGRAM(s) IntraMuscular once  insulin lispro (ADMELOG) corrective regimen sliding scale   SubCutaneous at bedtime  insulin lispro (ADMELOG) corrective regimen sliding scale   SubCutaneous three times a day before meals  lactated ringers. 1000 milliLiter(s) (100 mL/Hr) IV Continuous <Continuous>  memantine 10 milliGRAM(s) Oral every 12 hours  metoprolol succinate ER 25 milliGRAM(s) Oral daily  multivitamin 1 Tablet(s) Oral daily  NIFEdipine XL 60 milliGRAM(s) Oral daily  remdesivir  IVPB   IV Intermittent   remdesivir  IVPB 100 milliGRAM(s) IV Intermittent every 24 hours  simvastatin 20 milliGRAM(s) Oral at bedtime  zinc sulfate 220 milliGRAM(s) Oral daily    MEDICATIONS  (PRN):  acetaminophen     Tablet .. 650 milliGRAM(s) Oral every 6 hours PRN Mild Pain (1 - 3)  albuterol/ipratropium for Nebulization 3 milliLiter(s) Nebulizer every 6 hours PRN Shortness of Breath and/or Wheezing  dextrose Oral Gel 15 Gram(s) Oral once PRN Blood Glucose LESS THAN 70 milliGRAM(s)/deciliter  melatonin 3 milliGRAM(s) Oral at bedtime PRN Insomnia  ondansetron Injectable 4 milliGRAM(s) IV Push every 8 hours PRN Nausea and/or Vomiting

## 2024-08-14 NOTE — DIETITIAN INITIAL EVALUATION ADULT - CALCULATED FROM (CAL/KG)
Thanks for coming into [facility] today.    We discussed: I'll look into [facility] and if we need to do additional testing.  Please watch for palpitations, heart racing, passing out or almost passing out.  If any of these happen please let me know.      Work hard on exercise.      Medication changes:  Continue current medications.    Results: labs overall look good.  Cholesterol is still a bit high.      Follow up: with Dr. Fountain in 3 months- I'll call sooner with concerns.        Please call my nurse at [phone] with any questions or concerns.    Scheduling phone number: [phone]  Reminder: Please bring in all current medications, over the counter supplements and vitamin bottles to your next appointment.        
[phone]

## 2024-08-14 NOTE — DIETITIAN INITIAL EVALUATION ADULT - OTHER INFO
Pt and family reports ~50% intake at meals. Additionally endorsed by PCA. Pt amenable to taking oral nutrition supplement. Denied N/V/D however c/o pain on abdomen (of note pt with colostomy site pain). PCA present and aware of concerns. Last documented ostomy output noted 200mL (8/13). Discussed diet recommendations to promote PO intake and increase kcal/protein intake, explained diet modifications to adjust for colostomy regarding fiber modifications, gas forming foods. Family verbalized understanding.

## 2024-08-14 NOTE — DIETITIAN INITIAL EVALUATION ADULT - ADD RECOMMEND
Monitor weights, labs, BM's, skin integrity, p.o. intake.   Please monitor % PO intake on flowsheets  Honor food preferences as able within therapeutic diet order.

## 2024-08-14 NOTE — DIETITIAN INITIAL EVALUATION ADULT - PERTINENT LABORATORY DATA
08-14    135  |  104  |  18  ----------------------------<  85  3.4<L>   |  19<L>  |  0.52    Ca    6.8<L>      14 Aug 2024 06:35  Phos  2.1     08-14  Mg     2.10     08-14    TPro  6.9  /  Alb  3.7  /  TBili  2.2<H>  /  DBili  x   /  AST  17  /  ALT  11  /  AlkPhos  85  08-14    A1C with Estimated Average Glucose Result: 5.7 % (08-12-24 @ 06:10)  A1C with Estimated Average Glucose Result: 6.3 % (05-11-24 @ 05:16)  A1C with Estimated Average Glucose Result: 6.4 % (04-20-24 @ 05:00)    CAPILLARY BLOOD GLUCOSE  POCT Blood Glucose.: 173 mg/dL (14 Aug 2024 12:20)  POCT Blood Glucose.: 105 mg/dL (14 Aug 2024 08:33)  POCT Blood Glucose.: 108 mg/dL (13 Aug 2024 22:40)  POCT Blood Glucose.: 106 mg/dL (13 Aug 2024 17:15)

## 2024-08-14 NOTE — DIETITIAN INITIAL EVALUATION ADULT - REASON FOR ADMISSION
78 year-old female, with past history significant for Dementia, Type 2 DM, Glaucoma, HTN, Osteoarthritis, Diverticulitis and Colostomy, presented to the ED secondary to nausea, vomiting and removal of ostomy bag.  Diagnosed with Hypoxia and Nausea and Vomiting in the ED per chart.

## 2024-08-14 NOTE — DIETITIAN INITIAL EVALUATION ADULT - NSICDXPASTMEDICALHX_GEN_ALL_CORE_FT
PAST MEDICAL HISTORY:  Bilateral pneumonia     Diabetes mellitus, type 2     Diverticulitis     Glaucoma     HLD (hyperlipidemia)     Hypertension, unspecified type     Osteoarthritis, knee left    Sepsis

## 2024-08-14 NOTE — DIETITIAN INITIAL EVALUATION ADULT - ORAL INTAKE PTA/DIET HISTORY
Pt family at bedside provided hx. Stated pt has been eating small amounts, usually has 3 meals at however consumes 2 bottles of ensures per day (pt likely to meet </=75%of estimated energy requirements) Pt noted s/p colostomy procedure 5/24. Since procedure family reports initially pt was eating very poorly and now has somewhat improved approved. Denied chewing or swallowing concerns (has dentures top and bottom). No known food allergies. Prefers no beef no pork. Family reports daily consistent ostomy output. Ocacasional pain in stomach. Reported concern for weightloss. Reported UBW is around 120 lbs. Per HIE wt hx (in kg) 53.6 (3/21/24), 56 (4/19/24), 54 (5/21/24), 56.2 (6/19/24) 59 (7/15/24) 50.8 (8/11/24) Current chart wt noted  51.2 kg(112.8 lb) reflects 13% over 1 month time frame (as of 7/15) questionable accuracy however reflects 8.5% weightloss based on 2 month period (6/19/24) which is clinically significant.

## 2024-08-14 NOTE — DIETITIAN INITIAL EVALUATION ADULT - NS FNS DIET ORDER
Consistent Carbohydrate (Evening Snack) Gastroesophageal Reflux Disease (GERD) DASH/TLC (sodium & cholesterol restricted)

## 2024-08-14 NOTE — DIETITIAN NUTRITION RISK NOTIFICATION - ADDITIONAL COMMENTS/DIETITIAN RECOMMENDATIONS
Please see Dietitian Initial Assessment for complete recommendations.  Lauren Pathak MS, RD, CDN, CNSC (pg #[phone])

## 2024-08-14 NOTE — DIETITIAN INITIAL EVALUATION ADULT - NSFNSGIIOFT_GEN_A_CORE
08-13-24 @ 07:01  -  08-14-24 @ 07:00  --------------------------------------------------------  OUT:    Colostomy (mL): 100 mL  Total OUT: 100 mL    Total NET: -100 mL

## 2024-08-15 ENCOUNTER — TRANSCRIPTION ENCOUNTER (OUTPATIENT)
Age: 79
End: 2024-08-15

## 2024-08-15 LAB
ALBUMIN SERPL ELPH-MCNC: 4.1 G/DL — SIGNIFICANT CHANGE UP (ref 3.3–5)
ALP SERPL-CCNC: 100 U/L — SIGNIFICANT CHANGE UP (ref 40–120)
ALT FLD-CCNC: 12 U/L — SIGNIFICANT CHANGE UP (ref 4–33)
ANION GAP SERPL CALC-SCNC: 13 MMOL/L — SIGNIFICANT CHANGE UP (ref 7–14)
AST SERPL-CCNC: 20 U/L — SIGNIFICANT CHANGE UP (ref 4–32)
BASOPHILS # BLD AUTO: 0.06 K/UL — SIGNIFICANT CHANGE UP (ref 0–0.2)
BASOPHILS NFR BLD AUTO: 0.5 % — SIGNIFICANT CHANGE UP (ref 0–2)
BILIRUB SERPL-MCNC: 1.7 MG/DL — HIGH (ref 0.2–1.2)
BUN SERPL-MCNC: 18 MG/DL — SIGNIFICANT CHANGE UP (ref 7–23)
CALCIUM SERPL-MCNC: 8.8 MG/DL — SIGNIFICANT CHANGE UP (ref 8.4–10.5)
CHLORIDE SERPL-SCNC: 102 MMOL/L — SIGNIFICANT CHANGE UP (ref 98–107)
CO2 SERPL-SCNC: 20 MMOL/L — LOW (ref 22–31)
CREAT SERPL-MCNC: 0.51 MG/DL — SIGNIFICANT CHANGE UP (ref 0.5–1.3)
EGFR: 97 ML/MIN/1.73M2 — SIGNIFICANT CHANGE UP
EOSINOPHIL # BLD AUTO: 0.1 K/UL — SIGNIFICANT CHANGE UP (ref 0–0.5)
EOSINOPHIL NFR BLD AUTO: 0.8 % — SIGNIFICANT CHANGE UP (ref 0–6)
GLUCOSE BLDC GLUCOMTR-MCNC: 126 MG/DL — HIGH (ref 70–99)
GLUCOSE BLDC GLUCOMTR-MCNC: 148 MG/DL — HIGH (ref 70–99)
GLUCOSE BLDC GLUCOMTR-MCNC: 155 MG/DL — HIGH (ref 70–99)
GLUCOSE BLDC GLUCOMTR-MCNC: 175 MG/DL — HIGH (ref 70–99)
GLUCOSE SERPL-MCNC: 93 MG/DL — SIGNIFICANT CHANGE UP (ref 70–99)
HCT VFR BLD CALC: 41.8 % — SIGNIFICANT CHANGE UP (ref 34.5–45)
HGB BLD-MCNC: 14 G/DL — SIGNIFICANT CHANGE UP (ref 11.5–15.5)
IANC: 5.04 K/UL — SIGNIFICANT CHANGE UP (ref 1.8–7.4)
IMM GRANULOCYTES NFR BLD AUTO: 0.3 % — SIGNIFICANT CHANGE UP (ref 0–0.9)
LYMPHOCYTES # BLD AUTO: 46.7 % — HIGH (ref 13–44)
LYMPHOCYTES # BLD AUTO: 5.53 K/UL — HIGH (ref 1–3.3)
MAGNESIUM SERPL-MCNC: 2.1 MG/DL — SIGNIFICANT CHANGE UP (ref 1.6–2.6)
MCHC RBC-ENTMCNC: 30 PG — SIGNIFICANT CHANGE UP (ref 27–34)
MCHC RBC-ENTMCNC: 33.5 GM/DL — SIGNIFICANT CHANGE UP (ref 32–36)
MCV RBC AUTO: 89.7 FL — SIGNIFICANT CHANGE UP (ref 80–100)
MONOCYTES # BLD AUTO: 1.09 K/UL — HIGH (ref 0–0.9)
MONOCYTES NFR BLD AUTO: 9.2 % — SIGNIFICANT CHANGE UP (ref 2–14)
NEUTROPHILS # BLD AUTO: 5.04 K/UL — SIGNIFICANT CHANGE UP (ref 1.8–7.4)
NEUTROPHILS NFR BLD AUTO: 42.5 % — LOW (ref 43–77)
NRBC # BLD: 0 /100 WBCS — SIGNIFICANT CHANGE UP (ref 0–0)
NRBC # FLD: 0 K/UL — SIGNIFICANT CHANGE UP (ref 0–0)
PHOSPHATE SERPL-MCNC: 1.6 MG/DL — LOW (ref 2.5–4.5)
PLATELET # BLD AUTO: 352 K/UL — SIGNIFICANT CHANGE UP (ref 150–400)
POTASSIUM SERPL-MCNC: 3.8 MMOL/L — SIGNIFICANT CHANGE UP (ref 3.5–5.3)
POTASSIUM SERPL-SCNC: 3.8 MMOL/L — SIGNIFICANT CHANGE UP (ref 3.5–5.3)
PROT SERPL-MCNC: 8.7 G/DL — HIGH (ref 6–8.3)
RBC # BLD: 4.66 M/UL — SIGNIFICANT CHANGE UP (ref 3.8–5.2)
RBC # FLD: 14.7 % — HIGH (ref 10.3–14.5)
SODIUM SERPL-SCNC: 135 MMOL/L — SIGNIFICANT CHANGE UP (ref 135–145)
WBC # BLD: 11.85 K/UL — HIGH (ref 3.8–10.5)
WBC # FLD AUTO: 11.85 K/UL — HIGH (ref 3.8–10.5)

## 2024-08-15 RX ORDER — CHLORHEXIDINE GLUCONATE 40 MG/ML
1 SOLUTION TOPICAL DAILY
Refills: 0 | Status: DISCONTINUED | OUTPATIENT
Start: 2024-08-15 | End: 2024-08-19

## 2024-08-15 RX ADMIN — MEMANTINE 10 MILLIGRAM(S): 7 CAPSULE, EXTENDED RELEASE ORAL at 05:20

## 2024-08-15 RX ADMIN — Medication 20 MILLIGRAM(S): at 21:44

## 2024-08-15 RX ADMIN — ENOXAPARIN SODIUM 40 MILLIGRAM(S): 100 INJECTION SUBCUTANEOUS at 17:19

## 2024-08-15 RX ADMIN — Medication 1: at 12:13

## 2024-08-15 RX ADMIN — MEMANTINE 10 MILLIGRAM(S): 7 CAPSULE, EXTENDED RELEASE ORAL at 17:19

## 2024-08-15 RX ADMIN — Medication 25 MILLIGRAM(S): at 17:19

## 2024-08-15 RX ADMIN — NIFEDIPINE 60 MILLIGRAM(S): 60 TABLET, FILM COATED, EXTENDED RELEASE ORAL at 05:19

## 2024-08-15 RX ADMIN — Medication 2 MILLIGRAM(S): at 00:34

## 2024-08-15 RX ADMIN — Medication 3 MILLIGRAM(S): at 21:44

## 2024-08-15 RX ADMIN — Medication 6 MILLIGRAM(S): at 05:20

## 2024-08-15 RX ADMIN — REMDESIVIR 200 MILLIGRAM(S): 5 INJECTION INTRAVENOUS at 21:43

## 2024-08-15 RX ADMIN — Medication 1: at 09:01

## 2024-08-15 RX ADMIN — METOPROLOL TARTRATE 25 MILLIGRAM(S): 100 TABLET ORAL at 05:19

## 2024-08-15 NOTE — DISCHARGE NOTE PROVIDER - NSDCCPCAREPLAN_GEN_ALL_CORE_FT
PRINCIPAL DISCHARGE DIAGNOSIS  Diagnosis: Hypoxia  Assessment and Plan of Treatment: Acute hypoxemic respiratory failure due to COVID-19.   - COVID-19 PCR positive  - finished Remdesivir   -on decadron

## 2024-08-15 NOTE — DISCHARGE NOTE PROVIDER - NSDCMRMEDTOKEN_GEN_ALL_CORE_FT
alendronate 70 mg oral tablet: 1 tab(s) orally once a week  Aspirin EC 81 mg oral delayed release tablet: 1 tab(s) orally once a day  B-12 1000 mcg oral tablet: 1 tab(s) orally once a day  brinzolamide 1% ophthalmic suspension: 1 drop(s) in each eye 2 times a day  citalopram 10 mg oral tablet: 1 tab(s) orally once a day  clopidogrel 75 mg oral tablet: 1 tab(s) orally once a day  memantine 28 mg oral capsule, extended release: 1 cap(s) orally once a day  metFORMIN 500 mg oral tablet: 1 tab(s) orally once a day  metoprolol succinate 25 mg oral tablet, extended release: 1 tab(s) orally once a day  mirtazapine 30 mg oral tablet: 1 tab(s) orally once a day  NIFEdipine 60 mg oral tablet, extended release: 1 tab(s) orally once a day  Oysco 500 with D 500 mg-5 mcg (200 intl units) oral tablet: 1 tab(s) orally 3 times a day  Protonix 20 mg oral delayed release tablet: 1 tab(s) orally once a day  simvastatin 20 mg oral tablet: 1 tab(s) orally once a day   alendronate 70 mg oral tablet: 1 tab(s) orally once a week  Aspirin EC 81 mg oral delayed release tablet: 1 tab(s) orally once a day  B-12 1000 mcg oral tablet: 1 tab(s) orally once a day  brinzolamide 1% ophthalmic suspension: 1 drop(s) in each eye 2 times a day  citalopram 10 mg oral tablet: 1 tab(s) orally once a day  clopidogrel 75 mg oral tablet: 1 tab(s) orally once a day  dexAMETHasone 6 mg oral tablet: 1 tab(s) orally once a day  memantine 28 mg oral capsule, extended release: 1 cap(s) orally once a day  metFORMIN 500 mg oral tablet: 1 tab(s) orally once a day  metoprolol succinate 25 mg oral tablet, extended release: 1 tab(s) orally once a day  mirtazapine 30 mg oral tablet: 1 tab(s) orally once a day  NIFEdipine 60 mg oral tablet, extended release: 1 tab(s) orally once a day  Oysco 500 with D 500 mg-5 mcg (200 intl units) oral tablet: 1 tab(s) orally 3 times a day  Protonix 20 mg oral delayed release tablet: 1 tab(s) orally once a day  simvastatin 20 mg oral tablet: 1 tab(s) orally once a day

## 2024-08-15 NOTE — DISCHARGE NOTE PROVIDER - HOSPITAL COURSE
78F PMH of dementia, T2DM, glaucoma, HTN, osteoarthritis, diverticulitis and colostomy, presented to the ED secondary to nausea, vomiting, and removal of ostomy bag. Found to be COVID positive    Acute hypoxemic respiratory failure due to COVID-19  - COVID-19 PCR positive  - O2 saturation down to 82% on RA; on room air, weaned off 4L NC 8/13  - CXR w/o acute findings; CT A/P negative for acute pathology  - c/w duonebs PRN  - Ddimer 400, to be expected in setting of COVID  - ID consulted: c/w dexamethasone, remdesivir    Dehydration  - dry lips, very dry oral mucosa, hemoconcentrated lab-work, i/s/o fluid loss from vomiting, insensible losses due to infection  - s/p IVF    Colostomy in place  - reportedly removed same and has since been replaced in the ED, per report  - patient indicates burning sensation at the site  - c/w tylenol PRN    Type 2 diabetes mellitus treated without insulin  - A1c 5.7%  - c/w CC diet, ISS, FSG monitoring    Case discussed with Dr. Eagle on _____. Reviewed discharge medications with patient; All new medications requiring new prescription sent to pharmacy of patients choice. Reviewed need for prescription for previous home medication and new prescriptions sent if requested. Patient in agreement and understands.    78 year-old female, with past history significant for Dementia, Type 2 DM, Glaucoma, HTN, Osteoarthritis, Diverticulitis and Colostomy, presented to the ED secondary to nausea, vomiting and removal of ostomy bag.  Diagnosed with Hypoxia and Nausea and Vomiting in the ED.       Problem/Plan - 1:  ·  Problem: Acute hypoxemic respiratory failure due to COVID-19.   ·  Plan: - COVID-19 PCR positive  - duo-nebs PRN  - finished Remdesivir   -on decadron      Problem/Plan - 2:  ·  Problem: Dehydration.   ·  Plan: - IVF  - encourage oral hydration, as tolerated.     Problem/Plan - 3:  ·  Problem: Colostomy in place.   ·  Plan: - reportedly removed same and has since been replaced in the ED, per report  - patient indicates burning sensation at the site  - Tylenol PRN mild pain     Problem/Plan - 4:  ·  Problem: Type 2 diabetes mellitus treated without insulin.   ·  Plan: - glucose = 123 on CMP.  Last A1c - 6.3 on 05/11  - appears to be on metformin PTA;  held  - L-ISS, per FS  - consistent carb diet.    discussed with attending d/c home

## 2024-08-15 NOTE — DISCHARGE NOTE PROVIDER - DETAILS OF MALNUTRITION DIAGNOSIS/DIAGNOSES
This patient has been assessed with a concern for Malnutrition and was treated during this hospitalization for the following Nutrition diagnosis/diagnoses:     -  08/14/2024: Severe protein-calorie malnutrition

## 2024-08-15 NOTE — DISCHARGE NOTE PROVIDER - CARE PROVIDER_API CALL
Mandy Martinez  Internal Medicine  [address] [place] [address]  Phone: [phone]  Fax: [phone]  Follow Up Time:     Best Singh  Pulmonary Disease  [address] [place] [address]  Phone: [phone]  Fax: [phone]  Follow Up Time:

## 2024-08-16 LAB
ANION GAP SERPL CALC-SCNC: 11 MMOL/L — SIGNIFICANT CHANGE UP (ref 7–14)
BUN SERPL-MCNC: 22 MG/DL — SIGNIFICANT CHANGE UP (ref 7–23)
CALCIUM SERPL-MCNC: 8.3 MG/DL — LOW (ref 8.4–10.5)
CHLORIDE SERPL-SCNC: 104 MMOL/L — SIGNIFICANT CHANGE UP (ref 98–107)
CO2 SERPL-SCNC: 21 MMOL/L — LOW (ref 22–31)
CREAT SERPL-MCNC: 0.58 MG/DL — SIGNIFICANT CHANGE UP (ref 0.5–1.3)
EGFR: 94 ML/MIN/1.73M2 — SIGNIFICANT CHANGE UP
GLUCOSE BLDC GLUCOMTR-MCNC: 113 MG/DL — HIGH (ref 70–99)
GLUCOSE BLDC GLUCOMTR-MCNC: 132 MG/DL — HIGH (ref 70–99)
GLUCOSE BLDC GLUCOMTR-MCNC: 159 MG/DL — HIGH (ref 70–99)
GLUCOSE BLDC GLUCOMTR-MCNC: 180 MG/DL — HIGH (ref 70–99)
GLUCOSE SERPL-MCNC: 116 MG/DL — HIGH (ref 70–99)
HCT VFR BLD CALC: 37.6 % — SIGNIFICANT CHANGE UP (ref 34.5–45)
HGB BLD-MCNC: 12.6 G/DL — SIGNIFICANT CHANGE UP (ref 11.5–15.5)
MAGNESIUM SERPL-MCNC: 2 MG/DL — SIGNIFICANT CHANGE UP (ref 1.6–2.6)
MCHC RBC-ENTMCNC: 29.6 PG — SIGNIFICANT CHANGE UP (ref 27–34)
MCHC RBC-ENTMCNC: 33.5 GM/DL — SIGNIFICANT CHANGE UP (ref 32–36)
MCV RBC AUTO: 88.5 FL — SIGNIFICANT CHANGE UP (ref 80–100)
NRBC # BLD: 0 /100 WBCS — SIGNIFICANT CHANGE UP (ref 0–0)
NRBC # FLD: 0 K/UL — SIGNIFICANT CHANGE UP (ref 0–0)
PHOSPHATE SERPL-MCNC: 2.3 MG/DL — LOW (ref 2.5–4.5)
PLATELET # BLD AUTO: 302 K/UL — SIGNIFICANT CHANGE UP (ref 150–400)
POTASSIUM SERPL-MCNC: 3.5 MMOL/L — SIGNIFICANT CHANGE UP (ref 3.5–5.3)
POTASSIUM SERPL-SCNC: 3.5 MMOL/L — SIGNIFICANT CHANGE UP (ref 3.5–5.3)
RBC # BLD: 4.25 M/UL — SIGNIFICANT CHANGE UP (ref 3.8–5.2)
RBC # FLD: 15 % — HIGH (ref 10.3–14.5)
SODIUM SERPL-SCNC: 136 MMOL/L — SIGNIFICANT CHANGE UP (ref 135–145)
WBC # BLD: 11.52 K/UL — HIGH (ref 3.8–10.5)
WBC # FLD AUTO: 11.52 K/UL — HIGH (ref 3.8–10.5)

## 2024-08-16 RX ORDER — SODIUM PHOSPHATE, DIBASIC, ANHYDROUS, POTASSIUM PHOSPHATE, MONOBASIC, AND SODIUM PHOSPHATE, MONOBASIC, MONOHYDRATE 852; 155; 130 MG/1; MG/1; MG/1
1 TABLET, COATED ORAL
Refills: 0 | Status: COMPLETED | OUTPATIENT
Start: 2024-08-16 | End: 2024-08-16

## 2024-08-16 RX ADMIN — ENOXAPARIN SODIUM 40 MILLIGRAM(S): 100 INJECTION SUBCUTANEOUS at 12:50

## 2024-08-16 RX ADMIN — METOPROLOL TARTRATE 25 MILLIGRAM(S): 100 TABLET ORAL at 06:34

## 2024-08-16 RX ADMIN — Medication 3 MILLIGRAM(S): at 21:22

## 2024-08-16 RX ADMIN — Medication 1000 UNIT(S): at 12:49

## 2024-08-16 RX ADMIN — NIFEDIPINE 60 MILLIGRAM(S): 60 TABLET, FILM COATED, EXTENDED RELEASE ORAL at 06:34

## 2024-08-16 RX ADMIN — SODIUM PHOSPHATE, DIBASIC, ANHYDROUS, POTASSIUM PHOSPHATE, MONOBASIC, AND SODIUM PHOSPHATE, MONOBASIC, MONOHYDRATE 1 PACKET(S): 852; 155; 130 TABLET, COATED ORAL at 12:50

## 2024-08-16 RX ADMIN — Medication 220 MILLIGRAM(S): at 12:50

## 2024-08-16 RX ADMIN — Medication 1: at 12:48

## 2024-08-16 RX ADMIN — Medication 500 MILLIGRAM(S): at 12:49

## 2024-08-16 RX ADMIN — MEMANTINE 10 MILLIGRAM(S): 7 CAPSULE, EXTENDED RELEASE ORAL at 17:52

## 2024-08-16 RX ADMIN — Medication 81 MILLIGRAM(S): at 12:49

## 2024-08-16 RX ADMIN — Medication 25 MILLIGRAM(S): at 15:52

## 2024-08-16 RX ADMIN — Medication 75 MILLIGRAM(S): at 12:50

## 2024-08-16 RX ADMIN — Medication 1 TABLET(S): at 12:49

## 2024-08-16 RX ADMIN — SODIUM PHOSPHATE, DIBASIC, ANHYDROUS, POTASSIUM PHOSPHATE, MONOBASIC, AND SODIUM PHOSPHATE, MONOBASIC, MONOHYDRATE 1 PACKET(S): 852; 155; 130 TABLET, COATED ORAL at 10:25

## 2024-08-16 RX ADMIN — Medication 20 MILLIGRAM(S): at 21:22

## 2024-08-16 RX ADMIN — Medication 10 MILLIGRAM(S): at 12:50

## 2024-08-16 RX ADMIN — CHLORHEXIDINE GLUCONATE 1 APPLICATION(S): 40 SOLUTION TOPICAL at 12:53

## 2024-08-16 RX ADMIN — MEMANTINE 10 MILLIGRAM(S): 7 CAPSULE, EXTENDED RELEASE ORAL at 06:34

## 2024-08-16 RX ADMIN — Medication 6 MILLIGRAM(S): at 06:33

## 2024-08-16 RX ADMIN — REMDESIVIR 200 MILLIGRAM(S): 5 INJECTION INTRAVENOUS at 21:22

## 2024-08-16 RX ADMIN — Medication 1: at 17:51

## 2024-08-17 LAB
ANION GAP SERPL CALC-SCNC: 10 MMOL/L — SIGNIFICANT CHANGE UP (ref 7–14)
BUN SERPL-MCNC: 25 MG/DL — HIGH (ref 7–23)
CALCIUM SERPL-MCNC: 8.3 MG/DL — LOW (ref 8.4–10.5)
CHLORIDE SERPL-SCNC: 106 MMOL/L — SIGNIFICANT CHANGE UP (ref 98–107)
CO2 SERPL-SCNC: 20 MMOL/L — LOW (ref 22–31)
CREAT SERPL-MCNC: 0.52 MG/DL — SIGNIFICANT CHANGE UP (ref 0.5–1.3)
EGFR: 96 ML/MIN/1.73M2 — SIGNIFICANT CHANGE UP
GLUCOSE BLDC GLUCOMTR-MCNC: 112 MG/DL — HIGH (ref 70–99)
GLUCOSE BLDC GLUCOMTR-MCNC: 135 MG/DL — HIGH (ref 70–99)
GLUCOSE BLDC GLUCOMTR-MCNC: 138 MG/DL — HIGH (ref 70–99)
GLUCOSE BLDC GLUCOMTR-MCNC: 193 MG/DL — HIGH (ref 70–99)
GLUCOSE SERPL-MCNC: 113 MG/DL — HIGH (ref 70–99)
HCT VFR BLD CALC: 34.6 % — SIGNIFICANT CHANGE UP (ref 34.5–45)
HGB BLD-MCNC: 11.7 G/DL — SIGNIFICANT CHANGE UP (ref 11.5–15.5)
MAGNESIUM SERPL-MCNC: 2 MG/DL — SIGNIFICANT CHANGE UP (ref 1.6–2.6)
MCHC RBC-ENTMCNC: 30.1 PG — SIGNIFICANT CHANGE UP (ref 27–34)
MCHC RBC-ENTMCNC: 33.8 GM/DL — SIGNIFICANT CHANGE UP (ref 32–36)
MCV RBC AUTO: 88.9 FL — SIGNIFICANT CHANGE UP (ref 80–100)
NRBC # BLD: 0 /100 WBCS — SIGNIFICANT CHANGE UP (ref 0–0)
NRBC # FLD: 0 K/UL — SIGNIFICANT CHANGE UP (ref 0–0)
PHOSPHATE SERPL-MCNC: 2.2 MG/DL — LOW (ref 2.5–4.5)
PLATELET # BLD AUTO: 278 K/UL — SIGNIFICANT CHANGE UP (ref 150–400)
POTASSIUM SERPL-MCNC: 3.7 MMOL/L — SIGNIFICANT CHANGE UP (ref 3.5–5.3)
POTASSIUM SERPL-SCNC: 3.7 MMOL/L — SIGNIFICANT CHANGE UP (ref 3.5–5.3)
RBC # BLD: 3.89 M/UL — SIGNIFICANT CHANGE UP (ref 3.8–5.2)
RBC # FLD: 15.1 % — HIGH (ref 10.3–14.5)
SODIUM SERPL-SCNC: 136 MMOL/L — SIGNIFICANT CHANGE UP (ref 135–145)
WBC # BLD: 11.29 K/UL — HIGH (ref 3.8–10.5)
WBC # FLD AUTO: 11.29 K/UL — HIGH (ref 3.8–10.5)

## 2024-08-17 RX ORDER — SODIUM PHOSPHATE, DIBASIC, ANHYDROUS, POTASSIUM PHOSPHATE, MONOBASIC, AND SODIUM PHOSPHATE, MONOBASIC, MONOHYDRATE 852; 155; 130 MG/1; MG/1; MG/1
1 TABLET, COATED ORAL ONCE
Refills: 0 | Status: COMPLETED | OUTPATIENT
Start: 2024-08-17 | End: 2024-08-17

## 2024-08-17 RX ADMIN — Medication 1 TABLET(S): at 12:41

## 2024-08-17 RX ADMIN — SODIUM PHOSPHATE, DIBASIC, ANHYDROUS, POTASSIUM PHOSPHATE, MONOBASIC, AND SODIUM PHOSPHATE, MONOBASIC, MONOHYDRATE 1 PACKET(S): 852; 155; 130 TABLET, COATED ORAL at 09:34

## 2024-08-17 RX ADMIN — MEMANTINE 10 MILLIGRAM(S): 7 CAPSULE, EXTENDED RELEASE ORAL at 17:40

## 2024-08-17 RX ADMIN — ENOXAPARIN SODIUM 40 MILLIGRAM(S): 100 INJECTION SUBCUTANEOUS at 12:40

## 2024-08-17 RX ADMIN — Medication 6 MILLIGRAM(S): at 05:25

## 2024-08-17 RX ADMIN — Medication 3 MILLIGRAM(S): at 22:12

## 2024-08-17 RX ADMIN — Medication 10 MILLIGRAM(S): at 12:41

## 2024-08-17 RX ADMIN — Medication 75 MILLIGRAM(S): at 12:41

## 2024-08-17 RX ADMIN — Medication 1: at 12:40

## 2024-08-17 RX ADMIN — Medication 25 MILLIGRAM(S): at 22:17

## 2024-08-17 RX ADMIN — Medication 1000 UNIT(S): at 12:41

## 2024-08-17 RX ADMIN — Medication 500 MILLIGRAM(S): at 12:41

## 2024-08-17 RX ADMIN — MEMANTINE 10 MILLIGRAM(S): 7 CAPSULE, EXTENDED RELEASE ORAL at 05:26

## 2024-08-17 RX ADMIN — Medication 20 MILLIGRAM(S): at 22:12

## 2024-08-17 RX ADMIN — Medication 81 MILLIGRAM(S): at 12:41

## 2024-08-17 RX ADMIN — Medication 220 MILLIGRAM(S): at 12:41

## 2024-08-17 RX ADMIN — CHLORHEXIDINE GLUCONATE 1 APPLICATION(S): 40 SOLUTION TOPICAL at 12:48

## 2024-08-17 RX ADMIN — METOPROLOL TARTRATE 25 MILLIGRAM(S): 100 TABLET ORAL at 05:26

## 2024-08-17 RX ADMIN — NIFEDIPINE 60 MILLIGRAM(S): 60 TABLET, FILM COATED, EXTENDED RELEASE ORAL at 05:26

## 2024-08-18 LAB
ANION GAP SERPL CALC-SCNC: 13 MMOL/L — SIGNIFICANT CHANGE UP (ref 7–14)
BUN SERPL-MCNC: 24 MG/DL — HIGH (ref 7–23)
CALCIUM SERPL-MCNC: 8.9 MG/DL — SIGNIFICANT CHANGE UP (ref 8.4–10.5)
CHLORIDE SERPL-SCNC: 104 MMOL/L — SIGNIFICANT CHANGE UP (ref 98–107)
CO2 SERPL-SCNC: 19 MMOL/L — LOW (ref 22–31)
CREAT SERPL-MCNC: 0.55 MG/DL — SIGNIFICANT CHANGE UP (ref 0.5–1.3)
D DIMER BLD IA.RAPID-MCNC: 243 NG/ML DDU — HIGH
EGFR: 95 ML/MIN/1.73M2 — SIGNIFICANT CHANGE UP
GLUCOSE BLDC GLUCOMTR-MCNC: 128 MG/DL — HIGH (ref 70–99)
GLUCOSE BLDC GLUCOMTR-MCNC: 135 MG/DL — HIGH (ref 70–99)
GLUCOSE BLDC GLUCOMTR-MCNC: 166 MG/DL — HIGH (ref 70–99)
GLUCOSE BLDC GLUCOMTR-MCNC: 179 MG/DL — HIGH (ref 70–99)
GLUCOSE SERPL-MCNC: 91 MG/DL — SIGNIFICANT CHANGE UP (ref 70–99)
HCT VFR BLD CALC: 40.2 % — SIGNIFICANT CHANGE UP (ref 34.5–45)
HGB BLD-MCNC: 13.4 G/DL — SIGNIFICANT CHANGE UP (ref 11.5–15.5)
MAGNESIUM SERPL-MCNC: 2.2 MG/DL — SIGNIFICANT CHANGE UP (ref 1.6–2.6)
MCHC RBC-ENTMCNC: 30.3 PG — SIGNIFICANT CHANGE UP (ref 27–34)
MCHC RBC-ENTMCNC: 33.3 GM/DL — SIGNIFICANT CHANGE UP (ref 32–36)
MCV RBC AUTO: 91 FL — SIGNIFICANT CHANGE UP (ref 80–100)
NRBC # BLD: 0 /100 WBCS — SIGNIFICANT CHANGE UP (ref 0–0)
NRBC # FLD: 0 K/UL — SIGNIFICANT CHANGE UP (ref 0–0)
PHOSPHATE SERPL-MCNC: 2.5 MG/DL — SIGNIFICANT CHANGE UP (ref 2.5–4.5)
PLATELET # BLD AUTO: 239 K/UL — SIGNIFICANT CHANGE UP (ref 150–400)
POTASSIUM SERPL-MCNC: 3.9 MMOL/L — SIGNIFICANT CHANGE UP (ref 3.5–5.3)
POTASSIUM SERPL-SCNC: 3.9 MMOL/L — SIGNIFICANT CHANGE UP (ref 3.5–5.3)
RBC # BLD: 4.42 M/UL — SIGNIFICANT CHANGE UP (ref 3.8–5.2)
RBC # FLD: 14.7 % — HIGH (ref 10.3–14.5)
SODIUM SERPL-SCNC: 136 MMOL/L — SIGNIFICANT CHANGE UP (ref 135–145)
WBC # BLD: 13.25 K/UL — HIGH (ref 3.8–10.5)
WBC # FLD AUTO: 13.25 K/UL — HIGH (ref 3.8–10.5)

## 2024-08-18 RX ADMIN — Medication 1 TABLET(S): at 12:41

## 2024-08-18 RX ADMIN — NIFEDIPINE 60 MILLIGRAM(S): 60 TABLET, FILM COATED, EXTENDED RELEASE ORAL at 05:37

## 2024-08-18 RX ADMIN — Medication 81 MILLIGRAM(S): at 12:41

## 2024-08-18 RX ADMIN — Medication 6 MILLIGRAM(S): at 05:37

## 2024-08-18 RX ADMIN — Medication 220 MILLIGRAM(S): at 12:40

## 2024-08-18 RX ADMIN — Medication 1: at 17:43

## 2024-08-18 RX ADMIN — Medication 1: at 12:40

## 2024-08-18 RX ADMIN — MEMANTINE 10 MILLIGRAM(S): 7 CAPSULE, EXTENDED RELEASE ORAL at 17:43

## 2024-08-18 RX ADMIN — Medication 10 MILLIGRAM(S): at 12:40

## 2024-08-18 RX ADMIN — Medication 500 MILLIGRAM(S): at 12:40

## 2024-08-18 RX ADMIN — Medication 1000 UNIT(S): at 12:41

## 2024-08-18 RX ADMIN — CHLORHEXIDINE GLUCONATE 1 APPLICATION(S): 40 SOLUTION TOPICAL at 13:01

## 2024-08-18 RX ADMIN — Medication 20 MILLIGRAM(S): at 21:44

## 2024-08-18 RX ADMIN — METOPROLOL TARTRATE 25 MILLIGRAM(S): 100 TABLET ORAL at 05:38

## 2024-08-18 RX ADMIN — ENOXAPARIN SODIUM 40 MILLIGRAM(S): 100 INJECTION SUBCUTANEOUS at 12:40

## 2024-08-18 RX ADMIN — MEMANTINE 10 MILLIGRAM(S): 7 CAPSULE, EXTENDED RELEASE ORAL at 05:38

## 2024-08-18 RX ADMIN — Medication 75 MILLIGRAM(S): at 12:40

## 2024-08-18 RX ADMIN — Medication 25 MILLIGRAM(S): at 21:44

## 2024-08-18 NOTE — PROGRESS NOTE ADULT - NUTRITIONAL ASSESSMENT
This patient has been assessed with a concern for Malnutrition and has been determined to have a diagnosis/diagnoses of Severe protein-calorie malnutrition.    This patient is being managed with:   Diet Regular-  Consistent Carbohydrate {Evening Snack} (CSTCHOSN)  Gastroesophageal Reflux Disease (GERD)  DASH/TLC {Sodium & Cholesterol Restricted} (DASH)  No Beef  No Pork  Supplement Feeding Modality:  Oral  Glucerna Shake Cans or Servings Per Day:  2       Frequency:  Daily  Entered: Aug 14 2024  5:59PM  
This patient has been assessed with a concern for Malnutrition and has been determined to have a diagnosis/diagnoses of Severe protein-calorie malnutrition.    This patient is being managed with:   Diet Regular-  Consistent Carbohydrate {Evening Snack} (CSTCHOSN)  Gastroesophageal Reflux Disease (GERD)  DASH/TLC {Sodium & Cholesterol Restricted} (DASH)  Entered: Aug 12 2024  2:44AM  
This patient has been assessed with a concern for Malnutrition and has been determined to have a diagnosis/diagnoses of Severe protein-calorie malnutrition.    This patient is being managed with:   Diet Regular-  Consistent Carbohydrate {Evening Snack} (CSTCHOSN)  Gastroesophageal Reflux Disease (GERD)  DASH/TLC {Sodium & Cholesterol Restricted} (DASH)  No Beef  No Pork  Supplement Feeding Modality:  Oral  Glucerna Shake Cans or Servings Per Day:  2       Frequency:  Daily  Entered: Aug 14 2024  5:59PM

## 2024-08-19 ENCOUNTER — TRANSCRIPTION ENCOUNTER (OUTPATIENT)
Age: 79
End: 2024-08-19

## 2024-08-19 VITALS
HEART RATE: 60 BPM | OXYGEN SATURATION: 100 % | DIASTOLIC BLOOD PRESSURE: 60 MMHG | SYSTOLIC BLOOD PRESSURE: 115 MMHG | TEMPERATURE: 98 F | RESPIRATION RATE: 17 BRPM

## 2024-08-19 LAB
ANION GAP SERPL CALC-SCNC: 11 MMOL/L — SIGNIFICANT CHANGE UP (ref 7–14)
BUN SERPL-MCNC: 25 MG/DL — HIGH (ref 7–23)
CALCIUM SERPL-MCNC: 8.4 MG/DL — SIGNIFICANT CHANGE UP (ref 8.4–10.5)
CHLORIDE SERPL-SCNC: 104 MMOL/L — SIGNIFICANT CHANGE UP (ref 98–107)
CO2 SERPL-SCNC: 22 MMOL/L — SIGNIFICANT CHANGE UP (ref 22–31)
CREAT SERPL-MCNC: 0.61 MG/DL — SIGNIFICANT CHANGE UP (ref 0.5–1.3)
EGFR: 93 ML/MIN/1.73M2 — SIGNIFICANT CHANGE UP
GLUCOSE BLDC GLUCOMTR-MCNC: 121 MG/DL — HIGH (ref 70–99)
GLUCOSE BLDC GLUCOMTR-MCNC: 161 MG/DL — HIGH (ref 70–99)
GLUCOSE SERPL-MCNC: 100 MG/DL — HIGH (ref 70–99)
HCT VFR BLD CALC: 34.1 % — LOW (ref 34.5–45)
HGB BLD-MCNC: 11.3 G/DL — LOW (ref 11.5–15.5)
MAGNESIUM SERPL-MCNC: 2 MG/DL — SIGNIFICANT CHANGE UP (ref 1.6–2.6)
MCHC RBC-ENTMCNC: 30.1 PG — SIGNIFICANT CHANGE UP (ref 27–34)
MCHC RBC-ENTMCNC: 33.1 GM/DL — SIGNIFICANT CHANGE UP (ref 32–36)
MCV RBC AUTO: 90.7 FL — SIGNIFICANT CHANGE UP (ref 80–100)
NRBC # BLD: 0 /100 WBCS — SIGNIFICANT CHANGE UP (ref 0–0)
NRBC # FLD: 0 K/UL — SIGNIFICANT CHANGE UP (ref 0–0)
PHOSPHATE SERPL-MCNC: 2.8 MG/DL — SIGNIFICANT CHANGE UP (ref 2.5–4.5)
PLATELET # BLD AUTO: 254 K/UL — SIGNIFICANT CHANGE UP (ref 150–400)
POTASSIUM SERPL-MCNC: 3.8 MMOL/L — SIGNIFICANT CHANGE UP (ref 3.5–5.3)
POTASSIUM SERPL-SCNC: 3.8 MMOL/L — SIGNIFICANT CHANGE UP (ref 3.5–5.3)
RBC # BLD: 3.76 M/UL — LOW (ref 3.8–5.2)
RBC # FLD: 14.7 % — HIGH (ref 10.3–14.5)
SODIUM SERPL-SCNC: 137 MMOL/L — SIGNIFICANT CHANGE UP (ref 135–145)
WBC # BLD: 12.18 K/UL — HIGH (ref 3.8–10.5)
WBC # FLD AUTO: 12.18 K/UL — HIGH (ref 3.8–10.5)

## 2024-08-19 RX ADMIN — Medication 6 MILLIGRAM(S): at 06:43

## 2024-08-19 RX ADMIN — MEMANTINE 10 MILLIGRAM(S): 7 CAPSULE, EXTENDED RELEASE ORAL at 06:43

## 2024-08-19 RX ADMIN — Medication 81 MILLIGRAM(S): at 11:28

## 2024-08-19 RX ADMIN — Medication 10 MILLIGRAM(S): at 11:30

## 2024-08-19 RX ADMIN — Medication 1: at 12:29

## 2024-08-19 RX ADMIN — METOPROLOL TARTRATE 25 MILLIGRAM(S): 100 TABLET ORAL at 06:43

## 2024-08-19 RX ADMIN — Medication 1000 UNIT(S): at 11:29

## 2024-08-19 RX ADMIN — CHLORHEXIDINE GLUCONATE 1 APPLICATION(S): 40 SOLUTION TOPICAL at 12:02

## 2024-08-19 RX ADMIN — Medication 500 MILLIGRAM(S): at 11:29

## 2024-08-19 RX ADMIN — Medication 1 TABLET(S): at 11:29

## 2024-08-19 RX ADMIN — ENOXAPARIN SODIUM 40 MILLIGRAM(S): 100 INJECTION SUBCUTANEOUS at 11:30

## 2024-08-19 RX ADMIN — NIFEDIPINE 60 MILLIGRAM(S): 60 TABLET, FILM COATED, EXTENDED RELEASE ORAL at 06:43

## 2024-08-19 RX ADMIN — Medication 75 MILLIGRAM(S): at 11:30

## 2024-08-19 NOTE — PROGRESS NOTE ADULT - SUBJECTIVE AND OBJECTIVE BOX
Date of Service: 08-17-24 @ 12:10    Patient is a 78y old  Female who presents with a chief complaint of Hypoxia, Nausea and vomiting (16 Aug 2024 15:59)      Any change in ROS: seems OK:  no sob:  no cough : no phlegm  : on room air     MEDICATIONS  (STANDING):  ascorbic acid 500 milliGRAM(s) Oral daily  aspirin enteric coated 81 milliGRAM(s) Oral daily  chlorhexidine 2% Cloths 1 Application(s) Topical daily  cholecalciferol 1000 Unit(s) Oral daily  citalopram 10 milliGRAM(s) Oral daily  clopidogrel Tablet 75 milliGRAM(s) Oral daily  dexAMETHasone     Tablet 6 milliGRAM(s) Oral daily  dextrose 5%. 1000 milliLiter(s) (50 mL/Hr) IV Continuous <Continuous>  dextrose 5%. 1000 milliLiter(s) (100 mL/Hr) IV Continuous <Continuous>  dextrose 50% Injectable 12.5 Gram(s) IV Push once  dextrose 50% Injectable 25 Gram(s) IV Push once  dextrose 50% Injectable 25 Gram(s) IV Push once  enoxaparin Injectable 40 milliGRAM(s) SubCutaneous every 24 hours  glucagon  Injectable 1 milliGRAM(s) IntraMuscular once  insulin lispro (ADMELOG) corrective regimen sliding scale   SubCutaneous at bedtime  insulin lispro (ADMELOG) corrective regimen sliding scale   SubCutaneous three times a day before meals  lactated ringers. 1000 milliLiter(s) (100 mL/Hr) IV Continuous <Continuous>  memantine 10 milliGRAM(s) Oral every 12 hours  metoprolol succinate ER 25 milliGRAM(s) Oral daily  multivitamin 1 Tablet(s) Oral daily  NIFEdipine XL 60 milliGRAM(s) Oral daily  QUEtiapine 25 milliGRAM(s) Oral at bedtime  simvastatin 20 milliGRAM(s) Oral at bedtime  zinc sulfate 220 milliGRAM(s) Oral daily    MEDICATIONS  (PRN):  acetaminophen     Tablet .. 650 milliGRAM(s) Oral every 6 hours PRN Mild Pain (1 - 3)  albuterol/ipratropium for Nebulization 3 milliLiter(s) Nebulizer every 6 hours PRN Shortness of Breath and/or Wheezing  dextrose Oral Gel 15 Gram(s) Oral once PRN Blood Glucose LESS THAN 70 milliGRAM(s)/deciliter  melatonin 3 milliGRAM(s) Oral at bedtime PRN Insomnia  ondansetron Injectable 4 milliGRAM(s) IV Push every 8 hours PRN Nausea and/or Vomiting    Vital Signs Last 24 Hrs  T(C): 37 (17 Aug 2024 05:15), Max: 37 (17 Aug 2024 05:15)  T(F): 98.6 (17 Aug 2024 05:15), Max: 98.6 (17 Aug 2024 05:15)  HR: 65 (17 Aug 2024 05:15) (65 - 72)  BP: 129/71 (17 Aug 2024 05:15) (100/50 - 129/71)  BP(mean): --  RR: 18 (17 Aug 2024 05:15) (18 - 18)  SpO2: 98% (17 Aug 2024 05:15) (98% - 99%)    Parameters below as of 17 Aug 2024 05:15  Patient On (Oxygen Delivery Method): room air        I&O's Summary    17 Aug 2024 07:01  -  17 Aug 2024 12:10  --------------------------------------------------------  IN: 240 mL / OUT: 0 mL / NET: 240 mL          Physical Exam:   GENERAL: NAD, well-groomed, well-developed  HEENT: MARTINE/   Atraumatic, Normocephalic  ENMT: No tonsillar erythema, exudates, or enlargement; Moist mucous membranes, Good dentition, No lesions  NECK: Supple, No JVD, Normal thyroid  CHEST/LUNG: Clear to auscultaion  CVS: Regular rate and rhythm; No murmurs, rubs, or gallops  GI: : Soft, Nontender, Nondistended; Bowel sounds present  NERVOUS SYSTEM:  Alert & Oriented X2-3  EXTREMITIES:- edema  LYMPH: No lymphadenopathy noted  SKIN: No rashes or lesions  ENDOCRINOLOGY: No Thyromegaly  PSYCH: calm     Labs:  26                            11.7   11.29 )-----------( 278      ( 17 Aug 2024 07:11 )             34.6                         12.6   11.52 )-----------( 302      ( 16 Aug 2024 06:50 )             37.6                         14.0   11.85 )-----------( 352      ( 15 Aug 2024 03:30 )             41.8                         12.4   11.99 )-----------( 338      ( 14 Aug 2024 06:35 )             37.4     08-17    136  |  106  |  25<H>  ----------------------------<  113<H>  3.7   |  20<L>  |  0.52  08-16    136  |  104  |  22  ----------------------------<  116<H>  3.5   |  21<L>  |  0.58  08-15    135  |  102  |  18  ----------------------------<  93  3.8   |  20<L>  |  0.51  08-14    135  |  104  |  18  ----------------------------<  85  3.4<L>   |  19<L>  |  0.52    Ca    8.3<L>      17 Aug 2024 07:11  Ca    8.3<L>      16 Aug 2024 06:50  Phos  2.2     08-17  Phos  2.3     08-16  Mg     2.00     08-17  Mg     2.00     08-16    TPro  8.7<H>  /  Alb  4.1  /  TBili  1.7<H>  /  DBili  x   /  AST  20  /  ALT  12  /  AlkPhos  100  08-15  TPro  6.9  /  Alb  3.7  /  TBili  2.2<H>  /  DBili  x   /  AST  17  /  ALT  11  /  AlkPhos  85  08-14    CAPILLARY BLOOD GLUCOSE      POCT Blood Glucose.: 138 mg/dL (17 Aug 2024 08:28)  POCT Blood Glucose.: 113 mg/dL (16 Aug 2024 21:37)  POCT Blood Glucose.: 159 mg/dL (16 Aug 2024 17:20)  POCT Blood Glucose.: 180 mg/dL (16 Aug 2024 12:16)          Urinalysis Basic - ( 17 Aug 2024 07:11 )    Color: x / Appearance: x / SG: x / pH: x  Gluc: 113 mg/dL / Ketone: x  / Bili: x / Urobili: x   Blood: x / Protein: x / Nitrite: x   Leuk Esterase: x / RBC: x / WBC x   Sq Epi: x / Non Sq Epi: x / Bacteria: x      D-Dimer Assay, Quantitative: 417 ng/mL DDU (08-14 @ 06:35)  Procalcitonin: 0.04 ng/mL (08-14 @ 06:35)    < from: Xray Chest 1 View-PORTABLE IMMEDIATE (Xray Chest 1 View-PORTABLE IMMEDIATE .) (08.11.24 @ 19:25) >    INTERPRETATION:  EXAMINATION: XR CHEST IMMEDIATE    CLINICAL INDICATION: Cough    TECHNIQUE: Single frontal, portable view of the chest was obtained.    COMPARISON: Chest x-ray performed on 7/15/2024, concurrent abdominal CT..    FINDINGS:  The heart is normal in size.  No focal consolidation. Normal pulmonary vasculature  There is no pneumothorax or pleural effusion.    No acute osseous findings    IMPRESSION:  No focal consolidations.    --- End of Report ---          FAHEEM AVILA MD; Resident Radiologist  This document has been electronically signed.  SHIV SMITH DO; Attending Radiologist  This document has been electronically signed. Aug 12 2024  9:08AM    < end of copied text >  < from: CT Abdomen and Pelvis w/ IV Cont (08.11.24 @ 17:13) >    BLADDER: Within normal limits.  REPRODUCTIVE ORGANS: Hysterectomy.    BOWEL: Small hiatal hernia. Status post partial colectomy with Jania's   pouch and left lower quadrant colostomy. No bowel obstruction. Colonic   diverticulosis. Appendix is normal.  PERITONEUM/RETROPERITONEUM: Within normal limits.  VESSELS: Atherosclerotic changes. Left-sided IVC, anatomic variant.  LYMPH NODES: No lymphadenopathy.  ABDOMINAL WALL: Postsurgical changes.  BONES: Degenerative changes. Grade 2 anterolisthesis of L5 on S1, similar  to prior. Bilateral L5 pars defects.    IMPRESSION:  No acute intra-abdominal or pelvic pathology.        --- End of Report ---      < end of copied text >      RECENT CULTURES:        RESPIRATORY CULTURES:          Studies  Chest X-RAY  CT SCAN Chest   Venous Dopplers: LE:   CT Abdomen  Others              
Date of Service: 08-14-24 @ 13:35    Patient is a 78y old  Female who presents with a chief complaint of Hypoxia, Nausea and vomiting (13 Aug 2024 18:31)      Any change in ROS: she seems OK:  on roomair    MEDICATIONS  (STANDING):  ascorbic acid 500 milliGRAM(s) Oral daily  aspirin enteric coated 81 milliGRAM(s) Oral daily  cholecalciferol 1000 Unit(s) Oral daily  citalopram 10 milliGRAM(s) Oral daily  clopidogrel Tablet 75 milliGRAM(s) Oral daily  dexAMETHasone     Tablet 6 milliGRAM(s) Oral daily  dextrose 5%. 1000 milliLiter(s) (50 mL/Hr) IV Continuous <Continuous>  dextrose 5%. 1000 milliLiter(s) (100 mL/Hr) IV Continuous <Continuous>  dextrose 50% Injectable 12.5 Gram(s) IV Push once  dextrose 50% Injectable 25 Gram(s) IV Push once  dextrose 50% Injectable 25 Gram(s) IV Push once  enoxaparin Injectable 40 milliGRAM(s) SubCutaneous every 24 hours  glucagon  Injectable 1 milliGRAM(s) IntraMuscular once  insulin lispro (ADMELOG) corrective regimen sliding scale   SubCutaneous at bedtime  insulin lispro (ADMELOG) corrective regimen sliding scale   SubCutaneous three times a day before meals  lactated ringers. 1000 milliLiter(s) (100 mL/Hr) IV Continuous <Continuous>  memantine 10 milliGRAM(s) Oral every 12 hours  metoprolol succinate ER 25 milliGRAM(s) Oral daily  multivitamin 1 Tablet(s) Oral daily  NIFEdipine XL 60 milliGRAM(s) Oral daily  remdesivir  IVPB   IV Intermittent   remdesivir  IVPB 100 milliGRAM(s) IV Intermittent every 24 hours  simvastatin 20 milliGRAM(s) Oral at bedtime  zinc sulfate 220 milliGRAM(s) Oral daily    MEDICATIONS  (PRN):  acetaminophen     Tablet .. 650 milliGRAM(s) Oral every 6 hours PRN Mild Pain (1 - 3)  albuterol/ipratropium for Nebulization 3 milliLiter(s) Nebulizer every 6 hours PRN Shortness of Breath and/or Wheezing  dextrose Oral Gel 15 Gram(s) Oral once PRN Blood Glucose LESS THAN 70 milliGRAM(s)/deciliter  melatonin 3 milliGRAM(s) Oral at bedtime PRN Insomnia  ondansetron Injectable 4 milliGRAM(s) IV Push every 8 hours PRN Nausea and/or Vomiting    Vital Signs Last 24 Hrs  T(C): 37.2 (14 Aug 2024 11:22), Max: 37.2 (14 Aug 2024 11:22)  T(F): 98.9 (14 Aug 2024 11:22), Max: 98.9 (14 Aug 2024 11:22)  HR: 56 (14 Aug 2024 11:22) (56 - 86)  BP: 138/69 (14 Aug 2024 11:22) (120/65 - 138/69)  BP(mean): --  RR: 18 (14 Aug 2024 11:22) (17 - 19)  SpO2: 98% (14 Aug 2024 11:22) (97% - 99%)    Parameters below as of 14 Aug 2024 11:22  Patient On (Oxygen Delivery Method): nasal cannula        I&O's Summary    13 Aug 2024 07:01  -  14 Aug 2024 07:00  --------------------------------------------------------  IN: 480 mL / OUT: 600 mL / NET: -120 mL          Physical Exam:   GENERAL: NAD, well-groomed, well-developed  HEENT: MARTINE/   Atraumatic, Normocephalic  ENMT: No tonsillar erythema, exudates, or enlargement; Moist mucous membranes, Good dentition, No lesions  NECK: Supple, No JVD, Normal thyroid  CHEST/LUNG: Clear to auscultaion-  CVS: Regular rate and rhythm; No murmurs, rubs, or gallops  GI: : Soft, Nontender, Nondistended; Bowel sounds present  NERVOUS SYSTEM:  Alert & Oriented X2  EXTREMITIES: - edema  LYMPH: No lymphadenopathy noted  SKIN: No rashes or lesions  ENDOCRINOLOGY: No Thyromegaly  PSYCH: mild dementia    Labs:  26                            12.4   11.99 )-----------( 338      ( 14 Aug 2024 06:35 )             37.4                         13.4   11.47 )-----------( 387      ( 13 Aug 2024 06:30 )             38.9                         13.4   8.99  )-----------( 362      ( 12 Aug 2024 06:10 )             41.1                         13.1   13.97 )-----------( 342      ( 11 Aug 2024 15:40 )             38.6     08-14    135  |  104  |  18  ----------------------------<  85  3.4<L>   |  19<L>  |  0.52  08-13    136  |  100  |  15  ----------------------------<  124<H>  3.6   |  25  |  0.49<L>  08-12    137  |  102  |  15  ----------------------------<  168<H>  3.7   |  23  |  0.58  08-11    141  |  106  |  15  ----------------------------<  123<H>  4.1   |  22  |  0.56    Ca    6.8<L>      14 Aug 2024 06:35  Ca    9.4      13 Aug 2024 06:30  Phos  2.1     08-14  Phos  2.1     08-13  Mg     2.10     08-14  Mg     2.00     08-13    TPro  6.9  /  Alb  3.7  /  TBili  2.2<H>  /  DBili  x   /  AST  17  /  ALT  11  /  AlkPhos  85  08-14  TPro  8.8<H>  /  Alb  4.2  /  TBili  1.2  /  DBili  x   /  AST  21  /  ALT  12  /  AlkPhos  117  08-11    CAPILLARY BLOOD GLUCOSE      POCT Blood Glucose.: 173 mg/dL (14 Aug 2024 12:20)  POCT Blood Glucose.: 105 mg/dL (14 Aug 2024 08:33)  POCT Blood Glucose.: 108 mg/dL (13 Aug 2024 22:40)  POCT Blood Glucose.: 106 mg/dL (13 Aug 2024 17:15)      LIVER FUNCTIONS - ( 14 Aug 2024 06:35 )  Alb: 3.7 g/dL / Pro: 6.9 g/dL / ALK PHOS: 85 U/L / ALT: 11 U/L / AST: 17 U/L / GGT: x             Urinalysis Basic - ( 14 Aug 2024 06:35 )    Color: x / Appearance: x / SG: x / pH: x  Gluc: 85 mg/dL / Ketone: x  / Bili: x / Urobili: x   Blood: x / Protein: x / Nitrite: x   Leuk Esterase: x / RBC: x / WBC x   Sq Epi: x / Non Sq Epi: x / Bacteria: x      D-Dimer Assay, Quantitative: 417 ng/mL DDU (08-14 @ 06:35)  Procalcitonin: 0.04 ng/mL (08-14 @ 06:35)  Procalcitonin: 0.08 ng/mL (08-12 @ 06:10)        RECENT CULTURES:        RESPIRATORY CULTURES:    rad      Studies  Chest X-RAY  CT SCAN Chest   Venous Dopplers: LE:   CT Abdomen  Others              
Follow Up:      Inverval History/ROS:Patient is a 78y old  Female who presents with a chief complaint of Hypoxia, Nausea and vomiting (13 Aug 2024 16:49)    No fever  On nasal canula  More alert  Says she feels better    Allergies    No Known Allergies    Intolerances        ANTIMICROBIALS:  remdesivir  IVPB    remdesivir  IVPB 100 every 24 hours      OTHER MEDS:  acetaminophen     Tablet .. 650 milliGRAM(s) Oral every 6 hours PRN  albuterol/ipratropium for Nebulization 3 milliLiter(s) Nebulizer every 6 hours PRN  ascorbic acid 500 milliGRAM(s) Oral daily  aspirin enteric coated 81 milliGRAM(s) Oral daily  cholecalciferol 1000 Unit(s) Oral daily  citalopram 10 milliGRAM(s) Oral daily  clopidogrel Tablet 75 milliGRAM(s) Oral daily  dexAMETHasone     Tablet 6 milliGRAM(s) Oral daily  dextrose 5%. 1000 milliLiter(s) IV Continuous <Continuous>  dextrose 5%. 1000 milliLiter(s) IV Continuous <Continuous>  dextrose 50% Injectable 12.5 Gram(s) IV Push once  dextrose 50% Injectable 25 Gram(s) IV Push once  dextrose 50% Injectable 25 Gram(s) IV Push once  dextrose Oral Gel 15 Gram(s) Oral once PRN  enoxaparin Injectable 40 milliGRAM(s) SubCutaneous every 24 hours  glucagon  Injectable 1 milliGRAM(s) IntraMuscular once  insulin lispro (ADMELOG) corrective regimen sliding scale   SubCutaneous at bedtime  insulin lispro (ADMELOG) corrective regimen sliding scale   SubCutaneous three times a day before meals  lactated ringers. 1000 milliLiter(s) IV Continuous <Continuous>  melatonin 3 milliGRAM(s) Oral at bedtime PRN  memantine 10 milliGRAM(s) Oral every 12 hours  metoprolol succinate ER 25 milliGRAM(s) Oral daily  multivitamin 1 Tablet(s) Oral daily  NIFEdipine XL 60 milliGRAM(s) Oral daily  ondansetron Injectable 4 milliGRAM(s) IV Push every 8 hours PRN  simvastatin 20 milliGRAM(s) Oral at bedtime  zinc sulfate 220 milliGRAM(s) Oral daily      Vital Signs Last 24 Hrs  T(C): 36.4 (13 Aug 2024 13:57), Max: 36.6 (13 Aug 2024 10:49)  T(F): 97.6 (13 Aug 2024 13:57), Max: 97.9 (13 Aug 2024 10:49)  HR: 66 (13 Aug 2024 13:57) (66 - 80)  BP: 126/78 (13 Aug 2024 13:57) (126/78 - 143/80)  BP(mean): --  RR: 19 (13 Aug 2024 13:57) (16 - 19)  SpO2: 98% (13 Aug 2024 13:57) (98% - 100%)    Parameters below as of 13 Aug 2024 13:57  Patient On (Oxygen Delivery Method): room air        PHYSICAL EXAM:  General: [ ] non-toxic  HEAD/EYES: [ ] PERRL [x ] white sclera [ ] icterus  ENT:  [ ] normal [x ] supple [ ] thrush [ ] pharyngeal exudate  Cardiovascular:   [ ] murmur [x ] normal [ ] PPM/AICD  Respiratory:  [x ] clear to ausculation bilaterally  GI:  [x ] soft, non-tender, normal bowel sounds  :  [ ] young [x ] no CVA tenderness   Musculoskeletal:  [ ] no synovitis  Neurologic:  [ ] non-focal exam   Skin:  [ x] no rash  Lymph: [x ] no lymphadenopathy  Psychiatric:  [ ] appropriate affect [ ] alert & oriented  Lines:  [ x] no phlebitis [ ] central line                                13.4   11.47 )-----------( 387      ( 13 Aug 2024 06:30 )             38.9       08-13    136  |  100  |  15  ----------------------------<  124<H>  3.6   |  25  |  0.49<L>    Ca    9.4      13 Aug 2024 06:30  Phos  2.1     08-13  Mg     2.00     08-13        Urinalysis Basic - ( 13 Aug 2024 06:30 )    Color: x / Appearance: x / SG: x / pH: x  Gluc: 124 mg/dL / Ketone: x  / Bili: x / Urobili: x   Blood: x / Protein: x / Nitrite: x   Leuk Esterase: x / RBC: x / WBC x   Sq Epi: x / Non Sq Epi: x / Bacteria: x        MICROBIOLOGY:    RADIOLOGY:    
Patient is a 78y old  Female who presents with a chief complaint of Hypoxia, Nausea and vomiting (18 Aug 2024 12:39)    Date of servie : 08-18-24 @ 13:15  INTERVAL HPI/OVERNIGHT EVENTS:  T(C): 36.6 (08-18-24 @ 11:10), Max: 36.7 (08-17-24 @ 19:43)  HR: 60 (08-18-24 @ 11:10) (60 - 68)  BP: 106/64 (08-18-24 @ 11:10) (106/64 - 130/70)  RR: 18 (08-18-24 @ 11:10) (18 - 18)  SpO2: 100% (08-18-24 @ 11:10) (100% - 100%)  Wt(kg): --  I&O's Summary    17 Aug 2024 07:01  -  18 Aug 2024 07:00  --------------------------------------------------------  IN: 640 mL / OUT: 0 mL / NET: 640 mL    18 Aug 2024 07:01  -  18 Aug 2024 13:15  --------------------------------------------------------  IN: 237 mL / OUT: 0 mL / NET: 237 mL        LABS:                        13.4   13.25 )-----------( 239      ( 18 Aug 2024 06:23 )             40.2     08-18    136  |  104  |  24<H>  ----------------------------<  91  3.9   |  19<L>  |  0.55    Ca    8.9      18 Aug 2024 06:23  Phos  2.5     08-18  Mg     2.20     08-18        Urinalysis Basic - ( 18 Aug 2024 06:23 )    Color: x / Appearance: x / SG: x / pH: x  Gluc: 91 mg/dL / Ketone: x  / Bili: x / Urobili: x   Blood: x / Protein: x / Nitrite: x   Leuk Esterase: x / RBC: x / WBC x   Sq Epi: x / Non Sq Epi: x / Bacteria: x      CAPILLARY BLOOD GLUCOSE      POCT Blood Glucose.: 166 mg/dL (18 Aug 2024 12:38)  POCT Blood Glucose.: 135 mg/dL (18 Aug 2024 08:32)  POCT Blood Glucose.: 112 mg/dL (17 Aug 2024 23:12)  POCT Blood Glucose.: 135 mg/dL (17 Aug 2024 17:27)        Urinalysis Basic - ( 18 Aug 2024 06:23 )    Color: x / Appearance: x / SG: x / pH: x  Gluc: 91 mg/dL / Ketone: x  / Bili: x / Urobili: x   Blood: x / Protein: x / Nitrite: x   Leuk Esterase: x / RBC: x / WBC x   Sq Epi: x / Non Sq Epi: x / Bacteria: x        MEDICATIONS  (STANDING):  ascorbic acid 500 milliGRAM(s) Oral daily  aspirin enteric coated 81 milliGRAM(s) Oral daily  chlorhexidine 2% Cloths 1 Application(s) Topical daily  cholecalciferol 1000 Unit(s) Oral daily  citalopram 10 milliGRAM(s) Oral daily  clopidogrel Tablet 75 milliGRAM(s) Oral daily  dexAMETHasone     Tablet 6 milliGRAM(s) Oral daily  dextrose 5%. 1000 milliLiter(s) (50 mL/Hr) IV Continuous <Continuous>  dextrose 5%. 1000 milliLiter(s) (100 mL/Hr) IV Continuous <Continuous>  dextrose 50% Injectable 12.5 Gram(s) IV Push once  dextrose 50% Injectable 25 Gram(s) IV Push once  dextrose 50% Injectable 25 Gram(s) IV Push once  enoxaparin Injectable 40 milliGRAM(s) SubCutaneous every 24 hours  glucagon  Injectable 1 milliGRAM(s) IntraMuscular once  insulin lispro (ADMELOG) corrective regimen sliding scale   SubCutaneous three times a day before meals  insulin lispro (ADMELOG) corrective regimen sliding scale   SubCutaneous at bedtime  lactated ringers. 1000 milliLiter(s) (100 mL/Hr) IV Continuous <Continuous>  memantine 10 milliGRAM(s) Oral every 12 hours  metoprolol succinate ER 25 milliGRAM(s) Oral daily  multivitamin 1 Tablet(s) Oral daily  NIFEdipine XL 60 milliGRAM(s) Oral daily  QUEtiapine 25 milliGRAM(s) Oral at bedtime  simvastatin 20 milliGRAM(s) Oral at bedtime    MEDICATIONS  (PRN):  acetaminophen     Tablet .. 650 milliGRAM(s) Oral every 6 hours PRN Mild Pain (1 - 3)  albuterol/ipratropium for Nebulization 3 milliLiter(s) Nebulizer every 6 hours PRN Shortness of Breath and/or Wheezing  dextrose Oral Gel 15 Gram(s) Oral once PRN Blood Glucose LESS THAN 70 milliGRAM(s)/deciliter  melatonin 3 milliGRAM(s) Oral at bedtime PRN Insomnia  ondansetron Injectable 4 milliGRAM(s) IV Push every 8 hours PRN Nausea and/or Vomiting          PHYSICAL EXAM:  GENERAL: NAD, well-groomed, well-developed  HEAD:  Atraumatic, Normocephalic  CHEST/LUNG: Clear to percussion bilaterally; No rales, rhonchi, wheezing, or rubs  HEART: Regular rate and rhythm; No murmurs, rubs, or gallops  ABDOMEN: Soft, Nontender, Nondistended; Bowel sounds present  EXTREMITIES:  2+ Peripheral Pulses, No clubbing, cyanosis, or edema  LYMPH: No lymphadenopathy noted  SKIN: No rashes or lesions    Care Discussed with Consultants/Other Providers [ ] YES  [ ] NO
Date of Service: 08-15-24 @ 10:19    Patient is a 78y old  Female who presents with a chief complaint of Hypoxia, Nausea and vomiting (14 Aug 2024 17:50)      Any change in ROS: sheis looking very good:  no sob:  no cough :no phlegm   : on room air     MEDICATIONS  (STANDING):  ascorbic acid 500 milliGRAM(s) Oral daily  aspirin enteric coated 81 milliGRAM(s) Oral daily  cholecalciferol 1000 Unit(s) Oral daily  citalopram 10 milliGRAM(s) Oral daily  clopidogrel Tablet 75 milliGRAM(s) Oral daily  dexAMETHasone     Tablet 6 milliGRAM(s) Oral daily  dextrose 5%. 1000 milliLiter(s) (50 mL/Hr) IV Continuous <Continuous>  dextrose 5%. 1000 milliLiter(s) (100 mL/Hr) IV Continuous <Continuous>  dextrose 50% Injectable 12.5 Gram(s) IV Push once  dextrose 50% Injectable 25 Gram(s) IV Push once  dextrose 50% Injectable 25 Gram(s) IV Push once  enoxaparin Injectable 40 milliGRAM(s) SubCutaneous every 24 hours  glucagon  Injectable 1 milliGRAM(s) IntraMuscular once  insulin lispro (ADMELOG) corrective regimen sliding scale   SubCutaneous at bedtime  insulin lispro (ADMELOG) corrective regimen sliding scale   SubCutaneous three times a day before meals  lactated ringers. 1000 milliLiter(s) (100 mL/Hr) IV Continuous <Continuous>  memantine 10 milliGRAM(s) Oral every 12 hours  metoprolol succinate ER 25 milliGRAM(s) Oral daily  multivitamin 1 Tablet(s) Oral daily  NIFEdipine XL 60 milliGRAM(s) Oral daily  remdesivir  IVPB   IV Intermittent   remdesivir  IVPB 100 milliGRAM(s) IV Intermittent every 24 hours  simvastatin 20 milliGRAM(s) Oral at bedtime  zinc sulfate 220 milliGRAM(s) Oral daily    MEDICATIONS  (PRN):  acetaminophen     Tablet .. 650 milliGRAM(s) Oral every 6 hours PRN Mild Pain (1 - 3)  albuterol/ipratropium for Nebulization 3 milliLiter(s) Nebulizer every 6 hours PRN Shortness of Breath and/or Wheezing  dextrose Oral Gel 15 Gram(s) Oral once PRN Blood Glucose LESS THAN 70 milliGRAM(s)/deciliter  melatonin 3 milliGRAM(s) Oral at bedtime PRN Insomnia  ondansetron Injectable 4 milliGRAM(s) IV Push every 8 hours PRN Nausea and/or Vomiting    Vital Signs Last 24 Hrs  T(C): 36.7 (15 Aug 2024 05:19), Max: 37.2 (14 Aug 2024 11:22)  T(F): 98 (15 Aug 2024 05:19), Max: 98.9 (14 Aug 2024 11:22)  HR: 81 (15 Aug 2024 05:19) (56 - 85)  BP: 131/75 (15 Aug 2024 05:19) (115/63 - 138/69)  BP(mean): --  RR: 16 (15 Aug 2024 05:19) (16 - 18)  SpO2: 95% (15 Aug 2024 05:19) (95% - 98%)    Parameters below as of 15 Aug 2024 05:19  Patient On (Oxygen Delivery Method): room air        I&O's Summary        Physical Exam:   GENERAL: NAD, well-groomed, well-developed  HEENT: MARTINE/   Atraumatic, Normocephalic  ENMT: No tonsillar erythema, exudates, or enlargement; Moist mucous membranes, Good dentition, No lesions  NECK: Supple, No JVD, Normal thyroid  CHEST/LUNG: Clear to auscultaion  CVS: Regular rate and rhythm; No murmurs, rubs, or gallops  GI: : Soft, Nontender, Nondistended; Bowel sounds present  NERVOUS SYSTEM:  Alert & Oriented X3  EXTREMITIES: -edema  LYMPH: No lymphadenopathy noted  SKIN: No rashes or lesions  ENDOCRINOLOGY: No Thyromegaly  PSYCH: Appropriate    Labs:  26                            14.0   11.85 )-----------( 352      ( 15 Aug 2024 03:30 )             41.8                         12.4   11.99 )-----------( 338      ( 14 Aug 2024 06:35 )             37.4                         13.4   11.47 )-----------( 387      ( 13 Aug 2024 06:30 )             38.9                         13.4   8.99  )-----------( 362      ( 12 Aug 2024 06:10 )             41.1                         13.1   13.97 )-----------( 342      ( 11 Aug 2024 15:40 )             38.6     08-15    135  |  102  |  18  ----------------------------<  93  3.8   |  20<L>  |  0.51  08-14    135  |  104  |  18  ----------------------------<  85  3.4<L>   |  19<L>  |  0.52  08-13    136  |  100  |  15  ----------------------------<  124<H>  3.6   |  25  |  0.49<L>  08-12    137  |  102  |  15  ----------------------------<  168<H>  3.7   |  23  |  0.58  08-11    141  |  106  |  15  ----------------------------<  123<H>  4.1   |  22  |  0.56    Ca    8.8      15 Aug 2024 03:30  Ca    6.8<L>      14 Aug 2024 06:35  Phos  1.6     08-15  Phos  2.1     08-14  Mg     2.10     08-15  Mg     2.10     08-14    TPro  8.7<H>  /  Alb  4.1  /  TBili  1.7<H>  /  DBili  x   /  AST  20  /  ALT  12  /  AlkPhos  100  08-15  TPro  6.9  /  Alb  3.7  /  TBili  2.2<H>  /  DBili  x   /  AST  17  /  ALT  11  /  AlkPhos  85  08-14  TPro  8.8<H>  /  Alb  4.2  /  TBili  1.2  /  DBili  x   /  AST  21  /  ALT  12  /  AlkPhos  117  08-11    CAPILLARY BLOOD GLUCOSE      POCT Blood Glucose.: 175 mg/dL (15 Aug 2024 08:28)  POCT Blood Glucose.: 103 mg/dL (14 Aug 2024 22:38)  POCT Blood Glucose.: 101 mg/dL (14 Aug 2024 17:53)  POCT Blood Glucose.: 173 mg/dL (14 Aug 2024 12:20)      LIVER FUNCTIONS - ( 15 Aug 2024 03:30 )  Alb: 4.1 g/dL / Pro: 8.7 g/dL / ALK PHOS: 100 U/L / ALT: 12 U/L / AST: 20 U/L / GGT: x             Urinalysis Basic - ( 15 Aug 2024 03:30 )    Color: x / Appearance: x / SG: x / pH: x  Gluc: 93 mg/dL / Ketone: x  / Bili: x / Urobili: x   Blood: x / Protein: x / Nitrite: x   Leuk Esterase: x / RBC: x / WBC x   Sq Epi: x / Non Sq Epi: x / Bacteria: x      D-Dimer Assay, Quantitative: 417 ng/mL DDU (08-14 @ 06:35)  Procalcitonin: 0.04 ng/mL (08-14 @ 06:35)  Procalcitonin: 0.08 ng/mL (08-12 @ 06:10)        RECENT CULTURES:  rad< from: Xray Chest 1 View-PORTABLE IMMEDIATE (Xray Chest 1 View-PORTABLE IMMEDIATE .) (08.11.24 @ 19:25) >    ACC: [id number] EXAM:  XR CHEST PORTABLE IMMED 1V   ORDERED BY: TONYA VICKERS     PROCEDURE DATE:  08/11/2024          INTERPRETATION:  EXAMINATION: XR CHEST IMMEDIATE    CLINICAL INDICATION: Cough    TECHNIQUE: Single frontal, portable view of the chest was obtained.    COMPARISON: Chest x-ray performed on 7/15/2024, concurrent abdominal CT..    FINDINGS:  The heart is normal in size.  No focal consolidation. Normal pulmonary vasculature  There is no pneumothorax or pleural effusion.    No acute osseous findings    IMPRESSION:  No focal consolidations.    --- End of Report ---          FAHEEM AVILA MD; Resident Radiologist  This document has been electronically signed.  SHIV SMITH DO; Attending Radiologist  This document has been electronically signed. Aug 12 2024  9:08AM    < end of copied text >  < from: CT Abdomen and Pelvis w/ IV Cont (08.11.24 @ 17:13) >  PERITONEUM/RETROPERITONEUM: Within normal limits.  VESSELS: Atherosclerotic changes. Left-sided IVC, anatomic variant.  LYMPH NODES: No lymphadenopathy.  ABDOMINAL WALL: Postsurgical changes.  BONES: Degenerative changes. Grade 2 anterolisthesis of L5 on S1, similar  to prior. Bilateral L5 pars defects.    IMPRESSION:  No acute intra-abdominal or pelvic pathology.        --- End of Report ---    < end of copied text >  < from: Xray Chest 1 View- PORTABLE-Urgent (07.15.24 @ 16:58) >    INTERPRETATION:  EXAMINATION: XR CHEST URGENT    CLINICAL INDICATION: vomiting    TECHNIQUE: Single frontal, portable view of the chest was obtained.    COMPARISON: Chest x-ray 5/29/2024 and chest CT 5/19/2024    FINDINGS:  The heart is normal in size.  The lungs show similar right upper lobe nodules.  There is no pneumothorax or pleural effusion.  Right upper quadrant clips.    IMPRESSION:  No focal infiltrate.    --- End of Report ---          LM BACA MD; Resident Radiologist  This document has been electronically signed.  EWA COATS MD; Attending Interventional Radiologist  This document has been electronically signed. Jul 16 2024  9:53AM    < end of copied text >        RESPIRATORY CULTURES:          Studies  Chest X-RAY  CT SCAN Chest   Venous Dopplers: LE:   CT Abdomen  Others              
Date of Service: 08-16-24 @ 12:47    Patient is a 78y old  Female who presents with a chief complaint of Hypoxia, Nausea and vomiting (15 Aug 2024 14:14)      Any change in ROS: seems prettty good:  no sob:     MEDICATIONS  (STANDING):  ascorbic acid 500 milliGRAM(s) Oral daily  aspirin enteric coated 81 milliGRAM(s) Oral daily  chlorhexidine 2% Cloths 1 Application(s) Topical daily  cholecalciferol 1000 Unit(s) Oral daily  citalopram 10 milliGRAM(s) Oral daily  clopidogrel Tablet 75 milliGRAM(s) Oral daily  dexAMETHasone     Tablet 6 milliGRAM(s) Oral daily  dextrose 5%. 1000 milliLiter(s) (50 mL/Hr) IV Continuous <Continuous>  dextrose 5%. 1000 milliLiter(s) (100 mL/Hr) IV Continuous <Continuous>  dextrose 50% Injectable 12.5 Gram(s) IV Push once  dextrose 50% Injectable 25 Gram(s) IV Push once  dextrose 50% Injectable 25 Gram(s) IV Push once  enoxaparin Injectable 40 milliGRAM(s) SubCutaneous every 24 hours  glucagon  Injectable 1 milliGRAM(s) IntraMuscular once  insulin lispro (ADMELOG) corrective regimen sliding scale   SubCutaneous three times a day before meals  insulin lispro (ADMELOG) corrective regimen sliding scale   SubCutaneous at bedtime  lactated ringers. 1000 milliLiter(s) (100 mL/Hr) IV Continuous <Continuous>  memantine 10 milliGRAM(s) Oral every 12 hours  metoprolol succinate ER 25 milliGRAM(s) Oral daily  multivitamin 1 Tablet(s) Oral daily  NIFEdipine XL 60 milliGRAM(s) Oral daily  potassium phosphate / sodium phosphate Powder (PHOS-NaK) 1 Packet(s) Oral every 2 hours  remdesivir  IVPB   IV Intermittent   remdesivir  IVPB 100 milliGRAM(s) IV Intermittent every 24 hours  simvastatin 20 milliGRAM(s) Oral at bedtime  zinc sulfate 220 milliGRAM(s) Oral daily    MEDICATIONS  (PRN):  acetaminophen     Tablet .. 650 milliGRAM(s) Oral every 6 hours PRN Mild Pain (1 - 3)  albuterol/ipratropium for Nebulization 3 milliLiter(s) Nebulizer every 6 hours PRN Shortness of Breath and/or Wheezing  dextrose Oral Gel 15 Gram(s) Oral once PRN Blood Glucose LESS THAN 70 milliGRAM(s)/deciliter  melatonin 3 milliGRAM(s) Oral at bedtime PRN Insomnia  ondansetron Injectable 4 milliGRAM(s) IV Push every 8 hours PRN Nausea and/or Vomiting    Vital Signs Last 24 Hrs  T(C): 37.1 (16 Aug 2024 06:30), Max: 37.1 (16 Aug 2024 06:30)  T(F): 98.7 (16 Aug 2024 06:30), Max: 98.7 (16 Aug 2024 06:30)  HR: 82 (16 Aug 2024 06:30) (80 - 82)  BP: 124/67 (16 Aug 2024 06:30) (124/67 - 141/73)  BP(mean): --  RR: 18 (16 Aug 2024 06:30) (18 - 18)  SpO2: 96% (16 Aug 2024 06:30) (95% - 96%)    Parameters below as of 16 Aug 2024 06:30  Patient On (Oxygen Delivery Method): room air        I&O's Summary        Physical Exam:   GENERAL: NAD, well-groomed, well-developed  HEENT: MARTINE/   Atraumatic, Normocephalic  ENMT: No tonsillar erythema, exudates, or enlargement; Moist mucous membranes, Good dentition, No lesions  NECK: Supple, No JVD, Normal thyroid  CHEST/LUNG: Clear to auscultaion-  CVS: Regular rate and rhythm; No murmurs, rubs, or gallops  GI: : Soft, Nontender, Nondistended; Bowel sounds present  NERVOUS SYSTEM:  Alert & Oriented x3   EXTREMITIES:--r edema  LYMPH: No lymphadenopathy noted  SKIN: No rashes or lesions  ENDOCRINOLOGY: No Thyromegaly  PSYCH: calm     Labs:  26                            12.6   11.52 )-----------( 302      ( 16 Aug 2024 06:50 )             37.6                         14.0   11.85 )-----------( 352      ( 15 Aug 2024 03:30 )             41.8                         12.4   11.99 )-----------( 338      ( 14 Aug 2024 06:35 )             37.4                         13.4   11.47 )-----------( 387      ( 13 Aug 2024 06:30 )             38.9     08-16    136  |  104  |  22  ----------------------------<  116<H>  3.5   |  21<L>  |  0.58  08-15    135  |  102  |  18  ----------------------------<  93  3.8   |  20<L>  |  0.51  08-14    135  |  104  |  18  ----------------------------<  85  3.4<L>   |  19<L>  |  0.52  08-13    136  |  100  |  15  ----------------------------<  124<H>  3.6   |  25  |  0.49<L>    Ca    8.3<L>      16 Aug 2024 06:50  Ca    8.8      15 Aug 2024 03:30  Phos  2.3     08-16  Phos  1.6     08-15  Mg     2.00     08-16  Mg     2.10     08-15    TPro  8.7<H>  /  Alb  4.1  /  TBili  1.7<H>  /  DBili  x   /  AST  20  /  ALT  12  /  AlkPhos  100  08-15  TPro  6.9  /  Alb  3.7  /  TBili  2.2<H>  /  DBili  x   /  AST  17  /  ALT  11  /  AlkPhos  85  08-14    CAPILLARY BLOOD GLUCOSE      POCT Blood Glucose.: 180 mg/dL (16 Aug 2024 12:16)  POCT Blood Glucose.: 132 mg/dL (16 Aug 2024 08:33)  POCT Blood Glucose.: 126 mg/dL (15 Aug 2024 22:17)  POCT Blood Glucose.: 148 mg/dL (15 Aug 2024 17:49)      LIVER FUNCTIONS - ( 15 Aug 2024 03:30 )  Alb: 4.1 g/dL / Pro: 8.7 g/dL / ALK PHOS: 100 U/L / ALT: 12 U/L / AST: 20 U/L / GGT: x             Urinalysis Basic - ( 16 Aug 2024 06:50 )    Color: x / Appearance: x / SG: x / pH: x  Gluc: 116 mg/dL / Ketone: x  / Bili: x / Urobili: x   Blood: x / Protein: x / Nitrite: x   Leuk Esterase: x / RBC: x / WBC x   Sq Epi: x / Non Sq Epi: x / Bacteria: x      D-Dimer Assay, Quantitative: 417 ng/mL DDU (08-14 @ 06:35)  Procalcitonin: 0.04 ng/mL (08-14 @ 06:35)    rad< from: Xray Chest 1 View-PORTABLE IMMEDIATE (Xray Chest 1 View-PORTABLE IMMEDIATE .) (08.11.24 @ 19:25) >        INTERPRETATION:  EXAMINATION: XR CHEST IMMEDIATE    CLINICAL INDICATION: Cough    TECHNIQUE: Single frontal, portable view of the chest was obtained.    COMPARISON: Chest x-ray performed on 7/15/2024, concurrent abdominal CT..    FINDINGS:  The heart is normal in size.  No focal consolidation. Normal pulmonary vasculature  There is no pneumothorax or pleural effusion.    No acute osseous findings    IMPRESSION:  No focal consolidations.    --- End of Report ---          FAHEEM AVILA MD; Resident Radiologist  This document has been electronically signed.  SHIV SMITH DO; Attending Radiologist  This document has been electronically signed. Aug 12 2024  9:08AM    < end of copied text >      RECENT CULTURES:        RESPIRATORY CULTURES:          Studies  Chest X-RAY  CT SCAN Chest   Venous Dopplers: LE:   CT Abdomen  Others              
Date of Service: 08-18-24 @ 12:39    Patient is a 78y old  Female who presents with a chief complaint of Hypoxia, Nausea and vomiting (17 Aug 2024 14:50)      Any change in ROS: She is stable: no sob: no cough : no phlegm  ; on room air     MEDICATIONS  (STANDING):  ascorbic acid 500 milliGRAM(s) Oral daily  aspirin enteric coated 81 milliGRAM(s) Oral daily  chlorhexidine 2% Cloths 1 Application(s) Topical daily  cholecalciferol 1000 Unit(s) Oral daily  citalopram 10 milliGRAM(s) Oral daily  clopidogrel Tablet 75 milliGRAM(s) Oral daily  dexAMETHasone     Tablet 6 milliGRAM(s) Oral daily  dextrose 5%. 1000 milliLiter(s) (50 mL/Hr) IV Continuous <Continuous>  dextrose 5%. 1000 milliLiter(s) (100 mL/Hr) IV Continuous <Continuous>  dextrose 50% Injectable 12.5 Gram(s) IV Push once  dextrose 50% Injectable 25 Gram(s) IV Push once  dextrose 50% Injectable 25 Gram(s) IV Push once  enoxaparin Injectable 40 milliGRAM(s) SubCutaneous every 24 hours  glucagon  Injectable 1 milliGRAM(s) IntraMuscular once  insulin lispro (ADMELOG) corrective regimen sliding scale   SubCutaneous three times a day before meals  insulin lispro (ADMELOG) corrective regimen sliding scale   SubCutaneous at bedtime  lactated ringers. 1000 milliLiter(s) (100 mL/Hr) IV Continuous <Continuous>  memantine 10 milliGRAM(s) Oral every 12 hours  metoprolol succinate ER 25 milliGRAM(s) Oral daily  multivitamin 1 Tablet(s) Oral daily  NIFEdipine XL 60 milliGRAM(s) Oral daily  QUEtiapine 25 milliGRAM(s) Oral at bedtime  simvastatin 20 milliGRAM(s) Oral at bedtime  zinc sulfate 220 milliGRAM(s) Oral daily    MEDICATIONS  (PRN):  acetaminophen     Tablet .. 650 milliGRAM(s) Oral every 6 hours PRN Mild Pain (1 - 3)  albuterol/ipratropium for Nebulization 3 milliLiter(s) Nebulizer every 6 hours PRN Shortness of Breath and/or Wheezing  dextrose Oral Gel 15 Gram(s) Oral once PRN Blood Glucose LESS THAN 70 milliGRAM(s)/deciliter  melatonin 3 milliGRAM(s) Oral at bedtime PRN Insomnia  ondansetron Injectable 4 milliGRAM(s) IV Push every 8 hours PRN Nausea and/or Vomiting    Vital Signs Last 24 Hrs  T(C): 36.6 (18 Aug 2024 11:10), Max: 36.7 (17 Aug 2024 19:43)  T(F): 97.9 (18 Aug 2024 11:10), Max: 98 (17 Aug 2024 19:43)  HR: 60 (18 Aug 2024 11:10) (60 - 68)  BP: 106/64 (18 Aug 2024 11:10) (106/64 - 130/70)  BP(mean): --  RR: 18 (18 Aug 2024 11:10) (18 - 18)  SpO2: 100% (18 Aug 2024 11:10) (100% - 100%)    Parameters below as of 18 Aug 2024 11:10  Patient On (Oxygen Delivery Method): room air        I&O's Summary    17 Aug 2024 07:01  -  18 Aug 2024 07:00  --------------------------------------------------------  IN: 640 mL / OUT: 0 mL / NET: 640 mL    18 Aug 2024 07:01  -  18 Aug 2024 12:39  --------------------------------------------------------  IN: 237 mL / OUT: 0 mL / NET: 237 mL          Physical Exam:   GENERAL: NAD, well-groomed, well-developed  HEENT: MARTINE/   Atraumatic, Normocephalic  ENMT: No tonsillar erythema, exudates, or enlargement; Moist mucous membranes, Good dentition, No lesions  NECK: Supple, No JVD, Normal thyroid  CHEST/LUNG: Clear to auscultaion  CVS: Regular rate and rhythm; No murmurs, rubs, or gallops  GI: : Soft, Nontender, Nondistended; Bowel sounds present  NERVOUS SYSTEM:  Alert & Oriented X2-3  EXTREMITIES: -edema  LYMPH: No lymphadenopathy noted  SKIN: No rashes or lesions  ENDOCRINOLOGY: No Thyromegaly  PSYCH: calm     Labs:  26                            13.4   13.25 )-----------( 239      ( 18 Aug 2024 06:23 )             40.2                         11.7   11.29 )-----------( 278      ( 17 Aug 2024 07:11 )             34.6                         12.6   11.52 )-----------( 302      ( 16 Aug 2024 06:50 )             37.6                         14.0   11.85 )-----------( 352      ( 15 Aug 2024 03:30 )             41.8     08-18    136  |  104  |  24<H>  ----------------------------<  91  3.9   |  19<L>  |  0.55  08-17    136  |  106  |  25<H>  ----------------------------<  113<H>  3.7   |  20<L>  |  0.52  08-16    136  |  104  |  22  ----------------------------<  116<H>  3.5   |  21<L>  |  0.58  08-15    135  |  102  |  18  ----------------------------<  93  3.8   |  20<L>  |  0.51    Ca    8.9      18 Aug 2024 06:23  Ca    8.3<L>      17 Aug 2024 07:11  Phos  2.5     08-18  Phos  2.2     08-17  Mg     2.20     08-18  Mg     2.00     08-17    TPro  8.7<H>  /  Alb  4.1  /  TBili  1.7<H>  /  DBili  x   /  AST  20  /  ALT  12  /  AlkPhos  100  08-15    CAPILLARY BLOOD GLUCOSE      POCT Blood Glucose.: 135 mg/dL (18 Aug 2024 08:32)  POCT Blood Glucose.: 112 mg/dL (17 Aug 2024 23:12)  POCT Blood Glucose.: 135 mg/dL (17 Aug 2024 17:27)          Urinalysis Basic - ( 18 Aug 2024 06:23 )    Color: x / Appearance: x / SG: x / pH: x  Gluc: 91 mg/dL / Ketone: x  / Bili: x / Urobili: x   Blood: x / Protein: x / Nitrite: x   Leuk Esterase: x / RBC: x / WBC x   Sq Epi: x / Non Sq Epi: x / Bacteria: x      D-Dimer Assay, Quantitative: 243 ng/mL DDU (08-18 @ 06:23)  D-Dimer Assay, Quantitative: 417 ng/mL DDU (08-14 @ 06:35)        RECENT CULTURES:    rad< from: Xray Chest 1 View-PORTABLE IMMEDIATE (Xray Chest 1 View-PORTABLE IMMEDIATE .) (08.11.24 @ 19:25) >  CLINICAL INDICATION: Cough    TECHNIQUE: Single frontal, portable view of the chest was obtained.    COMPARISON: Chest x-ray performed on 7/15/2024, concurrent abdominal CT..    FINDINGS:  The heart is normal in size.  No focal consolidation. Normal pulmonary vasculature  There is no pneumothorax or pleural effusion.    No acute osseous findings    IMPRESSION:  No focal consolidations.    --- End of Report ---          FAHEEM AVILA MD; Resident Radiologist  This document has been electronically signed.  SHIV SMITH DO; Attending Radiologist  This document has been electronically signed. Aug 12 2024  9:08AM    < end of copied text >  < from: CT Abdomen and Pelvis w/ IV Cont (08.11.24 @ 17:13) >    LIVER: Within normal limits.  BILE DUCTS: Normal caliber.  GALLBLADDER: Cholecystectomy. Hyperattenuating nodules adjacent to the   posterior right hepatic lobe (2-29 and 601-54), similar to prior, may   represent dropped gallstones.  SPLEEN: Within normal limits.  PANCREAS: Within normal limits.  ADRENALS: Within normal limits.  KIDNEYS/URETERS: Bilateral symmetric renal enhancement. No   hydronephrosis. Right renal cyst.    BLADDER: Within normal limits.  REPRODUCTIVE ORGANS: Hysterectomy.    BOWEL: Small hiatal hernia. 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 No bowel obstruction. Colonic   diverticulosis. Appendix is normal.  PERITONEUM/RETROPERITONEUM: Within normal limits.  VESSELS: Atherosclerotic changes. Left-sided IVC, anatomic variant.  LYMPH NODES: No lymphadenopathy.  ABDOMINAL WALL: Postsurgical changes.  BONES: Degenerative changes. Grade 2 anterolisthesis of L5 on S1, similar  to prior. Bilateral L5 pars defects.    IMPRESSION:  No acute intra-abdominal or pelvic pathology.        --- End of Report ---      < end of copied text >      RESPIRATORY CULTURES:          Studies  Chest X-RAY  CT SCAN Chest   Venous Dopplers: LE:   CT Abdomen  Others              
Patient is a 78y old  Female who presents with a chief complaint of Hypoxia, Nausea and vomiting (14 Aug 2024 16:59)    Date of servie : 08-14-24 @ 17:50  INTERVAL HPI/OVERNIGHT EVENTS:  T(C): 37.2 (08-14-24 @ 11:22), Max: 37.2 (08-14-24 @ 11:22)  HR: 56 (08-14-24 @ 11:22) (56 - 86)  BP: 138/69 (08-14-24 @ 11:22) (129/59 - 138/69)  RR: 18 (08-14-24 @ 11:22) (17 - 18)  SpO2: 98% (08-14-24 @ 11:22) (97% - 98%)  Wt(kg): --  I&O's Summary    13 Aug 2024 07:01  -  14 Aug 2024 07:00  --------------------------------------------------------  IN: 480 mL / OUT: 600 mL / NET: -120 mL        LABS:                        12.4   11.99 )-----------( 338      ( 14 Aug 2024 06:35 )             37.4     08-14    135  |  104  |  18  ----------------------------<  85  3.4<L>   |  19<L>  |  0.52    Ca    6.8<L>      14 Aug 2024 06:35  Phos  2.1     08-14  Mg     2.10     08-14    TPro  6.9  /  Alb  3.7  /  TBili  2.2<H>  /  DBili  x   /  AST  17  /  ALT  11  /  AlkPhos  85  08-14      Urinalysis Basic - ( 14 Aug 2024 06:35 )    Color: x / Appearance: x / SG: x / pH: x  Gluc: 85 mg/dL / Ketone: x  / Bili: x / Urobili: x   Blood: x / Protein: x / Nitrite: x   Leuk Esterase: x / RBC: x / WBC x   Sq Epi: x / Non Sq Epi: x / Bacteria: x      CAPILLARY BLOOD GLUCOSE      POCT Blood Glucose.: 173 mg/dL (14 Aug 2024 12:20)  POCT Blood Glucose.: 105 mg/dL (14 Aug 2024 08:33)  POCT Blood Glucose.: 108 mg/dL (13 Aug 2024 22:40)        Urinalysis Basic - ( 14 Aug 2024 06:35 )    Color: x / Appearance: x / SG: x / pH: x  Gluc: 85 mg/dL / Ketone: x  / Bili: x / Urobili: x   Blood: x / Protein: x / Nitrite: x   Leuk Esterase: x / RBC: x / WBC x   Sq Epi: x / Non Sq Epi: x / Bacteria: x        MEDICATIONS  (STANDING):  ascorbic acid 500 milliGRAM(s) Oral daily  aspirin enteric coated 81 milliGRAM(s) Oral daily  cholecalciferol 1000 Unit(s) Oral daily  citalopram 10 milliGRAM(s) Oral daily  clopidogrel Tablet 75 milliGRAM(s) Oral daily  dexAMETHasone     Tablet 6 milliGRAM(s) Oral daily  dextrose 5%. 1000 milliLiter(s) (50 mL/Hr) IV Continuous <Continuous>  dextrose 5%. 1000 milliLiter(s) (100 mL/Hr) IV Continuous <Continuous>  dextrose 50% Injectable 12.5 Gram(s) IV Push once  dextrose 50% Injectable 25 Gram(s) IV Push once  dextrose 50% Injectable 25 Gram(s) IV Push once  enoxaparin Injectable 40 milliGRAM(s) SubCutaneous every 24 hours  glucagon  Injectable 1 milliGRAM(s) IntraMuscular once  insulin lispro (ADMELOG) corrective regimen sliding scale   SubCutaneous three times a day before meals  insulin lispro (ADMELOG) corrective regimen sliding scale   SubCutaneous at bedtime  lactated ringers. 1000 milliLiter(s) (100 mL/Hr) IV Continuous <Continuous>  memantine 10 milliGRAM(s) Oral every 12 hours  metoprolol succinate ER 25 milliGRAM(s) Oral daily  multivitamin 1 Tablet(s) Oral daily  NIFEdipine XL 60 milliGRAM(s) Oral daily  remdesivir  IVPB   IV Intermittent   remdesivir  IVPB 100 milliGRAM(s) IV Intermittent every 24 hours  simvastatin 20 milliGRAM(s) Oral at bedtime  zinc sulfate 220 milliGRAM(s) Oral daily    MEDICATIONS  (PRN):  acetaminophen     Tablet .. 650 milliGRAM(s) Oral every 6 hours PRN Mild Pain (1 - 3)  albuterol/ipratropium for Nebulization 3 milliLiter(s) Nebulizer every 6 hours PRN Shortness of Breath and/or Wheezing  dextrose Oral Gel 15 Gram(s) Oral once PRN Blood Glucose LESS THAN 70 milliGRAM(s)/deciliter  melatonin 3 milliGRAM(s) Oral at bedtime PRN Insomnia  ondansetron Injectable 4 milliGRAM(s) IV Push every 8 hours PRN Nausea and/or Vomiting          PHYSICAL EXAM:  GENERAL: NAD, well-groomed, well-developed  HEAD:  Atraumatic, Normocephalic  CHEST/LUNG: Clear to percussion bilaterally; No rales, rhonchi, wheezing, or rubs  HEART: Regular rate and rhythm; No murmurs, rubs, or gallops  ABDOMEN: Soft, Nontender, Nondistended; Bowel sounds present  EXTREMITIES:  2+ Peripheral Pulses, No clubbing, cyanosis, or edema  LYMPH: No lymphadenopathy noted  SKIN: No rashes or lesions    Care Discussed with Consultants/Other Providers [ ] YES  [ ] NO
Patient is a 78y old  Female who presents with a chief complaint of Hypoxia, Nausea and vomiting (15 Aug 2024 10:19)    Date of servie : 08-15-24 @ 13:33  INTERVAL HPI/OVERNIGHT EVENTS:  T(C): 37.2 (08-15-24 @ 12:28), Max: 37.2 (08-15-24 @ 12:28)  HR: 80 (08-15-24 @ 12:28) (80 - 85)  BP: 130/64 (08-15-24 @ 12:28) (115/63 - 131/75)  RR: 18 (08-15-24 @ 12:28) (16 - 18)  SpO2: 98% (08-15-24 @ 12:28) (95% - 98%)  Wt(kg): --  I&O's Summary      LABS:                        14.0   11.85 )-----------( 352      ( 15 Aug 2024 03:30 )             41.8     08-15    135  |  102  |  18  ----------------------------<  93  3.8   |  20<L>  |  0.51    Ca    8.8      15 Aug 2024 03:30  Phos  1.6     08-15  Mg     2.10     08-15    TPro  8.7<H>  /  Alb  4.1  /  TBili  1.7<H>  /  DBili  x   /  AST  20  /  ALT  12  /  AlkPhos  100  08-15      Urinalysis Basic - ( 15 Aug 2024 03:30 )    Color: x / Appearance: x / SG: x / pH: x  Gluc: 93 mg/dL / Ketone: x  / Bili: x / Urobili: x   Blood: x / Protein: x / Nitrite: x   Leuk Esterase: x / RBC: x / WBC x   Sq Epi: x / Non Sq Epi: x / Bacteria: x      CAPILLARY BLOOD GLUCOSE      POCT Blood Glucose.: 155 mg/dL (15 Aug 2024 11:57)  POCT Blood Glucose.: 175 mg/dL (15 Aug 2024 08:28)  POCT Blood Glucose.: 103 mg/dL (14 Aug 2024 22:38)  POCT Blood Glucose.: 101 mg/dL (14 Aug 2024 17:53)        Urinalysis Basic - ( 15 Aug 2024 03:30 )    Color: x / Appearance: x / SG: x / pH: x  Gluc: 93 mg/dL / Ketone: x  / Bili: x / Urobili: x   Blood: x / Protein: x / Nitrite: x   Leuk Esterase: x / RBC: x / WBC x   Sq Epi: x / Non Sq Epi: x / Bacteria: x        MEDICATIONS  (STANDING):  ascorbic acid 500 milliGRAM(s) Oral daily  aspirin enteric coated 81 milliGRAM(s) Oral daily  cholecalciferol 1000 Unit(s) Oral daily  citalopram 10 milliGRAM(s) Oral daily  clopidogrel Tablet 75 milliGRAM(s) Oral daily  dexAMETHasone     Tablet 6 milliGRAM(s) Oral daily  dextrose 5%. 1000 milliLiter(s) (100 mL/Hr) IV Continuous <Continuous>  dextrose 5%. 1000 milliLiter(s) (50 mL/Hr) IV Continuous <Continuous>  dextrose 50% Injectable 12.5 Gram(s) IV Push once  dextrose 50% Injectable 25 Gram(s) IV Push once  dextrose 50% Injectable 25 Gram(s) IV Push once  enoxaparin Injectable 40 milliGRAM(s) SubCutaneous every 24 hours  glucagon  Injectable 1 milliGRAM(s) IntraMuscular once  insulin lispro (ADMELOG) corrective regimen sliding scale   SubCutaneous at bedtime  insulin lispro (ADMELOG) corrective regimen sliding scale   SubCutaneous three times a day before meals  lactated ringers. 1000 milliLiter(s) (100 mL/Hr) IV Continuous <Continuous>  memantine 10 milliGRAM(s) Oral every 12 hours  metoprolol succinate ER 25 milliGRAM(s) Oral daily  multivitamin 1 Tablet(s) Oral daily  NIFEdipine XL 60 milliGRAM(s) Oral daily  remdesivir  IVPB   IV Intermittent   remdesivir  IVPB 100 milliGRAM(s) IV Intermittent every 24 hours  simvastatin 20 milliGRAM(s) Oral at bedtime  zinc sulfate 220 milliGRAM(s) Oral daily    MEDICATIONS  (PRN):  acetaminophen     Tablet .. 650 milliGRAM(s) Oral every 6 hours PRN Mild Pain (1 - 3)  albuterol/ipratropium for Nebulization 3 milliLiter(s) Nebulizer every 6 hours PRN Shortness of Breath and/or Wheezing  dextrose Oral Gel 15 Gram(s) Oral once PRN Blood Glucose LESS THAN 70 milliGRAM(s)/deciliter  melatonin 3 milliGRAM(s) Oral at bedtime PRN Insomnia  ondansetron Injectable 4 milliGRAM(s) IV Push every 8 hours PRN Nausea and/or Vomiting          PHYSICAL EXAM:  GENERAL: NAD, well-groomed, well-developed  HEAD:  Atraumatic, Normocephalic  CHEST/LUNG: Clear to percussion bilaterally; No rales, rhonchi, wheezing, or rubs  HEART: Regular rate and rhythm; No murmurs, rubs, or gallops  ABDOMEN: Soft, Nontender, Nondistended; Bowel sounds present  EXTREMITIES:  2+ Peripheral Pulses, No clubbing, cyanosis, or edema  LYMPH: No lymphadenopathy noted  SKIN: No rashes or lesions    Care Discussed with Consultants/Other Providers [ ] YES  [ ] NO
Patient is a 78y old  Female who presents with a chief complaint of Hypoxia, Nausea and vomiting (16 Aug 2024 12:46)    Date of servie : 08-16-24 @ 15:59  INTERVAL HPI/OVERNIGHT EVENTS:  T(C): 36.7 (08-16-24 @ 12:53), Max: 37.1 (08-16-24 @ 06:30)  HR: 72 (08-16-24 @ 12:53) (72 - 82)  BP: 100/50 (08-16-24 @ 12:53) (100/50 - 141/73)  RR: 18 (08-16-24 @ 12:53) (18 - 18)  SpO2: 99% (08-16-24 @ 12:53) (95% - 99%)  Wt(kg): --  I&O's Summary      LABS:                        12.6   11.52 )-----------( 302      ( 16 Aug 2024 06:50 )             37.6     08-16    136  |  104  |  22  ----------------------------<  116<H>  3.5   |  21<L>  |  0.58    Ca    8.3<L>      16 Aug 2024 06:50  Phos  2.3     08-16  Mg     2.00     08-16    TPro  8.7<H>  /  Alb  4.1  /  TBili  1.7<H>  /  DBili  x   /  AST  20  /  ALT  12  /  AlkPhos  100  08-15      Urinalysis Basic - ( 16 Aug 2024 06:50 )    Color: x / Appearance: x / SG: x / pH: x  Gluc: 116 mg/dL / Ketone: x  / Bili: x / Urobili: x   Blood: x / Protein: x / Nitrite: x   Leuk Esterase: x / RBC: x / WBC x   Sq Epi: x / Non Sq Epi: x / Bacteria: x      CAPILLARY BLOOD GLUCOSE      POCT Blood Glucose.: 180 mg/dL (16 Aug 2024 12:16)  POCT Blood Glucose.: 132 mg/dL (16 Aug 2024 08:33)  POCT Blood Glucose.: 126 mg/dL (15 Aug 2024 22:17)  POCT Blood Glucose.: 148 mg/dL (15 Aug 2024 17:49)        Urinalysis Basic - ( 16 Aug 2024 06:50 )    Color: x / Appearance: x / SG: x / pH: x  Gluc: 116 mg/dL / Ketone: x  / Bili: x / Urobili: x   Blood: x / Protein: x / Nitrite: x   Leuk Esterase: x / RBC: x / WBC x   Sq Epi: x / Non Sq Epi: x / Bacteria: x        MEDICATIONS  (STANDING):  ascorbic acid 500 milliGRAM(s) Oral daily  aspirin enteric coated 81 milliGRAM(s) Oral daily  chlorhexidine 2% Cloths 1 Application(s) Topical daily  cholecalciferol 1000 Unit(s) Oral daily  citalopram 10 milliGRAM(s) Oral daily  clopidogrel Tablet 75 milliGRAM(s) Oral daily  dexAMETHasone     Tablet 6 milliGRAM(s) Oral daily  dextrose 5%. 1000 milliLiter(s) (100 mL/Hr) IV Continuous <Continuous>  dextrose 5%. 1000 milliLiter(s) (50 mL/Hr) IV Continuous <Continuous>  dextrose 50% Injectable 12.5 Gram(s) IV Push once  dextrose 50% Injectable 25 Gram(s) IV Push once  dextrose 50% Injectable 25 Gram(s) IV Push once  enoxaparin Injectable 40 milliGRAM(s) SubCutaneous every 24 hours  glucagon  Injectable 1 milliGRAM(s) IntraMuscular once  insulin lispro (ADMELOG) corrective regimen sliding scale   SubCutaneous three times a day before meals  insulin lispro (ADMELOG) corrective regimen sliding scale   SubCutaneous at bedtime  lactated ringers. 1000 milliLiter(s) (100 mL/Hr) IV Continuous <Continuous>  memantine 10 milliGRAM(s) Oral every 12 hours  metoprolol succinate ER 25 milliGRAM(s) Oral daily  multivitamin 1 Tablet(s) Oral daily  NIFEdipine XL 60 milliGRAM(s) Oral daily  remdesivir  IVPB   IV Intermittent   remdesivir  IVPB 100 milliGRAM(s) IV Intermittent every 24 hours  simvastatin 20 milliGRAM(s) Oral at bedtime  zinc sulfate 220 milliGRAM(s) Oral daily    MEDICATIONS  (PRN):  acetaminophen     Tablet .. 650 milliGRAM(s) Oral every 6 hours PRN Mild Pain (1 - 3)  albuterol/ipratropium for Nebulization 3 milliLiter(s) Nebulizer every 6 hours PRN Shortness of Breath and/or Wheezing  dextrose Oral Gel 15 Gram(s) Oral once PRN Blood Glucose LESS THAN 70 milliGRAM(s)/deciliter  melatonin 3 milliGRAM(s) Oral at bedtime PRN Insomnia  ondansetron Injectable 4 milliGRAM(s) IV Push every 8 hours PRN Nausea and/or Vomiting          PHYSICAL EXAM:  GENERAL: NAD, well-groomed, well-developed  HEAD:  Atraumatic, Normocephalic  CHEST/LUNG: Clear to percussion bilaterally; No rales, rhonchi, wheezing, or rubs  HEART: Regular rate and rhythm; No murmurs, rubs, or gallops  ABDOMEN: Soft, Nontender, Nondistended; Bowel sounds present  EXTREMITIES:  2+ Peripheral Pulses, No clubbing, cyanosis, or edema  LYMPH: No lymphadenopathy noted  SKIN: No rashes or lesions    Care Discussed with Consultants/Other Providers [ ] YES  [ ] NO
Date of Service: 08-19-24 @ 11:29    Patient is a 78y old  Female who presents with a chief complaint of Hypoxia, Nausea and vomiting (18 Aug 2024 13:14)      Any change in ROS: seems OK:  no sob:  no cough : on room air:  no complaints:     MEDICATIONS  (STANDING):  ascorbic acid 500 milliGRAM(s) Oral daily  aspirin enteric coated 81 milliGRAM(s) Oral daily  chlorhexidine 2% Cloths 1 Application(s) Topical daily  cholecalciferol 1000 Unit(s) Oral daily  citalopram 10 milliGRAM(s) Oral daily  clopidogrel Tablet 75 milliGRAM(s) Oral daily  dexAMETHasone     Tablet 6 milliGRAM(s) Oral daily  dextrose 5%. 1000 milliLiter(s) (50 mL/Hr) IV Continuous <Continuous>  dextrose 5%. 1000 milliLiter(s) (100 mL/Hr) IV Continuous <Continuous>  dextrose 50% Injectable 12.5 Gram(s) IV Push once  dextrose 50% Injectable 25 Gram(s) IV Push once  dextrose 50% Injectable 25 Gram(s) IV Push once  enoxaparin Injectable 40 milliGRAM(s) SubCutaneous every 24 hours  glucagon  Injectable 1 milliGRAM(s) IntraMuscular once  insulin lispro (ADMELOG) corrective regimen sliding scale   SubCutaneous three times a day before meals  insulin lispro (ADMELOG) corrective regimen sliding scale   SubCutaneous at bedtime  lactated ringers. 1000 milliLiter(s) (100 mL/Hr) IV Continuous <Continuous>  memantine 10 milliGRAM(s) Oral every 12 hours  metoprolol succinate ER 25 milliGRAM(s) Oral daily  multivitamin 1 Tablet(s) Oral daily  NIFEdipine XL 60 milliGRAM(s) Oral daily  QUEtiapine 25 milliGRAM(s) Oral at bedtime  simvastatin 20 milliGRAM(s) Oral at bedtime    MEDICATIONS  (PRN):  acetaminophen     Tablet .. 650 milliGRAM(s) Oral every 6 hours PRN Mild Pain (1 - 3)  albuterol/ipratropium for Nebulization 3 milliLiter(s) Nebulizer every 6 hours PRN Shortness of Breath and/or Wheezing  dextrose Oral Gel 15 Gram(s) Oral once PRN Blood Glucose LESS THAN 70 milliGRAM(s)/deciliter  melatonin 3 milliGRAM(s) Oral at bedtime PRN Insomnia  ondansetron Injectable 4 milliGRAM(s) IV Push every 8 hours PRN Nausea and/or Vomiting    Vital Signs Last 24 Hrs  T(C): 37.2 (19 Aug 2024 06:30), Max: 37.2 (19 Aug 2024 06:30)  T(F): 98.9 (19 Aug 2024 06:30), Max: 98.9 (19 Aug 2024 06:30)  HR: 67 (19 Aug 2024 06:30) (67 - 67)  BP: 133/69 (19 Aug 2024 06:30) (122/67 - 133/69)  BP(mean): --  RR: 18 (19 Aug 2024 06:30) (18 - 18)  SpO2: 100% (19 Aug 2024 06:30) (96% - 100%)    Parameters below as of 19 Aug 2024 06:30  Patient On (Oxygen Delivery Method): room air        I&O's Summary    18 Aug 2024 07:01  -  19 Aug 2024 07:00  --------------------------------------------------------  IN: 737 mL / OUT: 0 mL / NET: 737 mL          Physical Exam:   GENERAL: NAD, well-groomed, well-developed  HEENT: MARTINE/   Atraumatic, Normocephalic  ENMT: No tonsillar erythema, exudates, or enlargement; Moist mucous membranes, Good dentition, No lesions  NECK: Supple, No JVD, Normal thyroid  CHEST/LUNG: Clear to auscultaion  CVS: Regular rate and rhythm; No murmurs, rubs, or gallops  GI: : Soft, Nontender, Nondistended; Bowel sounds present  NERVOUS SYSTEM:  Alert & Oriented X2  EXTREMITIES: -edema  LYMPH: No lymphadenopathy noted  SKIN: No rashes or lesions  ENDOCRINOLOGY: No Thyromegaly  PSYCH: calm     Labs:                              11.3   12.18 )-----------( 254      ( 19 Aug 2024 06:40 )             34.1                         13.4   13.25 )-----------( 239      ( 18 Aug 2024 06:23 )             40.2                         11.7   11.29 )-----------( 278      ( 17 Aug 2024 07:11 )             34.6                         12.6   11.52 )-----------( 302      ( 16 Aug 2024 06:50 )             37.6     08-19    137  |  104  |  25<H>  ----------------------------<  100<H>  3.8   |  22  |  0.61  08-18    136  |  104  |  24<H>  ----------------------------<  91  3.9   |  19<L>  |  0.55  08-17    136  |  106  |  25<H>  ----------------------------<  113<H>  3.7   |  20<L>  |  0.52  08-16    136  |  104  |  22  ----------------------------<  116<H>  3.5   |  21<L>  |  0.58    Ca    8.4      19 Aug 2024 06:40  Ca    8.9      18 Aug 2024 06:23  Phos  2.8     08-19  Phos  2.5     08-18  Mg     2.00     08-19  Mg     2.20     08-18      CAPILLARY BLOOD GLUCOSE      POCT Blood Glucose.: 121 mg/dL (19 Aug 2024 08:37)  POCT Blood Glucose.: 128 mg/dL (18 Aug 2024 23:05)  POCT Blood Glucose.: 179 mg/dL (18 Aug 2024 17:36)  POCT Blood Glucose.: 166 mg/dL (18 Aug 2024 12:38)          Urinalysis Basic - ( 19 Aug 2024 06:40 )    Color: x / Appearance: x / SG: x / pH: x  Gluc: 100 mg/dL / Ketone: x  / Bili: x / Urobili: x   Blood: x / Protein: x / Nitrite: x   Leuk Esterase: x / RBC: x / WBC x   Sq Epi: x / Non Sq Epi: x / Bacteria: x      D-Dimer Assay, Quantitative: 243 ng/mL DDU (08-18 @ 06:23)  D-Dimer Assay, Quantitative: 417 ng/mL DDU (08-14 @ 06:35)        RECENT CULTURES:    rad< from: Xray Chest 1 View-PORTABLE IMMEDIATE (Xray Chest 1 View-PORTABLE IMMEDIATE .) (08.11.24 @ 19:25) >    ACC: [id number] EXAM:  XR CHEST PORTABLE IMMED 1V   ORDERED BY: TONYA VICKERS     PROCEDURE DATE:  08/11/2024          INTERPRETATION:  EXAMINATION: XR CHEST IMMEDIATE    CLINICAL INDICATION: Cough    TECHNIQUE: Single frontal, portable view of the chest was obtained.    COMPARISON: Chest x-ray performed on 7/15/2024, concurrent abdominal CT..    FINDINGS:  The heart is normal in size.  No focal consolidation. Normal pulmonary vasculature  There is no pneumothorax or pleural effusion.    No acute osseous findings    IMPRESSION:  No focal consolidations.    --- End of Report ---          FAHEEM AVILA MD; Resident Radiologist  This document has been electronically signed.  SHIV SMITH DO; Attending Radiologist  This document has been electronically signed. Aug 12 2024  9:08AM    < end of copied text >      RESPIRATORY CULTURES:          Studies  Chest X-RAY  CT SCAN Chest   Venous Dopplers: LE:   CT Abdomen  Others              
Follow Up:      Inverval History/ROS:Patient is a 78y old  Female who presents with a chief complaint of 78 year-old female, with past history significant for Dementia, Type 2 DM, Glaucoma, HTN, Osteoarthritis, Diverticulitis and Colostomy, presented to the ED secondary to nausea, vomiting and removal of ostomy bag.  Diagnosed with Hypoxia and Nausea and Vomiting in the ED per chart.     No fever  on 0-2 L NC     (14 Aug 2024 14:06)      Allergies    No Known Allergies    Intolerances        ANTIMICROBIALS:  remdesivir  IVPB    remdesivir  IVPB 100 every 24 hours      OTHER MEDS:  acetaminophen     Tablet .. 650 milliGRAM(s) Oral every 6 hours PRN  albuterol/ipratropium for Nebulization 3 milliLiter(s) Nebulizer every 6 hours PRN  ascorbic acid 500 milliGRAM(s) Oral daily  aspirin enteric coated 81 milliGRAM(s) Oral daily  cholecalciferol 1000 Unit(s) Oral daily  citalopram 10 milliGRAM(s) Oral daily  clopidogrel Tablet 75 milliGRAM(s) Oral daily  dexAMETHasone     Tablet 6 milliGRAM(s) Oral daily  dextrose 5%. 1000 milliLiter(s) IV Continuous <Continuous>  dextrose 5%. 1000 milliLiter(s) IV Continuous <Continuous>  dextrose 50% Injectable 12.5 Gram(s) IV Push once  dextrose 50% Injectable 25 Gram(s) IV Push once  dextrose 50% Injectable 25 Gram(s) IV Push once  dextrose Oral Gel 15 Gram(s) Oral once PRN  enoxaparin Injectable 40 milliGRAM(s) SubCutaneous every 24 hours  glucagon  Injectable 1 milliGRAM(s) IntraMuscular once  insulin lispro (ADMELOG) corrective regimen sliding scale   SubCutaneous at bedtime  insulin lispro (ADMELOG) corrective regimen sliding scale   SubCutaneous three times a day before meals  lactated ringers. 1000 milliLiter(s) IV Continuous <Continuous>  melatonin 3 milliGRAM(s) Oral at bedtime PRN  memantine 10 milliGRAM(s) Oral every 12 hours  metoprolol succinate ER 25 milliGRAM(s) Oral daily  multivitamin 1 Tablet(s) Oral daily  NIFEdipine XL 60 milliGRAM(s) Oral daily  ondansetron Injectable 4 milliGRAM(s) IV Push every 8 hours PRN  simvastatin 20 milliGRAM(s) Oral at bedtime  zinc sulfate 220 milliGRAM(s) Oral daily      Vital Signs Last 24 Hrs  T(C): 37.2 (14 Aug 2024 11:22), Max: 37.2 (14 Aug 2024 11:22)  T(F): 98.9 (14 Aug 2024 11:22), Max: 98.9 (14 Aug 2024 11:22)  HR: 56 (14 Aug 2024 11:22) (56 - 86)  BP: 138/69 (14 Aug 2024 11:22) (120/65 - 138/69)  BP(mean): --  RR: 18 (14 Aug 2024 11:22) (17 - 18)  SpO2: 98% (14 Aug 2024 11:22) (97% - 99%)    Parameters below as of 14 Aug 2024 11:22  Patient On (Oxygen Delivery Method): nasal cannula        PHYSICAL EXAM:  General: [ ] non-toxic  HEAD/EYES: [ ] PERRL [x ] white sclera [ ] icterus  ENT:  [ ] normal [ ] supple [ ] thrush [ ] pharyngeal exudate  Cardiovascular:   [ ] murmur [x ] normal [ ] PPM/AICD  Respiratory:  [x ] clear to ausculation bilaterally  GI:  [ x] soft, non-tender, normal bowel sounds  :  [ ] young [ ] no CVA tenderness   Musculoskeletal:  [ ] no synovitis  Neurologic:  [ ] non-focal exam   Skin:  [x ] no rash  Lymph: [ x] no lymphadenopathy  Psychiatric:  [ ] appropriate affect [ ] alert & oriented  Lines:  [ x] no phlebitis [ ] central line                                12.4   11.99 )-----------( 338      ( 14 Aug 2024 06:35 )             37.4       08-14    135  |  104  |  18  ----------------------------<  85  3.4<L>   |  19<L>  |  0.52    Ca    6.8<L>      14 Aug 2024 06:35  Phos  2.1     08-14  Mg     2.10     08-14    TPro  6.9  /  Alb  3.7  /  TBili  2.2<H>  /  DBili  x   /  AST  17  /  ALT  11  /  AlkPhos  85  08-14      Urinalysis Basic - ( 14 Aug 2024 06:35 )    Color: x / Appearance: x / SG: x / pH: x  Gluc: 85 mg/dL / Ketone: x  / Bili: x / Urobili: x   Blood: x / Protein: x / Nitrite: x   Leuk Esterase: x / RBC: x / WBC x   Sq Epi: x / Non Sq Epi: x / Bacteria: x        MICROBIOLOGY:    RADIOLOGY:    
Patient is a 78y old  Female who presents with a chief complaint of Hypoxia, Nausea and vomiting (13 Aug 2024 10:07)    Date of servie : 08-13-24 @ 16:49  INTERVAL HPI/OVERNIGHT EVENTS:  T(C): 36.4 (08-13-24 @ 13:57), Max: 36.6 (08-12-24 @ 17:23)  HR: 66 (08-13-24 @ 13:57) (66 - 82)  BP: 126/78 (08-13-24 @ 13:57) (126/78 - 164/88)  RR: 19 (08-13-24 @ 13:57) (16 - 19)  SpO2: 98% (08-13-24 @ 13:57) (98% - 100%)  Wt(kg): --  I&O's Summary    12 Aug 2024 07:01  -  13 Aug 2024 07:00  --------------------------------------------------------  IN: 700 mL / OUT: 200 mL / NET: 500 mL    13 Aug 2024 07:01  -  13 Aug 2024 16:49  --------------------------------------------------------  IN: 480 mL / OUT: 600 mL / NET: -120 mL        LABS:                        13.4   11.47 )-----------( 387      ( 13 Aug 2024 06:30 )             38.9     08-13    136  |  100  |  15  ----------------------------<  124<H>  3.6   |  25  |  0.49<L>    Ca    9.4      13 Aug 2024 06:30  Phos  2.1     08-13  Mg     2.00     08-13        Urinalysis Basic - ( 13 Aug 2024 06:30 )    Color: x / Appearance: x / SG: x / pH: x  Gluc: 124 mg/dL / Ketone: x  / Bili: x / Urobili: x   Blood: x / Protein: x / Nitrite: x   Leuk Esterase: x / RBC: x / WBC x   Sq Epi: x / Non Sq Epi: x / Bacteria: x      CAPILLARY BLOOD GLUCOSE      POCT Blood Glucose.: 209 mg/dL (13 Aug 2024 12:23)  POCT Blood Glucose.: 156 mg/dL (13 Aug 2024 08:33)  POCT Blood Glucose.: 179 mg/dL (12 Aug 2024 22:23)  POCT Blood Glucose.: 139 mg/dL (12 Aug 2024 17:21)        Urinalysis Basic - ( 13 Aug 2024 06:30 )    Color: x / Appearance: x / SG: x / pH: x  Gluc: 124 mg/dL / Ketone: x  / Bili: x / Urobili: x   Blood: x / Protein: x / Nitrite: x   Leuk Esterase: x / RBC: x / WBC x   Sq Epi: x / Non Sq Epi: x / Bacteria: x        MEDICATIONS  (STANDING):  ascorbic acid 500 milliGRAM(s) Oral daily  aspirin enteric coated 81 milliGRAM(s) Oral daily  cholecalciferol 1000 Unit(s) Oral daily  citalopram 10 milliGRAM(s) Oral daily  clopidogrel Tablet 75 milliGRAM(s) Oral daily  dexAMETHasone     Tablet 6 milliGRAM(s) Oral daily  dextrose 5%. 1000 milliLiter(s) (100 mL/Hr) IV Continuous <Continuous>  dextrose 5%. 1000 milliLiter(s) (50 mL/Hr) IV Continuous <Continuous>  dextrose 50% Injectable 12.5 Gram(s) IV Push once  dextrose 50% Injectable 25 Gram(s) IV Push once  dextrose 50% Injectable 25 Gram(s) IV Push once  enoxaparin Injectable 40 milliGRAM(s) SubCutaneous every 24 hours  glucagon  Injectable 1 milliGRAM(s) IntraMuscular once  insulin lispro (ADMELOG) corrective regimen sliding scale   SubCutaneous at bedtime  insulin lispro (ADMELOG) corrective regimen sliding scale   SubCutaneous three times a day before meals  lactated ringers. 1000 milliLiter(s) (100 mL/Hr) IV Continuous <Continuous>  memantine 10 milliGRAM(s) Oral every 12 hours  metoprolol succinate ER 25 milliGRAM(s) Oral daily  multivitamin 1 Tablet(s) Oral daily  NIFEdipine XL 60 milliGRAM(s) Oral daily  remdesivir  IVPB   IV Intermittent   remdesivir  IVPB 100 milliGRAM(s) IV Intermittent every 24 hours  simvastatin 20 milliGRAM(s) Oral at bedtime  zinc sulfate 220 milliGRAM(s) Oral daily    MEDICATIONS  (PRN):  acetaminophen     Tablet .. 650 milliGRAM(s) Oral every 6 hours PRN Mild Pain (1 - 3)  albuterol/ipratropium for Nebulization 3 milliLiter(s) Nebulizer every 6 hours PRN Shortness of Breath and/or Wheezing  dextrose Oral Gel 15 Gram(s) Oral once PRN Blood Glucose LESS THAN 70 milliGRAM(s)/deciliter  melatonin 3 milliGRAM(s) Oral at bedtime PRN Insomnia  ondansetron Injectable 4 milliGRAM(s) IV Push every 8 hours PRN Nausea and/or Vomiting          PHYSICAL EXAM:  GENERAL: NAD, well-groomed, well-developed  HEAD:  Atraumatic, Normocephalic  CHEST/LUNG: Clear to percussion bilaterally; No rales, rhonchi, wheezing, or rubs  HEART: Regular rate and rhythm; No murmurs, rubs, or gallops  ABDOMEN: Soft, Nontender, Nondistended; Bowel sounds present  EXTREMITIES:  2+ Peripheral Pulses, No clubbing, cyanosis, or edema  LYMPH: No lymphadenopathy noted  SKIN: No rashes or lesions    Care Discussed with Consultants/Other Providers [ ] YES  [ ] NO
Patient is a 78y old  Female who presents with a chief complaint of Hypoxia, Nausea and vomiting (17 Aug 2024 12:10)    Date of servie : 08-17-24 @ 14:51  INTERVAL HPI/OVERNIGHT EVENTS:  T(C): 36.9 (08-17-24 @ 12:00), Max: 37 (08-17-24 @ 05:15)  HR: 64 (08-17-24 @ 12:00) (64 - 70)  BP: 114/70 (08-17-24 @ 12:00) (110/59 - 129/71)  RR: 18 (08-17-24 @ 12:00) (18 - 18)  SpO2: 100% (08-17-24 @ 12:00) (98% - 100%)  Wt(kg): --  I&O's Summary    17 Aug 2024 07:01  -  17 Aug 2024 14:51  --------------------------------------------------------  IN: 440 mL / OUT: 0 mL / NET: 440 mL        LABS:                        11.7   11.29 )-----------( 278      ( 17 Aug 2024 07:11 )             34.6     08-17    136  |  106  |  25<H>  ----------------------------<  113<H>  3.7   |  20<L>  |  0.52    Ca    8.3<L>      17 Aug 2024 07:11  Phos  2.2     08-17  Mg     2.00     08-17        Urinalysis Basic - ( 17 Aug 2024 07:11 )    Color: x / Appearance: x / SG: x / pH: x  Gluc: 113 mg/dL / Ketone: x  / Bili: x / Urobili: x   Blood: x / Protein: x / Nitrite: x   Leuk Esterase: x / RBC: x / WBC x   Sq Epi: x / Non Sq Epi: x / Bacteria: x      CAPILLARY BLOOD GLUCOSE      POCT Blood Glucose.: 193 mg/dL (17 Aug 2024 12:19)  POCT Blood Glucose.: 138 mg/dL (17 Aug 2024 08:28)  POCT Blood Glucose.: 113 mg/dL (16 Aug 2024 21:37)  POCT Blood Glucose.: 159 mg/dL (16 Aug 2024 17:20)        Urinalysis Basic - ( 17 Aug 2024 07:11 )    Color: x / Appearance: x / SG: x / pH: x  Gluc: 113 mg/dL / Ketone: x  / Bili: x / Urobili: x   Blood: x / Protein: x / Nitrite: x   Leuk Esterase: x / RBC: x / WBC x   Sq Epi: x / Non Sq Epi: x / Bacteria: x        MEDICATIONS  (STANDING):  ascorbic acid 500 milliGRAM(s) Oral daily  aspirin enteric coated 81 milliGRAM(s) Oral daily  chlorhexidine 2% Cloths 1 Application(s) Topical daily  cholecalciferol 1000 Unit(s) Oral daily  citalopram 10 milliGRAM(s) Oral daily  clopidogrel Tablet 75 milliGRAM(s) Oral daily  dexAMETHasone     Tablet 6 milliGRAM(s) Oral daily  dextrose 5%. 1000 milliLiter(s) (100 mL/Hr) IV Continuous <Continuous>  dextrose 5%. 1000 milliLiter(s) (50 mL/Hr) IV Continuous <Continuous>  dextrose 50% Injectable 12.5 Gram(s) IV Push once  dextrose 50% Injectable 25 Gram(s) IV Push once  dextrose 50% Injectable 25 Gram(s) IV Push once  enoxaparin Injectable 40 milliGRAM(s) SubCutaneous every 24 hours  glucagon  Injectable 1 milliGRAM(s) IntraMuscular once  insulin lispro (ADMELOG) corrective regimen sliding scale   SubCutaneous three times a day before meals  insulin lispro (ADMELOG) corrective regimen sliding scale   SubCutaneous at bedtime  lactated ringers. 1000 milliLiter(s) (100 mL/Hr) IV Continuous <Continuous>  memantine 10 milliGRAM(s) Oral every 12 hours  metoprolol succinate ER 25 milliGRAM(s) Oral daily  multivitamin 1 Tablet(s) Oral daily  NIFEdipine XL 60 milliGRAM(s) Oral daily  QUEtiapine 25 milliGRAM(s) Oral at bedtime  simvastatin 20 milliGRAM(s) Oral at bedtime  zinc sulfate 220 milliGRAM(s) Oral daily    MEDICATIONS  (PRN):  acetaminophen     Tablet .. 650 milliGRAM(s) Oral every 6 hours PRN Mild Pain (1 - 3)  albuterol/ipratropium for Nebulization 3 milliLiter(s) Nebulizer every 6 hours PRN Shortness of Breath and/or Wheezing  dextrose Oral Gel 15 Gram(s) Oral once PRN Blood Glucose LESS THAN 70 milliGRAM(s)/deciliter  melatonin 3 milliGRAM(s) Oral at bedtime PRN Insomnia  ondansetron Injectable 4 milliGRAM(s) IV Push every 8 hours PRN Nausea and/or Vomiting          PHYSICAL EXAM:  GENERAL: NAD, well-groomed, well-developed  HEAD:  Atraumatic, Normocephalic  CHEST/LUNG: Clear to percussion bilaterally; No rales, rhonchi, wheezing, or rubs  HEART: Regular rate and rhythm; No murmurs, rubs, or gallops  ABDOMEN: Soft, Nontender, Nondistended; Bowel sounds present  EXTREMITIES:  2+ Peripheral Pulses, No clubbing, cyanosis, or edema  LYMPH: No lymphadenopathy noted  SKIN: No rashes or lesions    Care Discussed with Consultants/Other Providers [ ] YES  [ ] NO
Patient is a 78y old  Female who presents with a chief complaint of Hypoxia, Nausea and vomiting (12 Aug 2024 18:21)      SUBJECTIVE / OVERNIGHT EVENTS: no events     MEDICATIONS  (STANDING):  ascorbic acid 500 milliGRAM(s) Oral daily  aspirin enteric coated 81 milliGRAM(s) Oral daily  cholecalciferol 1000 Unit(s) Oral daily  citalopram 10 milliGRAM(s) Oral daily  clopidogrel Tablet 75 milliGRAM(s) Oral daily  dexAMETHasone     Tablet 6 milliGRAM(s) Oral daily  dextrose 5%. 1000 milliLiter(s) (50 mL/Hr) IV Continuous <Continuous>  dextrose 5%. 1000 milliLiter(s) (100 mL/Hr) IV Continuous <Continuous>  dextrose 50% Injectable 12.5 Gram(s) IV Push once  dextrose 50% Injectable 25 Gram(s) IV Push once  dextrose 50% Injectable 25 Gram(s) IV Push once  enoxaparin Injectable 40 milliGRAM(s) SubCutaneous every 24 hours  glucagon  Injectable 1 milliGRAM(s) IntraMuscular once  insulin lispro (ADMELOG) corrective regimen sliding scale   SubCutaneous at bedtime  insulin lispro (ADMELOG) corrective regimen sliding scale   SubCutaneous three times a day before meals  lactated ringers. 1000 milliLiter(s) (100 mL/Hr) IV Continuous <Continuous>  memantine 10 milliGRAM(s) Oral every 12 hours  metoprolol succinate ER 25 milliGRAM(s) Oral daily  multivitamin 1 Tablet(s) Oral daily  NIFEdipine XL 60 milliGRAM(s) Oral daily  remdesivir  IVPB   IV Intermittent   simvastatin 20 milliGRAM(s) Oral at bedtime  zinc sulfate 220 milliGRAM(s) Oral daily    MEDICATIONS  (PRN):  acetaminophen     Tablet .. 650 milliGRAM(s) Oral every 6 hours PRN Mild Pain (1 - 3)  albuterol/ipratropium for Nebulization 3 milliLiter(s) Nebulizer every 6 hours PRN Shortness of Breath and/or Wheezing  dextrose Oral Gel 15 Gram(s) Oral once PRN Blood Glucose LESS THAN 70 milliGRAM(s)/deciliter  melatonin 3 milliGRAM(s) Oral at bedtime PRN Insomnia  ondansetron Injectable 4 milliGRAM(s) IV Push every 8 hours PRN Nausea and/or Vomiting      CAPILLARY BLOOD GLUCOSE      POCT Blood Glucose.: 179 mg/dL (12 Aug 2024 22:23)  POCT Blood Glucose.: 139 mg/dL (12 Aug 2024 17:21)  POCT Blood Glucose.: 184 mg/dL (12 Aug 2024 12:41)  POCT Blood Glucose.: 167 mg/dL (12 Aug 2024 08:27)    I&O's Summary    12 Aug 2024 07:01  -  12 Aug 2024 23:32  --------------------------------------------------------  IN: 700 mL / OUT: 200 mL / NET: 500 mL      T(C): 36.3 (08-12-24 @ 22:00), Max: 37.1 (08-12-24 @ 13:47)  HR: 76 (08-12-24 @ 22:00) (76 - 85)  BP: 128/71 (08-12-24 @ 22:00) (128/71 - 172/82)  RR: 16 (08-12-24 @ 22:00) (16 - 18)  SpO2: 98% (08-12-24 @ 22:00) (98% - 100%)    PHYSICAL EXAM:  GENERAL: NAD, well-developed  HEAD:  Atraumatic, Normocephalic  EYES: EOMI, PERRLA, conjunctiva and sclera clear  NECK: Supple, No JVD  CHEST/LUNG: Clear to auscultation bilaterally; No wheeze  HEART: Regular rate and rhythm; No murmurs, rubs, or gallops  ABDOMEN: Soft, Nontender, Nondistended; Bowel sounds present  EXTREMITIES:  2+ Peripheral Pulses, No clubbing, cyanosis, or edema  PSYCH: AAOx3  NEUROLOGY: non-focal  SKIN: No rashes or lesions    LABS:                        13.4   8.99  )-----------( 362      ( 12 Aug 2024 06:10 )             41.1     08-12    137  |  102  |  15  ----------------------------<  168<H>  3.7   |  23  |  0.58    Ca    9.1      12 Aug 2024 06:10  Phos  3.0     08-12  Mg     2.00     08-12    TPro  8.8<H>  /  Alb  4.2  /  TBili  1.2  /  DBili  x   /  AST  21  /  ALT  12  /  AlkPhos  117  08-11          Urinalysis Basic - ( 12 Aug 2024 06:10 )    Color: x / Appearance: x / SG: x / pH: x  Gluc: 168 mg/dL / Ketone: x  / Bili: x / Urobili: x   Blood: x / Protein: x / Nitrite: x   Leuk Esterase: x / RBC: x / WBC x   Sq Epi: x / Non Sq Epi: x / Bacteria: x        RADIOLOGY & ADDITIONAL TESTS:    Imaging Personally Reviewed:    Consultant(s) Notes Reviewed:      Care Discussed with Consultants/Other Providers:

## 2024-08-19 NOTE — PROGRESS NOTE ADULT - PROVIDER SPECIALTY LIST ADULT
Hospitalist
Infectious Disease
Pulmonology
Pulmonology
Hospitalist
Infectious Disease
Pulmonology
Hospitalist
Hospitalist
Pulmonology
Hospitalist

## 2024-08-19 NOTE — PROGRESS NOTE ADULT - REASON FOR ADMISSION
Hypoxia, Nausea and vomiting

## 2024-08-19 NOTE — PROGRESS NOTE ADULT - ASSESSMENT
78 year old with dementia presents with recent covid infection and hypoxia.   Repeat testing positive for COVID.    1) COVID    associated hypoxia- documented at 82 % and 85 % in nursing assessments  Finish 5 d of remdesivir  Finish 10 d of dexamethasone    She is improving- can hold off on chest CT      2) bacteruria- polymicrobial  Likely colonization    3) Leukocytosis  Mild elevation- ? steroids     Call ID service with additional questions  Will sign off  
78 year-old female, with past history significant for Dementia, Type 2 DM, Glaucoma, HTN, Osteoarthritis, Diverticulitis and Colostomy, presented to the ED secondary to nausea, vomiting and removal of ostomy bag.  Diagnosed with Hypoxia and Nausea and Vomiting in the ED.      Problem/Plan - 1:  ·  Problem: Acute hypoxemic respiratory failure due to COVID-19.   ·  Plan: - COVID-19 PCR positive  - O2 saturation down to 82% on room; improves with supplemental oxygen (4L) via NC  - CXR without any acute findings.  CT of A/P negative for acute pathology also  - HOB elevation, aspiration precautions  - duo-nebs PRN  - s/p prednisone 50 mg in the ED; will continue w/ steroid - dexamethasone 6 mg daily to complete 10 days  - ensure optimal hydration; signs of dehydration, in the context of vomiting, infection with insensible losses  - prescribing IVF of LR one liter over 2  hours, then IVF of LR at 100 mL/Hr x 10 hours (to be re-evaluated thereafter)  - continues on oxygen support; nasal cannula 4 liters (if in place for a long period, would humidify)  - Remdesivir as per ID.    Problem/Plan - 2:  ·  Problem: Dehydration.   ·  Plan: - dry lips, very dry oral mucosa, hemoconcentrated lab-work, in the setting of fluid loss from vomiting, insensible losses due to infection  - prescribing LR one liter bolus, followed by LR at 100 mL/Hr x 10 hours  - encourage oral hydration, as tolerated  - f/u electrolytes, renal function, clinical status for signs of improvement/resolution.    Problem/Plan - 3:  ·  Problem: Colostomy in place.   ·  Plan: - reportedly removed same and has since been replaced in the ED, per report  - patient indicates burning sensation at the site  - Tylenol PRN mild pain  - evaluate for any signs of gross infection - especially at the site.    Problem/Plan - 4:  ·  Problem: Type 2 diabetes mellitus treated without insulin.   ·  Plan: - glucose = 123 on CMP.  Last A1c - 6.3 on 05/11  - appears to be on metformin PTA;  held  - L-ISS, per FS  - consistent carb diet.  Problem/Plan - 5:  ·  Problem: Discharge planning issues.   ·  Plan: - family with multiple issues at present and is unable to adequately focus on the patient (especially in the context of repeatedly removing the colostomy bag  - Social Work consult.    Problem/Plan - 6:  ·  Problem: Prophylactic measure.   ·  Plan: - Lovenox
78 year-old female, with past history significant for Dementia, Type 2 DM, Glaucoma, HTN, Osteoarthritis, Diverticulitis and Colostomy, presented to the ED secondary to nausea, vomiting and removal of ostomy bag.  Diagnosed with Hypoxia and Nausea and Vomiting in the ED.      Problem/Plan - 1:  ·  Problem: Acute hypoxemic respiratory failure due to COVID-19.   ·  Plan: - COVID-19 PCR positive  - duo-nebs PRN  - Remdesivir as per ID.    Problem/Plan - 2:  ·  Problem: Dehydration.   ·  Plan: - IVF  - encourage oral hydration, as tolerated.    Problem/Plan - 3:  ·  Problem: Colostomy in place.   ·  Plan: - reportedly removed same and has since been replaced in the ED, per report  - patient indicates burning sensation at the site  - Tylenol PRN mild pain  Problem/Plan - 4:  ·  Problem: Type 2 diabetes mellitus treated without insulin.   ·  Plan: - glucose = 123 on CMP.  Last A1c - 6.3 on 05/11  - appears to be on metformin PTA;  held  - L-ISS, per FS  - consistent carb diet.
78 year-old female, with past history significant for Dementia, Type 2 DM, Glaucoma, HTN, Osteoarthritis, Diverticulitis and Colostomy, presented to the ED secondary to nausea, vomiting and removal of ostomy bag.  Diagnosed with Hypoxia and Nausea and Vomiting in the ED.      Problem/Plan - 1:  ·  Problem: Acute hypoxemic respiratory failure due to COVID-19.   ·  Plan: - COVID-19 PCR positive  - duo-nebs PRN  - finished Remdesivir     Problem/Plan - 2:  ·  Problem: Dehydration.   ·  Plan: - IVF  - encourage oral hydration, as tolerated.    Problem/Plan - 3:  ·  Problem: Colostomy in place.   ·  Plan: - reportedly removed same and has since been replaced in the ED, per report  - patient indicates burning sensation at the site  - Tylenol PRN mild pain    Problem/Plan - 4:  ·  Problem: Type 2 diabetes mellitus treated without insulin.   ·  Plan: - glucose = 123 on CMP.  Last A1c - 6.3 on 05/11  - appears to be on metformin PTA;  held  - L-ISS, per FS  - consistent carb diet.    dc planning   
78 year-old female, with past history significant for Dementia, Type 2 DM, Glaucoma, HTN, Osteoarthritis, Diverticulitis and Colostomy, presented to the ED secondary to nausea, vomiting and removal of ostomy bag.  Seen and evaluated at bedside; NAD.  Knows she is in [facility], but is unable to recall the reason for coming in.  When asked about nausea and vomiting, patient indicates recollection of same; states no nausea at present.  No abdominal pain.  No diarrhea or constipation.  Does not feel short of breath and states no chest pain.  No cough, fever, chills.  With regard to removal of the colostomy bag intermittently, patient reports burning discomfort at the site, which results in her molesting the area/bag.  Vital signs upon ED presentation as follows: BP = 147/76, Hr = 78, RR = 17, T = 36.4 C (97.6 F), O2 Sat = 100% on RA.  Diagnosed with Hypoxia and Nausea and Vomiting, and prescribed prednisone 50 mg and quetiapine 50 mg in the ED. (11 Aug 2024 23:28)  now she is found to have covid infection;   currently she is on 4 l of oxygen :  he also says something was leaking from her stomach      covid infection:   DM  HTN  Diveritculitis/colostomy    covid infection:   -she presented with covid infection with hypoxic resp failure;   -she is on 4 L of oxygen at this time;   -cont remdesivir as wellas dexa;   -check d di alexandrea:   -dvt prophylaxis    8/14:  -seems myra doing pretty good:   -not using oxygen today  :  -d dimer is in 400's:  expected from covid:   -she is on roomair:   -seems tbe doing  ok ; cont current rx;     try to wean oxygen down    -follow lfts and renal functions   -8/14:  -on room air today   -cont to monitor off oxygen     DM  -monitor and control     HTN  -controlled    Diveritculitis/colostomy  -seems stable:   dvt prophylaxis    dw acp 
78 year-old female, with past history significant for Dementia, Type 2 DM, Glaucoma, HTN, Osteoarthritis, Diverticulitis and Colostomy, presented to the ED secondary to nausea, vomiting and removal of ostomy bag.  Diagnosed with Hypoxia and Nausea and Vomiting in the ED.      Problem/Plan - 1:  ·  Problem: Acute hypoxemic respiratory failure due to COVID-19.   ·  Plan: - COVID-19 PCR positive  - duo-nebs PRN  - Remdesivir as per ID.    Problem/Plan - 2:  ·  Problem: Dehydration.   ·  Plan: - IVF  - encourage oral hydration, as tolerated.    Problem/Plan - 3:  ·  Problem: Colostomy in place.   ·  Plan: - reportedly removed same and has since been replaced in the ED, per report  - patient indicates burning sensation at the site  - Tylenol PRN mild pain    Problem/Plan - 4:  ·  Problem: Type 2 diabetes mellitus treated without insulin.   ·  Plan: - glucose = 123 on CMP.  Last A1c - 6.3 on 05/11  - appears to be on metformin PTA;  held  - L-ISS, per FS  - consistent carb diet.    
78 year-old female, with past history significant for Dementia, Type 2 DM, Glaucoma, HTN, Osteoarthritis, Diverticulitis and Colostomy, presented to the ED secondary to nausea, vomiting and removal of ostomy bag.  Seen and evaluated at bedside; NAD.  Knows she is in [facility], but is unable to recall the reason for coming in.  When asked about nausea and vomiting, patient indicates recollection of same; states no nausea at present.  No abdominal pain.  No diarrhea or constipation.  Does not feel short of breath and states no chest pain.  No cough, fever, chills.  With regard to removal of the colostomy bag intermittently, patient reports burning discomfort at the site, which results in her molesting the area/bag.  Vital signs upon ED presentation as follows: BP = 147/76, Hr = 78, RR = 17, T = 36.4 C (97.6 F), O2 Sat = 100% on RA.  Diagnosed with Hypoxia and Nausea and Vomiting, and prescribed prednisone 50 mg and quetiapine 50 mg in the ED. (11 Aug 2024 23:28)  now she is found to have covid infection;   currently she is on 4 l of oxygen :  he also says something was leaking from her stomach      covid infection:   DM  HTN  Diveritculitis/colostomy    covid infection:   -she presented with covid infection with hypoxic resp failure;   -she is on 4 L of oxygen at this time;   -cont remdesivir as wellas dexa;   -check d di alexandrea:   -dvt prophylaxis    8/14:  -seems to be doing pretty good:   -not using oxygen today  :  -d dimer is in 400's:  expected from covid:   -she is on roomair:   -seems to be doing  ok ; cont current rx;     8/15:  -seems to be doing  ok : no cough : no phlegm    -on room air  -seems to be pretty stable;     8/16: she is doing  ok : no sob:  no  cough and no phlegm      try to wean oxygen down    -follow lfts and renal functions   -8/14:  -on room air today   -cont to monitor off oxygen  8/15;  -remains on room air:   -seems pretty stable: no sob: no cough : no wheezing  8/16: seems to be doing ok : on room air:      DM  -monitor and control     HTN  -controlled    Diveritculitis/colostomy  -seems stable:   dvt prophylaxis    dw acp 
78 year-old female, with past history significant for Dementia, Type 2 DM, Glaucoma, HTN, Osteoarthritis, Diverticulitis and Colostomy, presented to the ED secondary to nausea, vomiting and removal of ostomy bag.  Seen and evaluated at bedside; NAD.  Knows she is in [facility], but is unable to recall the reason for coming in.  When asked about nausea and vomiting, patient indicates recollection of same; states no nausea at present.  No abdominal pain.  No diarrhea or constipation.  Does not feel short of breath and states no chest pain.  No cough, fever, chills.  With regard to removal of the colostomy bag intermittently, patient reports burning discomfort at the site, which results in her molesting the area/bag.  Vital signs upon ED presentation as follows: BP = 147/76, Hr = 78, RR = 17, T = 36.4 C (97.6 F), O2 Sat = 100% on RA.  Diagnosed with Hypoxia and Nausea and Vomiting, and prescribed prednisone 50 mg and quetiapine 50 mg in the ED. (11 Aug 2024 23:28)  now she is found to have covid infection;   currently she is on 4 l of oxygen :  he also says something was leaking from her stomach      covid infection:   DM  HTN  Diveritculitis/colostomy    covid infection:   -she presented with covid infection with hypoxic resp failure;   -she is on 4 L of oxygen at this time;   -cont remdesivir as wellas dexa;   -check d di alexandrea:   -dvt prophylaxis    8/14:  -seems to be doing pretty good:   -not using oxygen today  :  -d dimer is in 400's:  expected from covid:   -she is on roomair:   -seems to be doing  ok ; cont current rx;     8/15:  -seems to be doing  ok : no cough : no phlegm    -on room air  -seems to be pretty stable;     8/16: she is doing  ok : no sob:  no  cough and no phlegm      8/17:  -covid wise she seems to be doing much better;   -check d dim er in AM     try to wean oxygen down    -follow lfts and renal functions   -8/14:  -on room air today   -cont to monitor off oxygen  8/15;  -remains on room air:   -seems pretty stable: no sob: no cough : no wheezing  8/16: seems to be doing ok : on room air:   8/17: -she has been stable on room air      DM  -monitor and control     HTN  -controlled    Diveritculitis/colostomy  -seems stable:   dvt prophylaxis    dw acp 
78 year old with dementia presents with recent covid infection and hypoxia.   Repeat testing positive for COVID.    1) COVID    associated hypoxia- documented at 82 % and 85 % in nursing assessments  Start remdesivir for 5 days. Unclear when previous recent diagnosis of COVID was.   Continue dexamethasone  Check CT chest  
78 year-old female, with past history significant for Dementia, Type 2 DM, Glaucoma, HTN, Osteoarthritis, Diverticulitis and Colostomy, presented to the ED secondary to nausea, vomiting and removal of ostomy bag.  Seen and evaluated at bedside; NAD.  Knows she is in [facility], but is unable to recall the reason for coming in.  When asked about nausea and vomiting, patient indicates recollection of same; states no nausea at present.  No abdominal pain.  No diarrhea or constipation.  Does not feel short of breath and states no chest pain.  No cough, fever, chills.  With regard to removal of the colostomy bag intermittently, patient reports burning discomfort at the site, which results in her molesting the area/bag.  Vital signs upon ED presentation as follows: BP = 147/76, Hr = 78, RR = 17, T = 36.4 C (97.6 F), O2 Sat = 100% on RA.  Diagnosed with Hypoxia and Nausea and Vomiting, and prescribed prednisone 50 mg and quetiapine 50 mg in the ED. (11 Aug 2024 23:28)  now she is found to have covid infection;   currently she is on 4 l of oxygen :  he also says something was leaking from her stomach      covid infection:   DM  HTN  Diveritculitis/colostomy    covid infection:   -she presented with covid infection with hypoxic resp failure;   -she is on 4 L of oxygen at this time;   -cont remdesivir as wellas dexa;   -check d di alexandrea:   -dvt prophylaxis    8/14:  -seems to be doing pretty good:   -not using oxygen today  :  -d dimer is in 400's:  expected from covid:   -she is on roomair:   -seems to be doing  ok ; cont current rx;     8/15:  -seems to be doing  ok : no cough : no phlegm    -on room air  -seems to be pretty stable;     8/16: she is doing  ok : no sob:  no  cough and no phlegm      8/17:  -covid wise she seems to be doing much better;   -check d dim er in AM     8/18:  -she seems to be doing  ok : no cough : no phlegm    -on room air   8/19:  seems OK:  no sob:  no cough : no phlegm    -on room air     try to wean oxygen down    -follow lfts and renal functions   -8/14:  -on room air today   -cont to monitor off oxygen  8/15;  -remains on room air:   -seems pretty stable: no sob: no cough : no wheezing  8/16: seems to be doing ok : on room air:   8/17: -she has been stable on room air   8/18: off oxygen : on room air   8/19: remains on room air      DM  -monitor and control     HTN  -controlled    Diveritculitis/colostomy  -seems stable:   dvt prophylaxis    dw acp  : dc planning
78 year-old female, with past history significant for Dementia, Type 2 DM, Glaucoma, HTN, Osteoarthritis, Diverticulitis and Colostomy, presented to the ED secondary to nausea, vomiting and removal of ostomy bag.  Diagnosed with Hypoxia and Nausea and Vomiting in the ED.      Problem/Plan - 1:  ·  Problem: Acute hypoxemic respiratory failure due to COVID-19.   ·  Plan: - COVID-19 PCR positive  - duo-nebs PRN  - Remdesivir as per ID.    Problem/Plan - 2:  ·  Problem: Dehydration.   ·  Plan: - IVF  - encourage oral hydration, as tolerated.    Problem/Plan - 3:  ·  Problem: Colostomy in place.   ·  Plan: - reportedly removed same and has since been replaced in the ED, per report  - patient indicates burning sensation at the site  - Tylenol PRN mild pain  Problem/Plan - 4:  ·  Problem: Type 2 diabetes mellitus treated without insulin.   ·  Plan: - glucose = 123 on CMP.  Last A1c - 6.3 on 05/11  - appears to be on metformin PTA;  held  - L-ISS, per FS  - consistent carb diet.
78 year-old female, with past history significant for Dementia, Type 2 DM, Glaucoma, HTN, Osteoarthritis, Diverticulitis and Colostomy, presented to the ED secondary to nausea, vomiting and removal of ostomy bag.  Seen and evaluated at bedside; NAD.  Knows she is in [facility], but is unable to recall the reason for coming in.  When asked about nausea and vomiting, patient indicates recollection of same; states no nausea at present.  No abdominal pain.  No diarrhea or constipation.  Does not feel short of breath and states no chest pain.  No cough, fever, chills.  With regard to removal of the colostomy bag intermittently, patient reports burning discomfort at the site, which results in her molesting the area/bag.  Vital signs upon ED presentation as follows: BP = 147/76, Hr = 78, RR = 17, T = 36.4 C (97.6 F), O2 Sat = 100% on RA.  Diagnosed with Hypoxia and Nausea and Vomiting, and prescribed prednisone 50 mg and quetiapine 50 mg in the ED. (11 Aug 2024 23:28)  now she is found to have covid infection;   currently she is on 4 l of oxygen :  he also says something was leaking from her stomach      covid infection:   DM  HTN  Diveritculitis/colostomy    covid infection:   -she presented with covid infection with hypoxic resp failure;   -she is on 4 L of oxygen at this time;   -cont remdesivir as wellas dexa;   -check d di alexandrea:   -dvt prophylaxis    8/14:  -seems to be doing pretty good:   -not using oxygen today  :  -d dimer is in 400's:  expected from covid:   -she is on roomair:   -seems to be doing  ok ; cont current rx;     8/15:  -seems to be doing  ok : no cough : no phlegm    -on room air  -seems to be pretty stable;     8/16: she is doing  ok : no sob:  no  cough and no phlegm      8/17:  -covid wise she seems to be doing much better;   -check d dim er in AM     8/18:  -she seems to be doing  ok : no cough : no phlegm    -on room air     try to wean oxygen down    -follow lfts and renal functions   -8/14:  -on room air today   -cont to monitor off oxygen  8/15;  -remains on room air:   -seems pretty stable: no sob: no cough : no wheezing  8/16: seems to be doing ok : on room air:   8/17: -she has been stable on room air   8/18: off oxygen : on room air      DM  -monitor and control     HTN  -controlled    Diveritculitis/colostomy  -seems stable:   dvt prophylaxis    dw acp 
78 year-old female, with past history significant for Dementia, Type 2 DM, Glaucoma, HTN, Osteoarthritis, Diverticulitis and Colostomy, presented to the ED secondary to nausea, vomiting and removal of ostomy bag.  Seen and evaluated at bedside; NAD.  Knows she is in [facility], but is unable to recall the reason for coming in.  When asked about nausea and vomiting, patient indicates recollection of same; states no nausea at present.  No abdominal pain.  No diarrhea or constipation.  Does not feel short of breath and states no chest pain.  No cough, fever, chills.  With regard to removal of the colostomy bag intermittently, patient reports burning discomfort at the site, which results in her molesting the area/bag.  Vital signs upon ED presentation as follows: BP = 147/76, Hr = 78, RR = 17, T = 36.4 C (97.6 F), O2 Sat = 100% on RA.  Diagnosed with Hypoxia and Nausea and Vomiting, and prescribed prednisone 50 mg and quetiapine 50 mg in the ED. (11 Aug 2024 23:28)  now she is found to have covid infection;   currently she is on 4 l of oxygen :  he also says something was leaking from her stomach      covid infection:   DM  HTN  Diveritculitis/colostomy    covid infection:   -she presented with covid infection with hypoxic resp failure;   -she is on 4 L of oxygen at this time;   -cont remdesivir as wellas dexa;   -check d di alexandrea:   -dvt prophylaxis    8/14:  -seems to be doing pretty good:   -not using oxygen today  :  -d dimer is in 400's:  expected from covid:   -she is on roomair:   -seems to be doing  ok ; cont current rx;     8/15:  -seems to be doing  ok : no cough : no phlegm    -on room air  -seems to be pretty stable;     try to wean oxygen down    -follow lfts and renal functions   -8/14:  -on room air today   -cont to monitor off oxygen  8/15;  -remains on room air:   -seems pretty stable: no sob: no cough : no wheezing     DM  -monitor and control     HTN  -controlled    Diveritculitis/colostomy  -seems stable:   dvt prophylaxis    dw acp 
78 year-old female, with past history significant for Dementia, Type 2 DM, Glaucoma, HTN, Osteoarthritis, Diverticulitis and Colostomy, presented to the ED secondary to nausea, vomiting and removal of ostomy bag.  Diagnosed with Hypoxia and Nausea and Vomiting in the ED.      Problem/Plan - 1:  ·  Problem: Acute hypoxemic respiratory failure due to COVID-19.   ·  Plan: - COVID-19 PCR positive  - duo-nebs PRN  - finished Remdesivir     Problem/Plan - 2:  ·  Problem: Dehydration.   ·  Plan: - IVF  - encourage oral hydration, as tolerated.    Problem/Plan - 3:  ·  Problem: Colostomy in place.   ·  Plan: - reportedly removed same and has since been replaced in the ED, per report  - patient indicates burning sensation at the site  - Tylenol PRN mild pain    Problem/Plan - 4:  ·  Problem: Type 2 diabetes mellitus treated without insulin.   ·  Plan: - glucose = 123 on CMP.  Last A1c - 6.3 on 05/11  - appears to be on metformin PTA;  held  - L-ISS, per FS  - consistent carb diet.    dc planning   
[ x ]  Lab studies personally reviewed  [ x ]  Radiology personally reviewed  [ x ]  Old records personally reviewed    78 year-old female, with past history significant for Dementia, Type 2 DM, Glaucoma, HTN, Osteoarthritis, Diverticulitis and Colostomy, presented to the ED secondary to nausea, vomiting and removal of ostomy bag.  Diagnosed with Hypoxia and Nausea and Vomiting in the ED.

## 2024-08-19 NOTE — DISCHARGE NOTE NURSING/CASE MANAGEMENT/SOCIAL WORK - PATIENT PORTAL LINK FT
You can access the FollowMyHealth Patient Portal offered by [facility] by registering at the following website: [email or web address]. By joining [facility]’s FollowMyHealth portal, you will also be able to view your health information using other applications (apps) compatible with our system.

## 2024-08-20 ENCOUNTER — NON-APPOINTMENT (OUTPATIENT)
Age: 79
End: 2024-08-20

## 2024-08-20 ENCOUNTER — APPOINTMENT (OUTPATIENT)
Dept: HOME HEALTH SERVICES | Facility: HOME HEALTH | Age: 79
End: 2024-08-20

## 2024-08-20 VITALS
TEMPERATURE: 97.7 F | HEART RATE: 55 BPM | DIASTOLIC BLOOD PRESSURE: 58 MMHG | RESPIRATION RATE: 17 BRPM | SYSTOLIC BLOOD PRESSURE: 102 MMHG | OXYGEN SATURATION: 95 %

## 2024-08-20 PROBLEM — E11.9 TYPE 2 DIABETES MELLITUS WITHOUT COMPLICATIONS: Chronic | Status: ACTIVE | Noted: 2024-08-12

## 2024-08-20 PROBLEM — A41.9 SEPSIS, UNSPECIFIED ORGANISM: Chronic | Status: ACTIVE | Noted: 2024-08-12

## 2024-08-20 PROBLEM — J18.9 PNEUMONIA, UNSPECIFIED ORGANISM: Chronic | Status: ACTIVE | Noted: 2024-08-12

## 2024-08-20 PROBLEM — K57.92 DIVERTICULITIS OF INTESTINE, PART UNSPECIFIED, WITHOUT PERFORATION OR ABSCESS WITHOUT BLEEDING: Chronic | Status: ACTIVE | Noted: 2024-08-12

## 2024-08-20 RX ORDER — DEXAMETHASONE 0.75 MG
1 TABLET ORAL
Qty: 2 | Refills: 0
Start: 2024-08-20 | End: 2024-08-21

## 2024-08-23 ENCOUNTER — APPOINTMENT (OUTPATIENT)
Dept: HOME HEALTH SERVICES | Facility: HOME HEALTH | Age: 79
End: 2024-08-23

## 2024-08-23 VITALS — OXYGEN SATURATION: 96 % | HEART RATE: 82 BPM | DIASTOLIC BLOOD PRESSURE: 72 MMHG | SYSTOLIC BLOOD PRESSURE: 132 MMHG

## 2024-08-23 PROCEDURE — 99495 TRANSJ CARE MGMT MOD F2F 14D: CPT

## 2024-08-23 NOTE — PHYSICAL EXAM
[No Acute Distress] : no acute distress [Normal Sclera/Conjunctiva] : normal sclera/conjunctiva [PERRL] : pupils equal, round and reactive to light [EOMI] : extra ocular movement intact [Normal Outer Ear/Nose] : the ears and nose were normal in appearance [Normal Oropharynx] : the oropharynx was normal [Normal TMs] : both tympanic membranes were normal [Normal Nasal Mucosa] : the nasal mucosa was normal [No JVD] : no jugular venous distention [Supple] : the neck was supple [No LAD] : no lymphadenopathy [Thyroid Normal, No Nodules] : the thyroid was normal and there were no nodules present [No Respiratory Distress] : no respiratory distress [Clear to Auscultation] : lungs were clear to auscultation bilaterally [No Accessory Muscle Use] : no accessory muscle use [Normal Rate] : heart rate was normal  [Regular Rhythm] : with a regular rhythm [Normal S1, S2] : normal S1 and S2 [No Murmurs] : no murmurs heard [Breast Exam Declined] : patient declined to have breast exam done [Normal Bowel Sounds] : normal bowel sounds [Non Tender] : non-tender [Soft] : abdomen soft [Not Distended] : not distended [Patient Refused] : rectal exam was refused by the patient [Normal Supraclavicular Nodes] : no supraclavicular lymphadenopathy [Normal Axillary Nodes] : no axillary lymphadenopathy [Normal Anterior Cervical Nodes] : no anterior cervical lymphadenopathy [No CVA Tenderness] : no ~M costovertebral angle tenderness [No Spinal Tenderness] : no spinal tenderness [No Clubbing, Cyanosis] : no clubbing  or cyanosis of the fingernails [No Rash] : no rash [No Skin Lesions] : no skin lesions [Cranial Nerves Intact] : cranial nerves 2-12 were intact [No Gross Sensory Deficits] : no gross sensory deficits [Normal Affect] : the affect was normal [Kyphosis] : no kyphosis present [Foot Ulcers] : no foot ulcers [de-identified] : Remote and recent memory not intact but able to answer most questions  [de-identified] : unsteady gait [de-identified] : AOx2, remote and recent memory not intact.

## 2024-08-23 NOTE — PHYSICAL EXAM
[No Acute Distress] : no acute distress [Normal Sclera/Conjunctiva] : normal sclera/conjunctiva [PERRL] : pupils equal, round and reactive to light [EOMI] : extra ocular movement intact [Normal Outer Ear/Nose] : the ears and nose were normal in appearance [Normal Oropharynx] : the oropharynx was normal [Normal TMs] : both tympanic membranes were normal [Normal Nasal Mucosa] : the nasal mucosa was normal [No JVD] : no jugular venous distention [Supple] : the neck was supple [No LAD] : no lymphadenopathy [Thyroid Normal, No Nodules] : the thyroid was normal and there were no nodules present [No Respiratory Distress] : no respiratory distress [Clear to Auscultation] : lungs were clear to auscultation bilaterally [No Accessory Muscle Use] : no accessory muscle use [Normal Rate] : heart rate was normal  [Regular Rhythm] : with a regular rhythm [Normal S1, S2] : normal S1 and S2 [No Murmurs] : no murmurs heard [Breast Exam Declined] : patient declined to have breast exam done [Normal Bowel Sounds] : normal bowel sounds [Non Tender] : non-tender [Soft] : abdomen soft [Not Distended] : not distended [Patient Refused] : rectal exam was refused by the patient [Normal Supraclavicular Nodes] : no supraclavicular lymphadenopathy [Normal Axillary Nodes] : no axillary lymphadenopathy [Normal Anterior Cervical Nodes] : no anterior cervical lymphadenopathy [No CVA Tenderness] : no ~M costovertebral angle tenderness [No Spinal Tenderness] : no spinal tenderness [No Clubbing, Cyanosis] : no clubbing  or cyanosis of the fingernails [No Rash] : no rash [No Skin Lesions] : no skin lesions [Cranial Nerves Intact] : cranial nerves 2-12 were intact [No Gross Sensory Deficits] : no gross sensory deficits [Normal Affect] : the affect was normal [Kyphosis] : no kyphosis present [Foot Ulcers] : no foot ulcers [de-identified] : Remote and recent memory not intact but able to answer most questions  [de-identified] : unsteady gait [de-identified] : AOx2, remote and recent memory not intact.

## 2024-08-23 NOTE — REASON FOR VISIT
[Follow-Up] : a follow-up visit [Pre-Visit Preparation] : pre-visit preparation was done [Post-hospitalization from ___ Hospital] : Post-hospitalization from [unfilled] Hospital [Admitted on: ___] : The patient was admitted on [unfilled] [Discharged on ___] : discharged on [unfilled] [FreeTextEntry2] : chart review

## 2024-08-23 NOTE — HEALTH RISK ASSESSMENT
[Independent] : feeding [Some assistance needed] : using telephone [Full assistance needed] : managing medications [] : managing finances [Any fall with injury in past year] : Patient reported fall with injury in the past year [Yes] : The patient has visual impairment [HRA Reviewed] : Health risk assessment reviewed [Two or more falls in past year] : Patient reported two or more falls in the past year [[facility]] : 14

## 2024-08-23 NOTE — HEALTH RISK ASSESSMENT
[Independent] : feeding [Some assistance needed] : using telephone [Full assistance needed] : managing medications [] : managing finances [Any fall with injury in past year] : Patient reported fall with injury in the past year [Yes] : The patient has visual impairment [HRA Reviewed] : Health risk assessment reviewed [Two or more falls in past year] : Patient reported two or more falls in the past year [[facility]] : 14

## 2024-08-23 NOTE — HISTORY OF PRESENT ILLNESS
[Patient] : patient [Family Member] : family member [FreeTextEntry1] : dementia [FreeTextEntry2] : PMH: T2DM, dementia, diverticulitis c/b perf s/p exlap with colostomy placement 5/2024.  Purpose of Visit: Post hospitalization   Relevant Prior Hospitalizations: 6/2024 [facility]: Removed ostomy bag, needed redressing 5-6/2024 [facility]: Colon perf s/p exlap, colostomy, [facility] stay, ultimatel sent home 6/17/2024 4/19-22/2024 [facility]: Diverticulitis, pneumonia, sepsis, s/p Zosyn -> Augmentin 3/22-26/2024 [facility]: Diverticulitis, cipro/flagyl, recc outpt colonoscopy 1/22-24/2024 [facility]: pneumonia & sepsis s/p azithro/CTX, d/c with levofloxacin 12/2023 [facility]: Cholecystectomy   Social/Home Environment:  -Lives in home with her  and son Trevin -Dtr Benoit, gdtr through other son Kaitlin  -HHA: [employer] insurance, unclear MLTC but [employer], 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 -Son acts as CDPAS for dad. -Dr. Mandy Martinez PCP, neurology Dr. Suman Padilla [phone], [facility] [phone], psychiatrist  Today's Visit & Review: -Interviewed with  & son Trevin -Basically well since coming home, however memory seems to be slowly getting worse, confabulating more -[facility] called during visit. 5 hours/day 7 days/wk currently, hoping for increase to 12 hr/day, and apparently for more need than that, they recommend placement for LTC. -PCP Dr. Mc 8/30/2024 -Neuro visit planned 10/14/2024  -Feeling better, more energy, more willing to get up, no clear reason why she's had this improvement -Colostomy reversal consultation with Lucas Lr 10/17/2024 planned, however realistically this was discussed as occuring at the six month lucía, so approximately 12/2024 at earliest -[facility] seeking waiver program membership, will send letter explaining problems to help & will send to daughter -PCP visit planned 08/20/2024 -Neuro visit planned 10/14/2024  -Dementia: Prominent memory impairment. Memantine ER 21mg daily. Unsteady gait with 3 falls in past year, no severe injury. Losing weight recently. Insomnia, not on meds. New trial mirtazapine 15mg QHS. -Vertigo: Active in past, meclizine 12.5mg TID PRN -Fuch's corneal dystrophy: Uncommon condition with change in corneal compartment dimensions causing glare & optical phenomena. Brinzolamide eyedrops. -Cataracts -Seasonal rhinitis/cough: Claritin 10mg daily, Robitussin DM, Flonase -Dental: Top & bottom dentures -CAD: Metoprolol ER 50mg daily, simvastatin 20mg QHS, ASA 81mg -HTN: nifedipine ER  -GERD: Pantoprazole 20mg daily -Diverticulitis: Noted on CT 3/2024, cipro/flagyl, repeat hosp 4/2024 s/p Zosyn -> Augmentin. Ultimately partial colon resection & colostomy established, to undergo potential reversal 12/2024 -Cholecystitis: S/p cholecystectomy 11/2023, no plans to return to surgeon at this time. -T2DM: Unclear control. Metformin ER 2000mg daily. -Osteoporosis: Alendronate 70mg weekly, oyster calcium/D3 500mg-5mcg daily  -Screening/Preventive: Vaccines: Covid, flu, shingles, PNA   DME: Shower chair and bars in the bathroom  OSTOMY SUPPLIES: -Stoma pouch, 90 per month (patient has dementia and frequently dislodges her ostomy bag) (Ordered in effort to provide pt with alternative stoma paste, since Coloplast is causing itching/contact dermatitis) -Convatec stomahesive paste, 1 large tube per month

## 2024-08-23 NOTE — REVIEW OF SYSTEMS
[Muscle Weakness] : muscle weakness [Confusion] : confusion [Memory Loss] : memory loss [Unsteady Walking] : ataxia [Depression] : depression [Negative] : Heme/Lymph [Muscle Pain] : no muscle pain [Back Pain] : no back pain [Headache] : no headache [Dizziness] : no dizziness [Fainting] : no fainting [FreeTextEntry7] : Poor appetite since 12/2023 after gallbladder surgery [FreeTextEntry9] : unsteady gait [de-identified] : complains of feeling depressed, quiet, not motivated, poor appetite since surgery in 12/2023

## 2024-08-23 NOTE — COUNSELING
[Normal Weight - ( BMI  <25 )] : normal weight - ( BMI  <25 ) [Continue diet as tolerated] : continue diet as tolerated based on goals of care [Non - Smoker] : non-smoker [Smoke/CO Detectors] : smoke/CO detectors [Remove clutter and unsafe carpeting to avoid falls] : remove clutter and unsafe carpeting to avoid falls [] : foot exam [Completed] : Aspirin use discussion completed [Improve mobility] : improve mobility [Maintain functional ability] : maintain functional ability [Discussed disease trajectory with patient/caregiver] : discussed disease trajectory with patient/caregiver [Advanced Directives discussed: ____] : Advanced directives discussed: [unfilled] [Completed Healthcare Proxy] : completed healthcare proxy [Full Code] : Code Status: Full Code [No Limitations] : Treatment Guidelines: No limitations [Trial of Intubation] : Intubation: Trial of Intubation [_____] : HCP: [unfilled] [FreeTextEntry3] : low sodium diet  [FreeTextEntry4] : Stay healthy, improve low mood. [de-identified] : MOLST deferred until next visit.

## 2024-08-23 NOTE — REVIEW OF SYSTEMS
[Muscle Weakness] : muscle weakness [Confusion] : confusion [Memory Loss] : memory loss [Unsteady Walking] : ataxia [Depression] : depression [Negative] : Heme/Lymph [Muscle Pain] : no muscle pain [Back Pain] : no back pain [Headache] : no headache [Dizziness] : no dizziness [Fainting] : no fainting [FreeTextEntry7] : Poor appetite since 12/2023 after gallbladder surgery [FreeTextEntry9] : unsteady gait [de-identified] : complains of feeling depressed, quiet, not motivated, poor appetite since surgery in 12/2023

## 2024-08-23 NOTE — COUNSELING
[Normal Weight - ( BMI  <25 )] : normal weight - ( BMI  <25 ) [Continue diet as tolerated] : continue diet as tolerated based on goals of care [Non - Smoker] : non-smoker [Smoke/CO Detectors] : smoke/CO detectors [Remove clutter and unsafe carpeting to avoid falls] : remove clutter and unsafe carpeting to avoid falls [] : foot exam [Completed] : Aspirin use discussion completed [Improve mobility] : improve mobility [Maintain functional ability] : maintain functional ability [Discussed disease trajectory with patient/caregiver] : discussed disease trajectory with patient/caregiver [Advanced Directives discussed: ____] : Advanced directives discussed: [unfilled] [Completed Healthcare Proxy] : completed healthcare proxy [Full Code] : Code Status: Full Code [No Limitations] : Treatment Guidelines: No limitations [Trial of Intubation] : Intubation: Trial of Intubation [_____] : HCP: [unfilled] [FreeTextEntry3] : low sodium diet  [FreeTextEntry4] : Stay healthy, improve low mood. [de-identified] : MOLST deferred until next visit.

## 2024-08-23 NOTE — END OF VISIT
[Time Spent: ___ minutes] : I have spent [unfilled] minutes of time on the encounter which excludes teaching and separately reported services. [FreeTextEntry3] : All medical record entries made by the Scribe were at my, Dr. Church, direction and personally dictated by me on 08/09/2024. I have reviewed the chart and agree that the record accurately reflects my personal performance of the history, physical exam, assessment, and plan. I have also personally directed, reviewed, and agreed with the chart.

## 2024-08-23 NOTE — HISTORY OF PRESENT ILLNESS
[Patient] : patient [Family Member] : family member [FreeTextEntry1] : dementia [FreeTextEntry2] : PMH: T2DM, dementia, diverticulitis c/b perf s/p exlap with colostomy placement 5/2024.  Purpose of Visit: Post hospitalization   Relevant Prior Hospitalizations: 6/2024 [facility]: Removed ostomy bag, needed redressing 5-6/2024 [facility]: Colon perf s/p exlap, colostomy, [facility] stay, ultimatel sent home 6/17/2024 4/19-22/2024 [facility]: Diverticulitis, pneumonia, sepsis, s/p Zosyn -> Augmentin 3/22-26/2024 [facility]: Diverticulitis, cipro/flagyl, recc outpt colonoscopy 1/22-24/2024 [facility]: pneumonia & sepsis s/p azithro/CTX, d/c with levofloxacin 12/2023 [facility]: Cholecystectomy   Social/Home Environment:  -Lives in home with her  and son Trevin -Dtr Benoit, gdtr through other son Kaitlin  -HHA: [employer] insurance, unclear MLTC but apparently[employer], 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 -Son acts as CDPAS for dad. -Dr. Mandy Martinez PCP, neurology Dr. Suman Padilla [phone], AT[facility] [phone], psychiatrist  Today's Visit & Review: -Interviewed with  & son Trevin -Basically well since coming home, however memory seems to be slowly getting worse, confabulating more -[facility] called during visit. 5 hours/day 7 days/wk currently, hoping for increase to 12 hr/day, and apparently for more need than that, they recommend placement for LTC. -PCP Dr. Mc 8/30/2024 -Neuro visit planned 10/14/2024  -Feeling better, more energy, more willing to get up, no clear reason why she's had this improvement -Colostomy reversal consultation with Lucas Lr 10/17/2024 planned, however realistically this was discussed as occuring at the six month lucía, so approximately 12/2024 at earliest -[facility] seeking waiver program membership, will send letter explaining problems to help & will send to daughter -PCP visit planned 08/20/2024 -Neuro visit planned 10/14/2024  -Dementia: Prominent memory impairment. Memantine ER 21mg daily. Unsteady gait with 3 falls in past year, no severe injury. Losing weight recently. Insomnia, not on meds. New trial mirtazapine 15mg QHS. -Vertigo: Active in past, meclizine 12.5mg TID PRN -Fuch's corneal dystrophy: Uncommon condition with change in corneal compartment dimensions causing glare & optical phenomena. Brinzolamide eyedrops. -Cataracts -Seasonal rhinitis/cough: Claritin 10mg daily, Robitussin DM, Flonase -Dental: Top & bottom dentures -CAD: Metoprolol ER 50mg daily, simvastatin 20mg QHS, ASA 81mg -HTN: nifedipine ER  -GERD: Pantoprazole 20mg daily -Diverticulitis: Noted on CT 3/2024, cipro/flagyl, repeat hosp 4/2024 s/p Zosyn -> Augmentin. Ultimately partial colon resection & colostomy established, to undergo potential reversal 12/2024 -Cholecystitis: S/p cholecystectomy 11/2023, no plans to return to surgeon at this time. -T2DM: Unclear control. Metformin ER 2000mg daily. -Osteoporosis: Alendronate 70mg weekly, oyster calcium/D3 500mg-5mcg daily  -Screening/Preventive: Vaccines: Covid, flu, shingles, PNA   DME: Shower chair and bars in the bathroom  OSTOMY SUPPLIES: -Stoma pouch, 90 per month (patient has dementia and frequently dislodges her ostomy bag) (Ordered in effort to provide pt with alternative stoma paste, since Coloplast is causing itching/contact dermatitis) -Convatec stomahesive paste, 1 large tube per month

## 2024-09-02 ENCOUNTER — TRANSCRIPTION ENCOUNTER (OUTPATIENT)
Age: 79
End: 2024-09-02

## 2024-09-02 ENCOUNTER — NON-APPOINTMENT (OUTPATIENT)
Age: 79
End: 2024-09-02

## 2024-09-02 ENCOUNTER — INPATIENT (INPATIENT)
Facility: HOSPITAL | Age: 79
LOS: 6 days | Discharge: HOME | End: 2024-09-09
Attending: INTERNAL MEDICINE | Admitting: INTERNAL MEDICINE
Payer: MEDICARE

## 2024-09-02 VITALS
HEART RATE: 70 BPM | OXYGEN SATURATION: 99 % | TEMPERATURE: 99 F | DIASTOLIC BLOOD PRESSURE: 71 MMHG | SYSTOLIC BLOOD PRESSURE: 120 MMHG | RESPIRATION RATE: 18 BRPM

## 2024-09-02 DIAGNOSIS — Z93.3 COLOSTOMY STATUS: Chronic | ICD-10-CM

## 2024-09-02 DIAGNOSIS — Z87.81 PERSONAL HISTORY OF (HEALED) TRAUMATIC FRACTURE: Chronic | ICD-10-CM

## 2024-09-02 PROCEDURE — 99285 EMERGENCY DEPT VISIT HI MDM: CPT

## 2024-09-02 NOTE — ED PROVIDER NOTE - OBJECTIVE STATEMENT
78-year-old female past medical history dementia presents to the ED after noted to be agitated and pulled out her colostomy bag earlier today. As per patient's daughter Kaitlin who called from [place], the patient's  called her to let her know that the patient was agitated and fell.  Kaitlin also states that the patient lives at home and is unable to take care of herself when she is more agitated and demented.  States that the patient's  is not able to take care of her as well.  Unable to obtain ROS at this time.

## 2024-09-02 NOTE — ED PROVIDER NOTE - PROGRESS NOTE DETAILS
Daughter Kaitlin [phone] As per daughter, patient is on the following medications: Alendronate, simvastatin, metformin, ASA 81, Citalopram, metoprolol, pantoprazole, donepezil, mirtazopine, memantine, quetiapine.     Sisi Zacarias DO (PGY1) Multiple attempts at verbal de-escalation failed as patient persistently agitated in ER, requiring mittens for placement as patient continually pulling lines and has pulled off colostomy bag twice.   Patient placed on constant observation, continually agitated, not redirectable verbally, will give IM Zyprexa for agitation.  Pending CTs and admission at this time. - Alexandra Gibbs MD. EM Attending Benadryl given for patient's reported body itching CT reports questionable encephalmalcia vs edema vs mass, recommended repeat CT    Sisi Zacarias DO (PGY1) Vlad BUSTAMANTE: Patient in signout.  78 female with Dementia, now presenting with agitation, event–pulled out colostomy bag, present to the ED requiring physical restraints/chemical sedation.  On my assessment in the morning patient has physical restraints applied.  Appears to be,, disoriented to time/person.  Is moving all 4 limbs grossly.  Chest is clear to auscultate, abdominal soft nondistended, functioning colostomy bag is in place.  CT scan indicated for motion artifact ? right frontal gyrus hypodensity. Informed by primary team that patient cannot be looked after at home. Will consider hospital admission for ongoing care. Vlad BUSTAMANTE: Patient in signout.  78 female with Dementia, DM, HTN, now presenting with agitation, event–pulled out colostomy bag, present to the ED requiring physical restraints/chemical sedation.  On my assessment in the morning patient has physical restraints applied.  Appears to be,, disoriented to time/person.  Is moving all 4 limbs grossly.  Chest is clear to auscultate, abdominal soft nondistended, functioning colostomy bag is in place.  CT scan indicated for motion artifact ? right frontal gyrus hypodensity. Informed by primary team that patient cannot be looked after at home. Will consider hospital admission for ongoing care. Called  myself, spoke to Mr. Winn who confirms will not be able to take care of the pt. Patient recently admitted to [facility] on Aug 11 for HRF sec. to [facility], under c/o Dr. Eagle, who has been called and informed and has accepted adm.

## 2024-09-02 NOTE — ED PROVIDER NOTE - PHYSICAL EXAMINATION
Sisi Zacarias DO (PGY1)   Physical Exam:    Gen: NAD, AOx2  Head: NCAT  HEENT: EOMI, PEERLA  Lung: CTAB, no respiratory distress, no wheezes/rhonchi/rales B/L  CV: RRR, no murmurs, rubs or gallops  Abd: soft, NT, ND, no guarding, no rigidity, no rebound tenderness, no CVA tenderness. Open abdomen at the colostomy bag site with missing bag and visible stool protruding.  MSK: no visible deformities, ROM normal in UE/LE, no back pain  Neuro: No focal sensory or motor deficits. Sensation intact to light touch all extremities.  Skin: Warm, well perfused, no rash, no leg swelling  Psych: normal affect, calm

## 2024-09-02 NOTE — ED PROVIDER NOTE - CLINICAL SUMMARY MEDICAL DECISION MAKING FREE TEXT BOX
78-year-old female with past medical history of dementia and colostomy bag presents to the ED with agitation.  As per patient's daughter, patient had pulled out her colostomy bag and fallen earlier today.      Will replace bag, and obtain labs to screen for electrolyte abnormalities versus urinary tract infection versus ACS.  Will obtain CT head in the setting of altered mental status and presumed fall.  Will admit given unsafe discharge in the setting of altered mental status.

## 2024-09-02 NOTE — ED PROVIDER NOTE - ATTENDING CONTRIBUTION TO CARE
Patient is a 78-year-old female with a history of dementia here for evaluation after pulling out her colostomy bag.  Patient is unable to provide history.  Patient was provided to DR. Zacarias by patient's daughter, Kaitlin.  accordingly, daughter stated that she received a call today from her father to let her know that patient was agitated and fell.  She reported that her father is unable to take care of her mother.  Patient was seen and examined.  Patient does not know why she is here.  She denies any pain.  She is not aware that she needs a colostomy bag.  During my assessment, patient is pleasantly cooperative.  She does not know the year but understands that she is in the hospital.  When asked about her daughter, she states her daughter lives in [place].  Per discussion with Dr. Zacarias, patient's daughter lives in [place].      VS noted  Gen. no acute distress, Non toxic   HEENT: EOMI, mmm  Lungs: CTAB/L no C/ W /R   CVS: RRR   Abd; Soft non tender, non distended, colostomy site without bag, no TTP  Ext: no edema  Skin: no rash  Neuro AAOx1, face symmetrical, strength equal, clear speech  a/p:  pulled colostomy bag–plan for replacement.  Will get labs, CT head.  Daughter stating that patient is not safe at home given her dementia and agitation.  Will check for occult infection, metabolic derangement as a possible source.  Patient will likely need to be admitted to help to arrange for either nursing home or home services.  - Manny BUSTAMANTE

## 2024-09-02 NOTE — ED ADULT TRIAGE NOTE - CHIEF COMPLAINT QUOTE
Pt arrives from home c/o increased agitation and pulling colostomy bag out. Hx Dementia,  As per EMS  at home states pt is at baseline mental status. Pt arrives from home as per  c/o increased agitation and pulling colostomy bag out. Hx Dementia,  As per EMS  at home states pt is at baseline mental status. Pt has no complaints with triage Pt arrives from home as per  c/o increased agitation and pulling colostomy bag out. Hx Dementia,  As per EMS  at home states pt is at baseline mental status. Pt has no complaints with triage   # [phone]

## 2024-09-02 NOTE — ED CLERICAL - NS ED CARE COORDINATION INFORMATION
This patient is enrolled in the [facility] Program and receives comprehensive home-based primary care.  To obtain additional clinical information , or to discuss any questions or concerns, you can call the [facility] team at [phone], 24 hours a day.  If discharged, this patient will be followed up by the [facility] team within 2 days.  If this patient requires admission, please use the hospitalist service.

## 2024-09-03 ENCOUNTER — NON-APPOINTMENT (OUTPATIENT)
Age: 79
End: 2024-09-03

## 2024-09-03 DIAGNOSIS — R45.1 RESTLESSNESS AND AGITATION: ICD-10-CM

## 2024-09-03 LAB
ALBUMIN SERPL ELPH-MCNC: 4.1 G/DL — SIGNIFICANT CHANGE UP (ref 3.3–5)
ALP SERPL-CCNC: 135 U/L — HIGH (ref 40–120)
ALT FLD-CCNC: 21 U/L — SIGNIFICANT CHANGE UP (ref 4–33)
ANION GAP SERPL CALC-SCNC: 15 MMOL/L — HIGH (ref 7–14)
APPEARANCE UR: CLEAR — SIGNIFICANT CHANGE UP
APTT BLD: 33.4 SEC — SIGNIFICANT CHANGE UP (ref 24.5–35.6)
AST SERPL-CCNC: 32 U/L — SIGNIFICANT CHANGE UP (ref 4–32)
BACTERIA # UR AUTO: NEGATIVE /HPF — SIGNIFICANT CHANGE UP
BASOPHILS # BLD AUTO: 0.04 K/UL — SIGNIFICANT CHANGE UP (ref 0–0.2)
BASOPHILS NFR BLD AUTO: 0.4 % — SIGNIFICANT CHANGE UP (ref 0–2)
BILIRUB SERPL-MCNC: 0.6 MG/DL — SIGNIFICANT CHANGE UP (ref 0.2–1.2)
BILIRUB UR-MCNC: NEGATIVE — SIGNIFICANT CHANGE UP
BLOOD GAS VENOUS COMPREHENSIVE RESULT: SIGNIFICANT CHANGE UP
BUN SERPL-MCNC: 17 MG/DL — SIGNIFICANT CHANGE UP (ref 7–23)
CALCIUM SERPL-MCNC: 9.2 MG/DL — SIGNIFICANT CHANGE UP (ref 8.4–10.5)
CAST: 2 /LPF — SIGNIFICANT CHANGE UP (ref 0–4)
CHLORIDE SERPL-SCNC: 103 MMOL/L — SIGNIFICANT CHANGE UP (ref 98–107)
CO2 SERPL-SCNC: 24 MMOL/L — SIGNIFICANT CHANGE UP (ref 22–31)
COLOR SPEC: YELLOW — SIGNIFICANT CHANGE UP
CREAT SERPL-MCNC: 0.72 MG/DL — SIGNIFICANT CHANGE UP (ref 0.5–1.3)
DIFF PNL FLD: NEGATIVE — SIGNIFICANT CHANGE UP
EGFR: 87 ML/MIN/1.73M2 — SIGNIFICANT CHANGE UP
EOSINOPHIL # BLD AUTO: 0.27 K/UL — SIGNIFICANT CHANGE UP (ref 0–0.5)
EOSINOPHIL NFR BLD AUTO: 2.9 % — SIGNIFICANT CHANGE UP (ref 0–6)
GLUCOSE BLDC GLUCOMTR-MCNC: 101 MG/DL — HIGH (ref 70–99)
GLUCOSE BLDC GLUCOMTR-MCNC: 126 MG/DL — HIGH (ref 70–99)
GLUCOSE BLDC GLUCOMTR-MCNC: 202 MG/DL — HIGH (ref 70–99)
GLUCOSE SERPL-MCNC: 146 MG/DL — HIGH (ref 70–99)
GLUCOSE UR QL: NEGATIVE MG/DL — SIGNIFICANT CHANGE UP
HCT VFR BLD CALC: 38.6 % — SIGNIFICANT CHANGE UP (ref 34.5–45)
HGB BLD-MCNC: 13.1 G/DL — SIGNIFICANT CHANGE UP (ref 11.5–15.5)
IANC: 4.8 K/UL — SIGNIFICANT CHANGE UP (ref 1.8–7.4)
IMM GRANULOCYTES NFR BLD AUTO: 0.3 % — SIGNIFICANT CHANGE UP (ref 0–0.9)
INR BLD: 0.92 RATIO — SIGNIFICANT CHANGE UP (ref 0.85–1.18)
KETONES UR-MCNC: NEGATIVE MG/DL — SIGNIFICANT CHANGE UP
LEUKOCYTE ESTERASE UR-ACNC: NEGATIVE — SIGNIFICANT CHANGE UP
LIDOCAIN IGE QN: 70 U/L — HIGH (ref 7–60)
LYMPHOCYTES # BLD AUTO: 3.52 K/UL — HIGH (ref 1–3.3)
LYMPHOCYTES # BLD AUTO: 38.4 % — SIGNIFICANT CHANGE UP (ref 13–44)
MAGNESIUM SERPL-MCNC: 1.9 MG/DL — SIGNIFICANT CHANGE UP (ref 1.6–2.6)
MCHC RBC-ENTMCNC: 30.7 PG — SIGNIFICANT CHANGE UP (ref 27–34)
MCHC RBC-ENTMCNC: 33.9 GM/DL — SIGNIFICANT CHANGE UP (ref 32–36)
MCV RBC AUTO: 90.4 FL — SIGNIFICANT CHANGE UP (ref 80–100)
MONOCYTES # BLD AUTO: 0.5 K/UL — SIGNIFICANT CHANGE UP (ref 0–0.9)
MONOCYTES NFR BLD AUTO: 5.5 % — SIGNIFICANT CHANGE UP (ref 2–14)
NEUTROPHILS # BLD AUTO: 4.8 K/UL — SIGNIFICANT CHANGE UP (ref 1.8–7.4)
NEUTROPHILS NFR BLD AUTO: 52.5 % — SIGNIFICANT CHANGE UP (ref 43–77)
NITRITE UR-MCNC: NEGATIVE — SIGNIFICANT CHANGE UP
NRBC # BLD: 0 /100 WBCS — SIGNIFICANT CHANGE UP (ref 0–0)
NRBC # FLD: 0 K/UL — SIGNIFICANT CHANGE UP (ref 0–0)
PH UR: 7.5 — SIGNIFICANT CHANGE UP (ref 5–8)
PHOSPHATE SERPL-MCNC: 3.3 MG/DL — SIGNIFICANT CHANGE UP (ref 2.5–4.5)
PLATELET # BLD AUTO: 227 K/UL — SIGNIFICANT CHANGE UP (ref 150–400)
POTASSIUM SERPL-MCNC: 4.3 MMOL/L — SIGNIFICANT CHANGE UP (ref 3.5–5.3)
POTASSIUM SERPL-SCNC: 4.3 MMOL/L — SIGNIFICANT CHANGE UP (ref 3.5–5.3)
PROT SERPL-MCNC: 8.1 G/DL — SIGNIFICANT CHANGE UP (ref 6–8.3)
PROT UR-MCNC: SIGNIFICANT CHANGE UP MG/DL
PROTHROM AB SERPL-ACNC: 10.4 SEC — SIGNIFICANT CHANGE UP (ref 9.5–13)
RBC # BLD: 4.27 M/UL — SIGNIFICANT CHANGE UP (ref 3.8–5.2)
RBC # FLD: 13.8 % — SIGNIFICANT CHANGE UP (ref 10.3–14.5)
RBC CASTS # UR COMP ASSIST: 0 /HPF — SIGNIFICANT CHANGE UP (ref 0–4)
SODIUM SERPL-SCNC: 142 MMOL/L — SIGNIFICANT CHANGE UP (ref 135–145)
SP GR SPEC: 1.01 — SIGNIFICANT CHANGE UP (ref 1–1.03)
SQUAMOUS # UR AUTO: 0 /HPF — SIGNIFICANT CHANGE UP (ref 0–5)
TROPONIN T, HIGH SENSITIVITY RESULT: <6 NG/L — SIGNIFICANT CHANGE UP
UROBILINOGEN FLD QL: 0.2 MG/DL — SIGNIFICANT CHANGE UP (ref 0.2–1)
WBC # BLD: 9.16 K/UL — SIGNIFICANT CHANGE UP (ref 3.8–10.5)
WBC # FLD AUTO: 9.16 K/UL — SIGNIFICANT CHANGE UP (ref 3.8–10.5)
WBC UR QL: 0 /HPF — SIGNIFICANT CHANGE UP (ref 0–5)

## 2024-09-03 PROCEDURE — 70450 CT HEAD/BRAIN W/O DYE: CPT | Mod: 26,MC

## 2024-09-03 PROCEDURE — 90792 PSYCH DIAG EVAL W/MED SRVCS: CPT

## 2024-09-03 PROCEDURE — 72125 CT NECK SPINE W/O DYE: CPT | Mod: 26

## 2024-09-03 PROCEDURE — 70450 CT HEAD/BRAIN W/O DYE: CPT | Mod: 26,77

## 2024-09-03 PROCEDURE — 93010 ELECTROCARDIOGRAM REPORT: CPT | Mod: 77

## 2024-09-03 PROCEDURE — 72125 CT NECK SPINE W/O DYE: CPT | Mod: 26,MC,77

## 2024-09-03 RX ORDER — DEXTROSE 15 G/33 G
12.5 GEL IN PACKET (GRAM) ORAL ONCE
Refills: 0 | Status: DISCONTINUED | OUTPATIENT
Start: 2024-09-03 | End: 2024-09-09

## 2024-09-03 RX ORDER — ENOXAPARIN SODIUM 100 MG/ML
40 INJECTION SUBCUTANEOUS EVERY 24 HOURS
Refills: 0 | Status: DISCONTINUED | OUTPATIENT
Start: 2024-09-03 | End: 2024-09-03

## 2024-09-03 RX ORDER — GLUCAGON INJECTION, SOLUTION 1 MG/.2ML
1 INJECTION, SOLUTION SUBCUTANEOUS ONCE
Refills: 0 | Status: DISCONTINUED | OUTPATIENT
Start: 2024-09-03 | End: 2024-09-09

## 2024-09-03 RX ORDER — CITALOPRAM HYDROBROMIDE 40 MG
10 TABLET ORAL DAILY
Refills: 0 | Status: DISCONTINUED | OUTPATIENT
Start: 2024-09-03 | End: 2024-09-09

## 2024-09-03 RX ORDER — NIFEDIPINE 60 MG/1
60 TABLET, FILM COATED, EXTENDED RELEASE ORAL DAILY
Refills: 0 | Status: DISCONTINUED | OUTPATIENT
Start: 2024-09-03 | End: 2024-09-09

## 2024-09-03 RX ORDER — DEXTROSE 15 G/33 G
15 GEL IN PACKET (GRAM) ORAL ONCE
Refills: 0 | Status: DISCONTINUED | OUTPATIENT
Start: 2024-09-03 | End: 2024-09-09

## 2024-09-03 RX ORDER — MEMANTINE 7 MG/1
10 CAPSULE, EXTENDED RELEASE ORAL DAILY
Refills: 0 | Status: DISCONTINUED | OUTPATIENT
Start: 2024-09-03 | End: 2024-09-09

## 2024-09-03 RX ORDER — DEXTROSE 15 G/33 G
25 GEL IN PACKET (GRAM) ORAL ONCE
Refills: 0 | Status: DISCONTINUED | OUTPATIENT
Start: 2024-09-03 | End: 2024-09-09

## 2024-09-03 RX ORDER — OLANZAPINE 7.5 MG/1
5 TABLET ORAL ONCE
Refills: 0 | Status: DISCONTINUED | OUTPATIENT
Start: 2024-09-03 | End: 2024-09-03

## 2024-09-03 RX ORDER — LORAZEPAM 4 MG/ML
1 INJECTION INTRAMUSCULAR; INTRAVENOUS ONCE
Refills: 0 | Status: DISCONTINUED | OUTPATIENT
Start: 2024-09-03 | End: 2024-09-03

## 2024-09-03 RX ORDER — DIPHENHYDRAMINE HCL 50 MG
25 CAPSULE ORAL ONCE
Refills: 0 | Status: COMPLETED | OUTPATIENT
Start: 2024-09-03 | End: 2024-09-03

## 2024-09-03 RX ORDER — OLANZAPINE 7.5 MG/1
5 TABLET ORAL ONCE
Refills: 0 | Status: COMPLETED | OUTPATIENT
Start: 2024-09-03 | End: 2024-09-03

## 2024-09-03 RX ORDER — MIRTAZAPINE 30 MG
30 TABLET ORAL DAILY
Refills: 0 | Status: DISCONTINUED | OUTPATIENT
Start: 2024-09-03 | End: 2024-09-09

## 2024-09-03 RX ORDER — ENOXAPARIN SODIUM 100 MG/ML
30 INJECTION SUBCUTANEOUS EVERY 24 HOURS
Refills: 0 | Status: DISCONTINUED | OUTPATIENT
Start: 2024-09-03 | End: 2024-09-09

## 2024-09-03 RX ORDER — PANTOPRAZOLE SODIUM 40 MG
40 TABLET, DELAYED RELEASE (ENTERIC COATED) ORAL
Refills: 0 | Status: DISCONTINUED | OUTPATIENT
Start: 2024-09-03 | End: 2024-09-09

## 2024-09-03 RX ORDER — METOPROLOL TARTRATE 100 MG/1
25 TABLET ORAL DAILY
Refills: 0 | Status: DISCONTINUED | OUTPATIENT
Start: 2024-09-03 | End: 2024-09-09

## 2024-09-03 RX ORDER — KETAMINE HYDROCHLORIDE 10 MG/ML
50 INJECTION INTRAMUSCULAR; INTRAVENOUS ONCE
Refills: 0 | Status: DISCONTINUED | OUTPATIENT
Start: 2024-09-03 | End: 2024-09-03

## 2024-09-03 RX ORDER — ASPIRIN 81 MG
81 TABLET, DELAYED RELEASE (ENTERIC COATED) ORAL DAILY
Refills: 0 | Status: DISCONTINUED | OUTPATIENT
Start: 2024-09-03 | End: 2024-09-09

## 2024-09-03 RX ADMIN — Medication 30 MILLIGRAM(S): at 22:30

## 2024-09-03 RX ADMIN — LORAZEPAM 1 MILLIGRAM(S): 4 INJECTION INTRAMUSCULAR; INTRAVENOUS at 02:58

## 2024-09-03 RX ADMIN — Medication 6 MILLIGRAM(S): at 21:29

## 2024-09-03 RX ADMIN — MEMANTINE 10 MILLIGRAM(S): 7 CAPSULE, EXTENDED RELEASE ORAL at 12:29

## 2024-09-03 RX ADMIN — Medication 75 MILLIGRAM(S): at 12:29

## 2024-09-03 RX ADMIN — Medication 20 MILLIGRAM(S): at 21:29

## 2024-09-03 RX ADMIN — OLANZAPINE 5 MILLIGRAM(S): 7.5 TABLET ORAL at 04:25

## 2024-09-03 RX ADMIN — Medication 81 MILLIGRAM(S): at 12:28

## 2024-09-03 RX ADMIN — Medication 25 MILLIGRAM(S): at 01:32

## 2024-09-03 RX ADMIN — Medication 10 MILLIGRAM(S): at 12:29

## 2024-09-03 RX ADMIN — OLANZAPINE 5 MILLIGRAM(S): 7.5 TABLET ORAL at 02:31

## 2024-09-03 NOTE — ED ADULT NURSE REASSESSMENT NOTE - NS ED NURSE REASSESS COMMENT FT1
Break Coverage RN: Pt appears to be resting, ambulatory, NAD, PCA at bedside for 1:1, pt placed on soft mittens. Respirations are even and unlabored, no complaints at this moment. Safety precautions implemented as per protocol, awaiting further MD orders, plan of care ongoing.

## 2024-09-03 NOTE — PATIENT PROFILE ADULT - FUNCTIONAL ASSESSMENT - DAILY ACTIVITY 2.
Med rec updated and complete, per pt   Allergies reviewed, per pt  Interviewed pt with  at bedside with permission from pt.     2 = A lot of assistance

## 2024-09-03 NOTE — PATIENT PROFILE ADULT - FALL HARM RISK - HARM RISK INTERVENTIONS

## 2024-09-03 NOTE — ED ADULT NURSE NOTE - NSFALLRISKINTERV_ED_ALL_ED

## 2024-09-03 NOTE — PHYSICAL THERAPY INITIAL EVALUATION ADULT - DIAGNOSIS, PT EVAL
decreased functional mobility, generalized weakness, difficulty ambulating
Offered and patient declined

## 2024-09-03 NOTE — ED ADULT NURSE NOTE - OBJECTIVE STATEMENT
Patient received to 10a, A/Ox1 (self), ambulatory, c/o agitation at home. Per family, patient became agitated today at home, fell and removed her ostomy bag. Denies LOC and head trauma. Patient offers no complaints. No ostomy bag noted. Site is clean, dry and intact. 20G IV placed to left AC. Ostomy bag replaced.

## 2024-09-03 NOTE — ED ADULT NURSE REASSESSMENT NOTE - NS ED NURSE REASSESS COMMENT FT1
Patient continues to remove colostomy bag. Unable to redirect patient successfully, patient placed in soft mittens.

## 2024-09-03 NOTE — H&P ADULT - NSHPPHYSICALEXAM_GEN_ALL_CORE
General: frail  PERRLA  Neurology: A&Ox1  Respiratory: CTA B/L  CV: S1S2+  Abdominal: Soft, NT, ND +BS, Last BM  Extremities: No edema, + peripheral pulses  Skin Normal

## 2024-09-03 NOTE — ED ADULT NURSE REASSESSMENT NOTE - NS ED NURSE REASSESS COMMENT FT1
PT is resting in stretcher, easily arousable to verbal stimuli. no apparent distress noted at this time. 1 :1 at bedside PCA Gabby.  hand off will be given to primary nurse when return to area.

## 2024-09-03 NOTE — BH CONSULTATION LIAISON ASSESSMENT NOTE - SUMMARY
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 On memantine from home. Psych c/s for agitation.    Patient with clear cognitive deficits across several domains, defer to neuro regarding further workup regarding this, suspect vascular dementia given CThead. Patient has no recollection as to why she has colostomy. Low utility in most psych meds, only method of using psych meds to reduce propensity to pull out colostomy would be to utilize sedative effect of meds, defer to primary team to discussed with family about whether or not this aligns with patient/family GOC.     Recs:  - agree with c/w 1:1 as patient pulling out lines/colostomy  - melatonin 6mg qHS standing  - PRN for agitation Zyprexa 1.25 q6h PRN  - f/u EKG for QTc, can use antipsychotics if QTc<500  - c/w memantine as ordered  - consider GOC discussion surrounding usage of sedation to reduce changes of pulling out colostomy/lines etc   - no psych c/i to placement in rehab or other safe dispo

## 2024-09-03 NOTE — ED ADULT NURSE NOTE - CHIEF COMPLAINT QUOTE
Pt arrives from home as per  c/o increased agitation and pulling colostomy bag out. Hx Dementia,  As per EMS  at home states pt is at baseline mental status. Pt has no complaints with triage   # [phone]

## 2024-09-03 NOTE — CONSULT NOTE ADULT - SUBJECTIVE AND OBJECTIVE BOX
CHIEF COMPLAINT:Patient is a 78y old  Female who presents with a chief complaint of agitation (03 Sep 2024 09:52)      HISTORY OF PRESENT ILLNESS:     78-year-old female past medical history dementia presents to the ED after noted to be agitated and pulled out her colostomy bag earlier today. As per patient's daughter Kaitlin who called from [place], the patient's  called her to let her know that the patient was agitated and fell.  Kaitlin also states that the patient lives at home and is unable to take care of herself when she is more agitated and demented.  States that the patient's  is not able to take care of her as well      PAST MEDICAL & SURGICAL HISTORY:          MEDICATIONS:  aspirin  chewable 81 milliGRAM(s) Oral daily  clopidogrel Tablet 75 milliGRAM(s) Oral daily  metoprolol succinate ER 25 milliGRAM(s) Oral daily  NIFEdipine XL 60 milliGRAM(s) Oral daily        citalopram 10 milliGRAM(s) Oral daily  memantine 10 milliGRAM(s) Oral daily  mirtazapine 30 milliGRAM(s) Oral daily    pantoprazole    Tablet 40 milliGRAM(s) Oral before breakfast    atorvastatin 20 milliGRAM(s) Oral at bedtime  dextrose 50% Injectable 25 Gram(s) IV Push once  dextrose 50% Injectable 12.5 Gram(s) IV Push once  dextrose 50% Injectable 25 Gram(s) IV Push once  dextrose Oral Gel 15 Gram(s) Oral once PRN  glucagon  Injectable 1 milliGRAM(s) IntraMuscular once  insulin lispro (ADMELOG) corrective regimen sliding scale   SubCutaneous three times a day before meals    dextrose 5%. 1000 milliLiter(s) IV Continuous <Continuous>  dextrose 5%. 1000 milliLiter(s) IV Continuous <Continuous>      FAMILY HISTORY:      Non-contributory    SOCIAL HISTORY:    No tobacco, drugs or etoh    Allergies    No Known Allergies    Intolerances    	    REVIEW OF SYSTEMS:  as above  The rest of the 14 points ROS reviewed and except above they are unremarkable.        PHYSICAL EXAM:  T(C): 37.6 (09-03-24 @ 11:10), Max: 37.6 (09-03-24 @ 11:10)  HR: 79 (09-03-24 @ 09:50) (62 - 79)  BP: 159/93 (09-03-24 @ 09:50) (120/71 - 159/93)  RR: 16 (09-03-24 @ 09:50) (16 - 20)  SpO2: 99% (09-03-24 @ 09:50) (92% - 99%)  Wt(kg): --  I&O's Summary      JVP: Normal  Neck: supple  Lung: clear   CV: S1 S2 , Murmur:  Abd: soft  Ext: No edema  neuro: Awake / alert  Psych: flat affect  Skin: normal      LABS/DATA:    TELEMETRY: 	    ECG:  	   	  CARDIAC MARKERS:                        <6 <<== 09-02-24 @ 23:29                              13.1   9.16  )-----------( 227      ( 02 Sep 2024 23:29 )             38.6     09-02    142  |  103  |  17  ----------------------------<  146<H>  4.3   |  24  |  0.72    Ca    9.2      02 Sep 2024 23:29  Phos  3.3     09-02  Mg     1.90     09-02    TPro  8.1  /  Alb  4.1  /  TBili  0.6  /  DBili  x   /  AST  32  /  ALT  21  /  AlkPhos  135<H>  09-02    proBNP:   Lipid Profile:   HgA1c:   TSH:

## 2024-09-03 NOTE — H&P ADULT - HISTORY OF PRESENT ILLNESS
78-year-old female past medical history dementia presents to the ED after noted to be agitated and pulled out her colostomy bag earlier today. As per patient's daughter Kaitlin who called from [place], the patient's  called her to let her know that the patient was agitated and fell.  Kaitlin also states that the patient lives at home and is unable to take care of herself when she is more agitated and demented.  States that the patient's  is not able to take care of her as well.

## 2024-09-03 NOTE — PHYSICAL THERAPY INITIAL EVALUATION ADULT - ADDITIONAL COMMENTS
Pt left semisupine in bed in NAD, all lines intact, call bell in reach, SPO2 100%, bed at lowest level/bed rails raised, and RN aware.

## 2024-09-03 NOTE — BH CONSULTATION LIAISON ASSESSMENT NOTE - MSE UNSTRUCTURED FT
Mental Status Exam:  Alonzo: well groomed, fair hygiene     Behavior: psychomotor agitation  Motor: no tremors, EPS, or rigidity  Gait: did not assess, pt in bed  Speech: normal rate, rhythm, prosedy and volume   Mood: "okay"  Affect: euthymic, congruent  Thought process: impoverished  Thought Content: denies paranoia, delusions   Perception: denies AH/VH  SI: denies  HI: denies  Insight: poor  Judgment: poor     Cognitive Exam:  Orientation: AOx2  Recall: poor  Attention: poor  Abstraction: poor

## 2024-09-03 NOTE — BH CONSULTATION LIAISON ASSESSMENT NOTE - NSBHCHARTREVIEWVS_PSY_A_CORE FT
Vital Signs Last 24 Hrs  T(C): 37.6 (03 Sep 2024 11:10), Max: 37.6 (03 Sep 2024 11:10)  T(F): 99.7 (03 Sep 2024 11:10), Max: 99.7 (03 Sep 2024 11:10)  HR: 79 (03 Sep 2024 09:50) (62 - 79)  BP: 159/93 (03 Sep 2024 09:50) (120/71 - 159/93)  BP(mean): 99 (03 Sep 2024 00:14) (99 - 99)  RR: 16 (03 Sep 2024 09:50) (16 - 20)  SpO2: 99% (03 Sep 2024 09:50) (92% - 99%)    Parameters below as of 03 Sep 2024 09:50  Patient On (Oxygen Delivery Method): room air

## 2024-09-03 NOTE — PHYSICAL THERAPY INITIAL EVALUATION ADULT - ACTIVE RANGE OF MOTION EXAMINATION, REHAB EVAL
johnny. upper extremity Active ROM was WNL (within normal limits)/bilateral lower extremity Active ROM was WNL (within normal limits)

## 2024-09-03 NOTE — PHYSICAL THERAPY INITIAL EVALUATION ADULT - PLANNED THERAPY INTERVENTIONS, PT EVAL
Assessment/Plan:    No problem-specific Assessment & Plan notes found for this encounter  Diagnoses and all orders for this visit:    Acute otitis media, bilateral  -     amoxicillin (AMOXIL) 400 MG/5ML suspension; Take 10ml po bid x 10 days          Subjective:      Patient ID: Marisol Baez is a 5 y o  female  Patient presents with mother for evaluation of earache, cough and fever   Has had b/l ear pain intermittent over the past few weeks  Patient is in   She is tolerating PO      The following portions of the patient's history were reviewed and updated as appropriate: allergies, past family history, past medical history, past social history, past surgical history and problem list     Review of Systems   Constitutional: Positive for fever  Negative for activity change, appetite change, chills, crying and irritability  HENT: Positive for ear pain  Negative for congestion, ear discharge, rhinorrhea, sore throat and trouble swallowing  Eyes: Negative for pain, discharge, redness and itching  Respiratory: Positive for cough  Negative for wheezing and stridor  Cardiovascular: Negative for chest pain and palpitations  Gastrointestinal: Negative for abdominal pain, constipation, diarrhea, nausea and vomiting  Musculoskeletal: Negative for arthralgias, joint swelling and myalgias  Skin: Negative for rash  Neurological: Negative for weakness and headaches  Objective:      Pulse 102   Temp 99 2 °F (37 3 °C)   Ht 3' 4" (1 016 m)   Wt 18 kg (39 lb 9 6 oz)   SpO2 98%   BMI 17 40 kg/m²          Physical Exam  Vitals and nursing note reviewed  Constitutional:       General: She is active  She is not in acute distress  Appearance: She is well-developed  HENT:      Right Ear: A middle ear effusion is present  Left Ear: A middle ear effusion is present  Mouth/Throat:      Mouth: Mucous membranes are moist       Pharynx: Oropharynx is clear     Eyes: balance training/bed mobility training/gait training/postural re-education/ROM/strengthening/transfer training Conjunctiva/sclera: Conjunctivae normal       Pupils: Pupils are equal, round, and reactive to light  Cardiovascular:      Rate and Rhythm: Normal rate and regular rhythm  Heart sounds: No murmur heard  Pulmonary:      Effort: Pulmonary effort is normal  No respiratory distress  Breath sounds: Normal breath sounds  No wheezing  Abdominal:      General: Bowel sounds are normal       Palpations: Abdomen is soft  Musculoskeletal:         General: Normal range of motion  Cervical back: Normal range of motion  Skin:     General: Skin is warm and dry  Neurological:      Mental Status: She is alert

## 2024-09-03 NOTE — CONSULT NOTE ADULT - SUBJECTIVE AND OBJECTIVE BOX
Neurology Consult    Reason for Consult: Patient is a 78y old  Female who presents with a chief complaint of agitation (03 Sep 2024 14:00)      HPI:   78-year-old female past medical history dementia presents to the ED after noted to be agitated and pulled out her colostomy bag earlier today. As per patient's daughter Kaitlin who called from [place], the patient's  called her to let her know that the patient was agitated and fell.  Kaitlin also states that the patient lives at home and is unable to take care of herself when she is more agitated and demented.  States that the patient's  is not able to take care of her as well.  Pt unable to provide history       PAST MEDICAL & SURGICAL HISTORY:      Allergies: Allergies    No Known Allergies    Intolerances        Social History: Denies toxic habits including tobacco, ETOH or illicit drugs.    Family History: FAMILY HISTORY:  . No family history of strokes    Medications: MEDICATIONS  (STANDING):  aspirin  chewable 81 milliGRAM(s) Oral daily  atorvastatin 20 milliGRAM(s) Oral at bedtime  citalopram 10 milliGRAM(s) Oral daily  clopidogrel Tablet 75 milliGRAM(s) Oral daily  dextrose 5%. 1000 milliLiter(s) (50 mL/Hr) IV Continuous <Continuous>  dextrose 5%. 1000 milliLiter(s) (100 mL/Hr) IV Continuous <Continuous>  dextrose 50% Injectable 25 Gram(s) IV Push once  dextrose 50% Injectable 12.5 Gram(s) IV Push once  dextrose 50% Injectable 25 Gram(s) IV Push once  glucagon  Injectable 1 milliGRAM(s) IntraMuscular once  insulin lispro (ADMELOG) corrective regimen sliding scale   SubCutaneous three times a day before meals  memantine 10 milliGRAM(s) Oral daily  metoprolol succinate ER 25 milliGRAM(s) Oral daily  mirtazapine 30 milliGRAM(s) Oral daily  NIFEdipine XL 60 milliGRAM(s) Oral daily  pantoprazole    Tablet 40 milliGRAM(s) Oral before breakfast    MEDICATIONS  (PRN):  dextrose Oral Gel 15 Gram(s) Oral once PRN Blood Glucose LESS THAN 70 milliGRAM(s)/deciliter      Review of Systems:  CONSTITUTIONAL:  No weight loss, fever, chills, weakness or fatigue.  HEENT:  Eyes:  No visual loss, blurred vision, double vision or yellow sclera. Ears, Nose, Throat:  No hearing loss, sneezing, congestion, runny nose or sore throat.  SKIN:  No rash or itching.  CARDIOVASCULAR:  No chest pain, chest pressure or chest discomfort. No palpitations or edema.  RESPIRATORY:  No shortness of breath, cough or sputum.  GASTROINTESTINAL:  No anorexia, nausea, vomiting or diarrhea. No abdominal pain or blood.  GENITOURINARY:  No burning on urination or incontinence   NEUROLOGICAL:  No headache, dizziness, syncope, paralysis, ataxia, numbness or tingling in the extremities. No change in bowel or bladder control. no limb weakness. no vision changes.   MUSCULOSKELETAL:  No muscle, back pain, joint pain or stiffness.  HEMATOLOGIC:  No anemia, bleeding or bruising.  LYMPHATICS:  No enlarged nodes. No history of splenectomy.  PSYCHIATRIC:  No history of depression or anxiety.  ENDOCRINOLOGIC:  No reports of sweating, cold or heat intolerance. No polyuria or polydipsia.      Vitals:  Vital Signs Last 24 Hrs  T(C): 37.6 (03 Sep 2024 11:10), Max: 37.6 (03 Sep 2024 11:10)  T(F): 99.7 (03 Sep 2024 11:10), Max: 99.7 (03 Sep 2024 11:10)  HR: 79 (03 Sep 2024 09:50) (62 - 79)  BP: 159/93 (03 Sep 2024 09:50) (120/71 - 159/93)  BP(mean): 99 (03 Sep 2024 00:14) (99 - 99)  RR: 16 (03 Sep 2024 09:50) (16 - 20)  SpO2: 99% (03 Sep 2024 09:50) (92% - 99%)    Parameters below as of 03 Sep 2024 09:50  Patient On (Oxygen Delivery Method): room air        General Exam:   General Appearance: Appropriately dressed and in no acute distress       Head: Normocephalic, atraumatic and no dysmorphic features  Ear, Nose, and Throat: Moist mucous membranes  CVS: S1S2+  Resp: No SOB, no wheeze or rhonchi  GI: soft NT/ND  Extremities: No edema or cyanosis  Skin: No bruises or rashes     Neurological Exam:  Mental Status: Awake, alert and oriented x 2.  Able to follow simple and complex verbal commands. Able to name and repeat. fluent speech. No dysarthria   Cranial Nerves: PERRL, EOMI, VFFC, sensation V1-V3 intact,  no obvious facial asymmetry, equal elevation of palate, scm/trap 5/5, tongue is midline on protrusion. hearing is grossly intact.   Motor: Normal bulk, tone, MCCANN AG - in wrist restraints  Sensation: Intact to light touch   Reflexes: 1+ throughout at biceps, brachioradialis, triceps, patellars and ankles bilaterally and equal. No clonus. R toe and L toe were both downgoing.  Coordination: unablec  Gait: deferred    Data/Labs/Imaging which I personally reviewed.     Labs:     CBC Full  -  ( 02 Sep 2024 23:29 )  WBC Count : 9.16 K/uL  RBC Count : 4.27 M/uL  Hemoglobin : 13.1 g/dL  Hematocrit : 38.6 %  Platelet Count - Automated : 227 K/uL  Mean Cell Volume : 90.4 fL  Mean Cell Hemoglobin : 30.7 pg  Mean Cell Hemoglobin Concentration : 33.9 gm/dL  Auto Neutrophil # : 4.80 K/uL  Auto Lymphocyte # : 3.52 K/uL  Auto Monocyte # : 0.50 K/uL  Auto Eosinophil # : 0.27 K/uL  Auto Basophil # : 0.04 K/uL  Auto Neutrophil % : 52.5 %  Auto Lymphocyte % : 38.4 %  Auto Monocyte % : 5.5 %  Auto Eosinophil % : 2.9 %  Auto Basophil % : 0.4 %        142  |  103  |  17  ----------------------------<  146<H>  4.3   |  24  |  0.72    Ca    9.2      02 Sep 2024 23:29  Phos  3.3       Mg     1.90         TPro  8.1  /  Alb  4.1  /  TBili  0.6  /  DBili  x   /  AST  32  /  ALT  21  /  AlkPhos  135<H>      LIVER FUNCTIONS - ( 02 Sep 2024 23:29 )  Alb: 4.1 g/dL / Pro: 8.1 g/dL / ALK PHOS: 135 U/L / ALT: 21 U/L / AST: 32 U/L / GGT: x           PT/INR - ( 02 Sep 2024 23:29 )   PT: 10.4 sec;   INR: 0.92 ratio         PTT - ( 02 Sep 2024 23:29 )  PTT:33.4 sec  Urinalysis Basic - ( 02 Sep 2024 23:48 )    Color: Yellow / Appearance: Clear / S.010 / pH: x  Gluc: x / Ketone: Negative mg/dL  / Bili: Negative / Urobili: 0.2 mg/dL   Blood: x / Protein: Trace mg/dL / Nitrite: Negative   Leuk Esterase: Negative / RBC: 0 /HPF / WBC 0 /HPF   Sq Epi: x / Non Sq Epi: 0 /HPF / Bacteria: Negative /HPF        < from: CT Head No Cont (24 @ 13:29) >    ACC: [id number] EXAM:  CT CERVICAL SPINE   ORDERED BY: PRINCESS ALBRIGHT     ACC: [id number] EXAM:  CT BRAIN   ORDERED BY: PRINCESS ALBRIGHT     PROCEDURE DATE:  2024          INTERPRETATION:  CLINICAL INFORMATION: encephalomalcia vs edema/mass    COMPARISON: None.      TECHNIQUE:    CT BRAIN: Serial axial images were obtained from the skull base to the   vertex using multi-slice helical technique. Sagittal and coronal   reformats were obtained.    CT CERVICAL SPINE: Axial images were obtained of the cervical spine using   multislice helical technique. Reformatted coronal and sagittal images   were obtained.    FINDINGS:    CT BRAIN:    VENTRICLES AND SULCI: Age appropriate involutional changes.  INTRA-AXIAL: Encephalomalacic changes are seen in the right frontal   region consistent with a prior insult in this distribution. There are   periventricular and subcortical white matter hypodensities, consistent   with microvascular type changes.  EXTRA-AXIAL: Bifrontal convexity low attenuation collectionsas seen on   the prior with some flattening of the gyri. Findings suggest chronic   hematoma/hygromas which are symmetric and bilateral    VISUALIZED SINUSES:  Clear.  TYMPANOMASTOID CAVITIES:  Clear.  VISUALIZED ORBITS: Normal.  CALVARIUM: Intact.    MISCELLANEOUS: Left-sided cataract surgery    CT CERVICAL SPINE:    VERTEBRAE:  Normal in height. No acute fracture. Multilevel degenerative   changes including marginal osteophytes.  ALIGNMENT: No subluxation or scoliosis.  INTERVERTEBRAL DISC SPACES: No significant disc bulge or focal disc   herniation. No significant spinal canal or neural foraminal stenosis.    VISUALIZED LUNGS: Clear.    MISCELLANEOUS:  None.      IMPRESSION:    CT HEAD:  No acute intracranial hemorrhage, mass effect, or midline shift.   Encephalomalacic changes in the right frontal lobe. Bilateral extra-axial   convexity fluid attenuation collections favor chronic hematomas/hygromas.    CT CERVICAL SPINE:  No acute fracture or traumatic subluxation.    Multi-level degenerative changes.        --- End of Report ---            MARION DÍAZ MD; Attending Radiologist  This document has been electronically signed. Sep  3 2024  1:46PM    < end of copied text >     Neurology Consult    Reason for Consult: Patient is a 78y old  Female who presents with a chief complaint of agitation (03 Sep 2024 14:00)      HPI:   78-year-old female past medical history dementia presents to the ED after noted to be agitated and pulled out her colostomy bag earlier today. As per patient's daughter Kaitlin who called from [place], the patient's  called her to let her know that the patient was agitated and fell.  Kaitlin also states that the patient lives at home and is unable to take care of herself when she is more agitated and demented.  States that the patient's  is not able to take care of her as well.  Pt unable to provide history       PAST MEDICAL & SURGICAL HISTORY:      Allergies: Allergies    No Known Allergies    Intolerances        Social History: Denies toxic habits including tobacco, ETOH or illicit drugs.    Family History: FAMILY HISTORY:  . No family history of strokes    Medications: MEDICATIONS  (STANDING):  aspirin  chewable 81 milliGRAM(s) Oral daily  atorvastatin 20 milliGRAM(s) Oral at bedtime  citalopram 10 milliGRAM(s) Oral daily  clopidogrel Tablet 75 milliGRAM(s) Oral daily  dextrose 5%. 1000 milliLiter(s) (50 mL/Hr) IV Continuous <Continuous>  dextrose 5%. 1000 milliLiter(s) (100 mL/Hr) IV Continuous <Continuous>  dextrose 50% Injectable 25 Gram(s) IV Push once  dextrose 50% Injectable 12.5 Gram(s) IV Push once  dextrose 50% Injectable 25 Gram(s) IV Push once  glucagon  Injectable 1 milliGRAM(s) IntraMuscular once  insulin lispro (ADMELOG) corrective regimen sliding scale   SubCutaneous three times a day before meals  memantine 10 milliGRAM(s) Oral daily  metoprolol succinate ER 25 milliGRAM(s) Oral daily  mirtazapine 30 milliGRAM(s) Oral daily  NIFEdipine XL 60 milliGRAM(s) Oral daily  pantoprazole    Tablet 40 milliGRAM(s) Oral before breakfast    MEDICATIONS  (PRN):  dextrose Oral Gel 15 Gram(s) Oral once PRN Blood Glucose LESS THAN 70 milliGRAM(s)/deciliter      Review of Systems:  CONSTITUTIONAL:  No weight loss, fever, chills, weakness or fatigue.  HEENT:  Eyes:  No visual loss, blurred vision, double vision or yellow sclera. Ears, Nose, Throat:  No hearing loss, sneezing, congestion, runny nose or sore throat.  SKIN:  No rash or itching.  CARDIOVASCULAR:  No chest pain, chest pressure or chest discomfort. No palpitations or edema.  RESPIRATORY:  No shortness of breath, cough or sputum.  GASTROINTESTINAL:  No anorexia, nausea, vomiting or diarrhea. No abdominal pain or blood.  GENITOURINARY:  No burning on urination or incontinence   NEUROLOGICAL:  No headache, dizziness, syncope, paralysis, ataxia, numbness or tingling in the extremities. No change in bowel or bladder control. no limb weakness. no vision changes.   MUSCULOSKELETAL:  No muscle, back pain, joint pain or stiffness.  HEMATOLOGIC:  No anemia, bleeding or bruising.  LYMPHATICS:  No enlarged nodes. No history of splenectomy.  PSYCHIATRIC:  No history of depression or anxiety.  ENDOCRINOLOGIC:  No reports of sweating, cold or heat intolerance. No polyuria or polydipsia.      Vitals:  Vital Signs Last 24 Hrs  T(C): 37.6 (03 Sep 2024 11:10), Max: 37.6 (03 Sep 2024 11:10)  T(F): 99.7 (03 Sep 2024 11:10), Max: 99.7 (03 Sep 2024 11:10)  HR: 79 (03 Sep 2024 09:50) (62 - 79)  BP: 159/93 (03 Sep 2024 09:50) (120/71 - 159/93)  BP(mean): 99 (03 Sep 2024 00:14) (99 - 99)  RR: 16 (03 Sep 2024 09:50) (16 - 20)  SpO2: 99% (03 Sep 2024 09:50) (92% - 99%)    Parameters below as of 03 Sep 2024 09:50  Patient On (Oxygen Delivery Method): room air        General Exam:   General Appearance: Appropriately dressed and in no acute distress       Head: Normocephalic, atraumatic and no dysmorphic features  Ear, Nose, and Throat: Moist mucous membranes  CVS: S1S2+  Resp: No SOB, no wheeze or rhonchi  GI: soft NT/ND  Extremities: No edema or cyanosis  Skin: No bruises or rashes     Neurological Exam:  Mental Status: Awake, alert and oriented x 2.  Able to follow simple and complex verbal commands. Able to name and repeat. fluent speech. No dysarthria   Cranial Nerves: PERRL, EOMI, VFFC, sensation V1-V3 intact,  no obvious facial asymmetry, equal elevation of palate, scm/trap 5/5, tongue is midline on protrusion. hearing is grossly intact.   Motor: Normal bulk, tone, MCCANN AG - in wrist restraints  Sensation: Intact to light touch   Reflexes: 1+ throughout at biceps, brachioradialis, triceps, patellars and ankles bilaterally and equal. No clonus. R toe and L toe were both downgoing.  Coordination: unablec  Gait: deferred    Data/Labs/Imaging which I personally reviewed.     Labs:     CBC Full  -  ( 02 Sep 2024 23:29 )  WBC Count : 9.16 K/uL  RBC Count : 4.27 M/uL  Hemoglobin : 13.1 g/dL  Hematocrit : 38.6 %  Platelet Count - Automated : 227 K/uL  Mean Cell Volume : 90.4 fL  Mean Cell Hemoglobin : 30.7 pg  Mean Cell Hemoglobin Concentration : 33.9 gm/dL  Auto Neutrophil # : 4.80 K/uL  Auto Lymphocyte # : 3.52 K/uL  Auto Monocyte # : 0.50 K/uL  Auto Eosinophil # : 0.27 K/uL  Auto Basophil # : 0.04 K/uL  Auto Neutrophil % : 52.5 %  Auto Lymphocyte % : 38.4 %  Auto Monocyte % : 5.5 %  Auto Eosinophil % : 2.9 %  Auto Basophil % : 0.4 %        142  |  103  |  17  ----------------------------<  146<H>  4.3   |  24  |  0.72    Ca    9.2      02 Sep 2024 23:29  Phos  3.3       Mg     1.90         TPro  8.1  /  Alb  4.1  /  TBili  0.6  /  DBili  x   /  AST  32  /  ALT  21  /  AlkPhos  135<H>      LIVER FUNCTIONS - ( 02 Sep 2024 23:29 )  Alb: 4.1 g/dL / Pro: 8.1 g/dL / ALK PHOS: 135 U/L / ALT: 21 U/L / AST: 32 U/L / GGT: x           PT/INR - ( 02 Sep 2024 23:29 )   PT: 10.4 sec;   INR: 0.92 ratio         PTT - ( 02 Sep 2024 23:29 )  PTT:33.4 sec  Urinalysis Basic - ( 02 Sep 2024 23:48 )    Color: Yellow / Appearance: Clear / S.010 / pH: x  Gluc: x / Ketone: Negative mg/dL  / Bili: Negative / Urobili: 0.2 mg/dL   Blood: x / Protein: Trace mg/dL / Nitrite: Negative   Leuk Esterase: Negative / RBC: 0 /HPF / WBC 0 /HPF   Sq Epi: x / Non Sq Epi: 0 /HPF / Bacteria: Negative /HPF        < from: CT Head No Cont (24 @ 13:29) >    ACC: [id number] EXAM:  CT CERVICAL SPINE   ORDERED BY: PRINCESS ALBRIGHT     ACC: [id number] EXAM:  CT BRAIN   ORDERED BY: PRINCESS ALBRIGHT     PROCEDURE DATE:  2024          INTERPRETATION:  CLINICAL INFORMATION: encephalomalcia vs edema/mass    COMPARISON: None.      TECHNIQUE:    CT BRAIN: Serial axial images were obtained from the skull base to the   vertex using multi-slice helical technique. Sagittal and coronal   reformats were obtained.    CT CERVICAL SPINE: Axial images were obtained of the cervical spine using   multislice helical technique. Reformatted coronal and sagittal images   were obtained.    FINDINGS:    CT BRAIN:    VENTRICLES AND SULCI: Age appropriate involutional changes.  INTRA-AXIAL: Encephalomalacic changes are seen in the right frontal   region consistent with a prior insult in this distribution. There are   periventricular and subcortical white matter hypodensities, consistent   with microvascular type changes.  EXTRA-AXIAL: Bifrontal convexity low attenuation collectionsas seen on   the prior with some flattening of the gyri. Findings suggest chronic   hematoma/hygromas which are symmetric and bilateral    VISUALIZED SINUSES:  Clear.  TYMPANOMASTOID CAVITIES:  Clear.  VISUALIZED ORBITS: Normal.  CALVARIUM: Intact.    MISCELLANEOUS: Left-sided cataract surgery    CT CERVICAL SPINE:    VERTEBRAE:  Normal in height. No acute fracture. Multilevel degenerative   changes including marginal osteophytes.  ALIGNMENT: No subluxation or scoliosis.  INTERVERTEBRAL DISC SPACES: No significant disc bulge or focal disc   herniation. No significant spinal canal or neural foraminal stenosis.    VISUALIZED LUNGS: Clear.    MISCELLANEOUS:  None.      IMPRESSION:    CT HEAD:  No acute intracranial hemorrhage, mass effect, or midline shift.   Encephalomalacic changes in the right frontal lobe. Bilateral extra-axial   convexity fluid attenuation collections favor chronic hematomas/hygromas.    CT CERVICAL SPINE:  No acute fracture or traumatic subluxation.    Multi-level degenerative changes.        --- End of Report ---            MARION DÍAZ MD; Attending Radiologist  This document has been electronically signed. Sep  3 2024  1:46PM    < end of copied text >      IMAGING from other MRN:      Radiology:  CTH: No acute intracranial hemorrhage or mass effect. Extensive chronic small   vessel ischemic changes in the frontoparietal periventricular and   subcortical white matter. Indeterminate age bilateral thalamic lacunar   infarcts accommodation of dilated perivascular spaces and lacunar   infarcts in the bilateral basal ganglia. Small chronic appearing   bilateral cerebellar infarcts.    CTA NECK:  No significant stenosis of the cervical carotid arteries based   on NASCET criteria. Patent cervical vertebral arteries. No evidence of   cervical carotid or vertebral artery dissection.    CTA HEAD:  No high-grade stenosis or occlusion of the major proximal   arterial branches.    CT PERFUSION:  Perfusion imaging shows no evidence of a core infarct or   tissue at risk.    < from: MR Head No Cont (24 @ 20:13) >    ACC: [id number] EXAM:  MR BRAIN   ORDERED BY: ARY POE     PROCEDURE DATE:  2024          INTERPRETATION:  CLINICAL INDICATION: Patient recently fell at home.   Confused.    TECHNIQUE: MRI of the brain was performed without contrast.    COMPARISON: CT brain 2024    FINDINGS:  Acute infarct involving the anterior left midbrain.    No acute intracranial hemorrhage, mass effect, or hydrocephalus. No   extra-axial collection. Basal cisterns are patent.    Age-related involutional changes and chronic microvascular ischemic   changes. Chronic lacunar infarcts involving the bilateral thalami. Small   chronic infarcts involving the bilateral cerebral hemispheres.    Ventricles and sulci are age-appropriate in size.    Major intracranial flow-voids are preserved.    Left cataract surgery. The orbits, sellar and suprasellar structures, and   craniocervical junction are otherwise unremarkable. Visualized paranasal   sinuses and mastoid air cells are clear.    IMPRESSION:  Acute infarct involving the anterior left midbrain.    --- End of Report ---              < end of copied text >     1. Left ventricular cavity is normal in size. Left ventricular wall thickness is normal. Left ventricular systolic function is normal with an ejection fraction visually estimated at 60 to 65 %. There are no regional wall motion abnormalities seen.   2. There is mild (grade 1) left ventricular diastolic dysfunction.   3. Normal right ventricular cavity size and probably normal systolic function.   4. Structurally normal mitral valve with normal leaflet excursion. There is calcification of the mitral valve annulus. There is trace mitral regurgitation.   5. The aortic valve appears trileaflet with normal systolic excursion. There is calcification of the aortic valve leaflets. There is mild aortic regurgitation.

## 2024-09-03 NOTE — H&P ADULT - NSHPLABSRESULTS_GEN_ALL_CORE
Lab Results:  CBC  CBC Full  -  ( 02 Sep 2024 23:29 )  WBC Count : 9.16 K/uL  RBC Count : 4.27 M/uL  Hemoglobin : 13.1 g/dL  Hematocrit : 38.6 %  Platelet Count - Automated : 227 K/uL  Mean Cell Volume : 90.4 fL  Mean Cell Hemoglobin : 30.7 pg  Mean Cell Hemoglobin Concentration : 33.9 gm/dL  Auto Neutrophil # : 4.80 K/uL  Auto Lymphocyte # : 3.52 K/uL  Auto Monocyte # : 0.50 K/uL  Auto Eosinophil # : 0.27 K/uL  Auto Basophil # : 0.04 K/uL  Auto Neutrophil % : 52.5 %  Auto Lymphocyte % : 38.4 %  Auto Monocyte % : 5.5 %  Auto Eosinophil % : 2.9 %  Auto Basophil % : 0.4 %    .		Differential:	[] Automated		[] Manual  Chemistry                        13.1   9.16  )-----------( 227      ( 02 Sep 2024 23:29 )             38.6     09-02    142  |  103  |  17  ----------------------------<  146<H>  4.3   |  24  |  0.72    Ca    9.2      02 Sep 2024 23:29  Phos  3.3     09-  Mg     1.90     09    TPro  8.1  /  Alb  4.1  /  TBili  0.6  /  DBili  x   /  AST  32  /  ALT  21  /  AlkPhos  135<H>  0902    LIVER FUNCTIONS - ( 02 Sep 2024 23:29 )  Alb: 4.1 g/dL / Pro: 8.1 g/dL / ALK PHOS: 135 U/L / ALT: 21 U/L / AST: 32 U/L / GGT: x           PT/INR - ( 02 Sep 2024 23:29 )   PT: 10.4 sec;   INR: 0.92 ratio         PTT - ( 02 Sep 2024 23:29 )  PTT:33.4 sec  Urinalysis Basic - ( 02 Sep 2024 23:48 )    Color: Yellow / Appearance: Clear / S.010 / pH: x  Gluc: x / Ketone: Negative mg/dL  / Bili: Negative / Urobili: 0.2 mg/dL   Blood: x / Protein: Trace mg/dL / Nitrite: Negative   Leuk Esterase: Negative / RBC: 0 /HPF / WBC 0 /HPF   Sq Epi: x / Non Sq Epi: 0 /HPF / Bacteria: Negative /HPF            MICROBIOLOGY/CULTURES:      RADIOLOGY RESULTS: reviewed

## 2024-09-03 NOTE — H&P ADULT - ASSESSMENT
78-year-old female past medical history dementia presents to the ED after noted to be agitated and pulled out her colostomy bag earlier today. As per patient's daughter Kaitlin who called from [place], the patient's  called her to let her know that the patient was agitated and fell.  Kaitlin also states that the patient lives at home and is unable to take care of herself when she is more agitated and demented.  States that the patient's  is not able to take care of her as well.    78-year-old female past medical history dementia presents to the ED after noted to be agitated and pulled out her colostomy bag earlier today. As per patient's daughter Kaitlin who called from [place], the patient's  called her to let her know that the patient was agitated and fell.  Kaitlin also states that the patient lives at home and is unable to take care of herself when she is more agitated and demented.  States that the patient's  is not able to take care of her as well.      Dementia with behavioral disturbances  - agitated at home  - family unable to take care of the pt  - neuro following  - may need psych consult     Hx of CVA  - cw asa, plavix  - neuro following     DM  - hold metformin  - monitor FS  - ISS    PT and rehab planning

## 2024-09-03 NOTE — BH CONSULTATION LIAISON ASSESSMENT NOTE - CURRENT MEDICATION
MEDICATIONS  (STANDING):  aspirin  chewable 81 milliGRAM(s) Oral daily  atorvastatin 20 milliGRAM(s) Oral at bedtime  citalopram 10 milliGRAM(s) Oral daily  clopidogrel Tablet 75 milliGRAM(s) Oral daily  dextrose 5%. 1000 milliLiter(s) (50 mL/Hr) IV Continuous <Continuous>  dextrose 5%. 1000 milliLiter(s) (100 mL/Hr) IV Continuous <Continuous>  dextrose 50% Injectable 25 Gram(s) IV Push once  dextrose 50% Injectable 12.5 Gram(s) IV Push once  dextrose 50% Injectable 25 Gram(s) IV Push once  glucagon  Injectable 1 milliGRAM(s) IntraMuscular once  insulin lispro (ADMELOG) corrective regimen sliding scale   SubCutaneous three times a day before meals  memantine 10 milliGRAM(s) Oral daily  metoprolol succinate ER 25 milliGRAM(s) Oral daily  mirtazapine 30 milliGRAM(s) Oral daily  NIFEdipine XL 60 milliGRAM(s) Oral daily  pantoprazole    Tablet 40 milliGRAM(s) Oral before breakfast    MEDICATIONS  (PRN):  dextrose Oral Gel 15 Gram(s) Oral once PRN Blood Glucose LESS THAN 70 milliGRAM(s)/deciliter

## 2024-09-03 NOTE — PHYSICAL THERAPY INITIAL EVALUATION ADULT - PERTINENT HX OF CURRENT PROBLEM, REHAB EVAL
Patient is a 78 year old Female, PMH stated below, presents with agitation and pulled out colostomy bag and family unable to care for patient.

## 2024-09-03 NOTE — CONSULT NOTE ADULT - ASSESSMENT
78F with dementia here with agitation after pulling out her colostomy bag earlier today.     Chronic stroke   Dementia     78F with dementia (on memantine), recent sigmoid diverticulitis s/p ostomy, chronic stroke, T2DM, HTN, HLD, glaucoma, OA of the L knee, PERFECTO, here with agitation after pulling out her colostomy bag earlier today.   Prior workup in other chart:  CTH with bilateral thalamic and basal ganglia infarcts. Thalamic infarcts appear more subacute. Also with chronic cerebellar infarcts. CTA with mld ICA narrowing  MRI brain with L anterior midbrain infarct  Currently CTH motion degraded      Chronic small vessel appearing strokes  Dementia  - aspirin 81mg daily. No need for plavix at this time  - statin for secondary stroke prevention , prior LDL 49  - cont memantine 10mg daily  - consider starting low dose seroquel as needed for agitation if qtc < 500  - BP normotensive   - PT/OT  - DVT ppx   - delirium precautions   - Differential diagnosis and plan of care discussed with patient and/or family and primary team  - Thank you for allowing me to participate in the care of this patient. Call with questions.     Allison Vinson DO  Vascular Neurology  Office [phone]

## 2024-09-03 NOTE — BH CONSULTATION LIAISON ASSESSMENT NOTE - HPI (INCLUDE ILLNESS QUALITY, SEVERITY, DURATION, TIMING, CONTEXT, MODIFYING FACTORS, ASSOCIATED SIGNS AND SYMPTOMS)
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 On memantine from home. Psych c/s for agitation.    Patient calm on exam, keeps trying to pull at things, utilization behaviors present. AOx2, recall 0/3, attention poor, abstraction poor, unable to do luria exam. Mood is "okay". Denies suicidality/intent or plan and denies homicidality/intent or plan. Denies AH/VH, delusions or paranoia. On mittens.

## 2024-09-03 NOTE — PHYSICAL THERAPY INITIAL EVALUATION ADULT - LEVEL OF INDEPENDENCE: STAND/SIT, REHAB EVAL
Called patient and left message regarding normal glucose results. Informed patient that CBC levels show anemia and she should add iron to her daily prenatals. Informed patient to call back with any questions.  
contact guard

## 2024-09-03 NOTE — PATIENT PROFILE ADULT - NSPRESCRUSEDDRG_GEN_A_NUR
Detail Level: Detailed Number Of Curettages: 1 Size Of Lesion In Cm: 0.9 Add Intralesional Injection: No Concentration (Mg/Ml Or Millions Of Plaque Forming Units/Cc): 0.01 Anesthesia Type: 1% lidocaine without epinephrine and a 1:10 solution of 8.4% sodium bicarbonate Anesthesia Volume In Cc: 0.5 Cautery Type: electrodesiccation What Was Performed First?: Curettage Final Size Statement: The size of the lesion after curettage was Additional Information: (Optional): The wound was cleaned, and a pressure dressing was applied.  The patient received detailed post-op instructions. Consent was obtained from the patient. The risks and benefits were discussed. Specifically, the risks of infection, scarring, bleeding.  Prior to the procedure, the treatment site was confirmed by the patient. Post-Care Instructions: Reviewed post-care instructions. Keep the biopsy site dry overnight, and then apply Vaseline and bandage daily until healed. Bill As A Line Item Or As Units: Line Item No

## 2024-09-03 NOTE — CONSULT NOTE ADULT - ASSESSMENT
HTN  cont current meds    history of CVA  echo may 2024 ( different MR number ) normal LV /RV function   statin   antiplt therapy as per neurology     DM II  Monitor finger stick. Insulin coverage. Diabetic education and Diabetic diet. Consider nutrition consultation.

## 2024-09-04 LAB
A1C WITH ESTIMATED AVERAGE GLUCOSE RESULT: 5.5 % — SIGNIFICANT CHANGE UP (ref 4–5.6)
ANION GAP SERPL CALC-SCNC: 12 MMOL/L — SIGNIFICANT CHANGE UP (ref 7–14)
BUN SERPL-MCNC: 14 MG/DL — SIGNIFICANT CHANGE UP (ref 7–23)
CALCIUM SERPL-MCNC: 9.6 MG/DL — SIGNIFICANT CHANGE UP (ref 8.4–10.5)
CHLORIDE SERPL-SCNC: 105 MMOL/L — SIGNIFICANT CHANGE UP (ref 98–107)
CO2 SERPL-SCNC: 25 MMOL/L — SIGNIFICANT CHANGE UP (ref 22–31)
CREAT SERPL-MCNC: 0.9 MG/DL — SIGNIFICANT CHANGE UP (ref 0.5–1.3)
EGFR: 66 ML/MIN/1.73M2 — SIGNIFICANT CHANGE UP
ESTIMATED AVERAGE GLUCOSE: 111 — SIGNIFICANT CHANGE UP
GLUCOSE BLDC GLUCOMTR-MCNC: 134 MG/DL — HIGH (ref 70–99)
GLUCOSE BLDC GLUCOMTR-MCNC: 148 MG/DL — HIGH (ref 70–99)
GLUCOSE BLDC GLUCOMTR-MCNC: 161 MG/DL — HIGH (ref 70–99)
GLUCOSE BLDC GLUCOMTR-MCNC: 176 MG/DL — HIGH (ref 70–99)
GLUCOSE SERPL-MCNC: 129 MG/DL — HIGH (ref 70–99)
HCT VFR BLD CALC: 41.6 % — SIGNIFICANT CHANGE UP (ref 34.5–45)
HGB BLD-MCNC: 14 G/DL — SIGNIFICANT CHANGE UP (ref 11.5–15.5)
MAGNESIUM SERPL-MCNC: 2.2 MG/DL — SIGNIFICANT CHANGE UP (ref 1.6–2.6)
MCHC RBC-ENTMCNC: 30.8 PG — SIGNIFICANT CHANGE UP (ref 27–34)
MCHC RBC-ENTMCNC: 33.7 GM/DL — SIGNIFICANT CHANGE UP (ref 32–36)
MCV RBC AUTO: 91.4 FL — SIGNIFICANT CHANGE UP (ref 80–100)
MRSA PCR RESULT.: SIGNIFICANT CHANGE UP
NRBC # BLD: 0 /100 WBCS — SIGNIFICANT CHANGE UP (ref 0–0)
NRBC # FLD: 0 K/UL — SIGNIFICANT CHANGE UP (ref 0–0)
PHOSPHATE SERPL-MCNC: 4.4 MG/DL — SIGNIFICANT CHANGE UP (ref 2.5–4.5)
PLATELET # BLD AUTO: 236 K/UL — SIGNIFICANT CHANGE UP (ref 150–400)
POTASSIUM SERPL-MCNC: 4.3 MMOL/L — SIGNIFICANT CHANGE UP (ref 3.5–5.3)
POTASSIUM SERPL-SCNC: 4.3 MMOL/L — SIGNIFICANT CHANGE UP (ref 3.5–5.3)
RBC # BLD: 4.55 M/UL — SIGNIFICANT CHANGE UP (ref 3.8–5.2)
RBC # FLD: 13.9 % — SIGNIFICANT CHANGE UP (ref 10.3–14.5)
S AUREUS DNA NOSE QL NAA+PROBE: DETECTED
SODIUM SERPL-SCNC: 142 MMOL/L — SIGNIFICANT CHANGE UP (ref 135–145)
WBC # BLD: 8.39 K/UL — SIGNIFICANT CHANGE UP (ref 3.8–10.5)
WBC # FLD AUTO: 8.39 K/UL — SIGNIFICANT CHANGE UP (ref 3.8–10.5)

## 2024-09-04 RX ORDER — MUPIROCIN 2 %
1 OINTMENT (GRAM) TOPICAL
Refills: 0 | Status: COMPLETED | OUTPATIENT
Start: 2024-09-04 | End: 2024-09-09

## 2024-09-04 RX ORDER — CHLORHEXIDINE GLUCONATE 40 MG/ML
1 SOLUTION TOPICAL DAILY
Refills: 0 | Status: DISCONTINUED | OUTPATIENT
Start: 2024-09-04 | End: 2024-09-09

## 2024-09-04 RX ADMIN — Medication 1 APPLICATION(S): at 17:50

## 2024-09-04 RX ADMIN — Medication 75 MILLIGRAM(S): at 12:40

## 2024-09-04 RX ADMIN — Medication 81 MILLIGRAM(S): at 12:40

## 2024-09-04 RX ADMIN — CHLORHEXIDINE GLUCONATE 1 APPLICATION(S): 40 SOLUTION TOPICAL at 13:09

## 2024-09-04 RX ADMIN — Medication 10 MILLIGRAM(S): at 13:08

## 2024-09-04 RX ADMIN — MEMANTINE 10 MILLIGRAM(S): 7 CAPSULE, EXTENDED RELEASE ORAL at 12:40

## 2024-09-04 RX ADMIN — NIFEDIPINE 60 MILLIGRAM(S): 60 TABLET, FILM COATED, EXTENDED RELEASE ORAL at 05:35

## 2024-09-04 RX ADMIN — Medication 20 MILLIGRAM(S): at 22:08

## 2024-09-04 RX ADMIN — Medication 1: at 12:41

## 2024-09-04 RX ADMIN — Medication 1: at 17:40

## 2024-09-04 RX ADMIN — Medication 30 MILLIGRAM(S): at 13:08

## 2024-09-04 RX ADMIN — Medication 40 MILLIGRAM(S): at 07:31

## 2024-09-04 RX ADMIN — METOPROLOL TARTRATE 25 MILLIGRAM(S): 100 TABLET ORAL at 05:35

## 2024-09-04 RX ADMIN — Medication 6 MILLIGRAM(S): at 22:08

## 2024-09-04 RX ADMIN — ENOXAPARIN SODIUM 30 MILLIGRAM(S): 100 INJECTION SUBCUTANEOUS at 05:32

## 2024-09-04 NOTE — DIETITIAN NUTRITION RISK NOTIFICATION - ADDITIONAL COMMENTS/DIETITIAN RECOMMENDATIONS
Please refer to initial nutrition assessment for further goals, interventions, and recommendations.

## 2024-09-04 NOTE — DIETITIAN INITIAL EVALUATION ADULT - PERTINENT LABORATORY DATA
09-04    142  |  105  |  14  ----------------------------<  129<H>  4.3   |  25  |  0.90    Ca    9.6      04 Sep 2024 05:21  Phos  4.4     09-04  Mg     2.20     09-04    TPro  8.1  /  Alb  4.1  /  TBili  0.6  /  DBili  x   /  AST  32  /  ALT  21  /  AlkPhos  135<H>  09-02  POCT Blood Glucose.: 134 mg/dL (09-04-24 @ 08:36)  A1C with Estimated Average Glucose Result: 5.5 % (09-04-24 @ 05:21)

## 2024-09-04 NOTE — DIETITIAN INITIAL EVALUATION ADULT - ORAL INTAKE PTA/DIET HISTORY
Patient is A&Ox2 at time of visit.  Endorses NKFA, no food intolerance, no beef/pork per preferences.  Prefers tea vs coffee.  Patient is a limited historian due to impaired cognition.  Patient is unable to recall UBW and height.  Patient is A&Ox2 at time of visit.  Endorses NKFA, no food intolerance, no beef/pork per preferences.  Prefers tea vs coffee.  Patient is a limited historian due to impaired cognition.  Patient is unable to recall UBW and height.  Denies weight or appetite change PTA.  Per chart review,  reported recent weight loss and decreased appetite.  No recent weight record on file, the most recent weight @ 61.2kg (1/27/21) per [facility] weight record.

## 2024-09-04 NOTE — DIETITIAN INITIAL EVALUATION ADULT - REASON FOR ADMISSION
Restlessness and agitation  Per chart review, Patient is a  78-year-old female past medical history dementia presents to the ED after noted to be agitated and pulled out her colostomy bag earlier today. As per patient's daughter Kaitlin who called from [place], the patient's  called her to let her know that the patient was agitated and fell.  Rogejad also states that the patient lives at home and is unable to take care of herself when she is more agitated and demented.  States that the patient's  is not able to take care of her as well.

## 2024-09-04 NOTE — DIETITIAN INITIAL EVALUATION ADULT - PERTINENT MEDS FT
MEDICATIONS  (STANDING):  aspirin  chewable 81 milliGRAM(s) Oral daily  atorvastatin 20 milliGRAM(s) Oral at bedtime  chlorhexidine 2% Cloths 1 Application(s) Topical daily  citalopram 10 milliGRAM(s) Oral daily  clopidogrel Tablet 75 milliGRAM(s) Oral daily  dextrose 5%. 1000 milliLiter(s) (50 mL/Hr) IV Continuous <Continuous>  dextrose 5%. 1000 milliLiter(s) (100 mL/Hr) IV Continuous <Continuous>  dextrose 50% Injectable 25 Gram(s) IV Push once  dextrose 50% Injectable 12.5 Gram(s) IV Push once  dextrose 50% Injectable 25 Gram(s) IV Push once  enoxaparin Injectable 30 milliGRAM(s) SubCutaneous every 24 hours  glucagon  Injectable 1 milliGRAM(s) IntraMuscular once  insulin lispro (ADMELOG) corrective regimen sliding scale   SubCutaneous at bedtime  insulin lispro (ADMELOG) corrective regimen sliding scale   SubCutaneous three times a day before meals  melatonin 6 milliGRAM(s) Oral at bedtime  memantine 10 milliGRAM(s) Oral daily  metoprolol succinate ER 25 milliGRAM(s) Oral daily  mirtazapine 30 milliGRAM(s) Oral daily  NIFEdipine XL 60 milliGRAM(s) Oral daily  pantoprazole    Tablet 40 milliGRAM(s) Oral before breakfast    MEDICATIONS  (PRN):  dextrose Oral Gel 15 Gram(s) Oral once PRN Blood Glucose LESS THAN 70 milliGRAM(s)/deciliter

## 2024-09-04 NOTE — DIETITIAN INITIAL EVALUATION ADULT - OTHER INFO
Patient is receiving a PO diet order. Noted breakfast tray is visually observed 0-25%, RD assisted in breakfast meal to encourage PO intake. Patient also has a 1:1 secondary Denies difficulty chewing or swallowing of current diet at this time. No nausea, vomit, diarrhea, constipation. Pt s/p colostomy bag replaced at ED with +output 9/3 reported per RN flowsheets. No edema, no pressure injury per RN flowsheets. Nutritional labs all within acceptable ranges. Dosing weight is 46.2 kg/101.8 lbs (9/3), height as 162.6cm, BMI calculated at 17.5-underweight status- appeared to be at baseline from appearance.  Nutrition education provided RD to remain available for further nutrition intervention as indicated.  Patient was at sleep when RD visited this AM. Pt is receiving a PO diet order in house. Noted breakfast tray is visually observed 0-25%, RD assisted in breakfast meal to encourage PO intake. Patient also has a 1:1 secondary to episodes of agitation during hospital course. Denies difficulty chewing or swallowing of current diet at this time. No nausea, vomit, diarrhea, constipation. Pt s/p colostomy bag replaced at ED with +output 9/3 reported per RN flowsheets. No edema, no pressure injury per RN flowsheets. Nutritional labs all within acceptable ranges. Dosing weight is 46.2 kg/101.8 lbs (9/3), height as 162.6cm, BMI calculated at 17.5-underweight status- appeared to be at baseline from visual observation with muscle/fat depletion. Patient has DM2, noted good glycemic control Hgb A1c@ 5.5% (9/4) on insulin regimen. Nutrition education deferred at this time due to impaired cognition.

## 2024-09-04 NOTE — DIETITIAN INITIAL EVALUATION ADULT - ADD RECOMMEND
1. Recommend to continue regular diet due to good glycemic control.  2. Food and nutrition department to honor food and fluid preferences as able, provide [employer] 500 Vital Shake (=520 kcal, 22gm for 8.45 oz) and ice cream 4 oz. once daily  3. Nursing to document PO in RN flow sheets and monitor weekly weights.  4. Continue to monitor skin integrity, bowel regimen, and nutrition pertinent labs.

## 2024-09-05 LAB
ANION GAP SERPL CALC-SCNC: 12 MMOL/L — SIGNIFICANT CHANGE UP (ref 7–14)
BUN SERPL-MCNC: 30 MG/DL — HIGH (ref 7–23)
CALCIUM SERPL-MCNC: 8.8 MG/DL — SIGNIFICANT CHANGE UP (ref 8.4–10.5)
CHLORIDE SERPL-SCNC: 105 MMOL/L — SIGNIFICANT CHANGE UP (ref 98–107)
CO2 SERPL-SCNC: 24 MMOL/L — SIGNIFICANT CHANGE UP (ref 22–31)
CREAT SERPL-MCNC: 1.1 MG/DL — SIGNIFICANT CHANGE UP (ref 0.5–1.3)
EGFR: 52 ML/MIN/1.73M2 — LOW
GLUCOSE BLDC GLUCOMTR-MCNC: 107 MG/DL — HIGH (ref 70–99)
GLUCOSE BLDC GLUCOMTR-MCNC: 135 MG/DL — HIGH (ref 70–99)
GLUCOSE BLDC GLUCOMTR-MCNC: 139 MG/DL — HIGH (ref 70–99)
GLUCOSE BLDC GLUCOMTR-MCNC: 152 MG/DL — HIGH (ref 70–99)
GLUCOSE SERPL-MCNC: 128 MG/DL — HIGH (ref 70–99)
HCT VFR BLD CALC: 35.8 % — SIGNIFICANT CHANGE UP (ref 34.5–45)
HGB BLD-MCNC: 12 G/DL — SIGNIFICANT CHANGE UP (ref 11.5–15.5)
MAGNESIUM SERPL-MCNC: 2.2 MG/DL — SIGNIFICANT CHANGE UP (ref 1.6–2.6)
MCHC RBC-ENTMCNC: 30.8 PG — SIGNIFICANT CHANGE UP (ref 27–34)
MCHC RBC-ENTMCNC: 33.5 GM/DL — SIGNIFICANT CHANGE UP (ref 32–36)
MCV RBC AUTO: 92 FL — SIGNIFICANT CHANGE UP (ref 80–100)
NRBC # BLD: 0 /100 WBCS — SIGNIFICANT CHANGE UP (ref 0–0)
NRBC # FLD: 0 K/UL — SIGNIFICANT CHANGE UP (ref 0–0)
PHOSPHATE SERPL-MCNC: 4.2 MG/DL — SIGNIFICANT CHANGE UP (ref 2.5–4.5)
PLATELET # BLD AUTO: 224 K/UL — SIGNIFICANT CHANGE UP (ref 150–400)
POTASSIUM SERPL-MCNC: 4.3 MMOL/L — SIGNIFICANT CHANGE UP (ref 3.5–5.3)
POTASSIUM SERPL-SCNC: 4.3 MMOL/L — SIGNIFICANT CHANGE UP (ref 3.5–5.3)
RBC # BLD: 3.89 M/UL — SIGNIFICANT CHANGE UP (ref 3.8–5.2)
RBC # FLD: 14 % — SIGNIFICANT CHANGE UP (ref 10.3–14.5)
SODIUM SERPL-SCNC: 141 MMOL/L — SIGNIFICANT CHANGE UP (ref 135–145)
WBC # BLD: 8.41 K/UL — SIGNIFICANT CHANGE UP (ref 3.8–10.5)
WBC # FLD AUTO: 8.41 K/UL — SIGNIFICANT CHANGE UP (ref 3.8–10.5)

## 2024-09-05 RX ORDER — OLANZAPINE 7.5 MG/1
1.25 TABLET ORAL ONCE
Refills: 0 | Status: COMPLETED | OUTPATIENT
Start: 2024-09-05 | End: 2024-09-05

## 2024-09-05 RX ORDER — OLANZAPINE 7.5 MG/1
1.25 TABLET ORAL EVERY 6 HOURS
Refills: 0 | Status: DISCONTINUED | OUTPATIENT
Start: 2024-09-05 | End: 2024-09-09

## 2024-09-05 RX ADMIN — Medication 30 MILLIGRAM(S): at 12:44

## 2024-09-05 RX ADMIN — OLANZAPINE 1.25 MILLIGRAM(S): 7.5 TABLET ORAL at 18:58

## 2024-09-05 RX ADMIN — Medication 10 MILLIGRAM(S): at 12:44

## 2024-09-05 RX ADMIN — Medication 1 APPLICATION(S): at 05:40

## 2024-09-05 RX ADMIN — CHLORHEXIDINE GLUCONATE 1 APPLICATION(S): 40 SOLUTION TOPICAL at 12:49

## 2024-09-05 RX ADMIN — Medication 40 MILLIGRAM(S): at 05:41

## 2024-09-05 RX ADMIN — Medication 1: at 14:09

## 2024-09-05 RX ADMIN — Medication 81 MILLIGRAM(S): at 12:44

## 2024-09-05 RX ADMIN — MEMANTINE 10 MILLIGRAM(S): 7 CAPSULE, EXTENDED RELEASE ORAL at 12:44

## 2024-09-05 RX ADMIN — Medication 6 MILLIGRAM(S): at 21:26

## 2024-09-05 RX ADMIN — ENOXAPARIN SODIUM 30 MILLIGRAM(S): 100 INJECTION SUBCUTANEOUS at 05:41

## 2024-09-05 RX ADMIN — Medication 20 MILLIGRAM(S): at 21:26

## 2024-09-05 RX ADMIN — Medication 1 APPLICATION(S): at 17:50

## 2024-09-05 RX ADMIN — OLANZAPINE 1.25 MILLIGRAM(S): 7.5 TABLET ORAL at 12:43

## 2024-09-05 NOTE — ADVANCED PRACTICE NURSE CONSULT - REASON FOR CONSULT
Patient seen for ostomy assessment. As per H&P, patient is a 78F w/ pmhx/o dementia presents to the ED after noted to be agitated, fell and pulled out her colostomy bag earlier today.

## 2024-09-05 NOTE — ADVANCED PRACTICE NURSE CONSULT - ASSESSMENT
Patient is A&Ox1-2. Patient initial encounter of ambulating in hallway accompanied by PCA.     LUQ colostomy: Stoma size 7/8" x 1". See A&I flowsheet for assessment details.

## 2024-09-05 NOTE — ADVANCED PRACTICE NURSE CONSULT - RECOMMEDATIONS
Supplies Utilized:   Liquid barrier film    One piece drainable pouch soft convex - item #8954  Belt - item # 7299    LUQ colostomy: Cleanse with water, pat dry. IF impaired peristomal skin noted: apply stoma powder, brush off excess, and seal with liquid barrier film by using tapping motions. Apply 1 piece drainable soft convex ostomy pouch (pouch #8954) and secure with ostomy belt (#7299) after pouch applied. Change every 3 days and PRN if leaking.     Plan of care discussed with Primary RN Davida and     Please contact Wound/Ostomy Care Service Line if we can be of further assistance (ext [phone]).  Supplies Utilized:   Liquid barrier film    One piece drainable pouch soft convex - item #8954  Belt - item # 7299    LUQ colostomy: Cleanse with water, pat dry. (If impaired peristomal skin noted: apply stoma powder, brush off excess, and seal with liquid barrier film by using tapping motions.) Apply 1 piece drainable soft convex ostomy pouch (pouch #8954) and secure with ostomy belt (#7299) after pouch applied. Change every 3 days and PRN if leaking.     Plan of care discussed with Primary RN Davida and     Please contact Wound/Ostomy Care Service Line if we can be of further assistance (ext [phone]).  Supplies Utilized:   Liquid barrier film    One piece drainable pouch soft convex - item #8954  Belt - item # 7299    LUQ colostomy: Cleanse with water, pat dry. (If impaired peristomal skin noted: apply stoma powder, brush off excess, and seal with liquid barrier film by using tapping motions.) Apply 1 piece drainable soft convex ostomy pouch (pouch #8954) and secure with ostomy belt (#7299) after pouch applied. Change every 3 days and PRN if leaking.     Plan of care discussed with Primary RN Davida and Vidya Wilson (NP).    Please contact Wound/Ostomy Care Service Line if we can be of further assistance (ext [phone]).

## 2024-09-06 LAB
ANION GAP SERPL CALC-SCNC: 9 MMOL/L — SIGNIFICANT CHANGE UP (ref 7–14)
BUN SERPL-MCNC: 16 MG/DL — SIGNIFICANT CHANGE UP (ref 7–23)
CALCIUM SERPL-MCNC: 9 MG/DL — SIGNIFICANT CHANGE UP (ref 8.4–10.5)
CHLORIDE SERPL-SCNC: 108 MMOL/L — HIGH (ref 98–107)
CO2 SERPL-SCNC: 25 MMOL/L — SIGNIFICANT CHANGE UP (ref 22–31)
CREAT SERPL-MCNC: 0.68 MG/DL — SIGNIFICANT CHANGE UP (ref 0.5–1.3)
EGFR: 90 ML/MIN/1.73M2 — SIGNIFICANT CHANGE UP
GLUCOSE BLDC GLUCOMTR-MCNC: 118 MG/DL — HIGH (ref 70–99)
GLUCOSE BLDC GLUCOMTR-MCNC: 127 MG/DL — HIGH (ref 70–99)
GLUCOSE BLDC GLUCOMTR-MCNC: 153 MG/DL — HIGH (ref 70–99)
GLUCOSE BLDC GLUCOMTR-MCNC: 170 MG/DL — HIGH (ref 70–99)
GLUCOSE SERPL-MCNC: 112 MG/DL — HIGH (ref 70–99)
HCT VFR BLD CALC: 34.3 % — LOW (ref 34.5–45)
HGB BLD-MCNC: 11.7 G/DL — SIGNIFICANT CHANGE UP (ref 11.5–15.5)
MAGNESIUM SERPL-MCNC: 2.1 MG/DL — SIGNIFICANT CHANGE UP (ref 1.6–2.6)
MCHC RBC-ENTMCNC: 30.5 PG — SIGNIFICANT CHANGE UP (ref 27–34)
MCHC RBC-ENTMCNC: 34.1 GM/DL — SIGNIFICANT CHANGE UP (ref 32–36)
MCV RBC AUTO: 89.6 FL — SIGNIFICANT CHANGE UP (ref 80–100)
NRBC # BLD: 0 /100 WBCS — SIGNIFICANT CHANGE UP (ref 0–0)
NRBC # FLD: 0 K/UL — SIGNIFICANT CHANGE UP (ref 0–0)
PHOSPHATE SERPL-MCNC: 3 MG/DL — SIGNIFICANT CHANGE UP (ref 2.5–4.5)
PLATELET # BLD AUTO: 221 K/UL — SIGNIFICANT CHANGE UP (ref 150–400)
POTASSIUM SERPL-MCNC: 3.8 MMOL/L — SIGNIFICANT CHANGE UP (ref 3.5–5.3)
POTASSIUM SERPL-SCNC: 3.8 MMOL/L — SIGNIFICANT CHANGE UP (ref 3.5–5.3)
RBC # BLD: 3.83 M/UL — SIGNIFICANT CHANGE UP (ref 3.8–5.2)
RBC # FLD: 13.7 % — SIGNIFICANT CHANGE UP (ref 10.3–14.5)
SODIUM SERPL-SCNC: 142 MMOL/L — SIGNIFICANT CHANGE UP (ref 135–145)
WBC # BLD: 9.31 K/UL — SIGNIFICANT CHANGE UP (ref 3.8–10.5)
WBC # FLD AUTO: 9.31 K/UL — SIGNIFICANT CHANGE UP (ref 3.8–10.5)

## 2024-09-06 PROCEDURE — 99232 SBSQ HOSP IP/OBS MODERATE 35: CPT

## 2024-09-06 PROCEDURE — 93010 ELECTROCARDIOGRAM REPORT: CPT

## 2024-09-06 RX ORDER — OLANZAPINE 7.5 MG/1
1.25 TABLET ORAL ONCE
Refills: 0 | Status: COMPLETED | OUTPATIENT
Start: 2024-09-06 | End: 2024-09-06

## 2024-09-06 RX ORDER — POTASSIUM CHLORIDE 10 MEQ
10 TABLET, EXT RELEASE, PARTICLES/CRYSTALS ORAL
Refills: 0 | Status: COMPLETED | OUTPATIENT
Start: 2024-09-06 | End: 2024-09-06

## 2024-09-06 RX ADMIN — Medication 6 MILLIGRAM(S): at 21:53

## 2024-09-06 RX ADMIN — Medication 30 MILLIGRAM(S): at 13:14

## 2024-09-06 RX ADMIN — Medication 1: at 18:08

## 2024-09-06 RX ADMIN — OLANZAPINE 1.25 MILLIGRAM(S): 7.5 TABLET ORAL at 18:28

## 2024-09-06 RX ADMIN — Medication 100 MILLIEQUIVALENT(S): at 21:53

## 2024-09-06 RX ADMIN — Medication 1 APPLICATION(S): at 18:08

## 2024-09-06 RX ADMIN — Medication 1: at 13:35

## 2024-09-06 RX ADMIN — OLANZAPINE 1.25 MILLIGRAM(S): 7.5 TABLET ORAL at 10:53

## 2024-09-06 RX ADMIN — Medication 81 MILLIGRAM(S): at 13:14

## 2024-09-06 RX ADMIN — Medication 20 MILLIGRAM(S): at 21:53

## 2024-09-06 RX ADMIN — CHLORHEXIDINE GLUCONATE 1 APPLICATION(S): 40 SOLUTION TOPICAL at 13:14

## 2024-09-06 RX ADMIN — Medication 10 MILLIGRAM(S): at 13:14

## 2024-09-06 RX ADMIN — Medication 40 MILLIGRAM(S): at 06:12

## 2024-09-06 RX ADMIN — METOPROLOL TARTRATE 25 MILLIGRAM(S): 100 TABLET ORAL at 06:12

## 2024-09-06 RX ADMIN — Medication 1 APPLICATION(S): at 06:12

## 2024-09-06 RX ADMIN — MEMANTINE 10 MILLIGRAM(S): 7 CAPSULE, EXTENDED RELEASE ORAL at 13:14

## 2024-09-06 RX ADMIN — NIFEDIPINE 60 MILLIGRAM(S): 60 TABLET, FILM COATED, EXTENDED RELEASE ORAL at 06:12

## 2024-09-06 RX ADMIN — Medication 100 MILLIEQUIVALENT(S): at 15:35

## 2024-09-06 RX ADMIN — ENOXAPARIN SODIUM 30 MILLIGRAM(S): 100 INJECTION SUBCUTANEOUS at 06:11

## 2024-09-06 RX ADMIN — Medication 100 MILLIEQUIVALENT(S): at 20:35

## 2024-09-06 RX ADMIN — OLANZAPINE 1.25 MILLIGRAM(S): 7.5 TABLET ORAL at 13:35

## 2024-09-06 NOTE — BH CONSULTATION LIAISON PROGRESS NOTE - NSBHCHARTREVIEWVS_PSY_A_CORE FT
Vital Signs Last 24 Hrs  T(C): 36.9 (06 Sep 2024 15:13), Max: 36.9 (05 Sep 2024 21:25)  T(F): 98.4 (06 Sep 2024 15:13), Max: 98.4 (05 Sep 2024 21:25)  HR: 87 (06 Sep 2024 15:13) (71 - 91)  BP: 141/80 (06 Sep 2024 15:13) (133/75 - 149/81)  BP(mean): --  RR: 17 (06 Sep 2024 15:13) (17 - 18)  SpO2: 95% (06 Sep 2024 15:13) (95% - 100%)    Parameters below as of 06 Sep 2024 15:13  Patient On (Oxygen Delivery Method): room air

## 2024-09-06 NOTE — BH CONSULTATION LIAISON PROGRESS NOTE - CURRENT MEDICATION
MEDICATIONS  (STANDING):  aspirin  chewable 81 milliGRAM(s) Oral daily  atorvastatin 20 milliGRAM(s) Oral at bedtime  chlorhexidine 2% Cloths 1 Application(s) Topical daily  citalopram 10 milliGRAM(s) Oral daily  dextrose 5%. 1000 milliLiter(s) (50 mL/Hr) IV Continuous <Continuous>  dextrose 5%. 1000 milliLiter(s) (100 mL/Hr) IV Continuous <Continuous>  dextrose 50% Injectable 25 Gram(s) IV Push once  dextrose 50% Injectable 12.5 Gram(s) IV Push once  dextrose 50% Injectable 25 Gram(s) IV Push once  enoxaparin Injectable 30 milliGRAM(s) SubCutaneous every 24 hours  glucagon  Injectable 1 milliGRAM(s) IntraMuscular once  insulin lispro (ADMELOG) corrective regimen sliding scale   SubCutaneous at bedtime  insulin lispro (ADMELOG) corrective regimen sliding scale   SubCutaneous three times a day before meals  melatonin 6 milliGRAM(s) Oral at bedtime  memantine 10 milliGRAM(s) Oral daily  metoprolol succinate ER 25 milliGRAM(s) Oral daily  mirtazapine 30 milliGRAM(s) Oral daily  mupirocin 2% Ointment 1 Application(s) Both Nostrils two times a day  NIFEdipine XL 60 milliGRAM(s) Oral daily  pantoprazole    Tablet 40 milliGRAM(s) Oral before breakfast  potassium chloride  10 mEq/100 mL IVPB 10 milliEquivalent(s) IV Intermittent every 1 hour    MEDICATIONS  (PRN):  dextrose Oral Gel 15 Gram(s) Oral once PRN Blood Glucose LESS THAN 70 milliGRAM(s)/deciliter  OLANZapine Injectable 1.25 milliGRAM(s) IntraMuscular every 6 hours PRN agitation

## 2024-09-06 NOTE — BH CONSULTATION LIAISON PROGRESS NOTE - NSBHASSESSMENTFT_PSY_ALL_CORE
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 On memantine from home. Psych c/s for agitation.    Patient with clear cognitive deficits across several domains, defer to neuro regarding further workup regarding this, suspect vascular dementia given CThead. Patient has no recollection as to why she has colostomy. Low utility in most psych meds, only method of using psych meds to reduce propensity to pull out colostomy would be to utilize sedative effect of meds, defer to primary team to discussed with family about whether or not this aligns with patient/family GOC.     9/6 - patient pulling at colostomy, has no awareness of this, only method of mgmt would be to provide sedating agents to patient, if family OK with this can use depakote as below     Recs:  - agree with c/w 1:1 as patient pulling out lines/colostomy  - F/u BMP, LFTs  - C/w Melatonin 6mg qHS standing  - PRN for agitation Zyprexa 1.25 q6h PRN  - f/u EKG for QTc, can use antipsychotics if QTc<500  - c/w memantine as ordered  - Consider GOC discussion surrounding usage of sedation to reduce changes of pulling out colostomy/lines etc. If family OK with this begin Depakote 125mg qHS PO BID (8am, 8pm)  - no psych c/i to placement in rehab or other safe dispo

## 2024-09-07 LAB
ANION GAP SERPL CALC-SCNC: 11 MMOL/L — SIGNIFICANT CHANGE UP (ref 7–14)
BASOPHILS # BLD AUTO: 0.04 K/UL — SIGNIFICANT CHANGE UP (ref 0–0.2)
BASOPHILS NFR BLD AUTO: 0.5 % — SIGNIFICANT CHANGE UP (ref 0–2)
BUN SERPL-MCNC: 20 MG/DL — SIGNIFICANT CHANGE UP (ref 7–23)
CALCIUM SERPL-MCNC: 8.6 MG/DL — SIGNIFICANT CHANGE UP (ref 8.4–10.5)
CHLORIDE SERPL-SCNC: 107 MMOL/L — SIGNIFICANT CHANGE UP (ref 98–107)
CO2 SERPL-SCNC: 24 MMOL/L — SIGNIFICANT CHANGE UP (ref 22–31)
CREAT SERPL-MCNC: 0.79 MG/DL — SIGNIFICANT CHANGE UP (ref 0.5–1.3)
EGFR: 77 ML/MIN/1.73M2 — SIGNIFICANT CHANGE UP
EOSINOPHIL # BLD AUTO: 0.38 K/UL — SIGNIFICANT CHANGE UP (ref 0–0.5)
EOSINOPHIL NFR BLD AUTO: 4.5 % — SIGNIFICANT CHANGE UP (ref 0–6)
GLUCOSE BLDC GLUCOMTR-MCNC: 124 MG/DL — HIGH (ref 70–99)
GLUCOSE BLDC GLUCOMTR-MCNC: 138 MG/DL — HIGH (ref 70–99)
GLUCOSE BLDC GLUCOMTR-MCNC: 153 MG/DL — HIGH (ref 70–99)
GLUCOSE BLDC GLUCOMTR-MCNC: 187 MG/DL — HIGH (ref 70–99)
GLUCOSE SERPL-MCNC: 120 MG/DL — HIGH (ref 70–99)
HCT VFR BLD CALC: 34.1 % — LOW (ref 34.5–45)
HGB BLD-MCNC: 11.4 G/DL — LOW (ref 11.5–15.5)
IANC: 3.87 K/UL — SIGNIFICANT CHANGE UP (ref 1.8–7.4)
IMM GRANULOCYTES NFR BLD AUTO: 0.4 % — SIGNIFICANT CHANGE UP (ref 0–0.9)
LYMPHOCYTES # BLD AUTO: 3.4 K/UL — HIGH (ref 1–3.3)
LYMPHOCYTES # BLD AUTO: 40.3 % — SIGNIFICANT CHANGE UP (ref 13–44)
MAGNESIUM SERPL-MCNC: 2.1 MG/DL — SIGNIFICANT CHANGE UP (ref 1.6–2.6)
MCHC RBC-ENTMCNC: 30.6 PG — SIGNIFICANT CHANGE UP (ref 27–34)
MCHC RBC-ENTMCNC: 33.4 GM/DL — SIGNIFICANT CHANGE UP (ref 32–36)
MCV RBC AUTO: 91.7 FL — SIGNIFICANT CHANGE UP (ref 80–100)
MONOCYTES # BLD AUTO: 0.71 K/UL — SIGNIFICANT CHANGE UP (ref 0–0.9)
MONOCYTES NFR BLD AUTO: 8.4 % — SIGNIFICANT CHANGE UP (ref 2–14)
NEUTROPHILS # BLD AUTO: 3.87 K/UL — SIGNIFICANT CHANGE UP (ref 1.8–7.4)
NEUTROPHILS NFR BLD AUTO: 45.9 % — SIGNIFICANT CHANGE UP (ref 43–77)
NRBC # BLD: 0 /100 WBCS — SIGNIFICANT CHANGE UP (ref 0–0)
NRBC # FLD: 0 K/UL — SIGNIFICANT CHANGE UP (ref 0–0)
PHOSPHATE SERPL-MCNC: 3.5 MG/DL — SIGNIFICANT CHANGE UP (ref 2.5–4.5)
PLATELET # BLD AUTO: 230 K/UL — SIGNIFICANT CHANGE UP (ref 150–400)
POTASSIUM SERPL-MCNC: 4 MMOL/L — SIGNIFICANT CHANGE UP (ref 3.5–5.3)
POTASSIUM SERPL-SCNC: 4 MMOL/L — SIGNIFICANT CHANGE UP (ref 3.5–5.3)
RBC # BLD: 3.72 M/UL — LOW (ref 3.8–5.2)
RBC # FLD: 14.1 % — SIGNIFICANT CHANGE UP (ref 10.3–14.5)
SODIUM SERPL-SCNC: 142 MMOL/L — SIGNIFICANT CHANGE UP (ref 135–145)
WBC # BLD: 8.43 K/UL — SIGNIFICANT CHANGE UP (ref 3.8–10.5)
WBC # FLD AUTO: 8.43 K/UL — SIGNIFICANT CHANGE UP (ref 3.8–10.5)

## 2024-09-07 RX ORDER — DIVALPROEX SODIUM 125 MG/1
125 CAPSULE, DELAYED RELEASE ORAL ONCE
Refills: 0 | Status: COMPLETED | OUTPATIENT
Start: 2024-09-07 | End: 2024-09-07

## 2024-09-07 RX ORDER — DIVALPROEX SODIUM 125 MG/1
125 CAPSULE, DELAYED RELEASE ORAL
Refills: 0 | Status: DISCONTINUED | OUTPATIENT
Start: 2024-09-07 | End: 2024-09-09

## 2024-09-07 RX ADMIN — MEMANTINE 10 MILLIGRAM(S): 7 CAPSULE, EXTENDED RELEASE ORAL at 13:09

## 2024-09-07 RX ADMIN — CHLORHEXIDINE GLUCONATE 1 APPLICATION(S): 40 SOLUTION TOPICAL at 13:10

## 2024-09-07 RX ADMIN — DIVALPROEX SODIUM 125 MILLIGRAM(S): 125 CAPSULE, DELAYED RELEASE ORAL at 13:08

## 2024-09-07 RX ADMIN — DIVALPROEX SODIUM 125 MILLIGRAM(S): 125 CAPSULE, DELAYED RELEASE ORAL at 22:15

## 2024-09-07 RX ADMIN — ENOXAPARIN SODIUM 30 MILLIGRAM(S): 100 INJECTION SUBCUTANEOUS at 06:47

## 2024-09-07 RX ADMIN — OLANZAPINE 1.25 MILLIGRAM(S): 7.5 TABLET ORAL at 11:25

## 2024-09-07 RX ADMIN — Medication 10 MILLIGRAM(S): at 13:10

## 2024-09-07 RX ADMIN — Medication 20 MILLIGRAM(S): at 22:15

## 2024-09-07 RX ADMIN — Medication 1 APPLICATION(S): at 17:42

## 2024-09-07 RX ADMIN — Medication 1: at 13:07

## 2024-09-07 RX ADMIN — Medication 30 MILLIGRAM(S): at 13:09

## 2024-09-07 RX ADMIN — Medication 1: at 17:32

## 2024-09-07 RX ADMIN — Medication 81 MILLIGRAM(S): at 13:09

## 2024-09-07 RX ADMIN — NIFEDIPINE 60 MILLIGRAM(S): 60 TABLET, FILM COATED, EXTENDED RELEASE ORAL at 06:47

## 2024-09-07 RX ADMIN — Medication 1 APPLICATION(S): at 06:48

## 2024-09-07 RX ADMIN — Medication 6 MILLIGRAM(S): at 22:15

## 2024-09-07 RX ADMIN — Medication 40 MILLIGRAM(S): at 06:47

## 2024-09-07 RX ADMIN — METOPROLOL TARTRATE 25 MILLIGRAM(S): 100 TABLET ORAL at 06:47

## 2024-09-07 NOTE — CHART NOTE - NSCHARTNOTEFT_GEN_A_CORE
Discussed with patient's spouse Yolanda Winn via telephone [phone] about patient's plan of care. Patient's spouse is agreeable in starting Depakote 125mg at 8am and 8pm per psych recommendations. Order placed.

## 2024-09-08 LAB
ANION GAP SERPL CALC-SCNC: 11 MMOL/L — SIGNIFICANT CHANGE UP (ref 7–14)
BASOPHILS # BLD AUTO: 0.04 K/UL — SIGNIFICANT CHANGE UP (ref 0–0.2)
BASOPHILS NFR BLD AUTO: 0.5 % — SIGNIFICANT CHANGE UP (ref 0–2)
BUN SERPL-MCNC: 22 MG/DL — SIGNIFICANT CHANGE UP (ref 7–23)
CALCIUM SERPL-MCNC: 8.7 MG/DL — SIGNIFICANT CHANGE UP (ref 8.4–10.5)
CHLORIDE SERPL-SCNC: 108 MMOL/L — HIGH (ref 98–107)
CO2 SERPL-SCNC: 23 MMOL/L — SIGNIFICANT CHANGE UP (ref 22–31)
CREAT SERPL-MCNC: 0.73 MG/DL — SIGNIFICANT CHANGE UP (ref 0.5–1.3)
EGFR: 85 ML/MIN/1.73M2 — SIGNIFICANT CHANGE UP
EOSINOPHIL # BLD AUTO: 0.4 K/UL — SIGNIFICANT CHANGE UP (ref 0–0.5)
EOSINOPHIL NFR BLD AUTO: 4.9 % — SIGNIFICANT CHANGE UP (ref 0–6)
GLUCOSE BLDC GLUCOMTR-MCNC: 118 MG/DL — HIGH (ref 70–99)
GLUCOSE BLDC GLUCOMTR-MCNC: 144 MG/DL — HIGH (ref 70–99)
GLUCOSE BLDC GLUCOMTR-MCNC: 152 MG/DL — HIGH (ref 70–99)
GLUCOSE BLDC GLUCOMTR-MCNC: 153 MG/DL — HIGH (ref 70–99)
GLUCOSE SERPL-MCNC: 104 MG/DL — HIGH (ref 70–99)
HCT VFR BLD CALC: 35 % — SIGNIFICANT CHANGE UP (ref 34.5–45)
HGB BLD-MCNC: 11.8 G/DL — SIGNIFICANT CHANGE UP (ref 11.5–15.5)
IANC: 3.49 K/UL — SIGNIFICANT CHANGE UP (ref 1.8–7.4)
IMM GRANULOCYTES NFR BLD AUTO: 0.2 % — SIGNIFICANT CHANGE UP (ref 0–0.9)
LYMPHOCYTES # BLD AUTO: 3.63 K/UL — HIGH (ref 1–3.3)
LYMPHOCYTES # BLD AUTO: 44.3 % — HIGH (ref 13–44)
MAGNESIUM SERPL-MCNC: 2.1 MG/DL — SIGNIFICANT CHANGE UP (ref 1.6–2.6)
MCHC RBC-ENTMCNC: 30.5 PG — SIGNIFICANT CHANGE UP (ref 27–34)
MCHC RBC-ENTMCNC: 33.7 GM/DL — SIGNIFICANT CHANGE UP (ref 32–36)
MCV RBC AUTO: 90.4 FL — SIGNIFICANT CHANGE UP (ref 80–100)
MONOCYTES # BLD AUTO: 0.61 K/UL — SIGNIFICANT CHANGE UP (ref 0–0.9)
MONOCYTES NFR BLD AUTO: 7.4 % — SIGNIFICANT CHANGE UP (ref 2–14)
NEUTROPHILS # BLD AUTO: 3.49 K/UL — SIGNIFICANT CHANGE UP (ref 1.8–7.4)
NEUTROPHILS NFR BLD AUTO: 42.7 % — LOW (ref 43–77)
NRBC # BLD: 0 /100 WBCS — SIGNIFICANT CHANGE UP (ref 0–0)
NRBC # FLD: 0 K/UL — SIGNIFICANT CHANGE UP (ref 0–0)
PHOSPHATE SERPL-MCNC: 3.7 MG/DL — SIGNIFICANT CHANGE UP (ref 2.5–4.5)
PLATELET # BLD AUTO: 219 K/UL — SIGNIFICANT CHANGE UP (ref 150–400)
POTASSIUM SERPL-MCNC: 3.9 MMOL/L — SIGNIFICANT CHANGE UP (ref 3.5–5.3)
POTASSIUM SERPL-SCNC: 3.9 MMOL/L — SIGNIFICANT CHANGE UP (ref 3.5–5.3)
RBC # BLD: 3.87 M/UL — SIGNIFICANT CHANGE UP (ref 3.8–5.2)
RBC # FLD: 14.1 % — SIGNIFICANT CHANGE UP (ref 10.3–14.5)
SODIUM SERPL-SCNC: 142 MMOL/L — SIGNIFICANT CHANGE UP (ref 135–145)
WBC # BLD: 8.19 K/UL — SIGNIFICANT CHANGE UP (ref 3.8–10.5)
WBC # FLD AUTO: 8.19 K/UL — SIGNIFICANT CHANGE UP (ref 3.8–10.5)

## 2024-09-08 RX ORDER — MEMANTINE 7 MG/1
1 CAPSULE, EXTENDED RELEASE ORAL
Qty: 0 | Refills: 0 | DISCHARGE
Start: 2024-09-08

## 2024-09-08 RX ORDER — ASPIRIN 81 MG
1 TABLET, DELAYED RELEASE (ENTERIC COATED) ORAL
Qty: 0 | Refills: 0 | DISCHARGE
Start: 2024-09-08

## 2024-09-08 RX ORDER — METOPROLOL TARTRATE 100 MG/1
1 TABLET ORAL
Qty: 0 | Refills: 0 | DISCHARGE
Start: 2024-09-08

## 2024-09-08 RX ADMIN — METOPROLOL TARTRATE 25 MILLIGRAM(S): 100 TABLET ORAL at 07:08

## 2024-09-08 RX ADMIN — Medication 81 MILLIGRAM(S): at 13:51

## 2024-09-08 RX ADMIN — DIVALPROEX SODIUM 125 MILLIGRAM(S): 125 CAPSULE, DELAYED RELEASE ORAL at 19:58

## 2024-09-08 RX ADMIN — Medication 1: at 18:18

## 2024-09-08 RX ADMIN — MEMANTINE 10 MILLIGRAM(S): 7 CAPSULE, EXTENDED RELEASE ORAL at 13:52

## 2024-09-08 RX ADMIN — Medication 30 MILLIGRAM(S): at 13:51

## 2024-09-08 RX ADMIN — Medication 1 APPLICATION(S): at 07:07

## 2024-09-08 RX ADMIN — Medication 10 MILLIGRAM(S): at 13:51

## 2024-09-08 RX ADMIN — NIFEDIPINE 60 MILLIGRAM(S): 60 TABLET, FILM COATED, EXTENDED RELEASE ORAL at 07:08

## 2024-09-08 RX ADMIN — Medication 40 MILLIGRAM(S): at 07:08

## 2024-09-08 RX ADMIN — DIVALPROEX SODIUM 125 MILLIGRAM(S): 125 CAPSULE, DELAYED RELEASE ORAL at 07:45

## 2024-09-08 RX ADMIN — Medication 6 MILLIGRAM(S): at 22:19

## 2024-09-08 RX ADMIN — Medication 20 MILLIGRAM(S): at 22:19

## 2024-09-08 RX ADMIN — CHLORHEXIDINE GLUCONATE 1 APPLICATION(S): 40 SOLUTION TOPICAL at 13:50

## 2024-09-08 RX ADMIN — Medication 1 APPLICATION(S): at 18:19

## 2024-09-08 RX ADMIN — ENOXAPARIN SODIUM 30 MILLIGRAM(S): 100 INJECTION SUBCUTANEOUS at 07:08

## 2024-09-08 NOTE — DISCHARGE NOTE PROVIDER - DETAILS OF MALNUTRITION DIAGNOSIS/DIAGNOSES
This patient has been assessed with a concern for Malnutrition and was treated during this hospitalization for the following Nutrition diagnosis/diagnoses:     -  09/04/2024: Moderate protein-calorie malnutrition   -  09/04/2024: Underweight (BMI < 19)

## 2024-09-08 NOTE — DISCHARGE NOTE PROVIDER - NSDCHHNEEDSERVICE_GEN_ALL_CORE
Rehabilitation services Complex Repair Preamble Text (Leave Blank If You Do Not Want): Extensive wide undermining was performed. Medication teaching and assessment/Observation and assessment/Rehabilitation services

## 2024-09-08 NOTE — CHART NOTE - NSCHARTNOTEFT_GEN_A_CORE
Patient seen and evaluated at bedside. Both sons are with patient - stating they would like for her to be discharged today. Son V takes full responsibility of his mother. Attempted to call (x2) Spouse (listed as emergency contact) to norma Winn [phone] and [phone] with no answer. Attending Dr. Eagle made aware. Plan was for dispo to [facility] / LT, pending auth. Now OK to discharge home as per attending. Patient seen and evaluated at bedside. Both sons are with patient - stating they would like for her to be discharged today. Son Trevin [phone] takes full responsibility of his mother. Attempted to call (x2) Spouse (listed as emergency contact) to [employer]a - Yolanda Ringoes [phone] and [phone] with no answer. Attending Dr. Eagle made aware. Plan was for dispo to [facility] / LTC, pending auth. Now OK to discharge home as per attending.

## 2024-09-08 NOTE — DISCHARGE NOTE PROVIDER - NSDCCPCAREPLAN_GEN_ALL_CORE_FT
PRINCIPAL DISCHARGE DIAGNOSIS  Diagnosis: Dementia  Assessment and Plan of Treatment: You were hospitalized due to agitation after pulling colostomy bag. In order to enhance patient's overall well-being and clinical course, please try avoiding benzodiazepines, anticholinergics, and antihistamines (Can cause worsening confusion/delirium). Additionally, continue reorientation, supportive care, maintaining regular sleep/wake cycle, and optimizing nutritional/medical factors. Follow up with your primary care physician.   Continue your medications as directed and follow up with your primary care provider/psychiatrist for further management.   If you are ever in need of immediate psychiatric assistance you may reach out to the [facility] at [facility] ([address] [place] [address]) # [phone] operates M-F 9am-3pm, walk in or call for same-day/next-day appointment.      SECONDARY DISCHARGE DIAGNOSES  Diagnosis: History of CVA (cerebrovascular accident)  Assessment and Plan of Treatment: You were seen by neurology during your hospital stay. Please follow up with your primary care physician. Continue regimen as prescribed.    Diagnosis: Diabetes mellitus  Assessment and Plan of Treatment: Your Hemoglobin A1C is 5.5. Target goal for hemoglobin A1C is <7. Monitor blood glucose levels throughout the day before meals and at bedtime. Record blood sugars and bring to outpatient providers appointment in order to be reviewed by your doctor for management modifications. If your sugars are more than 400 or less than 70 you should contact your PCP immediately. Monitor for signs/symptoms of low blood glucose, such as, dizziness, altered mental status, or cool/clammy skin. In addition, monitor for signs/symptoms of high blood glucose, such as, feeling hot, dry, fatigued, or with increased thirst/urination. Make regular podiatry appointments in order to have feet checked for wounds and uncontrolled toe nail growth to prevent infections, as well as, appointments with an ophthalmologist to monitor your vision.    Diagnosis: Hypertension  Assessment and Plan of Treatment: Low sodium and fat diet, continue anti-hypertensive medications, and follow up with primary care physician.    Diagnosis: Presence of colostomy  Assessment and Plan of Treatment: LUQ colostomy: Cleanse with water, pat dry. (If impaired peristomal skin noted: apply stoma powder, brush off excess, and seal with liquid barrier film by using tapping motions.) Apply 1 piece drainable soft convex ostomy pouch (pouch #9239) and secure with ostomy belt (#6466) after pouch applied. Change every 3 days and PRN if leaking.     PRINCIPAL DISCHARGE DIAGNOSIS  Diagnosis: Dementia  Assessment and Plan of Treatment: You were hospitalized due to agitation after pulling colostomy bag. Please try avoiding benzodiazepines, anticholinergics, and antihistamines (Can cause worsening confusion/delirium). Additionally, continue reorientation, supportive care, maintaining regular sleep/wake cycle, and optimizing nutritional/medical factors. Follow up with your primary care physician.   Continue your medications as directed and follow up with your primary care provider/psychiatrist for further management.   If you are ever in need of immediate psychiatric assistance you may reach out to the [facility] at [facility] ([address] [place] [address]) # [phone] operates M-F 9am-3pm, walk in or call for same-day/next-day appointment.      SECONDARY DISCHARGE DIAGNOSES  Diagnosis: History of CVA (cerebrovascular accident)  Assessment and Plan of Treatment: You were seen by neurology during your hospital stay. Please follow up with your primary care physician. Continue regimen as prescribed.    Diagnosis: Diabetes mellitus  Assessment and Plan of Treatment: Your Hemoglobin A1C is 5.5. Target goal for hemoglobin A1C is <7. Monitor blood glucose levels throughout the day before meals and at bedtime. Record blood sugars and bring to outpatient providers appointment in order to be reviewed by your doctor for management modifications. If your sugars are more than 400 or less than 70 you should contact your PCP immediately. Monitor for signs/symptoms of low blood glucose, such as, dizziness, altered mental status, or cool/clammy skin. In addition, monitor for signs/symptoms of high blood glucose, such as, feeling hot, dry, fatigued, or with increased thirst/urination. Make regular podiatry appointments in order to have feet checked for wounds and uncontrolled toe nail growth to prevent infections, as well as, appointments with an ophthalmologist to monitor your vision.    Diagnosis: Hypertension  Assessment and Plan of Treatment: Low sodium and fat diet, continue anti-hypertensive medications, and follow up with primary care physician.    Diagnosis: Presence of colostomy  Assessment and Plan of Treatment: LUQ colostomy: Cleanse with water, pat dry. (If impaired peristomal skin noted: apply stoma powder, brush off excess, and seal with liquid barrier film by using tapping motions.) Apply 1 piece drainable soft convex ostomy pouch (pouch #9974) and secure with ostomy belt (#7299) after pouch applied. Change every 3 days and PRN if leaking.

## 2024-09-08 NOTE — DISCHARGE NOTE PROVIDER - PROVIDER TOKENS
FREE:[LAST:[Your primary care physician],PHONE:[(   )    -],FAX:[(   )    -],FOLLOWUP:[1 week]] FREE:[LAST:[Your primary care physician],PHONE:[(   )    -],FAX:[(   )    -],FOLLOWUP:[1 week]],PROVIDER:[TOKEN:[[id number]],FOLLOWUP:[1 week]]

## 2024-09-08 NOTE — DISCHARGE NOTE PROVIDER - NSDCHC_MEDRECSTATUS_GEN_ALL_CORE
Spoke to mother. Seema states her  started feeling ill Saturday evening and went to an urgent care clinic in [place] on Sunday. They just received the results back that he is positive for covid. Mother states when  started to show symptoms the kids kept their distance from their dad. Both children are not showing any symptoms at this time. Advised mother to keep an eye on them. Made aware that symptoms may appear within 2-14 days after exposure. Mother is asking if there is anything in particular she should be doing with the kids. Reinforced importance of dad self-isolating within the home, if possible, in one particular area. Also, if he does come into close proximity with anyone in the family masks should be worn and at least a 6 feet distance maintained. Reinforced importance of frequent handwashing and to clean high touch surfaces often. Also, discussed good respiratory hygiene, such as coughing/sneezing into a tissue and washing hands right after. Discouraged from sharing any food, drinks or plates/utensils with dad. Mentioned if the kids do become sick with mild-to-moderate symptoms, we recommend supportive measures at home, such as good fluid intake, rest, and fever control with tylenol or motrin. Discussed red flags and when to seek medical attention right away. Mother verbalized understanding. Stated the urgent care clinic mentioned the same measures, but she just wanted to double-check with our office. Mother encouraged to call back with any further questions/concerns.    Admission Reconciliation is Not Complete  Discharge Reconciliation is Not Complete Admission Reconciliation is Not Complete  Discharge Reconciliation is Completed

## 2024-09-08 NOTE — DISCHARGE NOTE PROVIDER - NSDCMRMEDTOKEN_GEN_ALL_CORE_FT
aspirin 81 mg oral tablet, chewable: 1 tab(s) orally once a day  memantine 10 mg oral tablet: 1 tab(s) orally once a day  metoprolol succinate 25 mg oral tablet, extended release: 1 tab(s) orally once a day   aspirin 81 mg oral tablet, chewable: 1 tab(s) orally once a day  atorvastatin 20 mg oral tablet: 1 tab(s) orally once a day (at bedtime)  citalopram 10 mg oral tablet: 1 tab(s) orally once a day  divalproex sodium 125 mg oral delayed release tablet: 1 tab(s) orally 2 times a day 8am and 8pm  melatonin 3 mg oral tablet: 2 tab(s) orally once a day (at bedtime)  memantine 10 mg oral tablet: 1 tab(s) orally once a day  metoprolol succinate 25 mg oral tablet, extended release: 1 tab(s) orally once a day  mirtazapine 30 mg oral tablet: 1 tab(s) orally once a day  NIFEdipine 60 mg oral tablet, extended release: 1 tab(s) orally once a day  pantoprazole 40 mg oral delayed release tablet: 1 tab(s) orally once a day (before a meal)

## 2024-09-08 NOTE — PROGRESS NOTE ADULT - NUTRITIONAL ASSESSMENT
This patient has been assessed with a concern for Malnutrition and has been determined to have a diagnosis/diagnoses of Moderate protein-calorie malnutrition and Underweight (BMI < 19).    This patient is being managed with:   Diet Regular-  Entered: Sep  3 2024  9:13PM  

## 2024-09-08 NOTE — DISCHARGE NOTE PROVIDER - HOSPITAL COURSE
78F with dementia (on memantine), recent sigmoid diverticulitis s/p ostomy, chronic stroke, T2DM, HTN, HLD, glaucoma, OA of the L knee, PERFECTO, here with agitation after pulling out her colostomy bag earlier.     Dementia with behavioral disturbances  - agitated at home  - family unable to take care of the pt  - neuro following  -  psych consult     Hx of CVA  - aspirin 81mg daily. No need for plavix at this time as per neuro  - statin for secondary stroke prevention , prior LDL 49  - cont memantine 10mg daily  - on mirtazapine and melatonin at night  - consider starting low dose seroquel as needed for agitation if qtc < 500  - delirium precautions     DM  - hold metformin  - monitor FS  - ISS  - A1C (9/4) 5.5    On 09/08/2024, case was discussed with Dr. Eagle, patient is medically cleared and optimized for discharge today. All medications were reviewed with attending, and sent to mutually agreed upon pharmacy. 78F with dementia (on memantine), recent sigmoid diverticulitis s/p ostomy, chronic stroke, T2DM, HTN, HLD, glaucoma, OA of the L knee, PERFECTO, here with agitation after pulling out her colostomy bag earlier.     Dementia with behavioral disturbances  -agitated at home  -neuro following  -seen by , started depakote 125mg BID for agitation     Hx of CVA  - aspirin 81mg daily. No need for plavix at this time as per neuro  - statin for secondary stroke prevention , prior LDL 49  - cont memantine 10mg daily  - on mirtazapine and melatonin at night  - delirium precautions     DM  - hold metformin  - monitor FS  - ISS  - A1C (9/4) 5.5    On 09/09/2024, case was discussed with Dr. Eagle, patient is medically cleared and optimized for discharge today.

## 2024-09-08 NOTE — DISCHARGE NOTE PROVIDER - CARE PROVIDER_API CALL
Your primary care physician,   Phone: (   )    -  Fax: (   )    -  Follow Up Time: 1 week   Your primary care physician,   Phone: (   )    -  Fax: (   )    -  Follow Up Time: 1 week    Jony Delvalle  Cardiovascular Disease  [address] [address]  Phone: [phone]  Fax: [phone]  Follow Up Time: 1 week

## 2024-09-09 VITALS
OXYGEN SATURATION: 98 % | SYSTOLIC BLOOD PRESSURE: 124 MMHG | DIASTOLIC BLOOD PRESSURE: 75 MMHG | TEMPERATURE: 98 F | HEART RATE: 84 BPM | RESPIRATION RATE: 18 BRPM

## 2024-09-09 LAB
ANION GAP SERPL CALC-SCNC: 10 MMOL/L — SIGNIFICANT CHANGE UP (ref 7–14)
BUN SERPL-MCNC: 24 MG/DL — HIGH (ref 7–23)
CALCIUM SERPL-MCNC: 8.6 MG/DL — SIGNIFICANT CHANGE UP (ref 8.4–10.5)
CHLORIDE SERPL-SCNC: 107 MMOL/L — SIGNIFICANT CHANGE UP (ref 98–107)
CO2 SERPL-SCNC: 25 MMOL/L — SIGNIFICANT CHANGE UP (ref 22–31)
CREAT SERPL-MCNC: 0.79 MG/DL — SIGNIFICANT CHANGE UP (ref 0.5–1.3)
EGFR: 77 ML/MIN/1.73M2 — SIGNIFICANT CHANGE UP
GLUCOSE BLDC GLUCOMTR-MCNC: 130 MG/DL — HIGH (ref 70–99)
GLUCOSE BLDC GLUCOMTR-MCNC: 142 MG/DL — HIGH (ref 70–99)
GLUCOSE SERPL-MCNC: 107 MG/DL — HIGH (ref 70–99)
MAGNESIUM SERPL-MCNC: 2.1 MG/DL — SIGNIFICANT CHANGE UP (ref 1.6–2.6)
PHOSPHATE SERPL-MCNC: 3.4 MG/DL — SIGNIFICANT CHANGE UP (ref 2.5–4.5)
POTASSIUM SERPL-MCNC: 4.1 MMOL/L — SIGNIFICANT CHANGE UP (ref 3.5–5.3)
POTASSIUM SERPL-SCNC: 4.1 MMOL/L — SIGNIFICANT CHANGE UP (ref 3.5–5.3)
SODIUM SERPL-SCNC: 142 MMOL/L — SIGNIFICANT CHANGE UP (ref 135–145)

## 2024-09-09 RX ORDER — PANTOPRAZOLE SODIUM 40 MG
1 TABLET, DELAYED RELEASE (ENTERIC COATED) ORAL
Qty: 30 | Refills: 0
Start: 2024-09-09 | End: 2024-10-08

## 2024-09-09 RX ORDER — MIRTAZAPINE 30 MG
1 TABLET ORAL
Qty: 30 | Refills: 0
Start: 2024-09-09 | End: 2024-10-08

## 2024-09-09 RX ORDER — CITALOPRAM HYDROBROMIDE 40 MG
1 TABLET ORAL
Qty: 7 | Refills: 0
Start: 2024-09-09 | End: 2024-09-15

## 2024-09-09 RX ORDER — CITALOPRAM HYDROBROMIDE 40 MG
1 TABLET ORAL
Qty: 30 | Refills: 0
Start: 2024-09-09 | End: 2024-10-08

## 2024-09-09 RX ORDER — MEMANTINE 7 MG/1
1 CAPSULE, EXTENDED RELEASE ORAL
Qty: 30 | Refills: 0
Start: 2024-09-09 | End: 2024-10-08

## 2024-09-09 RX ORDER — NIFEDIPINE 60 MG/1
1 TABLET, FILM COATED, EXTENDED RELEASE ORAL
Qty: 30 | Refills: 0
Start: 2024-09-09 | End: 2024-10-08

## 2024-09-09 RX ORDER — METOPROLOL TARTRATE 100 MG/1
1 TABLET ORAL
Qty: 30 | Refills: 0
Start: 2024-09-09 | End: 2024-10-08

## 2024-09-09 RX ORDER — DIVALPROEX SODIUM 125 MG/1
1 CAPSULE, DELAYED RELEASE ORAL
Qty: 60 | Refills: 0
Start: 2024-09-09 | End: 2024-10-08

## 2024-09-09 RX ADMIN — DIVALPROEX SODIUM 125 MILLIGRAM(S): 125 CAPSULE, DELAYED RELEASE ORAL at 09:22

## 2024-09-09 RX ADMIN — Medication 81 MILLIGRAM(S): at 12:02

## 2024-09-09 RX ADMIN — Medication 10 MILLIGRAM(S): at 12:02

## 2024-09-09 RX ADMIN — CHLORHEXIDINE GLUCONATE 1 APPLICATION(S): 40 SOLUTION TOPICAL at 12:04

## 2024-09-09 RX ADMIN — Medication 40 MILLIGRAM(S): at 06:01

## 2024-09-09 RX ADMIN — NIFEDIPINE 60 MILLIGRAM(S): 60 TABLET, FILM COATED, EXTENDED RELEASE ORAL at 06:00

## 2024-09-09 RX ADMIN — MEMANTINE 10 MILLIGRAM(S): 7 CAPSULE, EXTENDED RELEASE ORAL at 12:03

## 2024-09-09 RX ADMIN — OLANZAPINE 1.25 MILLIGRAM(S): 7.5 TABLET ORAL at 11:20

## 2024-09-09 RX ADMIN — METOPROLOL TARTRATE 25 MILLIGRAM(S): 100 TABLET ORAL at 06:01

## 2024-09-09 RX ADMIN — Medication 1 APPLICATION(S): at 06:01

## 2024-09-09 RX ADMIN — Medication 30 MILLIGRAM(S): at 12:02

## 2024-09-09 RX ADMIN — ENOXAPARIN SODIUM 30 MILLIGRAM(S): 100 INJECTION SUBCUTANEOUS at 06:00

## 2024-09-09 NOTE — DISCHARGE NOTE NURSING/CASE MANAGEMENT/SOCIAL WORK - CAREGIVER NAME
Group Topic:  HOOD Process Group    Date: 6/16/2022  Start Time:  1:45 PM  End Time:  2:30 PM  Facilitators: Nayan Walter V    Focus:Process group is a powerful tool used to promote growth and change. Pt.'s will share their struggles and concerns with the unique opportunity to receive multiple perspectives, support, encouragement and feedback from other individuals in safe and confidential environment.     Number in attendance: 6      Group Description: Process/Check-Out group is a safe and confidential therapeutic environment where pt.'s can share their concerns and struggles, as well as receive assistance on processing and reflecting on themself, their thoughts, their behaviors, and their actions that have occurred during the day while receiving feedback, multiple perspectives, support and encouragement from other individuals.       Goals: To discuss information learned today and how it will apply to their addiction.   Handouts: Addiction Discussion Questions. Building New Habits. Financial Literacy.  Attendance: Present  Mood/Affect: Appropriate  Behavior/Socialization: Appropriate to group  Participation: Active  Overall Patient Response to Group: Appropriate to topic  Individual Response to Group: \" I learned a lot about recovery and beginning new habits.\"  Ability to Apply Content to Group: 5     A D Ji Walter M.S. [employer]            Chad Winn

## 2024-09-09 NOTE — PROGRESS NOTE ADULT - SUBJECTIVE AND OBJECTIVE BOX
Subjective: Patient seen and examined. No new events except as noted.     SUBJECTIVE/ROS:        MEDICATIONS:  MEDICATIONS  (STANDING):  aspirin  chewable 81 milliGRAM(s) Oral daily  atorvastatin 20 milliGRAM(s) Oral at bedtime  chlorhexidine 2% Cloths 1 Application(s) Topical daily  citalopram 10 milliGRAM(s) Oral daily  dextrose 5%. 1000 milliLiter(s) (50 mL/Hr) IV Continuous <Continuous>  dextrose 5%. 1000 milliLiter(s) (100 mL/Hr) IV Continuous <Continuous>  dextrose 50% Injectable 12.5 Gram(s) IV Push once  dextrose 50% Injectable 25 Gram(s) IV Push once  dextrose 50% Injectable 25 Gram(s) IV Push once  divalproex  milliGRAM(s) Oral <User Schedule>  enoxaparin Injectable 30 milliGRAM(s) SubCutaneous every 24 hours  glucagon  Injectable 1 milliGRAM(s) IntraMuscular once  insulin lispro (ADMELOG) corrective regimen sliding scale   SubCutaneous at bedtime  insulin lispro (ADMELOG) corrective regimen sliding scale   SubCutaneous three times a day before meals  melatonin 6 milliGRAM(s) Oral at bedtime  memantine 10 milliGRAM(s) Oral daily  metoprolol succinate ER 25 milliGRAM(s) Oral daily  mirtazapine 30 milliGRAM(s) Oral daily  mupirocin 2% Ointment 1 Application(s) Both Nostrils two times a day  NIFEdipine XL 60 milliGRAM(s) Oral daily  pantoprazole    Tablet 40 milliGRAM(s) Oral before breakfast      PHYSICAL EXAM:  T(C): 36.7 (09-08-24 @ 06:58), Max: 36.7 (09-07-24 @ 09:42)  HR: 82 (09-08-24 @ 06:58) (75 - 84)  BP: 116/70 (09-08-24 @ 06:58) (116/70 - 132/66)  RR: 17 (09-08-24 @ 06:58) (17 - 17)  SpO2: 95% (09-08-24 @ 06:58) (95% - 97%)  Wt(kg): --  I&O's Summary          JVP: Normal  Neck: supple  Lung: clear   CV: S1 S2 , Murmur:  Abd: soft  Ext: No edema  neuro: Awake / alert  Psych: flat affect  Skin: normal``    LABS/DATA:    CARDIAC MARKERS:                                11.8   8.19  )-----------( 219      ( 08 Sep 2024 07:00 )             35.0     09-08    142  |  108<H>  |  22  ----------------------------<  104<H>  3.9   |  23  |  0.73    Ca    8.7      08 Sep 2024 07:00  Phos  3.7     09-08  Mg     2.10     09-08      proBNP:   Lipid Profile:   HgA1c:   TSH:     TELE:  EKG:        
    Subjective: Patient seen and examined. No new events except as noted.     SUBJECTIVE/ROS:  nad      MEDICATIONS:  MEDICATIONS  (STANDING):  aspirin  chewable 81 milliGRAM(s) Oral daily  atorvastatin 20 milliGRAM(s) Oral at bedtime  chlorhexidine 2% Cloths 1 Application(s) Topical daily  citalopram 10 milliGRAM(s) Oral daily  dextrose 5%. 1000 milliLiter(s) (50 mL/Hr) IV Continuous <Continuous>  dextrose 5%. 1000 milliLiter(s) (100 mL/Hr) IV Continuous <Continuous>  dextrose 50% Injectable 25 Gram(s) IV Push once  dextrose 50% Injectable 12.5 Gram(s) IV Push once  dextrose 50% Injectable 25 Gram(s) IV Push once  divalproex  milliGRAM(s) Oral <User Schedule>  enoxaparin Injectable 30 milliGRAM(s) SubCutaneous every 24 hours  glucagon  Injectable 1 milliGRAM(s) IntraMuscular once  insulin lispro (ADMELOG) corrective regimen sliding scale   SubCutaneous at bedtime  insulin lispro (ADMELOG) corrective regimen sliding scale   SubCutaneous three times a day before meals  melatonin 6 milliGRAM(s) Oral at bedtime  memantine 10 milliGRAM(s) Oral daily  metoprolol succinate ER 25 milliGRAM(s) Oral daily  mirtazapine 30 milliGRAM(s) Oral daily  NIFEdipine XL 60 milliGRAM(s) Oral daily  pantoprazole    Tablet 40 milliGRAM(s) Oral before breakfast      PHYSICAL EXAM:  T(C): 36.6 (09-09-24 @ 06:00), Max: 37.1 (09-08-24 @ 16:54)  HR: 76 (09-09-24 @ 06:00) (70 - 77)  BP: 114/74 (09-09-24 @ 06:00) (107/61 - 126/72)  RR: 17 (09-09-24 @ 06:00) (17 - 18)  SpO2: 96% (09-09-24 @ 06:00) (96% - 100%)  Wt(kg): --  I&O's Summary    08 Sep 2024 07:01  -  09 Sep 2024 07:00  --------------------------------------------------------  IN: 300 mL / OUT: 400 mL / NET: -100 mL            JVP: Normal  Neck: supple  Lung: clear   CV: S1 S2 , Murmur:  Abd: soft  Ext: No edema  neuro: Awake / alert  Psych: flat affect  Skin: normal``    LABS/DATA:    CARDIAC MARKERS:                                11.8   8.19  )-----------( 219      ( 08 Sep 2024 07:00 )             35.0     09-09    142  |  107  |  24<H>  ----------------------------<  107<H>  4.1   |  25  |  0.79    Ca    8.6      09 Sep 2024 06:10  Phos  3.4     09-09  Mg     2.10     09-09      proBNP:   Lipid Profile:   HgA1c:   TSH:     TELE:  EKG:        
Neurology Progress Note    S: Patient seen and examined. No new events overnight. Pending dc home todya    Medications: MEDICATIONS  (STANDING):  aspirin  chewable 81 milliGRAM(s) Oral daily  atorvastatin 20 milliGRAM(s) Oral at bedtime  chlorhexidine 2% Cloths 1 Application(s) Topical daily  citalopram 10 milliGRAM(s) Oral daily  dextrose 5%. 1000 milliLiter(s) (50 mL/Hr) IV Continuous <Continuous>  dextrose 5%. 1000 milliLiter(s) (100 mL/Hr) IV Continuous <Continuous>  dextrose 50% Injectable 12.5 Gram(s) IV Push once  dextrose 50% Injectable 25 Gram(s) IV Push once  dextrose 50% Injectable 25 Gram(s) IV Push once  divalproex  milliGRAM(s) Oral <User Schedule>  enoxaparin Injectable 30 milliGRAM(s) SubCutaneous every 24 hours  glucagon  Injectable 1 milliGRAM(s) IntraMuscular once  insulin lispro (ADMELOG) corrective regimen sliding scale   SubCutaneous at bedtime  insulin lispro (ADMELOG) corrective regimen sliding scale   SubCutaneous three times a day before meals  melatonin 6 milliGRAM(s) Oral at bedtime  memantine 10 milliGRAM(s) Oral daily  metoprolol succinate ER 25 milliGRAM(s) Oral daily  mirtazapine 30 milliGRAM(s) Oral daily  NIFEdipine XL 60 milliGRAM(s) Oral daily  pantoprazole    Tablet 40 milliGRAM(s) Oral before breakfast    MEDICATIONS  (PRN):  dextrose Oral Gel 15 Gram(s) Oral once PRN Blood Glucose LESS THAN 70 milliGRAM(s)/deciliter  OLANZapine Injectable 1.25 milliGRAM(s) IntraMuscular every 6 hours PRN agitation       Vitals:  Vital Signs Last 24 Hrs  T(C): 36.6 (09 Sep 2024 11:14), Max: 37.1 (08 Sep 2024 16:54)  T(F): 97.9 (09 Sep 2024 11:14), Max: 98.8 (08 Sep 2024 16:54)  HR: 76 (09 Sep 2024 11:14) (72 - 77)  BP: 124/74 (09 Sep 2024 11:14) (107/61 - 126/72)  BP(mean): --  RR: 18 (09 Sep 2024 11:14) (17 - 18)  SpO2: 98% (09 Sep 2024 11:14) (96% - 100%)    Parameters below as of 09 Sep 2024 11:14  Patient On (Oxygen Delivery Method): room air              General Exam:   General Appearance: Appropriately dressed and in no acute distress       Head: Normocephalic, atraumatic and no dysmorphic features  Ear, Nose, and Throat: Moist mucous membranes  CVS: S1S2+  Resp: No SOB, no wheeze or rhonchi  Abd: soft NTND  Extremities: No edema, no cyanosis  Skin: No bruises, no rashes     Neurological Exam:  Mental Status: Awake, alert and oriented x 2.  Able to follow simple commands. Able to name and repeat. fluent speech. No dysarthria   Cranial Nerves: PERRL, EOMI, VFFC, sensation V1-V3 intact,  R facial asymmetry, equal elevation of palate, scm/trap 5/5, tongue is midline on protrusion. hearing is grossly intact.   Motor: Normal bulk, tone, subtle R hemiparesis   Sensation: Intact to light touch   Reflexes: 1+ throughout at biceps, brachioradialis, triceps, patellars and ankles bilaterally and equal. No clonus. R toe and L toe were both downgoing.  Coordination: unablec  Gait: deferred      I personally reviewed the below data/images/labs:    LABS:                          11.8   8.19  )-----------( 219      ( 08 Sep 2024 07:00 )             35.0     09-09    142  |  107  |  24<H>  ----------------------------<  107<H>  4.1   |  25  |  0.79    Ca    8.6      09 Sep 2024 06:10  Phos  3.4     09-09  Mg     2.10     09-09          Urinalysis Basic - ( 09 Sep 2024 06:10 )    Color: x / Appearance: x / SG: x / pH: x  Gluc: 107 mg/dL / Ketone: x  / Bili: x / Urobili: x   Blood: x / Protein: x / Nitrite: x   Leuk Esterase: x / RBC: x / WBC x   Sq Epi: x / Non Sq Epi: x / Bacteria: x            < from: CT Head No Cont (09.03.24 @ 13:29) >    ACC: [id number] EXAM:  CT CERVICAL SPINE   ORDERED BY: PRINCESS ALBRIGHT     ACC: [id number] EXAM:  CT BRAIN   ORDERED BY: PRINCESS ALBRIGHT     PROCEDURE DATE:  09/03/2024          INTERPRETATION:  CLINICAL INFORMATION: encephalomalcia vs edema/mass    COMPARISON: None.      TECHNIQUE:    CT BRAIN: Serial axial images were obtained from the skull base to the   vertex using multi-slice helical technique. Sagittal and coronal   reformats were obtained.    CT CERVICAL SPINE: Axial images were obtained of the cervical spine using   multislice helical technique. Reformatted coronal and sagittal images   were obtained.    FINDINGS:    CT BRAIN:    VENTRICLES AND SULCI: Age appropriate involutional changes.  INTRA-AXIAL: Encephalomalacic changes are seen in the right frontal   region consistent with a prior insult in this distribution. There are   periventricular and subcortical white matter hypodensities, consistent   with microvascular type changes.  EXTRA-AXIAL: Bifrontal convexity low attenuation collectionsas seen on   the prior with some flattening of the gyri. Findings suggest chronic   hematoma/hygromas which are symmetric and bilateral    VISUALIZED SINUSES:  Clear.  TYMPANOMASTOID CAVITIES:  Clear.  VISUALIZED ORBITS: Normal.  CALVARIUM: Intact.    MISCELLANEOUS: Left-sided cataract surgery    CT CERVICAL SPINE:    VERTEBRAE:  Normal in height. No acute fracture. Multilevel degenerative   changes including marginal osteophytes.  ALIGNMENT: No subluxation or scoliosis.  INTERVERTEBRAL DISC SPACES: No significant disc bulge or focal disc   herniation. No significant spinal canal or neural foraminal stenosis.    VISUALIZED LUNGS: Clear.    MISCELLANEOUS:  None.      IMPRESSION:    CT HEAD:  No acute intracranial hemorrhage, mass effect, or midline shift.   Encephalomalacic changes in the right frontal lobe. Bilateral extra-axial   convexity fluid attenuation collections favor chronic hematomas/hygromas.    CT CERVICAL SPINE:  No acute fracture or traumatic subluxation.    Multi-level degenerative changes.        --- End of Report ---            MARION DÍAZ MD; Attending Radiologist  This document has been electronically signed. Sep  3 2024  1:46PM    < end of copied text >      IMAGING from other MRN:      Radiology:  CTH: No acute intracranial hemorrhage or mass effect. Extensive chronic small   vessel ischemic changes in the frontoparietal periventricular and   subcortical white matter. Indeterminate age bilateral thalamic lacunar   infarcts accommodation of dilated perivascular spaces and lacunar   infarcts in the bilateral basal ganglia. Small chronic appearing   bilateral cerebellar infarcts.    CTA NECK:  No significant stenosis of the cervical carotid arteries based   on NASCET criteria. Patent cervical vertebral arteries. No evidence of   cervical carotid or vertebral artery dissection.    CTA HEAD:  No high-grade stenosis or occlusion of the major proximal   arterial branches.    CT PERFUSION:  Perfusion imaging shows no evidence of a core infarct or   tissue at risk.    < from: MR Head No Cont (05.20.24 @ 20:13) >    ACC: [id number] EXAM:  MR BRAIN   ORDERED BY: ARY POE     PROCEDURE DATE:  05/20/2024          INTERPRETATION:  CLINICAL INDICATION: Patient recently fell at home.   Confused.    TECHNIQUE: MRI of the brain was performed without contrast.    COMPARISON: CT brain 5/19/2024    FINDINGS:  Acute infarct involving the anterior left midbrain.    No acute intracranial hemorrhage, mass effect, or hydrocephalus. No   extra-axial collection. Basal cisterns are patent.    Age-related involutional changes and chronic microvascular ischemic   changes. Chronic lacunar infarcts involving the bilateral thalami. Small   chronic infarcts involving the bilateral cerebral hemispheres.    Ventricles and sulci are age-appropriate in size.    Major intracranial flow-voids are preserved.    Left cataract surgery. The orbits, sellar and suprasellar structures, and   craniocervical junction are otherwise unremarkable. Visualized paranasal   sinuses and mastoid air cells are clear.    IMPRESSION:  Acute infarct involving the anterior left midbrain.    --- End of Report ---              < end of copied text >     1. Left ventricular cavity is normal in size. Left ventricular wall thickness is normal. Left ventricular systolic function is normal with an ejection fraction visually estimated at 60 to 65 %. There are no regional wall motion abnormalities seen.   2. There is mild (grade 1) left ventricular diastolic dysfunction.   3. Normal right ventricular cavity size and probably normal systolic function.   4. Structurally normal mitral valve with normal leaflet excursion. There is calcification of the mitral valve annulus. There is trace mitral regurgitation.   5. The aortic valve appears trileaflet with normal systolic excursion. There is calcification of the aortic valve leaflets. There is mild aortic regurgitation.                
    Subjective: Patient seen and examined. No new events except as noted.     SUBJECTIVE/ROS:  nad      MEDICATIONS:  MEDICATIONS  (STANDING):  aspirin  chewable 81 milliGRAM(s) Oral daily  atorvastatin 20 milliGRAM(s) Oral at bedtime  chlorhexidine 2% Cloths 1 Application(s) Topical daily  citalopram 10 milliGRAM(s) Oral daily  dextrose 5%. 1000 milliLiter(s) (50 mL/Hr) IV Continuous <Continuous>  dextrose 5%. 1000 milliLiter(s) (100 mL/Hr) IV Continuous <Continuous>  dextrose 50% Injectable 12.5 Gram(s) IV Push once  dextrose 50% Injectable 25 Gram(s) IV Push once  dextrose 50% Injectable 25 Gram(s) IV Push once  enoxaparin Injectable 30 milliGRAM(s) SubCutaneous every 24 hours  glucagon  Injectable 1 milliGRAM(s) IntraMuscular once  insulin lispro (ADMELOG) corrective regimen sliding scale   SubCutaneous at bedtime  insulin lispro (ADMELOG) corrective regimen sliding scale   SubCutaneous three times a day before meals  melatonin 6 milliGRAM(s) Oral at bedtime  memantine 10 milliGRAM(s) Oral daily  metoprolol succinate ER 25 milliGRAM(s) Oral daily  mirtazapine 30 milliGRAM(s) Oral daily  mupirocin 2% Ointment 1 Application(s) Both Nostrils two times a day  NIFEdipine XL 60 milliGRAM(s) Oral daily  pantoprazole    Tablet 40 milliGRAM(s) Oral before breakfast      PHYSICAL EXAM:  T(C): 36.8 (09-05-24 @ 05:41), Max: 37.1 (09-04-24 @ 22:08)  HR: 66 (09-05-24 @ 05:41) (66 - 82)  BP: 105/57 (09-05-24 @ 05:41) (104/70 - 142/75)  RR: 18 (09-05-24 @ 05:41) (16 - 20)  SpO2: 100% (09-05-24 @ 05:41) (92% - 100%)  Wt(kg): --  I&O's Summary    04 Sep 2024 07:01  -  05 Sep 2024 07:00  --------------------------------------------------------  IN: 540 mL / OUT: 700 mL / NET: -160 mL            JVP: Normal  Neck: supple  Lung: clear   CV: S1 S2 , Murmur:  Abd: soft  Ext: No edema  neuro: Awake / alert  Psych: flat affect  Skin: normal``    LABS/DATA:    CARDIAC MARKERS:                                14.0   8.39  )-----------( 236      ( 04 Sep 2024 05:21 )             41.6     09-04    142  |  105  |  14  ----------------------------<  129<H>  4.3   |  25  |  0.90    Ca    9.6      04 Sep 2024 05:21  Phos  4.4     09-04  Mg     2.20     09-04      proBNP:   Lipid Profile:   HgA1c:   TSH:     TELE:  EKG:        
    Subjective: Patient seen and examined. No new events except as noted.     SUBJECTIVE/ROS:  nad      MEDICATIONS:  MEDICATIONS  (STANDING):  aspirin  chewable 81 milliGRAM(s) Oral daily  atorvastatin 20 milliGRAM(s) Oral at bedtime  chlorhexidine 2% Cloths 1 Application(s) Topical daily  citalopram 10 milliGRAM(s) Oral daily  dextrose 5%. 1000 milliLiter(s) (50 mL/Hr) IV Continuous <Continuous>  dextrose 5%. 1000 milliLiter(s) (100 mL/Hr) IV Continuous <Continuous>  dextrose 50% Injectable 25 Gram(s) IV Push once  dextrose 50% Injectable 12.5 Gram(s) IV Push once  dextrose 50% Injectable 25 Gram(s) IV Push once  enoxaparin Injectable 30 milliGRAM(s) SubCutaneous every 24 hours  glucagon  Injectable 1 milliGRAM(s) IntraMuscular once  insulin lispro (ADMELOG) corrective regimen sliding scale   SubCutaneous at bedtime  insulin lispro (ADMELOG) corrective regimen sliding scale   SubCutaneous three times a day before meals  melatonin 6 milliGRAM(s) Oral at bedtime  memantine 10 milliGRAM(s) Oral daily  metoprolol succinate ER 25 milliGRAM(s) Oral daily  mirtazapine 30 milliGRAM(s) Oral daily  mupirocin 2% Ointment 1 Application(s) Both Nostrils two times a day  NIFEdipine XL 60 milliGRAM(s) Oral daily  pantoprazole    Tablet 40 milliGRAM(s) Oral before breakfast      PHYSICAL EXAM:  T(C): 36.8 (09-07-24 @ 06:33), Max: 37.2 (09-06-24 @ 18:06)  HR: 76 (09-07-24 @ 06:33) (62 - 87)  BP: 121/63 (09-07-24 @ 06:33) (121/63 - 159/70)  RR: 17 (09-07-24 @ 06:33) (17 - 18)  SpO2: 96% (09-07-24 @ 06:33) (95% - 100%)  Wt(kg): --  I&O's Summary    06 Sep 2024 07:01  -  07 Sep 2024 07:00  --------------------------------------------------------  IN: 650 mL / OUT: 0 mL / NET: 650 mL            JVP: Normal  Neck: supple  Lung: clear   CV: S1 S2 , Murmur:  Abd: soft  Ext: No edema  neuro: Awake / alert  Psych: flat affect  Skin: normal``    LABS/DATA:    CARDIAC MARKERS:                                11.7   9.31  )-----------( 221      ( 06 Sep 2024 06:00 )             34.3     09-06    142  |  108<H>  |  16  ----------------------------<  112<H>  3.8   |  25  |  0.68    Ca    9.0      06 Sep 2024 06:00  Phos  3.0     09-06  Mg     2.10     09-06      proBNP:   Lipid Profile:   HgA1c:   TSH:     TELE:  EKG:        
Neurology Progress Note    S: Patient seen and examined. No new events overnight.    Medication:  aspirin  chewable 81 milliGRAM(s) Oral daily  atorvastatin 20 milliGRAM(s) Oral at bedtime  chlorhexidine 2% Cloths 1 Application(s) Topical daily  citalopram 10 milliGRAM(s) Oral daily  clopidogrel Tablet 75 milliGRAM(s) Oral daily  dextrose 5%. 1000 milliLiter(s) IV Continuous <Continuous>  dextrose 5%. 1000 milliLiter(s) IV Continuous <Continuous>  dextrose 50% Injectable 25 Gram(s) IV Push once  dextrose 50% Injectable 12.5 Gram(s) IV Push once  dextrose 50% Injectable 25 Gram(s) IV Push once  dextrose Oral Gel 15 Gram(s) Oral once PRN  enoxaparin Injectable 30 milliGRAM(s) SubCutaneous every 24 hours  glucagon  Injectable 1 milliGRAM(s) IntraMuscular once  insulin lispro (ADMELOG) corrective regimen sliding scale   SubCutaneous at bedtime  insulin lispro (ADMELOG) corrective regimen sliding scale   SubCutaneous three times a day before meals  melatonin 6 milliGRAM(s) Oral at bedtime  memantine 10 milliGRAM(s) Oral daily  metoprolol succinate ER 25 milliGRAM(s) Oral daily  mirtazapine 30 milliGRAM(s) Oral daily  mupirocin 2% Ointment 1 Application(s) Both Nostrils two times a day  NIFEdipine XL 60 milliGRAM(s) Oral daily  pantoprazole    Tablet 40 milliGRAM(s) Oral before breakfast      Vitals:  Vital Signs Last 24 Hrs  T(C): 36.8 (04 Sep 2024 13:43), Max: 36.8 (04 Sep 2024 13:43)  T(F): 98.2 (04 Sep 2024 13:43), Max: 98.2 (04 Sep 2024 13:43)  HR: 82 (04 Sep 2024 13:43) (68 - 83)  BP: 142/75 (04 Sep 2024 13:43) (130/82 - 168/78)  BP(mean): --  RR: 18 (04 Sep 2024 13:43) (16 - 18)  SpO2: 100% (04 Sep 2024 13:43) (96% - 100%)    Parameters below as of 04 Sep 2024 13:43  Patient On (Oxygen Delivery Method): room air        General Exam:   General Appearance: Appropriately dressed and in no acute distress       Head: Normocephalic, atraumatic and no dysmorphic features  Ear, Nose, and Throat: Moist mucous membranes  CVS: S1S2+  Resp: No SOB, no wheeze or rhonchi  Abd: soft NTND  Extremities: No edema, no cyanosis  Skin: No bruises, no rashes     Neurological Exam:  Mental Status: Awake, alert and oriented x 2.  Able to follow simple commands. Able to name and repeat. fluent speech. No dysarthria   Cranial Nerves: PERRL, EOMI, VFFC, sensation V1-V3 intact,  R facial asymmetry, equal elevation of palate, scm/trap 5/5, tongue is midline on protrusion. hearing is grossly intact.   Motor: Normal bulk, tone, subtle R hemiparesis   Sensation: Intact to light touch   Reflexes: 1+ throughout at biceps, brachioradialis, triceps, patellars and ankles bilaterally and equal. No clonus. R toe and L toe were both downgoing.  Coordination: unablec  Gait: deferred      I personally reviewed the below data/images/labs:      CBC Full  -  ( 04 Sep 2024 05:21 )  WBC Count : 8.39 K/uL  RBC Count : 4.55 M/uL  Hemoglobin : 14.0 g/dL  Hematocrit : 41.6 %  Platelet Count - Automated : 236 K/uL  Mean Cell Volume : 91.4 fL  Mean Cell Hemoglobin : 30.8 pg  Mean Cell Hemoglobin Concentration : 33.7 gm/dL  Auto Neutrophil # : x  Auto Lymphocyte # : x  Auto Monocyte # : x  Auto Eosinophil # : x  Auto Basophil # : x  Auto Neutrophil % : x  Auto Lymphocyte % : x  Auto Monocyte % : x  Auto Eosinophil % : x  Auto Basophil % : x    09-04nn    142  |  105  |  14  ----------------------------<  129<H>  4.3   |  25  |  0.90    Ca    9.6      04 Sep 2024 05:21  Phos  4.4     09-04  Mg     2.20     09-04    TPro  8.1  /  Alb  4.1  /  TBili  0.6  /  DBili  x   /  AST  32  /  ALT  21  /  AlkPhos  135<H>  09-02    LIVER FUNCTIONS - ( 02 Sep 2024 23:29 )  Alb: 4.1 g/dL / Pro: 8.1 g/dL / ALK PHOS: 135 U/L / ALT: 21 U/L / AST: 32 U/L / GGT: x           PT/INR - ( 02 Sep 2024 23:29 )   PT: 10.4 sec;   INR: 0.92 ratio         PTT - ( 02 Sep 2024 23:29 )  PTT:33.4 sec  Urinalysis Basic - ( 04 Sep 2024 05:21 )    Color: x / Appearance: x / SG: x / pH: x  Gluc: 129 mg/dL / Ketone: x  / Bili: x / Urobili: x   Blood: x / Protein: x / Nitrite: x   Leuk Esterase: x / RBC: x / WBC x   Sq Epi: x / Non Sq Epi: x / Bacteria: x        < from: CT Head No Cont (09.03.24 @ 13:29) >    ACC: [id number] EXAM:  CT CERVICAL SPINE   ORDERED BY: PRINCESS ALBRIGHT     ACC: [id number] EXAM:  CT BRAIN   ORDERED BY: PRINCESS ALBRIGHT     PROCEDURE DATE:  09/03/2024          INTERPRETATION:  CLINICAL INFORMATION: encephalomalcia vs edema/mass    COMPARISON: None.      TECHNIQUE:    CT BRAIN: Serial axial images were obtained from the skull base to the   vertex using multi-slice helical technique. Sagittal and coronal   reformats were obtained.    CT CERVICAL SPINE: Axial images were obtained of the cervical spine using   multislice helical technique. Reformatted coronal and sagittal images   were obtained.    FINDINGS:    CT BRAIN:    VENTRICLES AND SULCI: Age appropriate involutional changes.  INTRA-AXIAL: Encephalomalacic changes are seen in the right frontal   region consistent with a prior insult in this distribution. There are   periventricular and subcortical white matter hypodensities, consistent   with microvascular type changes.  EXTRA-AXIAL: Bifrontal convexity low attenuation collectionsas seen on   the prior with some flattening of the gyri. Findings suggest chronic   hematoma/hygromas which are symmetric and bilateral    VISUALIZED SINUSES:  Clear.  TYMPANOMASTOID CAVITIES:  Clear.  VISUALIZED ORBITS: Normal.  CALVARIUM: Intact.    MISCELLANEOUS: Left-sided cataract surgery    CT CERVICAL SPINE:    VERTEBRAE:  Normal in height. No acute fracture. Multilevel degenerative   changes including marginal osteophytes.  ALIGNMENT: No subluxation or scoliosis.  INTERVERTEBRAL DISC SPACES: No significant disc bulge or focal disc   herniation. No significant spinal canal or neural foraminal stenosis.    VISUALIZED LUNGS: Clear.    MISCELLANEOUS:  None.      IMPRESSION:    CT HEAD:  No acute intracranial hemorrhage, mass effect, or midline shift.   Encephalomalacic changes in the right frontal lobe. Bilateral extra-axial   convexity fluid attenuation collections favor chronic hematomas/hygromas.    CT CERVICAL SPINE:  No acute fracture or traumatic subluxation.    Multi-level degenerative changes.        --- End of Report ---            MARION DÍAZ MD; Attending Radiologist  This document has been electronically signed. Sep  3 2024  1:46PM    < end of copied text >      IMAGING from other MRN:      Radiology:  CTH: No acute intracranial hemorrhage or mass effect. Extensive chronic small   vessel ischemic changes in the frontoparietal periventricular and   subcortical white matter. Indeterminate age bilateral thalamic lacunar   infarcts accommodation of dilated perivascular spaces and lacunar   infarcts in the bilateral basal ganglia. Small chronic appearing   bilateral cerebellar infarcts.    CTA NECK:  No significant stenosis of the cervical carotid arteries based   on NASCET criteria. Patent cervical vertebral arteries. No evidence of   cervical carotid or vertebral artery dissection.    CTA HEAD:  No high-grade stenosis or occlusion of the major proximal   arterial branches.    CT PERFUSION:  Perfusion imaging shows no evidence of a core infarct or   tissue at risk.    < from: MR Head No Cont (05.20.24 @ 20:13) >    ACC: [id number] EXAM:  MR BRAIN   ORDERED BY: ARY POE     PROCEDURE DATE:  05/20/2024          INTERPRETATION:  CLINICAL INDICATION: Patient recently fell at home.   Confused.    TECHNIQUE: MRI of the brain was performed without contrast.    COMPARISON: CT brain 5/19/2024    FINDINGS:  Acute infarct involving the anterior left midbrain.    No acute intracranial hemorrhage, mass effect, or hydrocephalus. No   extra-axial collection. Basal cisterns are patent.    Age-related involutional changes and chronic microvascular ischemic   changes. Chronic lacunar infarcts involving the bilateral thalami. Small   chronic infarcts involving the bilateral cerebral hemispheres.    Ventricles and sulci are age-appropriate in size.    Major intracranial flow-voids are preserved.    Left cataract surgery. The orbits, sellar and suprasellar structures, and   craniocervical junction are otherwise unremarkable. Visualized paranasal   sinuses and mastoid air cells are clear.    IMPRESSION:  Acute infarct involving the anterior left midbrain.    --- End of Report ---              < end of copied text >     1. Left ventricular cavity is normal in size. Left ventricular wall thickness is normal. Left ventricular systolic function is normal with an ejection fraction visually estimated at 60 to 65 %. There are no regional wall motion abnormalities seen.   2. There is mild (grade 1) left ventricular diastolic dysfunction.   3. Normal right ventricular cavity size and probably normal systolic function.   4. Structurally normal mitral valve with normal leaflet excursion. There is calcification of the mitral valve annulus. There is trace mitral regurgitation.   5. The aortic valve appears trileaflet with normal systolic excursion. There is calcification of the aortic valve leaflets. There is mild aortic regurgitation.                
Patient is a 79y old  Female who presents with a chief complaint of agitation (07 Sep 2024 07:23)    Date of servie : 09-07-24 @ 17:28  INTERVAL HPI/OVERNIGHT EVENTS:  T(C): 36.7 (09-07-24 @ 09:42), Max: 37.2 (09-06-24 @ 18:06)  HR: 75 (09-07-24 @ 09:42) (62 - 80)  BP: 132/66 (09-07-24 @ 09:42) (121/63 - 159/70)  RR: 17 (09-07-24 @ 09:42) (17 - 18)  SpO2: 97% (09-07-24 @ 09:42) (96% - 98%)  Wt(kg): --  I&O's Summary    06 Sep 2024 07:01  -  07 Sep 2024 07:00  --------------------------------------------------------  IN: 650 mL / OUT: 0 mL / NET: 650 mL        LABS:                        11.4   8.43  )-----------( 230      ( 07 Sep 2024 06:40 )             34.1     09-07    142  |  107  |  20  ----------------------------<  120<H>  4.0   |  24  |  0.79    Ca    8.6      07 Sep 2024 06:40  Phos  3.5     09-07  Mg     2.10     09-07        Urinalysis Basic - ( 07 Sep 2024 06:40 )    Color: x / Appearance: x / SG: x / pH: x  Gluc: 120 mg/dL / Ketone: x  / Bili: x / Urobili: x   Blood: x / Protein: x / Nitrite: x   Leuk Esterase: x / RBC: x / WBC x   Sq Epi: x / Non Sq Epi: x / Bacteria: x      CAPILLARY BLOOD GLUCOSE      POCT Blood Glucose.: 153 mg/dL (07 Sep 2024 12:39)  POCT Blood Glucose.: 138 mg/dL (07 Sep 2024 09:09)  POCT Blood Glucose.: 118 mg/dL (06 Sep 2024 22:01)  POCT Blood Glucose.: 153 mg/dL (06 Sep 2024 17:47)        Urinalysis Basic - ( 07 Sep 2024 06:40 )    Color: x / Appearance: x / SG: x / pH: x  Gluc: 120 mg/dL / Ketone: x  / Bili: x / Urobili: x   Blood: x / Protein: x / Nitrite: x   Leuk Esterase: x / RBC: x / WBC x   Sq Epi: x / Non Sq Epi: x / Bacteria: x        MEDICATIONS  (STANDING):  aspirin  chewable 81 milliGRAM(s) Oral daily  atorvastatin 20 milliGRAM(s) Oral at bedtime  chlorhexidine 2% Cloths 1 Application(s) Topical daily  citalopram 10 milliGRAM(s) Oral daily  dextrose 5%. 1000 milliLiter(s) (50 mL/Hr) IV Continuous <Continuous>  dextrose 5%. 1000 milliLiter(s) (100 mL/Hr) IV Continuous <Continuous>  dextrose 50% Injectable 12.5 Gram(s) IV Push once  dextrose 50% Injectable 25 Gram(s) IV Push once  dextrose 50% Injectable 25 Gram(s) IV Push once  divalproex  milliGRAM(s) Oral <User Schedule>  enoxaparin Injectable 30 milliGRAM(s) SubCutaneous every 24 hours  glucagon  Injectable 1 milliGRAM(s) IntraMuscular once  insulin lispro (ADMELOG) corrective regimen sliding scale   SubCutaneous at bedtime  insulin lispro (ADMELOG) corrective regimen sliding scale   SubCutaneous three times a day before meals  melatonin 6 milliGRAM(s) Oral at bedtime  memantine 10 milliGRAM(s) Oral daily  metoprolol succinate ER 25 milliGRAM(s) Oral daily  mirtazapine 30 milliGRAM(s) Oral daily  mupirocin 2% Ointment 1 Application(s) Both Nostrils two times a day  NIFEdipine XL 60 milliGRAM(s) Oral daily  pantoprazole    Tablet 40 milliGRAM(s) Oral before breakfast    MEDICATIONS  (PRN):  dextrose Oral Gel 15 Gram(s) Oral once PRN Blood Glucose LESS THAN 70 milliGRAM(s)/deciliter  OLANZapine Injectable 1.25 milliGRAM(s) IntraMuscular every 6 hours PRN agitation          PHYSICAL EXAM:  GENERAL: NAD, well-groomed, well-developed  HEAD:  Atraumatic, Normocephalic  CHEST/LUNG: Clear to percussion bilaterally; No rales, rhonchi, wheezing, or rubs  HEART: Regular rate and rhythm; No murmurs, rubs, or gallops  ABDOMEN: Soft, Nontender, Nondistended; Bowel sounds present  EXTREMITIES:  2+ Peripheral Pulses, No clubbing, cyanosis, or edema  LYMPH: No lymphadenopathy noted  SKIN: No rashes or lesions    Care Discussed with Consultants/Other Providers [ ] YES  [ ] NO
    Subjective: Patient seen and examined. No new events except as noted.     SUBJECTIVE/ROS:  nad      MEDICATIONS:  MEDICATIONS  (STANDING):  aspirin  chewable 81 milliGRAM(s) Oral daily  atorvastatin 20 milliGRAM(s) Oral at bedtime  chlorhexidine 2% Cloths 1 Application(s) Topical daily  citalopram 10 milliGRAM(s) Oral daily  clopidogrel Tablet 75 milliGRAM(s) Oral daily  dextrose 5%. 1000 milliLiter(s) (50 mL/Hr) IV Continuous <Continuous>  dextrose 5%. 1000 milliLiter(s) (100 mL/Hr) IV Continuous <Continuous>  dextrose 50% Injectable 12.5 Gram(s) IV Push once  dextrose 50% Injectable 25 Gram(s) IV Push once  dextrose 50% Injectable 25 Gram(s) IV Push once  enoxaparin Injectable 30 milliGRAM(s) SubCutaneous every 24 hours  glucagon  Injectable 1 milliGRAM(s) IntraMuscular once  insulin lispro (ADMELOG) corrective regimen sliding scale   SubCutaneous at bedtime  insulin lispro (ADMELOG) corrective regimen sliding scale   SubCutaneous three times a day before meals  melatonin 6 milliGRAM(s) Oral at bedtime  memantine 10 milliGRAM(s) Oral daily  metoprolol succinate ER 25 milliGRAM(s) Oral daily  mirtazapine 30 milliGRAM(s) Oral daily  NIFEdipine XL 60 milliGRAM(s) Oral daily  pantoprazole    Tablet 40 milliGRAM(s) Oral before breakfast      PHYSICAL EXAM:  T(C): 36.6 (09-04-24 @ 05:30), Max: 37.6 (09-03-24 @ 11:10)  HR: 83 (09-04-24 @ 05:30) (66 - 83)  BP: 130/82 (09-04-24 @ 05:30) (130/82 - 168/78)  RR: 16 (09-04-24 @ 05:30) (16 - 18)  SpO2: 100% (09-04-24 @ 05:30) (96% - 100%)  Wt(kg): --  I&O's Summary    03 Sep 2024 07:01  -  04 Sep 2024 07:00  --------------------------------------------------------  IN: 0 mL / OUT: 300 mL / NET: -300 mL      Height (cm): 162.6 (09-03 @ 19:32)  Weight (kg): 46.2 (09-03 @ 19:32)  BMI (kg/m2): 17.5 (09-03 @ 19:32)  BSA (m2): 1.47 (09-03 @ 19:32)      JVP: Normal  Neck: supple  Lung: clear   CV: S1 S2 , Murmur:  Abd: soft  Ext: No edema  neuro: Awake / alert  Psych: flat affect  Skin: normal``    LABS/DATA:    CARDIAC MARKERS:                                14.0   8.39  )-----------( 236      ( 04 Sep 2024 05:21 )             41.6     09-04    142  |  105  |  14  ----------------------------<  129<H>  4.3   |  25  |  0.90    Ca    9.6      04 Sep 2024 05:21  Phos  4.4     09-04  Mg     2.20     09-04    TPro  8.1  /  Alb  4.1  /  TBili  0.6  /  DBili  x   /  AST  32  /  ALT  21  /  AlkPhos  135<H>  09-02    proBNP:   Lipid Profile:   HgA1c:   TSH:     TELE:  EKG:        
    Subjective: Patient seen and examined. No new events except as noted.     SUBJECTIVE/ROS:  nad      MEDICATIONS:  MEDICATIONS  (STANDING):  aspirin  chewable 81 milliGRAM(s) Oral daily  atorvastatin 20 milliGRAM(s) Oral at bedtime  chlorhexidine 2% Cloths 1 Application(s) Topical daily  citalopram 10 milliGRAM(s) Oral daily  dextrose 5%. 1000 milliLiter(s) (50 mL/Hr) IV Continuous <Continuous>  dextrose 5%. 1000 milliLiter(s) (100 mL/Hr) IV Continuous <Continuous>  dextrose 50% Injectable 25 Gram(s) IV Push once  dextrose 50% Injectable 12.5 Gram(s) IV Push once  dextrose 50% Injectable 25 Gram(s) IV Push once  enoxaparin Injectable 30 milliGRAM(s) SubCutaneous every 24 hours  glucagon  Injectable 1 milliGRAM(s) IntraMuscular once  insulin lispro (ADMELOG) corrective regimen sliding scale   SubCutaneous at bedtime  insulin lispro (ADMELOG) corrective regimen sliding scale   SubCutaneous three times a day before meals  melatonin 6 milliGRAM(s) Oral at bedtime  memantine 10 milliGRAM(s) Oral daily  metoprolol succinate ER 25 milliGRAM(s) Oral daily  mirtazapine 30 milliGRAM(s) Oral daily  mupirocin 2% Ointment 1 Application(s) Both Nostrils two times a day  NIFEdipine XL 60 milliGRAM(s) Oral daily  pantoprazole    Tablet 40 milliGRAM(s) Oral before breakfast      PHYSICAL EXAM:  T(C): 36.5 (09-06-24 @ 06:03), Max: 36.9 (09-05-24 @ 21:25)  HR: 75 (09-06-24 @ 06:03) (75 - 98)  BP: 133/88 (09-06-24 @ 06:03) (116/63 - 149/81)  RR: 18 (09-06-24 @ 06:03) (18 - 19)  SpO2: 96% (09-06-24 @ 06:03) (96% - 100%)  Wt(kg): --  I&O's Summary    05 Sep 2024 07:01  -  06 Sep 2024 07:00  --------------------------------------------------------  IN: 600 mL / OUT: 0 mL / NET: 600 mL            JVP: Normal  Neck: supple  Lung: clear   CV: S1 S2 , Murmur:  Abd: soft  Ext: No edema  neuro: Awake / alert  Psych: flat affect  Skin: normal``    LABS/DATA:    CARDIAC MARKERS:                                11.7   9.31  )-----------( 221      ( 06 Sep 2024 06:00 )             34.3     09-06    142  |  108<H>  |  x   ----------------------------<  x   3.8   |  x   |  x     Ca    8.8      05 Sep 2024 05:50  Phos  4.2     09-05  Mg     2.10     09-06      proBNP:   Lipid Profile:   HgA1c:   TSH:     TELE:  EKG:        
Patient is a 79y old  Female who presents with a chief complaint of agitation (05 Sep 2024 07:01)    Date of servie : 09-05-24 @ 18:07  INTERVAL HPI/OVERNIGHT EVENTS:  T(C): 36.7 (09-05-24 @ 17:36), Max: 37.1 (09-04-24 @ 22:08)  HR: 91 (09-05-24 @ 17:36) (66 - 98)  BP: 139/74 (09-05-24 @ 17:36) (105/57 - 139/74)  RR: 18 (09-05-24 @ 17:36) (17 - 19)  SpO2: 100% (09-05-24 @ 17:36) (97% - 100%)  Wt(kg): --  I&O's Summary    04 Sep 2024 07:01  -  05 Sep 2024 07:00  --------------------------------------------------------  IN: 540 mL / OUT: 700 mL / NET: -160 mL    05 Sep 2024 07:01  -  05 Sep 2024 18:07  --------------------------------------------------------  IN: 400 mL / OUT: 0 mL / NET: 400 mL        LABS:                        12.0   8.41  )-----------( 224      ( 05 Sep 2024 05:50 )             35.8     09-05    141  |  105  |  30<H>  ----------------------------<  128<H>  4.3   |  24  |  1.10    Ca    8.8      05 Sep 2024 05:50  Phos  4.2     09-05  Mg     2.20     09-05        Urinalysis Basic - ( 05 Sep 2024 05:50 )    Color: x / Appearance: x / SG: x / pH: x  Gluc: 128 mg/dL / Ketone: x  / Bili: x / Urobili: x   Blood: x / Protein: x / Nitrite: x   Leuk Esterase: x / RBC: x / WBC x   Sq Epi: x / Non Sq Epi: x / Bacteria: x      CAPILLARY BLOOD GLUCOSE      POCT Blood Glucose.: 107 mg/dL (05 Sep 2024 17:31)  POCT Blood Glucose.: 152 mg/dL (05 Sep 2024 13:22)  POCT Blood Glucose.: 139 mg/dL (05 Sep 2024 08:45)  POCT Blood Glucose.: 148 mg/dL (04 Sep 2024 22:06)        Urinalysis Basic - ( 05 Sep 2024 05:50 )    Color: x / Appearance: x / SG: x / pH: x  Gluc: 128 mg/dL / Ketone: x  / Bili: x / Urobili: x   Blood: x / Protein: x / Nitrite: x   Leuk Esterase: x / RBC: x / WBC x   Sq Epi: x / Non Sq Epi: x / Bacteria: x        MEDICATIONS  (STANDING):  aspirin  chewable 81 milliGRAM(s) Oral daily  atorvastatin 20 milliGRAM(s) Oral at bedtime  chlorhexidine 2% Cloths 1 Application(s) Topical daily  citalopram 10 milliGRAM(s) Oral daily  dextrose 5%. 1000 milliLiter(s) (50 mL/Hr) IV Continuous <Continuous>  dextrose 5%. 1000 milliLiter(s) (100 mL/Hr) IV Continuous <Continuous>  dextrose 50% Injectable 25 Gram(s) IV Push once  dextrose 50% Injectable 12.5 Gram(s) IV Push once  dextrose 50% Injectable 25 Gram(s) IV Push once  enoxaparin Injectable 30 milliGRAM(s) SubCutaneous every 24 hours  glucagon  Injectable 1 milliGRAM(s) IntraMuscular once  insulin lispro (ADMELOG) corrective regimen sliding scale   SubCutaneous at bedtime  insulin lispro (ADMELOG) corrective regimen sliding scale   SubCutaneous three times a day before meals  melatonin 6 milliGRAM(s) Oral at bedtime  memantine 10 milliGRAM(s) Oral daily  metoprolol succinate ER 25 milliGRAM(s) Oral daily  mirtazapine 30 milliGRAM(s) Oral daily  mupirocin 2% Ointment 1 Application(s) Both Nostrils two times a day  NIFEdipine XL 60 milliGRAM(s) Oral daily  OLANZapine Injectable 1.25 milliGRAM(s) IntraMuscular once  pantoprazole    Tablet 40 milliGRAM(s) Oral before breakfast    MEDICATIONS  (PRN):  dextrose Oral Gel 15 Gram(s) Oral once PRN Blood Glucose LESS THAN 70 milliGRAM(s)/deciliter  OLANZapine Injectable 1.25 milliGRAM(s) IntraMuscular every 6 hours PRN agitation          PHYSICAL EXAM:  GENERAL: NAD, well-groomed, well-developed  HEAD:  Atraumatic, Normocephalic  CHEST/LUNG: Clear to percussion bilaterally; No rales, rhonchi, wheezing, or rubs  HEART: Regular rate and rhythm; No murmurs, rubs, or gallops  ABDOMEN: Soft, Nontender, Nondistended; Bowel sounds present  EXTREMITIES:  2+ Peripheral Pulses, No clubbing, cyanosis, or edema  LYMPH: No lymphadenopathy noted  SKIN: No rashes or lesions    Care Discussed with Consultants/Other Providers [ ] YES  [ ] NO
Patient is a 79y old  Female who presents with a chief complaint of agitation (08 Sep 2024 12:32)    Date of servie : 09-08-24 @ 17:10  INTERVAL HPI/OVERNIGHT EVENTS:  T(C): 37.1 (09-08-24 @ 16:54), Max: 37.1 (09-08-24 @ 16:54)  HR: 77 (09-08-24 @ 16:54) (70 - 84)  BP: 116/73 (09-08-24 @ 16:54) (116/70 - 128/70)  RR: 18 (09-08-24 @ 16:54) (17 - 18)  SpO2: 100% (09-08-24 @ 16:54) (95% - 100%)  Wt(kg): --  I&O's Summary    08 Sep 2024 07:01  -  08 Sep 2024 17:10  --------------------------------------------------------  IN: 0 mL / OUT: 400 mL / NET: -400 mL        LABS:                        11.8   8.19  )-----------( 219      ( 08 Sep 2024 07:00 )             35.0     09-08    142  |  108<H>  |  22  ----------------------------<  104<H>  3.9   |  23  |  0.73    Ca    8.7      08 Sep 2024 07:00  Phos  3.7     09-08  Mg     2.10     09-08        Urinalysis Basic - ( 08 Sep 2024 07:00 )    Color: x / Appearance: x / SG: x / pH: x  Gluc: 104 mg/dL / Ketone: x  / Bili: x / Urobili: x   Blood: x / Protein: x / Nitrite: x   Leuk Esterase: x / RBC: x / WBC x   Sq Epi: x / Non Sq Epi: x / Bacteria: x      CAPILLARY BLOOD GLUCOSE      POCT Blood Glucose.: 144 mg/dL (08 Sep 2024 12:26)  POCT Blood Glucose.: 118 mg/dL (08 Sep 2024 09:09)  POCT Blood Glucose.: 124 mg/dL (07 Sep 2024 22:20)  POCT Blood Glucose.: 187 mg/dL (07 Sep 2024 17:32)        Urinalysis Basic - ( 08 Sep 2024 07:00 )    Color: x / Appearance: x / SG: x / pH: x  Gluc: 104 mg/dL / Ketone: x  / Bili: x / Urobili: x   Blood: x / Protein: x / Nitrite: x   Leuk Esterase: x / RBC: x / WBC x   Sq Epi: x / Non Sq Epi: x / Bacteria: x        MEDICATIONS  (STANDING):  aspirin  chewable 81 milliGRAM(s) Oral daily  atorvastatin 20 milliGRAM(s) Oral at bedtime  chlorhexidine 2% Cloths 1 Application(s) Topical daily  citalopram 10 milliGRAM(s) Oral daily  dextrose 5%. 1000 milliLiter(s) (50 mL/Hr) IV Continuous <Continuous>  dextrose 5%. 1000 milliLiter(s) (100 mL/Hr) IV Continuous <Continuous>  dextrose 50% Injectable 12.5 Gram(s) IV Push once  dextrose 50% Injectable 25 Gram(s) IV Push once  dextrose 50% Injectable 25 Gram(s) IV Push once  divalproex  milliGRAM(s) Oral <User Schedule>  enoxaparin Injectable 30 milliGRAM(s) SubCutaneous every 24 hours  glucagon  Injectable 1 milliGRAM(s) IntraMuscular once  insulin lispro (ADMELOG) corrective regimen sliding scale   SubCutaneous at bedtime  insulin lispro (ADMELOG) corrective regimen sliding scale   SubCutaneous three times a day before meals  melatonin 6 milliGRAM(s) Oral at bedtime  memantine 10 milliGRAM(s) Oral daily  metoprolol succinate ER 25 milliGRAM(s) Oral daily  mirtazapine 30 milliGRAM(s) Oral daily  mupirocin 2% Ointment 1 Application(s) Both Nostrils two times a day  NIFEdipine XL 60 milliGRAM(s) Oral daily  pantoprazole    Tablet 40 milliGRAM(s) Oral before breakfast    MEDICATIONS  (PRN):  dextrose Oral Gel 15 Gram(s) Oral once PRN Blood Glucose LESS THAN 70 milliGRAM(s)/deciliter  OLANZapine Injectable 1.25 milliGRAM(s) IntraMuscular every 6 hours PRN agitation          PHYSICAL EXAM:  GENERAL: NAD, well-groomed, well-developed  HEAD:  Atraumatic, Normocephalic  CHEST/LUNG: Clear to percussion bilaterally; No rales, rhonchi, wheezing, or rubs  HEART: Regular rate and rhythm; No murmurs, rubs, or gallops  ABDOMEN: Soft, Nontender, Nondistended; Bowel sounds present  EXTREMITIES:  2+ Peripheral Pulses, No clubbing, cyanosis, or edema  LYMPH: No lymphadenopathy noted  SKIN: No rashes or lesions    Care Discussed with Consultants/Other Providers [ ] YES  [ ] NO
Patient is a 79y old  Female who presents with a chief complaint of Restlessness and agitation  Per chart review, Patient is a  78-year-old female past medical history dementia presents to the ED after noted to be agitated and pulled out her colostomy bag earlier today. As per patient's daughter Kaitlin who called from [place], the patient's  called her to let her know that the patient was agitated and fell.  Kaitlin also states that the patient lives at home and is unable to take care of herself when she is more agitated and demented.  States that the patient's  is not able to take care of her as well.   (04 Sep 2024 11:45)    Date of servie : 09-04-24 @ 14:20  INTERVAL HPI/OVERNIGHT EVENTS:  T(C): 36.8 (09-04-24 @ 13:43), Max: 36.8 (09-04-24 @ 13:43)  HR: 82 (09-04-24 @ 13:43) (66 - 83)  BP: 142/75 (09-04-24 @ 13:43) (130/82 - 168/78)  RR: 18 (09-04-24 @ 13:43) (16 - 18)  SpO2: 100% (09-04-24 @ 13:43) (96% - 100%)  Wt(kg): --  I&O's Summary    03 Sep 2024 07:01  -  04 Sep 2024 07:00  --------------------------------------------------------  IN: 0 mL / OUT: 300 mL / NET: -300 mL        LABS:                        14.0   8.39  )-----------( 236      ( 04 Sep 2024 05:21 )             41.6     09-04    142  |  105  |  14  ----------------------------<  129<H>  4.3   |  25  |  0.90    Ca    9.6      04 Sep 2024 05:21  Phos  4.4     09-04  Mg     2.20     09-04    TPro  8.1  /  Alb  4.1  /  TBili  0.6  /  DBili  x   /  AST  32  /  ALT  21  /  AlkPhos  135<H>  09-02    PT/INR - ( 02 Sep 2024 23:29 )   PT: 10.4 sec;   INR: 0.92 ratio         PTT - ( 02 Sep 2024 23:29 )  PTT:33.4 sec  Urinalysis Basic - ( 04 Sep 2024 05:21 )    Color: x / Appearance: x / SG: x / pH: x  Gluc: 129 mg/dL / Ketone: x  / Bili: x / Urobili: x   Blood: x / Protein: x / Nitrite: x   Leuk Esterase: x / RBC: x / WBC x   Sq Epi: x / Non Sq Epi: x / Bacteria: x      CAPILLARY BLOOD GLUCOSE      POCT Blood Glucose.: 161 mg/dL (04 Sep 2024 12:29)  POCT Blood Glucose.: 134 mg/dL (04 Sep 2024 08:36)  POCT Blood Glucose.: 101 mg/dL (03 Sep 2024 21:21)  POCT Blood Glucose.: 202 mg/dL (03 Sep 2024 15:51)        Urinalysis Basic - ( 04 Sep 2024 05:21 )    Color: x / Appearance: x / SG: x / pH: x  Gluc: 129 mg/dL / Ketone: x  / Bili: x / Urobili: x   Blood: x / Protein: x / Nitrite: x   Leuk Esterase: x / RBC: x / WBC x   Sq Epi: x / Non Sq Epi: x / Bacteria: x        MEDICATIONS  (STANDING):  aspirin  chewable 81 milliGRAM(s) Oral daily  atorvastatin 20 milliGRAM(s) Oral at bedtime  chlorhexidine 2% Cloths 1 Application(s) Topical daily  citalopram 10 milliGRAM(s) Oral daily  clopidogrel Tablet 75 milliGRAM(s) Oral daily  dextrose 5%. 1000 milliLiter(s) (50 mL/Hr) IV Continuous <Continuous>  dextrose 5%. 1000 milliLiter(s) (100 mL/Hr) IV Continuous <Continuous>  dextrose 50% Injectable 12.5 Gram(s) IV Push once  dextrose 50% Injectable 25 Gram(s) IV Push once  dextrose 50% Injectable 25 Gram(s) IV Push once  enoxaparin Injectable 30 milliGRAM(s) SubCutaneous every 24 hours  glucagon  Injectable 1 milliGRAM(s) IntraMuscular once  insulin lispro (ADMELOG) corrective regimen sliding scale   SubCutaneous at bedtime  insulin lispro (ADMELOG) corrective regimen sliding scale   SubCutaneous three times a day before meals  melatonin 6 milliGRAM(s) Oral at bedtime  memantine 10 milliGRAM(s) Oral daily  metoprolol succinate ER 25 milliGRAM(s) Oral daily  mirtazapine 30 milliGRAM(s) Oral daily  NIFEdipine XL 60 milliGRAM(s) Oral daily  pantoprazole    Tablet 40 milliGRAM(s) Oral before breakfast    MEDICATIONS  (PRN):  dextrose Oral Gel 15 Gram(s) Oral once PRN Blood Glucose LESS THAN 70 milliGRAM(s)/deciliter          PHYSICAL EXAM:  GENERAL: NAD, well-groomed, well-developed  HEAD:  Atraumatic, Normocephalic  CHEST/LUNG: Clear to percussion bilaterally; No rales, rhonchi, wheezing, or rubs  HEART: Regular rate and rhythm; No murmurs, rubs, or gallops  ABDOMEN: Soft, Nontender, Nondistended; Bowel sounds present  EXTREMITIES:  2+ Peripheral Pulses, No clubbing, cyanosis, or edema  LYMPH: No lymphadenopathy noted  SKIN: No rashes or lesions    Care Discussed with Consultants/Other Providers [ ] YES  [ ] NO
Patient is a 79y old  Female who presents with a chief complaint of agitation (06 Sep 2024 07:29)    Date of servie : 09-06-24 @ 16:15  INTERVAL HPI/OVERNIGHT EVENTS:  T(C): 36.9 (09-06-24 @ 15:13), Max: 36.9 (09-05-24 @ 21:25)  HR: 87 (09-06-24 @ 15:13) (71 - 91)  BP: 141/80 (09-06-24 @ 15:13) (133/75 - 149/81)  RR: 17 (09-06-24 @ 15:13) (17 - 18)  SpO2: 95% (09-06-24 @ 15:13) (95% - 100%)  Wt(kg): --  I&O's Summary    05 Sep 2024 07:01  -  06 Sep 2024 07:00  --------------------------------------------------------  IN: 600 mL / OUT: 0 mL / NET: 600 mL    06 Sep 2024 07:01  -  06 Sep 2024 16:15  --------------------------------------------------------  IN: 400 mL / OUT: 0 mL / NET: 400 mL        LABS:                        11.7   9.31  )-----------( 221      ( 06 Sep 2024 06:00 )             34.3     09-06    142  |  108<H>  |  16  ----------------------------<  112<H>  3.8   |  25  |  0.68    Ca    9.0      06 Sep 2024 06:00  Phos  3.0     09-06  Mg     2.10     09-06        Urinalysis Basic - ( 06 Sep 2024 06:00 )    Color: x / Appearance: x / SG: x / pH: x  Gluc: 112 mg/dL / Ketone: x  / Bili: x / Urobili: x   Blood: x / Protein: x / Nitrite: x   Leuk Esterase: x / RBC: x / WBC x   Sq Epi: x / Non Sq Epi: x / Bacteria: x      CAPILLARY BLOOD GLUCOSE      POCT Blood Glucose.: 170 mg/dL (06 Sep 2024 13:13)  POCT Blood Glucose.: 127 mg/dL (06 Sep 2024 08:40)  POCT Blood Glucose.: 135 mg/dL (05 Sep 2024 22:09)  POCT Blood Glucose.: 107 mg/dL (05 Sep 2024 17:31)        Urinalysis Basic - ( 06 Sep 2024 06:00 )    Color: x / Appearance: x / SG: x / pH: x  Gluc: 112 mg/dL / Ketone: x  / Bili: x / Urobili: x   Blood: x / Protein: x / Nitrite: x   Leuk Esterase: x / RBC: x / WBC x   Sq Epi: x / Non Sq Epi: x / Bacteria: x        MEDICATIONS  (STANDING):  aspirin  chewable 81 milliGRAM(s) Oral daily  atorvastatin 20 milliGRAM(s) Oral at bedtime  chlorhexidine 2% Cloths 1 Application(s) Topical daily  citalopram 10 milliGRAM(s) Oral daily  dextrose 5%. 1000 milliLiter(s) (50 mL/Hr) IV Continuous <Continuous>  dextrose 5%. 1000 milliLiter(s) (100 mL/Hr) IV Continuous <Continuous>  dextrose 50% Injectable 25 Gram(s) IV Push once  dextrose 50% Injectable 12.5 Gram(s) IV Push once  dextrose 50% Injectable 25 Gram(s) IV Push once  enoxaparin Injectable 30 milliGRAM(s) SubCutaneous every 24 hours  glucagon  Injectable 1 milliGRAM(s) IntraMuscular once  insulin lispro (ADMELOG) corrective regimen sliding scale   SubCutaneous at bedtime  insulin lispro (ADMELOG) corrective regimen sliding scale   SubCutaneous three times a day before meals  melatonin 6 milliGRAM(s) Oral at bedtime  memantine 10 milliGRAM(s) Oral daily  metoprolol succinate ER 25 milliGRAM(s) Oral daily  mirtazapine 30 milliGRAM(s) Oral daily  mupirocin 2% Ointment 1 Application(s) Both Nostrils two times a day  NIFEdipine XL 60 milliGRAM(s) Oral daily  pantoprazole    Tablet 40 milliGRAM(s) Oral before breakfast  potassium chloride  10 mEq/100 mL IVPB 10 milliEquivalent(s) IV Intermittent every 1 hour    MEDICATIONS  (PRN):  dextrose Oral Gel 15 Gram(s) Oral once PRN Blood Glucose LESS THAN 70 milliGRAM(s)/deciliter  OLANZapine Injectable 1.25 milliGRAM(s) IntraMuscular every 6 hours PRN agitation          PHYSICAL EXAM:  GENERAL: frail  CHEST/LUNG: Clear to percussion bilaterally; No rales, rhonchi, wheezing, or rubs  HEART: Regular rate and rhythm; No murmurs, rubs, or gallops  ABDOMEN: Soft, Nontender, Nondistended; Bowel sounds present  EXTREMITIES:  2+ Peripheral Pulses, No clubbing, cyanosis, or edema  LYMPH: No lymphadenopathy noted  SKIN: No rashes or lesions    Care Discussed with Consultants/Other Providers [ ] YES  [ ] NO

## 2024-09-09 NOTE — DISCHARGE NOTE NURSING/CASE MANAGEMENT/SOCIAL WORK - PATIENT PORTAL LINK FT
You can access the FollowMyHealth Patient Portal offered by [facility] by registering at the following website: [email or web address]. By joining [employer]’s FollowMyHealth portal, you will also be able to view your health information using other applications (apps) compatible with our system.

## 2024-09-09 NOTE — DISCHARGE NOTE NURSING/CASE MANAGEMENT/SOCIAL WORK - NSDCPEFALRISK_GEN_ALL_CORE
For information on Fall & Injury Prevention, visit: [email or web address] OR  [email or web address] OR  https://www.cdc.gov/steadi/patient.html

## 2024-09-09 NOTE — PROGRESS NOTE ADULT - PROVIDER SPECIALTY LIST ADULT
Hospitalist
Neurology
Cardiology
Cardiology
Hospitalist
Hospitalist
Neurology
Cardiology
Hospitalist
Cardiology
Hospitalist

## 2024-09-09 NOTE — PROGRESS NOTE ADULT - ASSESSMENT
78-year-old female past medical history dementia presents to the ED after noted to be agitated and pulled out her colostomy bag earlier today. As per patient's daughter Kaitlin who called from [place], the patient's  called her to let her know that the patient was agitated and fell.  Kaitlin also states that the patient lives at home and is unable to take care of herself when she is more agitated and demented.  States that the patient's  is not able to take care of her as well.      Dementia with behavioral disturbances  - agitated at home  - family unable to take care of the pt  - neuro following  -  psych consult     Hx of CVA  - cw asa, plavix  - neuro following     DM  - hold metformin  - monitor FS  - ISS    PT and rehab planning 
 78-year-old female past medical history dementia presents to the ED after noted to be agitated and pulled out her colostomy bag earlier today. As per patient's daughter Kaitlin who called from [place], the patient's  called her to let her know that the patient was agitated and fell.  Kaitlin also states that the patient lives at home and is unable to take care of herself when she is more agitated and demented.  States that the patient's  is not able to take care of her as well.      Dementia with behavioral disturbances  - agitated at home  - family unable to take care of the pt  - neuro following  -  psych consult     Hx of CVA  - cw asa, plavix  - neuro following     DM  - hold metformin  - monitor FS  - ISS    PT and rehab planning   
HTN  cont current meds    history of CVA  echo may 2024 ( different MR number ) normal LV /RV function   statin   antiplt therapy as per neurology     DM II  Monitor finger stick. Insulin coverage. Diabetic education and Diabetic diet. Consider nutrition consultation.    
HTN  cont current meds    history of CVA  echo may 2024 ( different MR number ) normal LV /RV function   statin   antiplt therapy as per neurology     DM II  Monitor finger stick. Insulin coverage. Diabetic education and Diabetic diet. Consider nutrition consultation.    
 78-year-old female past medical history dementia presents to the ED after noted to be agitated and pulled out her colostomy bag earlier today. As per patient's daughter Kaitlin who called from [place], the patient's  called her to let her know that the patient was agitated and fell.  Kaitlin also states that the patient lives at home and is unable to take care of herself when she is more agitated and demented.  States that the patient's  is not able to take care of her as well.      Dementia with behavioral disturbances  - agitated at home  - family unable to take care of the pt  - neuro following  -  psych consult     Hx of CVA  - cw asa, plavix  - neuro following     DM  - hold metformin  - monitor FS  - ISS    PT and rehab planning   
HTN  cont current meds    history of CVA  echo may 2024 ( different MR number ) normal LV /RV function   statin   antiplt therapy as per neurology     DM II  Monitor finger stick. Insulin coverage. Diabetic education and Diabetic diet. Consider nutrition consultation.    
78F with dementia (on memantine), recent sigmoid diverticulitis s/p ostomy, chronic stroke, T2DM, HTN, HLD, glaucoma, OA of the L knee, PERFECTO, here with agitation after pulling out her colostomy bag earlier today.   Prior workup in other chart:  CTH with bilateral thalamic and basal ganglia infarcts. Thalamic infarcts appear more subacute. Also with chronic cerebellar infarcts. CTA with mild ICA narrowing  MRI brain with L anterior midbrain infarct  Currently CTH motion degraded  Subtle R hemiparesis on exam      Chronic small vessel appearing strokes  Dementia w/ behavioral disturbance    - aspirin 81mg daily. No need for plavix at this time  - statin for secondary stroke prevention , prior LDL 49  - cont memantine 10mg daily  - on mirtazapine and melatonin at night  - depakote started for agitation, monitor lfts, plts   - BP normotensive   - PT/OT  - DVT ppx   - delirium precautions   - Differential diagnosis and plan of care discussed with patient and/or family and primary team  - Thank you for allowing me to participate in the care of this patient. Call with questions.     Allison Vinson DO  Vascular Neurology  Office [phone]  
HTN  cont current meds    history of CVA  echo may 2024 ( different MR number ) normal LV /RV function   statin   antiplt therapy as per neurology     DM II  Monitor finger stick. Insulin coverage. Diabetic education and Diabetic diet. Consider nutrition consultation.    
HTN  cont current meds    history of CVA  echo may 2024 ( different MR number ) normal LV /RV function   statin   antiplt therapy as per neurology     DM II  Monitor finger stick. Insulin coverage. Diabetic education and Diabetic diet. Consider nutrition consultation.    
 78-year-old female past medical history dementia presents to the ED after noted to be agitated and pulled out her colostomy bag earlier today. As per patient's daughter Kaitlin who called from [place], the patient's  called her to let her know that the patient was agitated and fell.  Kaitlin also states that the patient lives at home and is unable to take care of herself when she is more agitated and demented.  States that the patient's  is not able to take care of her as well.      Dementia with behavioral disturbances  - agitated at home  - family unable to take care of the pt  - neuro following  -  psych consult     Hx of CVA  - cw asa, plavix  - neuro following     DM  - hold metformin  - monitor FS  - ISS    PT and rehab planning   
78F with dementia (on memantine), recent sigmoid diverticulitis s/p ostomy, chronic stroke, T2DM, HTN, HLD, glaucoma, OA of the L knee, PERFECTO, here with agitation after pulling out her colostomy bag earlier today.   Prior workup in other chart:  CTH with bilateral thalamic and basal ganglia infarcts. Thalamic infarcts appear more subacute. Also with chronic cerebellar infarcts. CTA with mild ICA narrowing  MRI brain with L anterior midbrain infarct  Currently CTH motion degraded  Subtle R hemiparesis on exam      Chronic small vessel appearing strokes  Dementia w/ behavioral disturbance    - aspirin 81mg daily. No need for plavix at this time  - statin for secondary stroke prevention , prior LDL 49  - cont memantine 10mg daily  - on mirtazapine and melatonin at night  - consider starting low dose seroquel as needed for agitation if qtc < 500  - BP normotensive   - PT/OT  - DVT ppx   - delirium precautions   - Differential diagnosis and plan of care discussed with patient and/or family and primary team  - Thank you for allowing me to participate in the care of this patient. Call with questions.     Allison Vinson DO  Vascular Neurology  Office [phone]  
HTN  cont current meds    history of CVA  echo may 2024 ( different MR number ) normal LV /RV function   statin   antiplt therapy as per neurology     DM II  Monitor finger stick. Insulin coverage. Diabetic education and Diabetic diet. Consider nutrition consultation.    
 78-year-old female past medical history dementia presents to the ED after noted to be agitated and pulled out her colostomy bag earlier today. As per patient's daughter Kaitlin who called from [place], the patient's  called her to let her know that the patient was agitated and fell.  Kaitlin also states that the patient lives at home and is unable to take care of herself when she is more agitated and demented.  States that the patient's  is not able to take care of her as well.      Dementia with behavioral disturbances  - agitated at home  - family unable to take care of the pt  - neuro following  -  psych consult     Hx of CVA  - cw asa, plavix  - neuro following     DM  - hold metformin  - monitor FS  - ISS    PT and rehab planning

## 2024-09-10 ENCOUNTER — APPOINTMENT (OUTPATIENT)
Dept: HOME HEALTH SERVICES | Facility: HOME HEALTH | Age: 79
End: 2024-09-10
Payer: MEDICARE

## 2024-09-10 VITALS — DIASTOLIC BLOOD PRESSURE: 54 MMHG | SYSTOLIC BLOOD PRESSURE: 90 MMHG | HEART RATE: 76 BPM | OXYGEN SATURATION: 98 %

## 2024-09-10 VITALS — OXYGEN SATURATION: 98 % | SYSTOLIC BLOOD PRESSURE: 90 MMHG | HEART RATE: 76 BPM | DIASTOLIC BLOOD PRESSURE: 54 MMHG

## 2024-09-10 DIAGNOSIS — E08.49 DIABETES MELLITUS DUE TO UNDERLYING CONDITION WITH OTHER DIABETIC NEUROLOGICAL COMPLICATION: ICD-10-CM

## 2024-09-10 DIAGNOSIS — Z79.4 DIABETES MELLITUS DUE TO UNDERLYING CONDITION WITH OTHER DIABETIC NEUROLOGICAL COMPLICATION: ICD-10-CM

## 2024-09-10 DIAGNOSIS — K94.00 COLOSTOMY COMPLICATION, UNSPECIFIED: ICD-10-CM

## 2024-09-10 DIAGNOSIS — Z23 ENCOUNTER FOR IMMUNIZATION: ICD-10-CM

## 2024-09-10 DIAGNOSIS — F03.90 UNSPECIFIED DEMENTIA W/OUT BEHAVIORAL DISTURBANCE: ICD-10-CM

## 2024-09-10 PROCEDURE — 99495 TRANSJ CARE MGMT MOD F2F 14D: CPT

## 2024-09-10 NOTE — COMPREHENSIVE ASSESSMENT
[Comprehensive Assessment Reviewed] : Comprehensive assessment reviewed [No Action Needed] : No action needed Ambulatory

## 2024-09-11 PROBLEM — Z23 ENCOUNTER FOR IMMUNIZATION: Status: ACTIVE | Noted: 2024-09-11 | Resolved: 2024-09-25

## 2024-09-11 NOTE — PHYSICAL EXAM
[No Acute Distress] : no acute distress [Normal Sclera/Conjunctiva] : normal sclera/conjunctiva [PERRL] : pupils equal, round and reactive to light [EOMI] : extra ocular movement intact [Normal Outer Ear/Nose] : the ears and nose were normal in appearance [Normal Oropharynx] : the oropharynx was normal [Normal TMs] : both tympanic membranes were normal [Normal Nasal Mucosa] : the nasal mucosa was normal [No JVD] : no jugular venous distention [Supple] : the neck was supple [No LAD] : no lymphadenopathy [Thyroid Normal, No Nodules] : the thyroid was normal and there were no nodules present [No Respiratory Distress] : no respiratory distress [Clear to Auscultation] : lungs were clear to auscultation bilaterally [No Accessory Muscle Use] : no accessory muscle use [Normal Rate] : heart rate was normal  [Regular Rhythm] : with a regular rhythm [Normal S1, S2] : normal S1 and S2 [No Murmurs] : no murmurs heard [Breast Exam Declined] : patient declined to have breast exam done [Normal Bowel Sounds] : normal bowel sounds [Non Tender] : non-tender [Soft] : abdomen soft [Not Distended] : not distended [Patient Refused] : rectal exam was refused by the patient [Normal Supraclavicular Nodes] : no supraclavicular lymphadenopathy [Normal Axillary Nodes] : no axillary lymphadenopathy [Normal Anterior Cervical Nodes] : no anterior cervical lymphadenopathy [No CVA Tenderness] : no ~M costovertebral angle tenderness [No Spinal Tenderness] : no spinal tenderness [No Clubbing, Cyanosis] : no clubbing  or cyanosis of the fingernails [No Rash] : no rash [No Skin Lesions] : no skin lesions [Cranial Nerves Intact] : cranial nerves 2-12 were intact [No Gross Sensory Deficits] : no gross sensory deficits [Normal Affect] : the affect was normal [Kyphosis] : no kyphosis present [Foot Ulcers] : no foot ulcers [de-identified] : Remote and recent memory not intact but able to answer most questions  [de-identified] : unsteady gait [de-identified] : AOx2, remote and recent memory not intact.

## 2024-09-11 NOTE — REVIEW OF SYSTEMS
[Muscle Weakness] : muscle weakness [Confusion] : confusion [Memory Loss] : memory loss [Unsteady Walking] : ataxia [Depression] : depression [Negative] : Heme/Lymph [Muscle Pain] : no muscle pain [Back Pain] : no back pain [Headache] : no headache [Dizziness] : no dizziness [Fainting] : no fainting [FreeTextEntry7] : Poor appetite since 12/2023 after gallbladder surgery [FreeTextEntry9] : unsteady gait [de-identified] : complains of feeling depressed, quiet, not motivated, poor appetite since surgery in 12/2023

## 2024-09-11 NOTE — PHYSICAL EXAM
[No Acute Distress] : no acute distress [Normal Sclera/Conjunctiva] : normal sclera/conjunctiva [PERRL] : pupils equal, round and reactive to light [EOMI] : extra ocular movement intact [Normal Outer Ear/Nose] : the ears and nose were normal in appearance [Normal Oropharynx] : the oropharynx was normal [Normal TMs] : both tympanic membranes were normal [Normal Nasal Mucosa] : the nasal mucosa was normal [No JVD] : no jugular venous distention [Supple] : the neck was supple [No LAD] : no lymphadenopathy [Thyroid Normal, No Nodules] : the thyroid was normal and there were no nodules present [No Respiratory Distress] : no respiratory distress [Clear to Auscultation] : lungs were clear to auscultation bilaterally [No Accessory Muscle Use] : no accessory muscle use [Normal Rate] : heart rate was normal  [Regular Rhythm] : with a regular rhythm [Normal S1, S2] : normal S1 and S2 [No Murmurs] : no murmurs heard [Breast Exam Declined] : patient declined to have breast exam done [Normal Bowel Sounds] : normal bowel sounds [Non Tender] : non-tender [Soft] : abdomen soft [Not Distended] : not distended [Patient Refused] : rectal exam was refused by the patient [Normal Supraclavicular Nodes] : no supraclavicular lymphadenopathy [Normal Axillary Nodes] : no axillary lymphadenopathy [Normal Anterior Cervical Nodes] : no anterior cervical lymphadenopathy [No CVA Tenderness] : no ~M costovertebral angle tenderness [No Spinal Tenderness] : no spinal tenderness [No Clubbing, Cyanosis] : no clubbing  or cyanosis of the fingernails [No Rash] : no rash [No Skin Lesions] : no skin lesions [Cranial Nerves Intact] : cranial nerves 2-12 were intact [No Gross Sensory Deficits] : no gross sensory deficits [Normal Affect] : the affect was normal [Kyphosis] : no kyphosis present [Foot Ulcers] : no foot ulcers [de-identified] : Remote and recent memory not intact but able to answer most questions  [de-identified] : unsteady gait [de-identified] : AOx2, remote and recent memory not intact.

## 2024-09-11 NOTE — HEALTH RISK ASSESSMENT
[Independent] : feeding [Some assistance needed] : using telephone [Full assistance needed] : managing medications [] : managing finances [Any fall with injury in past year] : Patient reported fall with injury in the past year [Yes] : The patient has visual impairment [HRA Reviewed] : Health risk assessment reviewed [Two or more falls in past year] : Patient reported two or more falls in the past year [[facility]] : 14

## 2024-09-11 NOTE — COUNSELING
[Normal Weight - ( BMI  <25 )] : normal weight - ( BMI  <25 ) [Continue diet as tolerated] : continue diet as tolerated based on goals of care [Non - Smoker] : non-smoker [Smoke/CO Detectors] : smoke/CO detectors [Remove clutter and unsafe carpeting to avoid falls] : remove clutter and unsafe carpeting to avoid falls [] : foot exam [Completed] : Aspirin use discussion completed [Improve mobility] : improve mobility [Maintain functional ability] : maintain functional ability [Discussed disease trajectory with patient/caregiver] : discussed disease trajectory with patient/caregiver [Advanced Directives discussed: ____] : Advanced directives discussed: [unfilled] [Completed Healthcare Proxy] : completed healthcare proxy [Full Code] : Code Status: Full Code [No Limitations] : Treatment Guidelines: No limitations [Trial of Intubation] : Intubation: Trial of Intubation [_____] : HCP: [unfilled] [FreeTextEntry3] : low sodium diet  [FreeTextEntry4] : Stay healthy, improve low mood. [de-identified] : MOLST deferred until next visit.

## 2024-09-11 NOTE — HISTORY OF PRESENT ILLNESS
[Patient] : patient [Family Member] : family member [FreeTextEntry1] : dementia [FreeTextEntry2] : PMH:  T2DM, dementia, diverticulitis c/b perf s/p exlap with colostomy placement 5/2024.  Purpose of Visit: Post hospitalization   Relevant Prior Hospitalizations: 6/2024 [facility]: Removed ostomy bag, needed redressing 5-6/2024 [facility]: Colon perf s/p exlap, colostomy, [facility]IME stay, ultimatel sent home 6/17/2024 4/19-22/2024 [facility]: Diverticulitis, pneumonia, sepsis, s/p Zosyn -> Augmentin 3/22-26/2024 [facility]: Diverticulitis, cipro/flagyl, recc outpt colonoscopy 1/22-24/2024 [facility]: pneumonia & sepsis s/p azithro/CTX, d/c with levofloxacin 12/2023 [facility]: Cholecystectomy   Social/Home Environment:  -Lives in home with her  and son Trevin -Dtr [place], gdtr through other son Kaitlin  -HHA: [employer] insurance, unclear MLTC but apparently[employer], 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 -Son acts as CDPAS for dad. -Dr. Mandy Martinez PCP, neurology Dr. Suman Padilla [phone], ATUL [phone], psychiatrist  Today's Visit & Review: -Interviewed with  & son Trevin -New Depakote 125mg BID prescribed to control agitation/aggression. However this is characterized by family as her removing IVs, stickers, and things placed on her body. She is not "aggressive", and has been at baseline since coming home. Recommend they forego this medication based on current status. -Carotid & transcranial doppler scheduled 9/19/2024 -EEG 9/12/2024 same office, [address] -Putting son Trevin's cell in banner, in hopes of diverting calls thru him if ostomy dislodges again -Flu shot given in hospital 9/4/2024, son gave consent. Not in HIE. Recording. -Neuro visit planned 10/14/2024  -Dementia: Prominent memory impairment. Namzaric daily, mirtazapine 15mg QHS (for appetite & sleep). Unsteady gait with 3 falls in past year, no severe injury. -Vertigo: Active in past, meclizine 12.5mg TID PRN -Fuch's corneal dystrophy: Uncommon condition with change in corneal compartment dimensions causing glare & optical phenomena. Brinzolamide eyedrops. -Cataracts -Seasonal rhinitis/cough: Claritin 10mg daily, Robitussin DM, Flonase -Dental: Top & bottom dentures -CAD: Metoprolol ER 50mg daily, simvastatin 20mg QHS, ASA 81mg -HTN: nifedipine ER  -GERD: Pantoprazole 20mg daily -Diverticulitis: Noted on CT 3/2024, cipro/flagyl, repeat hosp 4/2024 s/p Zosyn -> Augmentin. Ultimately partial colon resection & colostomy established, to undergo potential reversal 12/2024 -Cholecystitis: S/p cholecystectomy 11/2023, no plans to return to surgeon at this time. -T2DM: Unclear control. Metformin ER 2000mg daily. -Osteoporosis: Alendronate 70mg weekly, oyster calcium/D3 500mg-5mcg daily  -Screening/Preventive: Vaccines: Covid, flu, shingles, PNA   DME: Shower chair and bars in the bathroom  OSTOMY SUPPLIES: -Stoma pouch, 30 per month (patient has dementia and frequently dislodges her ostomy bag) (Ordered in effort to provide pt with alternative stoma paste, since Coloplast is causing itching/contact dermatitis) -Convatec stomahesive paste, 1 large tube per month

## 2024-09-11 NOTE — HISTORY OF PRESENT ILLNESS
[Patient] : patient [Family Member] : family member [FreeTextEntry1] : dementia [FreeTextEntry2] : PMH:  T2DM, dementia, diverticulitis c/b perf s/p exlap with colostomy placement 5/2024.  Purpose of Visit: Post hospitalization   Relevant Prior Hospitalizations: 6/2024 [facility]: Removed ostomy bag, needed redressing 5-6/2024 [facility]: Colon perf s/p exlap, colostomy, AIME stay, ultimatel sent home 6/17/2024 4/19-22/2024 [facility]: Diverticulitis, pneumonia, sepsis, s/p Zosyn -> Augmentin 3/22-26/2024 [facility]: Diverticulitis, cipro/flagyl, recc outpt colonoscopy 1/22-24/2024 [facility]: pneumonia & sepsis s/p azithro/CTX, d/c with levofloxacin 12/2023 [facility]: Cholecystectomy   Social/Home Environment:  -Lives in home with her  and son Trevin -Dtr [place], gdtr through other son Kaitlin  -HHA: [employer] insurance, unclear MLTC but apparently[employer], 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 -Son acts as CDPAS for dad. -Dr. Mandy Martinez PCP, neurology Dr. Suman Padilla [phone], ATUL [phone], psychiatrist  Today's Visit & Review: -Interviewed with  & son Trevin -New Depakote 125mg BID prescribed to control agitation/aggression. However this is characterized by family as her removing IVs, stickers, and things placed on her body. She is not "aggressive", and has been at baseline since coming home. Recommend they forego this medication based on current status. -Carotid & transcranial doppler scheduled 9/19/2024 -EEG 9/12/2024 same office, [address] -Putting son Trevin's cell in banner, in hopes of diverting calls thru him if ostomy dislodges again -Flu shot given in hospital 9/4/2024, son gave consent. Not in HIE. Recording. -Neuro visit planned 10/14/2024  -Dementia: Prominent memory impairment. Namzaric daily, mirtazapine 15mg QHS (for appetite & sleep). Unsteady gait with 3 falls in past year, no severe injury. -Vertigo: Active in past, meclizine 12.5mg TID PRN -Fuch's corneal dystrophy: Uncommon condition with change in corneal compartment dimensions causing glare & optical phenomena. Brinzolamide eyedrops. -Cataracts -Seasonal rhinitis/cough: Claritin 10mg daily, Robitussin DM, Flonase -Dental: Top & bottom dentures -CAD: Metoprolol ER 50mg daily, simvastatin 20mg QHS, ASA 81mg -HTN: nifedipine ER  -GERD: Pantoprazole 20mg daily -Diverticulitis: Noted on CT 3/2024, cipro/flagyl, repeat hosp 4/2024 s/p Zosyn -> Augmentin. Ultimately partial colon resection & colostomy established, to undergo potential reversal 12/2024 -Cholecystitis: S/p cholecystectomy 11/2023, no plans to return to surgeon at this time. -T2DM: Unclear control. Metformin ER 2000mg daily. -Osteoporosis: Alendronate 70mg weekly, oyster calcium/D3 500mg-5mcg daily  -Screening/Preventive: Vaccines: Covid, flu, shingles, PNA   DME: Shower chair and bars in the bathroom  OSTOMY SUPPLIES: -Stoma pouch, 30 per month (patient has dementia and frequently dislodges her ostomy bag) (Ordered in effort to provide pt with alternative stoma paste, since Coloplast is causing itching/contact dermatitis) -Convatec stomahesive paste, 1 large tube per month

## 2024-09-11 NOTE — HEALTH RISK ASSESSMENT
[Independent] : feeding [Some assistance needed] : using telephone [Full assistance needed] : managing medications [] : managing finances [Any fall with injury in past year] : Patient reported fall with injury in the past year [Yes] : The patient has visual impairment [HRA Reviewed] : Health risk assessment reviewed [Two or more falls in past year] : Patient reported two or more falls in the past year [[facility]] : 14

## 2024-09-11 NOTE — COUNSELING
[Normal Weight - ( BMI  <25 )] : normal weight - ( BMI  <25 ) [Continue diet as tolerated] : continue diet as tolerated based on goals of care [Non - Smoker] : non-smoker [Smoke/CO Detectors] : smoke/CO detectors [Remove clutter and unsafe carpeting to avoid falls] : remove clutter and unsafe carpeting to avoid falls [] : foot exam [Completed] : Aspirin use discussion completed [Improve mobility] : improve mobility [Maintain functional ability] : maintain functional ability [Discussed disease trajectory with patient/caregiver] : discussed disease trajectory with patient/caregiver [Advanced Directives discussed: ____] : Advanced directives discussed: [unfilled] [Completed Healthcare Proxy] : completed healthcare proxy [Full Code] : Code Status: Full Code [No Limitations] : Treatment Guidelines: No limitations [Trial of Intubation] : Intubation: Trial of Intubation [_____] : HCP: [unfilled] [FreeTextEntry3] : low sodium diet  [FreeTextEntry4] : Stay healthy, improve low mood. [de-identified] : MOLST deferred until next visit.

## 2024-09-11 NOTE — REVIEW OF SYSTEMS
[Muscle Weakness] : muscle weakness [Confusion] : confusion [Memory Loss] : memory loss [Unsteady Walking] : ataxia [Depression] : depression [Negative] : Heme/Lymph [Muscle Pain] : no muscle pain [Back Pain] : no back pain [Headache] : no headache [Dizziness] : no dizziness [Fainting] : no fainting [FreeTextEntry7] : Poor appetite since 12/2023 after gallbladder surgery [FreeTextEntry9] : unsteady gait [de-identified] : complains of feeling depressed, quiet, not motivated, poor appetite since surgery in 12/2023

## 2024-09-25 ENCOUNTER — APPOINTMENT (OUTPATIENT)
Dept: HOME HEALTH SERVICES | Facility: HOME HEALTH | Age: 79
End: 2024-09-25

## 2024-09-25 PROBLEM — Z78.9 OTHER SPECIFIED HEALTH STATUS: Chronic | Status: ACTIVE | Noted: 2024-09-03

## 2024-10-01 ENCOUNTER — NON-APPOINTMENT (OUTPATIENT)
Age: 79
End: 2024-10-01

## 2024-10-02 ENCOUNTER — APPOINTMENT (OUTPATIENT)
Dept: HOME HEALTH SERVICES | Facility: HOME HEALTH | Age: 79
End: 2024-10-02
Payer: MEDICARE

## 2024-10-02 VITALS — OXYGEN SATURATION: 94 % | HEART RATE: 93 BPM | SYSTOLIC BLOOD PRESSURE: 130 MMHG | DIASTOLIC BLOOD PRESSURE: 76 MMHG

## 2024-10-02 DIAGNOSIS — Z43.3 ENCOUNTER FOR ATTENTION TO COLOSTOMY: ICD-10-CM

## 2024-10-02 PROCEDURE — 99348 HOME/RES VST EST LOW MDM 30: CPT

## 2024-10-02 RX ORDER — MUPIROCIN 20 MG/G
2 OINTMENT TOPICAL
Qty: 1 | Refills: 0 | Status: ACTIVE | COMMUNITY
Start: 2024-10-02 | End: 1900-01-01

## 2024-10-02 RX ORDER — CIPROFLOXACIN HYDROCHLORIDE 500 MG/1
500 TABLET, FILM COATED ORAL
Qty: 14 | Refills: 0 | Status: ACTIVE | COMMUNITY
Start: 2024-10-02 | End: 1900-01-01

## 2024-10-02 RX ORDER — METRONIDAZOLE 500 MG/1
500 TABLET ORAL
Qty: 21 | Refills: 0 | Status: ACTIVE | COMMUNITY
Start: 2024-10-02 | End: 1900-01-01

## 2024-10-02 NOTE — HISTORY OF PRESENT ILLNESS
[Patient] : patient [Family Member] : family member [FreeTextEntry1] : dementia [FreeTextEntry2] : PMH:  T2DM, dementia, diverticulitis c/b perf s/p exlap with colostomy placement 5/2024.  Purpose of Visit: Acute visit for ostomy irritation/infection   Relevant Prior Hospitalizations: 6/2024 [facility]: Removed ostomy bag, needed redressing 5-6/2024 [facility]: Colon perf s/p exlap, colostomy, AIME stay, ultimatel sent home 6/17/2024 4/19-22/2024 [facility]: Diverticulitis, pneumonia, sepsis, s/p Zosyn -> Augmentin 3/22-26/2024 [facility]: Diverticulitis, cipro/flagyl, recc outpt colonoscopy 1/22-24/2024 [facility]: pneumonia & sepsis s/p azithro/CTX, d/c with levofloxacin 12/2023 [facility]: Cholecystectomy   Social/Home Environment:  -Lives in home with her  and son Trevin -Dtr [place], gdtr through other son Kaitlin  -HHA: [employer] insurance, unclear MLTC but apparently[employer], 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 -Son acts as CDPAS for dad. -Dr. Mandy Martinez PCP, neurology Dr. Suman Padilla [phone], ATUL [phone], psychiatrist  Today's Visit & Review: -Interviewed with son Trevin -Ostomy site more red, swollen per son. Pt doesn't notice any change or pain. No pus. No purulence or breakdown of skin. -Discussed plan. Writing mupirocin topical to be applied into ostomy at time of bag changes. -Ciprofloxain & metronidazole written if this becomes worse, to treat genuine infection. Advise son to call us first.  -Dementia: Prominent memory impairment. Namzaric daily, mirtazapine 15mg QHS (for appetite & sleep). Unsteady gait with 3 falls in past year, no severe injury. -Vertigo: Active in past, meclizine 12.5mg TID PRN -Fuch's corneal dystrophy: Uncommon condition with change in corneal compartment dimensions causing glare & optical phenomena. Brinzolamide eyedrops. -Cataracts -Seasonal rhinitis/cough: Claritin 10mg daily, Robitussin DM, Flonase -Dental: Top & bottom dentures -CAD: Metoprolol ER 50mg daily, simvastatin 20mg QHS, ASA 81mg -HTN: nifedipine ER  -GERD: Pantoprazole 20mg daily -Diverticulitis: Noted on CT 3/2024, cipro/flagyl, repeat hosp 4/2024 s/p Zosyn -> Augmentin. Ultimately partial colon resection & colostomy established, to undergo potential reversal 12/2024 -Cholecystitis: S/p cholecystectomy 11/2023, no plans to return to surgeon at this time. -T2DM: Unclear control. Metformin ER 2000mg daily. -Osteoporosis: Alendronate 70mg weekly, oyster calcium/D3 500mg-5mcg daily  -Screening/Preventive: Vaccines: Covid, flu, shingles, PNA   DME: Shower chair and bars in the bathroom  OSTOMY SUPPLIES: -Stoma pouch, 30 per month (patient has dementia and frequently dislodges her ostomy bag) (Ordered in effort to provide pt with alternative stoma paste, since Coloplast is causing itching/contact dermatitis) -Convatec stomahesive paste, 1 large tube per month

## 2024-10-02 NOTE — PHYSICAL EXAM
[No Acute Distress] : no acute distress [Normal Sclera/Conjunctiva] : normal sclera/conjunctiva [PERRL] : pupils equal, round and reactive to light [EOMI] : extra ocular movement intact [Normal Outer Ear/Nose] : the ears and nose were normal in appearance [Normal Oropharynx] : the oropharynx was normal [Normal TMs] : both tympanic membranes were normal [Normal Nasal Mucosa] : the nasal mucosa was normal [No JVD] : no jugular venous distention [Supple] : the neck was supple [No LAD] : no lymphadenopathy [Thyroid Normal, No Nodules] : the thyroid was normal and there were no nodules present [No Respiratory Distress] : no respiratory distress [Clear to Auscultation] : lungs were clear to auscultation bilaterally [No Accessory Muscle Use] : no accessory muscle use [Normal Rate] : heart rate was normal  [Regular Rhythm] : with a regular rhythm [Normal S1, S2] : normal S1 and S2 [No Murmurs] : no murmurs heard [Breast Exam Declined] : patient declined to have breast exam done [Normal Bowel Sounds] : normal bowel sounds [Non Tender] : non-tender [Soft] : abdomen soft [Not Distended] : not distended [Patient Refused] : rectal exam was refused by the patient [Normal Supraclavicular Nodes] : no supraclavicular lymphadenopathy [Normal Axillary Nodes] : no axillary lymphadenopathy [Normal Anterior Cervical Nodes] : no anterior cervical lymphadenopathy [No CVA Tenderness] : no ~M costovertebral angle tenderness [No Spinal Tenderness] : no spinal tenderness [Kyphosis] : no kyphosis present [No Clubbing, Cyanosis] : no clubbing  or cyanosis of the fingernails [No Rash] : no rash [No Skin Lesions] : no skin lesions [Cranial Nerves Intact] : cranial nerves 2-12 were intact [No Gross Sensory Deficits] : no gross sensory deficits [Normal Affect] : the affect was normal [Foot Ulcers] : no foot ulcers [de-identified] : Remote and recent memory not intact but able to answer most questions  [de-identified] : unsteady gait [de-identified] : AOx2, remote and recent memory not intact.

## 2024-10-02 NOTE — COUNSELING
[Normal Weight - ( BMI  <25 )] : normal weight - ( BMI  <25 ) [Continue diet as tolerated] : continue diet as tolerated based on goals of care [Non - Smoker] : non-smoker [Smoke/CO Detectors] : smoke/CO detectors [Remove clutter and unsafe carpeting to avoid falls] : remove clutter and unsafe carpeting to avoid falls [] : foot exam [Completed] : Aspirin use discussion completed [FreeTextEntry3] : low sodium diet  [Improve mobility] : improve mobility [Maintain functional ability] : maintain functional ability [Discussed disease trajectory with patient/caregiver] : discussed disease trajectory with patient/caregiver [Advanced Directives discussed: ____] : Advanced directives discussed: [unfilled] [Completed Healthcare Proxy] : completed healthcare proxy [Full Code] : Code Status: Full Code [No Limitations] : Treatment Guidelines: No limitations [Trial of Intubation] : Intubation: Trial of Intubation [_____] : HCP: [unfilled] [FreeTextEntry4] : Stay healthy, improve low mood. [de-identified] : MOLST deferred until next visit.

## 2024-10-02 NOTE — REVIEW OF SYSTEMS
[Muscle Weakness] : muscle weakness [Muscle Pain] : no muscle pain [Back Pain] : no back pain [Headache] : no headache [Dizziness] : no dizziness [Fainting] : no fainting [Confusion] : confusion [Memory Loss] : memory loss [Unsteady Walking] : ataxia [Depression] : depression [Negative] : Heme/Lymph [FreeTextEntry7] : Poor appetite since 12/2023 after gallbladder surgery [FreeTextEntry9] : unsteady gait [de-identified] : complains of feeling depressed, quiet, not motivated, poor appetite since surgery in 12/2023

## 2024-10-29 ENCOUNTER — TRANSCRIPTION ENCOUNTER (OUTPATIENT)
Age: 79
End: 2024-10-29

## 2024-10-29 NOTE — PROGRESS NOTE ADULT - SUBJECTIVE AND OBJECTIVE BOX
DATE OF SERVICE: 03-26-24    Patient denies chest pain or shortness of breath.   Review of symptoms otherwise negative.    MEDICATIONS:  acetaminophen     Tablet .. 650 milliGRAM(s) Oral every 6 hours PRN  aspirin enteric coated 81 milliGRAM(s) Oral daily  atorvastatin 20 milliGRAM(s) Oral at bedtime  cefTRIAXone   IVPB 1000 milliGRAM(s) IV Intermittent every 24 hours  chlorhexidine 2% Cloths 1 Application(s) Topical <User Schedule>  dextrose 5%. 1000 milliLiter(s) IV Continuous <Continuous>  dextrose 5%. 1000 milliLiter(s) IV Continuous <Continuous>  dextrose 50% Injectable 25 Gram(s) IV Push once  dextrose 50% Injectable 12.5 Gram(s) IV Push once  dextrose 50% Injectable 25 Gram(s) IV Push once  dextrose Oral Gel 15 Gram(s) Oral once PRN  enoxaparin Injectable 40 milliGRAM(s) SubCutaneous every 24 hours  glucagon  Injectable 1 milliGRAM(s) IntraMuscular once  insulin lispro (ADMELOG) corrective regimen sliding scale   SubCutaneous three times a day before meals  melatonin 3 milliGRAM(s) Oral at bedtime PRN  metoprolol tartrate 25 milliGRAM(s) Oral every 12 hours  metroNIDAZOLE  IVPB 500 milliGRAM(s) IV Intermittent every 8 hours  metroNIDAZOLE  IVPB      pantoprazole    Tablet 40 milliGRAM(s) Oral before breakfast  petrolatum white Ointment 1 Application(s) Topical three times a day  potassium chloride  10 mEq/100 mL IVPB 10 milliEquivalent(s) IV Intermittent every 1 hour  sodium chloride 0.9%. 1000 milliLiter(s) IV Continuous <Continuous>      LABS:                        11.3   9.88  )-----------( 249      ( 26 Mar 2024 04:00 )             33.5       Hemoglobin: 11.3 g/dL (03-26 @ 04:00)  Hemoglobin: 11.2 g/dL (03-24 @ 03:15)  Hemoglobin: 11.7 g/dL (03-23 @ 03:10)  Hemoglobin: 12.5 g/dL (03-22 @ 06:15)  Hemoglobin: 11.7 g/dL (03-22 @ 00:57)      03-26    135  |  103  |  10  ----------------------------<  95  3.1<L>   |  22  |  0.67    Ca    8.5      26 Mar 2024 04:00  Phos  2.3     03-26  Mg     1.90     03-26      Creatinine Trend: 0.67<--, 0.73<--, 0.74<--, 0.74<--, 0.82<--    PHYSICAL EXAM:  T(C): 36.9 (03-26-24 @ 10:13), Max: 36.9 (03-26-24 @ 05:40)  HR: 79 (03-26-24 @ 10:13) (58 - 79)  BP: 131/69 (03-26-24 @ 10:13) (127/70 - 148/72)  RR: 18 (03-26-24 @ 10:13) (16 - 18)  SpO2: 94% (03-26-24 @ 10:13) (93% - 98%)  Wt(kg): --    I&O's Summary    25 Mar 2024 07:01  -  26 Mar 2024 07:00  --------------------------------------------------------  IN: 680 mL / OUT: 0 mL / NET: 680 mL      Gen: Appears well in NAD  HEENT:  (-)icterus (-)pallor  CV: N S1 S2 1/6 MERRITT (+)2 Pulses B/l  Resp:  Clear to ausculatation B/L, normal effort  GI: (+) BS Soft, NT, ND  Lymph:  (-)Edema, (-)obvious lymphadenopathy  Skin: Warm to touch, Normal turgor  Psych: unable to fully eval    ECG:  	NSR NS ST-T changes    < from: CT Abdomen and Pelvis w/ IV Cont (03.22.24 @ 02:03) >  IMPRESSION:    Suboptimal evaluation of the subsegmental pulmonary arteries. No obvious   embolus to the level of the segmental pulmonary arteries.    Persistent scattered tree-in-bud opacities in both lungs. Mildly   decreased patchy opacities in bilateral upper and lower lobes since   2/23/2019.    Heterogenous thyroid gland, which can be further assessed on a   nonemergent ultrasound if not previously characterized.    Distal descending/proximal sigmoid colon diverticulitis. Small free fluid   with increased attenuation, which may contain debris/blood product. No   obvious organized fluid collection, bowel obstruction or intraperitoneal   free air. Recommend correlation with colonoscopy to exclude neoplasm,   following resolution of acute episode.    Mild left hydroureteronephrosis to the level of the sigmoid colon,   presumably related to inflammation and subsequent compression of the   ureter. Distended urinary bladder. Recommend with clinical correlation to   assess urinary retention.    Additional findings as described.    ASSESSMENT/PLAN: 	78 year-old female, with past history significant for Glaucoma, Type-2 DM, Dyslipidemia, Hypertension, Osteoarthritis and Dementia, presented to the ED secondary to generalized weakness and decreased oral intake.     #HTN  --BP stable on metoprolol, continue for now    #HLD  --cont atorvastatin    #abdomianal pain  --CT findings noted, diverticulitis, +/- PNA, on Abx per ID    Negar Eagle MD  Pager: [phone]   
Patient is a 78y old  Female who presents with a chief complaint of Diverticulitis (23 Mar 2024 16:19)    Date of servie : 03-24-24 @ 14:02  INTERVAL HPI/OVERNIGHT EVENTS:  T(C): 36.8 (03-24-24 @ 13:18), Max: 36.8 (03-24-24 @ 13:18)  HR: 64 (03-24-24 @ 13:18) (63 - 80)  BP: 129/80 (03-24-24 @ 13:18) (120/77 - 158/91)  RR: 18 (03-24-24 @ 13:18) (18 - 18)  SpO2: 95% (03-24-24 @ 13:18) (95% - 99%)  Wt(kg): --  I&O's Summary    23 Mar 2024 07:01  -  24 Mar 2024 07:00  --------------------------------------------------------  IN: 120 mL / OUT: 0 mL / NET: 120 mL    24 Mar 2024 07:01  -  24 Mar 2024 14:02  --------------------------------------------------------  IN: 540 mL / OUT: 275 mL / NET: 265 mL        LABS:                        11.2   12.52 )-----------( 239      ( 24 Mar 2024 03:15 )             33.8     03-24    135  |  104  |  14  ----------------------------<  94  3.9   |  23  |  0.73    Ca    8.5      24 Mar 2024 03:15  Phos  2.4     03-24  Mg     1.90     03-24        Urinalysis Basic - ( 24 Mar 2024 03:15 )    Color: x / Appearance: x / SG: x / pH: x  Gluc: 94 mg/dL / Ketone: x  / Bili: x / Urobili: x   Blood: x / Protein: x / Nitrite: x   Leuk Esterase: x / RBC: x / WBC x   Sq Epi: x / Non Sq Epi: x / Bacteria: x      CAPILLARY BLOOD GLUCOSE      POCT Blood Glucose.: 84 mg/dL (24 Mar 2024 12:47)  POCT Blood Glucose.: 95 mg/dL (24 Mar 2024 08:45)  POCT Blood Glucose.: 105 mg/dL (23 Mar 2024 22:23)  POCT Blood Glucose.: 110 mg/dL (23 Mar 2024 17:29)        Urinalysis Basic - ( 24 Mar 2024 03:15 )    Color: x / Appearance: x / SG: x / pH: x  Gluc: 94 mg/dL / Ketone: x  / Bili: x / Urobili: x   Blood: x / Protein: x / Nitrite: x   Leuk Esterase: x / RBC: x / WBC x   Sq Epi: x / Non Sq Epi: x / Bacteria: x        MEDICATIONS  (STANDING):  aspirin enteric coated 81 milliGRAM(s) Oral daily  atorvastatin 20 milliGRAM(s) Oral at bedtime  cefTRIAXone   IVPB 1000 milliGRAM(s) IV Intermittent every 24 hours  chlorhexidine 2% Cloths 1 Application(s) Topical <User Schedule>  dextrose 5%. 1000 milliLiter(s) (50 mL/Hr) IV Continuous <Continuous>  dextrose 5%. 1000 milliLiter(s) (100 mL/Hr) IV Continuous <Continuous>  dextrose 50% Injectable 25 Gram(s) IV Push once  dextrose 50% Injectable 12.5 Gram(s) IV Push once  dextrose 50% Injectable 25 Gram(s) IV Push once  enoxaparin Injectable 40 milliGRAM(s) SubCutaneous every 24 hours  glucagon  Injectable 1 milliGRAM(s) IntraMuscular once  insulin lispro (ADMELOG) corrective regimen sliding scale   SubCutaneous three times a day before meals  metoprolol tartrate 25 milliGRAM(s) Oral every 12 hours  metroNIDAZOLE  IVPB      metroNIDAZOLE  IVPB 500 milliGRAM(s) IV Intermittent every 8 hours  pantoprazole    Tablet 40 milliGRAM(s) Oral before breakfast  petrolatum white Ointment 1 Application(s) Topical three times a day  sodium chloride 0.9%. 1000 milliLiter(s) (100 mL/Hr) IV Continuous <Continuous>    MEDICATIONS  (PRN):  acetaminophen     Tablet .. 650 milliGRAM(s) Oral every 6 hours PRN Temp greater or equal to 38C (100.4F), Mild Pain (1 - 3)  dextrose Oral Gel 15 Gram(s) Oral once PRN Blood Glucose LESS THAN 70 milliGRAM(s)/deciliter  melatonin 3 milliGRAM(s) Oral at bedtime PRN Insomnia          PHYSICAL EXAM:  GENERAL: NAD, well-groomed, well-developed  HEAD:  Atraumatic, Normocephalic  CHEST/LUNG: Clear to percussion bilaterally; No rales, rhonchi, wheezing, or rubs  HEART: Regular rate and rhythm; No murmurs, rubs, or gallops  ABDOMEN: Soft, Nontender, Nondistended; Bowel sounds present  EXTREMITIES:  2+ Peripheral Pulses, No clubbing, cyanosis, or edema  LYMPH: No lymphadenopathy noted  SKIN: No rashes or lesions    Care Discussed with Consultants/Other Providers [ ] YES  [ ] NO
Patient:  Spike Zelaya Date:  10/29/2024   :  1950 PCP:  Fay Bowser   74 year old male      Nephrology Clinic Consultation    Reason for Consultation: Chronic Kidney Disease Stage 3b  Date of Consultation: 10/29/2024    HPI: Patient is a 74 year old male with a past medical history of hypertension, hyperlipidemia, GERD and chronic kidney disease who presents today for new consultation.     In regards to hypertension, blood pressure today is 136/81 mmHg. Does not check blood pressure at home.     In regards to chronic kidney disease stage 3a, it is related to long standing history of hypertension. Denies NSAID, tobacco, or cocaine use. Baseline creatinine is 1.66-1.73 mg/dL with EGFR 41-44 ml/min however EGFR by Cystatin C is 53 ml/min (Oct 2024). Most recent BMP Oct 2024 showed stable kidney function with creatinine within baseline at 1.73 mg/dL. Has overt proteinuria on UA but never quantified.     ROS:  All systems reviewed and negative except for what is mentioned in the HPI.    Physical Exam:  GENERAL: Alert oriented x3, not in respiratory distress. Appropriate mood and affect.  HEENT: Normocephalic, atraumatic.   CHEST: Symmetric air entry. Clear to auscultation bilaterally. Non-labored breathing.   HEART: Regular rate and rhythm. S1, S2. No rub or murmur.  EXTREMITIES: No bilateral lower extremity edema.  NEUROMUSCULAR:  Free range of motion of all extremities bilaterally, grossly intact.         2024     2:40 PM 2024     2:50 PM 2024     3:00 PM 2024     3:10 PM 2024     1:38 PM 10/17/2024    12:39 PM 10/29/2024     3:54 PM   Vitals   SYSTOLIC 124 129 122 135 153 152 136   DIASTOLIC 74 63 76 69 90 98 81   Heart Rate 59 62 59 61 62 64 70   Temp 97.2 °F (36.2 °C)   96.4 °F (35.8 °C)      Resp 16 16 16 16      Weight kg     97.07 kg 100.245 kg 99.791 kg   Height     5' 11\" 5' 11\" 5' 11\"   BMI (Calculated)     29.85 30.82 30.68     Past Medical History:   Diagnosis Date 
   Chronic kidney disease     Chronic pain 01/2024    left eye pain    Depression     Erectile dysfunction 2000    post industrial accident    Essential (primary) hypertension     Fracture     pelvic fracture poosibly arterial insufficiency  secondary to trauma    GERD (gastroesophageal reflux disease)     Glaucoma 2023    Gout     HA (headache)     Hyperlipidemia     Hypertension     IBS (irritable bowel syndrome)     Industrial accident 08/1999 2000 pound Steel beam falling an multiple fractures right side of the pelvis/ Right hip    Insomnia     Peripheral neuropathy     Sleep apnea      Past Surgical History:   Procedure Laterality Date    Extracapsular cataract removal w insert io lens prosth wo ecp Bilateral 2023     History reviewed. No pertinent family history.  ALLERGIES:   Allergen Reactions    Oxycodone THROAT SWELLING    Hydrocodone RASH and PRURITUS    Ibuprofen RASH and PRURITUS    Lisinopril SWELLING    Naproxen PRURITUS     Social History     Tobacco Use   Smoking Status Former    Current packs/day: 1.00    Types: Cigarettes    Passive exposure: Past   Smokeless Tobacco Never     Social History     Substance and Sexual Activity   Alcohol Use Not Currently     Medications:  Current Outpatient Medications   Medication Sig Dispense Refill    amLODIPine (NORVASC) 10 MG tablet Take 1 tablet by mouth daily. 90 tablet 3    hydroCHLOROthiazide 25 MG tablet Take 1 tablet by mouth daily. 90 tablet 3    metoPROLOL tartrate (LOPRESSOR) 50 MG tablet Take 1 tablet by mouth in the morning and 1 tablet in the evening. 180 tablet 3    atorvastatin (LIPITOR) 40 MG tablet Take 1 tablet by mouth at bedtime. 90 tablet 3    amitriptyline (ELAVIL) 75 MG tablet Take 75 mg by mouth nightly.      baclofen (LIORESAL) 10 MG tablet Take 10 mg by mouth 2 times daily as needed.      colchicine (COLCRYS) 0.6 MG tablet TAKE 2 TABLETS BY MOUTH INITIALLY AT ONSET OF SYMPTOMS, THEN MAY TAKE 1 TAB 1 HOUR LATER      cyclobenzaprine 
(FLEXERIL) 10 MG tablet Take 1 tablet by mouth 3 times daily as needed.      diclofenac (VOLTAREN) 1 % gel Apply topically      dilTIAZem (Dilt-XR) 120 MG 24 hr capsule TAKE ONE (1) CAPSULE BY MOUTH EVERY DAY      donepezil (ARICEPT) 5 MG tablet       furosemide (LASIX) 20 MG tablet Take 20 mg by mouth daily.      gabapentin (NEURONTIN) 300 MG capsule Take 300 mg by mouth nightly.      linaclotide (Linzess) 290 MCG Take 290 mcg by mouth.      memantine (NAMENDA) 5 MG tablet       pantoprazole (PROTONIX) 40 MG tablet Take 1 tablet by mouth daily (before breakfast).      QUEtiapine (SEROquel) 50 MG tablet Take 50 mg by mouth at bedtime.      sertraline (ZOLOFT) 50 MG tablet Take 1 tablet by mouth daily.      MELATONIN PO        No current facility-administered medications for this visit.     Laboratory Results:  Recent Labs   Lab 10/28/24  1130 07/29/24  1259   Sodium 139  --    Potassium 3.7  --    Chloride 106  --    Carbon Dioxide 29  --    Anion Gap 8  --    BUN 21*  --    Creatinine 1.73* 1.94*   Glucose 95  --    Calcium 9.9  --    Phosphorus 3.3  --    PTH, Intact 155*  --    Vitamin D, 25-Hydroxy 20.0*  --      Recent Labs   Lab 10/28/24  1130 07/29/24  1259   WBC 6.1 5.9   HGB 14.3 14.7   HCT 44.9 43.1    339     Recent Labs   Lab 10/28/24  1130   USPG 1.020   UPH 6.0   UPROT 300*   UROB 0.2   UNITR Negative   UKET Negative   UBILI Negative   UWBC Negative   URBC Negative       Imaging: Reviewed in [facility]    Assessment and Recommendations:     Chronic Kidney Disease Stage 3a  Chronic kidney disease secondary to long standing history of hypertension.   MRI abdomen  July 2022 showed few left renal cysts but no concering features  Baseline creatinine is 1.66-1.73 mg/dL with EGFR 41-44 ml/min however EGFR by Cystatin C is 53 ml/min (Oct 2024).   Most recent BMP Oct 2024 showed stable kidney function with creatinine within baseline at 1.73 mg/dL.   Has overt proteinuria on UA but never quantified.   Will 
[facility], Division of Infectious Diseases  [facility]aves [facility]el, [facility], [facility]  [phone]  ([phone] - weekdays after 5pm and weekends)    Name: TY DAVIS  Age/Gender: 78y Female  MRN: [id number]    Interval History:  Notes reviewed.   No concerning overnight events.  Afebrile.   denies abd pain or diarrhea    Allergies: No Known Allergies      Objective:  Vitals:   T(F): 98.4 (03-26-24 @ 10:13), Max: 98.5 (03-26-24 @ 05:40)  HR: 79 (03-26-24 @ 10:13) (58 - 79)  BP: 131/69 (03-26-24 @ 10:13) (127/70 - 148/72)  RR: 18 (03-26-24 @ 10:13) (16 - 18)  SpO2: 94% (03-26-24 @ 10:13) (93% - 98%)  Physical Examination:  General: no acute distress  HEENT: anicteric  Cardio: S1, S2, normal rate  Resp: clear to auscultation bilaterally  Abd: soft, NT, ND  Ext: no LE edema  Skin: warm, dry    Laboratory Studies:  CBC:                       11.3   9.88  )-----------( 249      ( 26 Mar 2024 04:00 )             33.5     WBC Trend:  9.88 03-26-24 @ 04:00  12.52 03-24-24 @ 03:15  17.79 03-23-24 @ 03:10  11.44 03-22-24 @ 06:15  16.05 03-22-24 @ 00:57    CMP: 03-26    135  |  103  |  10  ----------------------------<  95  3.1<L>   |  22  |  0.67    Ca    8.5      26 Mar 2024 04:00  Phos  2.3     03-26  Mg     1.90     03-26            Urinalysis Basic - ( 26 Mar 2024 04:00 )    Color: x / Appearance: x / SG: x / pH: x  Gluc: 95 mg/dL / Ketone: x  / Bili: x / Urobili: x   Blood: x / Protein: x / Nitrite: x   Leuk Esterase: x / RBC: x / WBC x   Sq Epi: x / Non Sq Epi: x / Bacteria: x      Microbiology: reviewed     Culture - Blood (collected 03-22-24 @ 00:40)  Source: .Blood Blood-Peripheral  Preliminary Report (03-26-24 @ 07:01):    No growth at 4 days    Culture - Blood (collected 03-22-24 @ 00:20)  Source: .Blood Blood-Peripheral  Preliminary Report (03-26-24 @ 07:01):    No growth at 4 days        Radiology: reviewed     Medications:  acetaminophen     Tablet .. 650 milliGRAM(s) Oral every 6 hours PRN  aspirin enteric coated 81 milliGRAM(s) Oral daily  atorvastatin 20 milliGRAM(s) Oral at bedtime  cefTRIAXone   IVPB 1000 milliGRAM(s) IV Intermittent every 24 hours  chlorhexidine 2% Cloths 1 Application(s) Topical <User Schedule>  dextrose 5%. 1000 milliLiter(s) IV Continuous <Continuous>  dextrose 5%. 1000 milliLiter(s) IV Continuous <Continuous>  dextrose 50% Injectable 25 Gram(s) IV Push once  dextrose 50% Injectable 12.5 Gram(s) IV Push once  dextrose 50% Injectable 25 Gram(s) IV Push once  dextrose Oral Gel 15 Gram(s) Oral once PRN  enoxaparin Injectable 40 milliGRAM(s) SubCutaneous every 24 hours  glucagon  Injectable 1 milliGRAM(s) IntraMuscular once  insulin lispro (ADMELOG) corrective regimen sliding scale   SubCutaneous three times a day before meals  melatonin 3 milliGRAM(s) Oral at bedtime PRN  metoprolol tartrate 25 milliGRAM(s) Oral every 12 hours  metroNIDAZOLE  IVPB 500 milliGRAM(s) IV Intermittent every 8 hours  metroNIDAZOLE  IVPB      pantoprazole    Tablet 40 milliGRAM(s) Oral before breakfast  petrolatum white Ointment 1 Application(s) Topical three times a day  sodium chloride 0.9%. 1000 milliLiter(s) IV Continuous <Continuous>    Antimicrobials:  cefTRIAXone   IVPB 1000 milliGRAM(s) IV Intermittent every 24 hours  metroNIDAZOLE  IVPB 500 milliGRAM(s) IV Intermittent every 8 hours  metroNIDAZOLE  IVPB      
[facility], Division of Infectious Diseases  [facility], [facility], [facility]  [phone]  ([phone] - weekdays after 5pm and weekends)    Name: TY DAVIS  Age/Gender: 78y Female  MRN: [id number]    Interval History:  Notes reviewed.   No concerning overnight events.  Afebrile.   denies abd pain  she is eager to go [facility]lene esoon    Allergies: No Known Allergies      Objective:  Vitals:   T(F): 97.6 (03-24-24 @ 21:42), Max: 98.3 (03-24-24 @ 13:18)  HR: 69 (03-25-24 @ 05:37) (63 - 73)  BP: 105/58 (03-25-24 @ 05:37) (105/58 - 138/91)  RR: 17 (03-25-24 @ 05:37) (16 - 18)  SpO2: 93% (03-25-24 @ 05:37) (93% - 97%)  Physical Examination:  General: no acute distress  HEENT: anicteric  Cardio: S1, S2, normal rate  Resp: clear to auscultation bilaterally  Abd: soft, NT, ND  Ext: no LE edema  Skin: warm, dry    Laboratory Studies:  CBC:                       11.2   12.52 )-----------( 239      ( 24 Mar 2024 03:15 )             33.8     WBC Trend:  12.52 03-24-24 @ 03:15  17.79 03-23-24 @ 03:10  11.44 03-22-24 @ 06:15  16.05 03-22-24 @ 00:57    CMP: 03-24    135  |  104  |  14  ----------------------------<  94  3.9   |  23  |  0.73    Ca    8.5      24 Mar 2024 03:15  Phos  2.4     03-24  Mg     1.90     03-24            Urinalysis Basic - ( 24 Mar 2024 03:15 )    Color: x / Appearance: x / SG: x / pH: x  Gluc: 94 mg/dL / Ketone: x  / Bili: x / Urobili: x   Blood: x / Protein: x / Nitrite: x   Leuk Esterase: x / RBC: x / WBC x   Sq Epi: x / Non Sq Epi: x / Bacteria: x      Microbiology: reviewed     Culture - Blood (collected 03-22-24 @ 00:40)  Source: .Blood Blood-Peripheral  Preliminary Report (03-25-24 @ 07:01):    No growth at 72 Hours    Culture - Blood (collected 03-22-24 @ 00:20)  Source: .Blood Blood-Peripheral  Preliminary Report (03-25-24 @ 07:01):    No growth at 72 Hours        Radiology: reviewed     Medications:  acetaminophen     Tablet .. 650 milliGRAM(s) Oral every 6 hours PRN  aspirin enteric coated 81 milliGRAM(s) Oral daily  atorvastatin 20 milliGRAM(s) Oral at bedtime  cefTRIAXone   IVPB 1000 milliGRAM(s) IV Intermittent every 24 hours  chlorhexidine 2% Cloths 1 Application(s) Topical <User Schedule>  dextrose 5%. 1000 milliLiter(s) IV Continuous <Continuous>  dextrose 5%. 1000 milliLiter(s) IV Continuous <Continuous>  dextrose 50% Injectable 25 Gram(s) IV Push once  dextrose 50% Injectable 12.5 Gram(s) IV Push once  dextrose 50% Injectable 25 Gram(s) IV Push once  dextrose Oral Gel 15 Gram(s) Oral once PRN  enoxaparin Injectable 40 milliGRAM(s) SubCutaneous every 24 hours  glucagon  Injectable 1 milliGRAM(s) IntraMuscular once  insulin lispro (ADMELOG) corrective regimen sliding scale   SubCutaneous three times a day before meals  melatonin 3 milliGRAM(s) Oral at bedtime PRN  metoprolol tartrate 25 milliGRAM(s) Oral every 12 hours  metroNIDAZOLE  IVPB 500 milliGRAM(s) IV Intermittent every 8 hours  metroNIDAZOLE  IVPB      pantoprazole    Tablet 40 milliGRAM(s) Oral before breakfast  petrolatum white Ointment 1 Application(s) Topical three times a day  sodium chloride 0.9%. 1000 milliLiter(s) IV Continuous <Continuous>    Antimicrobials:  cefTRIAXone   IVPB 1000 milliGRAM(s) IV Intermittent every 24 hours  metroNIDAZOLE  IVPB 500 milliGRAM(s) IV Intermittent every 8 hours  metroNIDAZOLE  IVPB      
check renal ultrasound to assess kidney size and for any obstructive uropathy and follow up on renal cyst  Check UACR and UPCR   No acei due to allergy   Discontinue Hydrochlorothiazide 25 mg daily  Start Indapamide 2.5 mg daily  Start Eplerenone 25 mg   Start Jardiance 10 mg daily  Repeat BMP in 1 week and 2 weeks  The majority of the visit today was spent educating patient on chronic kidney disease. I have discussed the 5 stages of CKD and the function of the kidneys. We discussed how to slow the progression of CKD which includes blood pressure control, decreasing protein leakage in the urine, restricting salt intake, avoiding tobacco products, and avoiding NSAIDs    Essential Hypertension: Not at Goal  Blood pressure today is 136/81 mmHg  Does not check BP at home  Discontinue Hydrochlorothiazide 25 mg daily and Amlodipine 10 mg daily   Start Indapamide 2.5 mg daily  Start Eplerenone 25 mg nightly   Continue Diltiazem 120 mg daily, Lasix 20 mg daily, Metoprolol 50 mg bid  Patient was instructed to decrease salt intake  Patient was counseled about checking BP daily and to call the office with BP readings to adjust medications if needed  Goal BP is less [id number] mmHg      Vitamin D Deficiency 20  Secondary Hyperparathyroidism 155  Start cholecalciferol 50k units weekly for 12 weeks then monthly     Prediabetes 6.1%  Repeat A1c    GERD  Continue pantoprazole 40 mg daily (unable to increase due to symptoms)    Follow up: 1 week with  RN for BP check and 4 months in clinic    On 10/29/2024, IFranny APNP scribed the services personally performed by Nandini Gaston MD.          
Date of Service: 03-26-24 @ 13:04    Patient is a 78y old  Female who presents with a chief complaint of Diverticulitis (26 Mar 2024 11:53)      Any change in ROS: She is alert and awake:  no sob:  no cough : no phlegm      MEDICATIONS  (STANDING):  aspirin enteric coated 81 milliGRAM(s) Oral daily  atorvastatin 20 milliGRAM(s) Oral at bedtime  cefTRIAXone   IVPB 1000 milliGRAM(s) IV Intermittent every 24 hours  chlorhexidine 2% Cloths 1 Application(s) Topical <User Schedule>  dextrose 5%. 1000 milliLiter(s) (50 mL/Hr) IV Continuous <Continuous>  dextrose 5%. 1000 milliLiter(s) (100 mL/Hr) IV Continuous <Continuous>  dextrose 50% Injectable 25 Gram(s) IV Push once  dextrose 50% Injectable 12.5 Gram(s) IV Push once  dextrose 50% Injectable 25 Gram(s) IV Push once  enoxaparin Injectable 40 milliGRAM(s) SubCutaneous every 24 hours  glucagon  Injectable 1 milliGRAM(s) IntraMuscular once  insulin lispro (ADMELOG) corrective regimen sliding scale   SubCutaneous three times a day before meals  metoprolol tartrate 25 milliGRAM(s) Oral every 12 hours  metroNIDAZOLE  IVPB 500 milliGRAM(s) IV Intermittent every 8 hours  metroNIDAZOLE  IVPB      pantoprazole    Tablet 40 milliGRAM(s) Oral before breakfast  petrolatum white Ointment 1 Application(s) Topical three times a day  sodium chloride 0.9%. 1000 milliLiter(s) (100 mL/Hr) IV Continuous <Continuous>    MEDICATIONS  (PRN):  acetaminophen     Tablet .. 650 milliGRAM(s) Oral every 6 hours PRN Temp greater or equal to 38C (100.4F), Mild Pain (1 - 3)  dextrose Oral Gel 15 Gram(s) Oral once PRN Blood Glucose LESS THAN 70 milliGRAM(s)/deciliter  melatonin 3 milliGRAM(s) Oral at bedtime PRN Insomnia    Vital Signs Last 24 Hrs  T(C): 36.9 (26 Mar 2024 10:13), Max: 36.9 (26 Mar 2024 05:40)  T(F): 98.4 (26 Mar 2024 10:13), Max: 98.5 (26 Mar 2024 05:40)  HR: 79 (26 Mar 2024 10:13) (58 - 79)  BP: 131/69 (26 Mar 2024 10:13) (127/70 - 148/72)  BP(mean): --  RR: 18 (26 Mar 2024 10:13) (16 - 18)  SpO2: 94% (26 Mar 2024 10:13) (93% - 98%)    Parameters below as of 26 Mar 2024 10:13  Patient On (Oxygen Delivery Method): room air        I&O's Summary    25 Mar 2024 07:01  -  26 Mar 2024 07:00  --------------------------------------------------------  IN: 680 mL / OUT: 0 mL / NET: 680 mL          Physical Exam:   GENERAL: NAD, well-groomed, well-developed  HEENT: MARTINE/   Atraumatic, Normocephalic  ENMT: No tonsillar erythema, exudates, or enlargement; Moist mucous membranes, Good dentition, No lesions  NECK: Supple, No JVD, Normal thyroid  CHEST/LUNG: Clear to auscultaion  CVS: Regular rate and rhythm; No murmurs, rubs, or gallops  GI: : Soft, Nontender, Nondistended; Bowel sounds present  NERVOUS SYSTEM:  Alert & Oriented X1  EXTREMITIES:-edema  LYMPH: No lymphadenopathy noted  SKIN: No rashes or lesions  ENDOCRINOLOGY: No Thyromegaly  PSYCH: calm     Labs:  27                            11.3   9.88  )-----------( 249      ( 26 Mar 2024 04:00 )             33.5                         11.2   12.52 )-----------( 239      ( 24 Mar 2024 03:15 )             33.8                         11.7   17.79 )-----------( 226      ( 23 Mar 2024 03:10 )             36.1     03-26    135  |  103  |  10  ----------------------------<  95  3.1<L>   |  22  |  0.67  03-24    135  |  104  |  14  ----------------------------<  94  3.9   |  23  |  0.73  03-23    137  |  103  |  10  ----------------------------<  152<H>  3.7   |  25  |  0.74    Ca    8.5      26 Mar 2024 04:00  Phos  2.3     03-26  Mg     1.90     03-26      CAPILLARY BLOOD GLUCOSE      POCT Blood Glucose.: 215 mg/dL (26 Mar 2024 12:18)  POCT Blood Glucose.: 117 mg/dL (26 Mar 2024 09:00)  POCT Blood Glucose.: 77 mg/dL (25 Mar 2024 21:37)  POCT Blood Glucose.: 77 mg/dL (25 Mar 2024 17:39)  POCT Blood Glucose.: 77 mg/dL (25 Mar 2024 17:37)          Urinalysis Basic - ( 26 Mar 2024 04:00 )    Color: x / Appearance: x / SG: x / pH: x  Gluc: 95 mg/dL / Ketone: x  / Bili: x / Urobili: x   Blood: x / Protein: x / Nitrite: x   Leuk Esterase: x / RBC: x / WBC x   Sq Epi: x / Non Sq Epi: x / Bacteria: x      Procalcitonin, Serum: 0.07 ng/mL (03-23 @ 03:10)    rad< from: CT Abdomen and Pelvis w/ IV Cont (03.22.24 @ 02:03) >  the spine, notably at L2. Stable grade 1/2 anterolisthesis of L5 on S1   with bilateral L5 pars defect.    IMPRESSION:    Suboptimal evaluation of the subsegmental pulmonary arteries. No obvious   embolus to the level of the segmental pulmonary arteries.    Persistent scattered tree-in-bud opacities in both lungs. Mildly   decreased patchy opacities in bilateral upper and lower lobes since   2/23/2019.    Heterogenous thyroid gland, which can be further assessed on a   nonemergent ultrasound if not previously characterized.    Distal descending/proximal sigmoid colon diverticulitis. Small free fluid   with increased attenuation, which may contain debris/blood product. No   obvious organized fluid collection, bowel obstruction or intraperitoneal   free air. Recommend correlation with colonoscopy to exclude neoplasm,   following resolution of acute episode.    Mild left hydroureteronephrosis to the level of the sigmoid colon,   presumably related to inflammation and subsequent compression of the   ureter. Distended urinary bladder. Recommend with clinical correlation to   assess urinary retention.    Additional findings as described.    --- End of Report ---    < end of copied text >      RECENT CULTURES:  03-22 @ 00:40 .Blood Blood-Peripheral                No growth at 4 days    03-22 @ 00:20 .Blood Blood-Peripheral                No growth at 4 days          RESPIRATORY CULTURES:          Studies  Chest X-RAY  CT SCAN Chest   Venous Dopplers: LE:   CT Abdomen  Others              
Patient is a 78y old  Female who presents with a chief complaint of Diverticulitis (25 Mar 2024 12:57)    Date of servie : 03-25-24 @ 16:20  INTERVAL HPI/OVERNIGHT EVENTS:  T(C): 36.4 (03-24-24 @ 21:42), Max: 36.5 (03-24-24 @ 17:09)  HR: 69 (03-25-24 @ 05:37) (66 - 73)  BP: 105/58 (03-25-24 @ 05:37) (105/58 - 138/91)  RR: 17 (03-25-24 @ 05:37) (16 - 18)  SpO2: 93% (03-25-24 @ 05:37) (93% - 97%)  Wt(kg): --  I&O's Summary    24 Mar 2024 07:01  -  25 Mar 2024 07:00  --------------------------------------------------------  IN: 1110 mL / OUT: 600 mL / NET: 510 mL        LABS:                        11.2   12.52 )-----------( 239      ( 24 Mar 2024 03:15 )             33.8     03-24    135  |  104  |  14  ----------------------------<  94  3.9   |  23  |  0.73    Ca    8.5      24 Mar 2024 03:15  Phos  2.4     03-24  Mg     1.90     03-24        Urinalysis Basic - ( 24 Mar 2024 03:15 )    Color: x / Appearance: x / SG: x / pH: x  Gluc: 94 mg/dL / Ketone: x  / Bili: x / Urobili: x   Blood: x / Protein: x / Nitrite: x   Leuk Esterase: x / RBC: x / WBC x   Sq Epi: x / Non Sq Epi: x / Bacteria: x      CAPILLARY BLOOD GLUCOSE      POCT Blood Glucose.: 81 mg/dL (25 Mar 2024 13:03)  POCT Blood Glucose.: 95 mg/dL (25 Mar 2024 08:46)  POCT Blood Glucose.: 87 mg/dL (24 Mar 2024 21:56)  POCT Blood Glucose.: 89 mg/dL (24 Mar 2024 17:49)        Urinalysis Basic - ( 24 Mar 2024 03:15 )    Color: x / Appearance: x / SG: x / pH: x  Gluc: 94 mg/dL / Ketone: x  / Bili: x / Urobili: x   Blood: x / Protein: x / Nitrite: x   Leuk Esterase: x / RBC: x / WBC x   Sq Epi: x / Non Sq Epi: x / Bacteria: x        MEDICATIONS  (STANDING):  aspirin enteric coated 81 milliGRAM(s) Oral daily  atorvastatin 20 milliGRAM(s) Oral at bedtime  cefTRIAXone   IVPB 1000 milliGRAM(s) IV Intermittent every 24 hours  chlorhexidine 2% Cloths 1 Application(s) Topical <User Schedule>  dextrose 5%. 1000 milliLiter(s) (50 mL/Hr) IV Continuous <Continuous>  dextrose 5%. 1000 milliLiter(s) (100 mL/Hr) IV Continuous <Continuous>  dextrose 50% Injectable 25 Gram(s) IV Push once  dextrose 50% Injectable 12.5 Gram(s) IV Push once  dextrose 50% Injectable 25 Gram(s) IV Push once  enoxaparin Injectable 40 milliGRAM(s) SubCutaneous every 24 hours  glucagon  Injectable 1 milliGRAM(s) IntraMuscular once  insulin lispro (ADMELOG) corrective regimen sliding scale   SubCutaneous three times a day before meals  metoprolol tartrate 25 milliGRAM(s) Oral every 12 hours  metroNIDAZOLE  IVPB 500 milliGRAM(s) IV Intermittent every 8 hours  metroNIDAZOLE  IVPB      pantoprazole    Tablet 40 milliGRAM(s) Oral before breakfast  petrolatum white Ointment 1 Application(s) Topical three times a day  sodium chloride 0.9%. 1000 milliLiter(s) (100 mL/Hr) IV Continuous <Continuous>    MEDICATIONS  (PRN):  acetaminophen     Tablet .. 650 milliGRAM(s) Oral every 6 hours PRN Temp greater or equal to 38C (100.4F), Mild Pain (1 - 3)  dextrose Oral Gel 15 Gram(s) Oral once PRN Blood Glucose LESS THAN 70 milliGRAM(s)/deciliter  melatonin 3 milliGRAM(s) Oral at bedtime PRN Insomnia          PHYSICAL EXAM:  GENERAL: NAD, well-groomed, well-developed  HEAD:  Atraumatic, Normocephalic  CHEST/LUNG: Clear to percussion bilaterally; No rales, rhonchi, wheezing, or rubs  HEART: Regular rate and rhythm; No murmurs, rubs, or gallops  ABDOMEN: Soft, Nontender, Nondistended; Bowel sounds present  EXTREMITIES:  2+ Peripheral Pulses, No clubbing, cyanosis, or edema  LYMPH: No lymphadenopathy noted  SKIN: No rashes or lesions    Care Discussed with Consultants/Other Providers [ ] YES  [ ] NO
Patient is a 78y old  Female who presents with a chief complaint of Diverticulitis of intestine without perforation or abscess without bleeding     (23 Mar 2024 11:04)    Date of servie : 03-23-24 @ 16:20  INTERVAL HPI/OVERNIGHT EVENTS:  T(C): 36.6 (03-23-24 @ 10:00), Max: 37.2 (03-23-24 @ 06:15)  HR: 65 (03-23-24 @ 10:00) (65 - 85)  BP: 123/64 (03-23-24 @ 10:00) (123/64 - 160/87)  RR: 18 (03-23-24 @ 10:00) (16 - 18)  SpO2: 95% (03-23-24 @ 10:00) (93% - 97%)  Wt(kg): --  I&O's Summary    22 Mar 2024 07:01  -  23 Mar 2024 07:00  --------------------------------------------------------  IN: 0 mL / OUT: 500 mL / NET: -500 mL        LABS:                        11.7   17.79 )-----------( 226      ( 23 Mar 2024 03:10 )             36.1     03-23    137  |  103  |  10  ----------------------------<  152<H>  3.7   |  25  |  0.74    Ca    8.4      23 Mar 2024 03:10  Phos  2.8     03-23  Mg     1.90     03-23    TPro  8.4<H>  /  Alb  3.1<L>  /  TBili  1.2  /  DBili  x   /  AST  24  /  ALT  14  /  AlkPhos  91  03-22      Urinalysis Basic - ( 23 Mar 2024 03:10 )    Color: x / Appearance: x / SG: x / pH: x  Gluc: 152 mg/dL / Ketone: x  / Bili: x / Urobili: x   Blood: x / Protein: x / Nitrite: x   Leuk Esterase: x / RBC: x / WBC x   Sq Epi: x / Non Sq Epi: x / Bacteria: x      CAPILLARY BLOOD GLUCOSE      POCT Blood Glucose.: 127 mg/dL (23 Mar 2024 13:17)  POCT Blood Glucose.: 148 mg/dL (23 Mar 2024 08:51)  POCT Blood Glucose.: 173 mg/dL (22 Mar 2024 23:12)  POCT Blood Glucose.: 164 mg/dL (22 Mar 2024 19:32)  POCT Blood Glucose.: 159 mg/dL (22 Mar 2024 17:56)        Urinalysis Basic - ( 23 Mar 2024 03:10 )    Color: x / Appearance: x / SG: x / pH: x  Gluc: 152 mg/dL / Ketone: x  / Bili: x / Urobili: x   Blood: x / Protein: x / Nitrite: x   Leuk Esterase: x / RBC: x / WBC x   Sq Epi: x / Non Sq Epi: x / Bacteria: x        MEDICATIONS  (STANDING):  aspirin enteric coated 81 milliGRAM(s) Oral daily  atorvastatin 20 milliGRAM(s) Oral at bedtime  cefTRIAXone   IVPB 1000 milliGRAM(s) IV Intermittent every 24 hours  dextrose 5%. 1000 milliLiter(s) (50 mL/Hr) IV Continuous <Continuous>  dextrose 5%. 1000 milliLiter(s) (100 mL/Hr) IV Continuous <Continuous>  dextrose 50% Injectable 25 Gram(s) IV Push once  dextrose 50% Injectable 12.5 Gram(s) IV Push once  dextrose 50% Injectable 25 Gram(s) IV Push once  enoxaparin Injectable 40 milliGRAM(s) SubCutaneous every 24 hours  glucagon  Injectable 1 milliGRAM(s) IntraMuscular once  insulin lispro (ADMELOG) corrective regimen sliding scale   SubCutaneous three times a day before meals  metoprolol tartrate 25 milliGRAM(s) Oral every 12 hours  metroNIDAZOLE  IVPB 500 milliGRAM(s) IV Intermittent every 8 hours  metroNIDAZOLE  IVPB      pantoprazole    Tablet 40 milliGRAM(s) Oral before breakfast  petrolatum white Ointment 1 Application(s) Topical three times a day  sodium chloride 0.9%. 1000 milliLiter(s) (100 mL/Hr) IV Continuous <Continuous>    MEDICATIONS  (PRN):  acetaminophen     Tablet .. 650 milliGRAM(s) Oral every 6 hours PRN Temp greater or equal to 38C (100.4F), Mild Pain (1 - 3)  dextrose Oral Gel 15 Gram(s) Oral once PRN Blood Glucose LESS THAN 70 milliGRAM(s)/deciliter  melatonin 3 milliGRAM(s) Oral at bedtime PRN Insomnia          PHYSICAL EXAM:  GENERAL: NAD, well-groomed, well-developed  HEAD:  Atraumatic, Normocephalic  CHEST/LUNG: Clear to percussion bilaterally; No rales, rhonchi, wheezing, or rubs  HEART: Regular rate and rhythm; No murmurs, rubs, or gallops  ABDOMEN: Soft, Nontender, Nondistended; Bowel sounds present  EXTREMITIES:  2+ Peripheral Pulses, No clubbing, cyanosis, or edema  LYMPH: No lymphadenopathy noted  SKIN: No rashes or lesions    Care Discussed with Consultants/Other Providers [ ] YES  [ ] NO
have reviewed and edited the above note as needed. I have personally formulated the clinical impression and recommendations as stated.     Nandini Gaston MD  10/29/2024

## 2024-10-30 ENCOUNTER — TRANSCRIPTION ENCOUNTER (OUTPATIENT)
Age: 79
End: 2024-10-30

## 2024-10-31 NOTE — DISCHARGE NOTE PROVIDER - NSDCQMAMI_CARD_ALL_CORE
Patient Education   Preventive Care Advice   This is general advice given by our system to help you stay healthy. However, your care team may have specific advice just for you. Please talk to your care team about your preventive care needs.  Nutrition  Eat 5 or more servings of fruits and vegetables each day.  Try wheat bread, brown rice and whole grain pasta (instead of white bread, rice, and pasta).  Get enough calcium and vitamin D. Check the label on foods and aim for 100% of the RDA (recommended daily allowance).  Lifestyle  Exercise at least 150 minutes each week  (30 minutes a day, 5 days a week).  Do muscle strengthening activities 2 days a week. These help control your weight and prevent disease.  No smoking.  Wear sunscreen to prevent skin cancer.  Have a dental exam and cleaning every 6 months.  Yearly exams  See your health care team every year to talk about:  Any changes in your health.  Any medicines your care team has prescribed.  Preventive care, family planning, and ways to prevent chronic diseases.  Shots (vaccines)   HPV shots (up to age 26), if you've never had them before.  Hepatitis B shots (up to age 59), if you've never had them before.  COVID-19 shot: Get this shot when it's due.  Flu shot: Get a flu shot every year.  Tetanus shot: Get a tetanus shot every 10 years.  Pneumococcal, hepatitis A, and RSV shots: Ask your care team if you need these based on your risk.  Shingles shot (for age 50 and up)  General health tests  Diabetes screening:  Starting at age 35, Get screened for diabetes at least every 3 years.  If you are younger than age 35, ask your care team if you should be screened for diabetes.  Cholesterol test: At age 39, start having a cholesterol test every 5 years, or more often if advised.  Bone density scan (DEXA): At age 50, ask your care team if you should have this scan for osteoporosis (brittle bones).  Hepatitis C: Get tested at least once in your life.  STIs (sexually  transmitted infections)  Before age 24: Ask your care team if you should be screened for STIs.  After age 24: Get screened for STIs if you're at risk. You are at risk for STIs (including HIV) if:  You are sexually active with more than one person.  You don't use condoms every time.  You or a partner was diagnosed with a sexually transmitted infection.  If you are at risk for HIV, ask about PrEP medicine to prevent HIV.  Get tested for HIV at least once in your life, whether you are at risk for HIV or not.  Cancer screening tests  Cervical cancer screening: If you have a cervix, begin getting regular cervical cancer screening tests starting at age 21.  Breast cancer scan (mammogram): If you've ever had breasts, begin having regular mammograms starting at age 40. This is a scan to check for breast cancer.  Colon cancer screening: It is important to start screening for colon cancer at age 45.  Have a colonoscopy test every 10 years (or more often if you're at risk) Or, ask your provider about stool tests like a FIT test every year or Cologuard test every 3 years.  To learn more about your testing options, visit:   .  For help making a decision, visit:   https://bit.ly/mj42226.  Prostate cancer screening test: If you have a prostate, ask your care team if a prostate cancer screening test (PSA) at age 55 is right for you.  Lung cancer screening: If you are a current or former smoker ages 50 to 80, ask your care team if ongoing lung cancer screenings are right for you.  For informational purposes only. Not to replace the advice of your health care provider. Copyright   2023 [employer]. All rights reserved. Clinically reviewed by the [facility]. MEMSIC [id number] - REV 01/24.      No

## 2024-11-04 ENCOUNTER — TRANSCRIPTION ENCOUNTER (OUTPATIENT)
Age: 79
End: 2024-11-04

## 2024-11-21 ENCOUNTER — LABORATORY RESULT (OUTPATIENT)
Age: 79
End: 2024-11-21

## 2024-11-21 ENCOUNTER — NON-APPOINTMENT (OUTPATIENT)
Age: 79
End: 2024-11-21

## 2024-11-21 DIAGNOSIS — R19.7 DIARRHEA, UNSPECIFIED: ICD-10-CM

## 2024-11-25 ENCOUNTER — TRANSCRIPTION ENCOUNTER (OUTPATIENT)
Age: 79
End: 2024-11-25

## 2024-11-25 DIAGNOSIS — A04.72 ENTEROCOLITIS DUE TO CLOSTRIDIUM DIFFICILE, NOT SPECIFIED AS RECURRENT: ICD-10-CM

## 2024-11-25 RX ORDER — FIDAXOMICIN 200 MG/1
200 TABLET, FILM COATED ORAL TWICE DAILY
Qty: 20 | Refills: 0 | Status: DISCONTINUED | COMMUNITY
Start: 2024-11-25 | End: 2024-11-25

## 2024-11-25 RX ORDER — VANCOMYCIN HYDROCHLORIDE 125 MG/1
125 CAPSULE ORAL
Qty: 40 | Refills: 0 | Status: ACTIVE | COMMUNITY
Start: 2024-11-25 | End: 1900-01-01

## 2024-11-27 NOTE — PATIENT PROFILE ADULT - OVER THE PAST TWO WEEKS HAVE YOU FELT DOWN, DEPRESSED OR HOPELESS?
Education Record    Learner:  Patient    Disease / Diagnosis: Breast CA     Barriers / Limitations:  None   Comments:    Method:  Discussion   Comments:    General Topics:  Medication and Side effects and symptom management   Comments:    Outcome:  Shows understanding   Comments:    Here for follow up with APN and faslodex injection. She is feeling well today without any complaints. She has norco PRN for pain but she does not have any pain today. Has hx neuropathy but takes gabapentin for this. She feels her energy could always be better. Appetite is good. No other symptoms.    no

## 2024-12-10 ENCOUNTER — APPOINTMENT (OUTPATIENT)
Dept: HOME HEALTH SERVICES | Facility: HOME HEALTH | Age: 79
End: 2024-12-10

## 2024-12-30 NOTE — ED ADULT TRIAGE NOTE - ARRIVAL FROM
Spoke to patient with provider's instructions.  He is seeing Neuro in Feb.  He also said his left arm is back to normal so he will be skipping therapy.  No more numbness per pt    110 94   P 88  12/27    106 83  P 113   12/28    113 93  P 91  12/29    125 91  P 86  12/30   Home

## 2024-12-30 NOTE — ED ADULT NURSE NOTE - BEFAST SPEECH PHRASE
S/w to wife, Ashley, giving lab results.    Ashley states patient is experiencing weakness, unable to stand on his own to walk. He is unable to go to physical therapy. [facility] States she usually speaks with Josefina BENAVIDES Pt currently in [facility] with ambulatory dysfunction.    No

## 2025-01-18 NOTE — ED ADULT NURSE NOTE - IS THE PATIENT ABLE TO BE SCREENED?
Faye Campos is a 32 year old female presenting to the walk-in clinic today for runny nose, ST, eye pain and dyspnea since Monday. Patient reports living in a shelter, negative covid test done today.     SwabsSpecimens collected during rooming process:  None    Patient would like communication of their results via:   [employer]    Cell Phone:   Telephone Information:   Mobile [phone]     Okay to leave a message containing results? Yes    Patient aware that we will not contact for NEGATIVE strep/viral panel results. Patient encouraged to use [employer] for results and future communications.    Yes

## 2025-01-29 ENCOUNTER — APPOINTMENT (OUTPATIENT)
Dept: HOME HEALTH SERVICES | Facility: HOME HEALTH | Age: 80
End: 2025-01-29

## 2025-01-29 VITALS — OXYGEN SATURATION: 91 % | DIASTOLIC BLOOD PRESSURE: 90 MMHG | SYSTOLIC BLOOD PRESSURE: 122 MMHG | HEART RATE: 67 BPM

## 2025-01-29 DIAGNOSIS — F03.90 UNSPECIFIED DEMENTIA W/OUT BEHAVIORAL DISTURBANCE: ICD-10-CM

## 2025-01-29 DIAGNOSIS — E08.49 DIABETES MELLITUS DUE TO UNDERLYING CONDITION WITH OTHER DIABETIC NEUROLOGICAL COMPLICATION: ICD-10-CM

## 2025-01-29 DIAGNOSIS — K94.00 COLOSTOMY COMPLICATION, UNSPECIFIED: ICD-10-CM

## 2025-01-29 DIAGNOSIS — Z79.4 DIABETES MELLITUS DUE TO UNDERLYING CONDITION WITH OTHER DIABETIC NEUROLOGICAL COMPLICATION: ICD-10-CM

## 2025-01-29 DIAGNOSIS — E11.9 TYPE 2 DIABETES MELLITUS W/OUT COMPLICATIONS: ICD-10-CM

## 2025-01-29 DIAGNOSIS — Z43.3 ENCOUNTER FOR ATTENTION TO COLOSTOMY: ICD-10-CM

## 2025-01-29 DIAGNOSIS — M81.0 AGE-RELATED OSTEOPOROSIS W/OUT CURRENT PATHOLOGICAL FRACTURE: ICD-10-CM

## 2025-01-29 PROCEDURE — G0439: CPT

## 2025-01-29 PROCEDURE — G0506: CPT

## 2025-01-29 NOTE — DIETITIAN INITIAL EVALUATION ADULT - OBTAIN CURRENT WEIGHT
Below are the following reason for why the cancelled appointment has not been rescheduled: Called  patient, sent letter, notified PCP (if NEW PATIENT). Unable to reschedule   yes

## 2025-01-30 NOTE — H&P ADULT - NSHPPHYSICALEXAM_GEN_ALL_CORE
Thanks for visiting us today!    Remember these important phone numbers:    [phone] for phone nurses during the day and our nurse answering service at night    [phone]   for scheduling or changing future appointments    [phone] for the Poison Control Center    When leaving a message for our staff, please include:   the spelling of your child’s full name and date of birth  your full name and relationship to child  best phone number and time to reach you   reason for the call      We strongly recommend that all children age 6 months and older receive the COVID-19 vaccine. Call our office at [phone]  to schedule.      Where's the best place on the internet to look up health information about kids?  HealthyChildren.org  From the American Academy of Pediatrics        Is your child signed up for [employer]? If you do this, you can message us rather than playing phone tag! You can also look at labs, pay your bills, and do some scheduling. Go to your own account first. (If you don't have one yet, you can set one up at the website below or at your doctor's office).  Using a web browser (not the phone catrachito), on the right hand side of your page, click the button marked \"Request Access to my Child's Records.\" Fill out the information. In a few days your child's information will be linked to your account. It's that simple!! Here is the website for more information:    [email or web address]        --------------------------------------------------------------------------------------------------------------------      Vital Signs Last 24 Hrs  T(C): 37.7 (22 Mar 2024 01:37), Max: 37.7 (22 Mar 2024 01:37)  T(F): 99.9 (22 Mar 2024 01:37), Max: 99.9 (22 Mar 2024 01:37)  HR: 61 (21 Mar 2024 23:20) (61 - 61)  BP: 107/61 (21 Mar 2024 23:20) (107/61 - 107/61)  BP(mean): --  RR: 18 (21 Mar 2024 23:20) (18 - 18)  SpO2: 95% (21 Mar 2024 23:20) (95% - 98%)    Parameters below as of 21 Mar 2024 23:20  Patient On (Oxygen Delivery Method): nasal cannula  O2 Flow (L/min): 3  ================================================= Vital Signs Last 24 Hrs  T(C): 37.7 (22 Mar 2024 01:37), Max: 37.7 (22 Mar 2024 01:37)  T(F): 99.9 (22 Mar 2024 01:37), Max: 99.9 (22 Mar 2024 01:37)  HR: 61 (21 Mar 2024 23:20) (61 - 61)  BP: 107/61 (21 Mar 2024 23:20) (107/61 - 107/61)  BP(mean): --  RR: 18 (21 Mar 2024 23:20) (18 - 18)  SpO2: 95% (21 Mar 2024 23:20) (95% - 98%)    Parameters below as of 21 Mar 2024 23:20  Patient On (Oxygen Delivery Method): nasal cannula  O2 Flow (L/min): 3  =================================================    PHYSICAL EXAMINATION:    APPEARANCE: Adequately groomed, adequately nourished female, sitting propped up in stretcher in NAD.  Seems confused.  HEENT: Very dry lips.  Dry oral mucosa.  Pupils reactive to light.  EOMI  LYMPHATIC: No lymphadenopathy appreciated  CARDIOVASCULAR: (+) S1 S2.  No JVD.  No gross murmurs appreciated.  No edema  RESPIRATORY: No wheezing, rhonchi, crackles appreciated  GASTROINTESTINAL: Soft.  Non-tender.  (+) BS  GENITOURINARY: No suprapubic tenderness.  No CVA tenderness B/L  EXTREMITIES: Normal range of motion.  No clubbing, cyanosis or edema  MUSCULOSKELETAL: No atrophy.  No asymmetry.  Good ROM  SKIN: No rashes. No ecchymoses.  No cyanosis  PSYCHIATRIC: Alert.  Appropriately verbally communicative, but forgetful.  Pleasant.  Good affect  NEUROLOGICAL: Non-focal, MCCANN x 4 against gravity  VASCULAR: Peripheral pulses palpable

## 2025-02-17 NOTE — PHYSICAL THERAPY INITIAL EVALUATION ADULT - ASSISTIVE DEVICE FOR TRANSFER: GAIT, REHAB EVAL
Detail Level: Generalized Render Risk Assessment In Note?: no Comment: Posterior Mid-Parietal Scalp, Shave\\nBASAL CELL CARCINOMA, NODULAR, ULCERATED ( biopsy done 3/9/2023) rolling walker

## 2025-02-20 ENCOUNTER — TRANSCRIPTION ENCOUNTER (OUTPATIENT)
Age: 80
End: 2025-02-20

## 2025-02-20 ENCOUNTER — NON-APPOINTMENT (OUTPATIENT)
Age: 80
End: 2025-02-20

## 2025-02-22 ENCOUNTER — INPATIENT (INPATIENT)
Facility: HOSPITAL | Age: 80
LOS: 3 days | Discharge: HOME | End: 2025-02-26
Attending: INTERNAL MEDICINE | Admitting: INTERNAL MEDICINE
Payer: MEDICARE

## 2025-02-22 VITALS
OXYGEN SATURATION: 94 % | RESPIRATION RATE: 18 BRPM | TEMPERATURE: 98 F | HEART RATE: 83 BPM | WEIGHT: 149.91 LBS | DIASTOLIC BLOOD PRESSURE: 75 MMHG | SYSTOLIC BLOOD PRESSURE: 108 MMHG

## 2025-02-22 DIAGNOSIS — F03.90 UNSPECIFIED DEMENTIA, UNSPECIFIED SEVERITY, WITHOUT BEHAVIORAL DISTURBANCE, PSYCHOTIC DISTURBANCE, MOOD DISTURBANCE, AND ANXIETY: ICD-10-CM

## 2025-02-22 DIAGNOSIS — Z87.81 PERSONAL HISTORY OF (HEALED) TRAUMATIC FRACTURE: Chronic | ICD-10-CM

## 2025-02-22 DIAGNOSIS — E78.5 HYPERLIPIDEMIA, UNSPECIFIED: ICD-10-CM

## 2025-02-22 DIAGNOSIS — E86.0 DEHYDRATION: ICD-10-CM

## 2025-02-22 DIAGNOSIS — I10 ESSENTIAL (PRIMARY) HYPERTENSION: ICD-10-CM

## 2025-02-22 DIAGNOSIS — R55 SYNCOPE AND COLLAPSE: ICD-10-CM

## 2025-02-22 DIAGNOSIS — N17.9 ACUTE KIDNEY FAILURE, UNSPECIFIED: ICD-10-CM

## 2025-02-22 DIAGNOSIS — E11.9 TYPE 2 DIABETES MELLITUS WITHOUT COMPLICATIONS: ICD-10-CM

## 2025-02-22 DIAGNOSIS — Z93.3 COLOSTOMY STATUS: Chronic | ICD-10-CM

## 2025-02-22 DIAGNOSIS — I63.9 CEREBRAL INFARCTION, UNSPECIFIED: ICD-10-CM

## 2025-02-22 LAB
ALBUMIN SERPL ELPH-MCNC: 4.2 G/DL — SIGNIFICANT CHANGE UP (ref 3.3–5)
ALP SERPL-CCNC: 115 U/L — SIGNIFICANT CHANGE UP (ref 40–120)
ALT FLD-CCNC: 19 U/L — SIGNIFICANT CHANGE UP (ref 4–33)
ANION GAP SERPL CALC-SCNC: 20 MMOL/L — HIGH (ref 7–14)
APPEARANCE UR: ABNORMAL
AST SERPL-CCNC: 33 U/L — HIGH (ref 4–32)
BASOPHILS # BLD AUTO: 0.04 K/UL — SIGNIFICANT CHANGE UP (ref 0–0.2)
BASOPHILS NFR BLD AUTO: 0.4 % — SIGNIFICANT CHANGE UP (ref 0–2)
BILIRUB SERPL-MCNC: 1.9 MG/DL — HIGH (ref 0.2–1.2)
BILIRUB UR-MCNC: NEGATIVE — SIGNIFICANT CHANGE UP
BUN SERPL-MCNC: 60 MG/DL — HIGH (ref 7–23)
CALCIUM SERPL-MCNC: 9.3 MG/DL — SIGNIFICANT CHANGE UP (ref 8.4–10.5)
CHLORIDE SERPL-SCNC: 97 MMOL/L — LOW (ref 98–107)
CO2 SERPL-SCNC: 15 MMOL/L — LOW (ref 22–31)
COLOR SPEC: YELLOW — SIGNIFICANT CHANGE UP
CREAT SERPL-MCNC: 1.7 MG/DL — HIGH (ref 0.5–1.3)
DIFF PNL FLD: NEGATIVE — SIGNIFICANT CHANGE UP
EGFR: 31 ML/MIN/1.73M2 — LOW
EOSINOPHIL # BLD AUTO: 0.07 K/UL — SIGNIFICANT CHANGE UP (ref 0–0.5)
EOSINOPHIL NFR BLD AUTO: 0.7 % — SIGNIFICANT CHANGE UP (ref 0–6)
FLUAV AG NPH QL: SIGNIFICANT CHANGE UP
FLUBV AG NPH QL: SIGNIFICANT CHANGE UP
GLUCOSE BLDC GLUCOMTR-MCNC: 161 MG/DL — HIGH (ref 70–99)
GLUCOSE SERPL-MCNC: 135 MG/DL — HIGH (ref 70–99)
GLUCOSE UR QL: NEGATIVE MG/DL — SIGNIFICANT CHANGE UP
HCT VFR BLD CALC: 44.5 % — SIGNIFICANT CHANGE UP (ref 34.5–45)
HGB BLD-MCNC: 15.2 G/DL — SIGNIFICANT CHANGE UP (ref 11.5–15.5)
IANC: 7.02 K/UL — SIGNIFICANT CHANGE UP (ref 1.8–7.4)
IMM GRANULOCYTES NFR BLD AUTO: 0.5 % — SIGNIFICANT CHANGE UP (ref 0–0.9)
KETONES UR-MCNC: NEGATIVE MG/DL — SIGNIFICANT CHANGE UP
LEUKOCYTE ESTERASE UR-ACNC: ABNORMAL
LYMPHOCYTES # BLD AUTO: 2.44 K/UL — SIGNIFICANT CHANGE UP (ref 1–3.3)
LYMPHOCYTES # BLD AUTO: 23 % — SIGNIFICANT CHANGE UP (ref 13–44)
MAGNESIUM SERPL-MCNC: 2.4 MG/DL — SIGNIFICANT CHANGE UP (ref 1.6–2.6)
MCHC RBC-ENTMCNC: 30.4 PG — SIGNIFICANT CHANGE UP (ref 27–34)
MCHC RBC-ENTMCNC: 34.2 G/DL — SIGNIFICANT CHANGE UP (ref 32–36)
MCV RBC AUTO: 89 FL — SIGNIFICANT CHANGE UP (ref 80–100)
MONOCYTES # BLD AUTO: 1.01 K/UL — HIGH (ref 0–0.9)
MONOCYTES NFR BLD AUTO: 9.5 % — SIGNIFICANT CHANGE UP (ref 2–14)
NEUTROPHILS # BLD AUTO: 7.02 K/UL — SIGNIFICANT CHANGE UP (ref 1.8–7.4)
NEUTROPHILS NFR BLD AUTO: 65.9 % — SIGNIFICANT CHANGE UP (ref 43–77)
NITRITE UR-MCNC: NEGATIVE — SIGNIFICANT CHANGE UP
NRBC # BLD AUTO: 0 K/UL — SIGNIFICANT CHANGE UP (ref 0–0)
NRBC # FLD: 0 K/UL — SIGNIFICANT CHANGE UP (ref 0–0)
NRBC BLD AUTO-RTO: 0 /100 WBCS — SIGNIFICANT CHANGE UP (ref 0–0)
PH UR: 6 — SIGNIFICANT CHANGE UP (ref 5–8)
PHOSPHATE SERPL-MCNC: 5.1 MG/DL — HIGH (ref 2.5–4.5)
PLATELET # BLD AUTO: 196 K/UL — SIGNIFICANT CHANGE UP (ref 150–400)
POTASSIUM SERPL-MCNC: 4 MMOL/L — SIGNIFICANT CHANGE UP (ref 3.5–5.3)
POTASSIUM SERPL-SCNC: 4 MMOL/L — SIGNIFICANT CHANGE UP (ref 3.5–5.3)
PROT SERPL-MCNC: 8.9 G/DL — HIGH (ref 6–8.3)
PROT UR-MCNC: 100 MG/DL
RBC # BLD: 5 M/UL — SIGNIFICANT CHANGE UP (ref 3.8–5.2)
RBC # FLD: 13.1 % — SIGNIFICANT CHANGE UP (ref 10.3–14.5)
RSV RNA NPH QL NAA+NON-PROBE: SIGNIFICANT CHANGE UP
SARS-COV-2 RNA SPEC QL NAA+PROBE: SIGNIFICANT CHANGE UP
SODIUM SERPL-SCNC: 132 MMOL/L — LOW (ref 135–145)
SP GR SPEC: 1.02 — SIGNIFICANT CHANGE UP (ref 1–1.03)
TROPONIN T, HIGH SENSITIVITY RESULT: 11 NG/L — SIGNIFICANT CHANGE UP
UROBILINOGEN FLD QL: 1 MG/DL — SIGNIFICANT CHANGE UP (ref 0.2–1)
WBC # BLD: 10.63 K/UL — HIGH (ref 3.8–10.5)
WBC # FLD AUTO: 10.63 K/UL — HIGH (ref 3.8–10.5)

## 2025-02-22 PROCEDURE — 99497 ADVNCD CARE PLAN 30 MIN: CPT | Mod: 25

## 2025-02-22 PROCEDURE — 99223 1ST HOSP IP/OBS HIGH 75: CPT

## 2025-02-22 PROCEDURE — 99285 EMERGENCY DEPT VISIT HI MDM: CPT

## 2025-02-22 RX ORDER — MAGNESIUM, ALUMINUM HYDROXIDE 200-200 MG
30 TABLET,CHEWABLE ORAL EVERY 4 HOURS
Refills: 0 | Status: DISCONTINUED | OUTPATIENT
Start: 2025-02-22 | End: 2025-02-26

## 2025-02-22 RX ORDER — QUETIAPINE FUMARATE 25 MG/1
25 TABLET ORAL
Refills: 0 | Status: DISCONTINUED | OUTPATIENT
Start: 2025-02-22 | End: 2025-02-26

## 2025-02-22 RX ORDER — SODIUM CHLORIDE 9 G/1000ML
1000 INJECTION, SOLUTION INTRAVENOUS
Refills: 0 | Status: DISCONTINUED | OUTPATIENT
Start: 2025-02-22 | End: 2025-02-26

## 2025-02-22 RX ORDER — ATORVASTATIN CALCIUM 80 MG/1
10 TABLET, FILM COATED ORAL AT BEDTIME
Refills: 0 | Status: DISCONTINUED | OUTPATIENT
Start: 2025-02-22 | End: 2025-02-26

## 2025-02-22 RX ORDER — DONEPEZIL HYDROCHLORIDE 5 MG/1
10 TABLET ORAL AT BEDTIME
Refills: 0 | Status: DISCONTINUED | OUTPATIENT
Start: 2025-02-22 | End: 2025-02-26

## 2025-02-22 RX ORDER — DONEPEZIL HYDROCHLORIDE 5 MG/1
1 TABLET ORAL
Refills: 0 | DISCHARGE

## 2025-02-22 RX ORDER — DEXTROSE 50 % IN WATER 50 %
12.5 SYRINGE (ML) INTRAVENOUS ONCE
Refills: 0 | Status: DISCONTINUED | OUTPATIENT
Start: 2025-02-22 | End: 2025-02-26

## 2025-02-22 RX ORDER — ONDANSETRON HCL/PF 4 MG/2 ML
4 VIAL (ML) INJECTION EVERY 8 HOURS
Refills: 0 | Status: DISCONTINUED | OUTPATIENT
Start: 2025-02-22 | End: 2025-02-26

## 2025-02-22 RX ORDER — HEPARIN SODIUM 1000 [USP'U]/ML
5000 INJECTION INTRAVENOUS; SUBCUTANEOUS EVERY 12 HOURS
Refills: 0 | Status: DISCONTINUED | OUTPATIENT
Start: 2025-02-23 | End: 2025-02-23

## 2025-02-22 RX ORDER — METOPROLOL SUCCINATE 50 MG/1
25 TABLET, EXTENDED RELEASE ORAL
Refills: 0 | Status: DISCONTINUED | OUTPATIENT
Start: 2025-02-22 | End: 2025-02-26

## 2025-02-22 RX ORDER — CITALOPRAM 20 MG/1
10 TABLET ORAL DAILY
Refills: 0 | Status: DISCONTINUED | OUTPATIENT
Start: 2025-02-22 | End: 2025-02-26

## 2025-02-22 RX ORDER — MELATONIN 5 MG
3 TABLET ORAL AT BEDTIME
Refills: 0 | Status: DISCONTINUED | OUTPATIENT
Start: 2025-02-22 | End: 2025-02-26

## 2025-02-22 RX ORDER — METOPROLOL SUCCINATE 50 MG/1
2.5 TABLET, EXTENDED RELEASE ORAL ONCE
Refills: 0 | Status: COMPLETED | OUTPATIENT
Start: 2025-02-22 | End: 2025-02-22

## 2025-02-22 RX ORDER — DORZOLAMIDE 20 MG/ML
1 SOLUTION/ DROPS OPHTHALMIC THREE TIMES A DAY
Refills: 0 | Status: DISCONTINUED | OUTPATIENT
Start: 2025-02-22 | End: 2025-02-26

## 2025-02-22 RX ORDER — ASPIRIN 325 MG
81 TABLET ORAL DAILY
Refills: 0 | Status: DISCONTINUED | OUTPATIENT
Start: 2025-02-22 | End: 2025-02-24

## 2025-02-22 RX ORDER — DEXTROSE 50 % IN WATER 50 %
25 SYRINGE (ML) INTRAVENOUS ONCE
Refills: 0 | Status: DISCONTINUED | OUTPATIENT
Start: 2025-02-22 | End: 2025-02-26

## 2025-02-22 RX ORDER — FOLIC ACID 1 MG/1
1 TABLET ORAL
Refills: 0 | DISCHARGE

## 2025-02-22 RX ORDER — DEXTROSE 50 % IN WATER 50 %
15 SYRINGE (ML) INTRAVENOUS ONCE
Refills: 0 | Status: DISCONTINUED | OUTPATIENT
Start: 2025-02-22 | End: 2025-02-26

## 2025-02-22 RX ORDER — NIFEDIPINE 30 MG
30 TABLET, EXTENDED RELEASE 24 HR ORAL
Refills: 0 | Status: DISCONTINUED | OUTPATIENT
Start: 2025-02-22 | End: 2025-02-23

## 2025-02-22 RX ORDER — MEMANTINE HYDROCHLORIDE 21 MG/1
10 CAPSULE, EXTENDED RELEASE ORAL
Refills: 0 | Status: DISCONTINUED | OUTPATIENT
Start: 2025-02-22 | End: 2025-02-26

## 2025-02-22 RX ORDER — BRINZOLAMIDE 10 MG/ML
1 SUSPENSION/ DROPS OPHTHALMIC
Refills: 0 | DISCHARGE

## 2025-02-22 RX ORDER — NIFEDIPINE 30 MG
1 TABLET, EXTENDED RELEASE 24 HR ORAL
Refills: 0 | DISCHARGE

## 2025-02-22 RX ORDER — GLUCAGON 3 MG/1
1 POWDER NASAL ONCE
Refills: 0 | Status: DISCONTINUED | OUTPATIENT
Start: 2025-02-22 | End: 2025-02-26

## 2025-02-22 RX ORDER — QUETIAPINE FUMARATE 25 MG/1
1 TABLET ORAL
Refills: 0 | DISCHARGE

## 2025-02-22 RX ORDER — INSULIN LISPRO 100 U/ML
INJECTION, SOLUTION INTRAVENOUS; SUBCUTANEOUS AT BEDTIME
Refills: 0 | Status: DISCONTINUED | OUTPATIENT
Start: 2025-02-22 | End: 2025-02-26

## 2025-02-22 RX ORDER — MIRTAZAPINE 30 MG/1
30 TABLET, FILM COATED ORAL AT BEDTIME
Refills: 0 | Status: DISCONTINUED | OUTPATIENT
Start: 2025-02-22 | End: 2025-02-26

## 2025-02-22 RX ORDER — ACETAMINOPHEN 500 MG/5ML
650 LIQUID (ML) ORAL EVERY 6 HOURS
Refills: 0 | Status: DISCONTINUED | OUTPATIENT
Start: 2025-02-22 | End: 2025-02-26

## 2025-02-22 RX ORDER — MEMANTINE HYDROCHLORIDE 21 MG/1
1 CAPSULE, EXTENDED RELEASE ORAL
Refills: 0 | DISCHARGE

## 2025-02-22 RX ORDER — INSULIN LISPRO 100 U/ML
INJECTION, SOLUTION INTRAVENOUS; SUBCUTANEOUS
Refills: 0 | Status: DISCONTINUED | OUTPATIENT
Start: 2025-02-22 | End: 2025-02-26

## 2025-02-22 RX ADMIN — Medication 1000 MILLILITER(S): at 18:38

## 2025-02-22 RX ADMIN — MIRTAZAPINE 30 MILLIGRAM(S): 30 TABLET, FILM COATED ORAL at 23:07

## 2025-02-22 RX ADMIN — Medication 1000 MILLILITER(S): at 17:38

## 2025-02-22 RX ADMIN — QUETIAPINE FUMARATE 25 MILLIGRAM(S): 25 TABLET ORAL at 23:07

## 2025-02-22 RX ADMIN — SODIUM CHLORIDE 75 MILLILITER(S): 9 INJECTION, SOLUTION INTRAVENOUS at 23:09

## 2025-02-22 RX ADMIN — METOPROLOL SUCCINATE 2.5 MILLIGRAM(S): 50 TABLET, EXTENDED RELEASE ORAL at 23:06

## 2025-02-22 RX ADMIN — Medication 3 MILLIGRAM(S): at 23:06

## 2025-02-22 RX ADMIN — Medication 1000 MILLILITER(S): at 14:50

## 2025-02-22 RX ADMIN — Medication 1000 MILLILITER(S): at 15:50

## 2025-02-22 NOTE — ED PROVIDER NOTE - ATTENDING CONTRIBUTION TO CARE
79-year-old female, history of dementia, CVA, DM2, HTN, glaucoma, OA of the left knee, presenting for an episode of syncope.  The patient is presenting with her aide at bedside.  The aide states that the patient was sitting at the barstool in the kitchen when she slumped over for a few seconds.  Patient did not hit her head or fall out of the chair.  The patient aide states that she has been having episodes of frequent diarrhea from her ostomy bag.  The patient denies any symptoms at this time.  The patient denies any abdominal pain.  The patient denies any nausea or vomiting and states she feels well otherwise.  ROS is otherwise negative.

## 2025-02-22 NOTE — H&P ADULT - NSHPPHYSICALEXAM_GEN_ALL_CORE
PHYSICAL EXAM:  GENERAL: NAD, comfortable at bedside   HEAD:  Atraumatic, Normocephalic  EYES: EOMI, PERRL, conjunctiva and sclera clear  NECK: Supple, No JVD  CHEST/LUNG: Clear to auscultation bilaterally; No wheezes, rales or rhonchi  HEART: Regular rate and rhythm; No murmurs, rubs, or gallops, (+)S1, S2  ABDOMEN: Soft, Nontender, Nondistended; Normal Bowel sounds + ostomy healthy with liquid stool  EXTREMITIES:  2+ Peripheral Pulses, No clubbing, cyanosis, or edema  PSYCH: normal mood and affect  NEUROLOGY: AAOx2- name/ and place- lij, CN 2-12 grossly intact, 5/5 strength in bilateral upper and lower extremities, no slurred speech/pronator drift, gait not assessed  SKIN: No rashes or lesions

## 2025-02-22 NOTE — H&P ADULT - NSHPREVIEWOFSYSTEMS_GEN_ALL_CORE
REVIEW OF SYSTEMS:    CONSTITUTIONAL: No weakness, fevers or chills  EYES/ENT: No visual changes;  No dysphagia; No sore throat; No rhinorrhea; No sinus pain/pressure  NECK: No pain or stiffness  RESPIRATORY: No cough, wheezing, hemoptysis; No shortness of breath  CARDIOVASCULAR: No chest pain or palpitations; No lower extremity edema  GASTROINTESTINAL: No abdominal or epigastric pain. No nausea, vomiting, or hematemesis; + diarrhea no constipation. No melena or hematochezia.  GENITOURINARY: No dysuria, frequency or hematuria  NEUROLOGICAL: No numbness or weakness + syncope   MSK: denies any pain   SKIN: No itching, burning, rashes, or lesions   All other review of systems is negative unless indicated above.

## 2025-02-22 NOTE — ED ADULT TRIAGE NOTE - LOCATION:
Goal Outcome Evaluation:              Outcome Evaluation: Pt is Alert and oriented and on room air. Pt has constant tooth pain that is alleviated by PRN medications (see MAR). Pt is awaiting discharge for ETOH rehab. Pt has no complaints and is stable at this time.                                Left arm;

## 2025-02-22 NOTE — ED ADULT NURSE NOTE - CHIEF COMPLAINT QUOTE
Pt arrives via EMS s/p unresponsive episode while seated, lasting agatha 1-2 min. Pt denies complaints except diarrhea 2 days ago. RR even/unlabored on NC.  on scene. A&Ox4. PHx: dementia, DM2, HLD, HTN, colostomy.

## 2025-02-22 NOTE — ED PROVIDER NOTE - ATTENDING WITH...
79 yo M with NO significant PMH and surgical history comes to the ED with the c/o chest pain x 1 day. The symptoms started today morning. He was feeling pale and had chest discomfort. He also passed out while sitting on the chair talking to the family he hit his head.    He denies palpitations, shortness of breath, leg swelling or URI 79 yo M with NO significant PMH and surgical history comes to the ED with the c/o chest pain x 1 day. The symptoms started today morning. He was feeling pale and had chest discomfort. He also passed out while sitting on the chair talking to the family he hit his head. Son found him pulseless and immediately initiated CPR. EMS was called, found to be in Vfib. ROSC obtained in ER, code STEMI called and pt went to cath lab. Resident

## 2025-02-22 NOTE — H&P ADULT - PROBLEM SELECTOR PLAN 2
New  Cr 1.7- baseline ~0.7  likely 2/2 diarrhea and decreased PO intake (no recent abx use or sick contacts)  s/2 2 L IVF, continue with LR 75ml/hr for 1L  renally dose medications and avoid nephrotic agents  Strict i/o

## 2025-02-22 NOTE — H&P ADULT - PROBLEM SELECTOR PLAN 7
Chronic stable Chronic stable  Continue citalopram 10mg daily, mirtazapine 30mg nightly, donepezil 10mg nightly quetiapine 25mg BID, memantine 28mg daily  Pt is at high risk for delirium, therefore, please have adequate sleeping environment for the patient, light on, curtains open, TV on during the day with regular stimulation and out of bed to chair if possible. During the night, close curtains, TV off, lights off, and minimize interruptions (limit blood draws and vital sign checks if possible). Regular interaction with patient with staff (introducing selves), frequent reorientation, and encouragement of visitors as allowed by hospital and unit policy. Chronic stable  Continue asa 81mg daily and simvastatin 20mg daily formulary  A1c and lipid panel in AM

## 2025-02-22 NOTE — H&P ADULT - PROBLEM SELECTOR PLAN 6
Chronic stable  Continue asa 81mg daily and simvastatin 20mg daily formulary  A1c and lipid panel in AM Chronic stable  Continue simvastatin 20mg daily formulary  A1c and lipid panel in AM

## 2025-02-22 NOTE — ED ADULT NURSE NOTE - NSFALLHARMRISKINTERV_ED_ALL_ED

## 2025-02-22 NOTE — H&P ADULT - HISTORY OF PRESENT ILLNESS
79 yr old female with a pmh of HTN, HLD, T2DM not on insulin, CVA, glaucoma, OA of left knee, dementia AAO x1-2, s/p ostomy,  who presents with a syncopal episode. Pt was sitting at the kitchen barstool when she was found slumped over. She came to on her own after the aide was calling her name and did not recall any preceding symptoms. She did not fall/hit her head.   Aide reports pt has had diarrhea through her ostomy the past 3 days- slowly improving but during this time the pt has had decreased PO intake.   Denies  headache, dizziness, chest pain, palpitations, SOB, abdominal pain, joint pain, urinary symptoms.  Vitals: T97.6, HR 78, /76, RR 18 satting 100% on 3L NC 79 yr old female with a pmh of HTN, HLD, HFpEF, T2DM not on insulin, CVA, glaucoma, OA of left knee, dementia AAO x1-2, s/p ostomy,  who presents with a syncopal episode. Pt was sitting at the kitchen barstool when she was found slumped over. She came to on her own after the aide was calling her name and did not recall any preceding symptoms. She did not fall/hit her head.   Aide reports pt has had diarrhea through her ostomy the past 3 days- slowly improving but during this time the pt has had decreased PO intake.   Denies  headache, dizziness, chest pain, palpitations, SOB, abdominal pain, joint pain, urinary symptoms.  Vitals: T97.6, HR 78, /76, RR 18 satting 100% on 3L NC

## 2025-02-22 NOTE — H&P ADULT - PROBLEM SELECTOR PLAN 1
New  CT head and CXR ordered, UA negative   TTE 5/2024: EF 60-65% with G1DD  EKG nonischemic, Trop   Monitor on telemetry  Check orthostatics, TTE ordered, trend trop  * IV fluid hydration with LR 75 ml/hr for 1 L

## 2025-02-22 NOTE — ED PROVIDER NOTE - NSICDXPASTMEDICALHX_GEN_ALL_CORE_FT
PAST MEDICAL HISTORY:  Bilateral pneumonia     Diabetes mellitus, type 2     Diverticulitis     Glaucoma     HLD (hyperlipidemia)     Hypertension, unspecified type     No pertinent past medical history     Osteoarthritis, knee left    Sepsis

## 2025-02-22 NOTE — H&P ADULT - CONVERSATION DETAILS
hcp graddaughter Rejivijay [phone]    Family  would like to remain FULL CODE.  If required, the family would like the following interventions: CPR and/or intubation with mechanical ventilation. hcp granddaughter Nette [phone]    Family IS agreeable to:  Noninvasive mechanical ventilation , IV fluids,     Family NOT agreable to: CPR, Intubation and mechanical ventilation, Feeding tube,     Pt would like to decide if/when the need were to arise for:  Dialysis

## 2025-02-22 NOTE — H&P ADULT - NSHPLABSRESULTS_GEN_ALL_CORE
15.2   10.63 )-----------( 196      ( 2025 14:48 )             44.5     132[L]  |  97[L]  |  60[H]  ----------------------------<  135[H]       4.0   |  15[L]  |  1.70[H]    Ca    9.3      2025 14:48  Phos  5.1       Mg     2.40         TPro  8.9[H]  /  Alb  4.2  /  TBili  1.9[H]  /  DBili  x   /  AST  33[H]  /  ALT  19  /  AlkPhos  115        CXR and CT head ordered          hs Troponin, T - 11 ng/L (25 @ 14:48)              Urinalysis Basic - ( 2025 19:44 )  Color: Yellow / Appearance: Cloudy / S.025 / pH: 6.0  Gluc: Negative mg/dL / Ketone: Negative mg/dL  / Bili: Negative / Urobili: 1.0 mg/dL   Blood: Negative / Protein: 100 mg/dL / Nitrite: Negative   Leuk Esterase: Trace / RBC: 0 /HPF / WBC 1 /HPF   Sq Epi: x / Bacteria: Few /HPF  Hyaline Casts: x/WBC Casts: x      EKG interpreted by myself: nonischemic 15.2   10.63 )-----------( 196      ( 2025 14:48 )             44.5     132[L]  |  97[L]  |  60[H]  ----------------------------<  135[H]       4.0   |  15[L]  |  1.70[H]    Ca    9.3      2025 14:48  Phos  5.1       Mg     2.40         TPro  8.9[H]  /  Alb  4.2  /  TBili  1.9[H]  /  DBili  x   /  AST  33[H]  /  ALT  19  /  AlkPhos  115            hs Troponin, T - 11 ng/L (25 @ 14:48)            Urinalysis Basic - ( 2025 19:44 )  Color: Yellow / Appearance: Cloudy / S.025 / pH: 6.0  Gluc: Negative mg/dL / Ketone: Negative mg/dL  / Bili: Negative / Urobili: 1.0 mg/dL   Blood: Negative / Protein: 100 mg/dL / Nitrite: Negative   Leuk Esterase: Trace / RBC: 0 /HPF / WBC 1 /HPF   Sq Epi: x / Bacteria: Few /HPF  Hyaline Casts: x/WBC Casts: x      EKG interpreted by myself: nonischemic      CXR and CT head ordered    -> Discussed with radiology- no gross ICH seen - awaiting full report
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
## 2025-02-22 NOTE — ED ADULT NURSE NOTE - OBJECTIVE STATEMENT
Received patient in room 12 c/o syncope, +LOC today. Patient denies SOB, chest pain, fever. HX HTN, HLD, DM. Patient is on cardiac monitor sinus rhythm w/O2 sat 97% on a 6L/NC. Patient is A&Ox3, airway patent, breathing unlabored and even, radial pulses palpable, colostomy bag on left abdomen. Labs obtained, 20G IV placed on right arm, IV fluid bolus infusing. Side rails up and safety maintained. Fall precaution in place. Call bells within reach. Family at the bedside. Received patient in room 12 c/o syncope, +LOC today. Patient denies SOB, chest pain, fever. HX HTN, HLD, DM, Dementia, CVA, Glaucoma, OA of the left knee. Patient is on cardiac monitor sinus rhythm w/O2 sat 97% on a 6L/NC. Patient is A&Ox3, airway patent, breathing unlabored and even, radial pulses palpable, colostomy bag on left abdomen. Labs obtained, 20G IV placed on right arm, IV fluid bolus infusing. Side rails up and safety maintained. Fall precaution in place. Call bells within reach. Family at the bedside. Received patient in room from home 12 c/o syncope, +LOC today. Patient denies head injury. Patient denies SOB, chest pain, fever. HX HTN, HLD, DM, Dementia, CVA, Glaucoma, OA of the left knee. Patient is on cardiac monitor sinus rhythm w/O2 sat 97% on a 6L/NC. Patient is A&Ox1, airway patent, breathing unlabored and even, radial pulses palpable, colostomy bag on left abdomen. Labs obtained, 20G IV placed on right arm, IV fluid bolus infusing. Side rails up and safety maintained. Fall precaution in place. Call bells within reach. Family at the bedside. Received patient in room from home 12 c/o syncope, +LOC today. Patient denies head injury. Patient denies SOB, chest pain, fever. HX HTN, HLD, DM, Dementia, CVA, Glaucoma, OA of the left knee. Patient is on cardiac monitor sinus rhythm w/O2 sat 97% on a 6L/NC. Patient is A&Ox2, airway patent, breathing unlabored and even, radial pulses palpable, colostomy bag on left abdomen. Labs obtained, 20G IV placed on right arm, IV fluid bolus infusing. Side rails up and safety maintained. Fall precaution in place. Call bells within reach. Family at the bedside.

## 2025-02-22 NOTE — H&P ADULT - NSICDXPASTMEDICALHX_GEN_ALL_CORE_FT
PAST MEDICAL HISTORY:  CVA (cerebrovascular accident)     Dementia     Diabetes mellitus, type 2     Diverticulitis     Glaucoma     HLD (hyperlipidemia)     Hypertension, unspecified type     Osteoarthritis, knee left     PAST MEDICAL HISTORY:  (HFpEF) heart failure with preserved ejection fraction     CVA (cerebrovascular accident)     Dementia     Diabetes mellitus, type 2     Diverticulitis     Glaucoma     HLD (hyperlipidemia)     Hypertension, unspecified type     Osteoarthritis, knee left

## 2025-02-22 NOTE — ED CLERICAL - NS ED CLERK NOTE PRE-ARRIVAL INFORMATION; ADDITIONAL PRE-ARRIVAL INFORMATION

## 2025-02-22 NOTE — H&P ADULT - PROBLEM SELECTOR PLAN 3
Chronic moderate exacerbation   /78  Continue metoprolol XL 25mg daily and nifedipine 30mg daily with hold parameters  Monitor New  Afib with RVR   s/p lopressor 2.5mg iv and 25mg po  discussed with  hcp ok for heparin drip New  Afib with RVR   s/p lopressor 2.5mg iv and 25mg po  discussed with  hcp ok for heparin drip  Will give digoxin 250mcg x1 given jocelin  EP consult in AM New  Afib with RVR   s/p lopressor 2.5mg iv and 25mg po, still rvr- give 2.5mg IV once more  discussed with  hcp ok for heparin drip   digoxin 250mcg x1 given jocelin if still uncontrolled  EP consult in AM New  Afib with RVR   s/p lopressor 2.5mg iv and 25mg po, still rvr- give 5mg IV once more  discussed with  hcp ok for heparin drip   digoxin 250mcg x1 given jocelin if still uncontrolled  EP consult in AM

## 2025-02-22 NOTE — ED ADULT TRIAGE NOTE - CHIEF COMPLAINT QUOTE
Pt arrives via EMS s/p unresponsive episode while seated, lasting agatha 1-2 min. Pt denies complaints except diarrhea 2 days ago. RR even/unlabored on NC.  on scene. PHx: dementia, DM2, HLD, HTN, colostomy. Pt arrives via EMS s/p unresponsive episode while seated, lasting agatha 1-2 min. Pt denies complaints except diarrhea 2 days ago. RR even/unlabored on NC.  on scene. A&Ox4. PHx: dementia, DM2, HLD, HTN, colostomy.

## 2025-02-22 NOTE — H&P ADULT - PROBLEM SELECTOR PLAN 5
Chronic stable  Continue simvastatin 20mg daily formulary  A1c and lipid panel in AM Chronic moderate exacerbation    Hold metformin 500mg daily  LDCS with diabetic diet  A1c and lipid panel in AM

## 2025-02-22 NOTE — H&P ADULT - PROBLEM SELECTOR PLAN 4
Chronic moderate exacerbation    Hold metformin 500mg daily  LDCS with diabetic diet  A1c and lipid panel in AM Chronic moderate exacerbation   /78  Continue metoprolol XL 25mg daily and   Hold nifedipine 30mg dailygiven soft bp  Monitor

## 2025-02-22 NOTE — ED PROVIDER NOTE - CLINICAL SUMMARY MEDICAL DECISION MAKING FREE TEXT BOX
This is a 79-year-old female, history of dementia, CVA, DM2, HTN, glaucoma, OA of the left knee, presenting for an episode of syncope.  The patient is presenting with her aide at bedside.  The aide states that the patient was sitting at the barstool in the kitchen when she slumped over for a few seconds.  Patient did not hit her head or fall out of the chair.  The patient aide states that she has been having episodes of frequent diarrhea from her ostomy bag.  The patient denies any symptoms at this time.  The patient denies any abdominal pain.  The patient denies any nausea or vomiting and states she feels well otherwise.  ROS is otherwise negative.    VSS.    PE.  Watery diarrhea and ostomy bag otherwise patient well-appearing, in no acute distress.      Differential includes but is not limited to viral gastroenteritis, gastritis, will assess for electrolyte/hematological derangements.  Will obtain basic labs, cardiac labs, EKG, chest x-ray.  Final disposition pending labs imaging and reassessment.  Patient with electrolyte derangements, patient with TINO.  Will give patient fluids, and admit.

## 2025-02-22 NOTE — H&P ADULT - PROBLEM SELECTOR PLAN 8
Chronic stable  Continue citalopram 10mg daily, mirtazapine 30mg nightly, donepezil 10mg nightly quetiapine 25mg BID, memantine 28mg daily  Pt is at high risk for delirium, therefore, please have adequate sleeping environment for the patient, light on, curtains open, TV on during the day with regular stimulation and out of bed to chair if possible. During the night, close curtains, TV off, lights off, and minimize interruptions (limit blood draws and vital sign checks if possible). Regular interaction with patient with staff (introducing selves), frequent reorientation, and encouragement of visitors as allowed by hospital and unit policy.

## 2025-02-23 DIAGNOSIS — I48.91 UNSPECIFIED ATRIAL FIBRILLATION: ICD-10-CM

## 2025-02-23 LAB
ANION GAP SERPL CALC-SCNC: 11 MMOL/L — SIGNIFICANT CHANGE UP (ref 7–14)
APPEARANCE UR: CLEAR — SIGNIFICANT CHANGE UP
APTT BLD: 101 SEC — HIGH (ref 24.5–35.6)
APTT BLD: 30.1 SEC — SIGNIFICANT CHANGE UP (ref 24.5–35.6)
APTT BLD: >200 SEC — SIGNIFICANT CHANGE UP (ref 24.5–35.6)
BACTERIA # UR AUTO: NEGATIVE /HPF — SIGNIFICANT CHANGE UP
BILIRUB UR-MCNC: NEGATIVE — SIGNIFICANT CHANGE UP
BUN SERPL-MCNC: 21 MG/DL — SIGNIFICANT CHANGE UP (ref 7–23)
CALCIUM SERPL-MCNC: 8 MG/DL — LOW (ref 8.4–10.5)
CAST: 0 /LPF — SIGNIFICANT CHANGE UP (ref 0–4)
CHLORIDE SERPL-SCNC: 110 MMOL/L — HIGH (ref 98–107)
CO2 SERPL-SCNC: 21 MMOL/L — LOW (ref 22–31)
COLOR SPEC: YELLOW — SIGNIFICANT CHANGE UP
CREAT ?TM UR-MCNC: 61 MG/DL — SIGNIFICANT CHANGE UP
CREAT SERPL-MCNC: 0.62 MG/DL — SIGNIFICANT CHANGE UP (ref 0.5–1.3)
CULTURE RESULTS: SIGNIFICANT CHANGE UP
DIFF PNL FLD: NEGATIVE — SIGNIFICANT CHANGE UP
EGFR: 92 ML/MIN/1.73M2 — SIGNIFICANT CHANGE UP
GLUCOSE BLDC GLUCOMTR-MCNC: 105 MG/DL — HIGH (ref 70–99)
GLUCOSE BLDC GLUCOMTR-MCNC: 119 MG/DL — HIGH (ref 70–99)
GLUCOSE BLDC GLUCOMTR-MCNC: 123 MG/DL — HIGH (ref 70–99)
GLUCOSE BLDC GLUCOMTR-MCNC: 124 MG/DL — HIGH (ref 70–99)
GLUCOSE BLDC GLUCOMTR-MCNC: 157 MG/DL — HIGH (ref 70–99)
GLUCOSE SERPL-MCNC: 124 MG/DL — HIGH (ref 70–99)
GLUCOSE UR QL: NEGATIVE MG/DL — SIGNIFICANT CHANGE UP
HCT VFR BLD CALC: 35.7 % — SIGNIFICANT CHANGE UP (ref 34.5–45)
HCT VFR BLD CALC: 37.8 % — SIGNIFICANT CHANGE UP (ref 34.5–45)
HGB BLD-MCNC: 11.8 G/DL — SIGNIFICANT CHANGE UP (ref 11.5–15.5)
HGB BLD-MCNC: 12.4 G/DL — SIGNIFICANT CHANGE UP (ref 11.5–15.5)
INR BLD: 0.93 RATIO — SIGNIFICANT CHANGE UP (ref 0.85–1.16)
KETONES UR-MCNC: NEGATIVE MG/DL — SIGNIFICANT CHANGE UP
LEUKOCYTE ESTERASE UR-ACNC: ABNORMAL
MCHC RBC-ENTMCNC: 29.9 PG — SIGNIFICANT CHANGE UP (ref 27–34)
MCHC RBC-ENTMCNC: 30.1 PG — SIGNIFICANT CHANGE UP (ref 27–34)
MCHC RBC-ENTMCNC: 32.8 G/DL — SIGNIFICANT CHANGE UP (ref 32–36)
MCHC RBC-ENTMCNC: 33.1 G/DL — SIGNIFICANT CHANGE UP (ref 32–36)
MCV RBC AUTO: 90.4 FL — SIGNIFICANT CHANGE UP (ref 80–100)
MCV RBC AUTO: 91.7 FL — SIGNIFICANT CHANGE UP (ref 80–100)
NITRITE UR-MCNC: NEGATIVE — SIGNIFICANT CHANGE UP
NRBC # BLD AUTO: 0 K/UL — SIGNIFICANT CHANGE UP (ref 0–0)
NRBC # BLD AUTO: 0 K/UL — SIGNIFICANT CHANGE UP (ref 0–0)
NRBC # FLD: 0 K/UL — SIGNIFICANT CHANGE UP (ref 0–0)
NRBC # FLD: 0 K/UL — SIGNIFICANT CHANGE UP (ref 0–0)
NRBC BLD AUTO-RTO: 0 /100 WBCS — SIGNIFICANT CHANGE UP (ref 0–0)
NRBC BLD AUTO-RTO: 0 /100 WBCS — SIGNIFICANT CHANGE UP (ref 0–0)
OSMOLALITY UR: 460 MOSM/KG — SIGNIFICANT CHANGE UP (ref 50–1200)
PH UR: 6 — SIGNIFICANT CHANGE UP (ref 5–8)
PLATELET # BLD AUTO: 171 K/UL — SIGNIFICANT CHANGE UP (ref 150–400)
PLATELET # BLD AUTO: 175 K/UL — SIGNIFICANT CHANGE UP (ref 150–400)
POTASSIUM SERPL-MCNC: 3.4 MMOL/L — LOW (ref 3.5–5.3)
POTASSIUM SERPL-SCNC: 3.4 MMOL/L — LOW (ref 3.5–5.3)
PROT UR-MCNC: 30 MG/DL
PROTHROM AB SERPL-ACNC: 11.1 SEC — SIGNIFICANT CHANGE UP (ref 9.9–13.4)
RBC # BLD: 3.95 M/UL — SIGNIFICANT CHANGE UP (ref 3.8–5.2)
RBC # BLD: 4.12 M/UL — SIGNIFICANT CHANGE UP (ref 3.8–5.2)
RBC # FLD: 13.1 % — SIGNIFICANT CHANGE UP (ref 10.3–14.5)
RBC # FLD: 13.2 % — SIGNIFICANT CHANGE UP (ref 10.3–14.5)
RBC CASTS # UR COMP ASSIST: 0 /HPF — SIGNIFICANT CHANGE UP (ref 0–4)
SODIUM SERPL-SCNC: 142 MMOL/L — SIGNIFICANT CHANGE UP (ref 135–145)
SODIUM UR-SCNC: <20 MMOL/L — SIGNIFICANT CHANGE UP
SP GR SPEC: 1.01 — SIGNIFICANT CHANGE UP (ref 1–1.03)
SPECIMEN SOURCE: SIGNIFICANT CHANGE UP
SQUAMOUS # UR AUTO: 1 /HPF — SIGNIFICANT CHANGE UP (ref 0–5)
TROPONIN T, HIGH SENSITIVITY RESULT: 16 NG/L — SIGNIFICANT CHANGE UP
UROBILINOGEN FLD QL: 0.2 MG/DL — SIGNIFICANT CHANGE UP (ref 0.2–1)
WBC # BLD: 6.98 K/UL — SIGNIFICANT CHANGE UP (ref 3.8–10.5)
WBC # BLD: 7.15 K/UL — SIGNIFICANT CHANGE UP (ref 3.8–10.5)
WBC # FLD AUTO: 6.98 K/UL — SIGNIFICANT CHANGE UP (ref 3.8–10.5)
WBC # FLD AUTO: 7.15 K/UL — SIGNIFICANT CHANGE UP (ref 3.8–10.5)
WBC UR QL: 8 /HPF — HIGH (ref 0–5)

## 2025-02-23 PROCEDURE — 70450 CT HEAD/BRAIN W/O DYE: CPT | Mod: 26

## 2025-02-23 RX ORDER — HEPARIN SODIUM 1000 [USP'U]/ML
900 INJECTION INTRAVENOUS; SUBCUTANEOUS
Qty: 25000 | Refills: 0 | Status: DISCONTINUED | OUTPATIENT
Start: 2025-02-23 | End: 2025-02-24

## 2025-02-23 RX ORDER — HEPARIN SODIUM 1000 [USP'U]/ML
2500 INJECTION INTRAVENOUS; SUBCUTANEOUS EVERY 6 HOURS
Refills: 0 | Status: DISCONTINUED | OUTPATIENT
Start: 2025-02-23 | End: 2025-02-24

## 2025-02-23 RX ORDER — HEPARIN SODIUM 1000 [USP'U]/ML
5500 INJECTION INTRAVENOUS; SUBCUTANEOUS ONCE
Refills: 0 | Status: DISCONTINUED | OUTPATIENT
Start: 2025-02-23 | End: 2025-02-23

## 2025-02-23 RX ORDER — HEPARIN SODIUM 1000 [USP'U]/ML
INJECTION INTRAVENOUS; SUBCUTANEOUS
Qty: 25000 | Refills: 0 | Status: DISCONTINUED | OUTPATIENT
Start: 2025-02-23 | End: 2025-02-23

## 2025-02-23 RX ORDER — HEPARIN SODIUM 1000 [USP'U]/ML
5500 INJECTION INTRAVENOUS; SUBCUTANEOUS EVERY 6 HOURS
Refills: 0 | Status: DISCONTINUED | OUTPATIENT
Start: 2025-02-23 | End: 2025-02-24

## 2025-02-23 RX ORDER — METOPROLOL SUCCINATE 50 MG/1
5 TABLET, EXTENDED RELEASE ORAL ONCE
Refills: 0 | Status: COMPLETED | OUTPATIENT
Start: 2025-02-23 | End: 2025-02-23

## 2025-02-23 RX ORDER — SODIUM BICARBONATE 1 MEQ/ML
0.17 SYRINGE (ML) INTRAVENOUS
Qty: 150 | Refills: 0 | Status: DISCONTINUED | OUTPATIENT
Start: 2025-02-23 | End: 2025-02-24

## 2025-02-23 RX ADMIN — MIRTAZAPINE 30 MILLIGRAM(S): 30 TABLET, FILM COATED ORAL at 22:54

## 2025-02-23 RX ADMIN — DONEPEZIL HYDROCHLORIDE 10 MILLIGRAM(S): 5 TABLET ORAL at 22:53

## 2025-02-23 RX ADMIN — HEPARIN SODIUM 900 UNIT(S)/HR: 1000 INJECTION INTRAVENOUS; SUBCUTANEOUS at 23:36

## 2025-02-23 RX ADMIN — HEPARIN SODIUM 1200 UNIT(S)/HR: 1000 INJECTION INTRAVENOUS; SUBCUTANEOUS at 07:35

## 2025-02-23 RX ADMIN — DORZOLAMIDE 1 DROP(S): 20 SOLUTION/ DROPS OPHTHALMIC at 22:55

## 2025-02-23 RX ADMIN — QUETIAPINE FUMARATE 25 MILLIGRAM(S): 25 TABLET ORAL at 18:01

## 2025-02-23 RX ADMIN — MEMANTINE HYDROCHLORIDE 10 MILLIGRAM(S): 21 CAPSULE, EXTENDED RELEASE ORAL at 18:01

## 2025-02-23 RX ADMIN — HEPARIN SODIUM 0 UNIT(S)/HR: 1000 INJECTION INTRAVENOUS; SUBCUTANEOUS at 22:05

## 2025-02-23 RX ADMIN — METOPROLOL SUCCINATE 5 MILLIGRAM(S): 50 TABLET, EXTENDED RELEASE ORAL at 00:41

## 2025-02-23 RX ADMIN — METOPROLOL SUCCINATE 25 MILLIGRAM(S): 50 TABLET, EXTENDED RELEASE ORAL at 09:50

## 2025-02-23 RX ADMIN — ATORVASTATIN CALCIUM 10 MILLIGRAM(S): 80 TABLET, FILM COATED ORAL at 22:53

## 2025-02-23 RX ADMIN — SODIUM CHLORIDE 75 MILLILITER(S): 9 INJECTION, SOLUTION INTRAVENOUS at 06:54

## 2025-02-23 RX ADMIN — HEPARIN SODIUM 1100 UNIT(S)/HR: 1000 INJECTION INTRAVENOUS; SUBCUTANEOUS at 19:21

## 2025-02-23 RX ADMIN — CITALOPRAM 10 MILLIGRAM(S): 20 TABLET ORAL at 11:51

## 2025-02-23 RX ADMIN — DORZOLAMIDE 1 DROP(S): 20 SOLUTION/ DROPS OPHTHALMIC at 14:18

## 2025-02-23 RX ADMIN — Medication 81 MILLIGRAM(S): at 11:51

## 2025-02-23 RX ADMIN — Medication 3 MILLIGRAM(S): at 22:53

## 2025-02-23 RX ADMIN — MEMANTINE HYDROCHLORIDE 10 MILLIGRAM(S): 21 CAPSULE, EXTENDED RELEASE ORAL at 06:53

## 2025-02-23 RX ADMIN — DORZOLAMIDE 1 DROP(S): 20 SOLUTION/ DROPS OPHTHALMIC at 06:53

## 2025-02-23 RX ADMIN — Medication 75 MEQ/KG/HR: at 12:27

## 2025-02-23 RX ADMIN — Medication 40 MILLIGRAM(S): at 06:53

## 2025-02-23 RX ADMIN — HEPARIN SODIUM 1100 UNIT(S)/HR: 1000 INJECTION INTRAVENOUS; SUBCUTANEOUS at 14:47

## 2025-02-23 RX ADMIN — QUETIAPINE FUMARATE 25 MILLIGRAM(S): 25 TABLET ORAL at 06:53

## 2025-02-23 NOTE — PATIENT PROFILE ADULT - TOBACCO USE
9/15/23 4:20 pm:  Desiree Trammell PA-C Paisar, Kelly, RN  Hello,  I spoke with Chencho. He was taking 200 mg ibuprofen (1 tab) at bedtime with his methocarbamol. I asked him to increase this to 400-600 mg with breakfast and with lunch, and continue methocarbamol, and could take ibuprofen dose in the evening as well as needed. I said we'd touch base with him early next week to see how things go through the weekend.    Thanks again!  Desiree   Never smoker
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
## 2025-02-23 NOTE — CHART NOTE - NSCHARTNOTEFT_GEN_A_CORE
Notified by RN patient was tachycardic 140-150s with burst to 160-170s. Patient is a 80 yo F with a pmhx of HTN, HLD, HFpEF, T2DM, CVA, glaucoma, OA and dementia (AAO x1-2) admitted for syncope and TINO. Patient is asymptomatic with all other vitals stable. Advised RN to obtain EKG. 2.5 IVP ordered. Patient seen at bedside and denies chest pain, palpitations or SOB. When examined at bedside HR was 120-130 on tele post IVP. EKG showed A-fib.    T(C): 36.4 (02-22-25 @ 23:49), Max: 36.7 (02-22-25 @ 22:56)  HR: 145 (02-22-25 @ 23:49) (78 - 163)  BP: 100/67 (02-22-25 @ 23:49) (100/67 - 134/81)  RR: 22 (02-22-25 @ 23:49) (18 - 22)  SpO2: 96% (02-22-25 @ 23:49) (94% - 100%)    CONSTITUTIONAL: Well groomed, no apparent distress  RESP: No respiratory distress, no use of accessory muscles; CTA b/l, no WRR  CV: irregular rate and rhythm, +S1S2    PLAN:  -An additional 25mg PO metoprolol given   -Discussed with Dr. Moreno, patients healthcare proxy was contacted and received permission to start AC  -Will perform CT Head prior to initiating AC, Spoke with ED charge nurse to expedite CT  -Will continue to monitor

## 2025-02-23 NOTE — PATIENT PROFILE ADULT - NSPROPOAPRESSUREINJURY_GEN_A_NUR
Called patient. He verbalized understanding of results and recommendations. He states he would like to think about it. He will keep his upcoming appt and will discuss with Eduarda next week in office.    no

## 2025-02-23 NOTE — ED ADULT NURSE REASSESSMENT NOTE - NS ED NURSE REASSESS COMMENT FT1
Pt heartrate went up to 160'S, Pt is asymptomatic, denies CP  and palpitations, ACP was notified, vitals updated and pt was given metoprolol 2.5 IVP , EKG done and MD came to assist.
Received pt in room, AAOX1, resp even and unlabored, pt on 6L NC, sinus rhythm on cardiac monitor skin intact. pt is confused and keeps getting out of bed to get dress, pt redirected back to bed. Pt has an assigned bed attempt to give report but bed was dirty.
Upon receiving Pt from primary RN pt HR elevated into the 150s. Pt /105. ACP called and placed order for Metoprolol 5mg IV push. Medication given as per current care plan, Pt HR reduced to the 110s. Awaiting CT results. Pt states she feels no discomfort at the moment. Respirations even and unlabored. Safety precautions in place. TBA, awaiting bed assignment.
Pt awake and calm, asymptomatic , pt has 2 5 seconds pause one at 0206 and the other at 0221 pt then converted from afib to NSR, CONCHITA [place] was notified, vitals updated.

## 2025-02-23 NOTE — PHYSICAL THERAPY INITIAL EVALUATION ADULT - PERTINENT HX OF CURRENT PROBLEM, REHAB EVAL
Patient is a 79 year old female, history of HTN, HLD, HFpEF, T2DM, CVA, glaucoma, OA and dementia (AAO x1-2) admitted for syncope and TINO

## 2025-02-24 ENCOUNTER — NON-APPOINTMENT (OUTPATIENT)
Age: 80
End: 2025-02-24

## 2025-02-24 ENCOUNTER — RESULT REVIEW (OUTPATIENT)
Age: 80
End: 2025-02-24

## 2025-02-24 LAB
A1C WITH ESTIMATED AVERAGE GLUCOSE RESULT: 6.8 % — HIGH (ref 4–5.6)
ANION GAP SERPL CALC-SCNC: 10 MMOL/L — SIGNIFICANT CHANGE UP (ref 7–14)
ANISOCYTOSIS BLD QL: SLIGHT — SIGNIFICANT CHANGE UP
APTT BLD: 124.7 SEC — CRITICAL HIGH (ref 24.5–35.6)
APTT BLD: 193.2 SEC — CRITICAL HIGH (ref 24.5–35.6)
APTT BLD: 38.2 SEC — HIGH (ref 24.5–35.6)
BASOPHILS # BLD AUTO: 0.06 K/UL — SIGNIFICANT CHANGE UP (ref 0–0.2)
BASOPHILS NFR BLD AUTO: 0.8 % — SIGNIFICANT CHANGE UP (ref 0–2)
BUN SERPL-MCNC: 14 MG/DL — SIGNIFICANT CHANGE UP (ref 7–23)
CALCIUM SERPL-MCNC: 8.3 MG/DL — LOW (ref 8.4–10.5)
CHLORIDE SERPL-SCNC: 106 MMOL/L — SIGNIFICANT CHANGE UP (ref 98–107)
CHOLEST SERPL-MCNC: 115 MG/DL — SIGNIFICANT CHANGE UP
CO2 SERPL-SCNC: 28 MMOL/L — SIGNIFICANT CHANGE UP (ref 22–31)
CREAT SERPL-MCNC: 0.58 MG/DL — SIGNIFICANT CHANGE UP (ref 0.5–1.3)
EGFR: 93 ML/MIN/1.73M2 — SIGNIFICANT CHANGE UP
EOSINOPHIL # BLD AUTO: 0.25 K/UL — SIGNIFICANT CHANGE UP (ref 0–0.5)
EOSINOPHIL NFR BLD AUTO: 3.2 % — SIGNIFICANT CHANGE UP (ref 0–6)
ESTIMATED AVERAGE GLUCOSE: 148 — SIGNIFICANT CHANGE UP
GIANT PLATELETS BLD QL SMEAR: PRESENT — SIGNIFICANT CHANGE UP
GLUCOSE BLDC GLUCOMTR-MCNC: 113 MG/DL — HIGH (ref 70–99)
GLUCOSE BLDC GLUCOMTR-MCNC: 121 MG/DL — HIGH (ref 70–99)
GLUCOSE BLDC GLUCOMTR-MCNC: 126 MG/DL — HIGH (ref 70–99)
GLUCOSE BLDC GLUCOMTR-MCNC: 146 MG/DL — HIGH (ref 70–99)
GLUCOSE SERPL-MCNC: 134 MG/DL — HIGH (ref 70–99)
HCT VFR BLD CALC: 38.5 % — SIGNIFICANT CHANGE UP (ref 34.5–45)
HCT VFR BLD CALC: 40.3 % — SIGNIFICANT CHANGE UP (ref 34.5–45)
HDLC SERPL-MCNC: 30 MG/DL — LOW
HGB BLD-MCNC: 13.2 G/DL — SIGNIFICANT CHANGE UP (ref 11.5–15.5)
HGB BLD-MCNC: 13.3 G/DL — SIGNIFICANT CHANGE UP (ref 11.5–15.5)
IANC: 2.49 K/UL — SIGNIFICANT CHANGE UP (ref 1.8–7.4)
LIPID PNL WITH DIRECT LDL SERPL: 57 MG/DL — SIGNIFICANT CHANGE UP
LYMPHOCYTES # BLD AUTO: 3.96 K/UL — HIGH (ref 1–3.3)
LYMPHOCYTES # BLD AUTO: 50 % — HIGH (ref 13–44)
MACROCYTES BLD QL: SLIGHT — SIGNIFICANT CHANGE UP
MANUAL SMEAR VERIFICATION: SIGNIFICANT CHANGE UP
MCHC RBC-ENTMCNC: 29.6 PG — SIGNIFICANT CHANGE UP (ref 27–34)
MCHC RBC-ENTMCNC: 30.9 PG — SIGNIFICANT CHANGE UP (ref 27–34)
MCHC RBC-ENTMCNC: 32.8 G/DL — SIGNIFICANT CHANGE UP (ref 32–36)
MCHC RBC-ENTMCNC: 34.5 G/DL — SIGNIFICANT CHANGE UP (ref 32–36)
MCV RBC AUTO: 89.3 FL — SIGNIFICANT CHANGE UP (ref 80–100)
MCV RBC AUTO: 90.4 FL — SIGNIFICANT CHANGE UP (ref 80–100)
MONOCYTES # BLD AUTO: 0.56 K/UL — SIGNIFICANT CHANGE UP (ref 0–0.9)
MONOCYTES NFR BLD AUTO: 7.1 % — SIGNIFICANT CHANGE UP (ref 2–14)
NEUTROPHILS # BLD AUTO: 2.57 K/UL — SIGNIFICANT CHANGE UP (ref 1.8–7.4)
NEUTROPHILS NFR BLD AUTO: 32.5 % — LOW (ref 43–77)
NON HDL CHOLESTEROL: 85 MG/DL — SIGNIFICANT CHANGE UP
NRBC # BLD AUTO: 0 K/UL — SIGNIFICANT CHANGE UP (ref 0–0)
NRBC # FLD: 0 K/UL — SIGNIFICANT CHANGE UP (ref 0–0)
NRBC BLD AUTO-RTO: 0 /100 WBCS — SIGNIFICANT CHANGE UP (ref 0–0)
PLAT MORPH BLD: NORMAL — SIGNIFICANT CHANGE UP
PLATELET # BLD AUTO: 172 K/UL — SIGNIFICANT CHANGE UP (ref 150–400)
PLATELET # BLD AUTO: 175 K/UL — SIGNIFICANT CHANGE UP (ref 150–400)
PLATELET COUNT - ESTIMATE: NORMAL — SIGNIFICANT CHANGE UP
POLYCHROMASIA BLD QL SMEAR: SLIGHT — SIGNIFICANT CHANGE UP
POTASSIUM SERPL-MCNC: 4 MMOL/L — SIGNIFICANT CHANGE UP (ref 3.5–5.3)
POTASSIUM SERPL-SCNC: 4 MMOL/L — SIGNIFICANT CHANGE UP (ref 3.5–5.3)
RBC # BLD: 4.31 M/UL — SIGNIFICANT CHANGE UP (ref 3.8–5.2)
RBC # BLD: 4.46 M/UL — SIGNIFICANT CHANGE UP (ref 3.8–5.2)
RBC # FLD: 12.9 % — SIGNIFICANT CHANGE UP (ref 10.3–14.5)
RBC # FLD: 13.2 % — SIGNIFICANT CHANGE UP (ref 10.3–14.5)
RBC BLD AUTO: ABNORMAL
SMUDGE CELLS # BLD: PRESENT — SIGNIFICANT CHANGE UP
SODIUM SERPL-SCNC: 144 MMOL/L — SIGNIFICANT CHANGE UP (ref 135–145)
TRIGL SERPL-MCNC: 169 MG/DL — HIGH
TROPONIN T, HIGH SENSITIVITY RESULT: 12 NG/L — SIGNIFICANT CHANGE UP
TSH SERPL-MCNC: 1.7 UIU/ML — SIGNIFICANT CHANGE UP (ref 0.27–4.2)
VARIANT LYMPHS # BLD: 6.4 % — HIGH (ref 0–6)
VARIANT LYMPHS NFR BLD MANUAL: 6.4 % — HIGH (ref 0–6)
WBC # BLD: 7.64 K/UL — SIGNIFICANT CHANGE UP (ref 3.8–10.5)
WBC # BLD: 7.91 K/UL — SIGNIFICANT CHANGE UP (ref 3.8–10.5)
WBC # FLD AUTO: 7.64 K/UL — SIGNIFICANT CHANGE UP (ref 3.8–10.5)
WBC # FLD AUTO: 7.91 K/UL — SIGNIFICANT CHANGE UP (ref 3.8–10.5)

## 2025-02-24 PROCEDURE — 71045 X-RAY EXAM CHEST 1 VIEW: CPT | Mod: 26

## 2025-02-24 PROCEDURE — 93306 TTE W/DOPPLER COMPLETE: CPT | Mod: 26

## 2025-02-24 RX ORDER — APIXABAN 2.5 MG/1
5 TABLET, FILM COATED ORAL
Refills: 0 | Status: DISCONTINUED | OUTPATIENT
Start: 2025-02-24 | End: 2025-02-26

## 2025-02-24 RX ADMIN — QUETIAPINE FUMARATE 25 MILLIGRAM(S): 25 TABLET ORAL at 05:02

## 2025-02-24 RX ADMIN — Medication 650 MILLIGRAM(S): at 05:02

## 2025-02-24 RX ADMIN — HEPARIN SODIUM 600 UNIT(S)/HR: 1000 INJECTION INTRAVENOUS; SUBCUTANEOUS at 15:56

## 2025-02-24 RX ADMIN — DORZOLAMIDE 1 DROP(S): 20 SOLUTION/ DROPS OPHTHALMIC at 21:48

## 2025-02-24 RX ADMIN — MEMANTINE HYDROCHLORIDE 10 MILLIGRAM(S): 21 CAPSULE, EXTENDED RELEASE ORAL at 05:02

## 2025-02-24 RX ADMIN — DONEPEZIL HYDROCHLORIDE 10 MILLIGRAM(S): 5 TABLET ORAL at 21:47

## 2025-02-24 RX ADMIN — MIRTAZAPINE 30 MILLIGRAM(S): 30 TABLET, FILM COATED ORAL at 21:49

## 2025-02-24 RX ADMIN — DORZOLAMIDE 1 DROP(S): 20 SOLUTION/ DROPS OPHTHALMIC at 13:10

## 2025-02-24 RX ADMIN — CITALOPRAM 10 MILLIGRAM(S): 20 TABLET ORAL at 13:13

## 2025-02-24 RX ADMIN — APIXABAN 5 MILLIGRAM(S): 2.5 TABLET, FILM COATED ORAL at 18:28

## 2025-02-24 RX ADMIN — HEPARIN SODIUM 700 UNIT(S)/HR: 1000 INJECTION INTRAVENOUS; SUBCUTANEOUS at 09:20

## 2025-02-24 RX ADMIN — QUETIAPINE FUMARATE 25 MILLIGRAM(S): 25 TABLET ORAL at 18:28

## 2025-02-24 RX ADMIN — DORZOLAMIDE 1 DROP(S): 20 SOLUTION/ DROPS OPHTHALMIC at 05:02

## 2025-02-24 RX ADMIN — HEPARIN SODIUM 0 UNIT(S)/HR: 1000 INJECTION INTRAVENOUS; SUBCUTANEOUS at 08:08

## 2025-02-24 RX ADMIN — MEMANTINE HYDROCHLORIDE 10 MILLIGRAM(S): 21 CAPSULE, EXTENDED RELEASE ORAL at 18:27

## 2025-02-24 RX ADMIN — Medication 40 MILLIEQUIVALENT(S): at 04:24

## 2025-02-24 RX ADMIN — Medication 650 MILLIGRAM(S): at 06:02

## 2025-02-24 RX ADMIN — ATORVASTATIN CALCIUM 10 MILLIGRAM(S): 80 TABLET, FILM COATED ORAL at 21:47

## 2025-02-24 RX ADMIN — Medication 81 MILLIGRAM(S): at 13:14

## 2025-02-24 RX ADMIN — METOPROLOL SUCCINATE 25 MILLIGRAM(S): 50 TABLET, EXTENDED RELEASE ORAL at 09:20

## 2025-02-24 RX ADMIN — HEPARIN SODIUM 900 UNIT(S)/HR: 1000 INJECTION INTRAVENOUS; SUBCUTANEOUS at 07:29

## 2025-02-24 RX ADMIN — Medication 40 MILLIGRAM(S): at 06:30

## 2025-02-24 NOTE — PROVIDER CONTACT NOTE (CRITICAL VALUE NOTIFICATION) - ASSESSMENT
Heparin gtt rate decreased to 6/hr as per nomogram.
AOX3-4 forgetful. Pt denies pain nausea/ vomiting. VSS. Respirations even and unlabored. lab was drawn below level of IV with all infusions paused.
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
## 2025-02-24 NOTE — CONSULT NOTE ADULT - ASSESSMENT
79 yr old female with a pmh of HTN, HLD, HFpEF, T2DM not on insulin, CVA, glaucoma, OA of left knee, dementia AAO x1-2, s/p ostomy,  who presents with a syncopal episode. Pt was sitting at the kitchen barstool when she was found slumped over. She came to on her own after the aide was calling her name and did not recall any preceding symptoms. She did not fall/hit her head.   Aide reports pt has had diarrhea through her ostomy the past 3 days- slowly improving but during this time the pt has had decreased PO intake.       TINO   baseline scr 0.7  scr 1.7 on admission   TINO likely pre renal sec to diarrhea through ostomy and decreased po intake  Recommend IVF ( d5 with 150meq of bicarb at 75cc/hr for one day )  CHeck UA , Urine NA urine Cr  Avoid nephrotoxics NSAIDS     HTN  \BP stable   MOnitor     Acidosis  AG   IV bicarb as above  Monitor serum co2    Hyponatremia  possibly hypovolumia  IVF as above  Check URine NA URine OS
79 yr old female with a pmh of HTN, HLD, HFpEF, T2DM not on insulin, CVA, glaucoma, OA of left knee, dementia AAO x1-2, s/p ostomy,  who presents with a syncopal episode.   Had diarrhea x 3 days  PAF noted on tele   Recent admission 9/24 with new L anterior midbrain infarct  Has known chronic bilateral thalamic and basal ganglia infarcts as well as cerebellar infarcts.     Syncope - likely volume depleted due to hypotension and diarrhea  Chronic small vessel appearing strokes  Dementia w/ behavioral disturbance    - ok to start AC for new AF  - no need for aspirin while on AC   - tte pending per Cardio   - tele monitor   - no need for MRI or eeg at this time  - statin for secondary stroke prevention - on lipitor 10mg , prior LDL 49  - cont memantine 10mg daily  - on seroquel for agitation   - BP normotensive   - PT/OT  - DVT ppx   - delirium precautions   - Differential diagnosis and plan of care discussed with patient and/or family and primary team  - Thank you for allowing me to participate in the care of this patient. Call with questions.     Allison Vinson DO  Vascular Neurology  Office [phone]  
Syncope   in setting of hypovolemia, new onset afib , hypotension   obtain echo     PAF  newly discovered  in sinus now  cont BB  The calculated [id number] score is 7  cont a/c , can change to DOAC   can dc asa  check TSH    HTN  stable    TINO   resolved   fu with Renal

## 2025-02-25 ENCOUNTER — NON-APPOINTMENT (OUTPATIENT)
Age: 80
End: 2025-02-25

## 2025-02-25 ENCOUNTER — TRANSCRIPTION ENCOUNTER (OUTPATIENT)
Age: 80
End: 2025-02-25

## 2025-02-25 LAB
ANION GAP SERPL CALC-SCNC: 11 MMOL/L — SIGNIFICANT CHANGE UP (ref 7–14)
BUN SERPL-MCNC: 11 MG/DL — SIGNIFICANT CHANGE UP (ref 7–23)
CALCIUM SERPL-MCNC: 8.3 MG/DL — LOW (ref 8.4–10.5)
CHLORIDE SERPL-SCNC: 107 MMOL/L — SIGNIFICANT CHANGE UP (ref 98–107)
CO2 SERPL-SCNC: 26 MMOL/L — SIGNIFICANT CHANGE UP (ref 22–31)
CREAT SERPL-MCNC: 0.59 MG/DL — SIGNIFICANT CHANGE UP (ref 0.5–1.3)
EGFR: 93 ML/MIN/1.73M2 — SIGNIFICANT CHANGE UP
GLUCOSE BLDC GLUCOMTR-MCNC: 112 MG/DL — HIGH (ref 70–99)
GLUCOSE BLDC GLUCOMTR-MCNC: 122 MG/DL — HIGH (ref 70–99)
GLUCOSE BLDC GLUCOMTR-MCNC: 125 MG/DL — HIGH (ref 70–99)
GLUCOSE BLDC GLUCOMTR-MCNC: 164 MG/DL — HIGH (ref 70–99)
GLUCOSE SERPL-MCNC: 120 MG/DL — HIGH (ref 70–99)
HCT VFR BLD CALC: 36.6 % — SIGNIFICANT CHANGE UP (ref 34.5–45)
HGB BLD-MCNC: 12.2 G/DL — SIGNIFICANT CHANGE UP (ref 11.5–15.5)
MAGNESIUM SERPL-MCNC: 1.8 MG/DL — SIGNIFICANT CHANGE UP (ref 1.6–2.6)
MCHC RBC-ENTMCNC: 30.2 PG — SIGNIFICANT CHANGE UP (ref 27–34)
MCHC RBC-ENTMCNC: 33.3 G/DL — SIGNIFICANT CHANGE UP (ref 32–36)
MCV RBC AUTO: 90.6 FL — SIGNIFICANT CHANGE UP (ref 80–100)
NRBC # BLD AUTO: 0 K/UL — SIGNIFICANT CHANGE UP (ref 0–0)
NRBC # FLD: 0 K/UL — SIGNIFICANT CHANGE UP (ref 0–0)
NRBC BLD AUTO-RTO: 0 /100 WBCS — SIGNIFICANT CHANGE UP (ref 0–0)
PHOSPHATE SERPL-MCNC: 2 MG/DL — LOW (ref 2.5–4.5)
PLATELET # BLD AUTO: 180 K/UL — SIGNIFICANT CHANGE UP (ref 150–400)
POTASSIUM SERPL-MCNC: 3.5 MMOL/L — SIGNIFICANT CHANGE UP (ref 3.5–5.3)
POTASSIUM SERPL-SCNC: 3.5 MMOL/L — SIGNIFICANT CHANGE UP (ref 3.5–5.3)
RBC # BLD: 4.04 M/UL — SIGNIFICANT CHANGE UP (ref 3.8–5.2)
RBC # FLD: 13.2 % — SIGNIFICANT CHANGE UP (ref 10.3–14.5)
SODIUM SERPL-SCNC: 144 MMOL/L — SIGNIFICANT CHANGE UP (ref 135–145)
TSH SERPL-MCNC: 0.91 UIU/ML — SIGNIFICANT CHANGE UP (ref 0.27–4.2)
WBC # BLD: 5.85 K/UL — SIGNIFICANT CHANGE UP (ref 3.8–10.5)
WBC # FLD AUTO: 5.85 K/UL — SIGNIFICANT CHANGE UP (ref 3.8–10.5)

## 2025-02-25 RX ORDER — METOPROLOL SUCCINATE 50 MG/1
1 TABLET, EXTENDED RELEASE ORAL
Qty: 30 | Refills: 0
Start: 2025-02-25 | End: 2025-03-26

## 2025-02-25 RX ORDER — MELATONIN 5 MG
1 TABLET ORAL
Qty: 0 | Refills: 0 | DISCHARGE
Start: 2025-02-25

## 2025-02-25 RX ORDER — APIXABAN 2.5 MG/1
1 TABLET, FILM COATED ORAL
Qty: 60 | Refills: 0
Start: 2025-02-25 | End: 2025-03-26

## 2025-02-25 RX ORDER — ACETAMINOPHEN 500 MG/5ML
2 LIQUID (ML) ORAL
Qty: 0 | Refills: 0 | DISCHARGE
Start: 2025-02-25

## 2025-02-25 RX ORDER — SOD PHOS DI, MONO/K PHOS MONO 250 MG
1 TABLET ORAL THREE TIMES A DAY
Refills: 0 | Status: DISCONTINUED | OUTPATIENT
Start: 2025-02-25 | End: 2025-02-26

## 2025-02-25 RX ORDER — SOD PHOS DI, MONO/K PHOS MONO 250 MG
1 TABLET ORAL
Qty: 90 | Refills: 0
Start: 2025-02-25 | End: 2025-03-26

## 2025-02-25 RX ORDER — MIRTAZAPINE 30 MG/1
1 TABLET, FILM COATED ORAL
Qty: 30 | Refills: 0
Start: 2025-02-25 | End: 2025-03-26

## 2025-02-25 RX ORDER — MAGNESIUM, ALUMINUM HYDROXIDE 200-200 MG
30 TABLET,CHEWABLE ORAL
Qty: 0 | Refills: 0 | DISCHARGE
Start: 2025-02-25

## 2025-02-25 RX ADMIN — QUETIAPINE FUMARATE 25 MILLIGRAM(S): 25 TABLET ORAL at 17:18

## 2025-02-25 RX ADMIN — Medication 1 TABLET(S): at 13:46

## 2025-02-25 RX ADMIN — QUETIAPINE FUMARATE 25 MILLIGRAM(S): 25 TABLET ORAL at 05:20

## 2025-02-25 RX ADMIN — APIXABAN 5 MILLIGRAM(S): 2.5 TABLET, FILM COATED ORAL at 05:24

## 2025-02-25 RX ADMIN — Medication 40 MILLIGRAM(S): at 05:20

## 2025-02-25 RX ADMIN — INSULIN LISPRO 1: 100 INJECTION, SOLUTION INTRAVENOUS; SUBCUTANEOUS at 16:43

## 2025-02-25 RX ADMIN — CITALOPRAM 10 MILLIGRAM(S): 20 TABLET ORAL at 13:47

## 2025-02-25 RX ADMIN — DORZOLAMIDE 1 DROP(S): 20 SOLUTION/ DROPS OPHTHALMIC at 05:21

## 2025-02-25 RX ADMIN — METOPROLOL SUCCINATE 25 MILLIGRAM(S): 50 TABLET, EXTENDED RELEASE ORAL at 08:58

## 2025-02-25 RX ADMIN — MEMANTINE HYDROCHLORIDE 10 MILLIGRAM(S): 21 CAPSULE, EXTENDED RELEASE ORAL at 17:17

## 2025-02-25 RX ADMIN — DONEPEZIL HYDROCHLORIDE 10 MILLIGRAM(S): 5 TABLET ORAL at 21:40

## 2025-02-25 RX ADMIN — MIRTAZAPINE 30 MILLIGRAM(S): 30 TABLET, FILM COATED ORAL at 21:41

## 2025-02-25 RX ADMIN — ATORVASTATIN CALCIUM 10 MILLIGRAM(S): 80 TABLET, FILM COATED ORAL at 21:41

## 2025-02-25 RX ADMIN — Medication 1 TABLET(S): at 21:44

## 2025-02-25 RX ADMIN — APIXABAN 5 MILLIGRAM(S): 2.5 TABLET, FILM COATED ORAL at 17:18

## 2025-02-25 RX ADMIN — MEMANTINE HYDROCHLORIDE 10 MILLIGRAM(S): 21 CAPSULE, EXTENDED RELEASE ORAL at 05:20

## 2025-02-25 RX ADMIN — DORZOLAMIDE 1 DROP(S): 20 SOLUTION/ DROPS OPHTHALMIC at 13:44

## 2025-02-25 RX ADMIN — DORZOLAMIDE 1 DROP(S): 20 SOLUTION/ DROPS OPHTHALMIC at 21:39

## 2025-02-25 NOTE — DISCHARGE NOTE PROVIDER - NSDCMRMEDTOKEN_GEN_ALL_CORE_FT
acetaminophen 325 mg oral tablet: 2 tab(s) orally every 6 hours As needed Temp greater or equal to 38C (100.4F), Mild Pain (1 - 3)  alendronate 70 mg oral tablet: 1 tab(s) orally once a week  aluminum hydroxide-magnesium hydroxide 200 mg-200 mg/5 mL oral suspension: 30 milliliter(s) orally every 4 hours As needed Dyspepsia  apixaban 5 mg oral tablet: 1 tab(s) orally 2 times a day  aspirin 81 mg oral tablet, chewable: 1 tab(s) orally once a day  brinzolamide 1% ophthalmic suspension: 1 drop(s) in each eye 3 times a day  citalopram 10 mg oral tablet: 1 tab(s) orally once a day  donepezil 10 mg oral tablet: 1 tab(s) orally once a day (at bedtime)  folic acid: 1 milligram(s) once a day  melatonin 3 mg oral tablet: 1 tab(s) orally once a day (at bedtime) As needed Insomnia  memantine 28 mg oral capsule, extended release: 1 cap(s) orally once a day  metFORMIN 500 mg oral tablet: 1 tab(s) orally once a day  metoprolol succinate 25 mg oral tablet, extended release: 1 tab(s) orally once a day (before a meal) hold if systolic bp is less than 110 , diastolic less than 90 , hr less thaN 60  mirtazapine 30 mg oral tablet: 1 tab(s) orally once a day  mirtazapine 30 mg oral tablet: 1 tab(s) orally once a day (at bedtime)  NIFEdipine (Eqv-Adalat CC) 30 mg oral tablet, extended release: 1 tab(s) orally once a day  Oysco 500 with D 500 mg-5 mcg (200 intl units) oral tablet: 1 tab(s) orally 3 times a day  potassium phosphate-sodium phosphate 305 mg-700 mg oral tablet: 1 tab(s) orally 3 times a day  Protonix 20 mg oral delayed release tablet: 1 tab(s) orally once a day  Seroquel 25 mg oral tablet: 1 tab(s) orally 2 times a day  simvastatin 20 mg oral tablet: 1 tab(s) orally once a day   acetaminophen 325 mg oral tablet: 2 tab(s) orally every 6 hours As needed Temp greater or equal to 38C (100.4F), Mild Pain (1 - 3)  alendronate 70 mg oral tablet: 1 tab(s) orally once a week  aluminum hydroxide-magnesium hydroxide 200 mg-200 mg/5 mL oral suspension: 30 milliliter(s) orally every 4 hours As needed Dyspepsia  apixaban 5 mg oral tablet: 1 tab(s) orally 2 times a day  aspirin 81 mg oral tablet, chewable: 1 tab(s) orally once a day  brinzolamide 1% ophthalmic suspension: 1 drop(s) in each eye 3 times a day  citalopram 10 mg oral tablet: 1 tab(s) orally once a day  donepezil 10 mg oral tablet: 1 tab(s) orally once a day (at bedtime)  folic acid: 1 milligram(s) once a day  melatonin 3 mg oral tablet: 1 tab(s) orally once a day (at bedtime) As needed Insomnia  memantine 28 mg oral capsule, extended release: 1 cap(s) orally once a day  metFORMIN 500 mg oral tablet: 1 tab(s) orally once a day  metoprolol succinate 25 mg oral tablet, extended release: 1 tab(s) orally once a day (before a meal) hold if systolic bp is less than 110 , diastolic less than 90 , hr less thaN 60  mirtazapine 30 mg oral tablet: 1 tab(s) orally once a day (at bedtime)  NIFEdipine (Eqv-Adalat CC) 30 mg oral tablet, extended release: 1 tab(s) orally once a day  Oysco 500 with D 500 mg-5 mcg (200 intl units) oral tablet: 1 tab(s) orally 3 times a day  Protonix 20 mg oral delayed release tablet: 1 tab(s) orally once a day  Seroquel 25 mg oral tablet: 1 tab(s) orally 2 times a day  simvastatin 20 mg oral tablet: 1 tab(s) orally once a day

## 2025-02-25 NOTE — DISCHARGE NOTE PROVIDER - HOSPITAL COURSE
79 yr old female with a pmh of HTN, HLD, HFpEF, T2DM not on insulin, CVA, glaucoma, OA of left knee, dementia AAO x1-2, s/p ostomy,  who presents with a syncopal and TINO    Syncope.   - CT head - neg, UA negative   - TTE 2/24- EF 68%, LV hypertrophy   - EKG nonischemic, Trop   - Check orthostatics neg    New Afib  - s/p heparin gtt- now PO Eliquis   - Metoprolol started for rate control     TINO   - Cr 1.7- baseline ~0.7  - likely 2/2 diarrhea and decreased PO intake (no recent abx use or sick contacts)  - 2/2 2 L IVF, continue with LR 75ml/hr for 1L  - renally dose medications and avoid nephrotic agents    Benign essential HTN   - Continue metoprolol XL 25mg daily and nifedipine 30mg daily with hold parameters    HLD   - Continue simvastatin 20mg daily formulary    CVA Chronic stable  - Continue asa 81mg daily and simvastatin 20mg daily formulary    Dementia  - Continue citalopram 10mg daily, mirtazapine 30mg nightly, donepezil 10mg nightly quetiapine 25mg BID, memantine 28mg daily    Case discussed with Dr. Eagle on 2/26/25. Patient is medically stable and optimized for discharge as per attending. All medications were reviewed and prescriptions were sent to a mutually agreed upon pharmacy. Discharge plan reviewed with patient and/or family.

## 2025-02-25 NOTE — DISCHARGE NOTE PROVIDER - NSDCCPCAREPLAN_GEN_ALL_CORE_FT
PRINCIPAL DISCHARGE DIAGNOSIS  Diagnosis: Syncope  Assessment and Plan of Treatment: You came in for a syncopal episode, we did an Echocardiogram for your heart which was negative.      SECONDARY DISCHARGE DIAGNOSES  Diagnosis: TINO (acute kidney injury)  Assessment and Plan of Treatment: Please follow-up with kidney doctors to monitor your kidney function    Diagnosis: Atrial fibrillation with rapid ventricular response  Assessment and Plan of Treatment: You were found to have New Atrial Fibrilation which is an irregular heartrate which is most likely the cause of your syncopal episode. You were started on blood thinners to prevent blood clots, as well as medication Metoprolol to control your heart rate. Please follow-up with you cardiologist for further management. Please note we have provided a month supply of your blood thinners, PLEASE contact your cardiologist for further refills.

## 2025-02-25 NOTE — PROVIDER CONTACT NOTE (OTHER) - SITUATION
pt refusing to keep tele monitor on. pt on PSR, attempting to get oob. pt in home clothes stating she wants to go home.

## 2025-02-25 NOTE — DISCHARGE NOTE PROVIDER - CARE PROVIDER_API CALL
Mandy Martinez  Pediatric Infectious Disease  [address] [address] [place] [id number]  Phone: [phone]  Fax: [phone]  Follow Up Time:

## 2025-02-26 ENCOUNTER — TRANSCRIPTION ENCOUNTER (OUTPATIENT)
Age: 80
End: 2025-02-26

## 2025-02-26 VITALS
OXYGEN SATURATION: 96 % | RESPIRATION RATE: 17 BRPM | DIASTOLIC BLOOD PRESSURE: 81 MMHG | SYSTOLIC BLOOD PRESSURE: 135 MMHG | TEMPERATURE: 98 F | HEART RATE: 62 BPM

## 2025-02-26 LAB
ANION GAP SERPL CALC-SCNC: 17 MMOL/L — HIGH (ref 7–14)
BUN SERPL-MCNC: 12 MG/DL — SIGNIFICANT CHANGE UP (ref 7–23)
CALCIUM SERPL-MCNC: 8.6 MG/DL — SIGNIFICANT CHANGE UP (ref 8.4–10.5)
CHLORIDE SERPL-SCNC: 103 MMOL/L — SIGNIFICANT CHANGE UP (ref 98–107)
CO2 SERPL-SCNC: 21 MMOL/L — LOW (ref 22–31)
CREAT SERPL-MCNC: 0.72 MG/DL — SIGNIFICANT CHANGE UP (ref 0.5–1.3)
EGFR: 86 ML/MIN/1.73M2 — SIGNIFICANT CHANGE UP
GLUCOSE BLDC GLUCOMTR-MCNC: 115 MG/DL — HIGH (ref 70–99)
GLUCOSE BLDC GLUCOMTR-MCNC: 128 MG/DL — HIGH (ref 70–99)
GLUCOSE SERPL-MCNC: 130 MG/DL — HIGH (ref 70–99)
HCT VFR BLD CALC: 37.9 % — SIGNIFICANT CHANGE UP (ref 34.5–45)
HGB BLD-MCNC: 12.9 G/DL — SIGNIFICANT CHANGE UP (ref 11.5–15.5)
MAGNESIUM SERPL-MCNC: 1.8 MG/DL — SIGNIFICANT CHANGE UP (ref 1.6–2.6)
MCHC RBC-ENTMCNC: 30.7 PG — SIGNIFICANT CHANGE UP (ref 27–34)
MCHC RBC-ENTMCNC: 34 G/DL — SIGNIFICANT CHANGE UP (ref 32–36)
MCV RBC AUTO: 90.2 FL — SIGNIFICANT CHANGE UP (ref 80–100)
NRBC # BLD AUTO: 0 K/UL — SIGNIFICANT CHANGE UP (ref 0–0)
NRBC # FLD: 0 K/UL — SIGNIFICANT CHANGE UP (ref 0–0)
NRBC BLD AUTO-RTO: 0 /100 WBCS — SIGNIFICANT CHANGE UP (ref 0–0)
PHOSPHATE SERPL-MCNC: 3.1 MG/DL — SIGNIFICANT CHANGE UP (ref 2.5–4.5)
PLATELET # BLD AUTO: 221 K/UL — SIGNIFICANT CHANGE UP (ref 150–400)
POTASSIUM SERPL-MCNC: 3.5 MMOL/L — SIGNIFICANT CHANGE UP (ref 3.5–5.3)
POTASSIUM SERPL-SCNC: 3.5 MMOL/L — SIGNIFICANT CHANGE UP (ref 3.5–5.3)
RBC # BLD: 4.2 M/UL — SIGNIFICANT CHANGE UP (ref 3.8–5.2)
RBC # FLD: 13.3 % — SIGNIFICANT CHANGE UP (ref 10.3–14.5)
SODIUM SERPL-SCNC: 141 MMOL/L — SIGNIFICANT CHANGE UP (ref 135–145)
WBC # BLD: 7.43 K/UL — SIGNIFICANT CHANGE UP (ref 3.8–10.5)
WBC # FLD AUTO: 7.43 K/UL — SIGNIFICANT CHANGE UP (ref 3.8–10.5)

## 2025-02-26 RX ADMIN — DORZOLAMIDE 1 DROP(S): 20 SOLUTION/ DROPS OPHTHALMIC at 05:11

## 2025-02-26 RX ADMIN — APIXABAN 5 MILLIGRAM(S): 2.5 TABLET, FILM COATED ORAL at 05:10

## 2025-02-26 RX ADMIN — Medication 40 MILLIGRAM(S): at 05:11

## 2025-02-26 RX ADMIN — MEMANTINE HYDROCHLORIDE 10 MILLIGRAM(S): 21 CAPSULE, EXTENDED RELEASE ORAL at 05:10

## 2025-02-26 RX ADMIN — Medication 1 TABLET(S): at 05:10

## 2025-02-26 RX ADMIN — QUETIAPINE FUMARATE 25 MILLIGRAM(S): 25 TABLET ORAL at 05:10

## 2025-02-26 RX ADMIN — METOPROLOL SUCCINATE 25 MILLIGRAM(S): 50 TABLET, EXTENDED RELEASE ORAL at 08:49

## 2025-02-26 NOTE — DISCHARGE NOTE NURSING/CASE MANAGEMENT/SOCIAL WORK - FINANCIAL ASSISTANCE
[facility] provides services at a reduced cost to those who are determined to be eligible through [facility]’s financial assistance program. Information regarding [facility]’s financial assistance program can be found by going to [email or web address] or by calling [phone].

## 2025-02-26 NOTE — DISCHARGE NOTE NURSING/CASE MANAGEMENT/SOCIAL WORK - NSDCPEELIQUISFU_GEN_ALL_CORE
Go for blood tests as directed. Your doctor will do lab tests at regular visits to monitor the effects of this medicine. Please follow up with your doctor and keep your health care provider appointments.
Lenz catheter reinsertion, Lenz catheter not draining

## 2025-02-26 NOTE — PROGRESS NOTE ADULT - REASON FOR ADMISSION
syncope, jocelin

## 2025-02-26 NOTE — PROGRESS NOTE ADULT - ASSESSMENT
79 yr old female with a pmh of HTN, HLD, HFpEF, T2DM not on insulin, CVA, glaucoma, OA of left knee, dementia AAO x1-2, s/p ostomy,  who presents with a syncopal episode. Pt was sitting at the kitchen barstool when she was found slumped over. She came to on her own after the aide was calling her name and did not recall any preceding symptoms. She did not fall/hit her head.   Aide reports pt has had diarrhea through her ostomy the past 3 days- slowly improving but during this time the pt has had decreased PO intake.       TINO   baseline scr 0.7  scr 1.7 on admission   TINO likely pre renal sec to diarrhea through ostomy and decreased po intake  s/p bicarb gtt  scr improved  Fena <1% suggesting prerena;  Avoid nephrotoxics NSAIDS   monitor bmp     HTN  BP fluctuating; acceptable  MOnitor     Acidosis  AG/non AG   s/p IV bicarb  resolved  Monitor serum co2    Hyponatremia  possibly hypovolemia  s/p ivf  urine lytes suggests hypovolemia  resolved  monitor Na
Syncope   in setting of hypovolemia, new onset afib , hypotension   unremarkable echo     PAF  newly discovered  in sinus now  cont BB  The calculated [id number] score is 7  cont Eliquis     HTN  stable    TINO   resolved   fu with Renal     
Syncope   in setting of hypovolemia, new onset afib , hypotension   unremarkable echo     PAF  newly discovered  in sinus now  cont BB  The calculated [id number] score is 7  cont Eliquis     HTN  stable    TINO   resolved   fu with Renal     
79 yr old female presenting with syncope and tino      Problem/Plan - 1:  ·  Problem: Syncope.   ·  Plan: New  CT head and CXR ordered, UA negative   TTE 5/2024: EF 60-65% with G1DD  EKG nonischemic, Trop   Monitor on telemetry  Check orthostatics, TTE ordered, trend trop  ivf    Problem/Plan - 2:  ·  Problem: TINO (acute kidney injury).   ·  Plan: New  - renal fu   renally dose medications and avoid nephrotic agents  Strict i/o.    Problem/Plan - 3:  ·  Problem: Atrial fibrillation with rapid ventricular response.   ·  Plan: New  Afib with RVR   cards called     Problem/Plan - 4:  ·  Problem: Benign essential HTN.   ·  Plan: Chronic moderate exacerbation   Continue metoprolol XL 25mg daily and     Problem/Plan - 5:  ·  Problem: T2DM (type 2 diabetes mellitus).   ·  Plan: Chronic moderate exacerbationBG 135  Hold metformin 500mg daily  LDCS with diabetic diet    Problem/Plan - 6:  ·  Problem: HLD (hyperlipidemia).   ·  Plan: Chronic stable  Continue simvastatin 20mg daily formulary    Problem/Plan - 7:  ·  Problem: CVA (cerebrovascular accident).   ·  Plan: Chronic stable  Continue asa 81mg daily and simvastatin 20mg daily  
79 yr old female presenting with syncope and tino      Problem/Plan - 1:  ·  Problem: Syncope.   ·  Plan  - neuro and cards fu   EKG nonischemic, Trop   Monitor on telemetry    Problem/Plan - 2:  ·  Problem: TINO (acute kidney injury).   ·  Plan: New  Strict i/o.  monitor cr  renal fu     Problem/Plan - 3:  ·  Problem: Atrial fibrillation with rapid ventricular response.   ·  Plan: New  Afib with RVR   cards fu appreciated  started DOAC     Problem/Plan - 4:  ·  Problem: Benign essential HTN.   ·  Plan: Chronic moderate exacerbation   Continue metoprolol XL 25mg daily and     Problem/Plan - 5:  ·  Problem: T2DM (type 2 diabetes mellitus).   ·  Plan: monitor FS  - ISS     Problem/Plan - 6:  ·  Problem: HLD (hyperlipidemia).   ·  Plan: Chronic stable  Continue simvastatin 20mg daily     Problem/Plan - 7:  ·  Problem: CVA (cerebrovascular accident).   ·  Plan: Chronic stable  Continue asa 81mg daily and simvastatin 20mg daily
79 yr old female presenting with syncope and tino      Problem/Plan - 1:  ·  Problem: Syncope.   ·  Plan: New  - neuro and cards fu   EKG nonischemic, Trop   Monitor on telemetry    Problem/Plan - 2:  ·  Problem: TINO (acute kidney injury).   ·  Plan: New  Strict i/o.    Problem/Plan - 3:  ·  Problem: Atrial fibrillation with rapid ventricular response.   ·  Plan: New  Afib with RVR   cards fu appreciated  -on heparin drip     Problem/Plan - 4:  ·  Problem: Benign essential HTN.   ·  Plan: Chronic moderate exacerbation   Continue metoprolol XL 25mg daily and     Problem/Plan - 5:  ·  Problem: T2DM (type 2 diabetes mellitus).   ·  Plan: monitor FS  - ISS     Problem/Plan - 6:  ·  Problem: HLD (hyperlipidemia).   ·  Plan: Chronic stable  Continue simvastatin 20mg daily     Problem/Plan - 7:  ·  Problem: CVA (cerebrovascular accident).   ·  Plan: Chronic stable  Continue asa 81mg daily and simvastatin 20mg daily  
79 yr old female with a pmh of HTN, HLD, HFpEF, T2DM not on insulin, CVA, glaucoma, OA of left knee, dementia AAO x1-2, s/p ostomy,  who presents with a syncopal episode. Pt was sitting at the kitchen barstool when she was found slumped over. She came to on her own after the aide was calling her name and did not recall any preceding symptoms. She did not fall/hit her head.   Aide reports pt has had diarrhea through her ostomy the past 3 days- slowly improving but during this time the pt has had decreased PO intake.       TINO   baseline scr 0.7  scr 1.7 on admission   TINO likely pre renal sec to diarrhea through ostomy and decreased po intake  s/p bicarb gtt  scr improved  Fena <1% suggesting prerenal  Avoid nephrotoxics NSAIDS   monitor bmp     HTN  controlled   MOnitor     Acidosis  likely GI lost   s/p IV bicarb  resolved  Monitor serum co2    Hyponatremia  s/p ivf  urine lytes suggests hypovolemia  resolved  monitor Na    hypophos  supplement k phos tid x2 days  monitor    hypocalcemia  check pth, vit d level  monitor
79 yr old female with a pmh of HTN, HLD, HFpEF, T2DM not on insulin, CVA, glaucoma, OA of left knee, dementia AAO x1-2, s/p ostomy,  who presents with a syncopal episode.   Had diarrhea x 3 days  PAF noted on tele   Recent admission 9/24 with new L anterior midbrain infarct  Has known chronic bilateral thalamic and basal ganglia infarcts as well as cerebellar infarcts.     Syncope - likely volume depleted due to hypotension and diarrhea  Chronic small vessel appearing strokes  Dementia w/ behavioral disturbance    - ok to start AC for new AF  - no need for aspirin while on AC   - tte rev  - tele monitor   - no need for MRI or eeg at this time  - statin for secondary stroke prevention - on lipitor 10mg , prior LDL 49  - cont memantine 10mg daily  - on seroquel for agitation   - BP normotensive   - PT/OT  - DVT ppx   - delirium precautions   - Differential diagnosis and plan of care discussed with patient and/or family and primary team  - Thank you for allowing me to participate in the care of this patient. Call with questions.   dc planning    Allison Vinson DO  Vascular Neurology  Office [phone]      
79 yr old female with a pmh of HTN, HLD, HFpEF, T2DM not on insulin, CVA, glaucoma, OA of left knee, dementia AAO x1-2, s/p ostomy,  who presents with a syncopal episode. Pt was sitting at the kitchen barstool when she was found slumped over. She came to on her own after the aide was calling her name and did not recall any preceding symptoms. She did not fall/hit her head.   Aide reports pt has had diarrhea through her ostomy the past 3 days- slowly improving but during this time the pt has had decreased PO intake.       TINO   baseline scr 0.7  scr 1.7 on admission   TINO likely pre renal sec to diarrhea through ostomy and decreased po intake  s/p bicarb gtt  scr improved  Fena <1% suggesting prerena;  Avoid nephrotoxics NSAIDS   monitor bmp     HTN  controlled   MOnitor     Acidosis  likely GI lost   s/p IV bicarb  resolved  Monitor serum co2    Hyponatremia  s/p ivf  urine lytes suggests hypovolemia  resolved  monitor Na    hypophos  supplement k phos tid x2 days  monitor    hypocalcemia  check pth, vit d level  monitor

## 2025-02-26 NOTE — PROGRESS NOTE ADULT - PROVIDER SPECIALTY LIST ADULT
Hospitalist
Nephrology
Neurology
Cardiology
Nephrology
Hospitalist
Cardiology
Hospitalist
Nephrology-Cardiorenal

## 2025-02-26 NOTE — PROGRESS NOTE ADULT - SUBJECTIVE AND OBJECTIVE BOX
Patient is a 79y old  Female who presents with a chief complaint of syncope, jocelin (25 Feb 2025 14:57)    Date of servie : 02-25-25 @ 15:14  INTERVAL HPI/OVERNIGHT EVENTS:  T(C): 36.7 (02-25-25 @ 13:15), Max: 36.8 (02-24-25 @ 17:27)  HR: 67 (02-25-25 @ 13:15) (67 - 91)  BP: 134/83 (02-25-25 @ 13:15) (121/65 - 164/79)  RR: 18 (02-25-25 @ 13:15) (16 - 18)  SpO2: 99% (02-25-25 @ 13:15) (94% - 100%)  Wt(kg): --  I&O's Summary    24 Feb 2025 07:01  -  25 Feb 2025 07:00  --------------------------------------------------------  IN: 1195 mL / OUT: 1650 mL / NET: -455 mL    25 Feb 2025 07:01  -  25 Feb 2025 15:14  --------------------------------------------------------  IN: 360 mL / OUT: 300 mL / NET: 60 mL        LABS:                        12.2   5.85  )-----------( 180      ( 25 Feb 2025 05:12 )             36.6     02-25    144  |  107  |  11  ----------------------------<  120[H]  3.5   |  26  |  0.59    Ca    8.3[L]      25 Feb 2025 05:12  Phos  2.0     02-25  Mg     1.80     02-25      PTT - ( 24 Feb 2025 22:45 )  PTT:38.2 sec  Urinalysis Basic - ( 25 Feb 2025 05:12 )    Color: x / Appearance: x / SG: x / pH: x  Gluc: 120 mg/dL / Ketone: x  / Bili: x / Urobili: x   Blood: x / Protein: x / Nitrite: x   Leuk Esterase: x / RBC: x / WBC x   Sq Epi: x / Non Sq Epi: x / Bacteria: x      CAPILLARY BLOOD GLUCOSE      POCT Blood Glucose.: 122 mg/dL (25 Feb 2025 11:54)  POCT Blood Glucose.: 125 mg/dL (25 Feb 2025 08:03)  POCT Blood Glucose.: 121 mg/dL (24 Feb 2025 21:46)  POCT Blood Glucose.: 113 mg/dL (24 Feb 2025 16:45)        Urinalysis Basic - ( 25 Feb 2025 05:12 )    Color: x / Appearance: x / SG: x / pH: x  Gluc: 120 mg/dL / Ketone: x  / Bili: x / Urobili: x   Blood: x / Protein: x / Nitrite: x   Leuk Esterase: x / RBC: x / WBC x   Sq Epi: x / Non Sq Epi: x / Bacteria: x        MEDICATIONS  (STANDING):  apixaban 5 milliGRAM(s) Oral two times a day  atorvastatin 10 milliGRAM(s) Oral at bedtime  citalopram 10 milliGRAM(s) Oral daily  dextrose 5%. 1000 milliLiter(s) (50 mL/Hr) IV Continuous <Continuous>  dextrose 5%. 1000 milliLiter(s) (100 mL/Hr) IV Continuous <Continuous>  dextrose 50% Injectable 25 Gram(s) IV Push once  dextrose 50% Injectable 12.5 Gram(s) IV Push once  dextrose 50% Injectable 25 Gram(s) IV Push once  donepezil 10 milliGRAM(s) Oral at bedtime  dorzolamide 2% Ophthalmic Solution 1 Drop(s) Both EYES three times a day  glucagon  Injectable 1 milliGRAM(s) IntraMuscular once  insulin lispro (ADMELOG) corrective regimen sliding scale   SubCutaneous three times a day before meals  insulin lispro (ADMELOG) corrective regimen sliding scale   SubCutaneous at bedtime  lactated ringers. 1000 milliLiter(s) (75 mL/Hr) IV Continuous <Continuous>  memantine 10 milliGRAM(s) Oral two times a day  metoprolol succinate ER 25 milliGRAM(s) Oral with breakfast  mirtazapine 30 milliGRAM(s) Oral at bedtime  pantoprazole    Tablet 40 milliGRAM(s) Oral before breakfast  potassium phosphate / sodium phosphate Tablet (K-PHOS No. 2) 1 Tablet(s) Oral three times a day  QUEtiapine 25 milliGRAM(s) Oral two times a day    MEDICATIONS  (PRN):  acetaminophen     Tablet .. 650 milliGRAM(s) Oral every 6 hours PRN Temp greater or equal to 38C (100.4F), Mild Pain (1 - 3)  aluminum hydroxide/magnesium hydroxide/simethicone Suspension 30 milliLiter(s) Oral every 4 hours PRN Dyspepsia  dextrose Oral Gel 15 Gram(s) Oral once PRN Blood Glucose LESS THAN 70 milliGRAM(s)/deciliter  melatonin 3 milliGRAM(s) Oral at bedtime PRN Insomnia  ondansetron Injectable 4 milliGRAM(s) IV Push every 8 hours PRN Nausea and/or Vomiting          PHYSICAL EXAM:  GENERAL: NAD, well-groomed, well-developed  HEAD:  Atraumatic, Normocephalic  CHEST/LUNG: Clear to percussion bilaterally; No rales, rhonchi, wheezing, or rubs  HEART: Regular rate and rhythm; No murmurs, rubs, or gallops  ABDOMEN: Soft, Nontender, Nondistended; Bowel sounds present  EXTREMITIES:  2+ Peripheral Pulses, No clubbing, cyanosis, or edema  LYMPH: No lymphadenopathy noted  SKIN: No rashes or lesions    Care Discussed with Consultants/Other Providers [ ] YES  [ ] NO
    Subjective: Patient seen and examined. No new events except as noted.     SUBJECTIVE/ROS:  nad    MEDICATIONS:  MEDICATIONS  (STANDING):  apixaban 5 milliGRAM(s) Oral two times a day  atorvastatin 10 milliGRAM(s) Oral at bedtime  citalopram 10 milliGRAM(s) Oral daily  dextrose 5%. 1000 milliLiter(s) (50 mL/Hr) IV Continuous <Continuous>  dextrose 5%. 1000 milliLiter(s) (100 mL/Hr) IV Continuous <Continuous>  dextrose 50% Injectable 25 Gram(s) IV Push once  dextrose 50% Injectable 12.5 Gram(s) IV Push once  dextrose 50% Injectable 25 Gram(s) IV Push once  donepezil 10 milliGRAM(s) Oral at bedtime  dorzolamide 2% Ophthalmic Solution 1 Drop(s) Both EYES three times a day  glucagon  Injectable 1 milliGRAM(s) IntraMuscular once  insulin lispro (ADMELOG) corrective regimen sliding scale   SubCutaneous three times a day before meals  insulin lispro (ADMELOG) corrective regimen sliding scale   SubCutaneous at bedtime  lactated ringers. 1000 milliLiter(s) (75 mL/Hr) IV Continuous <Continuous>  memantine 10 milliGRAM(s) Oral two times a day  metoprolol succinate ER 25 milliGRAM(s) Oral with breakfast  mirtazapine 30 milliGRAM(s) Oral at bedtime  pantoprazole    Tablet 40 milliGRAM(s) Oral before breakfast  potassium phosphate / sodium phosphate Tablet (K-PHOS No. 2) 1 Tablet(s) Oral three times a day  QUEtiapine 25 milliGRAM(s) Oral two times a day      PHYSICAL EXAM:  T(C): 36.6 (02-26-25 @ 05:00), Max: 37.2 (02-25-25 @ 22:06)  HR: 64 (02-26-25 @ 05:00) (64 - 91)  BP: 162/84 (02-26-25 @ 05:00) (134/83 - 167/87)  RR: 14 (02-26-25 @ 05:00) (14 - 18)  SpO2: 97% (02-26-25 @ 05:00) (94% - 99%)  Wt(kg): --  I&O's Summary    25 Feb 2025 07:01  -  26 Feb 2025 07:00  --------------------------------------------------------  IN: 1210 mL / OUT: 625 mL / NET: 585 mL            JVP: Normal  Neck: supple  Lung: clear   CV: S1 S2 , Murmur:  Abd: soft  Ext: No edema  neuro: Awake / alert  Psych: flat affect  Skin: normal``    LABS/DATA:    CARDIAC MARKERS:                                12.2   5.85  )-----------( 180      ( 25 Feb 2025 05:12 )             36.6     02-25    144  |  107  |  11  ----------------------------<  120[H]  3.5   |  26  |  0.59    Ca    8.3[L]      25 Feb 2025 05:12  Phos  2.0     02-25  Mg     1.80     02-25      proBNP:   Lipid Profile:   HgA1c:   TSH:     TELE:  EKG:        
[facility] [place] [facility]   MD YONY CONWAY MD ANGELA WONG, PA    TEL:  OFFICE: [phone]  From 5pm-7am Answering Service [phone]    -- RENAL FOLLOW UP NOTE ---Date of Service 02-25-25 @ 12:28    Patient is a 79y old  Female who presents with a chief complaint of syncope, jocelin (25 Feb 2025 08:06)      Patient seen and examined at bedside. No chest pain/sob    VITALS:  T(F): 98 (02-25-25 @ 08:55), Max: 98.4 (02-24-25 @ 13:28)  HR: 91 (02-25-25 @ 08:55)  BP: 140/87 (02-25-25 @ 08:55)  RR: 17 (02-25-25 @ 08:55)  SpO2: 98% (02-25-25 @ 08:55)  Wt(kg): --    02-24 @ 07:01  -  02-25 @ 07:00  --------------------------------------------------------  IN: 1195 mL / OUT: 1650 mL / NET: -455 mL    02-25 @ 07:01  -  02-25 @ 12:28  --------------------------------------------------------  IN: 240 mL / OUT: 300 mL / NET: -60 mL          PHYSICAL EXAM:  General: NAD  Neck: No JVD  Respiratory: CTAB, no wheezes, rales or rhonchi  Cardiovascular: S1, S2, RRR  Gastrointestinal: BS+, soft, NT/ND  Extremities: No peripheral edema    Hospital Medications:   MEDICATIONS  (STANDING):  apixaban 5 milliGRAM(s) Oral two times a day  atorvastatin 10 milliGRAM(s) Oral at bedtime  citalopram 10 milliGRAM(s) Oral daily  dextrose 5%. 1000 milliLiter(s) (50 mL/Hr) IV Continuous <Continuous>  dextrose 5%. 1000 milliLiter(s) (100 mL/Hr) IV Continuous <Continuous>  dextrose 50% Injectable 25 Gram(s) IV Push once  dextrose 50% Injectable 12.5 Gram(s) IV Push once  dextrose 50% Injectable 25 Gram(s) IV Push once  donepezil 10 milliGRAM(s) Oral at bedtime  dorzolamide 2% Ophthalmic Solution 1 Drop(s) Both EYES three times a day  glucagon  Injectable 1 milliGRAM(s) IntraMuscular once  insulin lispro (ADMELOG) corrective regimen sliding scale   SubCutaneous three times a day before meals  insulin lispro (ADMELOG) corrective regimen sliding scale   SubCutaneous at bedtime  lactated ringers. 1000 milliLiter(s) (75 mL/Hr) IV Continuous <Continuous>  memantine 10 milliGRAM(s) Oral two times a day  metoprolol succinate ER 25 milliGRAM(s) Oral with breakfast  mirtazapine 30 milliGRAM(s) Oral at bedtime  pantoprazole    Tablet 40 milliGRAM(s) Oral before breakfast  QUEtiapine 25 milliGRAM(s) Oral two times a day      LABS:  02-25    144  |  107  |  11  ----------------------------<  120[H]  3.5   |  26  |  0.59    Ca    8.3[L]      25 Feb 2025 05:12  Phos  2.0     02-25  Mg     1.80     02-25      Creatinine Trend: 0.59 <--, 0.58 <--, 0.62 <--, 1.70 <--    Phosphorus: 2.0 mg/dL (02-25 @ 05:12)                              12.2   5.85  )-----------( 180      ( 25 Feb 2025 05:12 )             36.6     Urine Studies:  Urinalysis - [02-25-25 @ 05:12]      Color  / Appearance  / SG  / pH       Gluc 120 / Ketone   / Bili  / Urobili        Blood  / Protein  / Leuk Est  / Nitrite       RBC  / WBC  / Hyaline  / Gran  / Sq Epi  / Non Sq Epi  / Bacteria     Urine Creatinine 61      [02-23-25 @ 11:45]  Urine Sodium <20      [02-23-25 @ 11:45]  Urine Osmolality 460      [02-23-25 @ 11:45]    Ferritin 133      [08-14-24 @ 06:35]  Vitamin D (25OH) 27.3      [08-12-24 @ 06:10]  TSH 0.91      [02-25-25 @ 05:12]  Lipid: chol 115, , HDL 30, LDL --      [02-24-25 @ 06:45]        RADIOLOGY & ADDITIONAL STUDIES:  
Neurology Progress Note    S: Patient seen and examined. No new events overnight    Medication:  acetaminophen     Tablet .. 650 milliGRAM(s) Oral every 6 hours PRN  aluminum hydroxide/magnesium hydroxide/simethicone Suspension 30 milliLiter(s) Oral every 4 hours PRN  apixaban 5 milliGRAM(s) Oral two times a day  atorvastatin 10 milliGRAM(s) Oral at bedtime  citalopram 10 milliGRAM(s) Oral daily  dextrose 5%. 1000 milliLiter(s) IV Continuous <Continuous>  dextrose 5%. 1000 milliLiter(s) IV Continuous <Continuous>  dextrose 50% Injectable 25 Gram(s) IV Push once  dextrose 50% Injectable 12.5 Gram(s) IV Push once  dextrose 50% Injectable 25 Gram(s) IV Push once  dextrose Oral Gel 15 Gram(s) Oral once PRN  donepezil 10 milliGRAM(s) Oral at bedtime  dorzolamide 2% Ophthalmic Solution 1 Drop(s) Both EYES three times a day  glucagon  Injectable 1 milliGRAM(s) IntraMuscular once  insulin lispro (ADMELOG) corrective regimen sliding scale   SubCutaneous three times a day before meals  insulin lispro (ADMELOG) corrective regimen sliding scale   SubCutaneous at bedtime  lactated ringers. 1000 milliLiter(s) IV Continuous <Continuous>  melatonin 3 milliGRAM(s) Oral at bedtime PRN  memantine 10 milliGRAM(s) Oral two times a day  metoprolol succinate ER 25 milliGRAM(s) Oral with breakfast  mirtazapine 30 milliGRAM(s) Oral at bedtime  ondansetron Injectable 4 milliGRAM(s) IV Push every 8 hours PRN  pantoprazole    Tablet 40 milliGRAM(s) Oral before breakfast  potassium phosphate / sodium phosphate Tablet (K-PHOS No. 2) 1 Tablet(s) Oral three times a day  QUEtiapine 25 milliGRAM(s) Oral two times a day      Vitals:  Vital Signs Last 24 Hrs  T(C): 36.7 (26 Feb 2025 12:06), Max: 37.2 (25 Feb 2025 22:06)  T(F): 98.1 (26 Feb 2025 12:06), Max: 99 (25 Feb 2025 22:06)  HR: 62 (26 Feb 2025 12:06) (61 - 82)  BP: 135/81 (26 Feb 2025 12:06) (135/81 - 167/87)  BP(mean): --  RR: 17 (26 Feb 2025 12:06) (14 - 18)  SpO2: 96% (26 Feb 2025 12:06) (94% - 97%)    Parameters below as of 26 Feb 2025 12:06  Patient On (Oxygen Delivery Method): room air        General Exam:   General Appearance: Appropriately dressed and in no acute distress       Head: Normocephalic, atraumatic and no dysmorphic features  Ear, Nose, and Throat: Moist mucous membranes  CVS: S1S2+  Resp: No SOB, no wheeze or rhonchi  Abd: soft NTND  Extremities: No edema, no cyanosis  Skin: No bruises, no rashes     Neurological Exam:  Mental Status: Awake, alert and oriented x 2.  Able to follow simple commands. Able to name and repeat. fluent speech. No dysarthria   Cranial Nerves: PERRL, EOMI, VFFC, sensation V1-V3 intact,  R facial asymmetry, equal elevation of palate, scm/trap 5/5, tongue is midline on protrusion. hearing is grossly intact.   Motor: Normal bulk, tone, subtle R hemiparesis   Sensation: Intact to light touch   Reflexes: 1+ throughout at biceps, brachioradialis, triceps, patellars and ankles bilaterally and equal. No clonus. R toe and L toe were both downgoing.  Coordination: unablec  Gait: deferred    I personally reviewed the below data/images/labs:      CBC Full  -  ( 26 Feb 2025 06:15 )  WBC Count : 7.43 K/uL  RBC Count : 4.20 M/uL  Hemoglobin : 12.9 g/dL  Hematocrit : 37.9 %  Platelet Count - Automated : 221 K/uL  Mean Cell Volume : 90.2 fL  Mean Cell Hemoglobin : 30.7 pg  Mean Cell Hemoglobin Concentration : 34.0 g/dL  Auto Neutrophil # : x  Auto Lymphocyte # : x  Auto Monocyte # : x  Auto Eosinophil # : x  Auto Basophil # : x  Auto Neutrophil % : x  Auto Lymphocyte % : xn  Auto Monocyte % : x  Auto Eosinophil % : x  Auto Basophil % : x    02-26    141  |  103  |  12  ----------------------------<  130[H]  3.5   |  21[L]  |  0.72    Ca    8.6      26 Feb 2025 06:15  Phos  3.1     02-26  Mg     1.80     02-26    c< from: CT Head No Cont (02.23.25 @ 04:59) >    ACC: [id number] EXAM:  CT BRAIN   ORDERED BY: CHRITSA GRAHAM     PROCEDURE DATE:  02/23/2025          INTERPRETATION:  CLINICAL INFORMATION: Syncope.    COMPARISON: 09/03/2024.    CONTRAST:  IV Contrast:      TECHNIQUE:  Serial axial images were obtained from the skull base to the   vertex using multi-slice helical technique. Sagittal and coronal   reformats were obtained.    FINDINGS:    VENTRICLES AND SULCI: Mild to moderate age-related involutional changes.  INTRA-AXIAL: No mass effect, acute hemorrhage, or midline shift.    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  EXTRA-AXIAL: Stable prominence of extra-axial spaces overlying both   frontal parietal convexities, which represent enlarged subarachnoid   spaces secondary to underlying cerebral volume loss.  No acute   subarachnoid hemorrhage or evidence of a subdural fluid collection.    VISUALIZED SINUSES:  Air-fluid levels in both sphenoid sinuses.  Leftward   deviation of the nasal septum is partially imaged.  TYMPANOMASTOID CAVITIES:  Clear.  VISUALIZED ORBITS: Status post left lens replacement.  CALVARIUM: Intact.    MISCELLANEOUS: Scattered intracranial atherosclerotic calcifications in   the intradural vertebral arteries bilaterally, proximal basilar artery,   distal basilar artery, and in the intracranial internal carotid arteries   bilaterally.      IMPRESSION:  No CT evidence of acute intracranial pathology.    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            < end of copied text >      CONCLUSIONS:      1. The left ventricular cavity is normal in size. Left ventricular wall thickness is mildly increased. Left ventricular systolic function is normal with a calculated ejection fraction of 68 % by the Norton's biplane methodof disks. There are no regional wall motion abnormalities seen. Mild left ventricular hypertrophy. There is increased LV mass and concentric hypertrophy. There is mild (grade 1) left ventricular diastolic dysfunction.   2. Normal right ventricular cavity size and normal right ventricular systolic function. Tricuspid annular plane systolic excursion (TAPSE) is 2.2 cm (normal >=1.7 cm).   3. Structurally normal mitral valve with normal leaflet excursion. There is calcification of the mitral valve annulus. There is trace mitral regurgitation.              PTT - ( 24 Feb 2025 22:45 )  PTT:38.2 sec  Urinalysis Basic - ( 26 Feb 2025 06:15 )    Color: x / Appearance: x / SG: x / pH: x  Gluc: 130 mg/dL / Ketone: x  / Bili: x / Urobili: x   Blood: x / Protein: x / Nitrite: x   Leuk Esterase: x / RBC: x / WBC x   Sq Epi: x / Non Sq Epi: x / Bacteria: x      
[facility] [place] [facility]   MD YONY CONWAY MD MARIA SANTIAGO, NP    TEL:  OFFICE: [phone]  From 5pm-7am Answering Service [phone]    -- RENAL FOLLOW UP NOTE ---Date of Service 02-24-25 @ 13:14    Patient is a 79y old  Female who presents with a chief complaint of syncope, jocelin       Patient seen and examined at bedside. No chest pain/sob    VITALS:  T(F): 98.1 (02-24-25 @ 08:48), Max: 98.3 (02-23-25 @ 14:21)  HR: 77 (02-24-25 @ 08:48)  BP: 125/63 (02-24-25 @ 08:48)  RR: 16 (02-24-25 @ 08:48)  SpO2: 100% (02-24-25 @ 08:48)  Wt(kg): --    02-23 @ 07:01  -  02-24 @ 07:00  --------------------------------------------------------  IN: 1504 mL / OUT: 1400 mL / NET: 104 mL    02-24 @ 07:01  -  02-24 @ 13:14  --------------------------------------------------------  IN: 247 mL / OUT: 200 mL / NET: 47 mL          PHYSICAL EXAM:  General: NAD  Neck: No JVD  Respiratory: CTAB, no wheezes, rales or rhonchi  Cardiovascular: S1, S2, RRR  Gastrointestinal: BS+, soft, NT/ND  Extremities: No peripheral edema    Hospital Medications:   MEDICATIONS  (STANDING):  aspirin  chewable 81 milliGRAM(s) Oral daily  atorvastatin 10 milliGRAM(s) Oral at bedtime  citalopram 10 milliGRAM(s) Oral daily  dextrose 5%. 1000 milliLiter(s) (50 mL/Hr) IV Continuous <Continuous>  dextrose 5%. 1000 milliLiter(s) (100 mL/Hr) IV Continuous <Continuous>  dextrose 50% Injectable 25 Gram(s) IV Push once  dextrose 50% Injectable 12.5 Gram(s) IV Push once  dextrose 50% Injectable 25 Gram(s) IV Push once  donepezil 10 milliGRAM(s) Oral at bedtime  dorzolamide 2% Ophthalmic Solution 1 Drop(s) Both EYES three times a day  glucagon  Injectable 1 milliGRAM(s) IntraMuscular once  heparin  Infusion. 900 Unit(s)/Hr (9 mL/Hr) IV Continuous <Continuous>  insulin lispro (ADMELOG) corrective regimen sliding scale   SubCutaneous three times a day before meals  insulin lispro (ADMELOG) corrective regimen sliding scale   SubCutaneous at bedtime  lactated ringers. 1000 milliLiter(s) (75 mL/Hr) IV Continuous <Continuous>  memantine 10 milliGRAM(s) Oral two times a day  metoprolol succinate ER 25 milliGRAM(s) Oral with breakfast  mirtazapine 30 milliGRAM(s) Oral at bedtime  pantoprazole    Tablet 40 milliGRAM(s) Oral before breakfast  QUEtiapine 25 milliGRAM(s) Oral two times a day      LABS:  02-24    144  |  106  |  14  ----------------------------<  134[H]  4.0   |  28  |  0.58    Ca    8.3[L]      24 Feb 2025 06:45  Phos  5.1     02-22  Mg     2.40     02-22    TPro  8.9[H]  /  Alb  4.2  /  TBili  1.9[H]  /  DBili      /  AST  33[H]  /  ALT  19  /  AlkPhos  115  02-22    Creatinine Trend: 0.58 <--, 0.62 <--, 1.70 <--                                13.3   7.91  )-----------( 172      ( 24 Feb 2025 06:45 )             38.5     Urine Studies:  Urinalysis - [02-24-25 @ 06:45]      Color  / Appearance  / SG  / pH       Gluc 134 / Ketone   / Bili  / Urobili        Blood  / Protein  / Leuk Est  / Nitrite       RBC  / WBC  / Hyaline  / Gran  / Sq Epi  / Non Sq Epi  / Bacteria     Urine Creatinine 61      [02-23-25 @ 11:45]  Urine Sodium <20      [02-23-25 @ 11:45]  Urine Osmolality 460      [02-23-25 @ 11:45]    Ferritin 133      [08-14-24 @ 06:35]  Vitamin D (25OH) 27.3      [08-12-24 @ 06:10]  HbA1c 6.0      [02-27-20 @ 11:09]  TSH 0.64      [05-19-24 @ 21:53]  Lipid: chol 115, , HDL 30, LDL --      [02-24-25 @ 06:45]        RADIOLOGY & ADDITIONAL STUDIES:  
    Subjective: Patient seen and examined. No new events except as noted.     SUBJECTIVE/ROS:  nad      MEDICATIONS:  MEDICATIONS  (STANDING):  apixaban 5 milliGRAM(s) Oral two times a day  atorvastatin 10 milliGRAM(s) Oral at bedtime  citalopram 10 milliGRAM(s) Oral daily  dextrose 5%. 1000 milliLiter(s) (50 mL/Hr) IV Continuous <Continuous>  dextrose 5%. 1000 milliLiter(s) (100 mL/Hr) IV Continuous <Continuous>  dextrose 50% Injectable 25 Gram(s) IV Push once  dextrose 50% Injectable 12.5 Gram(s) IV Push once  dextrose 50% Injectable 25 Gram(s) IV Push once  donepezil 10 milliGRAM(s) Oral at bedtime  dorzolamide 2% Ophthalmic Solution 1 Drop(s) Both EYES three times a day  glucagon  Injectable 1 milliGRAM(s) IntraMuscular once  insulin lispro (ADMELOG) corrective regimen sliding scale   SubCutaneous three times a day before meals  insulin lispro (ADMELOG) corrective regimen sliding scale   SubCutaneous at bedtime  lactated ringers. 1000 milliLiter(s) (75 mL/Hr) IV Continuous <Continuous>  memantine 10 milliGRAM(s) Oral two times a day  metoprolol succinate ER 25 milliGRAM(s) Oral with breakfast  mirtazapine 30 milliGRAM(s) Oral at bedtime  pantoprazole    Tablet 40 milliGRAM(s) Oral before breakfast  QUEtiapine 25 milliGRAM(s) Oral two times a day      PHYSICAL EXAM:  T(C): 36.4 (02-25-25 @ 04:45), Max: 36.9 (02-24-25 @ 13:28)  HR: 72 (02-25-25 @ 04:45) (67 - 77)  BP: 129/70 (02-25-25 @ 04:45) (121/65 - 164/79)  RR: 18 (02-25-25 @ 04:45) (16 - 18)  SpO2: 96% (02-25-25 @ 04:45) (94% - 100%)  Wt(kg): --  I&O's Summary    24 Feb 2025 07:01  -  25 Feb 2025 07:00  --------------------------------------------------------  IN: 1195 mL / OUT: 1650 mL / NET: -455 mL            JVP: Normal  Neck: supple  Lung: clear   CV: S1 S2 , Murmur:  Abd: soft  Ext: No edema  neuro: Awake   Psych: flat affect  Skin: normal``    LABS/DATA:    CARDIAC MARKERS:                                12.2   5.85  )-----------( 180      ( 25 Feb 2025 05:12 )             36.6     02-25    144  |  107  |  11  ----------------------------<  120[H]  3.5   |  26  |  0.59    Ca    8.3[L]      25 Feb 2025 05:12  Phos  2.0     02-25  Mg     1.80     02-25      proBNP:   Lipid Profile:   HgA1c:   TSH: Thyroid Stimulating Hormone, Serum: 0.91 uIU/mL (02-25 @ 05:12)  Thyroid Stimulating Hormone, Serum: 1.70 uIU/mL (02-24 @ 15:15)      TELE: sinus  EKG:      < from: TTE W or WO Ultrasound Enhancing Agent (02.24.25 @ 15:29) >     CONCLUSIONS:      1. The left ventricular cavity is normal in size. Left ventricular wall thickness is mildly increased. Left ventricular systolic function is normal with a calculated ejection fraction of 68 % by the Norton's biplane methodof disks. There are no regional wall motion abnormalities seen. Mild left ventricular hypertrophy. There is increased LV mass and concentric hypertrophy. There is mild (grade 1) left ventricular diastolic dysfunction.   2. Normal right ventricular cavity size and normal right ventricular systolic function. Tricuspid annular plane systolic excursion (TAPSE) is 2.2 cm (normal >=1.7 cm).   3. Structurally normal mitral valve with normal leaflet excursion. There is calcification of the mitral valve annulus. There is trace mitral regurgitation.    < end of copied text >    
[facility] [place] [facility]   MD YONY CONWAY MD ANGELA WONG, PA    TEL:  OFFICE: [phone]  From 5pm-7am Answering Service [phone]    -- RENAL FOLLOW UP NOTE ---Date of Service 02-26-25 @ 14:18    Patient is a 79y old  Female who presents with a chief complaint of syncope, jocelin (26 Feb 2025 11:04)      Patient seen and examined at bedside. No chest pain/sob    VITALS:  T(F): 98.1 (02-26-25 @ 12:06), Max: 99 (02-25-25 @ 22:06)  HR: 62 (02-26-25 @ 12:06)  BP: 135/81 (02-26-25 @ 12:06)  RR: 17 (02-26-25 @ 12:06)  SpO2: 96% (02-26-25 @ 12:06)  Wt(kg): --    02-25 @ 07:01  -  02-26 @ 07:00  --------------------------------------------------------  IN: 1210 mL / OUT: 625 mL / NET: 585 mL          PHYSICAL EXAM:  General: NAD  Neck: No JVD  Respiratory: CTAB, no wheezes, rales or rhonchi  Cardiovascular: S1, S2, RRR  Gastrointestinal: BS+, soft, NT/ND  Extremities: No peripheral edema    Hospital Medications:   MEDICATIONS  (STANDING):  apixaban 5 milliGRAM(s) Oral two times a day  atorvastatin 10 milliGRAM(s) Oral at bedtime  citalopram 10 milliGRAM(s) Oral daily  dextrose 5%. 1000 milliLiter(s) (50 mL/Hr) IV Continuous <Continuous>  dextrose 5%. 1000 milliLiter(s) (100 mL/Hr) IV Continuous <Continuous>  dextrose 50% Injectable 25 Gram(s) IV Push once  dextrose 50% Injectable 12.5 Gram(s) IV Push once  dextrose 50% Injectable 25 Gram(s) IV Push once  donepezil 10 milliGRAM(s) Oral at bedtime  dorzolamide 2% Ophthalmic Solution 1 Drop(s) Both EYES three times a day  glucagon  Injectable 1 milliGRAM(s) IntraMuscular once  insulin lispro (ADMELOG) corrective regimen sliding scale   SubCutaneous three times a day before meals  insulin lispro (ADMELOG) corrective regimen sliding scale   SubCutaneous at bedtime  lactated ringers. 1000 milliLiter(s) (75 mL/Hr) IV Continuous <Continuous>  memantine 10 milliGRAM(s) Oral two times a day  metoprolol succinate ER 25 milliGRAM(s) Oral with breakfast  mirtazapine 30 milliGRAM(s) Oral at bedtime  pantoprazole    Tablet 40 milliGRAM(s) Oral before breakfast  potassium phosphate / sodium phosphate Tablet (K-PHOS No. 2) 1 Tablet(s) Oral three times a day  QUEtiapine 25 milliGRAM(s) Oral two times a day      LABS:  02-26    141  |  103  |  12  ----------------------------<  130[H]  3.5   |  21[L]  |  0.72    Ca    8.6      26 Feb 2025 06:15  Phos  3.1     02-26  Mg     1.80     02-26      Creatinine Trend: 0.72 <--, 0.59 <--, 0.58 <--, 0.62 <--, 1.70 <--    Phosphorus: 3.1 mg/dL (02-26 @ 06:15)                              12.9   7.43  )-----------( 221      ( 26 Feb 2025 06:15 )             37.9     Urine Studies:  Urinalysis - [02-26-25 @ 06:15]      Color  / Appearance  / SG  / pH       Gluc 130 / Ketone   / Bili  / Urobili        Blood  / Protein  / Leuk Est  / Nitrite       RBC  / WBC  / Hyaline  / Gran  / Sq Epi  / Non Sq Epi  / Bacteria     Urine Creatinine 61      [02-23-25 @ 11:45]  Urine Sodium <20      [02-23-25 @ 11:45]  Urine Osmolality 460      [02-23-25 @ 11:45]    Ferritin 133      [08-14-24 @ 06:35]  Vitamin D (25OH) 27.3      [08-12-24 @ 06:10]  TSH 0.91      [02-25-25 @ 05:12]  Lipid: chol 115, , HDL 30, LDL --      [02-24-25 @ 06:45]        RADIOLOGY & ADDITIONAL STUDIES:  
Patient is a 79y old  Female who presents with a chief complaint of syncope, jocelin (2025 10:32)    Date of servie : 25 @ 14:24  INTERVAL HPI/OVERNIGHT EVENTS:  T(C): 36.8 (25 @ 14:21), Max: 36.8 (25 @ 14:21)  HR: 89 (25 @ 14:21) (66 - 163)  BP: 131/67 (25 @ 14:21) (100/67 - 134/81)  RR: 15 (25 @ 14:21) (15 - 22)  SpO2: 98% (25 @ 14:21) (95% - 100%)  Wt(kg): --  I&O's Summary    2025 07:01  -  2025 07:00  --------------------------------------------------------  IN: 345 mL / OUT: 200 mL / NET: 145 mL    2025 07:01  -  2025 14:24  --------------------------------------------------------  IN: 48 mL / OUT: 800 mL / NET: -752 mL        LABS:                        15.2   10.63 )-----------( 196      ( 2025 14:48 )             44.5         132[L]  |  97[L]  |  60[H]  ----------------------------<  135[H]  4.0   |  15[L]  |  1.70[H]    Ca    9.3      2025 14:48  Phos  5.1     -  Mg     2.40     -    TPro  8.9[H]  /  Alb  4.2  /  TBili  1.9[H]  /  DBili  x   /  AST  33[H]  /  ALT  19  /  AlkPhos  115  02-22    PT/INR - ( 2025 00:35 )   PT: 11.1 sec;   INR: 0.93 ratio         PTT - ( 2025 00:35 )  PTT:30.1 sec  Urinalysis Basic - ( 2025 11:45 )    Color: Yellow / Appearance: Clear / S.015 / pH: x  Gluc: x / Ketone: Negative mg/dL  / Bili: Negative / Urobili: 0.2 mg/dL   Blood: x / Protein: 30 mg/dL / Nitrite: Negative   Leuk Esterase: Small / RBC: 0 /HPF / WBC 8 /HPF   Sq Epi: x / Non Sq Epi: 1 /HPF / Bacteria: Negative /HPF      CAPILLARY BLOOD GLUCOSE      POCT Blood Glucose.: 105 mg/dL (2025 11:54)  POCT Blood Glucose.: 123 mg/dL (2025 08:25)  POCT Blood Glucose.: 157 mg/dL (2025 01:57)  POCT Blood Glucose.: 161 mg/dL (2025 22:28)        Urinalysis Basic - ( 2025 11:45 )    Color: Yellow / Appearance: Clear / S.015 / pH: x  Gluc: x / Ketone: Negative mg/dL  / Bili: Negative / Urobili: 0.2 mg/dL   Blood: x / Protein: 30 mg/dL / Nitrite: Negative   Leuk Esterase: Small / RBC: 0 /HPF / WBC 8 /HPF   Sq Epi: x / Non Sq Epi: 1 /HPF / Bacteria: Negative /HPF        MEDICATIONS  (STANDING):  aspirin  chewable 81 milliGRAM(s) Oral daily  atorvastatin 10 milliGRAM(s) Oral at bedtime  citalopram 10 milliGRAM(s) Oral daily  dextrose 5%. 1000 milliLiter(s) (50 mL/Hr) IV Continuous <Continuous>  dextrose 5%. 1000 milliLiter(s) (100 mL/Hr) IV Continuous <Continuous>  dextrose 50% Injectable 25 Gram(s) IV Push once  dextrose 50% Injectable 12.5 Gram(s) IV Push once  dextrose 50% Injectable 25 Gram(s) IV Push once  donepezil 10 milliGRAM(s) Oral at bedtime  dorzolamide 2% Ophthalmic Solution 1 Drop(s) Both EYES three times a day  glucagon  Injectable 1 milliGRAM(s) IntraMuscular once  heparin  Infusion.  Unit(s)/Hr (12 mL/Hr) IV Continuous <Continuous>  insulin lispro (ADMELOG) corrective regimen sliding scale   SubCutaneous three times a day before meals  insulin lispro (ADMELOG) corrective regimen sliding scale   SubCutaneous at bedtime  lactated ringers. 1000 milliLiter(s) (75 mL/Hr) IV Continuous <Continuous>  memantine 10 milliGRAM(s) Oral two times a day  metoprolol succinate ER 25 milliGRAM(s) Oral with breakfast  mirtazapine 30 milliGRAM(s) Oral at bedtime  pantoprazole    Tablet 40 milliGRAM(s) Oral before breakfast  QUEtiapine 25 milliGRAM(s) Oral two times a day  sodium bicarbonate  Infusion 0.165 mEq/kG/Hr (75 mL/Hr) IV Continuous <Continuous>    MEDICATIONS  (PRN):  acetaminophen     Tablet .. 650 milliGRAM(s) Oral every 6 hours PRN Temp greater or equal to 38C (100.4F), Mild Pain (1 - 3)  aluminum hydroxide/magnesium hydroxide/simethicone Suspension 30 milliLiter(s) Oral every 4 hours PRN Dyspepsia  dextrose Oral Gel 15 Gram(s) Oral once PRN Blood Glucose LESS THAN 70 milliGRAM(s)/deciliter  heparin   Injectable 5500 Unit(s) IV Push every 6 hours PRN For aPTT less than 40  heparin   Injectable 2500 Unit(s) IV Push every 6 hours PRN For aPTT between 40 - 57  melatonin 3 milliGRAM(s) Oral at bedtime PRN Insomnia  ondansetron Injectable 4 milliGRAM(s) IV Push every 8 hours PRN Nausea and/or Vomiting          PHYSICAL EXAM:  GENERAL: NAD, well-groomed, well-developed  HEAD:  Atraumatic, Normocephalic  CHEST/LUNG: Clear to percussion bilaterally; No rales, rhonchi, wheezing, or rubs  HEART: Regular rate and rhythm; No murmurs, rubs, or gallops  ABDOMEN: Soft, Nontender, Nondistended; Bowel sounds present  EXTREMITIES:  edema +    Care Discussed with Consultants/Other Providers [x ] YES  [ ] NO
Patient is a 79y old  Female who presents with a chief complaint of syncope, jocelin (24 Feb 2025 13:14)    Date of servie : 02-24-25 @ 14:50  INTERVAL HPI/OVERNIGHT EVENTS:  T(C): 36.9 (02-24-25 @ 13:28), Max: 36.9 (02-24-25 @ 13:28)  HR: 71 (02-24-25 @ 13:28) (67 - 80)  BP: 125/73 (02-24-25 @ 13:28) (121/76 - 150/79)  RR: 16 (02-24-25 @ 13:28) (16 - 17)  SpO2: 100% (02-24-25 @ 13:28) (95% - 100%)  Wt(kg): --  I&O's Summary    23 Feb 2025 07:01  -  24 Feb 2025 07:00  --------------------------------------------------------  IN: 1504 mL / OUT: 1400 mL / NET: 104 mL    24 Feb 2025 07:01  -  24 Feb 2025 14:50  --------------------------------------------------------  IN: 515 mL / OUT: 750 mL / NET: -235 mL        LABS:                        13.3   7.91  )-----------( 172      ( 24 Feb 2025 06:45 )             38.5     02-24    144  |  106  |  14  ----------------------------<  134[H]  4.0   |  28  |  0.58    Ca    8.3[L]      24 Feb 2025 06:45      PT/INR - ( 23 Feb 2025 00:35 )   PT: 11.1 sec;   INR: 0.93 ratio         PTT - ( 24 Feb 2025 06:45 )  PTT:193.2 sec  Urinalysis Basic - ( 24 Feb 2025 06:45 )    Color: x / Appearance: x / SG: x / pH: x  Gluc: 134 mg/dL / Ketone: x  / Bili: x / Urobili: x   Blood: x / Protein: x / Nitrite: x   Leuk Esterase: x / RBC: x / WBC x   Sq Epi: x / Non Sq Epi: x / Bacteria: x      CAPILLARY BLOOD GLUCOSE      POCT Blood Glucose.: 126 mg/dL (24 Feb 2025 11:55)  POCT Blood Glucose.: 146 mg/dL (24 Feb 2025 08:06)  POCT Blood Glucose.: 124 mg/dL (23 Feb 2025 22:40)  POCT Blood Glucose.: 119 mg/dL (23 Feb 2025 17:20)        Urinalysis Basic - ( 24 Feb 2025 06:45 )    Color: x / Appearance: x / SG: x / pH: x  Gluc: 134 mg/dL / Ketone: x  / Bili: x / Urobili: x   Blood: x / Protein: x / Nitrite: x   Leuk Esterase: x / RBC: x / WBC x   Sq Epi: x / Non Sq Epi: x / Bacteria: x        MEDICATIONS  (STANDING):  aspirin  chewable 81 milliGRAM(s) Oral daily  atorvastatin 10 milliGRAM(s) Oral at bedtime  citalopram 10 milliGRAM(s) Oral daily  dextrose 5%. 1000 milliLiter(s) (50 mL/Hr) IV Continuous <Continuous>  dextrose 5%. 1000 milliLiter(s) (100 mL/Hr) IV Continuous <Continuous>  dextrose 50% Injectable 25 Gram(s) IV Push once  dextrose 50% Injectable 12.5 Gram(s) IV Push once  dextrose 50% Injectable 25 Gram(s) IV Push once  donepezil 10 milliGRAM(s) Oral at bedtime  dorzolamide 2% Ophthalmic Solution 1 Drop(s) Both EYES three times a day  glucagon  Injectable 1 milliGRAM(s) IntraMuscular once  heparin  Infusion. 900 Unit(s)/Hr (9 mL/Hr) IV Continuous <Continuous>  insulin lispro (ADMELOG) corrective regimen sliding scale   SubCutaneous three times a day before meals  insulin lispro (ADMELOG) corrective regimen sliding scale   SubCutaneous at bedtime  lactated ringers. 1000 milliLiter(s) (75 mL/Hr) IV Continuous <Continuous>  memantine 10 milliGRAM(s) Oral two times a day  metoprolol succinate ER 25 milliGRAM(s) Oral with breakfast  mirtazapine 30 milliGRAM(s) Oral at bedtime  pantoprazole    Tablet 40 milliGRAM(s) Oral before breakfast  QUEtiapine 25 milliGRAM(s) Oral two times a day    MEDICATIONS  (PRN):  acetaminophen     Tablet .. 650 milliGRAM(s) Oral every 6 hours PRN Temp greater or equal to 38C (100.4F), Mild Pain (1 - 3)  aluminum hydroxide/magnesium hydroxide/simethicone Suspension 30 milliLiter(s) Oral every 4 hours PRN Dyspepsia  dextrose Oral Gel 15 Gram(s) Oral once PRN Blood Glucose LESS THAN 70 milliGRAM(s)/deciliter  heparin   Injectable 5500 Unit(s) IV Push every 6 hours PRN For aPTT less than 40  heparin   Injectable 2500 Unit(s) IV Push every 6 hours PRN For aPTT between 40 - 57  melatonin 3 milliGRAM(s) Oral at bedtime PRN Insomnia  ondansetron Injectable 4 milliGRAM(s) IV Push every 8 hours PRN Nausea and/or Vomiting          PHYSICAL EXAM:  GENERAL: NAD, well-groomed, well-developed  HEAD:  Atraumatic, Normocephalic  CHEST/LUNG: Clear to percussion bilaterally; No rales, rhonchi, wheezing, or rubs  HEART: Regular rate and rhythm; No murmurs, rubs, or gallops  ABDOMEN: Soft, Nontender, Nondistended; Bowel sounds present  EXTREMITIES:  2+ Peripheral Pulses, No clubbing, cyanosis, or edema  LYMPH: No lymphadenopathy noted  SKIN: No rashes or lesions    Care Discussed with Consultants/Other Providers [ ] YES  [ ] NO

## 2025-02-26 NOTE — DISCHARGE NOTE NURSING/CASE MANAGEMENT/SOCIAL WORK - PATIENT PORTAL LINK FT
You can access the FollowMyHealth Patient Portal offered by [facility] by registering at the following website: [email or web address]. By joining [employer]’s FollowMyHealth portal, you will also be able to view your health information using other applications (apps) compatible with our system.

## 2025-02-27 ENCOUNTER — NON-APPOINTMENT (OUTPATIENT)
Age: 80
End: 2025-02-27

## 2025-02-27 RX ORDER — APIXABAN 5 MG/1
5 TABLET, FILM COATED ORAL
Refills: 0 | Status: ACTIVE | COMMUNITY
Start: 2025-02-27

## 2025-02-27 RX ORDER — MIRTAZAPINE 30 MG/1
30 TABLET, FILM COATED ORAL
Refills: 0 | Status: ACTIVE | COMMUNITY
Start: 2025-02-27

## 2025-02-27 RX ORDER — DONEPEZIL HYDROCHLORIDE 10 MG/1
10 TABLET ORAL DAILY
Qty: 1 | Refills: 0 | Status: ACTIVE | COMMUNITY
Start: 2025-02-27

## 2025-03-03 ENCOUNTER — TRANSCRIPTION ENCOUNTER (OUTPATIENT)
Age: 80
End: 2025-03-03

## 2025-03-03 PROBLEM — F03.90 UNSPECIFIED DEMENTIA, UNSPECIFIED SEVERITY, WITHOUT BEHAVIORAL DISTURBANCE, PSYCHOTIC DISTURBANCE, MOOD DISTURBANCE, AND ANXIETY: Chronic | Status: ACTIVE | Noted: 2025-02-22

## 2025-03-03 PROBLEM — I63.9 CEREBRAL INFARCTION, UNSPECIFIED: Chronic | Status: ACTIVE | Noted: 2025-02-22

## 2025-03-03 PROBLEM — I50.30 UNSPECIFIED DIASTOLIC (CONGESTIVE) HEART FAILURE: Chronic | Status: ACTIVE | Noted: 2025-02-22

## 2025-03-04 ENCOUNTER — APPOINTMENT (OUTPATIENT)
Dept: AFTER HOURS CARE | Facility: EMERGENCY ROOM | Age: 80
End: 2025-03-04

## 2025-03-04 ENCOUNTER — NON-APPOINTMENT (OUTPATIENT)
Age: 80
End: 2025-03-04

## 2025-03-04 ENCOUNTER — TRANSCRIPTION ENCOUNTER (OUTPATIENT)
Age: 80
End: 2025-03-04

## 2025-03-04 PROBLEM — F05 SUNDOWNING: Status: ACTIVE | Noted: 2025-03-04

## 2025-03-04 PROBLEM — F03.918 DEMENTIA WITH BEHAVIORAL DISTURBANCE: Status: ACTIVE | Noted: 2025-03-04

## 2025-03-04 PROCEDURE — 99215 OFFICE O/P EST HI 40 MIN: CPT | Mod: 2W

## 2025-03-05 RX ORDER — OLANZAPINE 5 MG/1
5 TABLET, ORALLY DISINTEGRATING ORAL
Qty: 60 | Refills: 5 | Status: ACTIVE | COMMUNITY
Start: 2025-03-05 | End: 1900-01-01

## 2025-03-06 ENCOUNTER — LABORATORY RESULT (OUTPATIENT)
Age: 80
End: 2025-03-06

## 2025-03-10 ENCOUNTER — APPOINTMENT (OUTPATIENT)
Age: 80
End: 2025-03-10
Payer: MEDICARE

## 2025-03-10 VITALS
RESPIRATION RATE: 16 BRPM | TEMPERATURE: 98.1 F | DIASTOLIC BLOOD PRESSURE: 90 MMHG | SYSTOLIC BLOOD PRESSURE: 142 MMHG | OXYGEN SATURATION: 94 % | HEART RATE: 78 BPM

## 2025-03-10 DIAGNOSIS — F03.90 UNSPECIFIED DEMENTIA W/OUT BEHAVIORAL DISTURBANCE: ICD-10-CM

## 2025-03-10 DIAGNOSIS — E08.49 DIABETES MELLITUS DUE TO UNDERLYING CONDITION WITH OTHER DIABETIC NEUROLOGICAL COMPLICATION: ICD-10-CM

## 2025-03-10 DIAGNOSIS — Z79.4 DIABETES MELLITUS DUE TO UNDERLYING CONDITION WITH OTHER DIABETIC NEUROLOGICAL COMPLICATION: ICD-10-CM

## 2025-03-10 DIAGNOSIS — Z43.3 ENCOUNTER FOR ATTENTION TO COLOSTOMY: ICD-10-CM

## 2025-03-10 DIAGNOSIS — F03.918 UNSPECIFIED DEMENTIA, UNSPECIFIED SEVERITY, WITH OTHER BEHAVIORAL DISTURBANCE: ICD-10-CM

## 2025-03-10 DIAGNOSIS — I10 ESSENTIAL (PRIMARY) HYPERTENSION: ICD-10-CM

## 2025-03-10 DIAGNOSIS — F05 DELIRIUM DUE TO KNOWN PHYSIOLOGICAL CONDITION: ICD-10-CM

## 2025-03-10 PROCEDURE — 99495 TRANSJ CARE MGMT MOD F2F 14D: CPT

## 2025-03-18 ENCOUNTER — TRANSCRIPTION ENCOUNTER (OUTPATIENT)
Age: 80
End: 2025-03-18

## 2025-03-18 ENCOUNTER — APPOINTMENT (OUTPATIENT)
Dept: HOME HEALTH SERVICES | Facility: HOME HEALTH | Age: 80
End: 2025-03-18

## 2025-03-23 PROBLEM — R05.9 COUGH: Status: ACTIVE | Noted: 2025-03-23

## 2025-03-24 ENCOUNTER — INPATIENT (INPATIENT)
Facility: HOSPITAL | Age: 80
LOS: 6 days | Discharge: HOME | End: 2025-03-31
Attending: INTERNAL MEDICINE | Admitting: INTERNAL MEDICINE
Payer: MEDICARE

## 2025-03-24 ENCOUNTER — TRANSCRIPTION ENCOUNTER (OUTPATIENT)
Age: 80
End: 2025-03-24

## 2025-03-24 ENCOUNTER — APPOINTMENT (OUTPATIENT)
Dept: HOME HEALTH SERVICES | Facility: HOME HEALTH | Age: 80
End: 2025-03-24
Payer: MEDICARE

## 2025-03-24 ENCOUNTER — NON-APPOINTMENT (OUTPATIENT)
Age: 80
End: 2025-03-24

## 2025-03-24 VITALS
RESPIRATION RATE: 22 BRPM | DIASTOLIC BLOOD PRESSURE: 76 MMHG | HEIGHT: 60 IN | SYSTOLIC BLOOD PRESSURE: 145 MMHG | TEMPERATURE: 103 F | WEIGHT: 139.99 LBS | HEART RATE: 87 BPM | OXYGEN SATURATION: 95 %

## 2025-03-24 DIAGNOSIS — Z87.81 PERSONAL HISTORY OF (HEALED) TRAUMATIC FRACTURE: Chronic | ICD-10-CM

## 2025-03-24 DIAGNOSIS — Z93.3 COLOSTOMY STATUS: Chronic | ICD-10-CM

## 2025-03-24 DIAGNOSIS — G93.40 ENCEPHALOPATHY, UNSPECIFIED: ICD-10-CM

## 2025-03-24 LAB
ADD ON TEST-SPECIMEN IN LAB: SIGNIFICANT CHANGE UP
ALBUMIN SERPL ELPH-MCNC: 3.4 G/DL — SIGNIFICANT CHANGE UP (ref 3.3–5)
ALP SERPL-CCNC: 112 U/L — SIGNIFICANT CHANGE UP (ref 40–120)
ALT FLD-CCNC: 9 U/L — SIGNIFICANT CHANGE UP (ref 4–33)
ANION GAP SERPL CALC-SCNC: 14 MMOL/L — SIGNIFICANT CHANGE UP (ref 7–14)
APTT BLD: 32.9 SEC — SIGNIFICANT CHANGE UP (ref 24.5–35.6)
AST SERPL-CCNC: 16 U/L — SIGNIFICANT CHANGE UP (ref 4–32)
BASOPHILS # BLD AUTO: 0.08 K/UL — SIGNIFICANT CHANGE UP (ref 0–0.2)
BASOPHILS NFR BLD AUTO: 0.5 % — SIGNIFICANT CHANGE UP (ref 0–2)
BILIRUB SERPL-MCNC: 1.2 MG/DL — SIGNIFICANT CHANGE UP (ref 0.2–1.2)
BLOOD GAS VENOUS COMPREHENSIVE RESULT: SIGNIFICANT CHANGE UP
BUN SERPL-MCNC: 15 MG/DL — SIGNIFICANT CHANGE UP (ref 7–23)
CALCIUM SERPL-MCNC: 8.7 MG/DL — SIGNIFICANT CHANGE UP (ref 8.4–10.5)
CHLORIDE SERPL-SCNC: 104 MMOL/L — SIGNIFICANT CHANGE UP (ref 98–107)
CO2 SERPL-SCNC: 19 MMOL/L — LOW (ref 22–31)
CREAT SERPL-MCNC: 0.67 MG/DL — SIGNIFICANT CHANGE UP (ref 0.5–1.3)
EGFR: 90 ML/MIN/1.73M2 — SIGNIFICANT CHANGE UP
EGFR: 90 ML/MIN/1.73M2 — SIGNIFICANT CHANGE UP
EOSINOPHIL # BLD AUTO: 0.16 K/UL — SIGNIFICANT CHANGE UP (ref 0–0.5)
EOSINOPHIL NFR BLD AUTO: 1 % — SIGNIFICANT CHANGE UP (ref 0–6)
FLUAV AG NPH QL: SIGNIFICANT CHANGE UP
FLUBV AG NPH QL: SIGNIFICANT CHANGE UP
GLUCOSE SERPL-MCNC: 179 MG/DL — HIGH (ref 70–99)
HCT VFR BLD CALC: 37.9 % — SIGNIFICANT CHANGE UP (ref 34.5–45)
HGB BLD-MCNC: 12.6 G/DL — SIGNIFICANT CHANGE UP (ref 11.5–15.5)
IANC: 10.88 K/UL — HIGH (ref 1.8–7.4)
IMM GRANULOCYTES NFR BLD AUTO: 0.7 % — SIGNIFICANT CHANGE UP (ref 0–0.9)
INR BLD: 1.07 RATIO — SIGNIFICANT CHANGE UP (ref 0.85–1.16)
LYMPHOCYTES # BLD AUTO: 21.4 % — SIGNIFICANT CHANGE UP (ref 13–44)
LYMPHOCYTES # BLD AUTO: 3.34 K/UL — HIGH (ref 1–3.3)
MCHC RBC-ENTMCNC: 30.4 PG — SIGNIFICANT CHANGE UP (ref 27–34)
MCHC RBC-ENTMCNC: 33.2 G/DL — SIGNIFICANT CHANGE UP (ref 32–36)
MCV RBC AUTO: 91.3 FL — SIGNIFICANT CHANGE UP (ref 80–100)
MONOCYTES # BLD AUTO: 1.01 K/UL — HIGH (ref 0–0.9)
MONOCYTES NFR BLD AUTO: 6.5 % — SIGNIFICANT CHANGE UP (ref 2–14)
NEUTROPHILS # BLD AUTO: 10.88 K/UL — HIGH (ref 1.8–7.4)
NEUTROPHILS NFR BLD AUTO: 69.9 % — SIGNIFICANT CHANGE UP (ref 43–77)
NRBC # BLD AUTO: 0 K/UL — SIGNIFICANT CHANGE UP (ref 0–0)
NRBC # FLD: 0 K/UL — SIGNIFICANT CHANGE UP (ref 0–0)
NRBC BLD AUTO-RTO: 0 /100 WBCS — SIGNIFICANT CHANGE UP (ref 0–0)
PLATELET # BLD AUTO: 282 K/UL — SIGNIFICANT CHANGE UP (ref 150–400)
POTASSIUM SERPL-MCNC: 4 MMOL/L — SIGNIFICANT CHANGE UP (ref 3.5–5.3)
POTASSIUM SERPL-SCNC: 4 MMOL/L — SIGNIFICANT CHANGE UP (ref 3.5–5.3)
PROT SERPL-MCNC: 7.6 G/DL — SIGNIFICANT CHANGE UP (ref 6–8.3)
PROTHROM AB SERPL-ACNC: 12.7 SEC — SIGNIFICANT CHANGE UP (ref 9.9–13.4)
RBC # BLD: 4.15 M/UL — SIGNIFICANT CHANGE UP (ref 3.8–5.2)
RBC # FLD: 13.4 % — SIGNIFICANT CHANGE UP (ref 10.3–14.5)
RSV RNA NPH QL NAA+NON-PROBE: SIGNIFICANT CHANGE UP
SARS-COV-2 RNA SPEC QL NAA+PROBE: SIGNIFICANT CHANGE UP
SODIUM SERPL-SCNC: 137 MMOL/L — SIGNIFICANT CHANGE UP (ref 135–145)
SOURCE RESPIRATORY: SIGNIFICANT CHANGE UP
WBC # BLD: 15.58 K/UL — HIGH (ref 3.8–10.5)
WBC # FLD AUTO: 15.58 K/UL — HIGH (ref 3.8–10.5)

## 2025-03-24 PROCEDURE — 99285 EMERGENCY DEPT VISIT HI MDM: CPT | Mod: FS

## 2025-03-24 PROCEDURE — 71046 X-RAY EXAM CHEST 2 VIEWS: CPT | Mod: 26

## 2025-03-24 PROCEDURE — 99350 HOME/RES VST EST HIGH MDM 60: CPT | Mod: 2W

## 2025-03-24 RX ORDER — ACETAMINOPHEN 500 MG/5ML
1000 LIQUID (ML) ORAL ONCE
Refills: 0 | Status: COMPLETED | OUTPATIENT
Start: 2025-03-24 | End: 2025-03-24

## 2025-03-24 RX ORDER — VANCOMYCIN HCL IN 5 % DEXTROSE 1.5G/250ML
1000 PLASTIC BAG, INJECTION (ML) INTRAVENOUS ONCE
Refills: 0 | Status: COMPLETED | OUTPATIENT
Start: 2025-03-24 | End: 2025-03-24

## 2025-03-24 RX ORDER — CEFEPIME 2 G/20ML
2000 INJECTION, POWDER, FOR SOLUTION INTRAVENOUS ONCE
Refills: 0 | Status: COMPLETED | OUTPATIENT
Start: 2025-03-24 | End: 2025-03-24

## 2025-03-24 RX ADMIN — Medication 250 MILLIGRAM(S): at 23:45

## 2025-03-24 RX ADMIN — Medication 1950 MILLILITER(S): at 21:20

## 2025-03-24 RX ADMIN — CEFEPIME 100 MILLIGRAM(S): 2 INJECTION, POWDER, FOR SOLUTION INTRAVENOUS at 22:05

## 2025-03-24 RX ADMIN — Medication 400 MILLIGRAM(S): at 21:20

## 2025-03-24 NOTE — ED CLERICAL - NS ED CLERK NOTE PRE-ARRIVAL INFORMATION; ADDITIONAL PRE-ARRIVAL INFORMATION

## 2025-03-24 NOTE — ED PROVIDER NOTE - PROGRESS NOTE DETAILS
KEVAN Mooney" patient seen resting comfortably in no acute distress.  Vital signs stable.  Labs acutely unremarkable.  CTA PE of chest negative for pulmonary embolism, shows concerns for pneumonia.  Will admit patient for IV antibiotics for pneumonia and further monitoring.

## 2025-03-24 NOTE — ED ADULT TRIAGE NOTE - CHIEF COMPLAINT QUOTE
c/o generalized weakness associated with fever. As per EMS, pt found to be hypoxic 80s% RA, arrives 6L via NC. Denies any chest pain, SOB or dizziness.   Hx: DM2, Dementia, CVA, HLD,

## 2025-03-24 NOTE — ED PROVIDER NOTE - CARE PLAN
Adequate: hears normal conversation without difficulty 1 none... Principal Discharge DX:	Hospital-acquired pneumonia

## 2025-03-24 NOTE — ED PROVIDER NOTE - NS ED ATTENDING STATEMENT MOD
[facility] and [address],  Sports [facility], [facility] [address] 12[address]  [address] [facility],[address]   Janet Reich  Phone: [phone]  Fax: [phone]      Physical Therapy Daily Treatment Note  Date:  2023    Patient Name:  Gladys Polanco    :  1993  MRN: [id number]  [id number]  URL: [email or web address]  12375 (typically 20 minutes face-to-face)  [] EVAL (MOD) 80173 (typically 30 minutes face-to-face)  [] EVAL (HIGH) 62808 (typically 45 minutes face-to-face)  [] RE-EVAL   [x] GO(96849) x  2   [] IONTO  [] NMR (54014) x     [x] VASO  [] Manual ([id number]) x     [] Other:  [x] TA x  2    [] Mech Traction (02466)  [] ES(attended) (82626)     [] ES (un) (24786):     This was a shared visit with the TOM. I reviewed and verified the documentation.

## 2025-03-24 NOTE — ED PROVIDER NOTE - CLINICAL SUMMARY MEDICAL DECISION MAKING FREE TEXT BOX
79-year-old female past medical history of dementia, CVA, type 2 diabetes, hypertension, glaucoma, osteoarthritis of the left knee presents to the ED as a code sepsis with aide at bedside.  Patient arrived with EMS and was found to be hypoxic to the 80s and was placed on 6 L nasal cannula with improvement.  Spoke with patient's daughter (Kaitlin) for collateral in which she states over the last week patient has been fighting cold/flu symptoms in which she has been having fevers Tmax 101, cough and overall lethargy.  Patient was seen by his doctor yesterday in which she was prescribed antibiotics but family was unable to  antibiotics at the time.  Physical exam as above,    Imrpession: will r/o viral illness vs pneumonia vs UTI    plan:    labs, CXR, supportive care    most likely admission

## 2025-03-24 NOTE — ED PROVIDER NOTE - NSICDXPASTMEDICALHX_GEN_ALL_CORE_FT
PAST MEDICAL HISTORY:  (HFpEF) heart failure with preserved ejection fraction     CVA (cerebrovascular accident)     Dementia     Diabetes mellitus, type 2     Diverticulitis     Glaucoma     HLD (hyperlipidemia)     Hypertension, unspecified type     Osteoarthritis, knee left

## 2025-03-24 NOTE — ED PROVIDER NOTE - PHYSICAL EXAMINATION
Vitals signed reviewed  General: Well appearing, NAD  HEENT: NC/AT, PERRLA, EOM intact with no pain, MMM  Neck: full ROM, no trachea deviation  Heart: RRR, normal s1/s2  Lungs: CTAB, normal WOB, no wheezing, rales, or rhonchi   Abdomen: Soft, non-distended, non-tender, no CVA tenderness , rebounding or guarding  MSK:    normal gait, full ROM with no tenderness, no swelling, redness, instability Vitals signed reviewed  General: Well appearing, NAD  HEENT: NC/AT, PERRLA, EOM intact with no pain, MMM  Neck: full ROM, no trachea deviation  Heart: RRR, normal s1/s2  Lungs: CTAB, normal WOB, no wheezing, rales, or rhonchi   Abdomen: Soft, non-distended, non-tender, no CVA tenderness , rebounding or guarding  MSK:    normal gait, full ROM with no tenderness, no swelling, redness, instability  ATTENDING PHYSICAL EXAM  GEN - No tachypnea.  RR 18.  O2 99% 2L NC currently.  Noted previously to have hypoxia.  Temp 103.  HEAD - NC/AT; EYES/NOSE - PERRL, EOMI, mucous membranes moist, no discharge; THROAT: Oral cavity and pharynx normal. No inflammation, swelling, exudate, or lesions  NECK: Neck supple, non-tender without lymphadenopathy, no masses, no JVD  PULMONARY - scattered rhonchi b/l.  CARDIAC -s1s2, RRR, no M,R,G  ABDOMEN - +NABS, ND, NT, soft, no guarding, no rebound, no masses   BACK - no CVA tenderness, No vertebral or paravertebral tenderness  EXTREMITIES - symmetric pulses, 2+ dp, capillary refill < 2 seconds, no clubbing, no cyanosis, no edema

## 2025-03-24 NOTE — ED PROVIDER NOTE - OBJECTIVE STATEMENT
79-year-old female past medical history of dementia, CVA, type 2 diabetes, hypertension, glaucoma, osteoarthritis of the left knee presents to the ED as a code sepsis with aide at bedside.  Patient arrived with EMS and was found to be hypoxic to the 80s and was placed on 6 L nasal cannula with improvement.  Spoke with patient's daughter (Kaitlin) for collateral in which she states over the last week patient has been fighting cold/flu symptoms in which she has been having fevers Tmax 101, cough and overall lethargy.  Patient was seen by his doctor yesterday in which she was prescribed antibiotics but family was unable to  antibiotics at the time. 79-year-old female past medical history of dementia, CVA, type 2 diabetes, hypertension, glaucoma, osteoarthritis of the left knee presents to the ED as a code sepsis with aide at bedside.  Patient arrived with EMS and was found to be hypoxic to the 80s and was placed on 6 L nasal cannula with improvement.  Spoke with patient's daughter (Kaitlin) for collateral in which she states over the last week patient has been fighting cold/flu symptoms in which she has been having fevers Tmax 101, cough and overall lethargy.  Patient was seen by his doctor yesterday in which she was prescribed antibiotics but family was unable to  antibiotics at the time.  Attending - Agree with above.  I evaluated patient myself. 79-year-old female  with history of dementia (AO x 1-2 at baseline), CVA, history of diverticulitis c/b perforation s/p Jania procedure (proctosigmoidectomy) on 5/21/24 with colostomy, Type 2 diabetes, hypertension, glaucoma, osteoarthritis of the left knee, recent admission for syncope, TINO and Dx of Afib with RVR;   Patient denies any complaint currently and history obtained from daughter over phone.  Reports pt having cough x 1 week with fever and generalized weakness.  MD prescribed abx yesterday but she hasn't yet taken.

## 2025-03-24 NOTE — ED ADULT NURSE NOTE - OBJECTIVE STATEMENT
Pt received to room 25. Pt is a 79 year old female with Hx of DM2, HTN, dementia, ostomy. Pt presented to ED c/o fever. Pt has aide at bedside; states she has had a cough for the past week, flu like symptoms. Rectal temperature of 102.8 upon examinations. Denies chest pain, SOB, headache, dizziness, abdominal pain, n/v/d, urinary symptoms, fevers/chills, numbness/tingling.   Pt is A&Ox3, bed bound. neuro/sensory intact. airway patent, speaking clearly in full sentences. breathing is even and unlabored. abdomen is soft, nontender, nondistended. no edema noted. skin is intact. spontaneous movement of all extremities. Placed on cardiac monitor and continuous pulse oximetry. EKG in chart. VS as noted in flowsheet. 20G IV placed to L AC, 20G IV to R hand from EMS noted; +blood return, flushes without difficulty. labs collected and sent. medications administered as ordered. awaiting imaging. comfort measures provided. stretcher set in lowest position, call bell within reach, safety maintained. Pt placed on primafit for comfort. No acute distress noted.

## 2025-03-25 ENCOUNTER — NON-APPOINTMENT (OUTPATIENT)
Age: 80
End: 2025-03-25

## 2025-03-25 DIAGNOSIS — I50.33 ACUTE ON CHRONIC DIASTOLIC (CONGESTIVE) HEART FAILURE: ICD-10-CM

## 2025-03-25 DIAGNOSIS — E11.65 TYPE 2 DIABETES MELLITUS WITH HYPERGLYCEMIA: ICD-10-CM

## 2025-03-25 DIAGNOSIS — J18.9 PNEUMONIA, UNSPECIFIED ORGANISM: ICD-10-CM

## 2025-03-25 DIAGNOSIS — A41.9 SEPSIS, UNSPECIFIED ORGANISM: ICD-10-CM

## 2025-03-25 DIAGNOSIS — I48.0 PAROXYSMAL ATRIAL FIBRILLATION: ICD-10-CM

## 2025-03-25 DIAGNOSIS — Z29.9 ENCOUNTER FOR PROPHYLACTIC MEASURES, UNSPECIFIED: ICD-10-CM

## 2025-03-25 DIAGNOSIS — R09.02 HYPOXEMIA: ICD-10-CM

## 2025-03-25 DIAGNOSIS — I10 ESSENTIAL (PRIMARY) HYPERTENSION: ICD-10-CM

## 2025-03-25 LAB
ADD ON TEST-SPECIMEN IN LAB: SIGNIFICANT CHANGE UP
ANION GAP SERPL CALC-SCNC: 13 MMOL/L — SIGNIFICANT CHANGE UP (ref 7–14)
APPEARANCE UR: CLEAR — SIGNIFICANT CHANGE UP
B PERT DNA SPEC QL NAA+PROBE: SIGNIFICANT CHANGE UP
B PERT+PARAPERT DNA PNL SPEC NAA+PROBE: SIGNIFICANT CHANGE UP
BACTERIA # UR AUTO: ABNORMAL /HPF
BASOPHILS # BLD AUTO: 0.06 K/UL — SIGNIFICANT CHANGE UP (ref 0–0.2)
BASOPHILS NFR BLD AUTO: 0.4 % — SIGNIFICANT CHANGE UP (ref 0–2)
BILIRUB UR-MCNC: NEGATIVE — SIGNIFICANT CHANGE UP
BLOOD GAS VENOUS COMPREHENSIVE RESULT: SIGNIFICANT CHANGE UP
BUN SERPL-MCNC: 9 MG/DL — SIGNIFICANT CHANGE UP (ref 7–23)
C PNEUM DNA SPEC QL NAA+PROBE: SIGNIFICANT CHANGE UP
CALCIUM SERPL-MCNC: 8.5 MG/DL — SIGNIFICANT CHANGE UP (ref 8.4–10.5)
CHLORIDE SERPL-SCNC: 104 MMOL/L — SIGNIFICANT CHANGE UP (ref 98–107)
CO2 SERPL-SCNC: 21 MMOL/L — LOW (ref 22–31)
COLOR SPEC: YELLOW — SIGNIFICANT CHANGE UP
COMMENT - URINE 2: SIGNIFICANT CHANGE UP
CREAT SERPL-MCNC: 0.66 MG/DL — SIGNIFICANT CHANGE UP (ref 0.5–1.3)
DIFF PNL FLD: NEGATIVE — SIGNIFICANT CHANGE UP
EGFR: 90 ML/MIN/1.73M2 — SIGNIFICANT CHANGE UP
EGFR: 90 ML/MIN/1.73M2 — SIGNIFICANT CHANGE UP
EOSINOPHIL # BLD AUTO: 0.16 K/UL — SIGNIFICANT CHANGE UP (ref 0–0.5)
EOSINOPHIL NFR BLD AUTO: 1.1 % — SIGNIFICANT CHANGE UP (ref 0–6)
EPI CELLS # UR: PRESENT
FLUAV SUBTYP SPEC NAA+PROBE: SIGNIFICANT CHANGE UP
FLUBV RNA SPEC QL NAA+PROBE: SIGNIFICANT CHANGE UP
GLUCOSE BLDC GLUCOMTR-MCNC: 146 MG/DL — HIGH (ref 70–99)
GLUCOSE BLDC GLUCOMTR-MCNC: 148 MG/DL — HIGH (ref 70–99)
GLUCOSE BLDC GLUCOMTR-MCNC: 150 MG/DL — HIGH (ref 70–99)
GLUCOSE BLDC GLUCOMTR-MCNC: 186 MG/DL — HIGH (ref 70–99)
GLUCOSE BLDC GLUCOMTR-MCNC: 189 MG/DL — HIGH (ref 70–99)
GLUCOSE BLDC GLUCOMTR-MCNC: 192 MG/DL — HIGH (ref 70–99)
GLUCOSE SERPL-MCNC: 198 MG/DL — HIGH (ref 70–99)
GLUCOSE UR QL: 500 MG/DL
HADV DNA SPEC QL NAA+PROBE: SIGNIFICANT CHANGE UP
HCOV 229E RNA SPEC QL NAA+PROBE: SIGNIFICANT CHANGE UP
HCOV HKU1 RNA SPEC QL NAA+PROBE: SIGNIFICANT CHANGE UP
HCOV NL63 RNA SPEC QL NAA+PROBE: SIGNIFICANT CHANGE UP
HCOV OC43 RNA SPEC QL NAA+PROBE: SIGNIFICANT CHANGE UP
HCT VFR BLD CALC: 37.5 % — SIGNIFICANT CHANGE UP (ref 34.5–45)
HGB BLD-MCNC: 12.6 G/DL — SIGNIFICANT CHANGE UP (ref 11.5–15.5)
HMPV RNA SPEC QL NAA+PROBE: SIGNIFICANT CHANGE UP
HPIV1 RNA SPEC QL NAA+PROBE: SIGNIFICANT CHANGE UP
HPIV2 RNA SPEC QL NAA+PROBE: SIGNIFICANT CHANGE UP
HPIV3 RNA SPEC QL NAA+PROBE: SIGNIFICANT CHANGE UP
HPIV4 RNA SPEC QL NAA+PROBE: SIGNIFICANT CHANGE UP
IANC: 9.88 K/UL — HIGH (ref 1.8–7.4)
IMM GRANULOCYTES NFR BLD AUTO: 0.7 % — SIGNIFICANT CHANGE UP (ref 0–0.9)
KETONES UR-MCNC: NEGATIVE MG/DL — SIGNIFICANT CHANGE UP
LEGIONELLA AG UR QL: NEGATIVE — SIGNIFICANT CHANGE UP
LEUKOCYTE ESTERASE UR-ACNC: NEGATIVE — SIGNIFICANT CHANGE UP
LYMPHOCYTES # BLD AUTO: 21.8 % — SIGNIFICANT CHANGE UP (ref 13–44)
LYMPHOCYTES # BLD AUTO: 3.13 K/UL — SIGNIFICANT CHANGE UP (ref 1–3.3)
M PNEUMO DNA SPEC QL NAA+PROBE: SIGNIFICANT CHANGE UP
MAGNESIUM SERPL-MCNC: 1.7 MG/DL — SIGNIFICANT CHANGE UP (ref 1.6–2.6)
MCHC RBC-ENTMCNC: 30.3 PG — SIGNIFICANT CHANGE UP (ref 27–34)
MCHC RBC-ENTMCNC: 33.6 G/DL — SIGNIFICANT CHANGE UP (ref 32–36)
MCV RBC AUTO: 90.1 FL — SIGNIFICANT CHANGE UP (ref 80–100)
MONOCYTES # BLD AUTO: 1.04 K/UL — HIGH (ref 0–0.9)
MONOCYTES NFR BLD AUTO: 7.2 % — SIGNIFICANT CHANGE UP (ref 2–14)
NEUTROPHILS # BLD AUTO: 9.88 K/UL — HIGH (ref 1.8–7.4)
NEUTROPHILS NFR BLD AUTO: 68.8 % — SIGNIFICANT CHANGE UP (ref 43–77)
NITRITE UR-MCNC: NEGATIVE — SIGNIFICANT CHANGE UP
NRBC # BLD AUTO: 0 K/UL — SIGNIFICANT CHANGE UP (ref 0–0)
NRBC # FLD: 0 K/UL — SIGNIFICANT CHANGE UP (ref 0–0)
NRBC BLD AUTO-RTO: 0 /100 WBCS — SIGNIFICANT CHANGE UP (ref 0–0)
NT-PROBNP SERPL-SCNC: 388 PG/ML — HIGH
PH UR: 7 — SIGNIFICANT CHANGE UP (ref 5–8)
PHOSPHATE SERPL-MCNC: 2.4 MG/DL — LOW (ref 2.5–4.5)
PLATELET # BLD AUTO: 276 K/UL — SIGNIFICANT CHANGE UP (ref 150–400)
POTASSIUM SERPL-MCNC: 3.5 MMOL/L — SIGNIFICANT CHANGE UP (ref 3.5–5.3)
POTASSIUM SERPL-SCNC: 3.5 MMOL/L — SIGNIFICANT CHANGE UP (ref 3.5–5.3)
PROT UR-MCNC: 30 MG/DL
RAPID RVP RESULT: SIGNIFICANT CHANGE UP
RBC # BLD: 4.16 M/UL — SIGNIFICANT CHANGE UP (ref 3.8–5.2)
RBC # FLD: 13.2 % — SIGNIFICANT CHANGE UP (ref 10.3–14.5)
RBC CASTS # UR COMP ASSIST: SIGNIFICANT CHANGE UP /HPF (ref 0–4)
RSV RNA SPEC QL NAA+PROBE: SIGNIFICANT CHANGE UP
RV+EV RNA SPEC QL NAA+PROBE: SIGNIFICANT CHANGE UP
SARS-COV-2 RNA SPEC QL NAA+PROBE: SIGNIFICANT CHANGE UP
SODIUM SERPL-SCNC: 138 MMOL/L — SIGNIFICANT CHANGE UP (ref 135–145)
SP GR SPEC: 1.02 — SIGNIFICANT CHANGE UP (ref 1–1.03)
TROPONIN T, HIGH SENSITIVITY RESULT: 10 NG/L — SIGNIFICANT CHANGE UP
UROBILINOGEN FLD QL: 0.2 MG/DL — SIGNIFICANT CHANGE UP (ref 0.2–1)
WBC # BLD: 14.37 K/UL — HIGH (ref 3.8–10.5)
WBC # FLD AUTO: 14.37 K/UL — HIGH (ref 3.8–10.5)
WBC UR QL: SIGNIFICANT CHANGE UP /HPF (ref 0–5)

## 2025-03-25 PROCEDURE — 74177 CT ABD & PELVIS W/CONTRAST: CPT | Mod: 26

## 2025-03-25 PROCEDURE — 71275 CT ANGIOGRAPHY CHEST: CPT | Mod: 26

## 2025-03-25 PROCEDURE — 99223 1ST HOSP IP/OBS HIGH 75: CPT

## 2025-03-25 RX ORDER — GLUCAGON 3 MG/1
1 POWDER NASAL ONCE
Refills: 0 | Status: DISCONTINUED | OUTPATIENT
Start: 2025-03-25 | End: 2025-03-31

## 2025-03-25 RX ORDER — ACETAMINOPHEN 500 MG/5ML
650 LIQUID (ML) ORAL EVERY 6 HOURS
Refills: 0 | Status: DISCONTINUED | OUTPATIENT
Start: 2025-03-25 | End: 2025-03-31

## 2025-03-25 RX ORDER — INSULIN LISPRO 100 U/ML
INJECTION, SOLUTION INTRAVENOUS; SUBCUTANEOUS
Refills: 0 | Status: DISCONTINUED | OUTPATIENT
Start: 2025-03-25 | End: 2025-03-31

## 2025-03-25 RX ORDER — CEFTRIAXONE 500 MG/1
1000 INJECTION, POWDER, FOR SOLUTION INTRAMUSCULAR; INTRAVENOUS EVERY 24 HOURS
Refills: 0 | Status: DISCONTINUED | OUTPATIENT
Start: 2025-03-25 | End: 2025-03-26

## 2025-03-25 RX ORDER — QUETIAPINE FUMARATE 25 MG/1
25 TABLET ORAL
Refills: 0 | Status: DISCONTINUED | OUTPATIENT
Start: 2025-03-25 | End: 2025-03-31

## 2025-03-25 RX ORDER — INSULIN LISPRO 100 U/ML
INJECTION, SOLUTION INTRAVENOUS; SUBCUTANEOUS AT BEDTIME
Refills: 0 | Status: DISCONTINUED | OUTPATIENT
Start: 2025-03-25 | End: 2025-03-31

## 2025-03-25 RX ORDER — FUROSEMIDE 10 MG/ML
20 INJECTION INTRAMUSCULAR; INTRAVENOUS DAILY
Refills: 0 | Status: COMPLETED | OUTPATIENT
Start: 2025-03-25 | End: 2025-03-27

## 2025-03-25 RX ORDER — ATORVASTATIN CALCIUM 80 MG/1
10 TABLET, FILM COATED ORAL AT BEDTIME
Refills: 0 | Status: DISCONTINUED | OUTPATIENT
Start: 2025-03-25 | End: 2025-03-31

## 2025-03-25 RX ORDER — MEMANTINE HYDROCHLORIDE 21 MG/1
10 CAPSULE, EXTENDED RELEASE ORAL
Refills: 0 | Status: DISCONTINUED | OUTPATIENT
Start: 2025-03-25 | End: 2025-03-31

## 2025-03-25 RX ORDER — FOLIC ACID 1 MG/1
1 TABLET ORAL DAILY
Refills: 0 | Status: DISCONTINUED | OUTPATIENT
Start: 2025-03-25 | End: 2025-03-31

## 2025-03-25 RX ORDER — DORZOLAMIDE 20 MG/ML
1 SOLUTION/ DROPS OPHTHALMIC THREE TIMES A DAY
Refills: 0 | Status: DISCONTINUED | OUTPATIENT
Start: 2025-03-25 | End: 2025-03-31

## 2025-03-25 RX ORDER — DEXTROSE 50 % IN WATER 50 %
12.5 SYRINGE (ML) INTRAVENOUS ONCE
Refills: 0 | Status: DISCONTINUED | OUTPATIENT
Start: 2025-03-25 | End: 2025-03-31

## 2025-03-25 RX ORDER — SODIUM CHLORIDE 9 G/1000ML
1000 INJECTION, SOLUTION INTRAVENOUS
Refills: 0 | Status: DISCONTINUED | OUTPATIENT
Start: 2025-03-25 | End: 2025-03-31

## 2025-03-25 RX ORDER — CITALOPRAM 20 MG/1
10 TABLET ORAL DAILY
Refills: 0 | Status: DISCONTINUED | OUTPATIENT
Start: 2025-03-25 | End: 2025-03-31

## 2025-03-25 RX ORDER — MIRTAZAPINE 30 MG/1
30 TABLET, FILM COATED ORAL AT BEDTIME
Refills: 0 | Status: DISCONTINUED | OUTPATIENT
Start: 2025-03-25 | End: 2025-03-31

## 2025-03-25 RX ORDER — AZITHROMYCIN 250 MG
500 CAPSULE ORAL ONCE
Refills: 0 | Status: COMPLETED | OUTPATIENT
Start: 2025-03-25 | End: 2025-03-25

## 2025-03-25 RX ORDER — DEXTROSE 50 % IN WATER 50 %
15 SYRINGE (ML) INTRAVENOUS ONCE
Refills: 0 | Status: DISCONTINUED | OUTPATIENT
Start: 2025-03-25 | End: 2025-03-31

## 2025-03-25 RX ORDER — MELATONIN 5 MG
3 TABLET ORAL AT BEDTIME
Refills: 0 | Status: DISCONTINUED | OUTPATIENT
Start: 2025-03-25 | End: 2025-03-31

## 2025-03-25 RX ORDER — CALCIUM CARBONATE/VITAMIN D3 500MG-5MCG
1 TABLET ORAL THREE TIMES A DAY
Refills: 0 | Status: DISCONTINUED | OUTPATIENT
Start: 2025-03-25 | End: 2025-03-31

## 2025-03-25 RX ORDER — ASPIRIN 325 MG
81 TABLET ORAL DAILY
Refills: 0 | Status: DISCONTINUED | OUTPATIENT
Start: 2025-03-25 | End: 2025-03-31

## 2025-03-25 RX ORDER — APIXABAN 2.5 MG/1
5 TABLET, FILM COATED ORAL EVERY 12 HOURS
Refills: 0 | Status: DISCONTINUED | OUTPATIENT
Start: 2025-03-25 | End: 2025-03-31

## 2025-03-25 RX ORDER — ONDANSETRON HCL/PF 4 MG/2 ML
4 VIAL (ML) INJECTION EVERY 8 HOURS
Refills: 0 | Status: DISCONTINUED | OUTPATIENT
Start: 2025-03-25 | End: 2025-03-31

## 2025-03-25 RX ORDER — NIFEDIPINE 30 MG
30 TABLET, EXTENDED RELEASE 24 HR ORAL DAILY
Refills: 0 | Status: DISCONTINUED | OUTPATIENT
Start: 2025-03-25 | End: 2025-03-26

## 2025-03-25 RX ORDER — DEXTROSE 50 % IN WATER 50 %
25 SYRINGE (ML) INTRAVENOUS ONCE
Refills: 0 | Status: DISCONTINUED | OUTPATIENT
Start: 2025-03-25 | End: 2025-03-31

## 2025-03-25 RX ORDER — DONEPEZIL HYDROCHLORIDE 5 MG/1
10 TABLET ORAL AT BEDTIME
Refills: 0 | Status: DISCONTINUED | OUTPATIENT
Start: 2025-03-25 | End: 2025-03-27

## 2025-03-25 RX ORDER — AZITHROMYCIN 250 MG
CAPSULE ORAL
Refills: 0 | Status: DISCONTINUED | OUTPATIENT
Start: 2025-03-25 | End: 2025-03-25

## 2025-03-25 RX ORDER — METOPROLOL SUCCINATE 50 MG/1
25 TABLET, EXTENDED RELEASE ORAL DAILY
Refills: 0 | Status: DISCONTINUED | OUTPATIENT
Start: 2025-03-25 | End: 2025-03-26

## 2025-03-25 RX ADMIN — Medication 3 MILLIGRAM(S): at 21:18

## 2025-03-25 RX ADMIN — INSULIN LISPRO 1: 100 INJECTION, SOLUTION INTRAVENOUS; SUBCUTANEOUS at 06:47

## 2025-03-25 RX ADMIN — Medication 650 MILLIGRAM(S): at 17:21

## 2025-03-25 RX ADMIN — Medication 81 MILLIGRAM(S): at 09:27

## 2025-03-25 RX ADMIN — MEMANTINE HYDROCHLORIDE 10 MILLIGRAM(S): 21 CAPSULE, EXTENDED RELEASE ORAL at 17:23

## 2025-03-25 RX ADMIN — FOLIC ACID 1 MILLIGRAM(S): 1 TABLET ORAL at 09:27

## 2025-03-25 RX ADMIN — Medication 1 TABLET(S): at 09:27

## 2025-03-25 RX ADMIN — Medication 40 MILLIGRAM(S): at 06:50

## 2025-03-25 RX ADMIN — MIRTAZAPINE 30 MILLIGRAM(S): 30 TABLET, FILM COATED ORAL at 21:17

## 2025-03-25 RX ADMIN — QUETIAPINE FUMARATE 25 MILLIGRAM(S): 25 TABLET ORAL at 17:23

## 2025-03-25 RX ADMIN — INSULIN LISPRO 1: 100 INJECTION, SOLUTION INTRAVENOUS; SUBCUTANEOUS at 17:22

## 2025-03-25 RX ADMIN — Medication 650 MILLIGRAM(S): at 14:01

## 2025-03-25 RX ADMIN — APIXABAN 5 MILLIGRAM(S): 2.5 TABLET, FILM COATED ORAL at 17:23

## 2025-03-25 RX ADMIN — DONEPEZIL HYDROCHLORIDE 10 MILLIGRAM(S): 5 TABLET ORAL at 21:18

## 2025-03-25 RX ADMIN — Medication 500 MILLIGRAM(S): at 05:28

## 2025-03-25 RX ADMIN — Medication 1 TABLET(S): at 21:17

## 2025-03-25 RX ADMIN — CITALOPRAM 10 MILLIGRAM(S): 20 TABLET ORAL at 14:02

## 2025-03-25 RX ADMIN — METOPROLOL SUCCINATE 25 MILLIGRAM(S): 50 TABLET, EXTENDED RELEASE ORAL at 06:49

## 2025-03-25 RX ADMIN — ATORVASTATIN CALCIUM 10 MILLIGRAM(S): 80 TABLET, FILM COATED ORAL at 21:17

## 2025-03-25 RX ADMIN — DORZOLAMIDE 1 DROP(S): 20 SOLUTION/ DROPS OPHTHALMIC at 14:02

## 2025-03-25 RX ADMIN — Medication 1000 MILLILITER(S): at 03:49

## 2025-03-25 RX ADMIN — FUROSEMIDE 20 MILLIGRAM(S): 10 INJECTION INTRAMUSCULAR; INTRAVENOUS at 06:55

## 2025-03-25 RX ADMIN — DORZOLAMIDE 1 DROP(S): 20 SOLUTION/ DROPS OPHTHALMIC at 21:18

## 2025-03-25 RX ADMIN — CEFTRIAXONE 100 MILLIGRAM(S): 500 INJECTION, POWDER, FOR SOLUTION INTRAMUSCULAR; INTRAVENOUS at 09:27

## 2025-03-25 NOTE — ED ADULT NURSE REASSESSMENT NOTE - NS ED NURSE REASSESS COMMENT FT1
Pt awake and alert, A&OX3, respirations even and unlabored. Denies chest pain, SOB, headache, dizziness, palpitations, blurry vision. Resting comfortably. Remains on cardiac monitor, NSR &  100% on 6L NC. Fluid bolus hung per orders. No acute distress noted at this time. Aide remains at bedside. Safety and comfort maintained. Awaiting admission.

## 2025-03-25 NOTE — H&P ADULT - HISTORY OF PRESENT ILLNESS
79-year-old female with  medical history of dementia, CVA, Type 2 diabetes, hypertension, glaucoma, osteoarthritis of the left knee presents to the ED as a code sepsis with aide at bedside.  Patient arrived with EMS and was found to be hypoxic to the 80s and was placed on 6 L nasal cannula with improvement.  Spoke with patient's daughter (Kaitlin) for collateral in which she states over the last week patient has been fighting cold/flu symptoms in which she has been having fevers Tmax 101, cough and overall lethargy.  Patient was seen by his doctor yesterday in which she was prescribed antibiotics but family was unable to  antibiotics at the time. 79-year-old female with  medical history of dementia, CVA, Type 2 diabetes, hypertension, glaucoma, osteoarthritis of the left knee, recent admission for syncope and Dx for Afib with RVR; Pt  presents to the ED as a code sepsis with aide at bedside.  Patient arrived with EMS and was found to be hypoxic to the 80s and was placed on 6 L nasal cannula with improvement.  Spoke with patient's daughter (Kaitlin) for collateral in which she states over the last week patient has been fighting cold/flu symptoms in which she has been having fevers Tmax 101, cough and overall lethargy.  Patient was seen by his doctor yesterday in which she was prescribed antibiotics but family was unable to  antibiotics at the time. 79-year-old female with  medical history of dementia, CVA, Type 2 diabetes, hypertension, glaucoma, osteoarthritis of the left knee, recent admission for syncope, TINO and Dx of Afib with RVR; Pt  presents to the ED as a code sepsis with aide at bedside.  Patient arrived with EMS and was found to be hypoxic to the 80s and was placed on 6 L nasal cannula with improvement.  Spoke with patient's daughter (Kaitlin) for collateral in which she states over the last week patient has been fighting cold/flu symptoms in which she has been having fevers Tmax 101, cough and overall lethargy.  Patient was seen by his doctor yesterday in which she was prescribed antibiotics but family was unable to  antibiotics at the time. 79-year-old female, ambulatory with assistance,  with  medical history of dementia (AO x 1-2 at baseline), CVA, history of diverticulitis c/b perforation s/p Jania procedure (proctosigmoidectomy) on 5/21/24 with colostomy, Type 2 diabetes, hypertension, glaucoma, osteoarthritis of the left knee, recent admission for syncope, TINO and Dx of Afib with RVR; Pt  presents to the ED as a code sepsis with aide at bedside.  Patient arrived with EMS and was found to be hypoxic to the 80s and was placed on 6 L nasal cannula with improvement.  Spoke with patient's daughter (Kaitlin) for collateral in which she states over the last week patient has been fighting cold/flu symptoms in which she has been having fevers Tmax 101, cough and overall lethargy.  Patient was seen by his doctor yesterday in which she was prescribed antibiotics but family was unable to  antibiotics at the time. Prednisone Pregnancy And Lactation Text: This medication is Pregnancy Category C and it isn't know if it is safe during pregnancy. This medication is excreted in breast milk. 79-year-old female, ambulatory with assistance,  with  medical history of dementia (AO x 1-2 at baseline), CVA, history of diverticulitis c/b perforation s/p Jania procedure (proctosigmoidectomy) on 5/21/24 with colostomy, Type 2 diabetes, hypertension, glaucoma, osteoarthritis of the left knee, recent admission for syncope, TINO and Dx of Afib with RVR; Pt  presents to the ED as a code sepsis with aide at bedside.  Patient arrived with EMS and was found to be hypoxic to the 80s and was placed on 6 L nasal cannula with improvement.  Spoke with patient's daughter (Kaitlin) for collateral in which she states over the last week patient has been fighting cold/flu symptoms in which she has been having fevers Tmax 101, cough and overall lethargy.  Patient was seen by his doctor yesterday in which she was prescribed antibiotics but family was unable to  antibiotics at the time.    ED course prior to admission to Medicine: Tmax 103.1, s/o Cefepime IV 2g and Vancomycin IV, s/p 3L NS

## 2025-03-25 NOTE — H&P ADULT - ASSESSMENT
No pulmonary embolism.  Mild smooth interlobular septal thickening likely reflective of mild   interstitial edema. Trace bilateral pleural effusions (left greater than   right).  Left apical consolidative opacity and a few patchy peripheral nodular   opacities which raises concern for pneumonia.  Cardiomegaly with reflux of contrast into the intrahepatic IVC and   hepatic veins suggestive of right heart failure.  No acute intra-abdominal or pelvic findings.  Colonic diverticulosis without evidence of diverticulitis. 79-year-old female, ambulatory with assistance,  with  medical history of dementia (AO x 1-2 at baseline), CVA, history of diverticulitis c/b perforation s/p Jania procedure (proctosigmoidectomy) on 5/21/24 with colostomy, Type 2 diabetes, hypertension, glaucoma, osteoarthritis of the left knee, recent admission for syncope, TINO and Dx of Afib with RVR; Pt a/w sepsis due to PNA c/b hypoxemia, and acute on chronic dCHF and RHF c/b pulmonary edema.     CONCLUSIONS:      1. The left ventricular cavity is normal in size. Left ventricular wall thickness is mildly increased. Left ventricular systolic function is normal with a calculated ejection fraction of 68 % by the Norton's biplane method of disks. There are no regional wall motion abnormalities seen. Mild left ventricular hypertrophy. There is increased LV mass and concentric hypertrophy. There is mild (grade 1) left ventricular diastolic dysfunction.   2. Normal right ventricular cavity size and normal right ventricular systolic function. Tricuspid annular plane systolic excursion (TAPSE) is 2.2 cm (normal >=1.7 cm).   3. Structurally normal mitral valve with normal leaflet excursion. There is calcification of the mitral valve annulus. There is trace mitral regurgitation.    No pulmonary embolism.  Mild smooth interlobular septal thickening likely reflective of mild   interstitial edema. Trace bilateral pleural effusions (left greater than   right).  Left apical consolidative opacity and a few patchy peripheral nodular   opacities which raises concern for pneumonia.  Cardiomegaly with reflux of contrast into the intrahepatic IVC and   hepatic veins suggestive of right heart failure.  No acute intra-abdominal or pelvic findings.  Colonic diverticulosis without evidence of diverticulitis.

## 2025-03-25 NOTE — H&P ADULT - NSHPLABSRESULTS_GEN_ALL_CORE
.    -Personally INTERPRETED EKG: NSR bpm, QTc=, no acute Tw or ST changes, no PACs or PVCs    -Personally INTERPRETED CXR: vascular congestion, no focal consolidation, no pneumothorax or pleural effusions    -Personally reviewed the following labs below:                        12.6   15.58 )-----------( 282      ( 24 Mar 2025 21:40 )             37.9   03-24    137  |  104  |  15  ----------------------------<  179[H]  4.0   |  19[L]  |  0.67    Ca    8.7      24 Mar 2025 21:40    TPro  7.6  /  Alb  3.4  /  TBili  1.2  /  DBili  x   /  AST  16  /  ALT  9   /  AlkPhos  112  03-24      SARS-CoV-2 Result: NotDetec (03-24-25 @ 21:40)  Influenza A Result: NotDetec (03-24-25 @ 21:40)  Influenza B Result: NotDetec (03-24-25 @ 21:40)  Resp Syn Virus Result: NotDetec (03-24-25 @ 21:40)        CT ABDOMEN AND PELVIS IC   ORDERED BY: ÁNGEL GUILLORY   CT ANGIO CHEST PULM ART [facility]   ORDERED BY: ÁNGEL GUILLORY   PROCEDURE DATE:  03/25/2025      FINDINGS:  CHEST:  LUNGS AND LARGE AIRWAYS: Patent central airways. Left apical   consolidative opacity.. Additional bilateral scattered peripheral nodular   and tree-in-bud opacities similar to prior exam. Mild smooth interlobular   septal thickening. Lingular subsegmental atelectasis. Bibasilar   subsegmental atelectasis.  PLEURA: Trace bilateral pleural effusions (left greater than right).  VESSELS: No main, right main, left main, lobar or segmental pulmonary   embolism. Limited evaluation of subsegmental pulmonary arteries secondary   to motion and mixing artifact. Main pulmonary arteries not dilated.   Thoracic aorta is normal in caliber. Atherosclerotic calcifications of   the thoracic aorta and coronary arteries.  HEART: Cardiomegaly. No pericardial effusion. Reflux of contrast into the   intrahepatic IVC and hepatic veins.  MEDIASTINUM AND OLIVE: No lymphadenopathy.  CHEST WALL AND LOWER NECK: Enlarged heterogenous thyroid containing   multiple nodules.  ABDOMEN AND PELVIS:  LIVER: Within normal limits.  BILE DUCTS: Normal caliber.  GALLBLADDER: Cholecystectomy.  SPLEEN: Within normal limits.  PANCREAS: Within normal limits.  ADRENALS: Within normal limits.  KIDNEYS/URETERS: Kidneys enhance symmetrically without hydronephrosis.   Right renal cyst.  BLADDER: Within normal limits.  REPRODUCTIVE ORGANS: Hysterectomy.  BOWEL: No bowel obstruction. Appendix is normal. Status post partial   colectomy with Jania's pouch and left lower quadrant colostomy.   Colonic diverticulosis without evidence of diverticulitis.  PERITONEUM/RETROPERITONEUM: No ascites, pneumoperitoneum, or loculated   collection.  VESSELS: Atherosclerotic calcifications of the aortoiliac tree.   Left-sided IVC.  LYMPH NODES: No lymphadenopathy.  ABDOMINAL WALL: Postsurgical changes. Small fat-containing umbilical   hernia. Small fat-containing parastomal hernia.  BONES: Chronic right-sided rib fractures. Chronic appearing mild   compression deformity of L2. Degenerative changes. Grade 2   anterolisthesis of L5 on S1 similar to prior exam. Bilateral L5 pars   defects.  IMPRESSION:  No pulmonary embolism.  Mild smooth interlobular septal thickening likely reflective of mild   interstitial edema. Trace bilateral pleural effusions (left greater than   right).  Left apical consolidative opacity and a few patchy peripheral nodular   opacities which raises concern for pneumonia.  Cardiomegaly with reflux of contrast into the intrahepatic IVC and   hepatic veins suggestive of right heart failure.  No acute intra-abdominal or pelvic findings.  Colonic diverticulosis without evidence of diverticulitis. .    -Personally INTERPRETED EKG: NSR bpm, QTc=, no acute Tw or ST changes, no PACs or PVCs    -Personally INTERPRETED CXR: vascular congestion, no focal consolidation, no pneumothorax or pleural effusions    -Personally reviewed the following labs below:                        12.6   15.58 )-----------( 282      ( 24 Mar 2025 21:40 )             37.9   03-24    137  |  104  |  15  ----------------------------<  179[H]  4.0   |  19[L]  |  0.67    Ca    8.7      24 Mar 2025 21:40    TPro  7.6  /  Alb  3.4  /  TBili  1.2  /  DBili  x   /  AST  16  /  ALT  9   /  AlkPhos  112  03-24      SARS-CoV-2 Result: NotDetec (03-24-25 @ 21:40)  Influenza A Result: NotDetec (03-24-25 @ 21:40)  Influenza B Result: NotDetec (03-24-25 @ 21:40)  Resp Syn Virus Result: NotDetec (03-24-25 @ 21:40)      -Personally reviewed:  CT ABDOMEN AND PELVIS IC   ORDERED BY: ÁNGEL GUILLORY   CT ANGIO CHEST PULM ART [facility]   ORDERED BY: ÁNGEL GUILLORY   PROCEDURE DATE:  03/25/2025      IMPRESSION:  No pulmonary embolism.  Mild smooth interlobular septal thickening likely reflective of mild   interstitial edema. Trace bilateral pleural effusions (left greater than   right).  Left apical consolidative opacity and a few patchy peripheral nodular   opacities which raises concern for pneumonia.  Cardiomegaly with reflux of contrast into the intrahepatic IVC and   hepatic veins suggestive of right heart failure.  No acute intra-abdominal or pelvic findings.  Colonic diverticulosis without evidence of diverticulitis.      -Personally reviewed: TTE 2/24/25  CONCLUSIONS:   1. The left ventricular cavity is normal in size. Left ventricular wall thickness is mildly increased. Left ventricular systolic function is normal with a calculated ejection fraction of 68 % by the Norton's biplane method of disks. There are no regional wall motion abnormalities seen. Mild left ventricular hypertrophy. There is increased LV mass and concentric hypertrophy. There is mild (grade 1) left ventricular diastolic dysfunction.   2. Normal right ventricular cavity size and normal right ventricular systolic function. Tricuspid annular plane systolic excursion (TAPSE) is 2.2 cm (normal >=1.7 cm).   3. Structurally normal mitral valve with normal leaflet excursion. There is calcification of the mitral valve annulus. There is trace mitral regurgitation. .    -Personally INTERPRETED EKG: NSR NSR bpm, MVv=710, no acute Tw or ST changes, no PACs or PVCs    -Personally INTERPRETED CXR: vascular congestion, no focal consolidation, no pneumothorax or pleural effusions    -Personally reviewed the following labs below:                        12.6   15.58 )-----------( 282      ( 24 Mar 2025 21:40 )             37.9   03-24    137  |  104  |  15  ----------------------------<  179[H]  4.0   |  19[L]  |  0.67    Ca    8.7      24 Mar 2025 21:40    TPro  7.6  /  Alb  3.4  /  TBili  1.2  /  DBili  x   /  AST  16  /  ALT  9   /  AlkPhos  112  03-24      SARS-CoV-2 Result: NotDetec (03-24-25 @ 21:40)  Influenza A Result: NotDetec (03-24-25 @ 21:40)  Influenza B Result: NotDetec (03-24-25 @ 21:40)  Resp Syn Virus Result: NotDetec (03-24-25 @ 21:40)      -Personally reviewed:  CT ABDOMEN AND PELVIS IC   ORDERED BY: ÁNGEL GUILLORY   CT ANGIO CHEST PULM ART [facility]   ORDERED BY: ÁNGEL GUILLORY   PROCEDURE DATE:  03/25/2025      IMPRESSION:  No pulmonary embolism.  Mild smooth interlobular septal thickening likely reflective of mild   interstitial edema. Trace bilateral pleural effusions (left greater than   right).  Left apical consolidative opacity and a few patchy peripheral nodular   opacities which raises concern for pneumonia.  Cardiomegaly with reflux of contrast into the intrahepatic IVC and   hepatic veins suggestive of right heart failure.  No acute intra-abdominal or pelvic findings.  Colonic diverticulosis without evidence of diverticulitis.      -Personally reviewed: TTE 2/24/25  CONCLUSIONS:   1. The left ventricular cavity is normal in size. Left ventricular wall thickness is mildly increased. Left ventricular systolic function is normal with a calculated ejection fraction of 68 % by the Norton's biplane method of disks. There are no regional wall motion abnormalities seen. Mild left ventricular hypertrophy. There is increased LV mass and concentric hypertrophy. There is mild (grade 1) left ventricular diastolic dysfunction.   2. Normal right ventricular cavity size and normal right ventricular systolic function. Tricuspid annular plane systolic excursion (TAPSE) is 2.2 cm (normal >=1.7 cm).   3. Structurally normal mitral valve with normal leaflet excursion. There is calcification of the mitral valve annulus. There is trace mitral regurgitation. .    -Personally INTERPRETED EKG: NSR NSR bpm, NNj=298, no acute Tw or ST changes, no PACs or PVCs    -Personally INTERPRETED CXR: vascular congestion, no focal consolidation, no pneumothorax or pleural effusions    -Personally reviewed the following labs below:                        12.6   15.58 )-----------( 282      ( 24 Mar 2025 21:40 )             37.9   03-24    137  |  104  |  15  ----------------------------<  179[H]  4.0   |  19[L]  |  0.67    Ca    8.7      24 Mar 2025 21:40    TPro  7.6  /  Alb  3.4  /  TBili  1.2  /  DBili  x   /  AST  16  /  ALT  9   /  AlkPhos  112  03-24      SARS-CoV-2 Result: NotDetec (03-24-25 @ 21:40)  Influenza A Result: NotDetec (03-24-25 @ 21:40)  Influenza B Result: NotDetec (03-24-25 @ 21:40)  Resp Syn Virus Result: NotDetec (03-24-25 @ 21:40)      -Personally reviewed:  CT ABDOMEN AND PELVIS IC   ORDERED BY: ÁNGEL GUILLORY   CT ANGIO CHEST PULM ART [facility]   ORDERED BY: ÁNGEL UGILLORY   PROCEDURE DATE:  03/25/2025    79-year-old female, ambulatory with assistance,  with  medical history of dementia (AO x 1-2 at baseline), CVA, history of diverticulitis c/b perforation s/p Jania procedure (proctosigmoidectomy) on 5/21/24 with colostomy, Type 2 diabetes, hypertension, glaucoma, osteoarthritis of the left knee, recent admission for syncope, TINO and Dx of Afib with RVR a/w sepsis 2/2 PNA and acute on chronic       IMPRESSION:  No pulmonary embolism.  Mild smooth interlobular septal thickening likely reflective of mild   interstitial edema. Trace bilateral pleural effusions (left greater than   right).  Left apical consolidative opacity and a few patchy peripheral nodular   opacities which raises concern for pneumonia.  Cardiomegaly with reflux of contrast into the intrahepatic IVC and   hepatic veins suggestive of right heart failure.  No acute intra-abdominal or pelvic findings.  Colonic diverticulosis without evidence of diverticulitis.      -Personally reviewed: TTE 2/24/25  CONCLUSIONS:   1. The left ventricular cavity is normal in size. Left ventricular wall thickness is mildly increased. Left ventricular systolic function is normal with a calculated ejection fraction of 68 % by the Norton's biplane method of disks. There are no regional wall motion abnormalities seen. Mild left ventricular hypertrophy. There is increased LV mass and concentric hypertrophy. There is mild (grade 1) left ventricular diastolic dysfunction.   2. Normal right ventricular cavity size and normal right ventricular systolic function. Tricuspid annular plane systolic excursion (TAPSE) is 2.2 cm (normal >=1.7 cm).   3. Structurally normal mitral valve with normal leaflet excursion. There is calcification of the mitral valve annulus. There is trace mitral regurgitation.

## 2025-03-25 NOTE — H&P ADULT - PROBLEM SELECTOR PLAN 1
Due to PNA  Fwoy=858.1; Leukocytosis 15K; RR=22-20s    -VS q4h   -f/u BCx  -Azithromycin, CXT  -f/u Legionella Ag, Strep Ag  -f/u VBG, LA in AM

## 2025-03-25 NOTE — H&P ADULT - PROBLEM SELECTOR PLAN 6
Fall, aspiration precautions  Conservative consistency diet  HOB elevation   c/w Mirtazapine, Seroquel, Namenda, Citalopram, Donepezil  EKG: Qtc wnl

## 2025-03-25 NOTE — CONSULT NOTE ADULT - ASSESSMENT
79-year-old female, ambulatory with assistance,  with  medical history of dementia (AO x 1-2 at baseline), CVA, history of diverticulitis c/b perforation s/p Jania procedure (proctosigmoidectomy) on 5/21/24 with colostomy, Type 2 diabetes, hypertension, glaucoma, osteoarthritis of the left knee, recent admission for syncope, TINO and Dx of Afib with RVR; Pt  presents to the ED as a code sepsis with aide at bedside.  Patient arrived with EMS and was found to be hypoxic to the 80s and was placed on 6 L nasal cannula with improvement.  Spoke with patient's daughter (Kaitlin) for collateral in which she states over the last week patient has been fighting cold/flu symptoms in which she has been having fevers Tmax 101, cough and overall lethargy.  Patient was seen by his doctor yesterday in which she was prescribed antibiotics but family was unable to  antibiotics at the time.  nephrology consulted for acidosis      acidosis  mild   non AG  monitor for now  can start na bicarb 650 bid if CO2<18    HTN  controlled   MOnitor     fluid overload  had congestion on xray  currently on lasix 20   f/u cardio   79-year-old female, ambulatory with assistance,  with  medical history of dementia (AO x 1-2 at baseline), CVA, history of diverticulitis c/b perforation s/p Jania procedure (proctosigmoidectomy) on 5/21/24 with colostomy, Type 2 diabetes, hypertension, glaucoma, osteoarthritis of the left knee, recent admission for syncope, TINO and Dx of Afib with RVR; Pt  presents to the ED as a code sepsis with aide at bedside.  Patient arrived with EMS and was found to be hypoxic to the 80s and was placed on 6 L nasal cannula with improvement.  Spoke with patient's daughter (Kaitlin) for collateral in which she states over the last week patient has been fighting cold/flu symptoms in which she has been having fevers Tmax 101, cough and overall lethargy.  Patient was seen by his doctor yesterday in which she was prescribed antibiotics but family was unable to  antibiotics at the time.  nephrology consulted for acidosis      acidosis  mild   non AG  monitor for now on diuretics   can start na bicarb 650 bid if CO2<18    HTN  controlled   MOnitor     fluid overload  had congestion on xray  currently on lasix 20   f/u cardio

## 2025-03-25 NOTE — H&P ADULT - PROBLEM SELECTOR PLAN 2
CTA chest: Left apical consolidative opacity and a few patchy peripheral nodular opacities which raises concern for pneumonia.  Suspect post-viral infection  RVP full negative    -Azotomycin IV, Ceftriaxone   -f/u Legionella Ag, Strep Ag  -f/u BCx  -Ordered sputum Cx

## 2025-03-25 NOTE — CONSULT NOTE ADULT - ASSESSMENT
78 y/o F PMhx dementia (AO x 1-2 at baseline), CVA, history of diverticulitis c/b perforation s/p Jania procedure (proctosigmoidectomy) on 5/21/24 with colostomy, DM II, HTN, glaucoma, recent admission for syncope, TINO, Afib w/ RVR who presented with hypoxia    PNA, acute hypoxic resp failure  sepsis- fever, leukocytosis  RVP negative  UA negative  CT- No pulmonary embolism. Left apical consolidative opacity and a few patchy peripheral nodular opacities which raises concern for pneumonia. Cardiomegaly with reflux of contrast into the intrahepatic IVC and hepatic veins suggestive of right heart failure. No acute intra-abdominal or pelvic findings.    Recommendations  c/w ceftriaxone 1g daily  c/w azithromycin 500mg daily x 3 days  - if urine legionella Ag negative, discontinue azithromycin  f/u blood cultures  f/u strep pneumoniae urine Ag  trend temps, wbc    Geovani Goldman M.D.  [facility]  [phone]  After 5pm on weekdays and all day on weekends - please call [phone]  Available on [employer] teams    Thank you for consulting us and involving us in the management of this patients case. In addition to reviewing history, imaging, documents, labs, microbiology, took into account antibiotic stewardship, local antibiogram and infection control strategies and potential transmission issues.

## 2025-03-25 NOTE — CONSULT NOTE ADULT - ASSESSMENT
Hypoxia  sob  CT c/w PNA    abx  o2  fu with ID/Pulm    PAF  cont eliquis  cont metoprolol     screen for heart disease  monitor qtc on zithromax plus seroquel and remeron     HTN  stable  cont current meds

## 2025-03-25 NOTE — H&P ADULT - NSHPPHYSICALEXAM_GEN_ALL_CORE
Vital Signs Last 24 Hrs  T(C): 36.2 (25 Mar 2025 03:28), Max: 39.5 (24 Mar 2025 20:18)  T(F): 97.2 (25 Mar 2025 03:28), Max: 103.1 (24 Mar 2025 20:18)  HR: 60 (25 Mar 2025 03:28) (60 - 87)  BP: 117/62 (25 Mar 2025 03:28) (100/59 - 145/76)  BP(mean): 77 (25 Mar 2025 03:28) (68 - 77)  RR: 21 (25 Mar 2025 03:28) (16 - 22)  SpO2: 98% (25 Mar 2025 03:28) (95% - 99%)    Parameters below as of 25 Mar 2025 03:28  Patient On (Oxygen Delivery Method): nasal cannula  O2 Flow (L/min): 6

## 2025-03-25 NOTE — H&P ADULT - PROBLEM SELECTOR PLAN 4
Multifactorial due to PN and pulmonary edema   No clinical evidence of bronchoconstriction   hypoxemic 80s% RA, arrived on 6L O2 via NC  CTA chest: No pulmonary embolism.    -VS q4h with pulse-ox  -  -Supp O2 NC 3L  -Treat CHF and PNA as above  -f/u VBG in AM

## 2025-03-26 LAB
ANION GAP SERPL CALC-SCNC: 12 MMOL/L — SIGNIFICANT CHANGE UP (ref 7–14)
BUN SERPL-MCNC: 10 MG/DL — SIGNIFICANT CHANGE UP (ref 7–23)
CALCIUM SERPL-MCNC: 8.9 MG/DL — SIGNIFICANT CHANGE UP (ref 8.4–10.5)
CHLORIDE SERPL-SCNC: 104 MMOL/L — SIGNIFICANT CHANGE UP (ref 98–107)
CO2 SERPL-SCNC: 23 MMOL/L — SIGNIFICANT CHANGE UP (ref 22–31)
CREAT SERPL-MCNC: 0.76 MG/DL — SIGNIFICANT CHANGE UP (ref 0.5–1.3)
EGFR: 81 ML/MIN/1.73M2 — SIGNIFICANT CHANGE UP
EGFR: 81 ML/MIN/1.73M2 — SIGNIFICANT CHANGE UP
GLUCOSE BLDC GLUCOMTR-MCNC: 157 MG/DL — HIGH (ref 70–99)
GLUCOSE BLDC GLUCOMTR-MCNC: 169 MG/DL — HIGH (ref 70–99)
GLUCOSE BLDC GLUCOMTR-MCNC: 187 MG/DL — HIGH (ref 70–99)
GLUCOSE BLDC GLUCOMTR-MCNC: 202 MG/DL — HIGH (ref 70–99)
GLUCOSE SERPL-MCNC: 164 MG/DL — HIGH (ref 70–99)
HCT VFR BLD CALC: 35.3 % — SIGNIFICANT CHANGE UP (ref 34.5–45)
HGB BLD-MCNC: 11.9 G/DL — SIGNIFICANT CHANGE UP (ref 11.5–15.5)
MAGNESIUM SERPL-MCNC: 2.6 MG/DL — SIGNIFICANT CHANGE UP (ref 1.6–2.6)
MCHC RBC-ENTMCNC: 30.5 PG — SIGNIFICANT CHANGE UP (ref 27–34)
MCHC RBC-ENTMCNC: 33.7 G/DL — SIGNIFICANT CHANGE UP (ref 32–36)
MCV RBC AUTO: 90.5 FL — SIGNIFICANT CHANGE UP (ref 80–100)
NRBC # BLD AUTO: 0 K/UL — SIGNIFICANT CHANGE UP (ref 0–0)
NRBC # FLD: 0 K/UL — SIGNIFICANT CHANGE UP (ref 0–0)
NRBC BLD AUTO-RTO: 0 /100 WBCS — SIGNIFICANT CHANGE UP (ref 0–0)
PHOSPHATE SERPL-MCNC: 3.2 MG/DL — SIGNIFICANT CHANGE UP (ref 2.5–4.5)
PLATELET # BLD AUTO: 299 K/UL — SIGNIFICANT CHANGE UP (ref 150–400)
POTASSIUM SERPL-MCNC: 3.2 MMOL/L — LOW (ref 3.5–5.3)
POTASSIUM SERPL-SCNC: 3.2 MMOL/L — LOW (ref 3.5–5.3)
RBC # BLD: 3.9 M/UL — SIGNIFICANT CHANGE UP (ref 3.8–5.2)
RBC # FLD: 13.2 % — SIGNIFICANT CHANGE UP (ref 10.3–14.5)
SODIUM SERPL-SCNC: 139 MMOL/L — SIGNIFICANT CHANGE UP (ref 135–145)
WBC # BLD: 15.57 K/UL — HIGH (ref 3.8–10.5)
WBC # FLD AUTO: 15.57 K/UL — HIGH (ref 3.8–10.5)

## 2025-03-26 PROCEDURE — 93010 ELECTROCARDIOGRAM REPORT: CPT

## 2025-03-26 RX ORDER — PIPERACILLIN-TAZO-DEXTROSE,ISO 3.375G/5
3.38 IV SOLUTION, PIGGYBACK PREMIX FROZEN(ML) INTRAVENOUS ONCE
Refills: 0 | Status: COMPLETED | OUTPATIENT
Start: 2025-03-26 | End: 2025-03-26

## 2025-03-26 RX ORDER — PIPERACILLIN-TAZO-DEXTROSE,ISO 3.375G/5
3.38 IV SOLUTION, PIGGYBACK PREMIX FROZEN(ML) INTRAVENOUS EVERY 8 HOURS
Refills: 0 | Status: DISCONTINUED | OUTPATIENT
Start: 2025-03-27 | End: 2025-03-31

## 2025-03-26 RX ORDER — NADOLOL 80 MG
20 TABLET ORAL DAILY
Refills: 0 | Status: DISCONTINUED | OUTPATIENT
Start: 2025-03-26 | End: 2025-03-27

## 2025-03-26 RX ORDER — MAGNESIUM SULFATE 500 MG/ML
2 SYRINGE (ML) INJECTION ONCE
Refills: 0 | Status: COMPLETED | OUTPATIENT
Start: 2025-03-26 | End: 2025-03-26

## 2025-03-26 RX ORDER — ACETAMINOPHEN 500 MG/5ML
1000 LIQUID (ML) ORAL ONCE
Refills: 0 | Status: COMPLETED | OUTPATIENT
Start: 2025-03-26 | End: 2025-03-26

## 2025-03-26 RX ORDER — NIFEDIPINE 30 MG
30 TABLET, EXTENDED RELEASE 24 HR ORAL DAILY
Refills: 0 | Status: DISCONTINUED | OUTPATIENT
Start: 2025-03-26 | End: 2025-03-31

## 2025-03-26 RX ORDER — METOPROLOL SUCCINATE 50 MG/1
5 TABLET, EXTENDED RELEASE ORAL ONCE
Refills: 0 | Status: COMPLETED | OUTPATIENT
Start: 2025-03-26 | End: 2025-03-26

## 2025-03-26 RX ORDER — PIPERACILLIN-TAZO-DEXTROSE,ISO 3.375G/5
3.38 IV SOLUTION, PIGGYBACK PREMIX FROZEN(ML) INTRAVENOUS ONCE
Refills: 0 | Status: COMPLETED | OUTPATIENT
Start: 2025-03-27 | End: 2025-03-26

## 2025-03-26 RX ADMIN — Medication 20 MILLIGRAM(S): at 17:17

## 2025-03-26 RX ADMIN — DORZOLAMIDE 1 DROP(S): 20 SOLUTION/ DROPS OPHTHALMIC at 13:36

## 2025-03-26 RX ADMIN — Medication 25 GRAM(S): at 18:38

## 2025-03-26 RX ADMIN — DORZOLAMIDE 1 DROP(S): 20 SOLUTION/ DROPS OPHTHALMIC at 06:00

## 2025-03-26 RX ADMIN — MEMANTINE HYDROCHLORIDE 10 MILLIGRAM(S): 21 CAPSULE, EXTENDED RELEASE ORAL at 17:18

## 2025-03-26 RX ADMIN — INSULIN LISPRO 1: 100 INJECTION, SOLUTION INTRAVENOUS; SUBCUTANEOUS at 12:36

## 2025-03-26 RX ADMIN — Medication 1000 MILLIGRAM(S): at 04:51

## 2025-03-26 RX ADMIN — Medication 3 MILLIGRAM(S): at 22:04

## 2025-03-26 RX ADMIN — APIXABAN 5 MILLIGRAM(S): 2.5 TABLET, FILM COATED ORAL at 05:57

## 2025-03-26 RX ADMIN — FOLIC ACID 1 MILLIGRAM(S): 1 TABLET ORAL at 08:23

## 2025-03-26 RX ADMIN — Medication 40 MILLIEQUIVALENT(S): at 17:17

## 2025-03-26 RX ADMIN — Medication 200 GRAM(S): at 08:22

## 2025-03-26 RX ADMIN — MIRTAZAPINE 30 MILLIGRAM(S): 30 TABLET, FILM COATED ORAL at 22:04

## 2025-03-26 RX ADMIN — Medication 500 MILLILITER(S): at 05:02

## 2025-03-26 RX ADMIN — Medication 25 GRAM(S): at 13:35

## 2025-03-26 RX ADMIN — Medication 1 TABLET(S): at 05:56

## 2025-03-26 RX ADMIN — QUETIAPINE FUMARATE 25 MILLIGRAM(S): 25 TABLET ORAL at 17:18

## 2025-03-26 RX ADMIN — FUROSEMIDE 20 MILLIGRAM(S): 10 INJECTION INTRAMUSCULAR; INTRAVENOUS at 12:13

## 2025-03-26 RX ADMIN — METOPROLOL SUCCINATE 5 MILLIGRAM(S): 50 TABLET, EXTENDED RELEASE ORAL at 02:12

## 2025-03-26 RX ADMIN — QUETIAPINE FUMARATE 25 MILLIGRAM(S): 25 TABLET ORAL at 05:57

## 2025-03-26 RX ADMIN — INSULIN LISPRO 1: 100 INJECTION, SOLUTION INTRAVENOUS; SUBCUTANEOUS at 08:23

## 2025-03-26 RX ADMIN — Medication 400 MILLIGRAM(S): at 01:40

## 2025-03-26 RX ADMIN — Medication 1 TABLET(S): at 22:04

## 2025-03-26 RX ADMIN — Medication 30 MILLIGRAM(S): at 17:19

## 2025-03-26 RX ADMIN — DONEPEZIL HYDROCHLORIDE 10 MILLIGRAM(S): 5 TABLET ORAL at 22:04

## 2025-03-26 RX ADMIN — DORZOLAMIDE 1 DROP(S): 20 SOLUTION/ DROPS OPHTHALMIC at 22:04

## 2025-03-26 RX ADMIN — METOPROLOL SUCCINATE 25 MILLIGRAM(S): 50 TABLET, EXTENDED RELEASE ORAL at 08:30

## 2025-03-26 RX ADMIN — INSULIN LISPRO 2: 100 INJECTION, SOLUTION INTRAVENOUS; SUBCUTANEOUS at 17:44

## 2025-03-26 RX ADMIN — Medication 40 MILLIEQUIVALENT(S): at 22:04

## 2025-03-26 RX ADMIN — CITALOPRAM 10 MILLIGRAM(S): 20 TABLET ORAL at 13:37

## 2025-03-26 RX ADMIN — Medication 25 GRAM(S): at 02:18

## 2025-03-26 RX ADMIN — Medication 81 MILLIGRAM(S): at 12:13

## 2025-03-26 RX ADMIN — Medication 40 MILLIGRAM(S): at 05:58

## 2025-03-26 RX ADMIN — APIXABAN 5 MILLIGRAM(S): 2.5 TABLET, FILM COATED ORAL at 17:18

## 2025-03-26 RX ADMIN — Medication 25 GRAM(S): at 23:39

## 2025-03-26 RX ADMIN — MEMANTINE HYDROCHLORIDE 10 MILLIGRAM(S): 21 CAPSULE, EXTENDED RELEASE ORAL at 05:57

## 2025-03-26 RX ADMIN — ATORVASTATIN CALCIUM 10 MILLIGRAM(S): 80 TABLET, FILM COATED ORAL at 22:05

## 2025-03-26 RX ADMIN — Medication 1 TABLET(S): at 13:37

## 2025-03-26 NOTE — PROGRESS NOTE ADULT - ASSESSMENT
79-year-old female, ambulatory with assistance,  with  medical history of dementia (AO x 1-2 at baseline), CVA, history of diverticulitis c/b perforation s/p Jania procedure (proctosigmoidectomy) on 5/21/24 with colostomy, Type 2 diabetes, hypertension, glaucoma, osteoarthritis of the left knee, recent admission for syncope, TINO and Dx of Afib with RVR; Pt  presents to the ED as a code sepsis with aide at bedside.  Patient arrived with EMS and was found to be hypoxic to the 80s and was placed on 6 L nasal cannula with improvement.  Spoke with patient's daughter (Kaitlin) for collateral in which she states over the last week patient has been fighting cold/flu symptoms in which she has been having fevers Tmax 101, cough and overall lethargy.  Patient was seen by his doctor yesterday in which she was prescribed antibiotics but family was unable to  antibiotics at the time.  nephrology consulted for acidosis      acidosis  mild   non AG  monitor for now on diuretics   better    HTN  controlled   MOnitor     fluid overload  had congestion on xray  currently on lasix 20   f/u cardio     hypokalemia  supplement kcl 40x2  monitor

## 2025-03-26 NOTE — CONSULT NOTE ADULT - ASSESSMENT
79-year-old female, ambulatory with assistance,  with  medical history of dementia (AO x 1-2 at baseline), CVA, history of diverticulitis c/b perforation s/p Jania procedure (proctosigmoidectomy) on 5/21/24 with colostomy, Type 2 diabetes, hypertension, glaucoma, osteoarthritis of the left knee, recent admission for syncope, TINO and Dx of Afib with RVR; Pt  presents to the ED as a code sepsis with aide at bedside.  Patient arrived with EMS and was found to be hypoxic to the 80s and was placed on 6 L nasal cannula with improvement.  Spoke with patient's daughter (Kaitlin) for collateral in which she states over the last week patient has been fighting cold/flu symptoms in which she has been having fevers Tmax 101, cough and overall lethargy.  Patient was seen by his doctor yesterday in which she was prescribed antibiotics but family was unable to  antibiotics at the time.  ED course prior to admission to Medicine: Tmax 103.1, s/o Cefepime IV 2g and Vancomycin IV, s/p 3L NS  (25 Mar 2025 04:42)  called pts home to get more information;  spoke to her :  he says she was having fever and was not talking and not eating;   she was not having trouble breathing:     no oxygen at home ; she was coughing too ;  plus sputum :     Fever:  cough /  phlegm    ct chest: No pulmonary embolism. Mild smooth interlobular septal thickening likely reflective of mild interstitial edema. Trace bilateral pleural effusions (left greater than right). Left apical consolidative opacity and a few patchy peripheral nodular opacities which raises concern for pneumonia. Cardiomegaly with reflux of contrast into the intrahepatic IVC and hepatic veins suggestive of right heart failure. No acute intra-abdominal or pelvic findings. Colonic diverticulosis without evidence of diverticulitis.  on zosyn;  keep o2 sao2 above 90% all the time  ;   Venous VBG seems OK;   check sputum cultures if possible  the ct scanc hest also suggests some chf  furosemide   Injectable 20 milliGRAM(s) IV Push daily    CVA  apixaban 5 milliGRAM(s) Oral every 12 hours  aspirin  chewable 81 milliGRAM(s) Oral daily  atorvastatin 10 milliGRAM(s) Oral at bedtime    DM  glucagon  Injectable 1 milliGRAM(s) IntraMuscular once  insulin lispro (ADMELOG) corrective regimen sliding scale   SubCutaneous three times a day before meals  insulin lispro (ADMELOG) corrective regimen sliding scale   SubCutaneous at bedtime    HTN  metoprolol succinate ER 25 milliGRAM(s) Oral daily  NIFEdipine XL 30 milliGRAM(s) Oral daily    A fibrillation   on eliquis    Dementia   QUEtiapine 25 milliGRAM(s) Oral two times a day    dw acp

## 2025-03-26 NOTE — CONSULT NOTE ADULT - REASON FOR ADMISSION
Generalized weakness, fever

## 2025-03-26 NOTE — PATIENT PROFILE ADULT - FALL HARM RISK - HARM RISK INTERVENTIONS

## 2025-03-26 NOTE — PATIENT PROFILE ADULT - LEGAL HELP
Subjective:       Patient ID: Ibeth Schroeder is a 66 y.o. White female who presents for re-evaluation of progression of Follow-up and Chronic Kidney Disease    HPI     Patient is a 66-year-old white female with severe COPD and history of acute kidney injury status post hemodialysis in the year 2012.  She has been followed in the nephrology division.  She was last seen by Dr. Jara in August 2016 when her creatinine was higher.  Today she comes back for follow-up as a new patient to me.  Patient has been having some wheezing.  Patient has lost 5 pounds in last 6 months unintentionally.  Patient has not been drinking very well.  Her creatinine is higher at 1.9.  Patient overall is a very poor historian.  Denies any fevers and chills.  Denied any urinary symptoms.  Denies any cough or sputum.    September 2016 acute kidney injury--- ACE inhibitor was stopped    October 2016 creatinine came down to 1.6; renal ultrasound showed echogenic kidneys with possible renal artery stenosis; AAA of size of 4.0 cm    Review of Systems   Constitutional: Negative for activity change, appetite change, chills, diaphoresis, fatigue, fever and unexpected weight change.   HENT: Negative for congestion, dental problem, drooling, postnasal drip, rhinorrhea and voice change.    Eyes: Negative for discharge.   Respiratory: Negative for apnea, cough, choking, chest tightness, shortness of breath, wheezing and stridor.    Cardiovascular: Negative for chest pain, palpitations and leg swelling.   Gastrointestinal: Negative for abdominal distention, blood in stool, constipation, diarrhea, nausea, rectal pain and vomiting.   Endocrine: Negative for cold intolerance, heat intolerance, polydipsia and polyuria.   Genitourinary: Negative for decreased urine volume, difficulty urinating, dysuria, enuresis, flank pain, frequency, hematuria and urgency.   Musculoskeletal: Negative for arthralgias, back pain, gait problem and joint swelling.   Skin:  "Negative for rash.   Allergic/Immunologic: Negative for food allergies and immunocompromised state.   Neurological: Negative for dizziness, tremors, syncope, numbness and headaches.   Hematological: Does not bruise/bleed easily.   Psychiatric/Behavioral: Negative for agitation, behavioral problems and self-injury. The patient is not nervous/anxious and is not hyperactive.    All other systems reviewed and are negative.      Objective:     Visit Vitals    /78    Pulse 80    Ht 5' 5" (1.651 m)    Wt 50.5 kg (111 lb 5.3 oz)    BMI 18.53 kg/m2        Physical Exam   Constitutional: She is oriented to person, place, and time. She appears well-developed and well-nourished.   HENT:   Head: Normocephalic and atraumatic.   Eyes: Conjunctivae and EOM are normal. Pupils are equal, round, and reactive to light.   Neck: Normal range of motion and full passive range of motion without pain. Neck supple. Carotid bruit is not present. No edema present. No thyroid mass and no thyromegaly present.   Cardiovascular: Normal rate, regular rhythm, S1 normal, S2 normal, normal heart sounds, intact distal pulses and normal pulses.  PMI is not displaced.  Exam reveals no friction rub.    No murmur heard.  Pulmonary/Chest: Effort normal and breath sounds normal. No accessory muscle usage. No respiratory distress. She has no wheezes. She has no rales. She exhibits no tenderness.   Abdominal: Soft. Bowel sounds are normal. She exhibits no distension and no mass. There is no hepatosplenomegaly. There is no tenderness. There is no rebound and no CVA tenderness. No hernia.   Musculoskeletal: Normal range of motion. She exhibits no edema or tenderness.   Neurological: She is alert and oriented to person, place, and time. She has normal reflexes. She displays normal reflexes. No cranial nerve deficit or sensory deficit. She exhibits normal muscle tone. Coordination normal.   Skin: Skin is warm and dry. No bruising, no ecchymosis and no " rash noted. No cyanosis or erythema. No pallor. Nails show no clubbing.   Psychiatric: She has a normal mood and affect. Her speech is normal and behavior is normal. Judgment and thought content normal.         Lab Results   Component Value Date    CREATININE 1.7 (H) 01/20/2017    BUN 43 (H) 01/20/2017     01/20/2017    K 4.0 01/20/2017     01/20/2017    CO2 25 01/20/2017     Lab Results   Component Value Date    WBC 9.22 01/11/2017    HGB 11.1 (L) 01/11/2017    HCT 34.7 (L) 01/11/2017    MCV 98 01/11/2017     01/11/2017     Lab Results   Component Value Date    .0 (H) 09/09/2016    CALCIUM 10.5 01/20/2017    CAION 0.90 (L) 09/18/2012    PHOS 3.5 01/11/2017       Assessment:    )    1. CKD (chronic kidney disease) stage 3, GFR 30-59 ml/min    2. Anemia of other chronic disease    3. Hyperparathyroidism, secondary renal    4. Hyperkalemia    5. CAYETANO (acute kidney injury)        Plan:         1. UTI s/p  Cipro in 9/2016.  Patient is ALLERGIC to penicillin and sulfur. Recurrent UTI instruction       2.  Hypertension controlled onToprol-XL 50 mg daily.   Possible TOMMY on USD.  No ACE inhibitor is in no ARB for now.     3.  Anemia: iron sats 17 % ; watch for now ; on PO iron and IV iron per dr. Saeed     4.  AAA last ultrasound show size was 4 cm.  We will repeat the ultrasound in 6 months --dr. Liz is is following her       5.  Chronic kidney disease stage III/4: No heavy proteinuria.  Continue current conservative management.  No intervention for renal artery stenosis at this time.  Avoid ACE inhibitor and ARB at this time.      6.  Hyperparathyroidism:  calcitriol 0.25 mg ; patient's vitamin D levels are normal d/c OTC Vitamin D ( Ca is higher today)    7. Lomotil for diarrhea         Follow up 3-4 months     Cristina Crowder MD     no

## 2025-03-26 NOTE — PROGRESS NOTE ADULT - ASSESSMENT
78 y/o F PMhx dementia (AO x 1-2 at baseline), CVA, history of diverticulitis c/b perforation s/p Jania procedure (proctosigmoidectomy) on 5/21/24 with colostomy, DM II, HTN, glaucoma, recent admission for syncope, TINO, Afib w/ RVR who presented with hypoxia    PNA, acute hypoxic resp failure  sepsis- fever, leukocytosis  RVP negative  UA negative  CT- No pulmonary embolism. Left apical consolidative opacity and a few patchy peripheral nodular opacities which raises concern for pneumonia. Cardiomegaly with reflux of contrast into the intrahepatic IVC and hepatic veins suggestive of right heart failure. No acute intra-abdominal or pelvic findings.    Recommendations  given recent hospitalization pt at risk of resistant organisms, will escalate ceftriaxone to zosyn  urine legionella Ag negative, azithromycin discontinued  f/u blood cultures- NGTD  f/u strep pneumoniae urine Ag  trend temps, wbc    Geovani Goldman M.D.  [facility]  [phone]  After 5pm on weekdays and all day on weekends - please call [phone]  Available on [employer] teams    Thank you for consulting us and involving us in the management of this patients case. In addition to reviewing history, imaging, documents, labs, microbiology, took into account antibiotic stewardship, local antibiogram and infection control strategies and potential transmission issues.

## 2025-03-26 NOTE — PROGRESS NOTE ADULT - ASSESSMENT
79-year-old female, ambulatory with assistance,  with  medical history of dementia (AO x 1-2 at baseline), CVA, history of diverticulitis c/b perforation s/p Jania procedure (proctosigmoidectomy) on 5/21/24 with colostomy, Type 2 diabetes, hypertension, glaucoma, osteoarthritis of the left knee, recent admission for syncope, TINO and Dx of Afib with RVR; Pt a/w sepsis due to PNA c/b hypoxemia, and acute on chronic dCHF and RHF c/b pulmonary edema.    Severe  Sepsis sec to pneumonia and acute hypoxic respiratory failure POA   ·  Plan: Due to PNA  abx as per ID  - pulm fu   -f/u Legionella Ag, Strep Ag  -f/u BCx  - fu  sputum Cx.    Acute on chronic diastolic CHF   ·  Plan: CTA chest: Mild smooth interlobular septal thickening likely reflective of mild interstitial edema. Trace bilateral pleural effusions (left greater than right). Cardiomegaly with reflux of contrast into the intrahepatic IVC and hepatic veins suggestive of right heart failure.  TTE 2/2025: There is increased LV mass and concentric hypertrophy. There is mild (grade 1) left ventricular diastolic dysfunction.  No CP or palpitations   cards fu   - diuresis as per cards   -Strict I/O q4h  -Deily weight      Paroxysmal atrial fibrillation.   [id number] risk stratification score=8  c/w Eliquis   c/w Metoprolol    Dementia.   Fall, aspiration precautions  Conservative consistency diet  HOB elevation   c/w Mirtazapine, Seroquel, Namenda, Citalopram, Donepezil    HTN (hypertension).   ·  Plan: c/w Metoprolol   c/w Nifedipine.     Type 2 diabetes mellitus with hyperglycemia, without long-term current use of insulin.   ·  Plan: Hold Metformin DM diet.  - ISS     Encounter for deep vein thrombosis (DVT) prophylaxis.   ·  Plan: On Eliquis BID    dw Pt , ACP, and pulm attending    Quality 137: Melanoma: Continuity Of Care - Recall System: Patient information entered into a recall system that includes: target date for the next exam specified AND a process to follow up with patients regarding missed or unscheduled appointments Detail Level: Detailed

## 2025-03-26 NOTE — PATIENT PROFILE ADULT - HOW PATIENT ADDRESSED, PROFILE

## 2025-03-26 NOTE — PATIENT PROFILE ADULT - FUNCTIONAL ASSESSMENT - BASIC MOBILITY 4.
Diagnosis: PE  Goal Range: 2.0-3.0  Spoke with patient via phone regarding anticoagulation monitoring.   Last INR on 12/30/24 was 2.8.  Dose maintained.   Today's INR is 3.2 and is above goal range.  Patient reports having alcohol over the past weekend.   Current warfarin total weekly dose of 70 mg verified.  Informed the INR result is above therapeutic range and instructed to hold 1/2 dose today (take 5 mg total) then resume TWD of 70 mg per protocol. Discussed dose and return date of 2/5/2025 for next PST INR. See Anticoagulation flowsheet.     is in the office today supervising the treatment.      Instructed to contact the clinic with any unusual bleeding or bruising, any changes in medications, diet, health status, lifestyle, or any other changes, questions or concerns. Verbalized understanding of all discussed.     Referring MD: Dr. Aron Maldonado  [facility] orders exp: 3/7/25   2 = A lot of assistance

## 2025-03-26 NOTE — PROGRESS NOTE ADULT - ASSESSMENT
Hypoxia  sob  CT c/w PNA    abx  o2  fu with ID/Pulm    PAF  cont eliquis  was in afib now converted   change bb to nadolol     screen for heart disease  monitor qtc on zithromax plus seroquel and remeron     HTN  stable  cont current meds

## 2025-03-26 NOTE — CHART NOTE - NSCHARTNOTEFT_GEN_A_CORE
Notified by RN that patient HR fluctuating from 150s-170. Patient seen and examined at bedside. Notified by RN that patient HR from 150s-170. Patient seen and examined at bedside. Patient denies any chest pain, palpitations or shortness of breath. Patient in no acute distress. At bedside, HR fluctuating 140s-160s. Patient with rectal temp 100.6. IV Tylenol given. HR still elevated after Tylenol. Patient also desaturated on 2L NC, SPO2 now 96% on 5L NC. EKG performed shows rapid afib rate of 170.     PHYSICAL EXAM:  GENERAL: No acute distress, well-developed  CHEST/LUNG: CTAB; No wheezes, rales, or rhonchi  HEART: Tachycardic    VITAL SIGNS:  T(C): 38.1 (03-26-25 @ 01:00), Max: 38.1 (03-26-25 @ 01:00)  HR: 153 (03-26-25 @ 02:00) (68 - 160)  BP: 132/79 (03-26-25 @ 02:00) (122/89 - 156/85)  RR: 18 (03-26-25 @ 02:00) (17 - 20)  SpO2: 98% (03-26-25 @ 02:00) (92% - 100%)      A/P: 79F dementia (AO x 1-2 at baseline), CVA, history of diverticulitis c/b perforation s/p colostomy, Type 2 diabetes, hypertension, glaucoma, osteoarthritis of the left knee, recent admission for syncope, TINO and Dx of Afib with RVR; Pt a/w sepsis due to PNA c/b hypoxemia and acute on chronic dCHF. Now in rapid afib.  -Metoprolol 5mg IVP STAT  -Supplement mag sulfate 1g  -Continue on 5L NC, wean down as tolerated when fever subsides  -Send am labs now  -Continue Toprol XL 25mg in am

## 2025-03-26 NOTE — CONSULT NOTE ADULT - SUBJECTIVE AND OBJECTIVE BOX
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      /  AST  16  /  ALT  9   /  AlkPhos  112      Creatinine Trend: 0.67 <--                        12.6   15.58 )-----------( 282      ( 24 Mar 2025 21:40 )             37.9     Urine Studies:  Urinalysis Basic - ( 25 Mar 2025 07:00 )    Color: Yellow / Appearance: Clear / S.022 / pH:   Gluc:  / Ketone: Negative mg/dL  / Bili: Negative / Urobili: 0.2 mg/dL   Blood:  / Protein: 30 mg/dL / Nitrite: Negative   Leuk Esterase: Negative / RBC: 0-1 /HPF / WBC 1-2 /HPF   Sq Epi:  / Non Sq Epi:  / Bacteria: Occasional /HPF          RADIOLOGY & ADDITIONAL STUDIES:

## 2025-03-27 DIAGNOSIS — R09.02 HYPOXEMIA: ICD-10-CM

## 2025-03-27 LAB
ANION GAP SERPL CALC-SCNC: 10 MMOL/L — SIGNIFICANT CHANGE UP (ref 7–14)
BASOPHILS # BLD AUTO: 0.05 K/UL — SIGNIFICANT CHANGE UP (ref 0–0.2)
BASOPHILS NFR BLD AUTO: 0.4 % — SIGNIFICANT CHANGE UP (ref 0–2)
BUN SERPL-MCNC: 20 MG/DL — SIGNIFICANT CHANGE UP (ref 7–23)
CALCIUM SERPL-MCNC: 8.9 MG/DL — SIGNIFICANT CHANGE UP (ref 8.4–10.5)
CHLORIDE SERPL-SCNC: 107 MMOL/L — SIGNIFICANT CHANGE UP (ref 98–107)
CO2 SERPL-SCNC: 23 MMOL/L — SIGNIFICANT CHANGE UP (ref 22–31)
CREAT SERPL-MCNC: 0.77 MG/DL — SIGNIFICANT CHANGE UP (ref 0.5–1.3)
EGFR: 79 ML/MIN/1.73M2 — SIGNIFICANT CHANGE UP
EGFR: 79 ML/MIN/1.73M2 — SIGNIFICANT CHANGE UP
EOSINOPHIL # BLD AUTO: 0.48 K/UL — SIGNIFICANT CHANGE UP (ref 0–0.5)
EOSINOPHIL NFR BLD AUTO: 4.2 % — SIGNIFICANT CHANGE UP (ref 0–6)
GLUCOSE BLDC GLUCOMTR-MCNC: 149 MG/DL — HIGH (ref 70–99)
GLUCOSE BLDC GLUCOMTR-MCNC: 152 MG/DL — HIGH (ref 70–99)
GLUCOSE BLDC GLUCOMTR-MCNC: 177 MG/DL — HIGH (ref 70–99)
GLUCOSE BLDC GLUCOMTR-MCNC: 192 MG/DL — HIGH (ref 70–99)
GLUCOSE SERPL-MCNC: 139 MG/DL — HIGH (ref 70–99)
HCT VFR BLD CALC: 33.2 % — LOW (ref 34.5–45)
HGB BLD-MCNC: 11.1 G/DL — LOW (ref 11.5–15.5)
IANC: 6.58 K/UL — SIGNIFICANT CHANGE UP (ref 1.8–7.4)
IMM GRANULOCYTES NFR BLD AUTO: 1.1 % — HIGH (ref 0–0.9)
LYMPHOCYTES # BLD AUTO: 28.7 % — SIGNIFICANT CHANGE UP (ref 13–44)
LYMPHOCYTES # BLD AUTO: 3.27 K/UL — SIGNIFICANT CHANGE UP (ref 1–3.3)
MAGNESIUM SERPL-MCNC: 2.1 MG/DL — SIGNIFICANT CHANGE UP (ref 1.6–2.6)
MCHC RBC-ENTMCNC: 30.5 PG — SIGNIFICANT CHANGE UP (ref 27–34)
MCHC RBC-ENTMCNC: 33.4 G/DL — SIGNIFICANT CHANGE UP (ref 32–36)
MCV RBC AUTO: 91.2 FL — SIGNIFICANT CHANGE UP (ref 80–100)
MONOCYTES # BLD AUTO: 0.9 K/UL — SIGNIFICANT CHANGE UP (ref 0–0.9)
MONOCYTES NFR BLD AUTO: 7.9 % — SIGNIFICANT CHANGE UP (ref 2–14)
NEUTROPHILS # BLD AUTO: 6.58 K/UL — SIGNIFICANT CHANGE UP (ref 1.8–7.4)
NEUTROPHILS NFR BLD AUTO: 57.7 % — SIGNIFICANT CHANGE UP (ref 43–77)
NRBC # BLD AUTO: 0 K/UL — SIGNIFICANT CHANGE UP (ref 0–0)
NRBC # FLD: 0 K/UL — SIGNIFICANT CHANGE UP (ref 0–0)
NRBC BLD AUTO-RTO: 0 /100 WBCS — SIGNIFICANT CHANGE UP (ref 0–0)
PHOSPHATE SERPL-MCNC: 3 MG/DL — SIGNIFICANT CHANGE UP (ref 2.5–4.5)
PLATELET # BLD AUTO: 281 K/UL — SIGNIFICANT CHANGE UP (ref 150–400)
POTASSIUM SERPL-MCNC: 4.4 MMOL/L — SIGNIFICANT CHANGE UP (ref 3.5–5.3)
POTASSIUM SERPL-SCNC: 4.4 MMOL/L — SIGNIFICANT CHANGE UP (ref 3.5–5.3)
RBC # BLD: 3.64 M/UL — LOW (ref 3.8–5.2)
RBC # FLD: 13.3 % — SIGNIFICANT CHANGE UP (ref 10.3–14.5)
S PNEUM AG UR QL: NEGATIVE — SIGNIFICANT CHANGE UP
SODIUM SERPL-SCNC: 140 MMOL/L — SIGNIFICANT CHANGE UP (ref 135–145)
WBC # BLD: 11.4 K/UL — HIGH (ref 3.8–10.5)
WBC # FLD AUTO: 11.4 K/UL — HIGH (ref 3.8–10.5)

## 2025-03-27 RX ORDER — IPRATROPIUM BROMIDE AND ALBUTEROL SULFATE .5; 2.5 MG/3ML; MG/3ML
3 SOLUTION RESPIRATORY (INHALATION) EVERY 6 HOURS
Refills: 0 | Status: DISCONTINUED | OUTPATIENT
Start: 2025-03-27 | End: 2025-03-31

## 2025-03-27 RX ORDER — NADOLOL 80 MG
10 TABLET ORAL DAILY
Refills: 0 | Status: DISCONTINUED | OUTPATIENT
Start: 2025-03-28 | End: 2025-03-31

## 2025-03-27 RX ORDER — MELATONIN 5 MG
6 TABLET ORAL ONCE
Refills: 0 | Status: COMPLETED | OUTPATIENT
Start: 2025-03-27 | End: 2025-03-27

## 2025-03-27 RX ADMIN — MEMANTINE HYDROCHLORIDE 10 MILLIGRAM(S): 21 CAPSULE, EXTENDED RELEASE ORAL at 17:12

## 2025-03-27 RX ADMIN — Medication 30 MILLIGRAM(S): at 05:47

## 2025-03-27 RX ADMIN — APIXABAN 5 MILLIGRAM(S): 2.5 TABLET, FILM COATED ORAL at 17:12

## 2025-03-27 RX ADMIN — MIRTAZAPINE 30 MILLIGRAM(S): 30 TABLET, FILM COATED ORAL at 21:08

## 2025-03-27 RX ADMIN — IPRATROPIUM BROMIDE AND ALBUTEROL SULFATE 3 MILLILITER(S): .5; 2.5 SOLUTION RESPIRATORY (INHALATION) at 19:55

## 2025-03-27 RX ADMIN — FUROSEMIDE 20 MILLIGRAM(S): 10 INJECTION INTRAMUSCULAR; INTRAVENOUS at 05:48

## 2025-03-27 RX ADMIN — CITALOPRAM 10 MILLIGRAM(S): 20 TABLET ORAL at 13:14

## 2025-03-27 RX ADMIN — Medication 25 GRAM(S): at 05:48

## 2025-03-27 RX ADMIN — QUETIAPINE FUMARATE 25 MILLIGRAM(S): 25 TABLET ORAL at 17:12

## 2025-03-27 RX ADMIN — Medication 25 GRAM(S): at 21:08

## 2025-03-27 RX ADMIN — INSULIN LISPRO 1: 100 INJECTION, SOLUTION INTRAVENOUS; SUBCUTANEOUS at 16:12

## 2025-03-27 RX ADMIN — MEMANTINE HYDROCHLORIDE 10 MILLIGRAM(S): 21 CAPSULE, EXTENDED RELEASE ORAL at 05:47

## 2025-03-27 RX ADMIN — Medication 1 TABLET(S): at 21:09

## 2025-03-27 RX ADMIN — Medication 81 MILLIGRAM(S): at 13:14

## 2025-03-27 RX ADMIN — Medication 1 TABLET(S): at 13:13

## 2025-03-27 RX ADMIN — FOLIC ACID 1 MILLIGRAM(S): 1 TABLET ORAL at 13:14

## 2025-03-27 RX ADMIN — Medication 25 GRAM(S): at 13:12

## 2025-03-27 RX ADMIN — QUETIAPINE FUMARATE 25 MILLIGRAM(S): 25 TABLET ORAL at 05:47

## 2025-03-27 RX ADMIN — DORZOLAMIDE 1 DROP(S): 20 SOLUTION/ DROPS OPHTHALMIC at 13:14

## 2025-03-27 RX ADMIN — Medication 40 MILLIGRAM(S): at 05:47

## 2025-03-27 RX ADMIN — Medication 3 MILLIGRAM(S): at 21:08

## 2025-03-27 RX ADMIN — Medication 1 TABLET(S): at 05:47

## 2025-03-27 RX ADMIN — DORZOLAMIDE 1 DROP(S): 20 SOLUTION/ DROPS OPHTHALMIC at 21:08

## 2025-03-27 RX ADMIN — IPRATROPIUM BROMIDE AND ALBUTEROL SULFATE 3 MILLILITER(S): .5; 2.5 SOLUTION RESPIRATORY (INHALATION) at 17:21

## 2025-03-27 RX ADMIN — INSULIN LISPRO 1: 100 INJECTION, SOLUTION INTRAVENOUS; SUBCUTANEOUS at 11:05

## 2025-03-27 RX ADMIN — DORZOLAMIDE 1 DROP(S): 20 SOLUTION/ DROPS OPHTHALMIC at 05:48

## 2025-03-27 RX ADMIN — APIXABAN 5 MILLIGRAM(S): 2.5 TABLET, FILM COATED ORAL at 05:47

## 2025-03-27 RX ADMIN — ATORVASTATIN CALCIUM 10 MILLIGRAM(S): 80 TABLET, FILM COATED ORAL at 21:08

## 2025-03-27 RX ADMIN — Medication 6 MILLIGRAM(S): at 21:09

## 2025-03-27 RX ADMIN — Medication 20 MILLIGRAM(S): at 05:48

## 2025-03-27 NOTE — DIETITIAN INITIAL EVALUATION ADULT - ORAL NUTRITION SUPPLEMENTS
Recommend Glucerna Shake (provides 220kcal, 10gms protein per serving) once daily and Ensure Max (provides 150kcal, 30gms protein per serving) once daily to support nutrition status

## 2025-03-27 NOTE — PROGRESS NOTE ADULT - ASSESSMENT
80 y/o F PMhx dementia (AO x 1-2 at baseline), CVA, history of diverticulitis c/b perforation s/p Jania procedure (proctosigmoidectomy) on 5/21/24 with colostomy, DM II, HTN, glaucoma, recent admission for syncope, TINO, Afib w/ RVR who presented with hypoxia    PNA, acute hypoxic resp failure  sepsis- fever, leukocytosis  RVP negative  UA negative  urine legionella and strep pneumoniae urine Ag negative  CT- No pulmonary embolism. Left apical consolidative opacity and a few patchy peripheral nodular opacities which raises concern for pneumonia. Cardiomegaly with reflux of contrast into the intrahepatic IVC and hepatic veins suggestive of right heart failure. No acute intra-abdominal or pelvic findings.    Recommendations  c/w zosyn  - complete 5 day course of antibiotics w/ last day 3/30  f/u blood cultures- NGTD  trend temps, wbc    Geovani Goldman M.D.  [facility]  [phone]  After 5pm on weekdays and all day on weekends - please call [phone]  Available on microsoft teams    Thank you for consulting us and involving us in the management of this patients case. In addition to reviewing history, imaging, documents, labs, microbiology, took into account antibiotic stewardship, local antibiogram and infection control strategies and potential transmission issues.  78 y/o F PMhx dementia (AO x 1-2 at baseline), CVA, history of diverticulitis c/b perforation s/p Jania procedure (proctosigmoidectomy) on 5/21/24 with colostomy, DM II, HTN, glaucoma, recent admission for syncope, TINO, Afib w/ RVR who presented with hypoxia    PNA, acute hypoxic resp failure  sepsis- fever, leukocytosis  RVP negative  UA negative  urine legionella and strep pneumoniae urine Ag negative  CT- No pulmonary embolism. Left apical consolidative opacity and a few patchy peripheral nodular opacities which raises concern for pneumonia. Cardiomegaly with reflux of contrast into the intrahepatic IVC and hepatic veins suggestive of right heart failure. No acute intra-abdominal or pelvic findings.  blood cultures- NGTD    Recommendations  c/w zosyn  - complete 5 day course of antibiotics w/ last day 3/30  trend temps, wbc  wean O2 as tolerated    Geovani Goldman M.D.  [facility]  [phone]  After 5pm on weekdays and all day on weekends - please call [phone]  Available on microsoft teams    Thank you for consulting us and involving us in the management of this patients case. In addition to reviewing history, imaging, documents, labs, microbiology, took into account antibiotic stewardship, local antibiogram and infection control strategies and potential transmission issues.

## 2025-03-27 NOTE — DIETITIAN INITIAL EVALUATION ADULT - PROBLEM SELECTOR PROBLEM 5
I am putting as stat consult to Orthopedics in general   Please follow this up with a phone call to however  Paroxysmal atrial fibrillation

## 2025-03-27 NOTE — PROGRESS NOTE ADULT - ASSESSMENT
79-year-old female, ambulatory with assistance,  with  medical history of dementia (AO x 1-2 at baseline), CVA, history of diverticulitis c/b perforation s/p Jania procedure (proctosigmoidectomy) on 5/21/24 with colostomy, Type 2 diabetes, hypertension, glaucoma, osteoarthritis of the left knee, recent admission for syncope, TINO and Dx of Afib with RVR; Pt a/w sepsis due to PNA c/b hypoxemia, and acute on chronic dCHF and RHF c/b pulmonary edema.    Severe  Sepsis sec to pneumonia and acute hypoxic respiratory failure POA   ·  Plan: Due to PNA  - abx as per ID  - pulm fu   - Legionella Ag, Strep Ag neg   -f/u BCx  - fu  sputum Cx.    Acute on chronic diastolic CHF   ·  Plan: CTA chest: Mild smooth interlobular septal thickening likely reflective of mild interstitial edema. Trace bilateral pleural effusions (left greater than right). Cardiomegaly with reflux of contrast into the intrahepatic IVC and hepatic veins suggestive of right heart failure.  TTE 2/2025: There is increased LV mass and concentric hypertrophy. There is mild (grade 1) left ventricular diastolic dysfunction.  No CP or palpitations   cards fu   - diuresis as per cards   -Strict I/O q4h  -Deily weight      Paroxysmal atrial fibrillation.   [id number] risk stratification score=8  c/w Eliquis   c/w Metoprolol    Dementia.   Fall, aspiration precautions  Conservative consistency diet  HOB elevation   c/w Mirtazapine, Seroquel, Namenda, Citalopram, Donepezil    HTN (hypertension).   ·  Plan: c/w Metoprolol   c/w Nifedipine.     Type 2 diabetes mellitus with hyperglycemia, without long-term current use of insulin.   ·  Plan: Hold Metformin DM diet.  - ISS     Encounter for deep vein thrombosis (DVT) prophylaxis.   ·  Plan: On Eliquis BID

## 2025-03-27 NOTE — DIETITIAN INITIAL EVALUATION ADULT - OTHER INFO
Patient is currently ordered for a PO diet. Documented on RN flowsheet as consuming 26-75% of meals. Granddaughter interested in oral nutrition supplements, will trial Glucerna Shake and Ensure Max to ascertain which one patient prefers. Covering provider made aware via text page. Per medication list, patient prescribed with mirtazapine (appetite stimulant). Patient denies any chewing or swallowing difficulty with regular solids or thin liquids. Currently on easy to chew textures as per physician orders. No report of GI distress (nausea, vomiting, diarrhea, constipation). Last BM 3/26/25 per RN flowsheet documentation. Not noted to be on a bowel regimen.     Writer provided verbal education regarding current diet order and nutrition recommendations for after discharge. Writer answered all questions to satisfaction. Patient's granddaughter appreciative, verbalized understanding to the discussion.

## 2025-03-27 NOTE — PROGRESS NOTE ADULT - ASSESSMENT
79-year-old female, ambulatory with assistance,  with  medical history of dementia (AO x 1-2 at baseline), CVA, history of diverticulitis c/b perforation s/p Jania procedure (proctosigmoidectomy) on 5/21/24 with colostomy, Type 2 diabetes, hypertension, glaucoma, osteoarthritis of the left knee, recent admission for syncope, TINO and Dx of Afib with RVR; Pt  presents to the ED as a code sepsis with aide at bedside.  Patient arrived with EMS and was found to be hypoxic to the 80s and was placed on 6 L nasal cannula with improvement.  Spoke with patient's daughter (Kaitlin) for collateral in which she states over the last week patient has been fighting cold/flu symptoms in which she has been having fevers Tmax 101, cough and overall lethargy.  Patient was seen by his doctor yesterday in which she was prescribed antibiotics but family was unable to  antibiotics at the time.  nephrology consulted for acidosis      acidosis  mild   non AG  better  off diuretic    HTN  controlled   MOnitor     fluid overload  had congestion on xray  completed on lasix 20   f/u cardio     hypokalemia  supplement kcl 40x2  better  monitor

## 2025-03-27 NOTE — DIETITIAN INITIAL EVALUATION ADULT - REASON INDICATOR FOR ASSESSMENT
Nutrition Consult for MST score 2 or greater     Per chart, patient is a 79y Female with PMH dementia (A&Ox1-2), CVA, diverticulitis complicated by perforation, s/p colostomy, T2DM, HTN, glaucoma, osteoarthritis of the Left knee who presented to [facility] with sepsis due to PNA complicated by hypoxemia and acute on chronic CHF.

## 2025-03-27 NOTE — PROGRESS NOTE ADULT - ASSESSMENT
Hypoxia  sob  CT c/w PNA    abx  o2  fu with ID/Pulm    PAF  cont eliquis  cont nadolol     screen for heart disease  monitor qtc on zithromax plus seroquel and remeron     HTN  stable  cont current meds

## 2025-03-27 NOTE — PROGRESS NOTE ADULT - ASSESSMENT
80 y/o F with PMH of dementia (AO x 1-2 at baseline), CVA, diverticulitis c/b perforation s/p Jania procedure (proctosigmoidectomy) on 5/21/24 with colostomy, Type 2 diabetes, hypertension, glaucoma, osteoarthritis of the left knee, recent admission for syncope, TINO and Dx of Afib with RVR; Pt  presents to the ED as a code sepsis with aide at bedside.  Patient arrived with EMS and was found to be hypoxic to the 80s and was placed on 6 L nasal cannula with improvement.  Spoke with patient's daughter (Kaitlin) for collateral in which she states over the last week patient has been fighting cold/flu symptoms in which she has been having fevers Tmax 101F, cough and overall lethargy. Febrile in ED to 103.1F. Pulmonary called to consult for hypoxia, r/o PNA.

## 2025-03-27 NOTE — DIETITIAN INITIAL EVALUATION ADULT - PERTINENT LABORATORY DATA
03-27    140  |  107  |  20  ----------------------------<  139[H]  4.4   |  23  |  0.77    Ca    8.9      27 Mar 2025 05:30  Phos  3.0     03-27  Mg     2.10     03-27    POCT Blood Glucose.: 152 mg/dL (03-27-25 @ 11:03)  A1C with Estimated Average Glucose Result: 6.8 % (02-24-25 @ 06:45)  A1C with Estimated Average Glucose Result: 5.5 % (09-04-24 @ 05:21)  A1C with Estimated Average Glucose Result: 5.7 % (08-12-24 @ 06:10)

## 2025-03-27 NOTE — DIETITIAN INITIAL EVALUATION ADULT - PERTINENT MEDS FT
MEDICATIONS  (STANDING):  albuterol/ipratropium for Nebulization 3 milliLiter(s) Nebulizer every 6 hours  apixaban 5 milliGRAM(s) Oral every 12 hours  aspirin  chewable 81 milliGRAM(s) Oral daily  atorvastatin 10 milliGRAM(s) Oral at bedtime  calcium carbonate 1250 mG  + Vitamin D (OsCal 500 + D) 1 Tablet(s) Oral three times a day  citalopram 10 milliGRAM(s) Oral daily  dextrose 5%. 1000 milliLiter(s) (100 mL/Hr) IV Continuous <Continuous>  dextrose 5%. 1000 milliLiter(s) (50 mL/Hr) IV Continuous <Continuous>  dextrose 50% Injectable 25 Gram(s) IV Push once  dextrose 50% Injectable 12.5 Gram(s) IV Push once  dextrose 50% Injectable 25 Gram(s) IV Push once  donepezil 10 milliGRAM(s) Oral at bedtime  dorzolamide 2% Ophthalmic Solution 1 Drop(s) Both EYES three times a day  folic acid 1 milliGRAM(s) Oral daily  glucagon  Injectable 1 milliGRAM(s) IntraMuscular once  insulin lispro (ADMELOG) corrective regimen sliding scale   SubCutaneous three times a day before meals  insulin lispro (ADMELOG) corrective regimen sliding scale   SubCutaneous at bedtime  melatonin 3 milliGRAM(s) Oral at bedtime  memantine 10 milliGRAM(s) Oral two times a day  mirtazapine 30 milliGRAM(s) Oral at bedtime  nadolol 20 milliGRAM(s) Oral daily  NIFEdipine XL 30 milliGRAM(s) Oral daily  pantoprazole    Tablet 40 milliGRAM(s) Oral before breakfast  piperacillin/tazobactam IVPB.. 3.375 Gram(s) IV Intermittent every 8 hours  QUEtiapine 25 milliGRAM(s) Oral two times a day    MEDICATIONS  (PRN):  acetaminophen     Tablet .. 650 milliGRAM(s) Oral every 6 hours PRN Temp greater or equal to 38C (100.4F), Mild Pain (1 - 3)  dextrose Oral Gel 15 Gram(s) Oral once PRN Blood Glucose LESS THAN 70 milliGRAM(s)/deciliter  ondansetron Injectable 4 milliGRAM(s) IV Push every 8 hours PRN Nausea and/or Vomiting

## 2025-03-27 NOTE — DIETITIAN INITIAL EVALUATION ADULT - ORAL INTAKE PTA/DIET HISTORY
Patient noted to be documented as disoriented and confused per RN flowsheet. Per chart, [facility] dementia (A&Ox1-2 at baseline). Patient unable to meaningfully participate in assessment.     Writer spoke with patient's granddaughter Kaitlin ([phone]) who provided history. No known food allergies or intolerances. Does not consume beef or pork. Nutrition supplementation at home includes Ensure. Patient reported with a poor appetite, decreased PO intake since dementia diagnosis (~2 years ago). Only consumes a few bites at a meal, likely meeting <75% estimated energy needs as a result. Often craves sweet or sugary items. Requires feeding assistance with meals. Lives with her  and cared for by a 24hrs / 7days/week HHA.

## 2025-03-27 NOTE — DIETITIAN INITIAL EVALUATION ADULT - OTHER CALCULATIONS
IBW: 100 lb +/- 10%, %%  Per Penelope MOSQUEDA, noted the following weight history: 63.5kg (3/24/25), 46.2kg (9/3/24), 50.8kg (8/11/24), 56.2kg (6/19/24)  Objective weight measurements suggest 17.3kg (27%) weight gain x6 months period of time; questionable accuracy given report of patient's chronic poor appetite

## 2025-03-28 ENCOUNTER — TRANSCRIPTION ENCOUNTER (OUTPATIENT)
Age: 80
End: 2025-03-28

## 2025-03-28 LAB
ANION GAP SERPL CALC-SCNC: 12 MMOL/L — SIGNIFICANT CHANGE UP (ref 7–14)
BASOPHILS # BLD AUTO: 0.06 K/UL — SIGNIFICANT CHANGE UP (ref 0–0.2)
BASOPHILS NFR BLD AUTO: 0.6 % — SIGNIFICANT CHANGE UP (ref 0–2)
BUN SERPL-MCNC: 17 MG/DL — SIGNIFICANT CHANGE UP (ref 7–23)
CALCIUM SERPL-MCNC: 9.1 MG/DL — SIGNIFICANT CHANGE UP (ref 8.4–10.5)
CHLORIDE SERPL-SCNC: 104 MMOL/L — SIGNIFICANT CHANGE UP (ref 98–107)
CO2 SERPL-SCNC: 24 MMOL/L — SIGNIFICANT CHANGE UP (ref 22–31)
CREAT SERPL-MCNC: 0.65 MG/DL — SIGNIFICANT CHANGE UP (ref 0.5–1.3)
EGFR: 91 ML/MIN/1.73M2 — SIGNIFICANT CHANGE UP
EGFR: 91 ML/MIN/1.73M2 — SIGNIFICANT CHANGE UP
EOSINOPHIL # BLD AUTO: 0.4 K/UL — SIGNIFICANT CHANGE UP (ref 0–0.5)
EOSINOPHIL NFR BLD AUTO: 4.2 % — SIGNIFICANT CHANGE UP (ref 0–6)
GLUCOSE BLDC GLUCOMTR-MCNC: 131 MG/DL — HIGH (ref 70–99)
GLUCOSE BLDC GLUCOMTR-MCNC: 135 MG/DL — HIGH (ref 70–99)
GLUCOSE BLDC GLUCOMTR-MCNC: 147 MG/DL — HIGH (ref 70–99)
GLUCOSE BLDC GLUCOMTR-MCNC: 160 MG/DL — HIGH (ref 70–99)
GLUCOSE SERPL-MCNC: 125 MG/DL — HIGH (ref 70–99)
HCT VFR BLD CALC: 35.9 % — SIGNIFICANT CHANGE UP (ref 34.5–45)
HGB BLD-MCNC: 12 G/DL — SIGNIFICANT CHANGE UP (ref 11.5–15.5)
IANC: 4.79 K/UL — SIGNIFICANT CHANGE UP (ref 1.8–7.4)
IMM GRANULOCYTES NFR BLD AUTO: 0.4 % — SIGNIFICANT CHANGE UP (ref 0–0.9)
LYMPHOCYTES # BLD AUTO: 3.5 K/UL — HIGH (ref 1–3.3)
LYMPHOCYTES # BLD AUTO: 36.7 % — SIGNIFICANT CHANGE UP (ref 13–44)
MAGNESIUM SERPL-MCNC: 2 MG/DL — SIGNIFICANT CHANGE UP (ref 1.6–2.6)
MCHC RBC-ENTMCNC: 30.2 PG — SIGNIFICANT CHANGE UP (ref 27–34)
MCHC RBC-ENTMCNC: 33.4 G/DL — SIGNIFICANT CHANGE UP (ref 32–36)
MCV RBC AUTO: 90.4 FL — SIGNIFICANT CHANGE UP (ref 80–100)
MONOCYTES # BLD AUTO: 0.74 K/UL — SIGNIFICANT CHANGE UP (ref 0–0.9)
MONOCYTES NFR BLD AUTO: 7.8 % — SIGNIFICANT CHANGE UP (ref 2–14)
NEUTROPHILS # BLD AUTO: 4.79 K/UL — SIGNIFICANT CHANGE UP (ref 1.8–7.4)
NEUTROPHILS NFR BLD AUTO: 50.3 % — SIGNIFICANT CHANGE UP (ref 43–77)
NRBC # BLD AUTO: 0 K/UL — SIGNIFICANT CHANGE UP (ref 0–0)
NRBC # FLD: 0 K/UL — SIGNIFICANT CHANGE UP (ref 0–0)
NRBC BLD AUTO-RTO: 0 /100 WBCS — SIGNIFICANT CHANGE UP (ref 0–0)
PHOSPHATE SERPL-MCNC: 3.5 MG/DL — SIGNIFICANT CHANGE UP (ref 2.5–4.5)
PLATELET # BLD AUTO: 311 K/UL — SIGNIFICANT CHANGE UP (ref 150–400)
POTASSIUM SERPL-MCNC: 3.7 MMOL/L — SIGNIFICANT CHANGE UP (ref 3.5–5.3)
POTASSIUM SERPL-SCNC: 3.7 MMOL/L — SIGNIFICANT CHANGE UP (ref 3.5–5.3)
RBC # BLD: 3.97 M/UL — SIGNIFICANT CHANGE UP (ref 3.8–5.2)
RBC # FLD: 13.1 % — SIGNIFICANT CHANGE UP (ref 10.3–14.5)
SODIUM SERPL-SCNC: 140 MMOL/L — SIGNIFICANT CHANGE UP (ref 135–145)
WBC # BLD: 9.53 K/UL — SIGNIFICANT CHANGE UP (ref 3.8–10.5)
WBC # FLD AUTO: 9.53 K/UL — SIGNIFICANT CHANGE UP (ref 3.8–10.5)

## 2025-03-28 RX ORDER — OLANZAPINE 10 MG/1
2.5 TABLET ORAL ONCE
Refills: 0 | Status: COMPLETED | OUTPATIENT
Start: 2025-03-28 | End: 2025-03-28

## 2025-03-28 RX ADMIN — IPRATROPIUM BROMIDE AND ALBUTEROL SULFATE 3 MILLILITER(S): .5; 2.5 SOLUTION RESPIRATORY (INHALATION) at 15:50

## 2025-03-28 RX ADMIN — IPRATROPIUM BROMIDE AND ALBUTEROL SULFATE 3 MILLILITER(S): .5; 2.5 SOLUTION RESPIRATORY (INHALATION) at 10:58

## 2025-03-28 RX ADMIN — APIXABAN 5 MILLIGRAM(S): 2.5 TABLET, FILM COATED ORAL at 05:58

## 2025-03-28 RX ADMIN — Medication 81 MILLIGRAM(S): at 13:10

## 2025-03-28 RX ADMIN — Medication 1 TABLET(S): at 13:10

## 2025-03-28 RX ADMIN — Medication 25 GRAM(S): at 05:57

## 2025-03-28 RX ADMIN — CITALOPRAM 10 MILLIGRAM(S): 20 TABLET ORAL at 13:10

## 2025-03-28 RX ADMIN — Medication 30 MILLIGRAM(S): at 05:58

## 2025-03-28 RX ADMIN — INSULIN LISPRO 1: 100 INJECTION, SOLUTION INTRAVENOUS; SUBCUTANEOUS at 11:51

## 2025-03-28 RX ADMIN — OLANZAPINE 2.5 MILLIGRAM(S): 10 TABLET ORAL at 20:42

## 2025-03-28 RX ADMIN — FOLIC ACID 1 MILLIGRAM(S): 1 TABLET ORAL at 13:12

## 2025-03-28 RX ADMIN — IPRATROPIUM BROMIDE AND ALBUTEROL SULFATE 3 MILLILITER(S): .5; 2.5 SOLUTION RESPIRATORY (INHALATION) at 20:23

## 2025-03-28 RX ADMIN — DORZOLAMIDE 1 DROP(S): 20 SOLUTION/ DROPS OPHTHALMIC at 21:11

## 2025-03-28 RX ADMIN — IPRATROPIUM BROMIDE AND ALBUTEROL SULFATE 3 MILLILITER(S): .5; 2.5 SOLUTION RESPIRATORY (INHALATION) at 03:58

## 2025-03-28 RX ADMIN — Medication 40 MILLIGRAM(S): at 05:59

## 2025-03-28 RX ADMIN — QUETIAPINE FUMARATE 25 MILLIGRAM(S): 25 TABLET ORAL at 17:05

## 2025-03-28 RX ADMIN — DORZOLAMIDE 1 DROP(S): 20 SOLUTION/ DROPS OPHTHALMIC at 13:10

## 2025-03-28 RX ADMIN — MEMANTINE HYDROCHLORIDE 10 MILLIGRAM(S): 21 CAPSULE, EXTENDED RELEASE ORAL at 17:05

## 2025-03-28 RX ADMIN — Medication 1 TABLET(S): at 21:11

## 2025-03-28 RX ADMIN — ATORVASTATIN CALCIUM 10 MILLIGRAM(S): 80 TABLET, FILM COATED ORAL at 21:11

## 2025-03-28 RX ADMIN — APIXABAN 5 MILLIGRAM(S): 2.5 TABLET, FILM COATED ORAL at 17:05

## 2025-03-28 RX ADMIN — DORZOLAMIDE 1 DROP(S): 20 SOLUTION/ DROPS OPHTHALMIC at 05:57

## 2025-03-28 RX ADMIN — Medication 25 GRAM(S): at 21:11

## 2025-03-28 RX ADMIN — Medication 3 MILLIGRAM(S): at 21:11

## 2025-03-28 RX ADMIN — MIRTAZAPINE 30 MILLIGRAM(S): 30 TABLET, FILM COATED ORAL at 21:11

## 2025-03-28 RX ADMIN — MEMANTINE HYDROCHLORIDE 10 MILLIGRAM(S): 21 CAPSULE, EXTENDED RELEASE ORAL at 05:58

## 2025-03-28 RX ADMIN — QUETIAPINE FUMARATE 25 MILLIGRAM(S): 25 TABLET ORAL at 05:58

## 2025-03-28 RX ADMIN — Medication 1 TABLET(S): at 05:58

## 2025-03-28 RX ADMIN — Medication 25 GRAM(S): at 13:10

## 2025-03-28 NOTE — PHYSICAL THERAPY INITIAL EVALUATION ADULT - PERTINENT HX OF CURRENT PROBLEM, REHAB EVAL
Patient is a 79 year old female who presents to the ED as a code sepsis with aide at bedside.  Patient arrived with EMS and was found to be hypoxic to the 80s and was placed on 6 L nasal cannula with improvement.  Per daughter patient has been having cold/flu symptoms, along with fevers. CTA chest with mild congestion, trace bilateral pleural effusions left>right, left apical consolidation + patchy peripheral nodular opacities likely infectious. Negative for PE.

## 2025-03-28 NOTE — DISCHARGE NOTE NURSING/CASE MANAGEMENT/SOCIAL WORK - FLU SEASON?
Please advise patient to have a PSA and CBC done. He is on testosterone supplement.
Please update colonoscopy.
Yes...

## 2025-03-28 NOTE — PHYSICAL THERAPY INITIAL EVALUATION ADULT - GAIT DEVIATIONS NOTED, PT EVAL
spO2 ~85% on room air while ambulating, took one standing rest break improved to 90% on room air, RN Maria Guadalupe made aware of desaturation with ambulation/decreased segundo/increased time in double stance/decreased step length

## 2025-03-28 NOTE — DISCHARGE NOTE NURSING/CASE MANAGEMENT/SOCIAL WORK - NSSCNAMETXT_GEN_ALL_CORE
[facility] [place] [phone].  Nurse to visit on the day after discharge.  Other appropriate services to be arranged thereafter.   24/7 HHA through [facility] [phone]; coordinator Skye Okeefe [phone]

## 2025-03-28 NOTE — DISCHARGE NOTE PROVIDER - NSDCCPCAREPLAN_GEN_ALL_CORE_FT
PRINCIPAL DISCHARGE DIAGNOSIS  Diagnosis: Pneumonia  Assessment and Plan of Treatment: You completed a course of antibiotics in the hospital. You were weaned off supplemental oxygen, can use albuterol inhaler as needed. Follow up with your primary care physician for further monitoring in 1-2 weeks. Please call to arrange appointment.      SECONDARY DISCHARGE DIAGNOSES  Diagnosis: Paroxysmal atrial fibrillation  Assessment and Plan of Treatment: COntinue eliquis to prevent stroke. Mtoprolol was stopped and switched to Nadolol due to low heart rate. Hold Aricept which can also cause low heart rate. Follow up with Dr. Pastrana for monitoring.    Diagnosis: Diastolic CHF, acute on chronic  Assessment and Plan of Treatment: Follow up with Dr. Pastrana for monitoring    Diagnosis: Dementia  Assessment and Plan of Treatment: Hold Aricept, Follow up with your primary care physician for further monitoring in 1-2 weeks. Please call to arrange appointment.    Diagnosis: Type 2 diabetes mellitus with hyperglycemia, without long-term current use of insulin  Assessment and Plan of Treatment: Continue Metformin

## 2025-03-28 NOTE — PROGRESS NOTE ADULT - ASSESSMENT
79-year-old female, ambulatory with assistance,  with  medical history of dementia (AO x 1-2 at baseline), CVA, history of diverticulitis c/b perforation s/p Jania procedure (proctosigmoidectomy) on 5/21/24 with colostomy, Type 2 diabetes, hypertension, glaucoma, osteoarthritis of the left knee, recent admission for syncope, TINO and Dx of Afib with RVR; Pt a/w sepsis due to PNA c/b hypoxemia, and acute on chronic dCHF and RHF c/b pulmonary edema.    Severe  Sepsis sec to pneumonia and acute hypoxic respiratory failure POA   ·  Plan: Due to PNA  - abx as per ID  - pulm fu   - Legionella Ag, Strep Ag neg     Acute on chronic diastolic CHF   - diuresis as per cards   -Strict I/O q4h  -Deily weight      Paroxysmal atrial fibrillation.   [id number] risk stratification score=8  c/w Eliquis   c/w Metoprolol    Dementia.   Fall, aspiration precautions  Conservative consistency diet  HOB elevation   c/w Mirtazapine, Seroquel, Namenda, Citalopram, Donepezil    HTN (hypertension).   ·  Plan: c/w Metoprolol   c/w Nifedipine.     Type 2 diabetes mellitus with hyperglycemia, without long-term current use of insulin.   ·  Plan: Hold Metformin DM diet.  - ISS

## 2025-03-28 NOTE — DISCHARGE NOTE PROVIDER - HOSPITAL COURSE
80 y/o F PMhx dementia (AO x 1-2 at baseline), CVA, history of diverticulitis c/b perforation s/p Jania procedure (proctosigmoidectomy) on 5/21/24 with colostomy, DM II, HTN, glaucoma, recent admission for syncope, TINO, Afib w/ RVR who presented with hypoxia    PNA, acute hypoxic resp failure  sepsis- fever, leukocytosis- resolved  RVP negative  UA negative  urine legionella and strep pneumoniae urine Ag negative  CT- No pulmonary embolism. Left apical consolidative opacity and a few patchy peripheral nodular opacities which raises concern for pneumonia. Cardiomegaly with reflux of contrast into the intrahepatic IVC and hepatic veins suggestive of right heart failure. No acute intra-abdominal or pelvic findings.  blood cultures- NGTD  s/p zosyn, completed 3/30  continue off antibiotics  O2 sat 92% on ambulation - no need for Home O2    Hypervolemia  - s/p IV Lasix  - TTE 2/2025: LVSF wnl, EF 68%. Mild LV hypertrophy, increased LV mass and concentric hypertrophy. Mild (grade 1) LV diastolic dysfunction.    Rapid afib - converted to NSR, changed BB to Nadolol - now bradycardic - Nadolol decreased on 3/27  off aricept to help bradycardia    Discussed case with Dr. Eagle, pt is cleared for discharge home.

## 2025-03-28 NOTE — DISCHARGE NOTE NURSING/CASE MANAGEMENT/SOCIAL WORK - FINANCIAL ASSISTANCE
[facility] provides services at a reduced cost to those who are determined to be eligible through [facility]’s financial assistance program. Information regarding [facility]’s financial assistance program can be found by going to [email or web address] or by calling [phone].

## 2025-03-28 NOTE — DISCHARGE NOTE NURSING/CASE MANAGEMENT/SOCIAL WORK - NSDCFUADDAPPT_GEN_ALL_CORE_FT
APPTS ARE READY TO BE MADE: [X] YES    Best Family or Patient Contact (if needed):    Additional Information about above appointments (if needed):    1: CARDIOLOGY/PULMONARY   2:   3:     Home Pulm - Appointment was scheduled in [facility] with Dr. Pride 4/1 1:30pm for home pulm    Cardiology - Appointment was scheduled by our team on the patient's behalf through the provider's office. Patient is scheduled with Dr. Joel Pastrana 4/2 10:00am [address] [facility]    Heart Fail - Appointment was scheduled by our team on the patient's behalf through the provider's office. Patient is scheduled with Dr. Andres 4/21 1:00pm [id number] [address] [place] [address]  Other comments or requests:

## 2025-03-28 NOTE — PHYSICAL THERAPY INITIAL EVALUATION ADULT - ADDITIONAL COMMENTS
Patient presenting with confusion, unable to attain accurate social history from patient. Per previous PT evaluation. Patient lives in a private home with her son and family, has aides ~12 hours a day. Ambulatory without a device at home.     Patient left in bed, NAD, all lines and tubes intact, bed alarm on, call bell within reach, head of bed elevated >30 degrees, RN Maria Guadalupe aware of PT, spO2 92% on room air

## 2025-03-28 NOTE — DISCHARGE NOTE NURSING/CASE MANAGEMENT/SOCIAL WORK - PATIENT PORTAL LINK FT
You can access the FollowMyHealth Patient Portal offered by [facility] by registering at the following website: [email or web address]. By joining [email or web address] FollowMyHealth portal, you will also be able to view your health information using other applications (apps) compatible with our system.

## 2025-03-28 NOTE — PROGRESS NOTE ADULT - ASSESSMENT
79-year-old female, ambulatory with assistance,  with  medical history of dementia (AO x 1-2 at baseline), CVA, history of diverticulitis c/b perforation s/p Jania procedure (proctosigmoidectomy) on 5/21/24 with colostomy, Type 2 diabetes, hypertension, glaucoma, osteoarthritis of the left knee, recent admission for syncope, TINO and Dx of Afib with RVR; Pt  presents to the ED as a code sepsis with aide at bedside.  Patient arrived with EMS and was found to be hypoxic to the 80s and was placed on 6 L nasal cannula with improvement.  Spoke with patient's daughter (Kaitlin) for collateral in which she states over the last week patient has been fighting cold/flu symptoms in which she has been having fevers Tmax 101, cough and overall lethargy.  Patient was seen by his doctor yesterday in which she was prescribed antibiotics but family was unable to  antibiotics at the time.  nephrology consulted for acidosis      acidosis  mild   non AG  better  off diuretic    HTN  controlled   MOnitor     fluid overload  had congestion on xray  completed on lasix 20   f/u cardio     hypokalemia  supplemented  better  monitor

## 2025-03-28 NOTE — PROGRESS NOTE ADULT - ASSESSMENT
78 y/o F with PMH of dementia (AO x 1-2 at baseline), CVA, diverticulitis c/b perforation s/p Jania procedure (proctosigmoidectomy) on 5/21/24 with colostomy, Type 2 diabetes, hypertension, glaucoma, osteoarthritis of the left knee, recent admission for syncope, TINO and Dx of Afib with RVR; Pt  presents to the ED as a code sepsis with aide at bedside.  Patient arrived with EMS and was found to be hypoxic to the 80s and was placed on 6 L nasal cannula with improvement.  Spoke with patient's daughter (Kaitlin) for collateral in which she states over the last week patient has been fighting cold/flu symptoms in which she has been having fevers Tmax 101F, cough and overall lethargy. Febrile in ED to 103.1F. Pulmonary called to consult for hypoxia, r/o PNA.

## 2025-03-28 NOTE — DISCHARGE NOTE PROVIDER - CARE PROVIDER_API CALL
Yoel Pastrana  Cardiovascular Disease  [address] [facility] [address]  [place] [address]  Phone: [phone]  Fax: [phone]  Follow Up Time:

## 2025-03-28 NOTE — PROGRESS NOTE ADULT - ASSESSMENT
78 y/o F PMhx dementia (AO x 1-2 at baseline), CVA, history of diverticulitis c/b perforation s/p Jania procedure (proctosigmoidectomy) on 5/21/24 with colostomy, DM II, HTN, glaucoma, recent admission for syncope, TINO, Afib w/ RVR who presented with hypoxia    PNA, acute hypoxic resp failure  sepsis- fever, leukocytosis- resolved  RVP negative  UA negative  urine legionella and strep pneumoniae urine Ag negative  CT- No pulmonary embolism. Left apical consolidative opacity and a few patchy peripheral nodular opacities which raises concern for pneumonia. Cardiomegaly with reflux of contrast into the intrahepatic IVC and hepatic veins suggestive of right heart failure. No acute intra-abdominal or pelvic findings.  blood cultures- NGTD    Recommendations  c/w zosyn  - complete 5 day course of antibiotics w/ last day 3/30  wean O2 as tolerated    Dr. Riccardo Okeefe will be covering 3/29 & 3/30. I will resume care 3/31.   Geovani Goldman M.D.  [facility]  [phone]  After 5pm on weekdays and all day on weekends - please call [phone]  Available on microsoft teams    Thank you for consulting us and involving us in the management of this patients case. In addition to reviewing history, imaging, documents, labs, microbiology, took into account antibiotic stewardship, local antibiogram and infection control strategies and potential transmission issues.  78 y/o F PMhx dementia (AO x 1-2 at baseline), CVA, history of diverticulitis c/b perforation s/p Jania procedure (proctosigmoidectomy) on 5/21/24 with colostomy, DM II, HTN, glaucoma, recent admission for syncope, TINO, Afib w/ RVR who presented with hypoxia    PNA, acute hypoxic resp failure  sepsis- fever, leukocytosis- resolved  RVP negative  UA negative  urine legionella and strep pneumoniae urine Ag negative  CT- No pulmonary embolism. Left apical consolidative opacity and a few patchy peripheral nodular opacities which raises concern for pneumonia. Cardiomegaly with reflux of contrast into the intrahepatic IVC and hepatic veins suggestive of right heart failure. No acute intra-abdominal or pelvic findings.  blood cultures- NGTD    Recommendations  c/w zosyn  - complete 5 day course of antibiotics w/ last day 3/30  - transition to augmentin 875-125mg bid on discharge to complete above course  wean O2 as tolerated  discharge planning    Dr. Riccardo Okeefe will be covering 3/29 & 3/30. I will resume care 3/31.   Geovani Goldman M.D.  [facility]  [phone]  After 5pm on weekdays and all day on weekends - please call [phone]  Available on microsoft teams    Thank you for consulting us and involving us in the management of this patients case. In addition to reviewing history, imaging, documents, labs, microbiology, took into account antibiotic stewardship, local antibiogram and infection control strategies and potential transmission issues.

## 2025-03-28 NOTE — PROGRESS NOTE ADULT - ASSESSMENT
Hypoxia  sob  CT c/w PNA    abx  o2  fu with ID/Pulm    PAF  cont eliquis  cont nadolol   off aricept to help bradycardia     screen for heart disease  monitor qtc on zithromax plus seroquel and remeron     HTN  stable  cont current meds

## 2025-03-28 NOTE — DISCHARGE NOTE PROVIDER - NSDCFUADDAPPT_GEN_ALL_CORE_FT
APPTS ARE READY TO BE MADE: [X] YES    Best Family or Patient Contact (if needed):    Additional Information about above appointments (if needed):    1: CARDIOLOGY/PULMONARY   2:   3:     Other comments or requests:    APPTS ARE READY TO BE MADE: [X] YES    Best Family or Patient Contact (if needed):    Additional Information about above appointments (if needed):    1: CARDIOLOGY/PULMONARY   2:   3:     Home Pulm - Appointment was scheduled in [facility] with Dr. Pride 4/1 1:30pm for home pulm    Cardiology - Appointment was scheduled by our team on the patient's behalf through the provider's office. Patient is scheduled with Dr. Joel Pastrana 4/2 10:00am [address] [place]    Heart Fail - Appointment was scheduled by our team on the patient's behalf through the provider's office. Patient is scheduled with Dr. Andres 4/21 1:00pm [address] [address] [place] [address]  Other comments or requests:

## 2025-03-28 NOTE — DISCHARGE NOTE PROVIDER - NSDCMRMEDTOKEN_GEN_ALL_CORE_FT
acetaminophen 325 mg oral tablet: 2 tab(s) orally every 6 hours As needed Temp greater or equal to 38C (100.4F), Mild Pain (1 - 3)  alendronate 70 mg oral tablet: 1 tab(s) orally once a week  aluminum hydroxide-magnesium hydroxide 200 mg-200 mg/5 mL oral suspension: 30 milliliter(s) orally every 4 hours As needed Dyspepsia  apixaban 5 mg oral tablet: 1 tab(s) orally 2 times a day  aspirin 81 mg oral tablet, chewable: 1 tab(s) orally once a day  brinzolamide 1% ophthalmic suspension: 1 drop(s) in each eye 3 times a day  citalopram 10 mg oral tablet: 1 tab(s) orally once a day  donepezil 10 mg oral tablet: 1 tab(s) orally once a day (at bedtime)  folic acid: 1 milligram(s) once a day  melatonin 3 mg oral tablet: 1 tab(s) orally once a day (at bedtime) As needed Insomnia  memantine 28 mg oral capsule, extended release: 1 cap(s) orally once a day  metFORMIN 500 mg oral tablet: 1 tab(s) orally once a day  metoprolol succinate 25 mg oral tablet, extended release: 1 tab(s) orally once a day (before a meal) hold if systolic bp is less than 110 , diastolic less than 90 , hr less thaN 60  mirtazapine 30 mg oral tablet: 1 tab(s) orally once a day (at bedtime)  NIFEdipine (Eqv-Adalat CC) 30 mg oral tablet, extended release: 1 tab(s) orally once a day  Oysco 500 with D 500 mg-5 mcg (200 intl units) oral tablet: 1 tab(s) orally 3 times a day  Protonix 20 mg oral delayed release tablet: 1 tab(s) orally once a day  Seroquel 25 mg oral tablet: 1 tab(s) orally 2 times a day  simvastatin 20 mg oral tablet: 1 tab(s) orally once a day   acetaminophen 325 mg oral tablet: 2 tab(s) orally every 6 hours As needed Temp greater or equal to 38C (100.4F), Mild Pain (1 - 3)  albuterol 90 mcg/inh inhalation aerosol: 2 puff(s) inhaled every 6 hours as needed for  shortness of breath and/or wheezing  alendronate 70 mg oral tablet: 1 tab(s) orally once a week  aluminum hydroxide-magnesium hydroxide 200 mg-200 mg/5 mL oral suspension: 30 milliliter(s) orally every 4 hours As needed Dyspepsia  apixaban 5 mg oral tablet: 1 tab(s) orally 2 times a day  aspirin 81 mg oral tablet, chewable: 1 tab(s) orally once a day  brinzolamide 1% ophthalmic suspension: 1 drop(s) in each eye 3 times a day  citalopram 10 mg oral tablet: 1 tab(s) orally once a day  folic acid: 1 milligram(s) once a day  melatonin 3 mg oral tablet: 1 tab(s) orally once a day (at bedtime) As needed Insomnia  memantine 10 mg oral tablet: 1 tab(s) orally 2 times a day  metFORMIN 500 mg oral tablet: 1 tab(s) orally once a day  mirtazapine 30 mg oral tablet: 1 tab(s) orally once a day (at bedtime)  nadolol 20 mg oral tablet: 0.5 tab(s) orally once a day  NIFEdipine (Eqv-Adalat CC) 30 mg oral tablet, extended release: 1 tab(s) orally once a day  Oysco 500 with D 500 mg-5 mcg (200 intl units) oral tablet: 1 tab(s) orally 3 times a day  Protonix 20 mg oral delayed release tablet: 1 tab(s) orally once a day  Seroquel 25 mg oral tablet: 1 tab(s) orally 2 times a day  simvastatin 20 mg oral tablet: 1 tab(s) orally once a day

## 2025-03-28 NOTE — DISCHARGE NOTE PROVIDER - NSDCFUSCHEDAPPT_GEN_ALL_CORE_FT
Gabrielle Pride  [facility] Physician [facility]  PULMMED [address]  Scheduled Appointment: 04/01/2025    [facility] Physician [facility]  HEARTFAIL [address]  Scheduled Appointment: 04/21/2025     Gabrielle Pride  [facility] Physician [facility]  PULMMED [address]  Scheduled Appointment: 04/01/2025    Yoel Pastrana  [facility]  CARDIOLOGY [address]   Scheduled Appointment: 04/02/2025    [facility]  HEARTFAIL [address]  Scheduled Appointment: 04/21/2025

## 2025-03-28 NOTE — DISCHARGE NOTE PROVIDER - NSFOLLOWUPCLINICS_GEN_ALL_ED_FT
[facility] - Cardiology  Cardiology  [place]   Phone:   Fax:     [facility] - HF  Cardiology  [address] [facility] office, [address]  [place], [place]   Phone:   Fax:     Home [facility]  Pulmonary  [place]   Phone:   Fax:

## 2025-03-28 NOTE — PHYSICAL THERAPY INITIAL EVALUATION ADULT - GENERAL OBSERVATIONS, REHAB EVAL
Patient found semi-reclined in bed NAD, A&Ox2, +tele monitor, spO2 94% on room air, patient OK for PT per RN Maria Guadalupe, patient agreeable to PT evaluation

## 2025-03-29 LAB
ANION GAP SERPL CALC-SCNC: 11 MMOL/L — SIGNIFICANT CHANGE UP (ref 7–14)
BASOPHILS # BLD AUTO: 0.06 K/UL — SIGNIFICANT CHANGE UP (ref 0–0.2)
BASOPHILS NFR BLD AUTO: 0.7 % — SIGNIFICANT CHANGE UP (ref 0–2)
BUN SERPL-MCNC: 13 MG/DL — SIGNIFICANT CHANGE UP (ref 7–23)
CALCIUM SERPL-MCNC: 9.7 MG/DL — SIGNIFICANT CHANGE UP (ref 8.4–10.5)
CHLORIDE SERPL-SCNC: 105 MMOL/L — SIGNIFICANT CHANGE UP (ref 98–107)
CO2 SERPL-SCNC: 24 MMOL/L — SIGNIFICANT CHANGE UP (ref 22–31)
CREAT SERPL-MCNC: 0.65 MG/DL — SIGNIFICANT CHANGE UP (ref 0.5–1.3)
EGFR: 91 ML/MIN/1.73M2 — SIGNIFICANT CHANGE UP
EGFR: 91 ML/MIN/1.73M2 — SIGNIFICANT CHANGE UP
EOSINOPHIL # BLD AUTO: 0.27 K/UL — SIGNIFICANT CHANGE UP (ref 0–0.5)
EOSINOPHIL NFR BLD AUTO: 3.2 % — SIGNIFICANT CHANGE UP (ref 0–6)
GLUCOSE BLDC GLUCOMTR-MCNC: 134 MG/DL — HIGH (ref 70–99)
GLUCOSE BLDC GLUCOMTR-MCNC: 147 MG/DL — HIGH (ref 70–99)
GLUCOSE BLDC GLUCOMTR-MCNC: 148 MG/DL — HIGH (ref 70–99)
GLUCOSE BLDC GLUCOMTR-MCNC: 157 MG/DL — HIGH (ref 70–99)
GLUCOSE SERPL-MCNC: 120 MG/DL — HIGH (ref 70–99)
HCT VFR BLD CALC: 38.5 % — SIGNIFICANT CHANGE UP (ref 34.5–45)
HGB BLD-MCNC: 12.8 G/DL — SIGNIFICANT CHANGE UP (ref 11.5–15.5)
IANC: 4.56 K/UL — SIGNIFICANT CHANGE UP (ref 1.8–7.4)
IMM GRANULOCYTES NFR BLD AUTO: 0.5 % — SIGNIFICANT CHANGE UP (ref 0–0.9)
LYMPHOCYTES # BLD AUTO: 2.86 K/UL — SIGNIFICANT CHANGE UP (ref 1–3.3)
LYMPHOCYTES # BLD AUTO: 33.9 % — SIGNIFICANT CHANGE UP (ref 13–44)
MAGNESIUM SERPL-MCNC: 2 MG/DL — SIGNIFICANT CHANGE UP (ref 1.6–2.6)
MCHC RBC-ENTMCNC: 30 PG — SIGNIFICANT CHANGE UP (ref 27–34)
MCHC RBC-ENTMCNC: 33.2 G/DL — SIGNIFICANT CHANGE UP (ref 32–36)
MCV RBC AUTO: 90.2 FL — SIGNIFICANT CHANGE UP (ref 80–100)
MONOCYTES # BLD AUTO: 0.64 K/UL — SIGNIFICANT CHANGE UP (ref 0–0.9)
MONOCYTES NFR BLD AUTO: 7.6 % — SIGNIFICANT CHANGE UP (ref 2–14)
NEUTROPHILS # BLD AUTO: 4.56 K/UL — SIGNIFICANT CHANGE UP (ref 1.8–7.4)
NEUTROPHILS NFR BLD AUTO: 54.1 % — SIGNIFICANT CHANGE UP (ref 43–77)
NRBC # BLD AUTO: 0 K/UL — SIGNIFICANT CHANGE UP (ref 0–0)
NRBC # FLD: 0 K/UL — SIGNIFICANT CHANGE UP (ref 0–0)
NRBC BLD AUTO-RTO: 0 /100 WBCS — SIGNIFICANT CHANGE UP (ref 0–0)
PHOSPHATE SERPL-MCNC: 4.2 MG/DL — SIGNIFICANT CHANGE UP (ref 2.5–4.5)
PLATELET # BLD AUTO: 349 K/UL — SIGNIFICANT CHANGE UP (ref 150–400)
POTASSIUM SERPL-MCNC: 3.7 MMOL/L — SIGNIFICANT CHANGE UP (ref 3.5–5.3)
POTASSIUM SERPL-SCNC: 3.7 MMOL/L — SIGNIFICANT CHANGE UP (ref 3.5–5.3)
RBC # BLD: 4.27 M/UL — SIGNIFICANT CHANGE UP (ref 3.8–5.2)
RBC # FLD: 13 % — SIGNIFICANT CHANGE UP (ref 10.3–14.5)
SODIUM SERPL-SCNC: 140 MMOL/L — SIGNIFICANT CHANGE UP (ref 135–145)
WBC # BLD: 8.43 K/UL — SIGNIFICANT CHANGE UP (ref 3.8–10.5)
WBC # FLD AUTO: 8.43 K/UL — SIGNIFICANT CHANGE UP (ref 3.8–10.5)

## 2025-03-29 RX ORDER — OLANZAPINE 10 MG/1
2.5 TABLET ORAL ONCE
Refills: 0 | Status: COMPLETED | OUTPATIENT
Start: 2025-03-29 | End: 2025-03-29

## 2025-03-29 RX ORDER — OLANZAPINE 10 MG/1
2.5 TABLET ORAL DAILY
Refills: 0 | Status: DISCONTINUED | OUTPATIENT
Start: 2025-03-29 | End: 2025-03-29

## 2025-03-29 RX ADMIN — MIRTAZAPINE 30 MILLIGRAM(S): 30 TABLET, FILM COATED ORAL at 21:22

## 2025-03-29 RX ADMIN — CITALOPRAM 10 MILLIGRAM(S): 20 TABLET ORAL at 12:11

## 2025-03-29 RX ADMIN — IPRATROPIUM BROMIDE AND ALBUTEROL SULFATE 3 MILLILITER(S): .5; 2.5 SOLUTION RESPIRATORY (INHALATION) at 04:43

## 2025-03-29 RX ADMIN — APIXABAN 5 MILLIGRAM(S): 2.5 TABLET, FILM COATED ORAL at 06:15

## 2025-03-29 RX ADMIN — Medication 1 TABLET(S): at 06:15

## 2025-03-29 RX ADMIN — Medication 1 TABLET(S): at 21:22

## 2025-03-29 RX ADMIN — MEMANTINE HYDROCHLORIDE 10 MILLIGRAM(S): 21 CAPSULE, EXTENDED RELEASE ORAL at 17:22

## 2025-03-29 RX ADMIN — IPRATROPIUM BROMIDE AND ALBUTEROL SULFATE 3 MILLILITER(S): .5; 2.5 SOLUTION RESPIRATORY (INHALATION) at 21:41

## 2025-03-29 RX ADMIN — Medication 3 MILLIGRAM(S): at 21:22

## 2025-03-29 RX ADMIN — Medication 25 GRAM(S): at 06:16

## 2025-03-29 RX ADMIN — APIXABAN 5 MILLIGRAM(S): 2.5 TABLET, FILM COATED ORAL at 17:22

## 2025-03-29 RX ADMIN — MEMANTINE HYDROCHLORIDE 10 MILLIGRAM(S): 21 CAPSULE, EXTENDED RELEASE ORAL at 06:15

## 2025-03-29 RX ADMIN — IPRATROPIUM BROMIDE AND ALBUTEROL SULFATE 3 MILLILITER(S): .5; 2.5 SOLUTION RESPIRATORY (INHALATION) at 09:27

## 2025-03-29 RX ADMIN — DORZOLAMIDE 1 DROP(S): 20 SOLUTION/ DROPS OPHTHALMIC at 22:05

## 2025-03-29 RX ADMIN — Medication 30 MILLIGRAM(S): at 06:16

## 2025-03-29 RX ADMIN — ATORVASTATIN CALCIUM 10 MILLIGRAM(S): 80 TABLET, FILM COATED ORAL at 21:22

## 2025-03-29 RX ADMIN — Medication 1 TABLET(S): at 12:11

## 2025-03-29 RX ADMIN — Medication 25 GRAM(S): at 21:23

## 2025-03-29 RX ADMIN — Medication 40 MILLIGRAM(S): at 06:15

## 2025-03-29 RX ADMIN — Medication 81 MILLIGRAM(S): at 12:12

## 2025-03-29 RX ADMIN — DORZOLAMIDE 1 DROP(S): 20 SOLUTION/ DROPS OPHTHALMIC at 12:12

## 2025-03-29 RX ADMIN — QUETIAPINE FUMARATE 25 MILLIGRAM(S): 25 TABLET ORAL at 17:22

## 2025-03-29 RX ADMIN — QUETIAPINE FUMARATE 25 MILLIGRAM(S): 25 TABLET ORAL at 06:15

## 2025-03-29 RX ADMIN — Medication 10 MILLIGRAM(S): at 06:16

## 2025-03-29 RX ADMIN — FOLIC ACID 1 MILLIGRAM(S): 1 TABLET ORAL at 12:11

## 2025-03-29 RX ADMIN — DORZOLAMIDE 1 DROP(S): 20 SOLUTION/ DROPS OPHTHALMIC at 06:15

## 2025-03-29 RX ADMIN — OLANZAPINE 2.5 MILLIGRAM(S): 10 TABLET ORAL at 18:56

## 2025-03-29 RX ADMIN — Medication 25 GRAM(S): at 12:12

## 2025-03-29 RX ADMIN — IPRATROPIUM BROMIDE AND ALBUTEROL SULFATE 3 MILLILITER(S): .5; 2.5 SOLUTION RESPIRATORY (INHALATION) at 16:26

## 2025-03-29 NOTE — PROGRESS NOTE ADULT - CONVERSATION DETAILS
YAIMA discussed at length with pt  she wants to remain full code and to continue all aggressive measures

## 2025-03-29 NOTE — PROVIDER CONTACT NOTE (OTHER) - SITUATION
Pt agitated during change of shift. Pt ripped off ostomy bag twice and threw it on the ground. Pt took off oxygen and pulse ox device. Pt receive IM Zyprexa.
pt Self converted to NSR
Pt climbing out of bed, cursing at staff and pulling off ostomy tube
Pt desatting to 88-89% on 2L oxygen nasal cannula.
Pt getting very restless and ripped out IV. Uncooperative at this time
Pt O2 85% on RA - placed pt on 1L, O2 now 94%
Pt going into A fib. HR going from 150s to 170s.

## 2025-03-29 NOTE — PROGRESS NOTE ADULT - ASSESSMENT
Hypoxia  sob  CT c/w PNA    abx  o2  fu with ID/Pulm    PAF  cont eliquis  cont nadolol   off aricept to help bradycardia   HR better     screen for heart disease  monitor qtc on zithromax plus seroquel and remeron     HTN  stable  cont current meds

## 2025-03-29 NOTE — PROVIDER CONTACT NOTE (OTHER) - BACKGROUND
Pt admitting dx Pneumonia
Pt admitting dx Pneumonia
Pt 79 year old admitted for pneumonia with hx of dementia.
Pt is 79 year old female admitted for pneumonia.
Pt admitting dx Pneumonia
(PMH) CVA (cerebrovascular accident)  (PMH) Dementia  (PMH) Diabetes mellitus, type 2

## 2025-03-29 NOTE — PROVIDER CONTACT NOTE (OTHER) - ACTION/TREATMENT ORDERED:
ACP aware
Continue to monitor pt if need more medication page provider.
Provider notified. Pt getting cleaned up and then IV Tylenol to be administered. Provider suggested that HR and BP meds for pt get pushed earlier
ACP aware
ACP aware - okay to try next shift once pt calms down
Continue to monitor. Respiratory to give duoneb treatment.

## 2025-03-29 NOTE — PROVIDER CONTACT NOTE (OTHER) - ASSESSMENT
Pt getting very restless and ripped out IV. Uncooperative at this time
Pt agitated yelling. Pt O2 sat 88-89% on 2L. Pt O2 raised to 4L pt now satting 90-93%.
VS: 100.6 rectal temp, 148/75 BP, 92% SpO2 on 2LNC,  sustaining, RR 18. Pt asymptomatic and sleeping when first assessed.
Pt O2 85% on RA - placed pt on 1L, O2 now 94%
pt Self converted to NSR
vitals as per flow sheet, pt agitated and restless. Refused PO Zyprexa, threw pill at RN
Pt agitated yelling and trying to kick. Security called to bedside to assist day shift and night shift RN.
Dysphagia diet as tolerated with known risk of aspiration

## 2025-03-29 NOTE — PROVIDER CONTACT NOTE (OTHER) - NAME OF MD/NP/PA/DO NOTIFIED:
Jannie Medrano
Aleja Granados
Jeremy Celis
CONCHITA - Lizzeth Paz
CONCHITA - Lizzeth Paz
CONCHITA Paz
Jeremy Celis

## 2025-03-29 NOTE — PROGRESS NOTE ADULT - ASSESSMENT
79-year-old female, ambulatory with assistance,  with  medical history of dementia (AO x 1-2 at baseline), CVA, history of diverticulitis c/b perforation s/p Jania procedure (proctosigmoidectomy) on 5/21/24 with colostomy, Type 2 diabetes, hypertension, glaucoma, osteoarthritis of the left knee, recent admission for syncope, TINO and Dx of Afib with RVR; Pt  presents to the ED as a code sepsis with aide at bedside.  Patient arrived with EMS and was found to be hypoxic to the 80s and was placed on 6 L nasal cannula with improvement.  Spoke with patient's daughter (Kaitlin) for collateral in which she states over the last week patient has been fighting cold/flu symptoms in which she has been having fevers Tmax 101, cough and overall lethargy.  Patient was seen by his doctor yesterday in which she was prescribed antibiotics but family was unable to  antibiotics at the time.  nephrology consulted for acidosis      Metabolic Acidosis  non AG  Improved.  - Follow up BMP    HTN  controlled   MOnitor     fluid overload  had congestion on xray  completed on lasix 20   f/u cardio     hypokalemia  supplemented  better  monitor

## 2025-03-29 NOTE — PROVIDER CONTACT NOTE (OTHER) - RECOMMENDATIONS
Continue to monitor pt if need more medication page provider.
IM Zyprexa for agitation
ACP aware - okay to try next shift once pt calms down
ACP aware
Provider notified. EKG sent to provider. Provider ordered IV Tylenol for pt fever.
ACP aware
Continue to monitor. Respiratory to give duoneb treatment.

## 2025-03-29 NOTE — PROVIDER CONTACT NOTE (OTHER) - REASON
Pt HR reached 170s
Pt desatting
Pt O2 85% on RA - placed pt on 1L, O2 now 94%
Pt agitated
Pt getting very restless and ripped out IV.
Pt agitated
pt Self converted to NSR

## 2025-03-29 NOTE — PROVIDER CONTACT NOTE (OTHER) - DATE AND TIME:
28-Mar-2025 17:04
27-Mar-2025 09:40
26-Mar-2025 01:00
26-Mar-2025 13:24
29-Mar-2025 19:02
29-Mar-2025 20:59
29-Mar-2025 19:18

## 2025-03-29 NOTE — PROGRESS NOTE ADULT - ASSESSMENT
79-year-old female, ambulatory with assistance,  with  medical history of dementia (AO x 1-2 at baseline), CVA, history of diverticulitis c/b perforation s/p Jania procedure (proctosigmoidectomy) on 5/21/24 with colostomy, Type 2 diabetes, hypertension, glaucoma, osteoarthritis of the left knee, recent admission for syncope, TINO and Dx of Afib with RVR; Pt a/w sepsis due to PNA c/b hypoxemia, and acute on chronic dCHF and RHF c/b pulmonary edema.    Severe  Sepsis sec to pneumonia and acute hypoxic respiratory failure POA   ·  Plan: Due to PNA  - abx as per ID  - pulm fu   - Legionella Ag, Strep Ag neg     Acute on chronic diastolic CHF   - diuresis as per cards   -Strict I/O q4h  -Deily weight      Paroxysmal atrial fibrillation.   [id number]-Gkmy risk stratification score=8  c/w Eliquis   c/w Metoprolol    Dementia.   Fall, aspiration precautions  Conservative consistency diet  HOB elevation   c/w Mirtazapine, Seroquel, Namenda, Citalopram, Donepezil    HTN (hypertension).   ·  Plan: c/w Metoprolol   c/w Nifedipine.     Type 2 diabetes mellitus with hyperglycemia, without long-term current use of insulin.   ·  Plan: Hold Metformin DM diet.  - ISS

## 2025-03-30 PROBLEM — A41.9 SEPSIS WITH ENCEPHALOPATHY WITHOUT SEPTIC SHOCK, DUE TO UNSPECIFIED ORGANISM: Status: ACTIVE | Noted: 2025-03-30

## 2025-03-30 PROBLEM — G93.40 ENCEPHALOPATHY ACUTE: Status: ACTIVE | Noted: 2025-03-30

## 2025-03-30 LAB
ANION GAP SERPL CALC-SCNC: 12 MMOL/L — SIGNIFICANT CHANGE UP (ref 7–14)
BASOPHILS # BLD AUTO: 0.05 K/UL — SIGNIFICANT CHANGE UP (ref 0–0.2)
BASOPHILS NFR BLD AUTO: 0.6 % — SIGNIFICANT CHANGE UP (ref 0–2)
BUN SERPL-MCNC: 27 MG/DL — HIGH (ref 7–23)
CALCIUM SERPL-MCNC: 8.9 MG/DL — SIGNIFICANT CHANGE UP (ref 8.4–10.5)
CHLORIDE SERPL-SCNC: 106 MMOL/L — SIGNIFICANT CHANGE UP (ref 98–107)
CO2 SERPL-SCNC: 21 MMOL/L — LOW (ref 22–31)
CREAT SERPL-MCNC: 0.7 MG/DL — SIGNIFICANT CHANGE UP (ref 0.5–1.3)
CULTURE RESULTS: SIGNIFICANT CHANGE UP
CULTURE RESULTS: SIGNIFICANT CHANGE UP
EGFR: 89 ML/MIN/1.73M2 — SIGNIFICANT CHANGE UP
EGFR: 89 ML/MIN/1.73M2 — SIGNIFICANT CHANGE UP
EOSINOPHIL # BLD AUTO: 0.29 K/UL — SIGNIFICANT CHANGE UP (ref 0–0.5)
EOSINOPHIL NFR BLD AUTO: 3.4 % — SIGNIFICANT CHANGE UP (ref 0–6)
GLUCOSE BLDC GLUCOMTR-MCNC: 112 MG/DL — HIGH (ref 70–99)
GLUCOSE BLDC GLUCOMTR-MCNC: 134 MG/DL — HIGH (ref 70–99)
GLUCOSE BLDC GLUCOMTR-MCNC: 135 MG/DL — HIGH (ref 70–99)
GLUCOSE BLDC GLUCOMTR-MCNC: 191 MG/DL — HIGH (ref 70–99)
GLUCOSE SERPL-MCNC: 152 MG/DL — HIGH (ref 70–99)
HCT VFR BLD CALC: 34.1 % — LOW (ref 34.5–45)
HGB BLD-MCNC: 11.1 G/DL — LOW (ref 11.5–15.5)
IANC: 4.3 K/UL — SIGNIFICANT CHANGE UP (ref 1.8–7.4)
IMM GRANULOCYTES NFR BLD AUTO: 0.6 % — SIGNIFICANT CHANGE UP (ref 0–0.9)
LYMPHOCYTES # BLD AUTO: 3.1 K/UL — SIGNIFICANT CHANGE UP (ref 1–3.3)
LYMPHOCYTES # BLD AUTO: 36.6 % — SIGNIFICANT CHANGE UP (ref 13–44)
MAGNESIUM SERPL-MCNC: 2.1 MG/DL — SIGNIFICANT CHANGE UP (ref 1.6–2.6)
MCHC RBC-ENTMCNC: 29.9 PG — SIGNIFICANT CHANGE UP (ref 27–34)
MCHC RBC-ENTMCNC: 32.6 G/DL — SIGNIFICANT CHANGE UP (ref 32–36)
MCV RBC AUTO: 91.9 FL — SIGNIFICANT CHANGE UP (ref 80–100)
MONOCYTES # BLD AUTO: 0.68 K/UL — SIGNIFICANT CHANGE UP (ref 0–0.9)
MONOCYTES NFR BLD AUTO: 8 % — SIGNIFICANT CHANGE UP (ref 2–14)
NEUTROPHILS # BLD AUTO: 4.3 K/UL — SIGNIFICANT CHANGE UP (ref 1.8–7.4)
NEUTROPHILS NFR BLD AUTO: 50.8 % — SIGNIFICANT CHANGE UP (ref 43–77)
NRBC # BLD AUTO: 0 K/UL — SIGNIFICANT CHANGE UP (ref 0–0)
NRBC # FLD: 0 K/UL — SIGNIFICANT CHANGE UP (ref 0–0)
NRBC BLD AUTO-RTO: 0 /100 WBCS — SIGNIFICANT CHANGE UP (ref 0–0)
PHOSPHATE SERPL-MCNC: 4.3 MG/DL — SIGNIFICANT CHANGE UP (ref 2.5–4.5)
PLATELET # BLD AUTO: 313 K/UL — SIGNIFICANT CHANGE UP (ref 150–400)
POTASSIUM SERPL-MCNC: 3.5 MMOL/L — SIGNIFICANT CHANGE UP (ref 3.5–5.3)
POTASSIUM SERPL-SCNC: 3.5 MMOL/L — SIGNIFICANT CHANGE UP (ref 3.5–5.3)
RBC # BLD: 3.71 M/UL — LOW (ref 3.8–5.2)
RBC # FLD: 13.1 % — SIGNIFICANT CHANGE UP (ref 10.3–14.5)
SODIUM SERPL-SCNC: 139 MMOL/L — SIGNIFICANT CHANGE UP (ref 135–145)
SPECIMEN SOURCE: SIGNIFICANT CHANGE UP
SPECIMEN SOURCE: SIGNIFICANT CHANGE UP
WBC # BLD: 8.47 K/UL — SIGNIFICANT CHANGE UP (ref 3.8–10.5)
WBC # FLD AUTO: 8.47 K/UL — SIGNIFICANT CHANGE UP (ref 3.8–10.5)

## 2025-03-30 RX ORDER — BUDESONIDE 90 UG/1
0.5 AEROSOL, POWDER RESPIRATORY (INHALATION) EVERY 12 HOURS
Refills: 0 | Status: DISCONTINUED | OUTPATIENT
Start: 2025-03-30 | End: 2025-03-31

## 2025-03-30 RX ORDER — OLANZAPINE 10 MG/1
2.5 TABLET ORAL ONCE
Refills: 0 | Status: COMPLETED | OUTPATIENT
Start: 2025-03-30 | End: 2025-03-30

## 2025-03-30 RX ADMIN — Medication 40 MILLIGRAM(S): at 05:02

## 2025-03-30 RX ADMIN — INSULIN LISPRO 1: 100 INJECTION, SOLUTION INTRAVENOUS; SUBCUTANEOUS at 11:51

## 2025-03-30 RX ADMIN — MEMANTINE HYDROCHLORIDE 10 MILLIGRAM(S): 21 CAPSULE, EXTENDED RELEASE ORAL at 05:02

## 2025-03-30 RX ADMIN — IPRATROPIUM BROMIDE AND ALBUTEROL SULFATE 3 MILLILITER(S): .5; 2.5 SOLUTION RESPIRATORY (INHALATION) at 04:02

## 2025-03-30 RX ADMIN — QUETIAPINE FUMARATE 25 MILLIGRAM(S): 25 TABLET ORAL at 16:46

## 2025-03-30 RX ADMIN — DORZOLAMIDE 1 DROP(S): 20 SOLUTION/ DROPS OPHTHALMIC at 05:02

## 2025-03-30 RX ADMIN — Medication 1 TABLET(S): at 05:02

## 2025-03-30 RX ADMIN — APIXABAN 5 MILLIGRAM(S): 2.5 TABLET, FILM COATED ORAL at 05:02

## 2025-03-30 RX ADMIN — CITALOPRAM 10 MILLIGRAM(S): 20 TABLET ORAL at 13:17

## 2025-03-30 RX ADMIN — Medication 1 TABLET(S): at 13:17

## 2025-03-30 RX ADMIN — Medication 30 MILLIGRAM(S): at 05:02

## 2025-03-30 RX ADMIN — ATORVASTATIN CALCIUM 10 MILLIGRAM(S): 80 TABLET, FILM COATED ORAL at 19:20

## 2025-03-30 RX ADMIN — Medication 10 MILLIGRAM(S): at 05:02

## 2025-03-30 RX ADMIN — IPRATROPIUM BROMIDE AND ALBUTEROL SULFATE 3 MILLILITER(S): .5; 2.5 SOLUTION RESPIRATORY (INHALATION) at 11:22

## 2025-03-30 RX ADMIN — BUDESONIDE 0.5 MILLIGRAM(S): 90 AEROSOL, POWDER RESPIRATORY (INHALATION) at 22:08

## 2025-03-30 RX ADMIN — Medication 25 GRAM(S): at 05:01

## 2025-03-30 RX ADMIN — Medication 1 TABLET(S): at 19:21

## 2025-03-30 RX ADMIN — APIXABAN 5 MILLIGRAM(S): 2.5 TABLET, FILM COATED ORAL at 16:46

## 2025-03-30 RX ADMIN — QUETIAPINE FUMARATE 25 MILLIGRAM(S): 25 TABLET ORAL at 05:02

## 2025-03-30 RX ADMIN — Medication 3 MILLIGRAM(S): at 19:20

## 2025-03-30 RX ADMIN — MEMANTINE HYDROCHLORIDE 10 MILLIGRAM(S): 21 CAPSULE, EXTENDED RELEASE ORAL at 16:46

## 2025-03-30 RX ADMIN — FOLIC ACID 1 MILLIGRAM(S): 1 TABLET ORAL at 13:18

## 2025-03-30 RX ADMIN — MIRTAZAPINE 30 MILLIGRAM(S): 30 TABLET, FILM COATED ORAL at 19:22

## 2025-03-30 RX ADMIN — DORZOLAMIDE 1 DROP(S): 20 SOLUTION/ DROPS OPHTHALMIC at 13:18

## 2025-03-30 RX ADMIN — DORZOLAMIDE 1 DROP(S): 20 SOLUTION/ DROPS OPHTHALMIC at 19:46

## 2025-03-30 RX ADMIN — IPRATROPIUM BROMIDE AND ALBUTEROL SULFATE 3 MILLILITER(S): .5; 2.5 SOLUTION RESPIRATORY (INHALATION) at 17:11

## 2025-03-30 RX ADMIN — Medication 25 GRAM(S): at 21:06

## 2025-03-30 RX ADMIN — Medication 81 MILLIGRAM(S): at 13:18

## 2025-03-30 RX ADMIN — Medication 25 GRAM(S): at 13:18

## 2025-03-30 RX ADMIN — IPRATROPIUM BROMIDE AND ALBUTEROL SULFATE 3 MILLILITER(S): .5; 2.5 SOLUTION RESPIRATORY (INHALATION) at 21:37

## 2025-03-30 NOTE — PROGRESS NOTE ADULT - ASSESSMENT
79-year-old female, ambulatory with assistance,  with  medical history of dementia (AO x 1-2 at baseline), CVA, history of diverticulitis c/b perforation s/p Jania procedure (proctosigmoidectomy) on 5/21/24 with colostomy, Type 2 diabetes, hypertension, glaucoma, osteoarthritis of the left knee, recent admission for syncope, TINO and Dx of Afib with RVR; Pt  presents to the ED as a code sepsis with aide at bedside.  Patient arrived with EMS and was found to be hypoxic to the 80s and was placed on 6 L nasal cannula with improvement.  Spoke with patient's daughter (Kaitlin) for collateral in which she states over the last week patient has been fighting cold/flu symptoms in which she has been having fevers Tmax 101, cough and overall lethargy.  Patient was seen by his doctor yesterday in which she was prescribed antibiotics but family was unable to  antibiotics at the time.  nephrology consulted for acidosis      Metabolic Acidosis  Non-AG  Improved.  - Follow up BMP    HTN  Improved now.  - Follow up BP.    CHF:  She had congestion on xray  completed on lasix 20   f/u by Cardiology.    Hypokalemia  Improved.  - Follow up BMP.

## 2025-03-30 NOTE — PROGRESS NOTE ADULT - ASSESSMENT
79-year-old female, ambulatory with assistance,  with  medical history of dementia (AO x 1-2 at baseline), CVA, history of diverticulitis c/b perforation s/p Jania procedure (proctosigmoidectomy) on 5/21/24 with colostomy, Type 2 diabetes, hypertension, glaucoma, osteoarthritis of the left knee, recent admission for syncope, TINO and Dx of Afib with RVR; Pt a/w sepsis due to PNA c/b hypoxemia, and acute on chronic dCHF and RHF c/b pulmonary edema.    Severe  Sepsis sec to pneumonia and acute hypoxic respiratory failure POA   ·  Plan: Due to PNA  - abx as per ID  - pulm fu   - Legionella Ag, Strep Ag neg     Acute on chronic diastolic CHF   - diuresis as per cards   -Strict I/O q4h  -Deily weight      Paroxysmal atrial fibrillation.   [id number]-Nefc risk stratification score=8  c/w Eliquis   c/w Metoprolol    Dementia.   Fall, aspiration precautions  Conservative consistency diet  HOB elevation   c/w Mirtazapine, Seroquel, Namenda, Citalopram, Donepezil    HTN (hypertension).   ·  Plan: c/w Metoprolol   c/w Nifedipine.     Type 2 diabetes mellitus with hyperglycemia, without long-term current use of insulin.   ·  Plan: Hold Metformin DM diet.  - ISS

## 2025-03-30 NOTE — PROGRESS NOTE ADULT - ASSESSMENT
Hypoxia  sob  CT c/w PNA    abx  o2  fu with ID/Pulm    PAF  cont eliquis  cont nadolol   off aricept to help bradycardia   HR better     HTN  stable  cont current meds

## 2025-03-31 VITALS
DIASTOLIC BLOOD PRESSURE: 57 MMHG | SYSTOLIC BLOOD PRESSURE: 113 MMHG | RESPIRATION RATE: 18 BRPM | TEMPERATURE: 98 F | OXYGEN SATURATION: 96 % | HEART RATE: 58 BPM

## 2025-03-31 LAB
ANION GAP SERPL CALC-SCNC: 14 MMOL/L — SIGNIFICANT CHANGE UP (ref 7–14)
BASOPHILS # BLD AUTO: 0.05 K/UL — SIGNIFICANT CHANGE UP (ref 0–0.2)
BASOPHILS NFR BLD AUTO: 0.6 % — SIGNIFICANT CHANGE UP (ref 0–2)
BUN SERPL-MCNC: 20 MG/DL — SIGNIFICANT CHANGE UP (ref 7–23)
CALCIUM SERPL-MCNC: 9.2 MG/DL — SIGNIFICANT CHANGE UP (ref 8.4–10.5)
CHLORIDE SERPL-SCNC: 104 MMOL/L — SIGNIFICANT CHANGE UP (ref 98–107)
CO2 SERPL-SCNC: 22 MMOL/L — SIGNIFICANT CHANGE UP (ref 22–31)
CREAT SERPL-MCNC: 0.71 MG/DL — SIGNIFICANT CHANGE UP (ref 0.5–1.3)
EGFR: 88 ML/MIN/1.73M2 — SIGNIFICANT CHANGE UP
EGFR: 88 ML/MIN/1.73M2 — SIGNIFICANT CHANGE UP
EOSINOPHIL # BLD AUTO: 0.28 K/UL — SIGNIFICANT CHANGE UP (ref 0–0.5)
EOSINOPHIL NFR BLD AUTO: 3.1 % — SIGNIFICANT CHANGE UP (ref 0–6)
GLUCOSE BLDC GLUCOMTR-MCNC: 142 MG/DL — HIGH (ref 70–99)
GLUCOSE BLDC GLUCOMTR-MCNC: 190 MG/DL — HIGH (ref 70–99)
GLUCOSE SERPL-MCNC: 181 MG/DL — HIGH (ref 70–99)
HCT VFR BLD CALC: 34.2 % — LOW (ref 34.5–45)
HGB BLD-MCNC: 11.2 G/DL — LOW (ref 11.5–15.5)
IANC: 4.66 K/UL — SIGNIFICANT CHANGE UP (ref 1.8–7.4)
IMM GRANULOCYTES NFR BLD AUTO: 0.6 % — SIGNIFICANT CHANGE UP (ref 0–0.9)
LYMPHOCYTES # BLD AUTO: 3.23 K/UL — SIGNIFICANT CHANGE UP (ref 1–3.3)
LYMPHOCYTES # BLD AUTO: 36.3 % — SIGNIFICANT CHANGE UP (ref 13–44)
MAGNESIUM SERPL-MCNC: 2 MG/DL — SIGNIFICANT CHANGE UP (ref 1.6–2.6)
MCHC RBC-ENTMCNC: 30 PG — SIGNIFICANT CHANGE UP (ref 27–34)
MCHC RBC-ENTMCNC: 32.7 G/DL — SIGNIFICANT CHANGE UP (ref 32–36)
MCV RBC AUTO: 91.7 FL — SIGNIFICANT CHANGE UP (ref 80–100)
MONOCYTES # BLD AUTO: 0.64 K/UL — SIGNIFICANT CHANGE UP (ref 0–0.9)
MONOCYTES NFR BLD AUTO: 7.2 % — SIGNIFICANT CHANGE UP (ref 2–14)
NEUTROPHILS # BLD AUTO: 4.66 K/UL — SIGNIFICANT CHANGE UP (ref 1.8–7.4)
NEUTROPHILS NFR BLD AUTO: 52.2 % — SIGNIFICANT CHANGE UP (ref 43–77)
NRBC # BLD AUTO: 0 K/UL — SIGNIFICANT CHANGE UP (ref 0–0)
NRBC # FLD: 0 K/UL — SIGNIFICANT CHANGE UP (ref 0–0)
NRBC BLD AUTO-RTO: 0 /100 WBCS — SIGNIFICANT CHANGE UP (ref 0–0)
PHOSPHATE SERPL-MCNC: 4 MG/DL — SIGNIFICANT CHANGE UP (ref 2.5–4.5)
PLATELET # BLD AUTO: 318 K/UL — SIGNIFICANT CHANGE UP (ref 150–400)
POTASSIUM SERPL-MCNC: 3.4 MMOL/L — LOW (ref 3.5–5.3)
POTASSIUM SERPL-SCNC: 3.4 MMOL/L — LOW (ref 3.5–5.3)
RBC # BLD: 3.73 M/UL — LOW (ref 3.8–5.2)
RBC # FLD: 13.2 % — SIGNIFICANT CHANGE UP (ref 10.3–14.5)
SODIUM SERPL-SCNC: 140 MMOL/L — SIGNIFICANT CHANGE UP (ref 135–145)
WBC # BLD: 8.91 K/UL — SIGNIFICANT CHANGE UP (ref 3.8–10.5)
WBC # FLD AUTO: 8.91 K/UL — SIGNIFICANT CHANGE UP (ref 3.8–10.5)

## 2025-03-31 RX ORDER — MEMANTINE HYDROCHLORIDE 21 MG/1
1 CAPSULE, EXTENDED RELEASE ORAL
Qty: 60 | Refills: 0
Start: 2025-03-31 | End: 2025-04-29

## 2025-03-31 RX ORDER — ALBUTEROL SULFATE 2.5 MG/3ML
2 VIAL, NEBULIZER (ML) INHALATION
Qty: 1 | Refills: 0
Start: 2025-03-31 | End: 2025-04-29

## 2025-03-31 RX ORDER — NADOLOL 80 MG
0.5 TABLET ORAL
Qty: 15 | Refills: 0
Start: 2025-03-31 | End: 2025-04-29

## 2025-03-31 RX ADMIN — Medication 1 TABLET(S): at 05:38

## 2025-03-31 RX ADMIN — Medication 30 MILLIGRAM(S): at 05:38

## 2025-03-31 RX ADMIN — DORZOLAMIDE 1 DROP(S): 20 SOLUTION/ DROPS OPHTHALMIC at 12:11

## 2025-03-31 RX ADMIN — MEMANTINE HYDROCHLORIDE 10 MILLIGRAM(S): 21 CAPSULE, EXTENDED RELEASE ORAL at 05:38

## 2025-03-31 RX ADMIN — Medication 1 TABLET(S): at 12:06

## 2025-03-31 RX ADMIN — DORZOLAMIDE 1 DROP(S): 20 SOLUTION/ DROPS OPHTHALMIC at 05:39

## 2025-03-31 RX ADMIN — FOLIC ACID 1 MILLIGRAM(S): 1 TABLET ORAL at 12:07

## 2025-03-31 RX ADMIN — INSULIN LISPRO 1: 100 INJECTION, SOLUTION INTRAVENOUS; SUBCUTANEOUS at 12:10

## 2025-03-31 RX ADMIN — Medication 81 MILLIGRAM(S): at 12:07

## 2025-03-31 RX ADMIN — Medication 40 MILLIGRAM(S): at 05:38

## 2025-03-31 RX ADMIN — IPRATROPIUM BROMIDE AND ALBUTEROL SULFATE 3 MILLILITER(S): .5; 2.5 SOLUTION RESPIRATORY (INHALATION) at 09:30

## 2025-03-31 RX ADMIN — Medication 40 MILLIEQUIVALENT(S): at 12:05

## 2025-03-31 RX ADMIN — APIXABAN 5 MILLIGRAM(S): 2.5 TABLET, FILM COATED ORAL at 05:38

## 2025-03-31 RX ADMIN — CITALOPRAM 10 MILLIGRAM(S): 20 TABLET ORAL at 12:07

## 2025-03-31 RX ADMIN — QUETIAPINE FUMARATE 25 MILLIGRAM(S): 25 TABLET ORAL at 05:39

## 2025-03-31 RX ADMIN — Medication 25 GRAM(S): at 05:38

## 2025-03-31 RX ADMIN — BUDESONIDE 0.5 MILLIGRAM(S): 90 AEROSOL, POWDER RESPIRATORY (INHALATION) at 09:31

## 2025-03-31 RX ADMIN — Medication 10 MILLIGRAM(S): at 05:38

## 2025-03-31 NOTE — PROGRESS NOTE ADULT - ASSESSMENT
80 y/o F PMhx dementia (AO x 1-2 at baseline), CVA, history of diverticulitis c/b perforation s/p Jania procedure (proctosigmoidectomy) on 5/21/24 with colostomy, DM II, HTN, glaucoma, recent admission for syncope, TINO, Afib w/ RVR who presented with hypoxia    PNA, acute hypoxic resp failure  sepsis- fever, leukocytosis- resolved  RVP negative  UA negative  urine legionella and strep pneumoniae urine Ag negative  CT- No pulmonary embolism. Left apical consolidative opacity and a few patchy peripheral nodular opacities which raises concern for pneumonia. Cardiomegaly with reflux of contrast into the intrahepatic IVC and hepatic veins suggestive of right heart failure. No acute intra-abdominal or pelvic findings.  blood cultures- NGTD    Recommendations  s/p zosyn, completed 3/30  continue off antibiotics  wean O2 as tolerated  discharge planning    Geovani Goldman M.D.  [facility]  [phone]  After 5pm on weekdays and all day on weekends - please call [phone]  Available on microsoft teams    Thank you for consulting us and involving us in the management of this patients case. In addition to reviewing history, imaging, documents, labs, microbiology, took into account antibiotic stewardship, local antibiogram and infection control strategies and potential transmission issues.

## 2025-03-31 NOTE — PROGRESS NOTE ADULT - ASSESSMENT
79-year-old female, ambulatory with assistance,  with  medical history of dementia (AO x 1-2 at baseline), CVA, history of diverticulitis c/b perforation s/p Jania procedure (proctosigmoidectomy) on 5/21/24 with colostomy, Type 2 diabetes, hypertension, glaucoma, osteoarthritis of the left knee, recent admission for syncope, TINO and Dx of Afib with RVR; Pt  presents to the ED as a code sepsis with aide at bedside.  Patient arrived with EMS and was found to be hypoxic to the 80s and was placed on 6 L nasal cannula with improvement.  Spoke with patient's daughter (Kaitlin) for collateral in which she states over the last week patient has been fighting cold/flu symptoms in which she has been having fevers Tmax 101, cough and overall lethargy.  Patient was seen by his doctor yesterday in which she was prescribed antibiotics but family was unable to  antibiotics at the time.  nephrology consulted for acidosis      Metabolic Acidosis  Non-AG  Improved.  - Follow up BMP    HTN  mostly controlled   continue current meds  - Follow up BP.    CHF:  She had congestion on xray  completed on lasix 20 now off  f/u by Cardiology.    Hypokalemia  supplement kcl 40x1  - Follow up BMP.

## 2025-03-31 NOTE — PROGRESS NOTE ADULT - ASSESSMENT
Hypoxia  sob  CT c/w PNA    abx  o2  fu with ID/Pulm    PAF  cont eliquis  cont nadolol   off aricept to help bradycardia   HR better     HTN  stable  cont current meds     PAF  cont eliquis  cont nadolol   off aricept to help bradycardia   HR better     HTN  stable  cont current meds

## 2025-03-31 NOTE — PROGRESS NOTE ADULT - PROBLEM SELECTOR PLAN 1
-2nd to mild fluid overload + PNA  -CTA chest with mild congestion, trace b/l pl effusions L>R, L apical consolidation + patchy peripheral nodular opacities likely infectious. Neg for PE   -Continue ABX as per ID   -Keep O>I as tolerated   -Duoneb q6h, can d/c on discharge  -O2 requirements overall improving, tolerating room air per flowsheet. Keep sats >90% with o2 PRN.
-2nd to mild fluid overload + PNA  -CTA chest with mild congestion, trace b/l pl effusions L>R, L apical consolidation + patchy peripheral nodular opacities likely infectious. Neg for PE   -Continue ABX as per ID   -Keep O>I as tolerated   -Duoneb q6h, can d/c on discharge  -O2 requirements overall improving. Keep sats >90% with o2 PRN, continue to wean O2 as tolerated.
-2nd to mild fluid overload + PNA  -CTA chest with mild congestion, trace b/l pl effusions L>R, L apical consolidation + patchy peripheral nodular opacities likely infectious. Neg for PE   -Continue ABX  -Keep O>I as tolerated   -Start Duoneb q6h   -O2 requirements overall improving, but still hypoxic on RA today. Keep sats >90% with o2 PRN, continue to wean O2 as tolerated.

## 2025-03-31 NOTE — PROGRESS NOTE ADULT - PROVIDER SPECIALTY LIST ADULT
Cardiology
Infectious Disease
Cardiology
Cardiology
Hospitalist
Hospitalist
Infectious Disease
Nephrology
Nephrology
Pulmonology
Cardiology
Hospitalist
Hospitalist
Infectious Disease
Infectious Disease
Nephrology
Hospitalist
Nephrology
Pulmonology
Pulmonology

## 2025-03-31 NOTE — PROGRESS NOTE ADULT - REASON FOR ADMISSION
Generalized weakness, fever

## 2025-03-31 NOTE — PATIENT PROFILE ADULT - FALL HARM RISK - FALL HARM RISK
Group Topic: Self-Care/Wellness   Group Date: 3/31/2025  Start Time: 1030  End Time: 1130  Facilitators: SUSAN Oreilly   Department: [facility] REHABTH VIRTUAL    Number of Participants: 9   Group Focus: other Healthy Living Group  Treatment Modality: Other: Recreation Therapy  Interventions utilized were exploration, group exercise, patient education, problem solving, and support  Purpose: coping skills, maladaptive thinking, feelings, irrational fears, communication skills, insight or knowledge, self-worth, self-care, relapse prevention strategies, and trigger / craving management    Name: Nevaeh Cuba YOB: 1954   MR: [id number]      Facilitator: Recreational Therapist  Level of Participation: minimal  Quality of Participation: passive and quiet  Interactions with others:  mostly passive  Mood/Affect:  mostly passive  Triggers (if applicable): n/a  Cognition: processing slowly  Progress: Minimal  Comments: pt problem is depressed mood.  Pt joins group with little encouragement.   Does not engage much during group but makes decent eye contact.  Does sit with her eyes closed toward the end of session.    Plan: continue with services       Other

## 2025-03-31 NOTE — PROGRESS NOTE ADULT - PROBLEM SELECTOR PLAN 2
-CTA chest as above   -ABX as per ID   -Trend leukocytosis/fever curve  -Duoneb q6h
-CTA chest as above   -ABX as per ID   -Trend leukocytosis/fever curve  -Duoneb q6h
-CTA chest as above   -Continue Zosyn  -Trend leukocytosis/fever curve  -Duoneb q6h

## 2025-03-31 NOTE — PROGRESS NOTE ADULT - SUBJECTIVE AND OBJECTIVE BOX
Subjective: Patient seen and examined. No new events except as noted.     SUBJECTIVE/ROS:  feels ok       MEDICATIONS:  MEDICATIONS  (STANDING):  apixaban 5 milliGRAM(s) Oral every 12 hours  aspirin  chewable 81 milliGRAM(s) Oral daily  atorvastatin 10 milliGRAM(s) Oral at bedtime  calcium carbonate 1250 mG  + Vitamin D (OsCal 500 + D) 1 Tablet(s) Oral three times a day  citalopram 10 milliGRAM(s) Oral daily  dextrose 5%. 1000 milliLiter(s) (50 mL/Hr) IV Continuous <Continuous>  dextrose 5%. 1000 milliLiter(s) (100 mL/Hr) IV Continuous <Continuous>  dextrose 50% Injectable 25 Gram(s) IV Push once  dextrose 50% Injectable 12.5 Gram(s) IV Push once  dextrose 50% Injectable 25 Gram(s) IV Push once  donepezil 10 milliGRAM(s) Oral at bedtime  dorzolamide 2% Ophthalmic Solution 1 Drop(s) Both EYES three times a day  folic acid 1 milliGRAM(s) Oral daily  furosemide   Injectable 20 milliGRAM(s) IV Push daily  glucagon  Injectable 1 milliGRAM(s) IntraMuscular once  insulin lispro (ADMELOG) corrective regimen sliding scale   SubCutaneous three times a day before meals  insulin lispro (ADMELOG) corrective regimen sliding scale   SubCutaneous at bedtime  melatonin 3 milliGRAM(s) Oral at bedtime  memantine 10 milliGRAM(s) Oral two times a day  metoprolol succinate ER 25 milliGRAM(s) Oral daily  mirtazapine 30 milliGRAM(s) Oral at bedtime  NIFEdipine XL 30 milliGRAM(s) Oral daily  pantoprazole    Tablet 40 milliGRAM(s) Oral before breakfast  piperacillin/tazobactam IVPB.- 3.375 Gram(s) IV Intermittent once  potassium chloride    Tablet ER 40 milliEquivalent(s) Oral every 4 hours  QUEtiapine 25 milliGRAM(s) Oral two times a day      PHYSICAL EXAM:  T(C): 36.4 (03-26-25 @ 12:10), Max: 38.1 (03-26-25 @ 01:00)  HR: 80 (03-26-25 @ 13:24) (75 - 160)  BP: 112/87 (03-26-25 @ 12:10) (78/50 - 151/86)  RR: 18 (03-26-25 @ 12:10) (16 - 19)  SpO2: 94% (03-26-25 @ 12:10) (92% - 100%)  Wt(kg): --  I&O's Summary          JVP: Normal  Neck: supple  Lung: clear   CV: S1 S2 ,  Abd: soft  Ext: No edema  neuro: Awake / alert  Psych: flat affect  Skin: normal``    LABS/DATA:    CARDIAC MARKERS:                                11.9   15.57 )-----------( 299      ( 26 Mar 2025 03:25 )             35.3     03-26    139  |  104  |  10  ----------------------------<  164[H]  3.2[L]   |  23  |  0.76    Ca    8.9      26 Mar 2025 03:25  Phos  3.2     03-26  Mg     2.60     03-26    TPro  7.6  /  Alb  3.4  /  TBili  1.2  /  DBili  x   /  AST  16  /  ALT  9   /  AlkPhos  112  03-24    proBNP:   Lipid Profile:   HgA1c:   TSH:             
    Subjective: Patient seen and examined. No new events except as noted.     SUBJECTIVE/ROS:  nad      MEDICATIONS:  MEDICATIONS  (STANDING):  albuterol/ipratropium for Nebulization 3 milliLiter(s) Nebulizer every 6 hours  apixaban 5 milliGRAM(s) Oral every 12 hours  aspirin  chewable 81 milliGRAM(s) Oral daily  atorvastatin 10 milliGRAM(s) Oral at bedtime  buDESOnide    Inhalation Suspension 0.5 milliGRAM(s) Inhalation every 12 hours  calcium carbonate 1250 mG  + Vitamin D (OsCal 500 + D) 1 Tablet(s) Oral three times a day  citalopram 10 milliGRAM(s) Oral daily  dextrose 5%. 1000 milliLiter(s) (100 mL/Hr) IV Continuous <Continuous>  dextrose 5%. 1000 milliLiter(s) (50 mL/Hr) IV Continuous <Continuous>  dextrose 50% Injectable 25 Gram(s) IV Push once  dextrose 50% Injectable 12.5 Gram(s) IV Push once  dextrose 50% Injectable 25 Gram(s) IV Push once  dorzolamide 2% Ophthalmic Solution 1 Drop(s) Both EYES three times a day  folic acid 1 milliGRAM(s) Oral daily  glucagon  Injectable 1 milliGRAM(s) IntraMuscular once  insulin lispro (ADMELOG) corrective regimen sliding scale   SubCutaneous three times a day before meals  insulin lispro (ADMELOG) corrective regimen sliding scale   SubCutaneous at bedtime  melatonin 3 milliGRAM(s) Oral at bedtime  memantine 10 milliGRAM(s) Oral two times a day  mirtazapine 30 milliGRAM(s) Oral at bedtime  nadolol 10 milliGRAM(s) Oral daily  NIFEdipine XL 30 milliGRAM(s) Oral daily  pantoprazole    Tablet 40 milliGRAM(s) Oral before breakfast  piperacillin/tazobactam IVPB.. 3.375 Gram(s) IV Intermittent every 8 hours  QUEtiapine 25 milliGRAM(s) Oral two times a day      PHYSICAL EXAM:  T(C): 36.8 (03-31-25 @ 05:30), Max: 37.1 (03-30-25 @ 11:07)  HR: 63 (03-31-25 @ 05:30) (57 - 75)  BP: 159/93 (03-31-25 @ 05:30) (125/71 - 159/93)  RR: 17 (03-31-25 @ 05:30) (17 - 18)  SpO2: 98% (03-31-25 @ 05:30) (88% - 98%)  Wt(kg): --  I&O's Summary    30 Mar 2025 07:01  -  31 Mar 2025 07:00  --------------------------------------------------------  IN: 720 mL / OUT: 0 mL / NET: 720 mL            JVP: Normal  Neck: supple  Lung: clear   CV: S1 S2 ,  Abd: soft  Ext: No edema  neuro: Awake / alert  Psych: flat affect  Skin: normal``    LABS/DATA:    CARDIAC MARKERS:                                11.2   8.91  )-----------( 318      ( 31 Mar 2025 05:54 )             34.2     03-31    140  |  104  |  20  ----------------------------<  181[H]  3.4[L]   |  22  |  0.71    Ca    9.2      31 Mar 2025 05:54  Phos  4.0     03-31  Mg     2.00     03-31      proBNP:   Lipid Profile:   HgA1c:   TSH:             
[facility] – [place] [facility]   MD YONY CONWAY MD ANGELA WONG, PA    TEL:  OFFICE: [phone]  From 5pm-7am Answering Service [phone]    -- RENAL FOLLOW UP NOTE ---Date of Service 03-26-25 @ 11:49    Patient is a 79y old  Female who presents with a chief complaint of Generalized weakness, fever (25 Mar 2025 16:29)      Patient seen and examined at bedside.     VITALS:  T(F): 97.8 (03-26-25 @ 08:25), Max: 100.6 (03-26-25 @ 01:00)  HR: 135 (03-26-25 @ 08:25)  BP: 106/69 (03-26-25 @ 08:25)  RR: 17 (03-26-25 @ 08:25)  SpO2: 93% (03-26-25 @ 08:25)  Wt(kg): --        PHYSICAL EXAM:  General: NAD  Neck: No JVD  Respiratory: CTAB, no wheezes, rales or rhonchi  Cardiovascular: irregularly irregular   Gastrointestinal: BS+, soft, NT/ND  Extremities: No peripheral edema    Hospital Medications:   MEDICATIONS  (STANDING):  apixaban 5 milliGRAM(s) Oral every 12 hours  aspirin  chewable 81 milliGRAM(s) Oral daily  atorvastatin 10 milliGRAM(s) Oral at bedtime  calcium carbonate 1250 mG  + Vitamin D (OsCal 500 + D) 1 Tablet(s) Oral three times a day  citalopram 10 milliGRAM(s) Oral daily  dextrose 5%. 1000 milliLiter(s) (50 mL/Hr) IV Continuous <Continuous>  dextrose 5%. 1000 milliLiter(s) (100 mL/Hr) IV Continuous <Continuous>  dextrose 50% Injectable 25 Gram(s) IV Push once  dextrose 50% Injectable 12.5 Gram(s) IV Push once  dextrose 50% Injectable 25 Gram(s) IV Push once  donepezil 10 milliGRAM(s) Oral at bedtime  dorzolamide 2% Ophthalmic Solution 1 Drop(s) Both EYES three times a day  folic acid 1 milliGRAM(s) Oral daily  furosemide   Injectable 20 milliGRAM(s) IV Push daily  glucagon  Injectable 1 milliGRAM(s) IntraMuscular once  insulin lispro (ADMELOG) corrective regimen sliding scale   SubCutaneous three times a day before meals  insulin lispro (ADMELOG) corrective regimen sliding scale   SubCutaneous at bedtime  melatonin 3 milliGRAM(s) Oral at bedtime  memantine 10 milliGRAM(s) Oral two times a day  metoprolol succinate ER 25 milliGRAM(s) Oral daily  mirtazapine 30 milliGRAM(s) Oral at bedtime  NIFEdipine XL 30 milliGRAM(s) Oral daily  pantoprazole    Tablet 40 milliGRAM(s) Oral before breakfast  piperacillin/tazobactam IVPB.- 3.375 Gram(s) IV Intermittent once  piperacillin/tazobactam IVPB.- 3.375 Gram(s) IV Intermittent once  QUEtiapine 25 milliGRAM(s) Oral two times a day      LABS:  03-26    139  |  104  |  10  ----------------------------<  164[H]  3.2[L]   |  23  |  0.76    Ca    8.9      26 Mar 2025 03:25  Phos  3.2     03-26  Mg     2.60     03-26    TPro  7.6  /  Alb  3.4  /  TBili  1.2  /  DBili      /  AST  16  /  ALT  9   /  AlkPhos  112  03-24    Creatinine Trend: 0.76 <--, 0.66 <--, 0.67 <--    Phosphorus: 3.2 mg/dL (03-26 @ 03:25)  Phosphorus: 2.4 mg/dL (03-25 @ 18:00)                              11.9   15.57 )-----------( 299      ( 26 Mar 2025 03:25 )             35.3     Urine Studies:  Urinalysis - [03-26-25 @ 03:25]      Color  / Appearance  / SG  / pH       Gluc 164 / Ketone   / Bili  / Urobili        Blood  / Protein  / Leuk Est  / Nitrite       RBC  / WBC  / Hyaline  / Gran  / Sq Epi  / Non Sq Epi  / Bacteria       Ferritin 133      [08-14-24 @ 06:35]  Vitamin D (25OH) 27.3      [08-12-24 @ 06:10]  TSH 0.91      [02-25-25 @ 05:12]  Lipid: chol 115, , HDL 30, LDL --      [02-24-25 @ 06:45]        RADIOLOGY & ADDITIONAL STUDIES:  
Patient is a 79y old  Female who presents with a chief complaint of Generalized weakness, fever (27 Mar 2025 15:03)    Date of servie : 03-27-25 @ 15:05  INTERVAL HPI/OVERNIGHT EVENTS:  T(C): 36.4 (03-27-25 @ 13:30), Max: 36.8 (03-27-25 @ 01:00)  HR: 55 (03-27-25 @ 13:30) (53 - 89)  BP: 124/64 (03-27-25 @ 13:30) (104/67 - 136/77)  RR: 17 (03-27-25 @ 13:30) (17 - 18)  SpO2: 95% (03-27-25 @ 13:30) (84% - 100%)  Wt(kg): --  I&O's Summary    26 Mar 2025 07:01  -  27 Mar 2025 07:00  --------------------------------------------------------  IN: 300 mL / OUT: 1500 mL / NET: -1200 mL    27 Mar 2025 07:01  -  27 Mar 2025 15:05  --------------------------------------------------------  IN: 340 mL / OUT: 1200 mL / NET: -860 mL        LABS:                        11.1   11.40 )-----------( 281      ( 27 Mar 2025 05:30 )             33.2     03-27    140  |  107  |  20  ----------------------------<  139[H]  4.4   |  23  |  0.77    Ca    8.9      27 Mar 2025 05:30  Phos  3.0     03-27  Mg     2.10     03-27        Urinalysis Basic - ( 27 Mar 2025 05:30 )    Color: x / Appearance: x / SG: x / pH: x  Gluc: 139 mg/dL / Ketone: x  / Bili: x / Urobili: x   Blood: x / Protein: x / Nitrite: x   Leuk Esterase: x / RBC: x / WBC x   Sq Epi: x / Non Sq Epi: x / Bacteria: x      CAPILLARY BLOOD GLUCOSE      POCT Blood Glucose.: 152 mg/dL (27 Mar 2025 11:03)  POCT Blood Glucose.: 149 mg/dL (27 Mar 2025 07:35)  POCT Blood Glucose.: 187 mg/dL (26 Mar 2025 22:06)  POCT Blood Glucose.: 202 mg/dL (26 Mar 2025 17:07)        Urinalysis Basic - ( 27 Mar 2025 05:30 )    Color: x / Appearance: x / SG: x / pH: x  Gluc: 139 mg/dL / Ketone: x  / Bili: x / Urobili: x   Blood: x / Protein: x / Nitrite: x   Leuk Esterase: x / RBC: x / WBC x   Sq Epi: x / Non Sq Epi: x / Bacteria: x        MEDICATIONS  (STANDING):  albuterol/ipratropium for Nebulization 3 milliLiter(s) Nebulizer every 6 hours  apixaban 5 milliGRAM(s) Oral every 12 hours  aspirin  chewable 81 milliGRAM(s) Oral daily  atorvastatin 10 milliGRAM(s) Oral at bedtime  calcium carbonate 1250 mG  + Vitamin D (OsCal 500 + D) 1 Tablet(s) Oral three times a day  citalopram 10 milliGRAM(s) Oral daily  dextrose 5%. 1000 milliLiter(s) (50 mL/Hr) IV Continuous <Continuous>  dextrose 5%. 1000 milliLiter(s) (100 mL/Hr) IV Continuous <Continuous>  dextrose 50% Injectable 25 Gram(s) IV Push once  dextrose 50% Injectable 12.5 Gram(s) IV Push once  dextrose 50% Injectable 25 Gram(s) IV Push once  dorzolamide 2% Ophthalmic Solution 1 Drop(s) Both EYES three times a day  folic acid 1 milliGRAM(s) Oral daily  glucagon  Injectable 1 milliGRAM(s) IntraMuscular once  insulin lispro (ADMELOG) corrective regimen sliding scale   SubCutaneous three times a day before meals  insulin lispro (ADMELOG) corrective regimen sliding scale   SubCutaneous at bedtime  melatonin 3 milliGRAM(s) Oral at bedtime  memantine 10 milliGRAM(s) Oral two times a day  mirtazapine 30 milliGRAM(s) Oral at bedtime  NIFEdipine XL 30 milliGRAM(s) Oral daily  pantoprazole    Tablet 40 milliGRAM(s) Oral before breakfast  piperacillin/tazobactam IVPB.. 3.375 Gram(s) IV Intermittent every 8 hours  QUEtiapine 25 milliGRAM(s) Oral two times a day    MEDICATIONS  (PRN):  acetaminophen     Tablet .. 650 milliGRAM(s) Oral every 6 hours PRN Temp greater or equal to 38C (100.4F), Mild Pain (1 - 3)  dextrose Oral Gel 15 Gram(s) Oral once PRN Blood Glucose LESS THAN 70 milliGRAM(s)/deciliter  ondansetron Injectable 4 milliGRAM(s) IV Push every 8 hours PRN Nausea and/or Vomiting          PHYSICAL EXAM:  GENERAL: NAD, well-groomed, well-developed  HEAD:  Atraumatic, Normocephalic  CHEST/LUNG: Clear to percussion bilaterally; No rales, rhonchi, wheezing, or rubs  HEART: Regular rate and rhythm; No murmurs, rubs, or gallops  ABDOMEN: Soft, Nontender, Nondistended; Bowel sounds present  EXTREMITIES:  2+ Peripheral Pulses, No clubbing, cyanosis, or edema  LYMPH: No lymphadenopathy noted  SKIN: No rashes or lesions    Care Discussed with Consultants/Other Providers [ ] YES  [ ] NO
Patient is a 79y old  Female who presents with a chief complaint of Generalized weakness, fever (29 Mar 2025 10:53)    Date of servie : 03-29-25 @ 17:01  INTERVAL HPI/OVERNIGHT EVENTS:  T(C): 36.8 (03-29-25 @ 15:08), Max: 36.8 (03-28-25 @ 21:11)  HR: 65 (03-29-25 @ 15:08) (50 - 81)  BP: 134/77 (03-29-25 @ 15:08) (127/85 - 153/84)  RR: 18 (03-29-25 @ 15:08) (16 - 18)  SpO2: 94% (03-29-25 @ 15:08) (94% - 97%)  Wt(kg): --  I&O's Summary    28 Mar 2025 07:01  -  29 Mar 2025 07:00  --------------------------------------------------------  IN: 540 mL / OUT: 1200 mL / NET: -660 mL        LABS:                        12.8   8.43  )-----------( 349      ( 29 Mar 2025 06:28 )             38.5     03-29    140  |  105  |  13  ----------------------------<  120[H]  3.7   |  24  |  0.65    Ca    9.7      29 Mar 2025 06:28  Phos  4.2     03-29  Mg     2.00     03-29        Urinalysis Basic - ( 29 Mar 2025 06:28 )    Color: x / Appearance: x / SG: x / pH: x  Gluc: 120 mg/dL / Ketone: x  / Bili: x / Urobili: x   Blood: x / Protein: x / Nitrite: x   Leuk Esterase: x / RBC: x / WBC x   Sq Epi: x / Non Sq Epi: x / Bacteria: x      CAPILLARY BLOOD GLUCOSE      POCT Blood Glucose.: 134 mg/dL (29 Mar 2025 16:58)  POCT Blood Glucose.: 147 mg/dL (29 Mar 2025 11:25)  POCT Blood Glucose.: 148 mg/dL (29 Mar 2025 07:21)  POCT Blood Glucose.: 147 mg/dL (28 Mar 2025 20:31)        Urinalysis Basic - ( 29 Mar 2025 06:28 )    Color: x / Appearance: x / SG: x / pH: x  Gluc: 120 mg/dL / Ketone: x  / Bili: x / Urobili: x   Blood: x / Protein: x / Nitrite: x   Leuk Esterase: x / RBC: x / WBC x   Sq Epi: x / Non Sq Epi: x / Bacteria: x        MEDICATIONS  (STANDING):  albuterol/ipratropium for Nebulization 3 milliLiter(s) Nebulizer every 6 hours  apixaban 5 milliGRAM(s) Oral every 12 hours  aspirin  chewable 81 milliGRAM(s) Oral daily  atorvastatin 10 milliGRAM(s) Oral at bedtime  calcium carbonate 1250 mG  + Vitamin D (OsCal 500 + D) 1 Tablet(s) Oral three times a day  citalopram 10 milliGRAM(s) Oral daily  dextrose 5%. 1000 milliLiter(s) (100 mL/Hr) IV Continuous <Continuous>  dextrose 5%. 1000 milliLiter(s) (50 mL/Hr) IV Continuous <Continuous>  dextrose 50% Injectable 25 Gram(s) IV Push once  dextrose 50% Injectable 12.5 Gram(s) IV Push once  dextrose 50% Injectable 25 Gram(s) IV Push once  dorzolamide 2% Ophthalmic Solution 1 Drop(s) Both EYES three times a day  folic acid 1 milliGRAM(s) Oral daily  glucagon  Injectable 1 milliGRAM(s) IntraMuscular once  insulin lispro (ADMELOG) corrective regimen sliding scale   SubCutaneous three times a day before meals  insulin lispro (ADMELOG) corrective regimen sliding scale   SubCutaneous at bedtime  melatonin 3 milliGRAM(s) Oral at bedtime  memantine 10 milliGRAM(s) Oral two times a day  mirtazapine 30 milliGRAM(s) Oral at bedtime  nadolol 10 milliGRAM(s) Oral daily  NIFEdipine XL 30 milliGRAM(s) Oral daily  pantoprazole    Tablet 40 milliGRAM(s) Oral before breakfast  piperacillin/tazobactam IVPB.. 3.375 Gram(s) IV Intermittent every 8 hours  QUEtiapine 25 milliGRAM(s) Oral two times a day    MEDICATIONS  (PRN):  acetaminophen     Tablet .. 650 milliGRAM(s) Oral every 6 hours PRN Temp greater or equal to 38C (100.4F), Mild Pain (1 - 3)  dextrose Oral Gel 15 Gram(s) Oral once PRN Blood Glucose LESS THAN 70 milliGRAM(s)/deciliter  ondansetron Injectable 4 milliGRAM(s) IV Push every 8 hours PRN Nausea and/or Vomiting          PHYSICAL EXAM:  GENERAL: NAD, well-groomed, well-developed  HEAD:  Atraumatic, Normocephalic  CHEST/LUNG: Clear to percussion bilaterally; No rales, rhonchi, wheezing, or rubs  HEART: Regular rate and rhythm; No murmurs, rubs, or gallops  ABDOMEN: Soft, Nontender, Nondistended; Bowel sounds present  EXTREMITIES: edema +    Care Discussed with Consultants/Other Providers [ x] YES  [ ] NO
[facility] – [place] [facility]   MD YONY CONWAY MD ANGELA WONG, PA    TEL:  OFFICE: [phone]  From 5pm-7am Answering Service [phone]    -- RENAL FOLLOW UP NOTE ---Date of Service 03-31-25 @ 09:54    Patient is a 79y old  Female who presents with a chief complaint of Generalized weakness, fever (31 Mar 2025 08:53)      Patient seen and examined at bedside. No chest pain/sob    VITALS:  T(F): 98.3 (03-31-25 @ 05:30), Max: 98.8 (03-30-25 @ 11:07)  HR: 63 (03-31-25 @ 05:30)  BP: 159/93 (03-31-25 @ 05:30)  RR: 17 (03-31-25 @ 05:30)  SpO2: 98% (03-31-25 @ 05:30)  Wt(kg): --    03-30 @ 07:01  -  03-31 @ 07:00  --------------------------------------------------------  IN: 720 mL / OUT: 0 mL / NET: 720 mL          PHYSICAL EXAM:  General: NAD  Neck: No JVD  Respiratory: CTAB, no wheezes, rales or rhonchi  Cardiovascular: S1, S2, RRR  Gastrointestinal: BS+, soft, NT/ND  Extremities: No peripheral edema    Hospital Medications:   MEDICATIONS  (STANDING):  albuterol/ipratropium for Nebulization 3 milliLiter(s) Nebulizer every 6 hours  apixaban 5 milliGRAM(s) Oral every 12 hours  aspirin  chewable 81 milliGRAM(s) Oral daily  atorvastatin 10 milliGRAM(s) Oral at bedtime  buDESOnide    Inhalation Suspension 0.5 milliGRAM(s) Inhalation every 12 hours  calcium carbonate 1250 mG  + Vitamin D (OsCal 500 + D) 1 Tablet(s) Oral three times a day  citalopram 10 milliGRAM(s) Oral daily  dextrose 5%. 1000 milliLiter(s) (100 mL/Hr) IV Continuous <Continuous>  dextrose 5%. 1000 milliLiter(s) (50 mL/Hr) IV Continuous <Continuous>  dextrose 50% Injectable 25 Gram(s) IV Push once  dextrose 50% Injectable 12.5 Gram(s) IV Push once  dextrose 50% Injectable 25 Gram(s) IV Push once  dorzolamide 2% Ophthalmic Solution 1 Drop(s) Both EYES three times a day  folic acid 1 milliGRAM(s) Oral daily  glucagon  Injectable 1 milliGRAM(s) IntraMuscular once  insulin lispro (ADMELOG) corrective regimen sliding scale   SubCutaneous three times a day before meals  insulin lispro (ADMELOG) corrective regimen sliding scale   SubCutaneous at bedtime  melatonin 3 milliGRAM(s) Oral at bedtime  memantine 10 milliGRAM(s) Oral two times a day  mirtazapine 30 milliGRAM(s) Oral at bedtime  nadolol 10 milliGRAM(s) Oral daily  NIFEdipine XL 30 milliGRAM(s) Oral daily  pantoprazole    Tablet 40 milliGRAM(s) Oral before breakfast  piperacillin/tazobactam IVPB.. 3.375 Gram(s) IV Intermittent every 8 hours  QUEtiapine 25 milliGRAM(s) Oral two times a day      LABS:  03-31    140  |  104  |  20  ----------------------------<  181[H]  3.4[L]   |  22  |  0.71    Ca    9.2      31 Mar 2025 05:54  Phos  4.0     03-31  Mg     2.00     03-31      Creatinine Trend: 0.71 <--, 0.70 <--, 0.65 <--, 0.65 <--, 0.77 <--, 0.76 <--, 0.66 <--, 0.67 <--    Phosphorus: 4.0 mg/dL (03-31 @ 05:54)                              11.2   8.91  )-----------( 318      ( 31 Mar 2025 05:54 )             34.2     Urine Studies:  Urinalysis - [03-31-25 @ 05:54]      Color  / Appearance  / SG  / pH       Gluc 181 / Ketone   / Bili  / Urobili        Blood  / Protein  / Leuk Est  / Nitrite       RBC  / WBC  / Hyaline  / Gran  / Sq Epi  / Non Sq Epi  / Bacteria       Ferritin 133      [08-14-24 @ 06:35]  Vitamin D (25OH) 27.3      [08-12-24 @ 06:10]  TSH 0.91      [02-25-25 @ 05:12]  Lipid: chol 115, , HDL 30, LDL --      [02-24-25 @ 06:45]        RADIOLOGY & ADDITIONAL STUDIES:  
    Subjective: Patient seen and examined. No new events except as noted.     SUBJECTIVE/ROS:  nad      MEDICATIONS:  MEDICATIONS  (STANDING):  albuterol/ipratropium for Nebulization 3 milliLiter(s) Nebulizer every 6 hours  apixaban 5 milliGRAM(s) Oral every 12 hours  aspirin  chewable 81 milliGRAM(s) Oral daily  atorvastatin 10 milliGRAM(s) Oral at bedtime  buDESOnide    Inhalation Suspension 0.5 milliGRAM(s) Inhalation every 12 hours  calcium carbonate 1250 mG  + Vitamin D (OsCal 500 + D) 1 Tablet(s) Oral three times a day  citalopram 10 milliGRAM(s) Oral daily  dextrose 5%. 1000 milliLiter(s) (100 mL/Hr) IV Continuous <Continuous>  dextrose 5%. 1000 milliLiter(s) (50 mL/Hr) IV Continuous <Continuous>  dextrose 50% Injectable 25 Gram(s) IV Push once  dextrose 50% Injectable 12.5 Gram(s) IV Push once  dextrose 50% Injectable 25 Gram(s) IV Push once  dorzolamide 2% Ophthalmic Solution 1 Drop(s) Both EYES three times a day  folic acid 1 milliGRAM(s) Oral daily  glucagon  Injectable 1 milliGRAM(s) IntraMuscular once  insulin lispro (ADMELOG) corrective regimen sliding scale   SubCutaneous three times a day before meals  insulin lispro (ADMELOG) corrective regimen sliding scale   SubCutaneous at bedtime  melatonin 3 milliGRAM(s) Oral at bedtime  memantine 10 milliGRAM(s) Oral two times a day  mirtazapine 30 milliGRAM(s) Oral at bedtime  nadolol 10 milliGRAM(s) Oral daily  NIFEdipine XL 30 milliGRAM(s) Oral daily  pantoprazole    Tablet 40 milliGRAM(s) Oral before breakfast  piperacillin/tazobactam IVPB.. 3.375 Gram(s) IV Intermittent every 8 hours  QUEtiapine 25 milliGRAM(s) Oral two times a day      PHYSICAL EXAM:  T(C): 37.1 (03-30-25 @ 11:07), Max: 37.1 (03-30-25 @ 11:07)  HR: 57 (03-30-25 @ 11:07) (57 - 89)  BP: 125/71 (03-30-25 @ 11:07) (125/71 - 157/82)  RR: 18 (03-30-25 @ 11:07) (16 - 18)  SpO2: 98% (03-30-25 @ 11:07) (88% - 99%)  Wt(kg): --  I&O's Summary    29 Mar 2025 07:01  -  30 Mar 2025 07:00  --------------------------------------------------------  IN: 400 mL / OUT: 650 mL / NET: -250 mL            JVP: Normal  Neck: supple  Lung: clear   CV: S1 S2 ,  Abd: soft  Ext: No edema  neuro: Awake / alert  Psych: flat affect  Skin: normal``    LABS/DATA:    CARDIAC MARKERS:                                11.1   8.47  )-----------( 313      ( 30 Mar 2025 04:49 )             34.1     03-30    139  |  106  |  27[H]  ----------------------------<  152[H]  3.5   |  21[L]  |  0.70    Ca    8.9      30 Mar 2025 04:49  Phos  4.3     03-30  Mg     2.10     03-30      proBNP:   Lipid Profile:   HgA1c:   TSH:             
[facility] – [place] [facility]   MD YONY CONWAY MD ANGELA WONG, PA    TEL:  OFFICE: [phone]  From 5pm-7am Answering Service [phone]    -- RENAL FOLLOW UP NOTE ---Date of Service 03-28-25 @ 14:05    Patient is a 79y old  Female who presents with a chief complaint of Generalized weakness, fever (28 Mar 2025 11:37)      Patient seen and examined at bedside. No chest pain/sob    VITALS:  T(F): 97.5 (03-28-25 @ 13:10), Max: 98.4 (03-28-25 @ 01:00)  HR: 61 (03-28-25 @ 13:10)  BP: 143/71 (03-28-25 @ 13:10)  RR: 18 (03-28-25 @ 13:10)  SpO2: 95% (03-28-25 @ 13:10)  Wt(kg): --    03-27 @ 07:01  -  03-28 @ 07:00  --------------------------------------------------------  IN: 690 mL / OUT: 1400 mL / NET: -710 mL    03-28 @ 07:01  -  03-28 @ 14:05  --------------------------------------------------------  IN: 340 mL / OUT: 600 mL / NET: -260 mL          PHYSICAL EXAM:  General: NAD  Neck: No JVD  Respiratory: CTAB, no wheezes, rales or rhonchi  Cardiovascular: S1, S2, RRR  Gastrointestinal: BS+, soft, NT/ND  Extremities: No peripheral edema    Hospital Medications:   MEDICATIONS  (STANDING):  albuterol/ipratropium for Nebulization 3 milliLiter(s) Nebulizer every 6 hours  apixaban 5 milliGRAM(s) Oral every 12 hours  aspirin  chewable 81 milliGRAM(s) Oral daily  atorvastatin 10 milliGRAM(s) Oral at bedtime  calcium carbonate 1250 mG  + Vitamin D (OsCal 500 + D) 1 Tablet(s) Oral three times a day  citalopram 10 milliGRAM(s) Oral daily  dextrose 5%. 1000 milliLiter(s) (100 mL/Hr) IV Continuous <Continuous>  dextrose 5%. 1000 milliLiter(s) (50 mL/Hr) IV Continuous <Continuous>  dextrose 50% Injectable 25 Gram(s) IV Push once  dextrose 50% Injectable 12.5 Gram(s) IV Push once  dextrose 50% Injectable 25 Gram(s) IV Push once  dorzolamide 2% Ophthalmic Solution 1 Drop(s) Both EYES three times a day  folic acid 1 milliGRAM(s) Oral daily  glucagon  Injectable 1 milliGRAM(s) IntraMuscular once  insulin lispro (ADMELOG) corrective regimen sliding scale   SubCutaneous three times a day before meals  insulin lispro (ADMELOG) corrective regimen sliding scale   SubCutaneous at bedtime  melatonin 3 milliGRAM(s) Oral at bedtime  memantine 10 milliGRAM(s) Oral two times a day  mirtazapine 30 milliGRAM(s) Oral at bedtime  nadolol 10 milliGRAM(s) Oral daily  NIFEdipine XL 30 milliGRAM(s) Oral daily  pantoprazole    Tablet 40 milliGRAM(s) Oral before breakfast  piperacillin/tazobactam IVPB.. 3.375 Gram(s) IV Intermittent every 8 hours  QUEtiapine 25 milliGRAM(s) Oral two times a day      LABS:  03-28    140  |  104  |  17  ----------------------------<  125[H]  3.7   |  24  |  0.65    Ca    9.1      28 Mar 2025 03:46  Phos  3.5     03-28  Mg     2.00     03-28      Creatinine Trend: 0.65 <--, 0.77 <--, 0.76 <--, 0.66 <--, 0.67 <--    Phosphorus: 3.5 mg/dL (03-28 @ 03:46)                              12.0   9.53  )-----------( 311      ( 28 Mar 2025 03:46 )             35.9     Urine Studies:  Urinalysis - [03-28-25 @ 03:46]      Color  / Appearance  / SG  / pH       Gluc 125 / Ketone   / Bili  / Urobili        Blood  / Protein  / Leuk Est  / Nitrite       RBC  / WBC  / Hyaline  / Gran  / Sq Epi  / Non Sq Epi  / Bacteria       Ferritin 133      [08-14-24 @ 06:35]  Vitamin D (25OH) 27.3      [08-12-24 @ 06:10]  TSH 0.91      [02-25-25 @ 05:12]  Lipid: chol 115, , HDL 30, LDL --      [02-24-25 @ 06:45]        RADIOLOGY & ADDITIONAL STUDIES:  
[facility]  JORGE LUIS Chaves Y. Patel, S. Shah, G. Casimir  [phone]  ([phone] - weekdays after 5pm and weekends)    Name: TY DAVIS  Age/Gender: 79y Female  MRN: [id number]    Interval History:  Notes reviewed.   No concerning overnight events.  Afebrile.   denies SOB    Allergies: No Known Allergies      Objective:  Vitals:   T(F): 97.5 (03-28-25 @ 09:50), Max: 98.4 (03-28-25 @ 01:00)  HR: 66 (03-28-25 @ 09:50) (54 - 66)  BP: 136/71 (03-28-25 @ 09:50) (122/75 - 144/82)  RR: 18 (03-28-25 @ 09:50) (17 - 18)  SpO2: 95% (03-28-25 @ 09:50) (95% - 100%)  Physical Examination:  General: no acute distress  HEENT: anicteric, NC  Cardio: S1, S2, normal rate  Resp: clear to auscultation anteriorly  Abd: soft, NT, ND, colostomy  Ext: no LE edema  Skin: warm, dry    Laboratory Studies:  CBC:                       12.0   9.53  )-----------( 311      ( 28 Mar 2025 03:46 )             35.9     WBC Trend:  9.53 03-28-25 @ 03:46  11.40 03-27-25 @ 05:30  15.57 03-26-25 @ 03:25  14.37 03-25-25 @ 18:00  15.58 03-24-25 @ 21:40    CMP: 03-28    140  |  104  |  17  ----------------------------<  125[H]  3.7   |  24  |  0.65    Ca    9.1      28 Mar 2025 03:46  Phos  3.5     03-28  Mg     2.00     03-28            Urinalysis Basic - ( 28 Mar 2025 03:46 )    Color: x / Appearance: x / SG: x / pH: x  Gluc: 125 mg/dL / Ketone: x  / Bili: x / Urobili: x   Blood: x / Protein: x / Nitrite: x   Leuk Esterase: x / RBC: x / WBC x   Sq Epi: x / Non Sq Epi: x / Bacteria: x      Microbiology: reviewed     Urinalysis with Rflx Culture (collected 03-25-25 @ 07:00)    Culture - Blood (collected 03-24-25 @ 22:35)  Source: Blood Blood-Peripheral  Preliminary Report (03-28-25 @ 01:01):    No growth at 72 Hours    Culture - Blood (collected 03-24-25 @ 22:34)  Source: Blood Blood-Peripheral  Preliminary Report (03-28-25 @ 01:01):    No growth at 72 Hours        Radiology: reviewed     Medications:  acetaminophen     Tablet .. 650 milliGRAM(s) Oral every 6 hours PRN  albuterol/ipratropium for Nebulization 3 milliLiter(s) Nebulizer every 6 hours  apixaban 5 milliGRAM(s) Oral every 12 hours  aspirin  chewable 81 milliGRAM(s) Oral daily  atorvastatin 10 milliGRAM(s) Oral at bedtime  calcium carbonate 1250 mG  + Vitamin D (OsCal 500 + D) 1 Tablet(s) Oral three times a day  citalopram 10 milliGRAM(s) Oral daily  dextrose 5%. 1000 milliLiter(s) IV Continuous <Continuous>  dextrose 5%. 1000 milliLiter(s) IV Continuous <Continuous>  dextrose 50% Injectable 25 Gram(s) IV Push once  dextrose 50% Injectable 12.5 Gram(s) IV Push once  dextrose 50% Injectable 25 Gram(s) IV Push once  dextrose Oral Gel 15 Gram(s) Oral once PRN  dorzolamide 2% Ophthalmic Solution 1 Drop(s) Both EYES three times a day  folic acid 1 milliGRAM(s) Oral daily  glucagon  Injectable 1 milliGRAM(s) IntraMuscular once  insulin lispro (ADMELOG) corrective regimen sliding scale   SubCutaneous three times a day before meals  insulin lispro (ADMELOG) corrective regimen sliding scale   SubCutaneous at bedtime  melatonin 3 milliGRAM(s) Oral at bedtime  memantine 10 milliGRAM(s) Oral two times a day  mirtazapine 30 milliGRAM(s) Oral at bedtime  nadolol 10 milliGRAM(s) Oral daily  NIFEdipine XL 30 milliGRAM(s) Oral daily  ondansetron Injectable 4 milliGRAM(s) IV Push every 8 hours PRN  pantoprazole    Tablet 40 milliGRAM(s) Oral before breakfast  piperacillin/tazobactam IVPB.. 3.375 Gram(s) IV Intermittent every 8 hours  QUEtiapine 25 milliGRAM(s) Oral two times a day    Antimicrobials:  piperacillin/tazobactam IVPB.. 3.375 Gram(s) IV Intermittent every 8 hours  
[facility]  JORGE LUSI Chaves Y. Patel, S. Shah, G. Casimir  [phone]  ([phone] - weekdays after 5pm and weekends)    Name: TY DAVIS  Age/Gender: 79y Female  MRN: [id number]    Interval History:  Notes reviewed.   No concerning overnight events.  febrile to 100.6 this AM  pt denies any complaints    Allergies: No Known Allergies      Objective:  Vitals:   T(F): 97.8 (03-26-25 @ 08:25), Max: 100.6 (03-26-25 @ 01:00)  HR: 135 (03-26-25 @ 08:25) (75 - 160)  BP: 106/69 (03-26-25 @ 08:25) (78/50 - 156/85)  RR: 17 (03-26-25 @ 08:25) (16 - 19)  SpO2: 93% (03-26-25 @ 08:25) (92% - 100%)  Physical Examination:  General: no acute distress  HEENT: anicteric, NC  Cardio: S1, S2, normal rate  Resp: clear to auscultation anteriorly  Abd: soft, NT, ND, colostomy  Ext: no LE edema  Skin: warm, dry    Laboratory Studies:  CBC:                       11.9   15.57 )-----------( 299      ( 26 Mar 2025 03:25 )             35.3     WBC Trend:  15.57 03-26-25 @ 03:25  14.37 03-25-25 @ 18:00  15.58 03-24-25 @ 21:40    CMP: 03-26    139  |  104  |  10  ----------------------------<  164[H]  3.2[L]   |  23  |  0.76    Ca    8.9      26 Mar 2025 03:25  Phos  3.2     03-26  Mg     2.60     03-26    TPro  7.6  /  Alb  3.4  /  TBili  1.2  /  DBili  x   /  AST  16  /  ALT  9   /  AlkPhos  112  03-24      LIVER FUNCTIONS - ( 24 Mar 2025 21:40 )  Alb: 3.4 g/dL / Pro: 7.6 g/dL / ALK PHOS: 112 U/L / ALT: 9 U/L / AST: 16 U/L / GGT: x             Urinalysis Basic - ( 26 Mar 2025 03:25 )    Color: x / Appearance: x / SG: x / pH: x  Gluc: 164 mg/dL / Ketone: x  / Bili: x / Urobili: x   Blood: x / Protein: x / Nitrite: x   Leuk Esterase: x / RBC: x / WBC x   Sq Epi: x / Non Sq Epi: x / Bacteria: x      Microbiology: reviewed     Urinalysis with Rflx Culture (collected 03-25-25 @ 07:00)    Culture - Blood (collected 03-24-25 @ 22:35)  Source: Blood Blood-Peripheral  Preliminary Report (03-26-25 @ 01:02):    No growth at 24 hours    Culture - Blood (collected 03-24-25 @ 22:34)  Source: Blood Blood-Peripheral  Preliminary Report (03-26-25 @ 01:02):    No growth at 24 hours        Radiology: reviewed     Medications:  acetaminophen     Tablet .. 650 milliGRAM(s) Oral every 6 hours PRN  apixaban 5 milliGRAM(s) Oral every 12 hours  aspirin  chewable 81 milliGRAM(s) Oral daily  atorvastatin 10 milliGRAM(s) Oral at bedtime  calcium carbonate 1250 mG  + Vitamin D (OsCal 500 + D) 1 Tablet(s) Oral three times a day  citalopram 10 milliGRAM(s) Oral daily  dextrose 5%. 1000 milliLiter(s) IV Continuous <Continuous>  dextrose 5%. 1000 milliLiter(s) IV Continuous <Continuous>  dextrose 50% Injectable 25 Gram(s) IV Push once  dextrose 50% Injectable 12.5 Gram(s) IV Push once  dextrose 50% Injectable 25 Gram(s) IV Push once  dextrose Oral Gel 15 Gram(s) Oral once PRN  donepezil 10 milliGRAM(s) Oral at bedtime  dorzolamide 2% Ophthalmic Solution 1 Drop(s) Both EYES three times a day  folic acid 1 milliGRAM(s) Oral daily  furosemide   Injectable 20 milliGRAM(s) IV Push daily  glucagon  Injectable 1 milliGRAM(s) IntraMuscular once  insulin lispro (ADMELOG) corrective regimen sliding scale   SubCutaneous three times a day before meals  insulin lispro (ADMELOG) corrective regimen sliding scale   SubCutaneous at bedtime  melatonin 3 milliGRAM(s) Oral at bedtime  memantine 10 milliGRAM(s) Oral two times a day  metoprolol succinate ER 25 milliGRAM(s) Oral daily  mirtazapine 30 milliGRAM(s) Oral at bedtime  NIFEdipine XL 30 milliGRAM(s) Oral daily  ondansetron Injectable 4 milliGRAM(s) IV Push every 8 hours PRN  pantoprazole    Tablet 40 milliGRAM(s) Oral before breakfast  piperacillin/tazobactam IVPB.- 3.375 Gram(s) IV Intermittent once  piperacillin/tazobactam IVPB.- 3.375 Gram(s) IV Intermittent once  potassium chloride    Tablet ER 40 milliEquivalent(s) Oral every 4 hours  QUEtiapine 25 milliGRAM(s) Oral two times a day    Antimicrobials:  piperacillin/tazobactam IVPB.- 3.375 Gram(s) IV Intermittent once  piperacillin/tazobactam IVPB.- 3.375 Gram(s) IV Intermittent once  
Patient is a 79y old  Female who presents with a chief complaint of Generalized weakness, fever (28 Mar 2025 11:37)    Date of servie : 03-28-25 @ 14:05  INTERVAL HPI/OVERNIGHT EVENTS:  T(C): 36.4 (03-28-25 @ 13:10), Max: 36.9 (03-28-25 @ 01:00)  HR: 61 (03-28-25 @ 13:10) (54 - 66)  BP: 143/71 (03-28-25 @ 13:10) (122/75 - 144/82)  RR: 18 (03-28-25 @ 13:10) (17 - 18)  SpO2: 95% (03-28-25 @ 13:10) (94% - 100%)  Wt(kg): --  I&O's Summary    27 Mar 2025 07:01  -  28 Mar 2025 07:00  --------------------------------------------------------  IN: 690 mL / OUT: 1400 mL / NET: -710 mL    28 Mar 2025 07:01  -  28 Mar 2025 14:05  --------------------------------------------------------  IN: 340 mL / OUT: 600 mL / NET: -260 mL        LABS:                        12.0   9.53  )-----------( 311      ( 28 Mar 2025 03:46 )             35.9     03-28    140  |  104  |  17  ----------------------------<  125[H]  3.7   |  24  |  0.65    Ca    9.1      28 Mar 2025 03:46  Phos  3.5     03-28  Mg     2.00     03-28        Urinalysis Basic - ( 28 Mar 2025 03:46 )    Color: x / Appearance: x / SG: x / pH: x  Gluc: 125 mg/dL / Ketone: x  / Bili: x / Urobili: x   Blood: x / Protein: x / Nitrite: x   Leuk Esterase: x / RBC: x / WBC x   Sq Epi: x / Non Sq Epi: x / Bacteria: x      CAPILLARY BLOOD GLUCOSE      POCT Blood Glucose.: 160 mg/dL (28 Mar 2025 10:59)  POCT Blood Glucose.: 135 mg/dL (28 Mar 2025 07:24)  POCT Blood Glucose.: 177 mg/dL (27 Mar 2025 21:07)  POCT Blood Glucose.: 192 mg/dL (27 Mar 2025 16:11)        Urinalysis Basic - ( 28 Mar 2025 03:46 )    Color: x / Appearance: x / SG: x / pH: x  Gluc: 125 mg/dL / Ketone: x  / Bili: x / Urobili: x   Blood: x / Protein: x / Nitrite: x   Leuk Esterase: x / RBC: x / WBC x   Sq Epi: x / Non Sq Epi: x / Bacteria: x        MEDICATIONS  (STANDING):  albuterol/ipratropium for Nebulization 3 milliLiter(s) Nebulizer every 6 hours  apixaban 5 milliGRAM(s) Oral every 12 hours  aspirin  chewable 81 milliGRAM(s) Oral daily  atorvastatin 10 milliGRAM(s) Oral at bedtime  calcium carbonate 1250 mG  + Vitamin D (OsCal 500 + D) 1 Tablet(s) Oral three times a day  citalopram 10 milliGRAM(s) Oral daily  dextrose 5%. 1000 milliLiter(s) (100 mL/Hr) IV Continuous <Continuous>  dextrose 5%. 1000 milliLiter(s) (50 mL/Hr) IV Continuous <Continuous>  dextrose 50% Injectable 25 Gram(s) IV Push once  dextrose 50% Injectable 12.5 Gram(s) IV Push once  dextrose 50% Injectable 25 Gram(s) IV Push once  dorzolamide 2% Ophthalmic Solution 1 Drop(s) Both EYES three times a day  folic acid 1 milliGRAM(s) Oral daily  glucagon  Injectable 1 milliGRAM(s) IntraMuscular once  insulin lispro (ADMELOG) corrective regimen sliding scale   SubCutaneous three times a day before meals  insulin lispro (ADMELOG) corrective regimen sliding scale   SubCutaneous at bedtime  melatonin 3 milliGRAM(s) Oral at bedtime  memantine 10 milliGRAM(s) Oral two times a day  mirtazapine 30 milliGRAM(s) Oral at bedtime  nadolol 10 milliGRAM(s) Oral daily  NIFEdipine XL 30 milliGRAM(s) Oral daily  pantoprazole    Tablet 40 milliGRAM(s) Oral before breakfast  piperacillin/tazobactam IVPB.. 3.375 Gram(s) IV Intermittent every 8 hours  QUEtiapine 25 milliGRAM(s) Oral two times a day    MEDICATIONS  (PRN):  acetaminophen     Tablet .. 650 milliGRAM(s) Oral every 6 hours PRN Temp greater or equal to 38C (100.4F), Mild Pain (1 - 3)  dextrose Oral Gel 15 Gram(s) Oral once PRN Blood Glucose LESS THAN 70 milliGRAM(s)/deciliter  ondansetron Injectable 4 milliGRAM(s) IV Push every 8 hours PRN Nausea and/or Vomiting          PHYSICAL EXAM:  GENERAL: NAD, well-groomed, well-developed  HEAD:  Atraumatic, Normocephalic  CHEST/LUNG: Clear to percussion bilaterally; No rales, rhonchi, wheezing, or rubs  HEART: Regular rate and rhythm; No murmurs, rubs, or gallops  ABDOMEN: Soft, Nontender, Nondistended; Bowel sounds present  EXTREMITIES:  2+ Peripheral Pulses, No clubbing, cyanosis, or edema  LYMPH: No lymphadenopathy noted  SKIN: No rashes or lesions    Care Discussed with Consultants/Other Providers [ ] YES  [ ] NO
TY DAVIS  79y  Patient is a 79y old  Female who presents with a chief complaint of Generalized weakness, fever (30 Mar 2025 07:14)    HPI:  Admitted for weakness and fevers, followed for Metabolic Acidosis.    ED course prior to admission to Medicine: Tmax 103.1, s/o Cefepime IV 2g and Vancomycin IV, s/p 3L NS  (25 Mar 2025 04:42)    HEALTH ISSUES - PROBLEM Dx:  Sepsis    Pneumonia    Diastolic CHF, acute on chronic    Hypoxemia    Dementia    HTN (hypertension)    Paroxysmal atrial fibrillation    Encounter for deep vein thrombosis (DVT) prophylaxis    Type 2 diabetes mellitus with hyperglycemia, without long-term current use of insulin    Hypoxia    Dementia          MEDICATIONS  (STANDING):  albuterol/ipratropium for Nebulization 3 milliLiter(s) Nebulizer every 6 hours  apixaban 5 milliGRAM(s) Oral every 12 hours  aspirin  chewable 81 milliGRAM(s) Oral daily  atorvastatin 10 milliGRAM(s) Oral at bedtime  buDESOnide    Inhalation Suspension 0.5 milliGRAM(s) Inhalation every 12 hours  calcium carbonate 1250 mG  + Vitamin D (OsCal 500 + D) 1 Tablet(s) Oral three times a day  citalopram 10 milliGRAM(s) Oral daily  dextrose 5%. 1000 milliLiter(s) (50 mL/Hr) IV Continuous <Continuous>  dextrose 5%. 1000 milliLiter(s) (100 mL/Hr) IV Continuous <Continuous>  dextrose 50% Injectable 25 Gram(s) IV Push once  dextrose 50% Injectable 12.5 Gram(s) IV Push once  dextrose 50% Injectable 25 Gram(s) IV Push once  dorzolamide 2% Ophthalmic Solution 1 Drop(s) Both EYES three times a day  folic acid 1 milliGRAM(s) Oral daily  glucagon  Injectable 1 milliGRAM(s) IntraMuscular once  insulin lispro (ADMELOG) corrective regimen sliding scale   SubCutaneous three times a day before meals  insulin lispro (ADMELOG) corrective regimen sliding scale   SubCutaneous at bedtime  melatonin 3 milliGRAM(s) Oral at bedtime  memantine 10 milliGRAM(s) Oral two times a day  mirtazapine 30 milliGRAM(s) Oral at bedtime  nadolol 10 milliGRAM(s) Oral daily  NIFEdipine XL 30 milliGRAM(s) Oral daily  pantoprazole    Tablet 40 milliGRAM(s) Oral before breakfast  piperacillin/tazobactam IVPB.. 3.375 Gram(s) IV Intermittent every 8 hours  QUEtiapine 25 milliGRAM(s) Oral two times a day    MEDICATIONS  (PRN):  acetaminophen     Tablet .. 650 milliGRAM(s) Oral every 6 hours PRN Temp greater or equal to 38C (100.4F), Mild Pain (1 - 3)  dextrose Oral Gel 15 Gram(s) Oral once PRN Blood Glucose LESS THAN 70 milliGRAM(s)/deciliter  ondansetron Injectable 4 milliGRAM(s) IV Push every 8 hours PRN Nausea and/or Vomiting    Vital Signs Last 24 Hrs  T(C): 37.1 (30 Mar 2025 11:07), Max: 37.1 (30 Mar 2025 11:07)  T(F): 98.8 (30 Mar 2025 11:07), Max: 98.8 (30 Mar 2025 11:07)  HR: 57 (30 Mar 2025 11:22) (57 - 89)  BP: 125/71 (30 Mar 2025 11:07) (125/71 - 157/82)  BP(mean): --  RR: 18 (30 Mar 2025 11:07) (16 - 18)  SpO2: 98% (30 Mar 2025 11:22) (88% - 99%)    Parameters below as of 30 Mar 2025 11:22  Patient On (Oxygen Delivery Method): nasal cannula      Daily     Daily Weight in k.2 (30 Mar 2025 03:30)    PHYSICAL EXAM:  Constitutional:   appears comfortable and not distressed. Not diaphoretic.    Neck:  The thyroid is normal. Trachea is midline.     Breasts: Normal examination.    Respiratory: The lungs are clear to auscultation. No dullness and expansion is normal.    Cardiovascular: S1 and S2 are normal. No mummurs, rubs or gallops are present.    Gastrointestinal: The abdomen is soft. No tenderness is present. No masses are present. Bowel sounds are normal.    Genitourinary: The bladder is not distended. No CVA tenderness is present.    Extremities: No edema is noted. No deformities are present.    Neurological:  Tone, power and sensation are normal.     Skin: No leasions are seen  or palpated.    Lymph Nodes: No lymphadenopathy is present.                            11.1   8.47  )-----------( 313      ( 30 Mar 2025 04:49 )             34.1     03-30    139  |  106  |  27[H]  ----------------------------<  152[H]  3.5   |  21[L]  |  0.70    Ca    8.9      30 Mar 2025 04:49  Phos  4.3     03-30  Mg     2.10     -30      Urinalysis Basic - ( 30 Mar 2025 04:49 )    Color: x / Appearance: x / SG: x / pH: x  Gluc: 152 mg/dL / Ketone: x  / Bili: x / Urobili: x   Blood: x / Protein: x / Nitrite: x   Leuk Esterase: x / RBC: x / WBC x   Sq Epi: x / Non Sq Epi: x / Bacteria: x    
    Subjective: Patient seen and examined. No new events except as noted.     SUBJECTIVE/ROS:  nad      MEDICATIONS:  MEDICATIONS  (STANDING):  albuterol/ipratropium for Nebulization 3 milliLiter(s) Nebulizer every 6 hours  apixaban 5 milliGRAM(s) Oral every 12 hours  aspirin  chewable 81 milliGRAM(s) Oral daily  atorvastatin 10 milliGRAM(s) Oral at bedtime  calcium carbonate 1250 mG  + Vitamin D (OsCal 500 + D) 1 Tablet(s) Oral three times a day  citalopram 10 milliGRAM(s) Oral daily  dextrose 5%. 1000 milliLiter(s) (100 mL/Hr) IV Continuous <Continuous>  dextrose 5%. 1000 milliLiter(s) (50 mL/Hr) IV Continuous <Continuous>  dextrose 50% Injectable 25 Gram(s) IV Push once  dextrose 50% Injectable 12.5 Gram(s) IV Push once  dextrose 50% Injectable 25 Gram(s) IV Push once  dorzolamide 2% Ophthalmic Solution 1 Drop(s) Both EYES three times a day  folic acid 1 milliGRAM(s) Oral daily  glucagon  Injectable 1 milliGRAM(s) IntraMuscular once  insulin lispro (ADMELOG) corrective regimen sliding scale   SubCutaneous three times a day before meals  insulin lispro (ADMELOG) corrective regimen sliding scale   SubCutaneous at bedtime  melatonin 3 milliGRAM(s) Oral at bedtime  memantine 10 milliGRAM(s) Oral two times a day  mirtazapine 30 milliGRAM(s) Oral at bedtime  nadolol 10 milliGRAM(s) Oral daily  NIFEdipine XL 30 milliGRAM(s) Oral daily  pantoprazole    Tablet 40 milliGRAM(s) Oral before breakfast  piperacillin/tazobactam IVPB.. 3.375 Gram(s) IV Intermittent every 8 hours  QUEtiapine 25 milliGRAM(s) Oral two times a day      PHYSICAL EXAM:  T(C): 36.7 (03-29-25 @ 06:00), Max: 36.8 (03-28-25 @ 21:11)  HR: 62 (03-29-25 @ 06:00) (61 - 81)  BP: 153/84 (03-29-25 @ 06:00) (127/85 - 153/84)  RR: 16 (03-29-25 @ 06:00) (16 - 18)  SpO2: 97% (03-29-25 @ 06:00) (93% - 97%)  Wt(kg): --  I&O's Summary    28 Mar 2025 07:01  -  29 Mar 2025 07:00  --------------------------------------------------------  IN: 540 mL / OUT: 1200 mL / NET: -660 mL            JVP: Normal  Neck: supple  Lung: clear   CV: S1 S2 ,  Abd: soft  Ext: No edema  neuro: Awake / alert  Psych: flat affect  Skin: normal``    LABS/DATA:    CARDIAC MARKERS:                                12.0   9.53  )-----------( 311      ( 28 Mar 2025 03:46 )             35.9     03-28    140  |  104  |  17  ----------------------------<  125[H]  3.7   |  24  |  0.65    Ca    9.1      28 Mar 2025 03:46  Phos  3.5     03-28  Mg     2.00     03-28      proBNP:   Lipid Profile:   HgA1c:   TSH:             
    Subjective: Patient seen and examined. No new events except as noted.     SUBJECTIVE/ROS:  nad      MEDICATIONS:  MEDICATIONS  (STANDING):  albuterol/ipratropium for Nebulization 3 milliLiter(s) Nebulizer every 6 hours  apixaban 5 milliGRAM(s) Oral every 12 hours  aspirin  chewable 81 milliGRAM(s) Oral daily  atorvastatin 10 milliGRAM(s) Oral at bedtime  calcium carbonate 1250 mG  + Vitamin D (OsCal 500 + D) 1 Tablet(s) Oral three times a day  citalopram 10 milliGRAM(s) Oral daily  dextrose 5%. 1000 milliLiter(s) (50 mL/Hr) IV Continuous <Continuous>  dextrose 5%. 1000 milliLiter(s) (100 mL/Hr) IV Continuous <Continuous>  dextrose 50% Injectable 25 Gram(s) IV Push once  dextrose 50% Injectable 12.5 Gram(s) IV Push once  dextrose 50% Injectable 25 Gram(s) IV Push once  dorzolamide 2% Ophthalmic Solution 1 Drop(s) Both EYES three times a day  folic acid 1 milliGRAM(s) Oral daily  glucagon  Injectable 1 milliGRAM(s) IntraMuscular once  insulin lispro (ADMELOG) corrective regimen sliding scale   SubCutaneous three times a day before meals  insulin lispro (ADMELOG) corrective regimen sliding scale   SubCutaneous at bedtime  melatonin 3 milliGRAM(s) Oral at bedtime  memantine 10 milliGRAM(s) Oral two times a day  mirtazapine 30 milliGRAM(s) Oral at bedtime  nadolol 10 milliGRAM(s) Oral daily  NIFEdipine XL 30 milliGRAM(s) Oral daily  pantoprazole    Tablet 40 milliGRAM(s) Oral before breakfast  piperacillin/tazobactam IVPB.. 3.375 Gram(s) IV Intermittent every 8 hours  QUEtiapine 25 milliGRAM(s) Oral two times a day      PHYSICAL EXAM:  T(C): 36.2 (03-28-25 @ 05:50), Max: 36.9 (03-28-25 @ 01:00)  HR: 55 (03-28-25 @ 05:50) (53 - 60)  BP: 144/82 (03-28-25 @ 05:50) (122/75 - 144/82)  RR: 17 (03-28-25 @ 05:50) (17 - 18)  SpO2: 95% (03-28-25 @ 05:50) (84% - 100%)  Wt(kg): --  I&O's Summary    27 Mar 2025 07:01  -  28 Mar 2025 07:00  --------------------------------------------------------  IN: 690 mL / OUT: 1400 mL / NET: -710 mL            JVP: Normal  Neck: supple  Lung: clear   CV: S1 S2 ,  Abd: soft  Ext: No edema  neuro: Awake / alert  Psych: flat affect  Skin: normal``    LABS/DATA:    CARDIAC MARKERS:                                12.0   9.53  )-----------( 311      ( 28 Mar 2025 03:46 )             35.9     03-28    140  |  104  |  17  ----------------------------<  125[H]  3.7   |  24  |  0.65    Ca    9.1      28 Mar 2025 03:46  Phos  3.5     03-28  Mg     2.00     03-28      proBNP:   Lipid Profile:   HgA1c:   TSH:             
[facility]   MD YONY CONWAY MD ANGELA WONG, PA    TEL:  OFFICE: [phone]  From 5pm-7am Answering Service [phone]    -- RENAL FOLLOW UP NOTE ---Date of Service 03-27-25 @ 15:03    Patient is a 79y old  Female who presents with a chief complaint of Pneumonia due to infectious organism     (27 Mar 2025 12:05)      Patient seen and examined at bedside. No chest pain/sob    VITALS:  T(F): 97.5 (03-27-25 @ 13:30), Max: 98.2 (03-27-25 @ 01:00)  HR: 55 (03-27-25 @ 13:30)  BP: 124/64 (03-27-25 @ 13:30)  RR: 17 (03-27-25 @ 13:30)  SpO2: 95% (03-27-25 @ 13:30)  Wt(kg): --    03-26 @ 07:01  -  03-27 @ 07:00  --------------------------------------------------------  IN: 300 mL / OUT: 1500 mL / NET: -1200 mL    03-27 @ 07:01  -  03-27 @ 15:03  --------------------------------------------------------  IN: 340 mL / OUT: 1200 mL / NET: -860 mL          PHYSICAL EXAM:  General: NAD  Neck: No JVD  Respiratory: CTAB, no wheezes, rales or rhonchi  Cardiovascular: S1, S2, RRR  Gastrointestinal: BS+, soft, NT/ND  Extremities: No peripheral edema    Hospital Medications:   MEDICATIONS  (STANDING):  albuterol/ipratropium for Nebulization 3 milliLiter(s) Nebulizer every 6 hours  apixaban 5 milliGRAM(s) Oral every 12 hours  aspirin  chewable 81 milliGRAM(s) Oral daily  atorvastatin 10 milliGRAM(s) Oral at bedtime  calcium carbonate 1250 mG  + Vitamin D (OsCal 500 + D) 1 Tablet(s) Oral three times a day  citalopram 10 milliGRAM(s) Oral daily  dextrose 5%. 1000 milliLiter(s) (50 mL/Hr) IV Continuous <Continuous>  dextrose 5%. 1000 milliLiter(s) (100 mL/Hr) IV Continuous <Continuous>  dextrose 50% Injectable 25 Gram(s) IV Push once  dextrose 50% Injectable 12.5 Gram(s) IV Push once  dextrose 50% Injectable 25 Gram(s) IV Push once  dorzolamide 2% Ophthalmic Solution 1 Drop(s) Both EYES three times a day  folic acid 1 milliGRAM(s) Oral daily  glucagon  Injectable 1 milliGRAM(s) IntraMuscular once  insulin lispro (ADMELOG) corrective regimen sliding scale   SubCutaneous three times a day before meals  insulin lispro (ADMELOG) corrective regimen sliding scale   SubCutaneous at bedtime  melatonin 3 milliGRAM(s) Oral at bedtime  memantine 10 milliGRAM(s) Oral two times a day  mirtazapine 30 milliGRAM(s) Oral at bedtime  NIFEdipine XL 30 milliGRAM(s) Oral daily  pantoprazole    Tablet 40 milliGRAM(s) Oral before breakfast  piperacillin/tazobactam IVPB.. 3.375 Gram(s) IV Intermittent every 8 hours  QUEtiapine 25 milliGRAM(s) Oral two times a day      LABS:  03-27    140  |  107  |  20  ----------------------------<  139[H]  4.4   |  23  |  0.77    Ca    8.9      27 Mar 2025 05:30  Phos  3.0     03-27  Mg     2.10     03-27      Creatinine Trend: 0.77 <--, 0.76 <--, 0.66 <--, 0.67 <--    Phosphorus: 3.0 mg/dL (03-27 @ 05:30)                              11.1   11.40 )-----------( 281      ( 27 Mar 2025 05:30 )             33.2     Urine Studies:  Urinalysis - [03-27-25 @ 05:30]      Color  / Appearance  / SG  / pH       Gluc 139 / Ketone   / Bili  / Urobili        Blood  / Protein  / Leuk Est  / Nitrite       RBC  / WBC  / Hyaline  / Gran  / Sq Epi  / Non Sq Epi  / Bacteria       Ferritin 133      [08-14-24 @ 06:35]  Vitamin D (25OH) 27.3      [08-12-24 @ 06:10]  TSH 0.91      [02-25-25 @ 05:12]  Lipid: chol 115, , HDL 30, LDL --      [02-24-25 @ 06:45]        RADIOLOGY & ADDITIONAL STUDIES:  
Date of Service: 03-27-25 @ 11:40    Patient is a 79y old  Female who presents with a chief complaint of Generalized weakness, fever (27 Mar 2025 11:27)      Any change in ROS:   No new respiratory events overnight. Denies SOB/CP.    MEDICATIONS  (STANDING):  apixaban 5 milliGRAM(s) Oral every 12 hours  aspirin  chewable 81 milliGRAM(s) Oral daily  atorvastatin 10 milliGRAM(s) Oral at bedtime  calcium carbonate 1250 mG  + Vitamin D (OsCal 500 + D) 1 Tablet(s) Oral three times a day  citalopram 10 milliGRAM(s) Oral daily  dextrose 5%. 1000 milliLiter(s) (100 mL/Hr) IV Continuous <Continuous>  dextrose 5%. 1000 milliLiter(s) (50 mL/Hr) IV Continuous <Continuous>  dextrose 50% Injectable 25 Gram(s) IV Push once  dextrose 50% Injectable 12.5 Gram(s) IV Push once  dextrose 50% Injectable 25 Gram(s) IV Push once  donepezil 10 milliGRAM(s) Oral at bedtime  dorzolamide 2% Ophthalmic Solution 1 Drop(s) Both EYES three times a day  folic acid 1 milliGRAM(s) Oral daily  glucagon  Injectable 1 milliGRAM(s) IntraMuscular once  insulin lispro (ADMELOG) corrective regimen sliding scale   SubCutaneous three times a day before meals  insulin lispro (ADMELOG) corrective regimen sliding scale   SubCutaneous at bedtime  melatonin 3 milliGRAM(s) Oral at bedtime  memantine 10 milliGRAM(s) Oral two times a day  mirtazapine 30 milliGRAM(s) Oral at bedtime  nadolol 20 milliGRAM(s) Oral daily  NIFEdipine XL 30 milliGRAM(s) Oral daily  pantoprazole    Tablet 40 milliGRAM(s) Oral before breakfast  piperacillin/tazobactam IVPB.. 3.375 Gram(s) IV Intermittent every 8 hours  QUEtiapine 25 milliGRAM(s) Oral two times a day    MEDICATIONS  (PRN):  acetaminophen     Tablet .. 650 milliGRAM(s) Oral every 6 hours PRN Temp greater or equal to 38C (100.4F), Mild Pain (1 - 3)  dextrose Oral Gel 15 Gram(s) Oral once PRN Blood Glucose LESS THAN 70 milliGRAM(s)/deciliter  ondansetron Injectable 4 milliGRAM(s) IV Push every 8 hours PRN Nausea and/or Vomiting    Vital Signs Last 24 Hrs  T(C): 36.3 (27 Mar 2025 10:00), Max: 36.8 (27 Mar 2025 01:00)  T(F): 97.4 (27 Mar 2025 10:00), Max: 98.2 (27 Mar 2025 01:00)  HR: 53 (27 Mar 2025 10:00) (53 - 94)  BP: 126/75 (27 Mar 2025 10:00) (104/67 - 136/77)  BP(mean): --  RR: 18 (27 Mar 2025 10:00) (17 - 18)  SpO2: 96% (27 Mar 2025 10:00) (84% - 100%)    Parameters below as of 27 Mar 2025 10:00  Patient On (Oxygen Delivery Method): nasal cannula  O2 Flow (L/min): 1      I&O's Summary    26 Mar 2025 07:01  -  27 Mar 2025 07:00  --------------------------------------------------------  IN: 300 mL / OUT: 1500 mL / NET: -1200 mL    27 Mar 2025 07:01  -  27 Mar 2025 11:40  --------------------------------------------------------  IN: 200 mL / OUT: 800 mL / NET: -600 mL          Physical Exam:   GENERAL: NAD, well-groomed, well-developed  HEENT: MARTINE/   Atraumatic, Normocephalic  ENMT: No tonsillar erythema, exudates, or enlargement; Moist mucous membranes, Good dentition, No lesions  NECK: Supple, No JVD, Normal thyroid  CHEST/LUNG: Decreased BS  CVS: Regular rate and rhythm; No murmurs, rubs, or gallops  GI: : Soft, Nontender, Nondistended; Bowel sounds present  NERVOUS SYSTEM:  Alert & Oriented X2  EXTREMITIES:  2+ Peripheral Pulses, No clubbing, cyanosis, or edema  LYMPH: No lymphadenopathy noted  SKIN: No rashes or lesions  ENDOCRINOLOGY: No Thyromegaly  PSYCH: Appropriate    Labs:  28, 25                            11.1   11.40 )-----------( 281      ( 27 Mar 2025 05:30 )             33.2                         11.9   15.57 )-----------( 299      ( 26 Mar 2025 03:25 )             35.3                         12.6   14.37 )-----------( 276      ( 25 Mar 2025 18:00 )             37.5                         12.6   15.58 )-----------( 282      ( 24 Mar 2025 21:40 )             37.9     03-27    140  |  107  |  20  ----------------------------<  139[H]  4.4   |  23  |  0.77  03-26    139  |  104  |  10  ----------------------------<  164[H]  3.2[L]   |  23  |  0.76  03-25    138  |  104  |  9   ----------------------------<  198[H]  3.5   |  21[L]  |  0.66  03-24    137  |  104  |  15  ----------------------------<  179[H]  4.0   |  19[L]  |  0.67    Ca    8.9      27 Mar 2025 05:30  Ca    8.9      26 Mar 2025 03:25  Ca    8.5      25 Mar 2025 18:00  Phos  3.0     03-27  Phos  3.2     03-26  Phos  2.4     03-25  Mg     2.10     03-27  Mg     2.60     03-26  Mg     1.70     03-25    TPro  7.6  /  Alb  3.4  /  TBili  1.2  /  DBili  x   /  AST  16  /  ALT  9   /  AlkPhos  112  03-24    CAPILLARY BLOOD GLUCOSE      POCT Blood Glucose.: 152 mg/dL (27 Mar 2025 11:03)  POCT Blood Glucose.: 149 mg/dL (27 Mar 2025 07:35)  POCT Blood Glucose.: 187 mg/dL (26 Mar 2025 22:06)  POCT Blood Glucose.: 202 mg/dL (26 Mar 2025 17:07)  POCT Blood Glucose.: 169 mg/dL (26 Mar 2025 12:04)          Urinalysis Basic - ( 27 Mar 2025 05:30 )    Color: x / Appearance: x / SG: x / pH: x  Gluc: 139 mg/dL / Ketone: x  / Bili: x / Urobili: x   Blood: x / Protein: x / Nitrite: x   Leuk Esterase: x / RBC: x / WBC x   Sq Epi: x / Non Sq Epi: x / Bacteria: x      Procalcitonin: 0.05 ng/mL (03-24 @ 21:40)        RECENT CULTURES:  03-24 @ 22:35 Blood Blood-Peripheral                No growth at 48 Hours    03-24 @ 22:34 Blood Blood-Peripheral                No growth at 48 Hours      Studies  < from: CT Angio Chest PE Protocol w/ IV Cont (03.25.25 @ 02:50) >    ACC: [id number] EXAM:  CT ABDOMEN AND PELVIS IC   ORDERED BY: ÁNGEL GUILLORY     ACC: [id number] EXAM:  CT ANGIO CHEST PULM ART [facility]   ORDERED BY: ÁNGEL GUILLORY     PROCEDURE DATE:  03/25/2025          INTERPRETATION:  CLINICAL INFORMATION:Sepsis, dyspnea. Evaluate for   pulmonary embolism.    COMPARISON: CT chest 5/19/2024, CT abdomen pelvis 8/11/2024    CONTRAST/COMPLICATIONS:  IV Contrast: Omnipaque 350  90 cc administered   10 cc discarded  Oral Contrast: NONE  .    PROCEDURE:  CT Angiography of the Chest was performed followed by portal venous phase   imaging of the Abdomen and Pelvis.  Sagittal and coronal reformats were performed as well as 3D (MIP)   reconstructions.    FINDINGS:  CHEST:  LUNGS AND LARGE AIRWAYS: Patent central airways. Left apical   consolidative opacity.. Additional bilateral scattered peripheral nodular   and tree-in-bud opacities similar to prior exam. Mild smooth interlobular   septal thickening. Lingular subsegmental atelectasis. Bibasilar   subsegmental atelectasis.  PLEURA: Trace bilateral pleural effusions (left greater than right).  VESSELS: No main, right main, left main, lobar or segmental pulmonary   embolism. Limited evaluation of subsegmental pulmonary arteries secondary   to motion and mixing artifact. Main pulmonary arteries not dilated.   Thoracic aorta is normal in caliber. Atherosclerotic calcifications of   the thoracic aorta and coronary arteries.  HEART: Cardiomegaly. No pericardial effusion. Reflux of contrast into the   intrahepatic IVC and hepatic veins.  MEDIASTINUM AND OLIVE: No lymphadenopathy.  CHEST WALL AND LOWER NECK: Enlarged heterogenous thyroid containing   multiple nodules.    ABDOMEN AND PELVIS:  LIVER: Within normal limits.  BILE DUCTS: Normal caliber.  GALLBLADDER: Cholecystectomy.  SPLEEN: Within normal limits.  PANCREAS: Within normal limits.  ADRENALS: Within normal limits.  KIDNEYS/URETERS: Kidneys enhance symmetrically without hydronephrosis.   Right renal cyst.    BLADDER: Within normal limits.  REPRODUCTIVE ORGANS: Hysterectomy.    BOWEL: No bowel obstruction. Appendix is normal. Status post partial   colectomy with Jania's pouch and left lower quadrant colostomy.   Colonic diverticulosis without evidence of diverticulitis.  PERITONEUM/RETROPERITONEUM: No ascites, pneumoperitoneum, or loculated   collection.  VESSELS: Atherosclerotic calcifications of the aortoiliac tree.   Left-sided IVC.  LYMPH NODES: No lymphadenopathy.  ABDOMINAL WALL: Postsurgical changes. Small fat-containingumbilical   hernia. Small fat-containing parastomal hernia.  BONES: Chronic right-sided rib fractures. Chronic appearing mild   compression deformity of L2. Degenerative changes. Grade 2   anterolisthesis of L5 on S1 similar to prior exam. Bilateral L5 pars   defects.    IMPRESSION:  No pulmonary embolism.    Mild smooth interlobular septal thickening likely reflective of mild   interstitial edema. Trace bilateral pleural effusions (left greater than   right).    Left apical consolidative opacityand a few patchy peripheral nodular   opacities which raises concern for pneumonia.    Cardiomegaly with reflux of contrast into the intrahepatic IVC and   hepatic veins suggestive of right heart failure.    No acute intra-abdominal or pelvic findings.    Colonic diverticulosis without evidence of diverticulitis.          --- End of Report ---          < end of copied text >        
[facility]  JORGE LUIS Chaves Y. Patel, S. Shah, G. Casimir  [phone]  ([phone] - weekdays after 5pm and weekends)    Name: TY DAVIS  Age/Gender: 79y Female  MRN: [id number]    Interval History:  Notes reviewed.   No concerning overnight events.  Afebrile.   denies any complaints    Allergies: No Known Allergies      Objective:  Vitals:   T(F): 97.4 (03-27-25 @ 10:00), Max: 98.2 (03-27-25 @ 01:00)  HR: 53 (03-27-25 @ 10:00) (53 - 94)  BP: 126/75 (03-27-25 @ 10:00) (104/67 - 136/77)  RR: 18 (03-27-25 @ 10:00) (17 - 18)  SpO2: 96% (03-27-25 @ 10:00) (84% - 100%)  Physical Examination:  General: no acute distress  HEENT: anicteric, NC  Cardio: S1, S2, normal rate  Resp: clear to auscultation anteriorly  Abd: soft, NT, ND, colostomy  Ext: no LE edema  Skin: warm, dry    Laboratory Studies:  CBC:                       11.1   11.40 )-----------( 281      ( 27 Mar 2025 05:30 )             33.2     WBC Trend:  11.40 03-27-25 @ 05:30  15.57 03-26-25 @ 03:25  14.37 03-25-25 @ 18:00  15.58 03-24-25 @ 21:40    CMP: 03-27    140  |  107  |  20  ----------------------------<  139[H]  4.4   |  23  |  0.77    Ca    8.9      27 Mar 2025 05:30  Phos  3.0     03-27  Mg     2.10     03-27            Urinalysis Basic - ( 27 Mar 2025 05:30 )    Color: x / Appearance: x / SG: x / pH: x  Gluc: 139 mg/dL / Ketone: x  / Bili: x / Urobili: x   Blood: x / Protein: x / Nitrite: x   Leuk Esterase: x / RBC: x / WBC x   Sq Epi: x / Non Sq Epi: x / Bacteria: x      Microbiology: reviewed     Urinalysis with Rflx Culture (collected 03-25-25 @ 07:00)    Culture - Blood (collected 03-24-25 @ 22:35)  Source: Blood Blood-Peripheral  Preliminary Report (03-27-25 @ 01:02):    No growth at 48 Hours    Culture - Blood (collected 03-24-25 @ 22:34)  Source: Blood Blood-Peripheral  Preliminary Report (03-27-25 @ 01:02):    No growth at 48 Hours        Radiology: reviewed     Medications:  acetaminophen     Tablet .. 650 milliGRAM(s) Oral every 6 hours PRN  apixaban 5 milliGRAM(s) Oral every 12 hours  aspirin  chewable 81 milliGRAM(s) Oral daily  atorvastatin 10 milliGRAM(s) Oral at bedtime  calcium carbonate 1250 mG  + Vitamin D (OsCal 500 + D) 1 Tablet(s) Oral three times a day  citalopram 10 milliGRAM(s) Oral daily  dextrose 5%. 1000 milliLiter(s) IV Continuous <Continuous>  dextrose 5%. 1000 milliLiter(s) IV Continuous <Continuous>  dextrose 50% Injectable 25 Gram(s) IV Push once  dextrose 50% Injectable 12.5 Gram(s) IV Push once  dextrose 50% Injectable 25 Gram(s) IV Push once  dextrose Oral Gel 15 Gram(s) Oral once PRN  donepezil 10 milliGRAM(s) Oral at bedtime  dorzolamide 2% Ophthalmic Solution 1 Drop(s) Both EYES three times a day  folic acid 1 milliGRAM(s) Oral daily  glucagon  Injectable 1 milliGRAM(s) IntraMuscular once  insulin lispro (ADMELOG) corrective regimen sliding scale   SubCutaneous three times a day before meals  insulin lispro (ADMELOG) corrective regimen sliding scale   SubCutaneous at bedtime  melatonin 3 milliGRAM(s) Oral at bedtime  memantine 10 milliGRAM(s) Oral two times a day  mirtazapine 30 milliGRAM(s) Oral at bedtime  nadolol 20 milliGRAM(s) Oral daily  NIFEdipine XL 30 milliGRAM(s) Oral daily  ondansetron Injectable 4 milliGRAM(s) IV Push every 8 hours PRN  pantoprazole    Tablet 40 milliGRAM(s) Oral before breakfast  piperacillin/tazobactam IVPB.. 3.375 Gram(s) IV Intermittent every 8 hours  QUEtiapine 25 milliGRAM(s) Oral two times a day    Antimicrobials:  piperacillin/tazobactam IVPB.. 3.375 Gram(s) IV Intermittent every 8 hours  
[facility]  JORGE LUIS Chaves Y. Patel, S. Shah, G. Casimir  [phone]  ([phone] - weekdays after 5pm and weekends)    Name: TY DAVIS  Age/Gender: 79y Female  MRN: [id number]    Interval History:  Notes reviewed.   No concerning overnight events.  Afebrile.   denies SOB or abd pain    Allergies: No Known Allergies      Objective:  Vitals:   T(F): 98.1 (03-31-25 @ 11:20), Max: 98.4 (03-30-25 @ 19:50)  HR: 58 (03-31-25 @ 11:20) (58 - 75)  BP: 113/57 (03-31-25 @ 11:20) (113/57 - 159/93)  RR: 18 (03-31-25 @ 11:20) (17 - 18)  SpO2: 96% (03-31-25 @ 11:20) (92% - 98%)  Physical Examination:  General: no acute distress  HEENT: anicteric, NC  Cardio: S1, S2, normal rate  Resp: clear to auscultation anteriorly  Abd: soft, NT, ND  Ext: no LE edema  Skin: warm, dry    Laboratory Studies:  CBC:                       11.2   8.91  )-----------( 318      ( 31 Mar 2025 05:54 )             34.2     WBC Trend:  8.91 03-31-25 @ 05:54  8.47 03-30-25 @ 04:49  8.43 03-29-25 @ 06:28  9.53 03-28-25 @ 03:46  11.40 03-27-25 @ 05:30  15.57 03-26-25 @ 03:25  14.37 03-25-25 @ 18:00  15.58 03-24-25 @ 21:40    CMP: 03-31    140  |  104  |  20  ----------------------------<  181[H]  3.4[L]   |  22  |  0.71    Ca    9.2      31 Mar 2025 05:54  Phos  4.0     03-31  Mg     2.00     03-31            Urinalysis Basic - ( 31 Mar 2025 05:54 )    Color: x / Appearance: x / SG: x / pH: x  Gluc: 181 mg/dL / Ketone: x  / Bili: x / Urobili: x   Blood: x / Protein: x / Nitrite: x   Leuk Esterase: x / RBC: x / WBC x   Sq Epi: x / Non Sq Epi: x / Bacteria: x      Microbiology: reviewed     Urinalysis with Rflx Culture (collected 03-25-25 @ 07:00)    Culture - Blood (collected 03-24-25 @ 22:35)  Source: Blood Blood-Peripheral  Final Report (03-30-25 @ 01:00):    No growth at 5 days    Culture - Blood (collected 03-24-25 @ 22:34)  Source: Blood Blood-Peripheral  Final Report (03-30-25 @ 01:00):    No growth at 5 days        Radiology: reviewed     Medications:  acetaminophen     Tablet .. 650 milliGRAM(s) Oral every 6 hours PRN  albuterol/ipratropium for Nebulization 3 milliLiter(s) Nebulizer every 6 hours  apixaban 5 milliGRAM(s) Oral every 12 hours  aspirin  chewable 81 milliGRAM(s) Oral daily  atorvastatin 10 milliGRAM(s) Oral at bedtime  buDESOnide    Inhalation Suspension 0.5 milliGRAM(s) Inhalation every 12 hours  calcium carbonate 1250 mG  + Vitamin D (OsCal 500 + D) 1 Tablet(s) Oral three times a day  citalopram 10 milliGRAM(s) Oral daily  dextrose 5%. 1000 milliLiter(s) IV Continuous <Continuous>  dextrose 5%. 1000 milliLiter(s) IV Continuous <Continuous>  dextrose 50% Injectable 25 Gram(s) IV Push once  dextrose 50% Injectable 12.5 Gram(s) IV Push once  dextrose 50% Injectable 25 Gram(s) IV Push once  dextrose Oral Gel 15 Gram(s) Oral once PRN  dorzolamide 2% Ophthalmic Solution 1 Drop(s) Both EYES three times a day  folic acid 1 milliGRAM(s) Oral daily  glucagon  Injectable 1 milliGRAM(s) IntraMuscular once  insulin lispro (ADMELOG) corrective regimen sliding scale   SubCutaneous three times a day before meals  insulin lispro (ADMELOG) corrective regimen sliding scale   SubCutaneous at bedtime  melatonin 3 milliGRAM(s) Oral at bedtime  memantine 10 milliGRAM(s) Oral two times a day  mirtazapine 30 milliGRAM(s) Oral at bedtime  nadolol 10 milliGRAM(s) Oral daily  NIFEdipine XL 30 milliGRAM(s) Oral daily  ondansetron Injectable 4 milliGRAM(s) IV Push every 8 hours PRN  pantoprazole    Tablet 40 milliGRAM(s) Oral before breakfast  piperacillin/tazobactam IVPB.. 3.375 Gram(s) IV Intermittent every 8 hours  potassium chloride    Tablet ER 40 milliEquivalent(s) Oral once  QUEtiapine 25 milliGRAM(s) Oral two times a day    Antimicrobials:  piperacillin/tazobactam IVPB.. 3.375 Gram(s) IV Intermittent every 8 hours  
Patient is a 79y old  Female who presents with a chief complaint of Generalized weakness, fever (26 Mar 2025 12:51)    Date of servie : 03-26-25 @ 13:32  INTERVAL HPI/OVERNIGHT EVENTS:  T(C): 36.4 (03-26-25 @ 12:10), Max: 38.1 (03-26-25 @ 01:00)  HR: 80 (03-26-25 @ 13:24) (75 - 160)  BP: 112/87 (03-26-25 @ 12:10) (78/50 - 151/86)  RR: 18 (03-26-25 @ 12:10) (16 - 19)  SpO2: 94% (03-26-25 @ 12:10) (92% - 100%)  Wt(kg): --  I&O's Summary      LABS:                        11.9   15.57 )-----------( 299      ( 26 Mar 2025 03:25 )             35.3     03-26    139  |  104  |  10  ----------------------------<  164[H]  3.2[L]   |  23  |  0.76    Ca    8.9      26 Mar 2025 03:25  Phos  3.2     03-26  Mg     2.60     03-26    TPro  7.6  /  Alb  3.4  /  TBili  1.2  /  DBili  x   /  AST  16  /  ALT  9   /  AlkPhos  112  03-24    PT/INR - ( 24 Mar 2025 21:40 )   PT: 12.7 sec;   INR: 1.07 ratio         PTT - ( 24 Mar 2025 21:40 )  PTT:32.9 sec  Urinalysis Basic - ( 26 Mar 2025 03:25 )    Color: x / Appearance: x / SG: x / pH: x  Gluc: 164 mg/dL / Ketone: x  / Bili: x / Urobili: x   Blood: x / Protein: x / Nitrite: x   Leuk Esterase: x / RBC: x / WBC x   Sq Epi: x / Non Sq Epi: x / Bacteria: x      CAPILLARY BLOOD GLUCOSE      POCT Blood Glucose.: 169 mg/dL (26 Mar 2025 12:04)  POCT Blood Glucose.: 157 mg/dL (26 Mar 2025 08:13)  POCT Blood Glucose.: 146 mg/dL (25 Mar 2025 21:20)  POCT Blood Glucose.: 192 mg/dL (25 Mar 2025 17:17)  POCT Blood Glucose.: 189 mg/dL (25 Mar 2025 16:32)        Urinalysis Basic - ( 26 Mar 2025 03:25 )    Color: x / Appearance: x / SG: x / pH: x  Gluc: 164 mg/dL / Ketone: x  / Bili: x / Urobili: x   Blood: x / Protein: x / Nitrite: x   Leuk Esterase: x / RBC: x / WBC x   Sq Epi: x / Non Sq Epi: x / Bacteria: x        MEDICATIONS  (STANDING):  apixaban 5 milliGRAM(s) Oral every 12 hours  aspirin  chewable 81 milliGRAM(s) Oral daily  atorvastatin 10 milliGRAM(s) Oral at bedtime  calcium carbonate 1250 mG  + Vitamin D (OsCal 500 + D) 1 Tablet(s) Oral three times a day  citalopram 10 milliGRAM(s) Oral daily  dextrose 5%. 1000 milliLiter(s) (50 mL/Hr) IV Continuous <Continuous>  dextrose 5%. 1000 milliLiter(s) (100 mL/Hr) IV Continuous <Continuous>  dextrose 50% Injectable 25 Gram(s) IV Push once  dextrose 50% Injectable 12.5 Gram(s) IV Push once  dextrose 50% Injectable 25 Gram(s) IV Push once  donepezil 10 milliGRAM(s) Oral at bedtime  dorzolamide 2% Ophthalmic Solution 1 Drop(s) Both EYES three times a day  folic acid 1 milliGRAM(s) Oral daily  furosemide   Injectable 20 milliGRAM(s) IV Push daily  glucagon  Injectable 1 milliGRAM(s) IntraMuscular once  insulin lispro (ADMELOG) corrective regimen sliding scale   SubCutaneous three times a day before meals  insulin lispro (ADMELOG) corrective regimen sliding scale   SubCutaneous at bedtime  melatonin 3 milliGRAM(s) Oral at bedtime  memantine 10 milliGRAM(s) Oral two times a day  metoprolol succinate ER 25 milliGRAM(s) Oral daily  mirtazapine 30 milliGRAM(s) Oral at bedtime  NIFEdipine XL 30 milliGRAM(s) Oral daily  pantoprazole    Tablet 40 milliGRAM(s) Oral before breakfast  piperacillin/tazobactam IVPB.- 3.375 Gram(s) IV Intermittent once  piperacillin/tazobactam IVPB.- 3.375 Gram(s) IV Intermittent once  potassium chloride    Tablet ER 40 milliEquivalent(s) Oral every 4 hours  QUEtiapine 25 milliGRAM(s) Oral two times a day    MEDICATIONS  (PRN):  acetaminophen     Tablet .. 650 milliGRAM(s) Oral every 6 hours PRN Temp greater or equal to 38C (100.4F), Mild Pain (1 - 3)  dextrose Oral Gel 15 Gram(s) Oral once PRN Blood Glucose LESS THAN 70 milliGRAM(s)/deciliter  ondansetron Injectable 4 milliGRAM(s) IV Push every 8 hours PRN Nausea and/or Vomiting          PHYSICAL EXAM:  GENERAL: frail  CHEST/LUNG: Coarse breath sounds +  HEART: Regular rate and rhythm; No murmurs, rubs, or gallops  ABDOMEN: Soft, Nontender, Nondistended; Bowel sounds present  EXTREMITIES: edema +    Care Discussed with Consultants/Other Providers [ ] YES  [ ] NO
TY DAVIS  79y  Patient is a 79y old  Female who presents with a chief complaint of Generalized weakness, fever (29 Mar 2025 07:30)    HPI:  Seen and examined. No new complaints at this time.    HEALTH ISSUES - PROBLEM Dx:  Sepsis    Pneumonia    Diastolic CHF, acute on chronic    Hypoxemia    Dementia    HTN (hypertension)    Paroxysmal atrial fibrillation    Encounter for deep vein thrombosis (DVT) prophylaxis    Type 2 diabetes mellitus with hyperglycemia, without long-term current use of insulin    Hypoxia    Dementia          MEDICATIONS  (STANDING):  albuterol/ipratropium for Nebulization 3 milliLiter(s) Nebulizer every 6 hours  apixaban 5 milliGRAM(s) Oral every 12 hours  aspirin  chewable 81 milliGRAM(s) Oral daily  atorvastatin 10 milliGRAM(s) Oral at bedtime  calcium carbonate 1250 mG  + Vitamin D (OsCal 500 + D) 1 Tablet(s) Oral three times a day  citalopram 10 milliGRAM(s) Oral daily  dextrose 5%. 1000 milliLiter(s) (100 mL/Hr) IV Continuous <Continuous>  dextrose 5%. 1000 milliLiter(s) (50 mL/Hr) IV Continuous <Continuous>  dextrose 50% Injectable 25 Gram(s) IV Push once  dextrose 50% Injectable 12.5 Gram(s) IV Push once  dextrose 50% Injectable 25 Gram(s) IV Push once  dorzolamide 2% Ophthalmic Solution 1 Drop(s) Both EYES three times a day  folic acid 1 milliGRAM(s) Oral daily  glucagon  Injectable 1 milliGRAM(s) IntraMuscular once  insulin lispro (ADMELOG) corrective regimen sliding scale   SubCutaneous three times a day before meals  insulin lispro (ADMELOG) corrective regimen sliding scale   SubCutaneous at bedtime  melatonin 3 milliGRAM(s) Oral at bedtime  memantine 10 milliGRAM(s) Oral two times a day  mirtazapine 30 milliGRAM(s) Oral at bedtime  nadolol 10 milliGRAM(s) Oral daily  NIFEdipine XL 30 milliGRAM(s) Oral daily  pantoprazole    Tablet 40 milliGRAM(s) Oral before breakfast  piperacillin/tazobactam IVPB.. 3.375 Gram(s) IV Intermittent every 8 hours  QUEtiapine 25 milliGRAM(s) Oral two times a day    MEDICATIONS  (PRN):  acetaminophen     Tablet .. 650 milliGRAM(s) Oral every 6 hours PRN Temp greater or equal to 38C (100.4F), Mild Pain (1 - 3)  dextrose Oral Gel 15 Gram(s) Oral once PRN Blood Glucose LESS THAN 70 milliGRAM(s)/deciliter  ondansetron Injectable 4 milliGRAM(s) IV Push every 8 hours PRN Nausea and/or Vomiting    Vital Signs Last 24 Hrs  T(C): 36.7 (29 Mar 2025 10:00), Max: 36.8 (28 Mar 2025 21:11)  T(F): 98.1 (29 Mar 2025 10:00), Max: 98.3 (28 Mar 2025 21:11)  HR: 55 (29 Mar 2025 10:00) (52 - 81)  BP: 147/78 (29 Mar 2025 10:00) (127/85 - 153/84)  BP(mean): --  RR: 18 (29 Mar 2025 10:00) (16 - 18)  SpO2: 95% (29 Mar 2025 10:00) (93% - 97%)    Parameters below as of 29 Mar 2025 10:00  Patient On (Oxygen Delivery Method): nasal cannula  O2 Flow (L/min): 2    Daily     Daily     PHYSICAL EXAM:  Constitutional: She  appears comfortable and not distressed. Not diaphoretic.    Neck:  The thyroid is normal. Trachea is midline.     Breasts: Normal examination.    Respiratory: The lungs are clear to auscultation. No dullness and expansion is normal.    Cardiovascular: S1 and S2 are normal. No mummurs, rubs or gallops are present.    Gastrointestinal: The abdomen is soft. No tenderness is present. No masses are present. Bowel sounds are normal.    Genitourinary: The bladder is not distended. No CVA tenderness is present.    Extremities: No edema is noted. No deformities are present.    Neurological: Confused. Tone, power and sensation are normal.     Skin: No leasions are seen  or palpated.    Lymph Nodes: No lymphadenopathy is present.                              12.8   8.43  )-----------( 349      ( 29 Mar 2025 06:28 )             38.5     03-29    140  |  105  |  13  ----------------------------<  120[H]  3.7   |  24  |  0.65    Ca    9.7      29 Mar 2025 06:28  Phos  4.2     03-29  Mg     2.00     03-29      Urinalysis Basic - ( 29 Mar 2025 06:28 )    Color: x / Appearance: x / SG: x / pH: x  Gluc: 120 mg/dL / Ketone: x  / Bili: x / Urobili: x   Blood: x / Protein: x / Nitrite: x   Leuk Esterase: x / RBC: x / WBC x   Sq Epi: x / Non Sq Epi: x / Bacteria: x    
    Subjective: Patient seen and examined. No new events except as noted.     SUBJECTIVE/ROS:  nad      MEDICATIONS:  MEDICATIONS  (STANDING):  apixaban 5 milliGRAM(s) Oral every 12 hours  aspirin  chewable 81 milliGRAM(s) Oral daily  atorvastatin 10 milliGRAM(s) Oral at bedtime  calcium carbonate 1250 mG  + Vitamin D (OsCal 500 + D) 1 Tablet(s) Oral three times a day  citalopram 10 milliGRAM(s) Oral daily  dextrose 5%. 1000 milliLiter(s) (100 mL/Hr) IV Continuous <Continuous>  dextrose 5%. 1000 milliLiter(s) (50 mL/Hr) IV Continuous <Continuous>  dextrose 50% Injectable 25 Gram(s) IV Push once  dextrose 50% Injectable 12.5 Gram(s) IV Push once  dextrose 50% Injectable 25 Gram(s) IV Push once  donepezil 10 milliGRAM(s) Oral at bedtime  dorzolamide 2% Ophthalmic Solution 1 Drop(s) Both EYES three times a day  folic acid 1 milliGRAM(s) Oral daily  glucagon  Injectable 1 milliGRAM(s) IntraMuscular once  insulin lispro (ADMELOG) corrective regimen sliding scale   SubCutaneous three times a day before meals  insulin lispro (ADMELOG) corrective regimen sliding scale   SubCutaneous at bedtime  melatonin 3 milliGRAM(s) Oral at bedtime  memantine 10 milliGRAM(s) Oral two times a day  mirtazapine 30 milliGRAM(s) Oral at bedtime  nadolol 20 milliGRAM(s) Oral daily  NIFEdipine XL 30 milliGRAM(s) Oral daily  pantoprazole    Tablet 40 milliGRAM(s) Oral before breakfast  piperacillin/tazobactam IVPB.. 3.375 Gram(s) IV Intermittent every 8 hours  QUEtiapine 25 milliGRAM(s) Oral two times a day      PHYSICAL EXAM:  T(C): 36.7 (03-27-25 @ 05:00), Max: 36.8 (03-27-25 @ 01:00)  HR: 63 (03-27-25 @ 05:00) (58 - 94)  BP: 116/64 (03-27-25 @ 05:00) (104/67 - 136/77)  RR: 18 (03-27-25 @ 09:38) (17 - 18)  SpO2: 95% (03-27-25 @ 09:38) (84% - 100%)  Wt(kg): --  I&O's Summary    26 Mar 2025 07:01  -  27 Mar 2025 07:00  --------------------------------------------------------  IN: 300 mL / OUT: 1500 mL / NET: -1200 mL            JVP: Normal  Neck: supple  Lung: clear   CV: S1 S2 ,  Abd: soft  Ext: No edema  neuro: Awake / alert  Psych: flat affect  Skin: normal``    LABS/DATA:    CARDIAC MARKERS:                                11.1   11.40 )-----------( 281      ( 27 Mar 2025 05:30 )             33.2     03-27    140  |  107  |  20  ----------------------------<  139[H]  4.4   |  23  |  0.77    Ca    8.9      27 Mar 2025 05:30  Phos  3.0     03-27  Mg     2.10     03-27      proBNP:   Lipid Profile:   HgA1c:   TSH:             
Date of Service: 03-28-25 @ 13:05    Patient is a 79y old  Female who presents with a chief complaint of Generalized weakness, fever (28 Mar 2025 11:37)      Any change in ROS:   No new respiratory events overnight. Denies SOB/CP.  O2 sats 97% on 2LNC     MEDICATIONS  (STANDING):  albuterol/ipratropium for Nebulization 3 milliLiter(s) Nebulizer every 6 hours  apixaban 5 milliGRAM(s) Oral every 12 hours  aspirin  chewable 81 milliGRAM(s) Oral daily  atorvastatin 10 milliGRAM(s) Oral at bedtime  calcium carbonate 1250 mG  + Vitamin D (OsCal 500 + D) 1 Tablet(s) Oral three times a day  citalopram 10 milliGRAM(s) Oral daily  dextrose 5%. 1000 milliLiter(s) (50 mL/Hr) IV Continuous <Continuous>  dextrose 5%. 1000 milliLiter(s) (100 mL/Hr) IV Continuous <Continuous>  dextrose 50% Injectable 25 Gram(s) IV Push once  dextrose 50% Injectable 12.5 Gram(s) IV Push once  dextrose 50% Injectable 25 Gram(s) IV Push once  dorzolamide 2% Ophthalmic Solution 1 Drop(s) Both EYES three times a day  folic acid 1 milliGRAM(s) Oral daily  glucagon  Injectable 1 milliGRAM(s) IntraMuscular once  insulin lispro (ADMELOG) corrective regimen sliding scale   SubCutaneous three times a day before meals  insulin lispro (ADMELOG) corrective regimen sliding scale   SubCutaneous at bedtime  melatonin 3 milliGRAM(s) Oral at bedtime  memantine 10 milliGRAM(s) Oral two times a day  mirtazapine 30 milliGRAM(s) Oral at bedtime  nadolol 10 milliGRAM(s) Oral daily  NIFEdipine XL 30 milliGRAM(s) Oral daily  pantoprazole    Tablet 40 milliGRAM(s) Oral before breakfast  piperacillin/tazobactam IVPB.. 3.375 Gram(s) IV Intermittent every 8 hours  QUEtiapine 25 milliGRAM(s) Oral two times a day    MEDICATIONS  (PRN):  acetaminophen     Tablet .. 650 milliGRAM(s) Oral every 6 hours PRN Temp greater or equal to 38C (100.4F), Mild Pain (1 - 3)  dextrose Oral Gel 15 Gram(s) Oral once PRN Blood Glucose LESS THAN 70 milliGRAM(s)/deciliter  ondansetron Injectable 4 milliGRAM(s) IV Push every 8 hours PRN Nausea and/or Vomiting    Vital Signs Last 24 Hrs  T(C): 36.4 (28 Mar 2025 09:50), Max: 36.9 (28 Mar 2025 01:00)  T(F): 97.5 (28 Mar 2025 09:50), Max: 98.4 (28 Mar 2025 01:00)  HR: 64 (28 Mar 2025 10:58) (54 - 66)  BP: 136/71 (28 Mar 2025 09:50) (122/75 - 144/82)  BP(mean): --  RR: 18 (28 Mar 2025 09:50) (17 - 18)  SpO2: 94% (28 Mar 2025 10:58) (94% - 100%)    Parameters below as of 28 Mar 2025 10:58  Patient On (Oxygen Delivery Method): nasal cannula        I&O's Summary    27 Mar 2025 07:01  -  28 Mar 2025 07:00  --------------------------------------------------------  IN: 690 mL / OUT: 1400 mL / NET: -710 mL    28 Mar 2025 07:01  -  28 Mar 2025 13:05  --------------------------------------------------------  IN: 200 mL / OUT: 300 mL / NET: -100 mL          Physical Exam:   GENERAL: NAD, well-groomed, well-developed  HEENT: MARTINE/   Atraumatic, Normocephalic  ENMT: No tonsillar erythema, exudates, or enlargement; Moist mucous membranes, Good dentition, No lesions  NECK: Supple, No JVD, Normal thyroid  CHEST/LUNG: few scattered rhonchi   CVS: Regular rate and rhythm; No murmurs, rubs, or gallops  GI: : Soft, Nontender, Nondistended; Bowel sounds present  NERVOUS SYSTEM:  Alert & Oriented X2  EXTREMITIES:  2+ Peripheral Pulses, No clubbing, cyanosis, or edema  LYMPH: No lymphadenopathy noted  SKIN: No rashes or lesions  ENDOCRINOLOGY: No Thyromegaly  PSYCH: Appropriate    Labs:  28, 25                            12.0   9.53  )-----------( 311      ( 28 Mar 2025 03:46 )             35.9                         11.1   11.40 )-----------( 281      ( 27 Mar 2025 05:30 )             33.2                         11.9   15.57 )-----------( 299      ( 26 Mar 2025 03:25 )             35.3                         12.6   14.37 )-----------( 276      ( 25 Mar 2025 18:00 )             37.5                         12.6   15.58 )-----------( 282      ( 24 Mar 2025 21:40 )             37.9     03-28    140  |  104  |  17  ----------------------------<  125[H]  3.7   |  24  |  0.65  03-27    140  |  107  |  20  ----------------------------<  139[H]  4.4   |  23  |  0.77  03-26    139  |  104  |  10  ----------------------------<  164[H]  3.2[L]   |  23  |  0.76  03-25    138  |  104  |  9   ----------------------------<  198[H]  3.5   |  21[L]  |  0.66  03-24    137  |  104  |  15  ----------------------------<  179[H]  4.0   |  19[L]  |  0.67    Ca    9.1      28 Mar 2025 03:46  Ca    8.9      27 Mar 2025 05:30  Phos  3.5     03-28  Phos  3.0     03-27  Mg     2.00     03-28  Mg     2.10     03-27    TPro  7.6  /  Alb  3.4  /  TBili  1.2  /  DBili  x   /  AST  16  /  ALT  9   /  AlkPhos  112  03-24    CAPILLARY BLOOD GLUCOSE      POCT Blood Glucose.: 160 mg/dL (28 Mar 2025 10:59)  POCT Blood Glucose.: 135 mg/dL (28 Mar 2025 07:24)  POCT Blood Glucose.: 177 mg/dL (27 Mar 2025 21:07)  POCT Blood Glucose.: 192 mg/dL (27 Mar 2025 16:11)          Urinalysis Basic - ( 28 Mar 2025 03:46 )    Color: x / Appearance: x / SG: x / pH: x  Gluc: 125 mg/dL / Ketone: x  / Bili: x / Urobili: x   Blood: x / Protein: x / Nitrite: x   Leuk Esterase: x / RBC: x / WBC x   Sq Epi: x / Non Sq Epi: x / Bacteria: x      Procalcitonin: 0.05 ng/mL (03-24 @ 21:40)        RECENT CULTURES:  03-24 @ 22:35 Blood Blood-Peripheral                No growth at 72 Hours    03-24 @ 22:34 Blood Blood-Peripheral                No growth at 72 Hours      Studies  < from: CT Angio Chest PE Protocol w/ IV Cont (03.25.25 @ 02:50) >    ACC: [id number] EXAM:  CT ABDOMEN AND PELVIS IC   ORDERED BY: ÁNGEL GUILLORY     ACC: [id number] [facility]   ORDERED BY: ÁNGEL GUILLORY     PROCEDURE DATE:  03/25/2025          INTERPRETATION:  CLINICAL INFORMATION:Sepsis, dyspnea. Evaluate for   pulmonary embolism.    COMPARISON: CT chest 5/19/2024, CT abdomen pelvis 8/11/2024    CONTRAST/COMPLICATIONS:  IV Contrast: Omnipaque 350  90 cc administered   10 cc discarded  Oral Contrast: NONE  .    PROCEDURE:  CT Angiography of the Chest was performed followed by portal venous phase   imaging of the Abdomen and Pelvis.  Sagittal and coronal reformats were performed as well as 3D (MIP)   reconstructions.    FINDINGS:  CHEST:  LUNGS AND LARGE AIRWAYS: Patent central airways. Left apical   consolidative opacity.. Additional bilateral scattered peripheral nodular   and tree-in-bud opacities similar to prior exam. Mild smooth interlobular   septal thickening. Lingular subsegmental atelectasis. Bibasilar   subsegmental atelectasis.  PLEURA: Trace bilateral pleural effusions (left greater than right).  VESSELS: No main, right main, left main, lobar or segmental pulmonary   embolism. Limited evaluation of subsegmental pulmonary arteries secondary   to motion and mixing artifact. Main pulmonary arteries not dilated.   Thoracic aorta is normal in caliber. Atherosclerotic calcifications of   the thoracic aorta and coronary arteries.  HEART: Cardiomegaly. No pericardial effusion. Reflux of contrast into the   intrahepatic IVC and hepatic veins.  MEDIASTINUM AND OLIVE: No lymphadenopathy.  CHEST WALL AND LOWER NECK: Enlarged heterogenous thyroid containing   multiple nodules.    ABDOMEN AND PELVIS:  LIVER: Within normal limits.  BILE DUCTS: Normal caliber.  GALLBLADDER: Cholecystectomy.  SPLEEN: Within normal limits.  PANCREAS: Within normal limits.  ADRENALS: Within normal limits.  KIDNEYS/URETERS: Kidneys enhance symmetrically without hydronephrosis.   Right renal cyst.    BLADDER: Within normal limits.  REPRODUCTIVE ORGANS: Hysterectomy.    BOWEL: No bowel obstruction. Appendix is normal. Status post partial   colectomy with Jania's pouch and left lower quadrant colostomy.   Colonic diverticulosis without evidence of diverticulitis.  PERITONEUM/RETROPERITONEUM: No ascites, pneumoperitoneum, or loculated   collection.  VESSELS: Atherosclerotic calcifications of the aortoiliac tree.   Left-sided IVC.  LYMPH NODES: No lymphadenopathy.  ABDOMINAL WALL: Postsurgical changes. Small fat-containingumbilical   hernia. Small fat-containing parastomal hernia.  BONES: Chronic right-sided rib fractures. Chronic appearing mild   compression deformity of L2. Degenerative changes. Grade 2   anterolisthesis of L5 on S1 similar to prior exam. Bilateral L5 pars   defects.    IMPRESSION:  No pulmonary embolism.    Mild smooth interlobular septal thickening likely reflective of mild   interstitial edema. Trace bilateral pleural effusions (left greater than   right).    Left apical consolidative opacityand a few patchy peripheral nodular   opacities which raises concern for pneumonia.    Cardiomegaly with reflux of contrast into the intrahepatic IVC and   hepatic veins suggestive of right heart failure.    No acute intra-abdominal or pelvic findings.    Colonic diverticulosis without evidence of diverticulitis.          --- End of Report ---      < end of copied text >            
Patient is a 79y old  Female who presents with a chief complaint of Generalized weakness, fever (30 Mar 2025 14:59)    Date of servie : 03-30-25 @ 15:03  INTERVAL HPI/OVERNIGHT EVENTS:  T(C): 37.1 (03-30-25 @ 11:07), Max: 37.1 (03-30-25 @ 11:07)  HR: 57 (03-30-25 @ 11:22) (57 - 89)  BP: 125/71 (03-30-25 @ 11:07) (125/71 - 157/82)  RR: 18 (03-30-25 @ 11:07) (16 - 18)  SpO2: 98% (03-30-25 @ 11:22) (88% - 99%)  Wt(kg): --  I&O's Summary    29 Mar 2025 07:01  -  30 Mar 2025 07:00  --------------------------------------------------------  IN: 400 mL / OUT: 650 mL / NET: -250 mL        LABS:                        11.1   8.47  )-----------( 313      ( 30 Mar 2025 04:49 )             34.1     03-30    139  |  106  |  27[H]  ----------------------------<  152[H]  3.5   |  21[L]  |  0.70    Ca    8.9      30 Mar 2025 04:49  Phos  4.3     03-30  Mg     2.10     03-30        Urinalysis Basic - ( 30 Mar 2025 04:49 )    Color: x / Appearance: x / SG: x / pH: x  Gluc: 152 mg/dL / Ketone: x  / Bili: x / Urobili: x   Blood: x / Protein: x / Nitrite: x   Leuk Esterase: x / RBC: x / WBC x   Sq Epi: x / Non Sq Epi: x / Bacteria: x      CAPILLARY BLOOD GLUCOSE      POCT Blood Glucose.: 191 mg/dL (30 Mar 2025 11:30)  POCT Blood Glucose.: 134 mg/dL (30 Mar 2025 07:32)  POCT Blood Glucose.: 157 mg/dL (29 Mar 2025 21:21)  POCT Blood Glucose.: 134 mg/dL (29 Mar 2025 16:58)        Urinalysis Basic - ( 30 Mar 2025 04:49 )    Color: x / Appearance: x / SG: x / pH: x  Gluc: 152 mg/dL / Ketone: x  / Bili: x / Urobili: x   Blood: x / Protein: x / Nitrite: x   Leuk Esterase: x / RBC: x / WBC x   Sq Epi: x / Non Sq Epi: x / Bacteria: x        MEDICATIONS  (STANDING):  albuterol/ipratropium for Nebulization 3 milliLiter(s) Nebulizer every 6 hours  apixaban 5 milliGRAM(s) Oral every 12 hours  aspirin  chewable 81 milliGRAM(s) Oral daily  atorvastatin 10 milliGRAM(s) Oral at bedtime  buDESOnide    Inhalation Suspension 0.5 milliGRAM(s) Inhalation every 12 hours  calcium carbonate 1250 mG  + Vitamin D (OsCal 500 + D) 1 Tablet(s) Oral three times a day  citalopram 10 milliGRAM(s) Oral daily  dextrose 5%. 1000 milliLiter(s) (50 mL/Hr) IV Continuous <Continuous>  dextrose 5%. 1000 milliLiter(s) (100 mL/Hr) IV Continuous <Continuous>  dextrose 50% Injectable 25 Gram(s) IV Push once  dextrose 50% Injectable 12.5 Gram(s) IV Push once  dextrose 50% Injectable 25 Gram(s) IV Push once  dorzolamide 2% Ophthalmic Solution 1 Drop(s) Both EYES three times a day  folic acid 1 milliGRAM(s) Oral daily  glucagon  Injectable 1 milliGRAM(s) IntraMuscular once  insulin lispro (ADMELOG) corrective regimen sliding scale   SubCutaneous three times a day before meals  insulin lispro (ADMELOG) corrective regimen sliding scale   SubCutaneous at bedtime  melatonin 3 milliGRAM(s) Oral at bedtime  memantine 10 milliGRAM(s) Oral two times a day  mirtazapine 30 milliGRAM(s) Oral at bedtime  nadolol 10 milliGRAM(s) Oral daily  NIFEdipine XL 30 milliGRAM(s) Oral daily  pantoprazole    Tablet 40 milliGRAM(s) Oral before breakfast  piperacillin/tazobactam IVPB.. 3.375 Gram(s) IV Intermittent every 8 hours  QUEtiapine 25 milliGRAM(s) Oral two times a day    MEDICATIONS  (PRN):  acetaminophen     Tablet .. 650 milliGRAM(s) Oral every 6 hours PRN Temp greater or equal to 38C (100.4F), Mild Pain (1 - 3)  dextrose Oral Gel 15 Gram(s) Oral once PRN Blood Glucose LESS THAN 70 milliGRAM(s)/deciliter  ondansetron Injectable 4 milliGRAM(s) IV Push every 8 hours PRN Nausea and/or Vomiting          PHYSICAL EXAM:  GENERAL: NAD, well-groomed, well-developed  HEAD:  Atraumatic, Normocephalic  CHEST/LUNG: Clear to percussion bilaterally; No rales, rhonchi, wheezing, or rubs  HEART: Regular rate and rhythm; No murmurs, rubs, or gallops  ABDOMEN: Soft, Nontender, Nondistended; Bowel sounds present  EXTREMITIES:  2+ Peripheral Pulses, No clubbing, cyanosis, or edema  LYMPH: No lymphadenopathy noted  SKIN: No rashes or lesions    Care Discussed with Consultants/Other Providers [ ] YES  [ ] NO
Date of Service: 03-31-25 @ 11:27    Patient is a 79y old  Female who presents with a chief complaint of Generalized weakness, fever (31 Mar 2025 09:53)      Any change in ROS:   Awake & alert, calm  Denies CP, SOB  O2 sats 97% on 1LNC when seen, now tolerating RA per flowsheet     MEDICATIONS  (STANDING):  albuterol/ipratropium for Nebulization 3 milliLiter(s) Nebulizer every 6 hours  apixaban 5 milliGRAM(s) Oral every 12 hours  aspirin  chewable 81 milliGRAM(s) Oral daily  atorvastatin 10 milliGRAM(s) Oral at bedtime  buDESOnide    Inhalation Suspension 0.5 milliGRAM(s) Inhalation every 12 hours  calcium carbonate 1250 mG  + Vitamin D (OsCal 500 + D) 1 Tablet(s) Oral three times a day  citalopram 10 milliGRAM(s) Oral daily  dextrose 5%. 1000 milliLiter(s) (100 mL/Hr) IV Continuous <Continuous>  dextrose 5%. 1000 milliLiter(s) (50 mL/Hr) IV Continuous <Continuous>  dextrose 50% Injectable 25 Gram(s) IV Push once  dextrose 50% Injectable 12.5 Gram(s) IV Push once  dextrose 50% Injectable 25 Gram(s) IV Push once  dorzolamide 2% Ophthalmic Solution 1 Drop(s) Both EYES three times a day  folic acid 1 milliGRAM(s) Oral daily  glucagon  Injectable 1 milliGRAM(s) IntraMuscular once  insulin lispro (ADMELOG) corrective regimen sliding scale   SubCutaneous three times a day before meals  insulin lispro (ADMELOG) corrective regimen sliding scale   SubCutaneous at bedtime  melatonin 3 milliGRAM(s) Oral at bedtime  memantine 10 milliGRAM(s) Oral two times a day  mirtazapine 30 milliGRAM(s) Oral at bedtime  nadolol 10 milliGRAM(s) Oral daily  NIFEdipine XL 30 milliGRAM(s) Oral daily  pantoprazole    Tablet 40 milliGRAM(s) Oral before breakfast  piperacillin/tazobactam IVPB.. 3.375 Gram(s) IV Intermittent every 8 hours  potassium chloride    Tablet ER 40 milliEquivalent(s) Oral once  QUEtiapine 25 milliGRAM(s) Oral two times a day    MEDICATIONS  (PRN):  acetaminophen     Tablet .. 650 milliGRAM(s) Oral every 6 hours PRN Temp greater or equal to 38C (100.4F), Mild Pain (1 - 3)  dextrose Oral Gel 15 Gram(s) Oral once PRN Blood Glucose LESS THAN 70 milliGRAM(s)/deciliter  ondansetron Injectable 4 milliGRAM(s) IV Push every 8 hours PRN Nausea and/or Vomiting    Vital Signs Last 24 Hrs  T(C): 36.7 (31 Mar 2025 11:20), Max: 36.9 (30 Mar 2025 19:50)  T(F): 98.1 (31 Mar 2025 11:20), Max: 98.4 (30 Mar 2025 19:50)  HR: 58 (31 Mar 2025 11:20) (58 - 75)  BP: 113/57 (31 Mar 2025 11:20) (113/57 - 159/93)  BP(mean): --  RR: 18 (31 Mar 2025 11:20) (17 - 18)  SpO2: 96% (31 Mar 2025 11:20) (92% - 98%)    Parameters below as of 31 Mar 2025 11:20  Patient On (Oxygen Delivery Method): nasal cannula  O2 Flow (L/min): 1      I&O's Summary    30 Mar 2025 07:01  -  31 Mar 2025 07:00  --------------------------------------------------------  IN: 720 mL / OUT: 0 mL / NET: 720 mL          Physical Exam:   GENERAL: NAD, well-groomed, well-developed  HEENT: MARTINE/   Atraumatic, Normocephalic  ENMT: No tonsillar erythema, exudates, or enlargement; Moist mucous membranes, Good dentition, No lesions  NECK: Supple, No JVD, Normal thyroid  CHEST/LUNG: few scattered rhonchi   CVS: Regular rate and rhythm; No murmurs, rubs, or gallops  GI: : Soft, Nontender, Nondistended; Bowel sounds present  NERVOUS SYSTEM:  Alert & Oriented X2  EXTREMITIES:  2+ Peripheral Pulses, No clubbing, cyanosis, or edema  LYMPH: No lymphadenopathy noted  SKIN: No rashes or lesions  ENDOCRINOLOGY: No Thyromegaly  PSYCH: Appropriate    Labs:  28, 25                            11.2   8.91  )-----------( 318      ( 31 Mar 2025 05:54 )             34.2                         11.1   8.47  )-----------( 313      ( 30 Mar 2025 04:49 )             34.1                         12.8   8.43  )-----------( 349      ( 29 Mar 2025 06:28 )             38.5                         12.0   9.53  )-----------( 311      ( 28 Mar 2025 03:46 )             35.9     03-31    140  |  104  |  20  ----------------------------<  181[H]  3.4[L]   |  22  |  0.71  03-30    139  |  106  |  27[H]  ----------------------------<  152[H]  3.5   |  21[L]  |  0.70  03-29    140  |  105  |  13  ----------------------------<  120[H]  3.7   |  24  |  0.65  03-28    140  |  104  |  17  ----------------------------<  125[H]  3.7   |  24  |  0.65    Ca    9.2      31 Mar 2025 05:54  Ca    8.9      30 Mar 2025 04:49  Phos  4.0     03-31  Phos  4.3     03-30  Mg     2.00     03-31  Mg     2.10     03-30      CAPILLARY BLOOD GLUCOSE      POCT Blood Glucose.: 142 mg/dL (31 Mar 2025 07:31)  POCT Blood Glucose.: 135 mg/dL (30 Mar 2025 21:17)  POCT Blood Glucose.: 112 mg/dL (30 Mar 2025 16:08)  POCT Blood Glucose.: 191 mg/dL (30 Mar 2025 11:30)          Urinalysis Basic - ( 31 Mar 2025 05:54 )    Color: x / Appearance: x / SG: x / pH: x  Gluc: 181 mg/dL / Ketone: x  / Bili: x / Urobili: x   Blood: x / Protein: x / Nitrite: x   Leuk Esterase: x / RBC: x / WBC x   Sq Epi: x / Non Sq Epi: x / Bacteria: x            RECENT CULTURES:  03-24 @ 22:35 Blood Blood-Peripheral                No growth at 5 days    03-24 @ 22:34 Blood Blood-Peripheral                No growth at 5 days          RESPIRATORY CULTURES:          Studies  < from: CT Angio Chest PE Protocol w/ IV Cont (03.25.25 @ 02:50) >    ACC: [id number] EXAM:  CT ABDOMEN AND PELVIS IC   ORDERED BY: ÁNGEL GUILLORY     ACC: [id number] EXAM:  CT ANGIO CHEST PULM ART [facility]   ORDERED BY: ÁNGEL GUILLORY     PROCEDURE DATE:  03/25/2025          INTERPRETATION:  CLINICAL INFORMATION:Sepsis, dyspnea. Evaluate for   pulmonary embolism.    COMPARISON: CT chest 5/19/2024, CT abdomen pelvis 8/11/2024    CONTRAST/COMPLICATIONS:  IV Contrast: Omnipaque 350  90 cc administered   10 cc discarded  Oral Contrast: NONE  .    PROCEDURE:  CT Angiography of the Chest was performed followed by portal venous phase   imaging of the Abdomen and Pelvis.  Sagittal and coronal reformats were performed as well as 3D (MIP)   reconstructions.    FINDINGS:  CHEST:  LUNGS AND LARGE AIRWAYS: Patent central airways. Left apical   consolidative opacity.. Additional bilateral scattered peripheral nodular   and tree-in-bud opacities similar to prior exam. Mild smooth interlobular   septal thickening. Lingular subsegmental atelectasis. Bibasilar   subsegmental atelectasis.  PLEURA: Trace bilateral pleural effusions (left greater than right).  VESSELS: No main, right main, left main, lobar or segmental pulmonary   embolism. Limited evaluation of subsegmental pulmonary arteries secondary   to motion and mixing artifact. Main pulmonary arteries not dilated.   Thoracic aorta is normal in caliber. Atherosclerotic calcifications of   the thoracic aorta and coronary arteries.  HEART: Cardiomegaly. No pericardial effusion. Reflux of contrast into the   intrahepatic IVC and hepatic veins.  MEDIASTINUM AND OLIVE: No lymphadenopathy.  CHEST WALL AND LOWER NECK: Enlarged heterogenous thyroid containing   multiple nodules.    ABDOMEN AND PELVIS:  LIVER: Within normal limits.  BILE DUCTS: Normal caliber.  GALLBLADDER: Cholecystectomy.  SPLEEN: Within normal limits.  PANCREAS: Within normal limits.  ADRENALS: Within normal limits.  KIDNEYS/URETERS: Kidneys enhance symmetrically without hydronephrosis.   Right renal cyst.    BLADDER: Within normal limits.  REPRODUCTIVE ORGANS: Hysterectomy.    BOWEL: No bowel obstruction. Appendix is normal. Status post partial   colectomy with Jania's pouch and left lower quadrant colostomy.   Colonic diverticulosis without evidence of diverticulitis.  PERITONEUM/RETROPERITONEUM: No ascites, pneumoperitoneum, or loculated   collection.  VESSELS: Atherosclerotic calcifications of the aortoiliac tree.   Left-sided IVC.  LYMPH NODES: No lymphadenopathy.  ABDOMINAL WALL: Postsurgical changes. Small fat-containingumbilical   hernia. Small fat-containing parastomal hernia.  BONES: Chronic right-sided rib fractures. Chronic appearing mild   compression deformity of L2. Degenerative changes. Grade 2   anterolisthesis of L5 on S1 similar to prior exam. Bilateral L5 pars   defects.    IMPRESSION:  No pulmonary embolism.    Mild smooth interlobular septal thickening likely reflective of mild   interstitial edema. Trace bilateral pleural effusions (left greater than   right).    Left apical consolidative opacityand a few patchy peripheral nodular   opacities which raises concern for pneumonia.    Cardiomegaly with reflux of contrast into the intrahepatic IVC and   hepatic veins suggestive of right heart failure.    No acute intra-abdominal or pelvic findings.    Colonic diverticulosis without evidence of diverticulitis.          --- End of Report ---

## 2025-04-01 ENCOUNTER — APPOINTMENT (OUTPATIENT)
Dept: PULMONOLOGY | Facility: CLINIC | Age: 80
End: 2025-04-01
Payer: MEDICARE

## 2025-04-01 ENCOUNTER — APPOINTMENT (OUTPATIENT)
Dept: HOME HEALTH SERVICES | Facility: HOME HEALTH | Age: 80
End: 2025-04-01

## 2025-04-01 DIAGNOSIS — E11.9 TYPE 2 DIABETES MELLITUS W/OUT COMPLICATIONS: ICD-10-CM

## 2025-04-01 DIAGNOSIS — J18.9 PNEUMONIA, UNSPECIFIED ORGANISM: ICD-10-CM

## 2025-04-01 DIAGNOSIS — R50.9 FEVER, UNSPECIFIED: ICD-10-CM

## 2025-04-01 DIAGNOSIS — I48.0 PAROXYSMAL ATRIAL FIBRILLATION: ICD-10-CM

## 2025-04-01 DIAGNOSIS — I25.10 ATHEROSCLEROTIC HEART DISEASE OF NATIVE CORONARY ARTERY W/OUT ANGINA PECTORIS: ICD-10-CM

## 2025-04-01 DIAGNOSIS — Z86.19 PERSONAL HISTORY OF OTHER INFECTIOUS AND PARASITIC DISEASES: ICD-10-CM

## 2025-04-01 DIAGNOSIS — R65.20 SEPSIS, UNSPECIFIED ORGANISM: ICD-10-CM

## 2025-04-01 DIAGNOSIS — K57.32 DIVERTICULITIS OF LARGE INTESTINE W/OUT PERFORATION OR ABSCESS W/OUT BLEEDING: ICD-10-CM

## 2025-04-01 DIAGNOSIS — I48.91 UNSPECIFIED ATRIAL FIBRILLATION: ICD-10-CM

## 2025-04-01 DIAGNOSIS — R21 RASH AND OTHER NONSPECIFIC SKIN ERUPTION: ICD-10-CM

## 2025-04-01 DIAGNOSIS — Z79.4 DIABETES MELLITUS DUE TO UNDERLYING CONDITION WITH OTHER DIABETIC NEUROLOGICAL COMPLICATION: ICD-10-CM

## 2025-04-01 DIAGNOSIS — R68.83 CHILLS (WITHOUT FEVER): ICD-10-CM

## 2025-04-01 DIAGNOSIS — E08.49 DIABETES MELLITUS DUE TO UNDERLYING CONDITION WITH OTHER DIABETIC NEUROLOGICAL COMPLICATION: ICD-10-CM

## 2025-04-01 DIAGNOSIS — R09.89 OTHER SPECIFIED SYMPTOMS AND SIGNS INVOLVING THE CIRCULATORY AND RESPIRATORY SYSTEMS: ICD-10-CM

## 2025-04-01 DIAGNOSIS — K94.00 COLOSTOMY COMPLICATION, UNSPECIFIED: ICD-10-CM

## 2025-04-01 DIAGNOSIS — Z43.3 ENCOUNTER FOR ATTENTION TO COLOSTOMY: ICD-10-CM

## 2025-04-01 DIAGNOSIS — Z87.898 PERSONAL HISTORY OF OTHER SPECIFIED CONDITIONS: ICD-10-CM

## 2025-04-01 DIAGNOSIS — Z96.652 PRESENCE OF LEFT ARTIFICIAL KNEE JOINT: ICD-10-CM

## 2025-04-01 DIAGNOSIS — G93.41 SEPSIS, UNSPECIFIED ORGANISM: ICD-10-CM

## 2025-04-01 DIAGNOSIS — A41.9 SEPSIS, UNSPECIFIED ORGANISM: ICD-10-CM

## 2025-04-01 PROCEDURE — 99204 OFFICE O/P NEW MOD 45 MIN: CPT | Mod: 2W

## 2025-04-01 PROCEDURE — 99496 TRANSJ CARE MGMT HIGH F2F 7D: CPT | Mod: 2W

## 2025-04-02 ENCOUNTER — APPOINTMENT (OUTPATIENT)
Dept: CARDIOLOGY | Facility: CLINIC | Age: 80
End: 2025-04-02
Payer: MEDICARE

## 2025-04-02 ENCOUNTER — NON-APPOINTMENT (OUTPATIENT)
Age: 80
End: 2025-04-02

## 2025-04-02 VITALS
HEART RATE: 60 BPM | DIASTOLIC BLOOD PRESSURE: 72 MMHG | OXYGEN SATURATION: 96 % | BODY MASS INDEX: 29.08 KG/M2 | WEIGHT: 159 LBS | SYSTOLIC BLOOD PRESSURE: 104 MMHG

## 2025-04-02 DIAGNOSIS — F03.90 UNSPECIFIED DEMENTIA W/OUT BEHAVIORAL DISTURBANCE: ICD-10-CM

## 2025-04-02 DIAGNOSIS — R06.02 SHORTNESS OF BREATH: ICD-10-CM

## 2025-04-02 DIAGNOSIS — I10 ESSENTIAL (PRIMARY) HYPERTENSION: ICD-10-CM

## 2025-04-02 DIAGNOSIS — R48.2 APRAXIA: ICD-10-CM

## 2025-04-02 PROCEDURE — 99204 OFFICE O/P NEW MOD 45 MIN: CPT

## 2025-04-02 PROCEDURE — 93000 ELECTROCARDIOGRAM COMPLETE: CPT

## 2025-04-02 PROCEDURE — G2211 COMPLEX E/M VISIT ADD ON: CPT

## 2025-04-03 PROBLEM — Z96.652 STATUS POST TOTAL LEFT KNEE REPLACEMENT: Status: RESOLVED | Noted: 2020-10-14 | Resolved: 2025-04-03

## 2025-04-03 PROBLEM — R09.89 CHEST CONGESTION: Status: RESOLVED | Noted: 2024-02-22 | Resolved: 2025-04-03

## 2025-04-03 PROBLEM — K94.00 COLOSTOMY COMPLICATION: Status: RESOLVED | Noted: 2024-08-11 | Resolved: 2025-04-03

## 2025-04-03 PROBLEM — R68.83 SHAKING CHILLS: Status: RESOLVED | Noted: 2024-03-20 | Resolved: 2025-04-03

## 2025-04-03 PROBLEM — R68.83 CHILLS (WITHOUT FEVER): Status: RESOLVED | Noted: 2024-03-20 | Resolved: 2025-04-03

## 2025-04-03 PROBLEM — Z86.19 HISTORY OF CLOSTRIDIOIDES DIFFICILE COLITIS: Status: RESOLVED | Noted: 2024-11-25 | Resolved: 2025-04-03

## 2025-04-03 PROBLEM — R21 RASH: Status: RESOLVED | Noted: 2024-06-20 | Resolved: 2025-04-03

## 2025-04-03 PROBLEM — Z87.898 HISTORY OF DIARRHEA: Status: RESOLVED | Noted: 2024-11-21 | Resolved: 2025-04-03

## 2025-04-03 PROBLEM — A41.9 SEPSIS WITH ENCEPHALOPATHY WITHOUT SEPTIC SHOCK, DUE TO UNSPECIFIED ORGANISM: Status: RESOLVED | Noted: 2025-03-30 | Resolved: 2025-04-03

## 2025-04-03 PROBLEM — R50.9 FEVER AND CHILLS: Status: RESOLVED | Noted: 2024-04-08 | Resolved: 2025-04-03

## 2025-04-03 PROBLEM — I48.91 A-FIB: Status: ACTIVE | Noted: 2025-04-03

## 2025-04-03 RX ORDER — NADOLOL 20 MG/1
20 TABLET ORAL DAILY
Qty: 15 | Refills: 0 | Status: ACTIVE | COMMUNITY
Start: 2025-04-03

## 2025-04-03 RX ORDER — MEMANTINE 10 MG/1
10 TABLET ORAL DAILY
Qty: 30 | Refills: 3 | Status: ACTIVE | COMMUNITY
Start: 2025-04-03

## 2025-04-12 ENCOUNTER — NON-APPOINTMENT (OUTPATIENT)
Age: 80
End: 2025-04-12

## 2025-04-12 ENCOUNTER — TRANSCRIPTION ENCOUNTER (OUTPATIENT)
Age: 80
End: 2025-04-12

## 2025-04-12 DIAGNOSIS — R30.0 DYSURIA: ICD-10-CM

## 2025-04-14 ENCOUNTER — LABORATORY RESULT (OUTPATIENT)
Age: 80
End: 2025-04-14

## 2025-04-14 NOTE — PATIENT PROFILE ADULT - FUNCTIONAL ASSESSMENT - BASIC MOBILITY SCORE.
Instructions from your doctor today:  Emergency Department (ED) testing is focused on the potential causes of your symptoms that are the most dangerous possibilities, and cannot cover every possibility. Based on the evaluation, it was deemed sufficiently safe to discharge and continue management through the clinics. Thus, follow-up is very important to assess for improvement/worsening, potential further testing, and potential treatment adjustments. If you were given opioid pain medications or other medications that can make you drowsy while in the ED, you should not drive for at least several hours and not until you feel completely back to normal.     The x-rays show no signs of fractures.  The ultrasound shows no signs of internal bleeding.    Please make an appointment to follow up with:  - Your Primary Care Provider in 4-7 days  - If you do not have a primary care provider, you can be seen in follow-up and establish care by calling any of the clinics below:     - [facility] (phone: [phone])     - Primary Care / [facility] (phone: [phone])   - Have your clinic provider review the results from today's visit with you again, including any potential follow-up or additional testing that may be needed based on the results. Occasionally, incidental findings are found on later review by radiologists that may need follow-up.     Return to the Emergency Department immediately if you have worsening symptoms, or any other urgent health concerns.     12

## 2025-04-15 ENCOUNTER — LABORATORY RESULT (OUTPATIENT)
Age: 80
End: 2025-04-15

## 2025-04-21 ENCOUNTER — APPOINTMENT (OUTPATIENT)
Dept: HEART FAILURE | Facility: CLINIC | Age: 80
End: 2025-04-21

## 2025-04-21 ENCOUNTER — NON-APPOINTMENT (OUTPATIENT)
Age: 80
End: 2025-04-21

## 2025-04-21 VITALS
WEIGHT: 130.07 LBS | HEART RATE: 64 BPM | HEIGHT: 62 IN | SYSTOLIC BLOOD PRESSURE: 149 MMHG | DIASTOLIC BLOOD PRESSURE: 80 MMHG | OXYGEN SATURATION: 98 % | BODY MASS INDEX: 23.94 KG/M2

## 2025-04-23 ENCOUNTER — APPOINTMENT (OUTPATIENT)
Dept: PULMONOLOGY | Facility: CLINIC | Age: 80
End: 2025-04-23

## 2025-05-07 ENCOUNTER — APPOINTMENT (OUTPATIENT)
Dept: HOME HEALTH SERVICES | Facility: HOME HEALTH | Age: 80
End: 2025-05-07
Payer: MEDICARE

## 2025-05-07 VITALS — OXYGEN SATURATION: 97 % | DIASTOLIC BLOOD PRESSURE: 82 MMHG | SYSTOLIC BLOOD PRESSURE: 132 MMHG | HEART RATE: 70 BPM

## 2025-05-07 DIAGNOSIS — Z79.4 DIABETES MELLITUS DUE TO UNDERLYING CONDITION WITH OTHER DIABETIC NEUROLOGICAL COMPLICATION: ICD-10-CM

## 2025-05-07 DIAGNOSIS — E08.49 DIABETES MELLITUS DUE TO UNDERLYING CONDITION WITH OTHER DIABETIC NEUROLOGICAL COMPLICATION: ICD-10-CM

## 2025-05-07 DIAGNOSIS — F03.90 UNSPECIFIED DEMENTIA W/OUT BEHAVIORAL DISTURBANCE: ICD-10-CM

## 2025-05-07 DIAGNOSIS — Z43.3 ENCOUNTER FOR ATTENTION TO COLOSTOMY: ICD-10-CM

## 2025-05-07 PROCEDURE — 99349 HOME/RES VST EST MOD MDM 40: CPT

## 2025-05-09 NOTE — ED PROVIDER NOTE - ENDOCRINE NEGATIVE STATEMENT, MLM
Please do patient's screening abdominal aortic ultrasound examination at the [facility] in [place] as well as the patient's bilateral carotid duplex examination at the [facility] in [place] prior to the patient's next visit to see me.
no diabetes and no thyroid trouble.
Bedside exam and interview   Reviewed vitals, labs   Discussed patient's plan of care with primary team  Documentation of encounter  Excludes teaching time and/or separately reported services

## 2025-05-17 NOTE — H&P ADULT - PROBLEM SELECTOR PROBLEM 7
I reviewed the H&P, I examined the patient, and there are no changes in the patient's condition.     Prophylactic measure

## 2025-05-20 ENCOUNTER — TRANSCRIPTION ENCOUNTER (OUTPATIENT)
Age: 80
End: 2025-05-20

## 2025-05-20 ENCOUNTER — NON-APPOINTMENT (OUTPATIENT)
Age: 80
End: 2025-05-20

## 2025-06-05 ENCOUNTER — APPOINTMENT (OUTPATIENT)
Dept: HOME HEALTH SERVICES | Facility: HOME HEALTH | Age: 80
End: 2025-06-05
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
## 2025-06-16 ENCOUNTER — TRANSCRIPTION ENCOUNTER (OUTPATIENT)
Age: 80
End: 2025-06-16

## 2025-06-17 PROBLEM — F51.04 CHRONIC INSOMNIA: Status: ACTIVE | Noted: 2025-06-17

## 2025-06-21 RX ORDER — DORZOLAMIDE HYDROCHLORIDE 20 MG/ML
2 SOLUTION OPHTHALMIC TWICE DAILY
Qty: 1 | Refills: 11 | Status: ACTIVE | COMMUNITY
Start: 2025-06-21 | End: 1900-01-01

## 2025-06-24 ENCOUNTER — TRANSCRIPTION ENCOUNTER (OUTPATIENT)
Age: 80
End: 2025-06-24

## 2025-06-26 RX ORDER — BLOOD SUGAR DIAGNOSTIC
STRIP MISCELLANEOUS 4 TIMES DAILY
Qty: 8 | Refills: 3 | Status: ACTIVE | COMMUNITY
Start: 2025-06-26 | End: 1900-01-01

## 2025-06-26 RX ORDER — BLOOD SUGAR DIAGNOSTIC
STRIP MISCELLANEOUS 3 TIMES DAILY
Qty: 1 | Refills: 0 | Status: COMPLETED | COMMUNITY
Start: 2025-06-24 | End: 2025-06-26

## 2025-06-26 RX ORDER — BLOOD-GLUCOSE METER
W/DEVICE EACH MISCELLANEOUS
Qty: 1 | Refills: 0 | Status: ACTIVE | COMMUNITY
Start: 2025-06-26 | End: 1900-01-01

## 2025-07-07 PROBLEM — H91.90 HARD OF HEARING: Status: ACTIVE | Noted: 2025-07-07

## 2025-07-29 ENCOUNTER — APPOINTMENT (OUTPATIENT)
Dept: HOME HEALTH SERVICES | Facility: HOME HEALTH | Age: 80
End: 2025-07-29
Payer: MEDICARE

## 2025-07-29 VITALS — OXYGEN SATURATION: 98 % | DIASTOLIC BLOOD PRESSURE: 76 MMHG | HEART RATE: 74 BPM | SYSTOLIC BLOOD PRESSURE: 138 MMHG

## 2025-07-29 DIAGNOSIS — I48.91 UNSPECIFIED ATRIAL FIBRILLATION: ICD-10-CM

## 2025-07-29 DIAGNOSIS — F03.90 UNSPECIFIED DEMENTIA W/OUT BEHAVIORAL DISTURBANCE: ICD-10-CM

## 2025-07-29 DIAGNOSIS — Z87.01 PERSONAL HISTORY OF PNEUMONIA (RECURRENT): ICD-10-CM

## 2025-07-29 DIAGNOSIS — K08.89 OTHER SPECIFIED DISORDERS OF TEETH AND SUPPORTING STRUCTURES: ICD-10-CM

## 2025-07-29 DIAGNOSIS — Z01.818 ENCOUNTER FOR OTHER PREPROCEDURAL EXAMINATION: ICD-10-CM

## 2025-07-29 DIAGNOSIS — E11.9 TYPE 2 DIABETES MELLITUS W/OUT COMPLICATIONS: ICD-10-CM

## 2025-07-29 DIAGNOSIS — Z97.2 OTHER SPECIFIED DISORDERS OF TEETH AND SUPPORTING STRUCTURES: ICD-10-CM

## 2025-07-29 PROCEDURE — 99349 HOME/RES VST EST MOD MDM 40: CPT

## 2025-07-30 ENCOUNTER — LABORATORY RESULT (OUTPATIENT)
Age: 80
End: 2025-07-30

## 2025-08-03 PROBLEM — Z87.01 HISTORY OF PNEUMONIA: Status: RESOLVED | Noted: 2024-04-26 | Resolved: 2025-08-03

## 2025-08-03 PROBLEM — Z01.818 PRE-OP TESTING: Status: ACTIVE | Noted: 2020-08-21

## 2025-08-03 PROBLEM — K08.89 ILL-FITTING DENTURES: Status: ACTIVE | Noted: 2025-07-29

## 2025-08-20 ENCOUNTER — TRANSCRIPTION ENCOUNTER (OUTPATIENT)
Age: 80
End: 2025-08-20

## 2025-08-20 ENCOUNTER — RESULT REVIEW (OUTPATIENT)
Age: 80
End: 2025-08-20

## 2025-08-20 ENCOUNTER — NON-APPOINTMENT (OUTPATIENT)
Age: 80
End: 2025-08-20

## 2025-08-20 ENCOUNTER — INPATIENT (INPATIENT)
Facility: HOSPITAL | Age: 80
LOS: 4 days | Discharge: HOME HEALTH | End: 2025-08-25
Attending: INTERNAL MEDICINE | Admitting: INTERNAL MEDICINE
Payer: MEDICARE

## 2025-08-20 VITALS
DIASTOLIC BLOOD PRESSURE: 87 MMHG | SYSTOLIC BLOOD PRESSURE: 143 MMHG | WEIGHT: 139.99 LBS | TEMPERATURE: 98 F | RESPIRATION RATE: 16 BRPM | HEART RATE: 62 BPM | OXYGEN SATURATION: 97 %

## 2025-08-20 DIAGNOSIS — Z29.9 ENCOUNTER FOR PROPHYLACTIC MEASURES, UNSPECIFIED: ICD-10-CM

## 2025-08-20 DIAGNOSIS — R04.0 EPISTAXIS: ICD-10-CM

## 2025-08-20 DIAGNOSIS — I63.9 CEREBRAL INFARCTION, UNSPECIFIED: ICD-10-CM

## 2025-08-20 DIAGNOSIS — I10 ESSENTIAL (PRIMARY) HYPERTENSION: ICD-10-CM

## 2025-08-20 DIAGNOSIS — R94.31 ABNORMAL ELECTROCARDIOGRAM [ECG] [EKG]: ICD-10-CM

## 2025-08-20 DIAGNOSIS — E11.9 TYPE 2 DIABETES MELLITUS WITHOUT COMPLICATIONS: ICD-10-CM

## 2025-08-20 DIAGNOSIS — F03.90 UNSPECIFIED DEMENTIA, UNSPECIFIED SEVERITY, WITHOUT BEHAVIORAL DISTURBANCE, PSYCHOTIC DISTURBANCE, MOOD DISTURBANCE, AND ANXIETY: ICD-10-CM

## 2025-08-20 DIAGNOSIS — Z93.3 COLOSTOMY STATUS: Chronic | ICD-10-CM

## 2025-08-20 DIAGNOSIS — R93.89 ABNORMAL FINDINGS ON DIAGNOSTIC IMAGING OF OTHER SPECIFIED BODY STRUCTURES: ICD-10-CM

## 2025-08-20 DIAGNOSIS — I48.91 UNSPECIFIED ATRIAL FIBRILLATION: ICD-10-CM

## 2025-08-20 DIAGNOSIS — J18.9 PNEUMONIA, UNSPECIFIED ORGANISM: ICD-10-CM

## 2025-08-20 DIAGNOSIS — Z87.81 PERSONAL HISTORY OF (HEALED) TRAUMATIC FRACTURE: Chronic | ICD-10-CM

## 2025-08-20 DIAGNOSIS — I50.32 CHRONIC DIASTOLIC (CONGESTIVE) HEART FAILURE: ICD-10-CM

## 2025-08-20 LAB
ADD ON TEST-SPECIMEN IN LAB: SIGNIFICANT CHANGE UP
ADD ON TEST-SPECIMEN IN LAB: SIGNIFICANT CHANGE UP
ALBUMIN SERPL ELPH-MCNC: 3.7 G/DL — SIGNIFICANT CHANGE UP (ref 3.3–5)
ALP SERPL-CCNC: 116 U/L — SIGNIFICANT CHANGE UP (ref 40–120)
ALT FLD-CCNC: 8 U/L — SIGNIFICANT CHANGE UP (ref 4–33)
ANION GAP SERPL CALC-SCNC: 12 MMOL/L — SIGNIFICANT CHANGE UP (ref 7–14)
APTT BLD: 34.6 SEC — SIGNIFICANT CHANGE UP (ref 26.1–36.8)
AST SERPL-CCNC: 13 U/L — SIGNIFICANT CHANGE UP (ref 4–32)
BASE EXCESS BLDV CALC-SCNC: 1.5 MMOL/L — SIGNIFICANT CHANGE UP (ref -2–3)
BASOPHILS # BLD AUTO: 0.06 K/UL — SIGNIFICANT CHANGE UP (ref 0–0.2)
BASOPHILS NFR BLD AUTO: 0.6 % — SIGNIFICANT CHANGE UP (ref 0–2)
BILIRUB SERPL-MCNC: 1 MG/DL — SIGNIFICANT CHANGE UP (ref 0.2–1.2)
BLD GP AB SCN SERPL QL: NEGATIVE — SIGNIFICANT CHANGE UP
BUN SERPL-MCNC: 10 MG/DL — SIGNIFICANT CHANGE UP (ref 7–23)
CALCIUM SERPL-MCNC: 8.7 MG/DL — SIGNIFICANT CHANGE UP (ref 8.4–10.5)
CHLORIDE SERPL-SCNC: 103 MMOL/L — SIGNIFICANT CHANGE UP (ref 98–107)
CO2 BLDV-SCNC: 29 MMOL/L — HIGH (ref 22–26)
CO2 SERPL-SCNC: 22 MMOL/L — SIGNIFICANT CHANGE UP (ref 22–31)
CREAT SERPL-MCNC: 0.61 MG/DL — SIGNIFICANT CHANGE UP (ref 0.5–1.3)
EGFR: 92 ML/MIN/1.73M2 — SIGNIFICANT CHANGE UP
EGFR: 92 ML/MIN/1.73M2 — SIGNIFICANT CHANGE UP
EOSINOPHIL # BLD AUTO: 0.41 K/UL — SIGNIFICANT CHANGE UP (ref 0–0.5)
EOSINOPHIL NFR BLD AUTO: 4.2 % — SIGNIFICANT CHANGE UP (ref 0–6)
FLUAV AG NPH QL: SIGNIFICANT CHANGE UP
FLUBV AG NPH QL: SIGNIFICANT CHANGE UP
GLUCOSE BLDC GLUCOMTR-MCNC: 199 MG/DL — HIGH (ref 70–99)
GLUCOSE BLDC GLUCOMTR-MCNC: 205 MG/DL — HIGH (ref 70–99)
GLUCOSE BLDC GLUCOMTR-MCNC: 238 MG/DL — HIGH (ref 70–99)
GLUCOSE SERPL-MCNC: 189 MG/DL — HIGH (ref 70–99)
HCO3 BLDV-SCNC: 28 MMOL/L — SIGNIFICANT CHANGE UP (ref 22–29)
HCT VFR BLD CALC: 36.7 % — SIGNIFICANT CHANGE UP (ref 34.5–45)
HCT VFR BLD CALC: 37.9 % — SIGNIFICANT CHANGE UP (ref 34.5–45)
HGB BLD-MCNC: 12.1 G/DL — SIGNIFICANT CHANGE UP (ref 11.5–15.5)
HGB BLD-MCNC: 12.6 G/DL — SIGNIFICANT CHANGE UP (ref 11.5–15.5)
IMM GRANULOCYTES # BLD AUTO: 0.05 K/UL — SIGNIFICANT CHANGE UP (ref 0–0.07)
IMM GRANULOCYTES NFR BLD AUTO: 0.5 % — SIGNIFICANT CHANGE UP (ref 0–0.9)
INR BLD: 1.23 RATIO — HIGH (ref 0.85–1.16)
LACTATE BLDV-MCNC: 1.3 MMOL/L — SIGNIFICANT CHANGE UP (ref 0.5–2)
LYMPHOCYTES # BLD AUTO: 2.97 K/UL — SIGNIFICANT CHANGE UP (ref 1–3.3)
LYMPHOCYTES NFR BLD AUTO: 30.8 % — SIGNIFICANT CHANGE UP (ref 13–44)
MCHC RBC-ENTMCNC: 29.4 PG — SIGNIFICANT CHANGE UP (ref 27–34)
MCHC RBC-ENTMCNC: 30.1 PG — SIGNIFICANT CHANGE UP (ref 27–34)
MCHC RBC-ENTMCNC: 33 G/DL — SIGNIFICANT CHANGE UP (ref 32–36)
MCHC RBC-ENTMCNC: 33.2 G/DL — SIGNIFICANT CHANGE UP (ref 32–36)
MCV RBC AUTO: 89.1 FL — SIGNIFICANT CHANGE UP (ref 80–100)
MCV RBC AUTO: 90.5 FL — SIGNIFICANT CHANGE UP (ref 80–100)
MONOCYTES # BLD AUTO: 0.67 K/UL — SIGNIFICANT CHANGE UP (ref 0–0.9)
MONOCYTES NFR BLD AUTO: 6.9 % — SIGNIFICANT CHANGE UP (ref 2–14)
NEUTROPHILS # BLD AUTO: 5.49 K/UL — SIGNIFICANT CHANGE UP (ref 1.8–7.4)
NEUTROPHILS NFR BLD AUTO: 57 % — SIGNIFICANT CHANGE UP (ref 43–77)
NRBC # BLD AUTO: 0 K/UL — SIGNIFICANT CHANGE UP (ref 0–0)
NRBC # BLD AUTO: 0 K/UL — SIGNIFICANT CHANGE UP (ref 0–0)
NRBC # FLD: 0 K/UL — SIGNIFICANT CHANGE UP (ref 0–0)
NRBC # FLD: 0 K/UL — SIGNIFICANT CHANGE UP (ref 0–0)
NRBC BLD AUTO-RTO: 0 /100 WBCS — SIGNIFICANT CHANGE UP (ref 0–0)
NRBC BLD AUTO-RTO: 0 /100 WBCS — SIGNIFICANT CHANGE UP (ref 0–0)
PCO2 BLDV: 49 MMHG — SIGNIFICANT CHANGE UP (ref 39–52)
PH BLDV: 7.36 — SIGNIFICANT CHANGE UP (ref 7.32–7.43)
PLATELET # BLD AUTO: 258 K/UL — SIGNIFICANT CHANGE UP (ref 150–400)
PLATELET # BLD AUTO: 271 K/UL — SIGNIFICANT CHANGE UP (ref 150–400)
PMV BLD: 9.8 FL — SIGNIFICANT CHANGE UP (ref 7–13)
PMV BLD: 9.8 FL — SIGNIFICANT CHANGE UP (ref 7–13)
PO2 BLDV: 24 MMHG — LOW (ref 25–45)
POTASSIUM SERPL-MCNC: 3.6 MMOL/L — SIGNIFICANT CHANGE UP (ref 3.5–5.3)
POTASSIUM SERPL-SCNC: 3.6 MMOL/L — SIGNIFICANT CHANGE UP (ref 3.5–5.3)
PROT SERPL-MCNC: 7.8 G/DL — SIGNIFICANT CHANGE UP (ref 6–8.3)
PROTHROM AB SERPL-ACNC: 14.2 SEC — HIGH (ref 9.9–13.4)
RBC # BLD: 4.12 M/UL — SIGNIFICANT CHANGE UP (ref 3.8–5.2)
RBC # BLD: 4.19 M/UL — SIGNIFICANT CHANGE UP (ref 3.8–5.2)
RBC # FLD: 13.1 % — SIGNIFICANT CHANGE UP (ref 10.3–14.5)
RBC # FLD: 13.4 % — SIGNIFICANT CHANGE UP (ref 10.3–14.5)
RH IG SCN BLD-IMP: POSITIVE — SIGNIFICANT CHANGE UP
RSV RNA NPH QL NAA+NON-PROBE: SIGNIFICANT CHANGE UP
SAO2 % BLDV: 28.7 % — LOW (ref 67–88)
SARS-COV-2 RNA SPEC QL NAA+PROBE: SIGNIFICANT CHANGE UP
SODIUM SERPL-SCNC: 137 MMOL/L — SIGNIFICANT CHANGE UP (ref 135–145)
SOURCE RESPIRATORY: SIGNIFICANT CHANGE UP
WBC # BLD: 11.03 K/UL — HIGH (ref 3.8–10.5)
WBC # BLD: 9.65 K/UL — SIGNIFICANT CHANGE UP (ref 3.8–10.5)
WBC # FLD AUTO: 11.03 K/UL — HIGH (ref 3.8–10.5)
WBC # FLD AUTO: 9.65 K/UL — SIGNIFICANT CHANGE UP (ref 3.8–10.5)

## 2025-08-20 PROCEDURE — 99223 1ST HOSP IP/OBS HIGH 75: CPT

## 2025-08-20 PROCEDURE — 71046 X-RAY EXAM CHEST 2 VIEWS: CPT | Mod: 26

## 2025-08-20 PROCEDURE — 71250 CT THORAX DX C-: CPT | Mod: 26

## 2025-08-20 PROCEDURE — 93306 TTE W/DOPPLER COMPLETE: CPT | Mod: 26

## 2025-08-20 PROCEDURE — 99285 EMERGENCY DEPT VISIT HI MDM: CPT | Mod: GC

## 2025-08-20 RX ORDER — INSULIN LISPRO 100 U/ML
INJECTION, SOLUTION INTRAVENOUS; SUBCUTANEOUS
Refills: 0 | Status: DISCONTINUED | OUTPATIENT
Start: 2025-08-20 | End: 2025-08-25

## 2025-08-20 RX ORDER — APIXABAN 2.5 MG/1
5 TABLET, FILM COATED ORAL EVERY 12 HOURS
Refills: 0 | Status: DISCONTINUED | OUTPATIENT
Start: 2025-08-20 | End: 2025-08-25

## 2025-08-20 RX ORDER — INSULIN LISPRO 100 U/ML
INJECTION, SOLUTION INTRAVENOUS; SUBCUTANEOUS AT BEDTIME
Refills: 0 | Status: DISCONTINUED | OUTPATIENT
Start: 2025-08-20 | End: 2025-08-25

## 2025-08-20 RX ORDER — QUETIAPINE FUMARATE 25 MG/1
25 TABLET ORAL
Refills: 0 | Status: DISCONTINUED | OUTPATIENT
Start: 2025-08-20 | End: 2025-08-25

## 2025-08-20 RX ORDER — MEMANTINE HYDROCHLORIDE 21 MG/1
10 CAPSULE, EXTENDED RELEASE ORAL
Refills: 0 | Status: DISCONTINUED | OUTPATIENT
Start: 2025-08-20 | End: 2025-08-25

## 2025-08-20 RX ORDER — CITALOPRAM 20 MG/1
10 TABLET ORAL DAILY
Refills: 0 | Status: DISCONTINUED | OUTPATIENT
Start: 2025-08-20 | End: 2025-08-22

## 2025-08-20 RX ORDER — NIFEDIPINE 30 MG
30 TABLET, EXTENDED RELEASE 24 HR ORAL DAILY
Refills: 0 | Status: DISCONTINUED | OUTPATIENT
Start: 2025-08-20 | End: 2025-08-25

## 2025-08-20 RX ORDER — B1/B2/B3/B5/B6/B12/VIT C/FOLIC 500-0.5 MG
0 TABLET ORAL
Refills: 0 | DISCHARGE

## 2025-08-20 RX ORDER — NADOLOL 80 MG
1 TABLET ORAL
Refills: 0 | DISCHARGE

## 2025-08-20 RX ORDER — DEXTROSE 50 % IN WATER 50 %
12.5 SYRINGE (ML) INTRAVENOUS ONCE
Refills: 0 | Status: DISCONTINUED | OUTPATIENT
Start: 2025-08-20 | End: 2025-08-25

## 2025-08-20 RX ORDER — SODIUM CHLORIDE 9 G/1000ML
1000 INJECTION, SOLUTION INTRAVENOUS
Refills: 0 | Status: DISCONTINUED | OUTPATIENT
Start: 2025-08-20 | End: 2025-08-25

## 2025-08-20 RX ORDER — CEFTRIAXONE 500 MG/1
1000 INJECTION, POWDER, FOR SOLUTION INTRAMUSCULAR; INTRAVENOUS ONCE
Refills: 0 | Status: COMPLETED | OUTPATIENT
Start: 2025-08-20 | End: 2025-08-20

## 2025-08-20 RX ORDER — FOLIC ACID 1 MG/1
1 TABLET ORAL DAILY
Refills: 0 | Status: DISCONTINUED | OUTPATIENT
Start: 2025-08-20 | End: 2025-08-25

## 2025-08-20 RX ORDER — DORZOLAMIDE 20 MG/ML
1 SOLUTION/ DROPS OPHTHALMIC
Refills: 0 | Status: DISCONTINUED | OUTPATIENT
Start: 2025-08-20 | End: 2025-08-25

## 2025-08-20 RX ORDER — DEXTROSE 50 % IN WATER 50 %
15 SYRINGE (ML) INTRAVENOUS ONCE
Refills: 0 | Status: DISCONTINUED | OUTPATIENT
Start: 2025-08-20 | End: 2025-08-25

## 2025-08-20 RX ORDER — CYANOCOBALAMIN 1000 UG/ML
1 INJECTION INTRAMUSCULAR; SUBCUTANEOUS
Refills: 0 | DISCHARGE

## 2025-08-20 RX ORDER — DEXTROSE 50 % IN WATER 50 %
25 SYRINGE (ML) INTRAVENOUS ONCE
Refills: 0 | Status: DISCONTINUED | OUTPATIENT
Start: 2025-08-20 | End: 2025-08-25

## 2025-08-20 RX ORDER — WHITE PETROLATUM 1 G/G
1 OINTMENT TOPICAL DAILY
Refills: 0 | Status: DISCONTINUED | OUTPATIENT
Start: 2025-08-20 | End: 2025-08-25

## 2025-08-20 RX ORDER — SODIUM CHLORIDE 0.65 %
2 AEROSOL, SPRAY (ML) NASAL
Refills: 0 | Status: DISCONTINUED | OUTPATIENT
Start: 2025-08-20 | End: 2025-08-25

## 2025-08-20 RX ORDER — ATORVASTATIN CALCIUM 80 MG/1
10 TABLET, FILM COATED ORAL AT BEDTIME
Refills: 0 | Status: DISCONTINUED | OUTPATIENT
Start: 2025-08-20 | End: 2025-08-25

## 2025-08-20 RX ORDER — CYANOCOBALAMIN 1000 UG/ML
1000 INJECTION INTRAMUSCULAR; SUBCUTANEOUS DAILY
Refills: 0 | Status: DISCONTINUED | OUTPATIENT
Start: 2025-08-20 | End: 2025-08-25

## 2025-08-20 RX ORDER — SODIUM CHLORIDE 0.65 %
1 AEROSOL, SPRAY (ML) NASAL
Refills: 0 | Status: DISCONTINUED | OUTPATIENT
Start: 2025-08-20 | End: 2025-08-20

## 2025-08-20 RX ORDER — GLUCAGON 3 MG/1
1 POWDER NASAL ONCE
Refills: 0 | Status: DISCONTINUED | OUTPATIENT
Start: 2025-08-20 | End: 2025-08-25

## 2025-08-20 RX ORDER — DORZOLAMIDE 20 MG/ML
1 SOLUTION/ DROPS OPHTHALMIC
Refills: 0 | DISCHARGE

## 2025-08-20 RX ORDER — AZITHROMYCIN 250 MG
500 CAPSULE ORAL ONCE
Refills: 0 | Status: COMPLETED | OUTPATIENT
Start: 2025-08-20 | End: 2025-08-20

## 2025-08-20 RX ORDER — MIRTAZAPINE 30 MG/1
30 TABLET, FILM COATED ORAL AT BEDTIME
Refills: 0 | Status: DISCONTINUED | OUTPATIENT
Start: 2025-08-20 | End: 2025-08-25

## 2025-08-20 RX ADMIN — ATORVASTATIN CALCIUM 10 MILLIGRAM(S): 80 TABLET, FILM COATED ORAL at 21:12

## 2025-08-20 RX ADMIN — Medication 1 SPRAY(S): at 12:45

## 2025-08-20 RX ADMIN — INSULIN LISPRO 2: 100 INJECTION, SOLUTION INTRAVENOUS; SUBCUTANEOUS at 18:56

## 2025-08-20 RX ADMIN — Medication 2 SPRAY(S): at 18:28

## 2025-08-20 RX ADMIN — CEFTRIAXONE 100 MILLIGRAM(S): 500 INJECTION, POWDER, FOR SOLUTION INTRAMUSCULAR; INTRAVENOUS at 06:48

## 2025-08-20 RX ADMIN — MEMANTINE HYDROCHLORIDE 10 MILLIGRAM(S): 21 CAPSULE, EXTENDED RELEASE ORAL at 18:28

## 2025-08-20 RX ADMIN — MIRTAZAPINE 30 MILLIGRAM(S): 30 TABLET, FILM COATED ORAL at 21:12

## 2025-08-20 RX ADMIN — APIXABAN 5 MILLIGRAM(S): 2.5 TABLET, FILM COATED ORAL at 18:27

## 2025-08-20 RX ADMIN — Medication 250 MILLIGRAM(S): at 07:20

## 2025-08-20 RX ADMIN — Medication 1 APPLICATION(S): at 12:46

## 2025-08-20 RX ADMIN — CYANOCOBALAMIN 1000 MICROGRAM(S): 1000 INJECTION INTRAMUSCULAR; SUBCUTANEOUS at 18:27

## 2025-08-20 RX ADMIN — FOLIC ACID 1 MILLIGRAM(S): 1 TABLET ORAL at 18:27

## 2025-08-20 RX ADMIN — DORZOLAMIDE 1 DROP(S): 20 SOLUTION/ DROPS OPHTHALMIC at 21:12

## 2025-08-20 RX ADMIN — QUETIAPINE FUMARATE 25 MILLIGRAM(S): 25 TABLET ORAL at 18:28

## 2025-08-21 LAB
A1C WITH ESTIMATED AVERAGE GLUCOSE RESULT: 6.7 % — HIGH (ref 4–5.6)
ESTIMATED AVERAGE GLUCOSE: 146 — SIGNIFICANT CHANGE UP
GLUCOSE BLDC GLUCOMTR-MCNC: 196 MG/DL — HIGH (ref 70–99)
GLUCOSE BLDC GLUCOMTR-MCNC: 203 MG/DL — HIGH (ref 70–99)
GLUCOSE BLDC GLUCOMTR-MCNC: 248 MG/DL — HIGH (ref 70–99)
GLUCOSE BLDC GLUCOMTR-MCNC: 294 MG/DL — HIGH (ref 70–99)
MRSA PCR RESULT.: SIGNIFICANT CHANGE UP
S AUREUS DNA NOSE QL NAA+PROBE: SIGNIFICANT CHANGE UP

## 2025-08-21 RX ADMIN — Medication 1 APPLICATION(S): at 12:11

## 2025-08-21 RX ADMIN — Medication 2 SPRAY(S): at 05:08

## 2025-08-21 RX ADMIN — Medication 2 SPRAY(S): at 18:02

## 2025-08-21 RX ADMIN — DORZOLAMIDE 1 DROP(S): 20 SOLUTION/ DROPS OPHTHALMIC at 10:05

## 2025-08-21 RX ADMIN — CYANOCOBALAMIN 1000 MICROGRAM(S): 1000 INJECTION INTRAMUSCULAR; SUBCUTANEOUS at 12:19

## 2025-08-21 RX ADMIN — QUETIAPINE FUMARATE 25 MILLIGRAM(S): 25 TABLET ORAL at 18:02

## 2025-08-21 RX ADMIN — MEMANTINE HYDROCHLORIDE 10 MILLIGRAM(S): 21 CAPSULE, EXTENDED RELEASE ORAL at 05:07

## 2025-08-21 RX ADMIN — INSULIN LISPRO 1: 100 INJECTION, SOLUTION INTRAVENOUS; SUBCUTANEOUS at 21:59

## 2025-08-21 RX ADMIN — CITALOPRAM 10 MILLIGRAM(S): 20 TABLET ORAL at 12:18

## 2025-08-21 RX ADMIN — Medication 2 SPRAY(S): at 12:19

## 2025-08-21 RX ADMIN — WHITE PETROLATUM 1 APPLICATION(S): 1 OINTMENT TOPICAL at 12:24

## 2025-08-21 RX ADMIN — Medication 2 SPRAY(S): at 02:14

## 2025-08-21 RX ADMIN — APIXABAN 5 MILLIGRAM(S): 2.5 TABLET, FILM COATED ORAL at 05:07

## 2025-08-21 RX ADMIN — MIRTAZAPINE 30 MILLIGRAM(S): 30 TABLET, FILM COATED ORAL at 21:26

## 2025-08-21 RX ADMIN — DORZOLAMIDE 1 DROP(S): 20 SOLUTION/ DROPS OPHTHALMIC at 21:25

## 2025-08-21 RX ADMIN — APIXABAN 5 MILLIGRAM(S): 2.5 TABLET, FILM COATED ORAL at 18:02

## 2025-08-21 RX ADMIN — INSULIN LISPRO 2: 100 INJECTION, SOLUTION INTRAVENOUS; SUBCUTANEOUS at 18:00

## 2025-08-21 RX ADMIN — Medication 40 MILLIGRAM(S): at 05:08

## 2025-08-21 RX ADMIN — ATORVASTATIN CALCIUM 10 MILLIGRAM(S): 80 TABLET, FILM COATED ORAL at 21:27

## 2025-08-21 RX ADMIN — Medication 30 MILLIGRAM(S): at 05:07

## 2025-08-21 RX ADMIN — FOLIC ACID 1 MILLIGRAM(S): 1 TABLET ORAL at 12:18

## 2025-08-21 RX ADMIN — INSULIN LISPRO 2: 100 INJECTION, SOLUTION INTRAVENOUS; SUBCUTANEOUS at 12:11

## 2025-08-21 RX ADMIN — MEMANTINE HYDROCHLORIDE 10 MILLIGRAM(S): 21 CAPSULE, EXTENDED RELEASE ORAL at 18:02

## 2025-08-21 RX ADMIN — QUETIAPINE FUMARATE 25 MILLIGRAM(S): 25 TABLET ORAL at 05:07

## 2025-08-21 RX ADMIN — INSULIN LISPRO 1: 100 INJECTION, SOLUTION INTRAVENOUS; SUBCUTANEOUS at 08:20

## 2025-08-22 ENCOUNTER — TRANSCRIPTION ENCOUNTER (OUTPATIENT)
Age: 80
End: 2025-08-22

## 2025-08-22 LAB
ALBUMIN SERPL ELPH-MCNC: 3.8 G/DL — SIGNIFICANT CHANGE UP (ref 3.3–5)
ALP SERPL-CCNC: 116 U/L — SIGNIFICANT CHANGE UP (ref 40–120)
ALT FLD-CCNC: 10 U/L — SIGNIFICANT CHANGE UP (ref 4–33)
ANION GAP SERPL CALC-SCNC: 13 MMOL/L — SIGNIFICANT CHANGE UP (ref 7–14)
AST SERPL-CCNC: 16 U/L — SIGNIFICANT CHANGE UP (ref 4–32)
BILIRUB SERPL-MCNC: 1 MG/DL — SIGNIFICANT CHANGE UP (ref 0.2–1.2)
BUN SERPL-MCNC: 13 MG/DL — SIGNIFICANT CHANGE UP (ref 7–23)
CALCIUM SERPL-MCNC: 8.7 MG/DL — SIGNIFICANT CHANGE UP (ref 8.4–10.5)
CHLORIDE SERPL-SCNC: 104 MMOL/L — SIGNIFICANT CHANGE UP (ref 98–107)
CHOLEST SERPL-MCNC: 125 MG/DL — SIGNIFICANT CHANGE UP
CO2 SERPL-SCNC: 22 MMOL/L — SIGNIFICANT CHANGE UP (ref 22–31)
CREAT SERPL-MCNC: 0.63 MG/DL — SIGNIFICANT CHANGE UP (ref 0.5–1.3)
EGFR: 91 ML/MIN/1.73M2 — SIGNIFICANT CHANGE UP
EGFR: 91 ML/MIN/1.73M2 — SIGNIFICANT CHANGE UP
GLUCOSE BLDC GLUCOMTR-MCNC: 165 MG/DL — HIGH (ref 70–99)
GLUCOSE BLDC GLUCOMTR-MCNC: 172 MG/DL — HIGH (ref 70–99)
GLUCOSE BLDC GLUCOMTR-MCNC: 225 MG/DL — HIGH (ref 70–99)
GLUCOSE BLDC GLUCOMTR-MCNC: 231 MG/DL — HIGH (ref 70–99)
GLUCOSE SERPL-MCNC: 172 MG/DL — HIGH (ref 70–99)
HCT VFR BLD CALC: 35.2 % — SIGNIFICANT CHANGE UP (ref 34.5–45)
HDLC SERPL-MCNC: 33 MG/DL — LOW
HGB BLD-MCNC: 11.8 G/DL — SIGNIFICANT CHANGE UP (ref 11.5–15.5)
LDLC SERPL-MCNC: 65 MG/DL — SIGNIFICANT CHANGE UP
LIPID PNL WITH DIRECT LDL SERPL: 65 MG/DL — SIGNIFICANT CHANGE UP
MAGNESIUM SERPL-MCNC: 2 MG/DL — SIGNIFICANT CHANGE UP (ref 1.6–2.6)
MCHC RBC-ENTMCNC: 30.1 PG — SIGNIFICANT CHANGE UP (ref 27–34)
MCHC RBC-ENTMCNC: 33.5 G/DL — SIGNIFICANT CHANGE UP (ref 32–36)
MCV RBC AUTO: 89.8 FL — SIGNIFICANT CHANGE UP (ref 80–100)
NONHDLC SERPL-MCNC: 92 MG/DL — SIGNIFICANT CHANGE UP
NRBC # BLD AUTO: 0 K/UL — SIGNIFICANT CHANGE UP (ref 0–0)
NRBC # FLD: 0 K/UL — SIGNIFICANT CHANGE UP (ref 0–0)
NRBC BLD AUTO-RTO: 0 /100 WBCS — SIGNIFICANT CHANGE UP (ref 0–0)
PHOSPHATE SERPL-MCNC: 3.7 MG/DL — SIGNIFICANT CHANGE UP (ref 2.5–4.5)
PLATELET # BLD AUTO: 242 K/UL — SIGNIFICANT CHANGE UP (ref 150–400)
PMV BLD: 9.8 FL — SIGNIFICANT CHANGE UP (ref 7–13)
POTASSIUM SERPL-MCNC: 3.5 MMOL/L — SIGNIFICANT CHANGE UP (ref 3.5–5.3)
POTASSIUM SERPL-SCNC: 3.5 MMOL/L — SIGNIFICANT CHANGE UP (ref 3.5–5.3)
PROT SERPL-MCNC: 8 G/DL — SIGNIFICANT CHANGE UP (ref 6–8.3)
RBC # BLD: 3.92 M/UL — SIGNIFICANT CHANGE UP (ref 3.8–5.2)
RBC # FLD: 13.6 % — SIGNIFICANT CHANGE UP (ref 10.3–14.5)
SODIUM SERPL-SCNC: 139 MMOL/L — SIGNIFICANT CHANGE UP (ref 135–145)
TRIGL SERPL-MCNC: 157 MG/DL — HIGH
WBC # BLD: 10.3 K/UL — SIGNIFICANT CHANGE UP (ref 3.8–10.5)
WBC # FLD AUTO: 10.3 K/UL — SIGNIFICANT CHANGE UP (ref 3.8–10.5)

## 2025-08-22 RX ORDER — OLANZAPINE 10 MG/1
1.25 TABLET ORAL ONCE
Refills: 0 | Status: COMPLETED | OUTPATIENT
Start: 2025-08-22 | End: 2025-08-22

## 2025-08-22 RX ORDER — OLANZAPINE 10 MG/1
1.25 TABLET ORAL ONCE
Refills: 0 | Status: DISCONTINUED | OUTPATIENT
Start: 2025-08-22 | End: 2025-08-22

## 2025-08-22 RX ORDER — OLANZAPINE 10 MG/1
1.25 TABLET ORAL EVERY 6 HOURS
Refills: 0 | Status: DISCONTINUED | OUTPATIENT
Start: 2025-08-22 | End: 2025-08-25

## 2025-08-22 RX ADMIN — APIXABAN 5 MILLIGRAM(S): 2.5 TABLET, FILM COATED ORAL at 17:25

## 2025-08-22 RX ADMIN — QUETIAPINE FUMARATE 25 MILLIGRAM(S): 25 TABLET ORAL at 17:25

## 2025-08-22 RX ADMIN — Medication 2 SPRAY(S): at 17:24

## 2025-08-22 RX ADMIN — INSULIN LISPRO 2: 100 INJECTION, SOLUTION INTRAVENOUS; SUBCUTANEOUS at 08:30

## 2025-08-22 RX ADMIN — MIRTAZAPINE 30 MILLIGRAM(S): 30 TABLET, FILM COATED ORAL at 21:43

## 2025-08-22 RX ADMIN — MEMANTINE HYDROCHLORIDE 10 MILLIGRAM(S): 21 CAPSULE, EXTENDED RELEASE ORAL at 05:21

## 2025-08-22 RX ADMIN — Medication 1 APPLICATION(S): at 11:39

## 2025-08-22 RX ADMIN — INSULIN LISPRO 1: 100 INJECTION, SOLUTION INTRAVENOUS; SUBCUTANEOUS at 17:24

## 2025-08-22 RX ADMIN — Medication 2 SPRAY(S): at 00:19

## 2025-08-22 RX ADMIN — OLANZAPINE 1.25 MILLIGRAM(S): 10 TABLET ORAL at 13:08

## 2025-08-22 RX ADMIN — OLANZAPINE 1.25 MILLIGRAM(S): 10 TABLET ORAL at 16:31

## 2025-08-22 RX ADMIN — DORZOLAMIDE 1 DROP(S): 20 SOLUTION/ DROPS OPHTHALMIC at 09:55

## 2025-08-22 RX ADMIN — INSULIN LISPRO 2: 100 INJECTION, SOLUTION INTRAVENOUS; SUBCUTANEOUS at 12:14

## 2025-08-22 RX ADMIN — MEMANTINE HYDROCHLORIDE 10 MILLIGRAM(S): 21 CAPSULE, EXTENDED RELEASE ORAL at 17:25

## 2025-08-22 RX ADMIN — ATORVASTATIN CALCIUM 10 MILLIGRAM(S): 80 TABLET, FILM COATED ORAL at 21:42

## 2025-08-22 RX ADMIN — DORZOLAMIDE 1 DROP(S): 20 SOLUTION/ DROPS OPHTHALMIC at 21:42

## 2025-08-22 RX ADMIN — APIXABAN 5 MILLIGRAM(S): 2.5 TABLET, FILM COATED ORAL at 05:20

## 2025-08-22 RX ADMIN — WHITE PETROLATUM 1 APPLICATION(S): 1 OINTMENT TOPICAL at 11:36

## 2025-08-22 RX ADMIN — Medication 2 SPRAY(S): at 11:35

## 2025-08-22 RX ADMIN — Medication 30 MILLIGRAM(S): at 05:20

## 2025-08-22 RX ADMIN — CYANOCOBALAMIN 1000 MICROGRAM(S): 1000 INJECTION INTRAMUSCULAR; SUBCUTANEOUS at 11:36

## 2025-08-22 RX ADMIN — QUETIAPINE FUMARATE 25 MILLIGRAM(S): 25 TABLET ORAL at 05:21

## 2025-08-22 RX ADMIN — Medication 2 SPRAY(S): at 05:20

## 2025-08-22 RX ADMIN — Medication 40 MILLIGRAM(S): at 05:21

## 2025-08-22 RX ADMIN — FOLIC ACID 1 MILLIGRAM(S): 1 TABLET ORAL at 11:35

## 2025-08-22 RX ADMIN — CITALOPRAM 10 MILLIGRAM(S): 20 TABLET ORAL at 11:35

## 2025-08-22 RX ADMIN — Medication 2 SPRAY(S): at 23:53

## 2025-08-23 LAB
ANION GAP SERPL CALC-SCNC: 13 MMOL/L — SIGNIFICANT CHANGE UP (ref 7–14)
BUN SERPL-MCNC: 12 MG/DL — SIGNIFICANT CHANGE UP (ref 7–23)
CALCIUM SERPL-MCNC: 8.6 MG/DL — SIGNIFICANT CHANGE UP (ref 8.4–10.5)
CHLORIDE SERPL-SCNC: 104 MMOL/L — SIGNIFICANT CHANGE UP (ref 98–107)
CO2 SERPL-SCNC: 21 MMOL/L — LOW (ref 22–31)
CREAT SERPL-MCNC: 0.62 MG/DL — SIGNIFICANT CHANGE UP (ref 0.5–1.3)
EGFR: 92 ML/MIN/1.73M2 — SIGNIFICANT CHANGE UP
EGFR: 92 ML/MIN/1.73M2 — SIGNIFICANT CHANGE UP
GLUCOSE BLDC GLUCOMTR-MCNC: 156 MG/DL — HIGH (ref 70–99)
GLUCOSE BLDC GLUCOMTR-MCNC: 193 MG/DL — HIGH (ref 70–99)
GLUCOSE BLDC GLUCOMTR-MCNC: 207 MG/DL — HIGH (ref 70–99)
GLUCOSE BLDC GLUCOMTR-MCNC: 212 MG/DL — HIGH (ref 70–99)
GLUCOSE BLDC GLUCOMTR-MCNC: 216 MG/DL — HIGH (ref 70–99)
GLUCOSE SERPL-MCNC: 188 MG/DL — HIGH (ref 70–99)
HCT VFR BLD CALC: 33.7 % — LOW (ref 34.5–45)
HGB BLD-MCNC: 11.3 G/DL — LOW (ref 11.5–15.5)
MAGNESIUM SERPL-MCNC: 2.1 MG/DL — SIGNIFICANT CHANGE UP (ref 1.6–2.6)
MCHC RBC-ENTMCNC: 29.9 PG — SIGNIFICANT CHANGE UP (ref 27–34)
MCHC RBC-ENTMCNC: 33.5 G/DL — SIGNIFICANT CHANGE UP (ref 32–36)
MCV RBC AUTO: 89.2 FL — SIGNIFICANT CHANGE UP (ref 80–100)
NRBC # BLD AUTO: 0 K/UL — SIGNIFICANT CHANGE UP (ref 0–0)
NRBC # FLD: 0 K/UL — SIGNIFICANT CHANGE UP (ref 0–0)
NRBC BLD AUTO-RTO: 0 /100 WBCS — SIGNIFICANT CHANGE UP (ref 0–0)
PLATELET # BLD AUTO: 251 K/UL — SIGNIFICANT CHANGE UP (ref 150–400)
PMV BLD: 9.9 FL — SIGNIFICANT CHANGE UP (ref 7–13)
POTASSIUM SERPL-MCNC: 3.4 MMOL/L — LOW (ref 3.5–5.3)
POTASSIUM SERPL-SCNC: 3.4 MMOL/L — LOW (ref 3.5–5.3)
RBC # BLD: 3.78 M/UL — LOW (ref 3.8–5.2)
RBC # FLD: 13.6 % — SIGNIFICANT CHANGE UP (ref 10.3–14.5)
SODIUM SERPL-SCNC: 138 MMOL/L — SIGNIFICANT CHANGE UP (ref 135–145)
WBC # BLD: 10.16 K/UL — SIGNIFICANT CHANGE UP (ref 3.8–10.5)
WBC # FLD AUTO: 10.16 K/UL — SIGNIFICANT CHANGE UP (ref 3.8–10.5)

## 2025-08-23 PROCEDURE — 90792 PSYCH DIAG EVAL W/MED SRVCS: CPT

## 2025-08-23 PROCEDURE — 71045 X-RAY EXAM CHEST 1 VIEW: CPT | Mod: 26

## 2025-08-23 RX ORDER — IPRATROPIUM BROMIDE AND ALBUTEROL SULFATE .5; 2.5 MG/3ML; MG/3ML
3 SOLUTION RESPIRATORY (INHALATION) ONCE
Refills: 0 | Status: DISCONTINUED | OUTPATIENT
Start: 2025-08-23 | End: 2025-08-25

## 2025-08-23 RX ADMIN — Medication 1 APPLICATION(S): at 11:41

## 2025-08-23 RX ADMIN — INSULIN LISPRO 2: 100 INJECTION, SOLUTION INTRAVENOUS; SUBCUTANEOUS at 17:24

## 2025-08-23 RX ADMIN — Medication 2 SPRAY(S): at 18:41

## 2025-08-23 RX ADMIN — APIXABAN 5 MILLIGRAM(S): 2.5 TABLET, FILM COATED ORAL at 17:25

## 2025-08-23 RX ADMIN — OLANZAPINE 1.25 MILLIGRAM(S): 10 TABLET ORAL at 14:20

## 2025-08-23 RX ADMIN — MIRTAZAPINE 30 MILLIGRAM(S): 30 TABLET, FILM COATED ORAL at 21:43

## 2025-08-23 RX ADMIN — ATORVASTATIN CALCIUM 10 MILLIGRAM(S): 80 TABLET, FILM COATED ORAL at 21:43

## 2025-08-23 RX ADMIN — FOLIC ACID 1 MILLIGRAM(S): 1 TABLET ORAL at 11:41

## 2025-08-23 RX ADMIN — QUETIAPINE FUMARATE 25 MILLIGRAM(S): 25 TABLET ORAL at 05:09

## 2025-08-23 RX ADMIN — Medication 30 MILLIGRAM(S): at 05:09

## 2025-08-23 RX ADMIN — OLANZAPINE 1.25 MILLIGRAM(S): 10 TABLET ORAL at 09:14

## 2025-08-23 RX ADMIN — Medication 2 SPRAY(S): at 05:07

## 2025-08-23 RX ADMIN — INSULIN LISPRO 2: 100 INJECTION, SOLUTION INTRAVENOUS; SUBCUTANEOUS at 11:42

## 2025-08-23 RX ADMIN — MEMANTINE HYDROCHLORIDE 10 MILLIGRAM(S): 21 CAPSULE, EXTENDED RELEASE ORAL at 05:09

## 2025-08-23 RX ADMIN — WHITE PETROLATUM 1 APPLICATION(S): 1 OINTMENT TOPICAL at 11:41

## 2025-08-23 RX ADMIN — CYANOCOBALAMIN 1000 MICROGRAM(S): 1000 INJECTION INTRAMUSCULAR; SUBCUTANEOUS at 11:41

## 2025-08-23 RX ADMIN — QUETIAPINE FUMARATE 25 MILLIGRAM(S): 25 TABLET ORAL at 17:26

## 2025-08-23 RX ADMIN — DORZOLAMIDE 1 DROP(S): 20 SOLUTION/ DROPS OPHTHALMIC at 21:45

## 2025-08-23 RX ADMIN — INSULIN LISPRO 1: 100 INJECTION, SOLUTION INTRAVENOUS; SUBCUTANEOUS at 09:14

## 2025-08-23 RX ADMIN — MEMANTINE HYDROCHLORIDE 10 MILLIGRAM(S): 21 CAPSULE, EXTENDED RELEASE ORAL at 17:25

## 2025-08-23 RX ADMIN — Medication 2 SPRAY(S): at 11:41

## 2025-08-23 RX ADMIN — APIXABAN 5 MILLIGRAM(S): 2.5 TABLET, FILM COATED ORAL at 05:08

## 2025-08-23 RX ADMIN — Medication 2 SPRAY(S): at 23:14

## 2025-08-23 RX ADMIN — DORZOLAMIDE 1 DROP(S): 20 SOLUTION/ DROPS OPHTHALMIC at 09:16

## 2025-08-23 RX ADMIN — Medication 40 MILLIGRAM(S): at 05:09

## 2025-08-24 LAB
ANION GAP SERPL CALC-SCNC: 14 MMOL/L — SIGNIFICANT CHANGE UP (ref 7–14)
BUN SERPL-MCNC: 16 MG/DL — SIGNIFICANT CHANGE UP (ref 7–23)
CALCIUM SERPL-MCNC: 8.7 MG/DL — SIGNIFICANT CHANGE UP (ref 8.4–10.5)
CHLORIDE SERPL-SCNC: 103 MMOL/L — SIGNIFICANT CHANGE UP (ref 98–107)
CO2 SERPL-SCNC: 22 MMOL/L — SIGNIFICANT CHANGE UP (ref 22–31)
CREAT SERPL-MCNC: 0.68 MG/DL — SIGNIFICANT CHANGE UP (ref 0.5–1.3)
EGFR: 90 ML/MIN/1.73M2 — SIGNIFICANT CHANGE UP
EGFR: 90 ML/MIN/1.73M2 — SIGNIFICANT CHANGE UP
GLUCOSE BLDC GLUCOMTR-MCNC: 151 MG/DL — HIGH (ref 70–99)
GLUCOSE BLDC GLUCOMTR-MCNC: 158 MG/DL — HIGH (ref 70–99)
GLUCOSE BLDC GLUCOMTR-MCNC: 195 MG/DL — HIGH (ref 70–99)
GLUCOSE BLDC GLUCOMTR-MCNC: 196 MG/DL — HIGH (ref 70–99)
GLUCOSE BLDC GLUCOMTR-MCNC: 253 MG/DL — HIGH (ref 70–99)
GLUCOSE SERPL-MCNC: 175 MG/DL — HIGH (ref 70–99)
HCT VFR BLD CALC: 35.7 % — SIGNIFICANT CHANGE UP (ref 34.5–45)
HGB BLD-MCNC: 11.6 G/DL — SIGNIFICANT CHANGE UP (ref 11.5–15.5)
MAGNESIUM SERPL-MCNC: 2.1 MG/DL — SIGNIFICANT CHANGE UP (ref 1.6–2.6)
MCHC RBC-ENTMCNC: 30 PG — SIGNIFICANT CHANGE UP (ref 27–34)
MCHC RBC-ENTMCNC: 32.5 G/DL — SIGNIFICANT CHANGE UP (ref 32–36)
MCV RBC AUTO: 92.2 FL — SIGNIFICANT CHANGE UP (ref 80–100)
NRBC # BLD AUTO: 0 K/UL — SIGNIFICANT CHANGE UP (ref 0–0)
NRBC # FLD: 0 K/UL — SIGNIFICANT CHANGE UP (ref 0–0)
NRBC BLD AUTO-RTO: 0 /100 WBCS — SIGNIFICANT CHANGE UP (ref 0–0)
PLATELET # BLD AUTO: 235 K/UL — SIGNIFICANT CHANGE UP (ref 150–400)
PMV BLD: 10.5 FL — SIGNIFICANT CHANGE UP (ref 7–13)
POTASSIUM SERPL-MCNC: 3.5 MMOL/L — SIGNIFICANT CHANGE UP (ref 3.5–5.3)
POTASSIUM SERPL-SCNC: 3.5 MMOL/L — SIGNIFICANT CHANGE UP (ref 3.5–5.3)
RBC # BLD: 3.87 M/UL — SIGNIFICANT CHANGE UP (ref 3.8–5.2)
RBC # FLD: 14.1 % — SIGNIFICANT CHANGE UP (ref 10.3–14.5)
SODIUM SERPL-SCNC: 139 MMOL/L — SIGNIFICANT CHANGE UP (ref 135–145)
WBC # BLD: 9.98 K/UL — SIGNIFICANT CHANGE UP (ref 3.8–10.5)
WBC # FLD AUTO: 9.98 K/UL — SIGNIFICANT CHANGE UP (ref 3.8–10.5)

## 2025-08-24 PROCEDURE — 93016 CV STRESS TEST SUPVJ ONLY: CPT | Mod: GC

## 2025-08-24 PROCEDURE — 78451 HT MUSCLE IMAGE SPECT SING: CPT | Mod: 26

## 2025-08-24 PROCEDURE — 93018 CV STRESS TEST I&R ONLY: CPT | Mod: GC

## 2025-08-24 RX ADMIN — MIRTAZAPINE 30 MILLIGRAM(S): 30 TABLET, FILM COATED ORAL at 21:05

## 2025-08-24 RX ADMIN — OLANZAPINE 1.25 MILLIGRAM(S): 10 TABLET ORAL at 07:51

## 2025-08-24 RX ADMIN — Medication 2 SPRAY(S): at 12:13

## 2025-08-24 RX ADMIN — DORZOLAMIDE 1 DROP(S): 20 SOLUTION/ DROPS OPHTHALMIC at 12:10

## 2025-08-24 RX ADMIN — QUETIAPINE FUMARATE 25 MILLIGRAM(S): 25 TABLET ORAL at 17:10

## 2025-08-24 RX ADMIN — WHITE PETROLATUM 1 APPLICATION(S): 1 OINTMENT TOPICAL at 12:11

## 2025-08-24 RX ADMIN — MEMANTINE HYDROCHLORIDE 10 MILLIGRAM(S): 21 CAPSULE, EXTENDED RELEASE ORAL at 17:10

## 2025-08-24 RX ADMIN — QUETIAPINE FUMARATE 25 MILLIGRAM(S): 25 TABLET ORAL at 05:05

## 2025-08-24 RX ADMIN — Medication 2 SPRAY(S): at 23:12

## 2025-08-24 RX ADMIN — APIXABAN 5 MILLIGRAM(S): 2.5 TABLET, FILM COATED ORAL at 05:05

## 2025-08-24 RX ADMIN — FOLIC ACID 1 MILLIGRAM(S): 1 TABLET ORAL at 12:13

## 2025-08-24 RX ADMIN — INSULIN LISPRO 1: 100 INJECTION, SOLUTION INTRAVENOUS; SUBCUTANEOUS at 09:55

## 2025-08-24 RX ADMIN — CYANOCOBALAMIN 1000 MICROGRAM(S): 1000 INJECTION INTRAMUSCULAR; SUBCUTANEOUS at 12:13

## 2025-08-24 RX ADMIN — Medication 40 MILLIGRAM(S): at 12:10

## 2025-08-24 RX ADMIN — Medication 30 MILLIGRAM(S): at 05:06

## 2025-08-24 RX ADMIN — Medication 2 SPRAY(S): at 17:09

## 2025-08-24 RX ADMIN — Medication 1 APPLICATION(S): at 12:10

## 2025-08-24 RX ADMIN — MEMANTINE HYDROCHLORIDE 10 MILLIGRAM(S): 21 CAPSULE, EXTENDED RELEASE ORAL at 05:06

## 2025-08-24 RX ADMIN — DORZOLAMIDE 1 DROP(S): 20 SOLUTION/ DROPS OPHTHALMIC at 21:05

## 2025-08-24 RX ADMIN — INSULIN LISPRO 3: 100 INJECTION, SOLUTION INTRAVENOUS; SUBCUTANEOUS at 12:09

## 2025-08-24 RX ADMIN — APIXABAN 5 MILLIGRAM(S): 2.5 TABLET, FILM COATED ORAL at 17:10

## 2025-08-24 RX ADMIN — ATORVASTATIN CALCIUM 10 MILLIGRAM(S): 80 TABLET, FILM COATED ORAL at 21:05

## 2025-08-24 RX ADMIN — INSULIN LISPRO 1: 100 INJECTION, SOLUTION INTRAVENOUS; SUBCUTANEOUS at 17:31

## 2025-08-25 ENCOUNTER — APPOINTMENT (OUTPATIENT)
Dept: PULMONOLOGY | Facility: CLINIC | Age: 80
End: 2025-08-25

## 2025-08-25 ENCOUNTER — TRANSCRIPTION ENCOUNTER (OUTPATIENT)
Age: 80
End: 2025-08-25

## 2025-08-25 VITALS
HEART RATE: 77 BPM | SYSTOLIC BLOOD PRESSURE: 124 MMHG | RESPIRATION RATE: 18 BRPM | TEMPERATURE: 98 F | DIASTOLIC BLOOD PRESSURE: 70 MMHG | OXYGEN SATURATION: 95 %

## 2025-08-25 LAB
ANION GAP SERPL CALC-SCNC: 9 MMOL/L — SIGNIFICANT CHANGE UP (ref 7–14)
BUN SERPL-MCNC: 14 MG/DL — SIGNIFICANT CHANGE UP (ref 7–23)
CALCIUM SERPL-MCNC: 8.6 MG/DL — SIGNIFICANT CHANGE UP (ref 8.4–10.5)
CHLORIDE SERPL-SCNC: 106 MMOL/L — SIGNIFICANT CHANGE UP (ref 98–107)
CO2 SERPL-SCNC: 24 MMOL/L — SIGNIFICANT CHANGE UP (ref 22–31)
CREAT SERPL-MCNC: 0.73 MG/DL — SIGNIFICANT CHANGE UP (ref 0.5–1.3)
EGFR: 85 ML/MIN/1.73M2 — SIGNIFICANT CHANGE UP
EGFR: 85 ML/MIN/1.73M2 — SIGNIFICANT CHANGE UP
GLUCOSE BLDC GLUCOMTR-MCNC: 155 MG/DL — HIGH (ref 70–99)
GLUCOSE BLDC GLUCOMTR-MCNC: 188 MG/DL — HIGH (ref 70–99)
GLUCOSE BLDC GLUCOMTR-MCNC: 202 MG/DL — HIGH (ref 70–99)
GLUCOSE SERPL-MCNC: 161 MG/DL — HIGH (ref 70–99)
HCT VFR BLD CALC: 33.7 % — LOW (ref 34.5–45)
HGB BLD-MCNC: 11.1 G/DL — LOW (ref 11.5–15.5)
MAGNESIUM SERPL-MCNC: 2 MG/DL — SIGNIFICANT CHANGE UP (ref 1.6–2.6)
MCHC RBC-ENTMCNC: 29.8 PG — SIGNIFICANT CHANGE UP (ref 27–34)
MCHC RBC-ENTMCNC: 32.9 G/DL — SIGNIFICANT CHANGE UP (ref 32–36)
MCV RBC AUTO: 90.3 FL — SIGNIFICANT CHANGE UP (ref 80–100)
NRBC # BLD AUTO: 0 K/UL — SIGNIFICANT CHANGE UP (ref 0–0)
NRBC # FLD: 0 K/UL — SIGNIFICANT CHANGE UP (ref 0–0)
NRBC BLD AUTO-RTO: 0 /100 WBCS — SIGNIFICANT CHANGE UP (ref 0–0)
PLATELET # BLD AUTO: 239 K/UL — SIGNIFICANT CHANGE UP (ref 150–400)
PMV BLD: 9.8 FL — SIGNIFICANT CHANGE UP (ref 7–13)
POTASSIUM SERPL-MCNC: 3.7 MMOL/L — SIGNIFICANT CHANGE UP (ref 3.5–5.3)
POTASSIUM SERPL-SCNC: 3.7 MMOL/L — SIGNIFICANT CHANGE UP (ref 3.5–5.3)
RBC # BLD: 3.73 M/UL — LOW (ref 3.8–5.2)
RBC # FLD: 14.2 % — SIGNIFICANT CHANGE UP (ref 10.3–14.5)
SODIUM SERPL-SCNC: 139 MMOL/L — SIGNIFICANT CHANGE UP (ref 135–145)
WBC # BLD: 9.53 K/UL — SIGNIFICANT CHANGE UP (ref 3.8–10.5)
WBC # FLD AUTO: 9.53 K/UL — SIGNIFICANT CHANGE UP (ref 3.8–10.5)

## 2025-08-25 PROCEDURE — 93010 ELECTROCARDIOGRAM REPORT: CPT

## 2025-08-25 RX ORDER — LORATADINE 5 MG/5ML
1 SOLUTION ORAL
Refills: 0 | DISCHARGE

## 2025-08-25 RX ORDER — DEXTROSE 50 % IN WATER 50 %
45 SYRINGE (ML) INTRAVENOUS
Qty: 0 | Refills: 0 | DISCHARGE
Start: 2025-08-25

## 2025-08-25 RX ORDER — APIXABAN 2.5 MG/1
1 TABLET, FILM COATED ORAL
Qty: 60 | Refills: 0
Start: 2025-08-25 | End: 2025-09-23

## 2025-08-25 RX ORDER — SODIUM CHLORIDE 0.65 %
1 AEROSOL, SPRAY (ML) NASAL
Qty: 1 | Refills: 0
Start: 2025-08-25 | End: 2025-09-03

## 2025-08-25 RX ADMIN — FOLIC ACID 1 MILLIGRAM(S): 1 TABLET ORAL at 11:05

## 2025-08-25 RX ADMIN — QUETIAPINE FUMARATE 25 MILLIGRAM(S): 25 TABLET ORAL at 05:03

## 2025-08-25 RX ADMIN — INSULIN LISPRO 1: 100 INJECTION, SOLUTION INTRAVENOUS; SUBCUTANEOUS at 07:43

## 2025-08-25 RX ADMIN — CYANOCOBALAMIN 1000 MICROGRAM(S): 1000 INJECTION INTRAMUSCULAR; SUBCUTANEOUS at 11:05

## 2025-08-25 RX ADMIN — Medication 30 MILLIGRAM(S): at 05:03

## 2025-08-25 RX ADMIN — INSULIN LISPRO 2: 100 INJECTION, SOLUTION INTRAVENOUS; SUBCUTANEOUS at 13:00

## 2025-08-25 RX ADMIN — MEMANTINE HYDROCHLORIDE 10 MILLIGRAM(S): 21 CAPSULE, EXTENDED RELEASE ORAL at 05:03

## 2025-08-25 RX ADMIN — Medication 2 SPRAY(S): at 06:24

## 2025-08-25 RX ADMIN — Medication 2 SPRAY(S): at 11:07

## 2025-08-25 RX ADMIN — Medication 1 APPLICATION(S): at 11:08

## 2025-08-25 RX ADMIN — Medication 40 MILLIGRAM(S): at 05:03

## 2025-08-25 RX ADMIN — WHITE PETROLATUM 1 APPLICATION(S): 1 OINTMENT TOPICAL at 11:05

## 2025-08-25 RX ADMIN — DORZOLAMIDE 1 DROP(S): 20 SOLUTION/ DROPS OPHTHALMIC at 07:44

## 2025-08-25 RX ADMIN — APIXABAN 5 MILLIGRAM(S): 2.5 TABLET, FILM COATED ORAL at 05:03

## 2025-08-26 ENCOUNTER — NON-APPOINTMENT (OUTPATIENT)
Age: 80
End: 2025-08-26

## 2025-08-26 ENCOUNTER — APPOINTMENT (OUTPATIENT)
Dept: PULMONOLOGY | Facility: CLINIC | Age: 80
End: 2025-08-26
Payer: MEDICARE

## 2025-08-26 DIAGNOSIS — Z87.898 PERSONAL HISTORY OF OTHER SPECIFIED CONDITIONS: ICD-10-CM

## 2025-08-26 DIAGNOSIS — A31.0 PULMONARY MYCOBACTERIAL INFECTION: ICD-10-CM

## 2025-08-26 DIAGNOSIS — J18.9 PNEUMONIA, UNSPECIFIED ORGANISM: ICD-10-CM

## 2025-08-26 PROCEDURE — 99496 TRANSJ CARE MGMT HIGH F2F 7D: CPT | Mod: 2W

## 2025-08-26 RX ORDER — OXYMETAZOLINE HYDROCHLORIDE 0.05 G/100ML
0.05 SPRAY NASAL
Qty: 1 | Refills: 0 | Status: ACTIVE | COMMUNITY
Start: 2025-08-26 | End: 1900-01-01

## 2025-08-26 RX ORDER — QUETIAPINE 25 MG/1
25 TABLET, FILM COATED ORAL TWICE DAILY
Qty: 60 | Refills: 0 | Status: ACTIVE | COMMUNITY
Start: 2025-08-26

## 2025-08-29 PROBLEM — A31.0 MAI (MYCOBACTERIUM AVIUM-INTRACELLULARE): Status: ACTIVE | Noted: 2025-08-29

## 2025-08-29 PROBLEM — J18.9 PNA (PNEUMONIA): Status: ACTIVE | Noted: 2025-08-29

## 2025-08-29 PROBLEM — J47.9 BRONCHIECTASIS: Status: ACTIVE | Noted: 2025-08-29

## 2025-08-30 ENCOUNTER — APPOINTMENT (OUTPATIENT)
Dept: AFTER HOURS CARE | Facility: EMERGENCY ROOM | Age: 80
End: 2025-08-30

## 2025-08-30 ENCOUNTER — TRANSCRIPTION ENCOUNTER (OUTPATIENT)
Age: 80
End: 2025-08-30

## 2025-08-30 DIAGNOSIS — J06.9 ACUTE UPPER RESPIRATORY INFECTION, UNSPECIFIED: ICD-10-CM

## 2025-08-30 PROCEDURE — 99215 OFFICE O/P EST HI 40 MIN: CPT | Mod: 2W

## 2025-08-30 RX ORDER — AZITHROMYCIN 250 MG/1
250 TABLET, FILM COATED ORAL
Qty: 1 | Refills: 0 | Status: ACTIVE | COMMUNITY
Start: 2025-08-30 | End: 1900-01-01

## 2025-09-01 ENCOUNTER — TRANSCRIPTION ENCOUNTER (OUTPATIENT)
Age: 80
End: 2025-09-01

## 2025-09-03 ENCOUNTER — APPOINTMENT (OUTPATIENT)
Age: 80
End: 2025-09-03

## 2025-09-03 VITALS
HEART RATE: 70 BPM | DIASTOLIC BLOOD PRESSURE: 70 MMHG | RESPIRATION RATE: 16 BRPM | SYSTOLIC BLOOD PRESSURE: 130 MMHG | OXYGEN SATURATION: 95 % | TEMPERATURE: 97.1 F

## 2025-09-03 DIAGNOSIS — E11.9 TYPE 2 DIABETES MELLITUS W/OUT COMPLICATIONS: ICD-10-CM

## 2025-09-03 DIAGNOSIS — J47.9 BRONCHIECTASIS, UNCOMPLICATED: ICD-10-CM

## 2025-09-03 DIAGNOSIS — I10 ESSENTIAL (PRIMARY) HYPERTENSION: ICD-10-CM

## 2025-09-03 DIAGNOSIS — I48.91 UNSPECIFIED ATRIAL FIBRILLATION: ICD-10-CM

## 2025-09-03 DIAGNOSIS — F03.90 UNSPECIFIED DEMENTIA W/OUT BEHAVIORAL DISTURBANCE: ICD-10-CM

## 2025-09-03 DIAGNOSIS — R04.0 EPISTAXIS: ICD-10-CM

## 2025-09-03 PROCEDURE — 99349 HOME/RES VST EST MOD MDM 40: CPT

## 2025-09-03 RX ORDER — ALBUTEROL SULFATE 90 UG/1
108 (90 BASE) INHALANT RESPIRATORY (INHALATION)
Qty: 1 | Refills: 2 | Status: ACTIVE | COMMUNITY
Start: 2025-09-03 | End: 1900-01-01

## 2025-09-15 ENCOUNTER — TRANSCRIPTION ENCOUNTER (OUTPATIENT)
Age: 80
End: 2025-09-15

## 2025-09-16 ENCOUNTER — NON-APPOINTMENT (OUTPATIENT)
Age: 80
End: 2025-09-16

## 2025-09-16 ENCOUNTER — TRANSCRIPTION ENCOUNTER (OUTPATIENT)
Age: 80
End: 2025-09-16

## 2025-09-16 DIAGNOSIS — R04.0 EPISTAXIS: ICD-10-CM

## 2025-09-18 ENCOUNTER — APPOINTMENT (OUTPATIENT)
Dept: HOME HEALTH SERVICES | Facility: HOME HEALTH | Age: 80
End: 2025-09-18

## 2025-09-18 VITALS — DIASTOLIC BLOOD PRESSURE: 70 MMHG | HEART RATE: 68 BPM | SYSTOLIC BLOOD PRESSURE: 126 MMHG | OXYGEN SATURATION: 94 %

## 2025-09-18 DIAGNOSIS — I48.0 PAROXYSMAL ATRIAL FIBRILLATION: ICD-10-CM

## 2025-09-18 DIAGNOSIS — E11.9 TYPE 2 DIABETES MELLITUS W/OUT COMPLICATIONS: ICD-10-CM

## 2025-09-18 DIAGNOSIS — Z23 ENCOUNTER FOR IMMUNIZATION: ICD-10-CM

## 2025-09-18 DIAGNOSIS — I48.91 UNSPECIFIED ATRIAL FIBRILLATION: ICD-10-CM

## 2025-09-18 DIAGNOSIS — R04.0 EPISTAXIS: ICD-10-CM

## 2025-09-18 DIAGNOSIS — F03.90 UNSPECIFIED DEMENTIA W/OUT BEHAVIORAL DISTURBANCE: ICD-10-CM

## 2025-09-18 LAB
BASOPHILS # BLD AUTO: 0.06 K/UL
BASOPHILS NFR BLD AUTO: 0.6 %
EOSINOPHIL # BLD AUTO: 0.28 K/UL
EOSINOPHIL NFR BLD AUTO: 3 %
HCT VFR BLD CALC: 30.9 %
HGB BLD-MCNC: 10 G/DL
IMM GRANULOCYTES NFR BLD AUTO: 0.5 %
LYMPHOCYTES # BLD AUTO: 3.63 K/UL
LYMPHOCYTES NFR BLD AUTO: 39.2 %
MAN DIFF?: NORMAL
MCHC RBC-ENTMCNC: 29.2 PG
MCHC RBC-ENTMCNC: 32.4 G/DL
MCV RBC AUTO: 90.1 FL
MONOCYTES # BLD AUTO: 0.83 K/UL
MONOCYTES NFR BLD AUTO: 9 %
NEUTROPHILS # BLD AUTO: 4.4 K/UL
NEUTROPHILS NFR BLD AUTO: 47.7 %
PLATELET # BLD AUTO: 272 K/UL
RBC # BLD: 3.43 M/UL
RBC # FLD: 13.9 %
WBC # FLD AUTO: 9.25 K/UL

## 2025-09-19 ENCOUNTER — NON-APPOINTMENT (OUTPATIENT)
Age: 80
End: 2025-09-19

## 2025-09-26 LAB
BASOPHILS # BLD AUTO: 0.05 K/UL
BASOPHILS NFR BLD AUTO: 0.5 %
EOSINOPHIL # BLD AUTO: 0.35 K/UL
EOSINOPHIL NFR BLD AUTO: 3.5 %
HCT VFR BLD CALC: 31.3 %
HGB BLD-MCNC: 9.5 G/DL
IMM GRANULOCYTES NFR BLD AUTO: 0.4 %
LYMPHOCYTES # BLD AUTO: 3.35 K/UL
LYMPHOCYTES NFR BLD AUTO: 33.4 %
MAN DIFF?: NORMAL
MCHC RBC-ENTMCNC: 28.4 PG
MCHC RBC-ENTMCNC: 30.4 G/DL
MCV RBC AUTO: 93.4 FL
MONOCYTES # BLD AUTO: 0.72 K/UL
MONOCYTES NFR BLD AUTO: 7.2 %
NEUTROPHILS # BLD AUTO: 5.52 K/UL
NEUTROPHILS NFR BLD AUTO: 55 %
PLATELET # BLD AUTO: 266 K/UL
RBC # BLD: 3.35 M/UL
RBC # FLD: 14.4 %
WBC # FLD AUTO: 10.03 K/UL

## (undated) DEVICE — LIGASURE MARYLAND 37CM

## (undated) DEVICE — Device

## (undated) DEVICE — ELCTR GROUNDING PAD ADULT COVIDIEN

## (undated) DEVICE — SUT VICRYL 0 27" UR-6

## (undated) DEVICE — TUBING OLYMPUS INSUFFLATION

## (undated) DEVICE — ELCTR BOVIE PENCIL SMOKE EVACUATION

## (undated) DEVICE — SOL IRR POUR NS 0.9% 1500ML

## (undated) DEVICE — POOLE SUCTION TIP

## (undated) DEVICE — SHEARS COVIDIEN ENDO SHEAR 5MM X 31CM W UNIPOLAR CAUTERY

## (undated) DEVICE — POSITIONER STRAP ARMBOARD VELCRO TS-30

## (undated) DEVICE — BASIN SET SINGLE

## (undated) DEVICE — D HELP - CLEARVIEW CLEARIFY SYSTEM

## (undated) DEVICE — SOL IRR POUR H2O 1500ML

## (undated) DEVICE — DRAPE TOWEL BLUE 17" X 24"

## (undated) DEVICE — SUT VICRYL 3-0 27" SH UNDYED

## (undated) DEVICE — SUT MAXON 1 36" GS-24

## (undated) DEVICE — SUT SILK 3-0 18" SH (POP-OFF)

## (undated) DEVICE — DRSG TELFA 3 X 8

## (undated) DEVICE — FOLEY TRAY 16FR 5CC LF UMETER CLOSED

## (undated) DEVICE — STAPLER SKIN VISI-STAT 35 WIDE

## (undated) DEVICE — TROCAR COVIDIEN VERSAPORT BLADELESS OPTICAL 5MM STANDARD

## (undated) DEVICE — VENODYNE/SCD SLEEVE CALF MEDIUM

## (undated) DEVICE — TROCAR COVIDIEN BLUNT TIP HASSAN 12MM

## (undated) DEVICE — DRAPE FLUID WARMER 44 X 44"

## (undated) DEVICE — POSITIONER FOAM EGG CRATE ULNAR 2PCS (PINK)

## (undated) DEVICE — PACK GENERAL LAPAROSCOPY

## (undated) DEVICE — TUBING SUCTION NONCONDUCTIVE 6MM X 12FT

## (undated) DEVICE — LABELS BLANK W PEN

## (undated) DEVICE — DRAPE 3/4 SHEET 52X76"

## (undated) DEVICE — DRAPE GENERAL ENDOSCOPY

## (undated) DEVICE — DRSG TEGADERM 2.5X3"

## (undated) DEVICE — TUBING HYDRO-SURG PLUS IRRIGATOR W SMOKEVAC & PROBE

## (undated) DEVICE — BLADE SURGICAL #10 CARBON

## (undated) DEVICE — WARMING BLANKET FULL UNDERBODY

## (undated) DEVICE — TROCAR COVIDIEN BLUNT TIP HASSAN 10MM

## (undated) DEVICE — GLV 7.5 PROTEXIS (WHITE)

## (undated) DEVICE — ELCTR BOVIE BLADE 3/4" EXTENDED LENGTH 6"

## (undated) DEVICE — STAPLER SKIN MULTI DIRECTION W35

## (undated) DEVICE — DRSG STERISTRIPS 0.5 X 4"

## (undated) DEVICE — LIGASURE IMPACT

## (undated) DEVICE — TROCAR APPLIED MEDICAL KII BALLOON BLUNT TIP 12MM X 100MM

## (undated) DEVICE — DRAIN RESERVOIR FOR JACKSON PRATT 100CC CARDINAL

## (undated) DEVICE — TUBING INSUFFLATION LAP FILTER 10FT

## (undated) DEVICE — NDL COUNTER FOAM AND MAGNET 20-40

## (undated) DEVICE — STAPLER COVIDIEN ENDO GIA SHORT HANDLE